# Patient Record
Sex: FEMALE | Race: WHITE | Employment: UNEMPLOYED | URBAN - METROPOLITAN AREA
[De-identification: names, ages, dates, MRNs, and addresses within clinical notes are randomized per-mention and may not be internally consistent; named-entity substitution may affect disease eponyms.]

---

## 2019-02-08 PROBLEM — F32.A MILD DEPRESSION: Status: ACTIVE | Noted: 2019-02-08

## 2019-04-08 PROBLEM — R73.01 IMPAIRED FASTING BLOOD SUGAR: Status: ACTIVE | Noted: 2019-04-08

## 2019-05-07 ENCOUNTER — HOSPITAL ENCOUNTER (OUTPATIENT)
Dept: ULTRASOUND IMAGING | Age: 64
Discharge: HOME | End: 2019-05-07
Attending: INTERNAL MEDICINE
Payer: COMMERCIAL

## 2019-05-07 DIAGNOSIS — M79.89 LEFT LEG SWELLING: ICD-10-CM

## 2019-05-07 PROCEDURE — 93971 EXTREMITY STUDY: CPT

## 2019-11-06 ENCOUNTER — HOSPITAL ENCOUNTER (OUTPATIENT)
Dept: MAMMOGRAPHY | Age: 64
Discharge: HOME | End: 2019-11-06
Attending: INTERNAL MEDICINE

## 2019-11-06 DIAGNOSIS — Z12.39 BREAST CANCER SCREENING: ICD-10-CM

## 2020-01-09 ENCOUNTER — HOSPITAL ENCOUNTER (OUTPATIENT)
Dept: GENERAL RADIOLOGY | Age: 65
Discharge: HOME | End: 2020-01-09

## 2020-01-09 DIAGNOSIS — R06.02 SHORTNESS OF BREATH: ICD-10-CM

## 2020-01-23 PROBLEM — R06.02 SOB (SHORTNESS OF BREATH): Status: ACTIVE | Noted: 2020-01-23

## 2020-01-23 PROBLEM — R60.0 LEG EDEMA: Status: ACTIVE | Noted: 2020-01-23

## 2020-01-30 ENCOUNTER — HOSPITAL ENCOUNTER (OUTPATIENT)
Dept: LAB | Age: 65
Discharge: HOME | End: 2020-01-30
Payer: COMMERCIAL

## 2020-01-30 DIAGNOSIS — R06.02 SOB (SHORTNESS OF BREATH): ICD-10-CM

## 2020-01-30 DIAGNOSIS — R60.0 LEG EDEMA: ICD-10-CM

## 2020-01-30 LAB — BNP SERPL-MCNC: 239 PG/ML (ref 5–125)

## 2020-01-30 PROCEDURE — 83880 ASSAY OF NATRIURETIC PEPTIDE: CPT

## 2020-01-30 PROCEDURE — 36415 COLL VENOUS BLD VENIPUNCTURE: CPT

## 2020-06-25 PROBLEM — J84.9 INTERSTITIAL LUNG DISEASE (HCC): Status: ACTIVE | Noted: 2020-06-25

## 2020-06-25 PROBLEM — R06.02 SOB (SHORTNESS OF BREATH): Status: RESOLVED | Noted: 2020-01-23 | Resolved: 2020-06-25

## 2020-09-09 ENCOUNTER — HOSPITAL ENCOUNTER (INPATIENT)
Age: 65
LOS: 7 days | Discharge: HOME HEALTH | DRG: 871 | End: 2020-09-17
Attending: EMERGENCY MEDICINE | Admitting: INTERNAL MEDICINE
Payer: COMMERCIAL

## 2020-09-09 ENCOUNTER — APPOINTMENT (OUTPATIENT)
Dept: GENERAL RADIOLOGY | Age: 65
DRG: 871 | End: 2020-09-09
Attending: EMERGENCY MEDICINE
Payer: COMMERCIAL

## 2020-09-09 DIAGNOSIS — E66.01 MORBID OBESITY (HCC): ICD-10-CM

## 2020-09-09 DIAGNOSIS — R09.02 HYPOXIA: ICD-10-CM

## 2020-09-09 DIAGNOSIS — A41.9 SEPSIS, DUE TO UNSPECIFIED ORGANISM, UNSPECIFIED WHETHER ACUTE ORGAN DYSFUNCTION PRESENT (HCC): Primary | ICD-10-CM

## 2020-09-09 DIAGNOSIS — L03.116 CELLULITIS OF LEFT LOWER LEG: ICD-10-CM

## 2020-09-09 DIAGNOSIS — R05.9 COUGH: ICD-10-CM

## 2020-09-09 PROBLEM — E11.9 CONTROLLED TYPE 2 DIABETES MELLITUS, WITHOUT LONG-TERM CURRENT USE OF INSULIN (HCC): Status: ACTIVE | Noted: 2020-09-09

## 2020-09-09 PROBLEM — R73.01 IMPAIRED FASTING BLOOD SUGAR: Status: RESOLVED | Noted: 2019-04-08 | Resolved: 2020-09-09

## 2020-09-09 LAB
ALBUMIN SERPL-MCNC: 2.7 G/DL (ref 3.2–4.6)
ALBUMIN/GLOB SERPL: 0.6 {RATIO} (ref 1.2–3.5)
ALP SERPL-CCNC: 102 U/L (ref 50–136)
ALT SERPL-CCNC: 47 U/L (ref 12–65)
ANION GAP SERPL CALC-SCNC: 6 MMOL/L (ref 7–16)
AST SERPL-CCNC: 134 U/L (ref 15–37)
BASOPHILS # BLD: 0 K/UL (ref 0–0.2)
BASOPHILS NFR BLD: 0 % (ref 0–2)
BILIRUB SERPL-MCNC: 0.4 MG/DL (ref 0.2–1.1)
BUN SERPL-MCNC: 12 MG/DL (ref 8–23)
CALCIUM SERPL-MCNC: 8.8 MG/DL (ref 8.3–10.4)
CHLORIDE SERPL-SCNC: 102 MMOL/L (ref 98–107)
CO2 SERPL-SCNC: 31 MMOL/L (ref 21–32)
CREAT SERPL-MCNC: 0.81 MG/DL (ref 0.6–1)
DIFFERENTIAL METHOD BLD: ABNORMAL
EOSINOPHIL # BLD: 0 K/UL (ref 0–0.8)
EOSINOPHIL NFR BLD: 0 % (ref 0.5–7.8)
ERYTHROCYTE [DISTWIDTH] IN BLOOD BY AUTOMATED COUNT: 15 % (ref 11.9–14.6)
GLOBULIN SER CALC-MCNC: 4.8 G/DL (ref 2.3–3.5)
GLUCOSE SERPL-MCNC: 150 MG/DL (ref 65–100)
HCT VFR BLD AUTO: 43.6 % (ref 35.8–46.3)
HGB BLD-MCNC: 14 G/DL (ref 11.7–15.4)
IMM GRANULOCYTES # BLD AUTO: 0.1 K/UL (ref 0–0.5)
IMM GRANULOCYTES NFR BLD AUTO: 1 % (ref 0–5)
LACTATE SERPL-SCNC: 0.7 MMOL/L (ref 0.4–2)
LYMPHOCYTES # BLD: 0.7 K/UL (ref 0.5–4.6)
LYMPHOCYTES NFR BLD: 6 % (ref 13–44)
MCH RBC QN AUTO: 29.2 PG (ref 26.1–32.9)
MCHC RBC AUTO-ENTMCNC: 32.1 G/DL (ref 31.4–35)
MCV RBC AUTO: 90.8 FL (ref 79.6–97.8)
MONOCYTES # BLD: 0.7 K/UL (ref 0.1–1.3)
MONOCYTES NFR BLD: 6 % (ref 4–12)
NEUTS SEG # BLD: 10.9 K/UL (ref 1.7–8.2)
NEUTS SEG NFR BLD: 87 % (ref 43–78)
NRBC # BLD: 0 K/UL (ref 0–0.2)
PLATELET # BLD AUTO: 175 K/UL (ref 150–450)
PMV BLD AUTO: 11.1 FL (ref 9.4–12.3)
POTASSIUM SERPL-SCNC: 3.7 MMOL/L (ref 3.5–5.1)
PROT SERPL-MCNC: 7.5 G/DL (ref 6.3–8.2)
RBC # BLD AUTO: 4.8 M/UL (ref 4.05–5.2)
SODIUM SERPL-SCNC: 139 MMOL/L (ref 136–145)
TROPONIN-HIGH SENSITIVITY: 67.1 PG/ML (ref 0–14)
WBC # BLD AUTO: 12.5 K/UL (ref 4.3–11.1)

## 2020-09-09 PROCEDURE — 93005 ELECTROCARDIOGRAM TRACING: CPT | Performed by: EMERGENCY MEDICINE

## 2020-09-09 PROCEDURE — 96367 TX/PROPH/DG ADDL SEQ IV INF: CPT

## 2020-09-09 PROCEDURE — 71045 X-RAY EXAM CHEST 1 VIEW: CPT

## 2020-09-09 PROCEDURE — 74011250637 HC RX REV CODE- 250/637: Performed by: EMERGENCY MEDICINE

## 2020-09-09 PROCEDURE — 87040 BLOOD CULTURE FOR BACTERIA: CPT

## 2020-09-09 PROCEDURE — 85025 COMPLETE CBC W/AUTO DIFF WBC: CPT

## 2020-09-09 PROCEDURE — 87635 SARS-COV-2 COVID-19 AMP PRB: CPT

## 2020-09-09 PROCEDURE — 96365 THER/PROPH/DIAG IV INF INIT: CPT

## 2020-09-09 PROCEDURE — 74011250636 HC RX REV CODE- 250/636: Performed by: EMERGENCY MEDICINE

## 2020-09-09 PROCEDURE — 84484 ASSAY OF TROPONIN QUANT: CPT

## 2020-09-09 PROCEDURE — 87205 SMEAR GRAM STAIN: CPT

## 2020-09-09 PROCEDURE — 80053 COMPREHEN METABOLIC PANEL: CPT

## 2020-09-09 PROCEDURE — 81003 URINALYSIS AUTO W/O SCOPE: CPT

## 2020-09-09 PROCEDURE — 87150 DNA/RNA AMPLIFIED PROBE: CPT

## 2020-09-09 PROCEDURE — 74011000258 HC RX REV CODE- 258: Performed by: EMERGENCY MEDICINE

## 2020-09-09 PROCEDURE — 83605 ASSAY OF LACTIC ACID: CPT

## 2020-09-09 PROCEDURE — 99283 EMERGENCY DEPT VISIT LOW MDM: CPT

## 2020-09-09 PROCEDURE — 87186 SC STD MICRODIL/AGAR DIL: CPT

## 2020-09-09 PROCEDURE — 87077 CULTURE AEROBIC IDENTIFY: CPT

## 2020-09-09 RX ORDER — IBUPROFEN 400 MG/1
400 TABLET ORAL
Status: COMPLETED | OUTPATIENT
Start: 2020-09-09 | End: 2020-09-09

## 2020-09-09 RX ORDER — ACETAMINOPHEN 500 MG
1000 TABLET ORAL
Status: COMPLETED | OUTPATIENT
Start: 2020-09-09 | End: 2020-09-09

## 2020-09-09 RX ADMIN — ACETAMINOPHEN 1000 MG: 500 TABLET, FILM COATED ORAL at 22:57

## 2020-09-09 RX ADMIN — IBUPROFEN 400 MG: 400 TABLET, FILM COATED ORAL at 23:55

## 2020-09-09 RX ADMIN — AZITHROMYCIN MONOHYDRATE 500 MG: 500 INJECTION, POWDER, LYOPHILIZED, FOR SOLUTION INTRAVENOUS at 22:57

## 2020-09-09 RX ADMIN — CEFTRIAXONE SODIUM 2 G: 2 INJECTION, POWDER, FOR SOLUTION INTRAMUSCULAR; INTRAVENOUS at 22:05

## 2020-09-10 PROBLEM — R09.02 HYPOXIA: Status: ACTIVE | Noted: 2020-09-10

## 2020-09-10 PROBLEM — A41.9 SEPSIS (HCC): Status: ACTIVE | Noted: 2020-09-10

## 2020-09-10 PROBLEM — R74.8 ABNORMAL CARDIAC ENZYME LEVEL: Status: ACTIVE | Noted: 2020-09-10

## 2020-09-10 PROBLEM — L03.90 CELLULITIS: Status: ACTIVE | Noted: 2020-09-10

## 2020-09-10 PROBLEM — L03.116 CELLULITIS OF LEFT LOWER EXTREMITY: Status: ACTIVE | Noted: 2020-09-10

## 2020-09-10 PROBLEM — Z20.822 SUSPECTED COVID-19 VIRUS INFECTION: Status: ACTIVE | Noted: 2020-09-10

## 2020-09-10 LAB
ABO + RH BLD: NORMAL
ACC. NO. FROM MICRO ORDER, ACCP: ABNORMAL
ATRIAL RATE: 104 BPM
BACTERIA URNS QL MICRO: NORMAL /HPF
BLOOD GROUP ANTIBODIES SERPL: NORMAL
CALCULATED P AXIS, ECG09: 74 DEGREES
CALCULATED R AXIS, ECG10: 26 DEGREES
CALCULATED T AXIS, ECG11: 60 DEGREES
CASTS URNS QL MICRO: 0 /LPF
COVID-19 RAPID TEST, COVR: NOT DETECTED
CRYSTALS URNS QL MICRO: NORMAL /LPF
DIAGNOSIS, 93000: NORMAL
EPI CELLS #/AREA URNS HPF: NORMAL /HPF
GLUCOSE BLD STRIP.AUTO-MCNC: 129 MG/DL (ref 65–100)
GLUCOSE BLD STRIP.AUTO-MCNC: 134 MG/DL (ref 65–100)
GLUCOSE BLD STRIP.AUTO-MCNC: 141 MG/DL (ref 65–100)
GLUCOSE BLD STRIP.AUTO-MCNC: 161 MG/DL (ref 65–100)
INTERPRETATION: ABNORMAL
MUCOUS THREADS URNS QL MICRO: 0 /LPF
P-R INTERVAL, ECG05: 184 MS
Q-T INTERVAL, ECG07: 310 MS
QRS DURATION, ECG06: 74 MS
QTC CALCULATION (BEZET), ECG08: 407 MS
RBC #/AREA URNS HPF: NORMAL /HPF
SARS COV-2, XPGCVT: NEGATIVE
SOURCE, COVRS: NORMAL
SPECIMEN EXP DATE BLD: NORMAL
STREPTOCOCCUS , STPSP: DETECTED
TROPONIN-HIGH SENSITIVITY: 38 PG/ML (ref 0–14)
TROPONIN-HIGH SENSITIVITY: 39.4 PG/ML (ref 0–14)
TROPONIN-HIGH SENSITIVITY: 67 PG/ML (ref 0–14)
VENTRICULAR RATE, ECG03: 104 BPM
WBC URNS QL MICRO: NORMAL /HPF

## 2020-09-10 PROCEDURE — 84484 ASSAY OF TROPONIN QUANT: CPT

## 2020-09-10 PROCEDURE — 94760 N-INVAS EAR/PLS OXIMETRY 1: CPT

## 2020-09-10 PROCEDURE — 74011250637 HC RX REV CODE- 250/637: Performed by: INTERNAL MEDICINE

## 2020-09-10 PROCEDURE — 74011250636 HC RX REV CODE- 250/636: Performed by: INTERNAL MEDICINE

## 2020-09-10 PROCEDURE — 65270000029 HC RM PRIVATE

## 2020-09-10 PROCEDURE — 94640 AIRWAY INHALATION TREATMENT: CPT

## 2020-09-10 PROCEDURE — 74011000258 HC RX REV CODE- 258: Performed by: INTERNAL MEDICINE

## 2020-09-10 PROCEDURE — 82962 GLUCOSE BLOOD TEST: CPT

## 2020-09-10 PROCEDURE — 77030040393 HC DRSG OPTIFOAM GENT MDII -B

## 2020-09-10 PROCEDURE — 81015 MICROSCOPIC EXAM OF URINE: CPT

## 2020-09-10 PROCEDURE — 36415 COLL VENOUS BLD VENIPUNCTURE: CPT

## 2020-09-10 PROCEDURE — 86900 BLOOD TYPING SEROLOGIC ABO: CPT

## 2020-09-10 PROCEDURE — 74011636637 HC RX REV CODE- 636/637: Performed by: INTERNAL MEDICINE

## 2020-09-10 PROCEDURE — 77010033678 HC OXYGEN DAILY

## 2020-09-10 PROCEDURE — 74011250637 HC RX REV CODE- 250/637

## 2020-09-10 RX ORDER — NYSTATIN 100000 [USP'U]/G
POWDER TOPICAL
Status: COMPLETED
Start: 2020-09-10 | End: 2020-09-10

## 2020-09-10 RX ORDER — PROMETHAZINE HYDROCHLORIDE 25 MG/1
12.5 TABLET ORAL
Status: DISCONTINUED | OUTPATIENT
Start: 2020-09-10 | End: 2020-09-17 | Stop reason: HOSPADM

## 2020-09-10 RX ORDER — NYSTATIN 100000 [USP'U]/G
POWDER TOPICAL 2 TIMES DAILY
Status: DISCONTINUED | OUTPATIENT
Start: 2020-09-10 | End: 2020-09-17 | Stop reason: HOSPADM

## 2020-09-10 RX ORDER — BUDESONIDE AND FORMOTEROL FUMARATE DIHYDRATE 80; 4.5 UG/1; UG/1
2 AEROSOL RESPIRATORY (INHALATION)
Status: DISCONTINUED | OUTPATIENT
Start: 2020-09-10 | End: 2020-09-17 | Stop reason: HOSPADM

## 2020-09-10 RX ORDER — ONDANSETRON 2 MG/ML
4 INJECTION INTRAMUSCULAR; INTRAVENOUS
Status: DISCONTINUED | OUTPATIENT
Start: 2020-09-10 | End: 2020-09-17 | Stop reason: HOSPADM

## 2020-09-10 RX ORDER — SODIUM CHLORIDE 0.9 % (FLUSH) 0.9 %
5-40 SYRINGE (ML) INJECTION EVERY 8 HOURS
Status: DISCONTINUED | OUTPATIENT
Start: 2020-09-10 | End: 2020-09-17 | Stop reason: HOSPADM

## 2020-09-10 RX ORDER — POTASSIUM CHLORIDE 20 MEQ/1
20 TABLET, EXTENDED RELEASE ORAL DAILY
Status: DISCONTINUED | OUTPATIENT
Start: 2020-09-10 | End: 2020-09-17 | Stop reason: HOSPADM

## 2020-09-10 RX ORDER — SODIUM CHLORIDE 9 MG/ML
75 INJECTION, SOLUTION INTRAVENOUS CONTINUOUS
Status: DISCONTINUED | OUTPATIENT
Start: 2020-09-10 | End: 2020-09-11

## 2020-09-10 RX ORDER — VANCOMYCIN 1.75 GRAM/500 ML IN 0.9 % SODIUM CHLORIDE INTRAVENOUS
1750 EVERY 12 HOURS
Status: DISCONTINUED | OUTPATIENT
Start: 2020-09-10 | End: 2020-09-10

## 2020-09-10 RX ORDER — BUDESONIDE AND FORMOTEROL FUMARATE DIHYDRATE 80; 4.5 UG/1; UG/1
2 AEROSOL RESPIRATORY (INHALATION) 2 TIMES DAILY
Status: DISCONTINUED | OUTPATIENT
Start: 2020-09-10 | End: 2020-09-10

## 2020-09-10 RX ORDER — ENOXAPARIN SODIUM 100 MG/ML
40 INJECTION SUBCUTANEOUS EVERY 12 HOURS
Status: DISCONTINUED | OUTPATIENT
Start: 2020-09-10 | End: 2020-09-17 | Stop reason: HOSPADM

## 2020-09-10 RX ORDER — ACETAMINOPHEN 325 MG/1
650 TABLET ORAL
Status: DISCONTINUED | OUTPATIENT
Start: 2020-09-10 | End: 2020-09-17 | Stop reason: HOSPADM

## 2020-09-10 RX ORDER — ACETAMINOPHEN 650 MG/1
650 SUPPOSITORY RECTAL
Status: DISCONTINUED | OUTPATIENT
Start: 2020-09-10 | End: 2020-09-17 | Stop reason: HOSPADM

## 2020-09-10 RX ORDER — INSULIN LISPRO 100 [IU]/ML
INJECTION, SOLUTION INTRAVENOUS; SUBCUTANEOUS
Status: DISCONTINUED | OUTPATIENT
Start: 2020-09-10 | End: 2020-09-17 | Stop reason: HOSPADM

## 2020-09-10 RX ORDER — POLYETHYLENE GLYCOL 3350 17 G/17G
17 POWDER, FOR SOLUTION ORAL DAILY PRN
Status: DISCONTINUED | OUTPATIENT
Start: 2020-09-10 | End: 2020-09-17 | Stop reason: HOSPADM

## 2020-09-10 RX ORDER — METOPROLOL SUCCINATE 50 MG/1
25 TABLET, EXTENDED RELEASE ORAL DAILY
Status: DISCONTINUED | OUTPATIENT
Start: 2020-09-10 | End: 2020-09-17 | Stop reason: HOSPADM

## 2020-09-10 RX ORDER — SODIUM CHLORIDE 0.9 % (FLUSH) 0.9 %
5-40 SYRINGE (ML) INJECTION AS NEEDED
Status: DISCONTINUED | OUTPATIENT
Start: 2020-09-10 | End: 2020-09-17 | Stop reason: HOSPADM

## 2020-09-10 RX ORDER — FUROSEMIDE 40 MG/1
40 TABLET ORAL DAILY
Status: DISCONTINUED | OUTPATIENT
Start: 2020-09-10 | End: 2020-09-17 | Stop reason: HOSPADM

## 2020-09-10 RX ADMIN — ENOXAPARIN SODIUM 40 MG: 40 INJECTION SUBCUTANEOUS at 09:44

## 2020-09-10 RX ADMIN — NYSTATIN: 100000 POWDER TOPICAL at 18:00

## 2020-09-10 RX ADMIN — Medication 5 ML: at 01:10

## 2020-09-10 RX ADMIN — Medication 10 ML: at 06:16

## 2020-09-10 RX ADMIN — Medication 10 ML: at 21:51

## 2020-09-10 RX ADMIN — ENOXAPARIN SODIUM 40 MG: 40 INJECTION SUBCUTANEOUS at 21:50

## 2020-09-10 RX ADMIN — FUROSEMIDE 40 MG: 40 TABLET ORAL at 09:44

## 2020-09-10 RX ADMIN — SODIUM CHLORIDE 75 ML/HR: 900 INJECTION, SOLUTION INTRAVENOUS at 02:10

## 2020-09-10 RX ADMIN — BUDESONIDE AND FORMOTEROL FUMARATE DIHYDRATE 2 PUFF: 80; 4.5 AEROSOL RESPIRATORY (INHALATION) at 08:25

## 2020-09-10 RX ADMIN — NYSTATIN: 100000 POWDER TOPICAL at 06:00

## 2020-09-10 RX ADMIN — METOPROLOL SUCCINATE 25 MG: 50 TABLET, EXTENDED RELEASE ORAL at 09:44

## 2020-09-10 RX ADMIN — Medication 10 ML: at 14:18

## 2020-09-10 RX ADMIN — CEFTRIAXONE SODIUM 1 G: 1 INJECTION, POWDER, FOR SOLUTION INTRAMUSCULAR; INTRAVENOUS at 21:50

## 2020-09-10 RX ADMIN — BUDESONIDE AND FORMOTEROL FUMARATE DIHYDRATE 2 PUFF: 80; 4.5 AEROSOL RESPIRATORY (INHALATION) at 19:27

## 2020-09-10 RX ADMIN — POTASSIUM CHLORIDE 20 MEQ: 20 TABLET, EXTENDED RELEASE ORAL at 09:44

## 2020-09-10 RX ADMIN — NYSTATIN: 100000 POWDER TOPICAL at 09:00

## 2020-09-10 RX ADMIN — INSULIN LISPRO 2 UNITS: 100 INJECTION, SOLUTION INTRAVENOUS; SUBCUTANEOUS at 21:51

## 2020-09-10 RX ADMIN — VANCOMYCIN HYDROCHLORIDE 2500 MG: 10 INJECTION, POWDER, LYOPHILIZED, FOR SOLUTION INTRAVENOUS at 02:10

## 2020-09-10 NOTE — PROGRESS NOTES
Pt resting in bed awake watching tv. On 2L NC. No s/sx of distress noted at this time. Denies any pain. Prurewick in place and draining clear luis urine. NS infusing at 75ml/hr. Encouraged to call for assistance as needed. Call light within reach. Will continue to monitor.

## 2020-09-10 NOTE — PROGRESS NOTES
's visit via telephone call attempted. The call was unanswered. Chaplains remain available follow-up.      Jorge Crocker MDIV, [facility]

## 2020-09-10 NOTE — PROGRESS NOTES
Patient arrived to floor on stretcher via patient transport. Patient alert and oriented to person, place, and situation. Respirations even and unlabored on 3L NC. Oriented patient to unit, call light system, and surroundings. Instructed patient to utilize call light for any needs and not to ambulate without assistance. Patient verbalized understanding. Denies any needs at this time. Bed low and locked. Bedside table, personal belongings and call light within reach.

## 2020-09-10 NOTE — ED PROVIDER NOTES
The history is provided by the patient. Fatigue   This is a new problem. The current episode started yesterday. The problem has been gradually worsening. There was no focality noted. Primary symptoms comment: Fever cough generalized weakness. There has been a fever of 101 - 101.9 F. Pertinent negatives include no shortness of breath, no chest pain, no vomiting, no altered mental status, no confusion, no headaches and no nausea.         Past Medical History:   Diagnosis Date    Chronic pain     pain R knee    Hypertension     Morbid obesity (HCC)     BMI 45.8- 12    Positive PPD     had vaccination as a child - BCG       Past Surgical History:   Procedure Laterality Date    HX GYN      C-sec x 1 with GA    HX KNEE ARTHROSCOPY      bilateral knees         Family History:   Problem Relation Age of Onset    Other Mother         kidney failure    Cancer Sister         cervical cancer       Social History     Socioeconomic History    Marital status:      Spouse name: Not on file    Number of children: Not on file    Years of education: Not on file    Highest education level: Not on file   Occupational History    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     Social Needs    Financial resource strain: Not on file    Food insecurity     Worry: Not on file     Inability: Not on file   [employer] needs     Medical: Not on file     Non-medical: Not on file   Tobacco Use    Smoking status: Former Smoker     Packs/day: 0.50     Years: 25.00     Pack years: 12.50     Types: Cigarettes     Last attempt to quit: 2007     Years since quittin.5    Smokeless tobacco: Never Used   Substance and Sexual Activity    Alcohol use: No    Drug use: No    Sexual activity: Not on file   Lifestyle    Physical activity     Days per week: Not on file     Minutes per session: Not on file    Stress: Not on file   Relationships    Social connections     Talks on phone: Not on file     Gets together: Not on file Attends Restorationism service: Not on file     Active member of club or organization: Not on file     Attends meetings of clubs or organizations: Not on file     Relationship status: Not on file    Intimate partner violence     Fear of current or ex partner: Not on file     Emotionally abused: Not on file     Physically abused: Not on file     Forced sexual activity: Not on file   Other Topics Concern    Not on file   Social History Narrative    Not on file         ALLERGIES: Lortab [hydrocodone-acetaminophen]    Review of Systems   Constitutional: Positive for fatigue. Negative for chills and fever. HENT: Negative for congestion, rhinorrhea and sore throat. Eyes: Negative for photophobia and redness. Respiratory: Positive for cough. Negative for shortness of breath. Cardiovascular: Negative for chest pain and leg swelling. Gastrointestinal: Negative for abdominal pain, diarrhea, nausea and vomiting. Endocrine: Negative for polydipsia and polyuria. Genitourinary: Negative for dysuria, frequency and urgency. Musculoskeletal: Negative for back pain and myalgias. Skin: Positive for color change ( Left lower leg). Neurological: Negative for weakness, numbness and headaches. Psychiatric/Behavioral: Negative for confusion. Vitals:    09/09/20 2121   BP: 177/81   Pulse: (!) 103   Resp: 24   Temp: (!) 103.1 °F (39.5 °C)   SpO2: (!) 88%   Weight: 158.8 kg (350 lb)   Height: 5' 7\" (1.702 m)            Physical Exam  Vitals signs and nursing note reviewed. Constitutional:       General: She is not in acute distress. Appearance: She is well-developed. She is obese. HENT:      Head: Normocephalic. Eyes:      Conjunctiva/sclera: Conjunctivae normal.      Pupils: Pupils are equal, round, and reactive to light. Cardiovascular:      Rate and Rhythm: Normal rate and regular rhythm. Heart sounds: Normal heart sounds.    Pulmonary:      Effort: Pulmonary effort is normal.      Breath sounds: Normal breath sounds. Abdominal:      General: There is no distension. Palpations: Abdomen is soft. There is no mass. Tenderness: There is no abdominal tenderness. There is no guarding or rebound. Musculoskeletal: Normal range of motion. Lymphadenopathy:      Cervical: No cervical adenopathy. Skin:     General: Skin is warm and dry. Findings: Erythema ( Warmth and erythema left lower anterolateral leg with a few areas of superficial skin breakdown.) present. Neurological:      Mental Status: She is alert. MDM  Number of Diagnoses or Management Options  Diagnosis management comments: Septic and coronavirus work-up initiated. Pneumonia unlikely given hypoxia, PE unlikely giving denial of shortness of breath or chest pain. Cellulitis may be the source as well but it does not explain the hypoxia. Anterior lateral erythema more consistent with cellulitis and not DVT.        Amount and/or Complexity of Data Reviewed  Clinical lab tests: ordered and reviewed  Tests in the radiology section of CPT®: ordered and reviewed           Procedures

## 2020-09-10 NOTE — ROUTINE PROCESS
TRANSFER - OUT REPORT: 
 
Verbal report given to lawson(name) on Lizzette  being transferred to [facility](unit) for routine progression of care Report consisted of patients Situation, Background, Assessment and  
Recommendations(SBAR). Information from the following report(s) SBAR was reviewed with the receiving nurse. Lines:  
Peripheral IV 09/10/20 Left Hand (Active) Site Assessment Clean, dry, & intact 09/10/20 0030 Phlebitis Assessment 0 09/10/20 1750 Infiltration Assessment 0 09/10/20 0081 Dressing Status Clean, dry, & intact 09/10/20 6872 Hub Color/Line Status Pink 09/10/20 2843 Saline Lock 09/09/20 Right Antecubital (Active) Opportunity for questions and clarification was provided. Patient transported with: 
 O2 @ 4 liters

## 2020-09-10 NOTE — H&P
Hospitalist H&P Note     Admit Date:  2020  9:25 PM   Name:  Lazarus Pair   Age:  63 y.o.  :  1955   MRN:  [id number]   PCP:  Bambi Cortez MD  Treatment Team: Attending Provider: Desmond Lockhart DO; Primary Nurse: Jackie Cates    HPI:       Chief complaintcough, fever, malaise body aches cellulitis left lower extremity    1 week history of productive cough today progressive weakness and myalgias with fevers as high as 101.9 degrees. Last several days developing cellulitis left lower extremity and she reports that she had to cover her pretibial ulceration because her dog [place] it. She claims that she has not been out of her house to be exposed to COVID-19 but she is being admitted with suspected infection despite initial rapid screen negative. She is 63years of age with history of possible mild interstitial lung disease by available records, morbid obesity with dyspnea on exertion related to as well as chronic lower extremity edema related to obesity for which she takes daily Lasix and potassium. Hypertension and a new diagnosis of diabetes mellitus. She has had progressive weakness and sought care today because she could get off of her couch. She was hypoxemic at time of presentation with reported 87% on room air but documented 88% on room air. Her white blood count is 12,500 with 87% neutrophils. Serum lactic acid is normal serum creatinine is less than 1 she has a glucose of 150 her liver transaminases are abnormal with  and ALT of 47. Her troponin I is elevated but EKG shows no ischemic changes and she does not complain of any chest pain or pressure. 10 systems reviewed and negative except as noted in HPI.   Past Medical History:   Diagnosis Date    Chronic pain     pain R knee    Hypertension     Morbid obesity ([facility]     BMI 45.8- 12    Positive PPD     had vaccination as a child - BCG      Past Surgical History:   Procedure Laterality Date    HX GYN      C-sec x 1 with GA    HX KNEE ARTHROSCOPY      bilateral knees      Allergies   Allergen Reactions    Lortab [Hydrocodone-Acetaminophen] Nausea Only and Other (comments)     \"It makes me feel hung over\"      Social History     Tobacco Use    Smoking status: Former Smoker     Packs/day: 0.50     Years: 25.00     Pack years: 12.50     Types: Cigarettes     Last attempt to quit: 2007     Years since quittin.6    Smokeless tobacco: Never Used   Substance Use Topics    Alcohol use: No      Family History   Problem Relation Age of Onset    Other Mother         kidney failure    Cancer Sister         cervical cancer      Immunization History   Administered Date(s) Administered    BCG Vaccine 1960     PTA Medications:  Prior to Admission Medications   Prescriptions Last Dose Informant Patient Reported? Taking?   budesonide-formoteroL (SYMBICORT) 80-4.5 mcg/actuation HFAA   No No   Sig: Take 2 Puffs by inhalation two (2) times a day. furosemide (LASIX) 40 mg tablet   No No   Sig: Take 1 Tab by mouth daily. metoprolol succinate (TOPROL-XL) 25 mg XL tablet   No No   Sig: Take 1 Tab by mouth daily. potassium chloride (K-DUR, KLOR-CON) 20 mEq tablet   No No   Sig: Take 1 Tab by mouth daily. Facility-Administered Medications: None       Objective:     Patient Vitals for the past 24 hrs:   Temp Pulse Resp BP SpO2   20  (!) 110  145/85 93 %   20 (!) 103.1 °F (39.5 °C) (!) 103 24 177/81 (!) 88 %     Oxygen Therapy  O2 Sat (%): 93 % (20)  Pulse via Oximetry: 106 beats per minute (20)  O2 Device: Room air (20)  No intake or output data in the 24 hours ending 09/10/20 0118    Physical Exam:  General:    Malaise, myalgias and weakness  Eyes:   Normal sclera. Extraocular movements intact. ENT:  Normocephalic, atraumatic. Moist mucous membranes  Neck:  Supple, no lymphadenopathy or JVD  CV:   Tachycardic but regular no m/r/g. Peripheral pulses 2+. Capillary refill <2s. Lungs:  Creased breath sounds at the basesmorbidly obese may be obscuring breath sounds  Abdomen: Soft, nontender, nondistended. Extremities: Warm and dry. Bilateral lower extremity edema  Neurologic: CN II-XII grossly intact. Sensation intact. Skin:     Colitis left lower extremity pretibial with anterior tibial ulceration covered  Psych:  Normal mood and affect. I reviewed the labs, imaging, EKGs, telemetry, and other studies done this admission. Data Review:   Recent Results (from the past 24 hour(s))   CBC WITH AUTOMATED DIFF    Collection Time: 09/09/20  9:36 PM   Result Value Ref Range    WBC 12.5 (H) 4.3 - 11.1 K/uL    RBC 4.80 4.05 - 5.2 M/uL    HGB 14.0 11.7 - 15.4 g/dL    HCT 43.6 35.8 - 46.3 %    MCV 90.8 79.6 - 97.8 FL    MCH 29.2 26.1 - 32.9 PG    MCHC 32.1 31.4 - 35.0 g/dL    RDW 15.0 (H) 11.9 - 14.6 %    PLATELET 444 647 - 592 K/uL    MPV 11.1 9.4 - 12.3 FL    ABSOLUTE NRBC 0.00 0.0 - 0.2 K/uL    DF AUTOMATED      NEUTROPHILS 87 (H) 43 - 78 %    LYMPHOCYTES 6 (L) 13 - 44 %    MONOCYTES 6 4.0 - 12.0 %    EOSINOPHILS 0 (L) 0.5 - 7.8 %    BASOPHILS 0 0.0 - 2.0 %    IMMATURE GRANULOCYTES 1 0.0 - 5.0 %    ABS. NEUTROPHILS 10.9 (H) 1.7 - 8.2 K/UL    ABS. LYMPHOCYTES 0.7 0.5 - 4.6 K/UL    ABS. MONOCYTES 0.7 0.1 - 1.3 K/UL    ABS. EOSINOPHILS 0.0 0.0 - 0.8 K/UL    ABS. BASOPHILS 0.0 0.0 - 0.2 K/UL    ABS. IMM.  GRANS. 0.1 0.0 - 0.5 K/UL   METABOLIC PANEL, COMPREHENSIVE    Collection Time: 09/09/20  9:36 PM   Result Value Ref Range    Sodium 139 136 - 145 mmol/L    Potassium 3.7 3.5 - 5.1 mmol/L    Chloride 102 98 - 107 mmol/L    CO2 31 21 - 32 mmol/L    Anion gap 6 (L) 7 - 16 mmol/L    Glucose 150 (H) 65 - 100 mg/dL    BUN 12 8 - 23 MG/DL    Creatinine 0.81 0.6 - 1.0 MG/DL    GFR est AA >60 >60 ml/min/1.73m2    GFR est non-AA >60 >60 ml/min/1.73m2    Calcium 8.8 8.3 - 10.4 MG/DL    Bilirubin, total 0.4 0.2 - 1.1 MG/DL    ALT (SGPT) 47 12 - 65 U/L    AST (SGOT) 134 (H) 15 - 37 U/L    Alk. phosphatase 102 50 - 136 U/L    Protein, total 7.5 6.3 - 8.2 g/dL    Albumin 2.7 (L) 3.2 - 4.6 g/dL    Globulin 4.8 (H) 2.3 - 3.5 g/dL    A-G Ratio 0.6 (L) 1.2 - 3.5     TROPONIN-HIGH SENSITIVITY    Collection Time: 09/09/20  9:36 PM   Result Value Ref Range    Troponin-High Sensitivity 67.1 (HH) 0 - 14 pg/mL   EKG, 12 LEAD, INITIAL    Collection Time: 09/09/20  9:38 PM   Result Value Ref Range    Ventricular Rate 104 BPM    Atrial Rate 104 BPM    P-R Interval 184 ms    QRS Duration 74 ms    Q-T Interval 310 ms    QTC Calculation (Bezet) 407 ms    Calculated P Axis 74 degrees    Calculated R Axis 26 degrees    Calculated T Axis 60 degrees    Diagnosis       !! AGE AND GENDER SPECIFIC ECG ANALYSIS !! Sinus tachycardia  Anteroseptal infarct , age undetermined  Abnormal ECG  No previous ECGs available     SARS-COV-2    Collection Time: 09/09/20 10:56 PM   Result Value Ref Range    Specimen source Nasopharyngeal      COVID-19 rapid test Not detected NOTD      SARS CoV-2 PENDING    LACTIC ACID    Collection Time: 09/09/20 10:56 PM   Result Value Ref Range    Lactic acid 0.7 0.4 - 2.0 MMOL/L       All Micro Results     Procedure Component Value Units Date/Time    CULTURE, BLOOD [[id number]] Collected:  09/09/20 2157    Order Status:  Completed Specimen:  Blood Updated:  09/09/20 2216    CULTURE, BLOOD [[id number]] Collected:  09/09/20 2136    Order Status:  Completed Specimen:  Blood Updated:  09/09/20 2216          Other Studies:  Xr Chest Port    Result Date: 9/9/2020  Portable AP upright chest dated 9/9/2020 at 2135 hours Prior exam 1/9/2020 CLINICAL INFORMATION: Cough and fever Heart is enlarged, mediastinum unremarkable. Pulmonary vascularity is normal and lungs clear. No pleural effusion.      IMPRESSION: No acute acute abnormality      Assessment and Plan:     Hospital Problems as of 9/10/2020 Date Reviewed: 2/18/2020          Codes Class Noted - Resolved POA    * (Principal) Sepsis [facility]) ICD-10-CM: A41.9  ICD-9-CM: 038.9, 995.91  9/10/2020 - Present Unknown        Cellulitis of left lower extremity ICD-10-CM: L03.116  ICD-9-CM: 682.6  9/10/2020 - Present Unknown        Suspected COVID-19 virus infection ICD-10-CM: Z20.828  ICD-9-CM: V01.79  9/10/2020 - Present Unknown        Abnormal cardiac enzyme level ICD-10-CM: R74.8  ICD-9-CM: 790.5  9/10/2020 - Present Unknown        Hypoxia ICD-10-CM: R09.02  ICD-9-CM: 799.02  9/10/2020 - Present Unknown        Cellulitis ICD-10-CM: L03.90  ICD-9-CM: 682.9  9/10/2020 - Present Unknown        Controlled type 2 diabetes mellitus, without long-term current use of insulin ([facility] ICD-10-CM: E11.9  ICD-9-CM: 250.00  9/9/2020 - Present Yes        Interstitial lung disease ([facility] ICD-10-CM: J84.9  ICD-9-CM: 515  6/25/2020 - Present Yes        Leg edema ICD-10-CM: R60.0  ICD-9-CM: 782.3  1/23/2020 - Present Yes        Hypertension ICD-10-CM: I10  ICD-9-CM: 401.9  Unknown - Present Yes        Mild depression ([facility] ICD-10-CM: F32.0  ICD-9-CM: 950  2/8/2019 - Present Yes        Morbid obesity ([facility] ICD-10-CM: E66.01  ICD-9-CM: 278.01  Unknown - Present Yes    Overview Signed 2/8/2019 11:59 AM by Gavi Hernandez MD     BMI 45.8- 5/2/12                   PLAN:  --- Sepsis secondary to left lower extremity cellulitis but COVID-19 suspect. Vancomycin Rocephin, blood culture sent. IV fluids but not bolus due to COVID suspect. Type and screened and if confirmed COVID-19 consider convalescent plasma and ID consultation to evaluate for remdesivir and would start Decadron confirmed positive, or clinical suspicion remains and her clinical course deteriorates.   Supportive oxygenation regarding hypoxia    --- Diagnoses diabetes mellitus type 2recheck hemoglobin A1csliding-scale insulin coverage and diabetic diet    --Abnormal LFTsfollow this along with abnormal troponin I may be explained by sepsis/COVID-19 suspected    --Oxygenation as neededconsider CT chest inching to further delineate ported interstitial lung disease mild by outpatient records    Discharge planning: Home with family  DVT ppx: Lovenox    Code status: Full code  Decision Maker: Self, contacts on file      Admit status: Inpatient          Estimated LOS: Greater than 2 midnights  Risk:  high    Signed:  Shawn Mccurdy DO

## 2020-09-10 NOTE — WOUND CARE
Bilateral lower legs with 3 + pitting edema, left leg with small open areas consistent with venous stasis, patient states she allows her dog to lick them daily, concern for cellulitis. Recommend xeroform abd and ace from toes to just below knee. Will monitor.

## 2020-09-10 NOTE — PROGRESS NOTES
Provided dulce care to patient and placed clean purewick external female catheter. Completed dual skin assessment with Susanne Fonseca RN. Wound care completed for left lower leg wound/cellulitis. Wound cleansed with wound cleansing spray, patted dry. Telfa non-adherent dressing applied with kerlex to secure. Patient declined changing into to hospital gown at this time. Patient denies any needs at this time. Bed remains low and locked. Bedside table, personal belongings and call light within reach.

## 2020-09-10 NOTE — PROGRESS NOTES
Pharmacokinetic Consult to Pharmacist    Germania Tamara is a 63 y.o. female being treated for cellulitis/sepsis with vancomycin and rocephin. Height: 5' 7\" (170.2 cm)  Weight: 158.8 kg (350 lb)  Lab Results   Component Value Date/Time    BUN 12 09/09/2020 09:36 PM    Creatinine 0.81 09/09/2020 09:36 PM    WBC 12.5 (H) 09/09/2020 09:36 PM    Lactic acid 0.7 09/09/2020 10:56 PM      Estimated Creatinine Clearance: 112.8 mL/min (based on SCr of 0.81 mg/dL). CULTURES:  9/9 BCx: pending      Day 1 of vancomycin. Goal trough is 15 -20. Vancomycin dose initiated at 2.5g load , then 1750mg q 12h. Will continue to follow patient and order levels when clinically indicated.     Thank you,  Rosmery Fox, PharmD  Clinical Pharmacist  [phone]

## 2020-09-10 NOTE — PROGRESS NOTES
Pt resting in bed with eyes closed. Awakens when spoken to. Alert and oriented times 3. On 2L NC. No s/sx of distress noted. Denies any pain. Encouraged to call for assistance as needed. Call light within reach. Will continue to monitor.

## 2020-09-10 NOTE — PROGRESS NOTES
Pt resting in bed with eyes closed. Awakens when spoken to. No s/sx of distress noted. Denies any pain. Call light within reach. Will monitor.

## 2020-09-10 NOTE — PROGRESS NOTES
TRANSFER - IN REPORT:    Verbal report received from OPAL Goodwin on CONOR.ROXANE Rice  being received from Emergency Department for routine progression of care. Report consisted of patients Situation, Background, Assessment and   Recommendations(SBAR). Information from the following report(s) SBAR, Kardex, ED Summary, [facility] and Recent Results was reviewed with the receiving nurse. Opportunity for questions and clarification was provided. Assessment will be completed upon patients arrival to unit and care assumed.

## 2020-09-11 PROBLEM — F32.A MILD DEPRESSION: Chronic | Status: ACTIVE | Noted: 2019-02-08

## 2020-09-11 PROBLEM — R60.0 LEG EDEMA: Chronic | Status: ACTIVE | Noted: 2020-01-23

## 2020-09-11 PROBLEM — E11.9 CONTROLLED TYPE 2 DIABETES MELLITUS, WITHOUT LONG-TERM CURRENT USE OF INSULIN (HCC): Chronic | Status: ACTIVE | Noted: 2020-09-09

## 2020-09-11 PROBLEM — J84.9 INTERSTITIAL LUNG DISEASE (HCC): Chronic | Status: ACTIVE | Noted: 2020-06-25

## 2020-09-11 PROBLEM — I24.8 DEMAND ISCHEMIA (HCC): Status: ACTIVE | Noted: 2020-09-10

## 2020-09-11 LAB
ALBUMIN SERPL-MCNC: 2.5 G/DL (ref 3.2–4.6)
ALBUMIN/GLOB SERPL: 0.5 {RATIO} (ref 1.2–3.5)
ALP SERPL-CCNC: 103 U/L (ref 50–136)
ALT SERPL-CCNC: 47 U/L (ref 12–65)
ANION GAP SERPL CALC-SCNC: 5 MMOL/L (ref 7–16)
AST SERPL-CCNC: 84 U/L (ref 15–37)
BASOPHILS # BLD: 0 K/UL (ref 0–0.2)
BASOPHILS NFR BLD: 1 % (ref 0–2)
BILIRUB SERPL-MCNC: 0.2 MG/DL (ref 0.2–1.1)
BNP SERPL-MCNC: 220 PG/ML (ref 5–125)
BUN SERPL-MCNC: 14 MG/DL (ref 8–23)
CALCIUM SERPL-MCNC: 8.9 MG/DL (ref 8.3–10.4)
CHLORIDE SERPL-SCNC: 103 MMOL/L (ref 98–107)
CO2 SERPL-SCNC: 32 MMOL/L (ref 21–32)
CREAT SERPL-MCNC: 0.78 MG/DL (ref 0.6–1)
DIFFERENTIAL METHOD BLD: ABNORMAL
EOSINOPHIL # BLD: 0 K/UL (ref 0–0.8)
EOSINOPHIL NFR BLD: 0 % (ref 0.5–7.8)
ERYTHROCYTE [DISTWIDTH] IN BLOOD BY AUTOMATED COUNT: 15.6 % (ref 11.9–14.6)
EST. AVERAGE GLUCOSE BLD GHB EST-MCNC: 169 MG/DL
GLOBULIN SER CALC-MCNC: 5.2 G/DL (ref 2.3–3.5)
GLUCOSE BLD STRIP.AUTO-MCNC: 138 MG/DL (ref 65–100)
GLUCOSE BLD STRIP.AUTO-MCNC: 143 MG/DL (ref 65–100)
GLUCOSE BLD STRIP.AUTO-MCNC: 152 MG/DL (ref 65–100)
GLUCOSE BLD STRIP.AUTO-MCNC: 161 MG/DL (ref 65–100)
GLUCOSE SERPL-MCNC: 124 MG/DL (ref 65–100)
HBA1C MFR BLD: 7.5 % (ref 4.8–6)
HCT VFR BLD AUTO: 45.1 % (ref 35.8–46.3)
HGB BLD-MCNC: 13.8 G/DL (ref 11.7–15.4)
IMM GRANULOCYTES # BLD AUTO: 0.1 K/UL (ref 0–0.5)
IMM GRANULOCYTES NFR BLD AUTO: 1 % (ref 0–5)
LYMPHOCYTES # BLD: 1.3 K/UL (ref 0.5–4.6)
LYMPHOCYTES NFR BLD: 14 % (ref 13–44)
MAGNESIUM SERPL-MCNC: 2.1 MG/DL (ref 1.8–2.4)
MCH RBC QN AUTO: 29 PG (ref 26.1–32.9)
MCHC RBC AUTO-ENTMCNC: 30.6 G/DL (ref 31.4–35)
MCV RBC AUTO: 94.7 FL (ref 79.6–97.8)
MONOCYTES # BLD: 0.9 K/UL (ref 0.1–1.3)
MONOCYTES NFR BLD: 10 % (ref 4–12)
NEUTS SEG # BLD: 6.6 K/UL (ref 1.7–8.2)
NEUTS SEG NFR BLD: 74 % (ref 43–78)
NRBC # BLD: 0 K/UL (ref 0–0.2)
PLATELET # BLD AUTO: 162 K/UL (ref 150–450)
PMV BLD AUTO: 11.3 FL (ref 9.4–12.3)
POTASSIUM SERPL-SCNC: 3.8 MMOL/L (ref 3.5–5.1)
PROT SERPL-MCNC: 7.7 G/DL (ref 6.3–8.2)
RBC # BLD AUTO: 4.76 M/UL (ref 4.05–5.2)
SODIUM SERPL-SCNC: 140 MMOL/L (ref 136–145)
WBC # BLD AUTO: 8.9 K/UL (ref 4.3–11.1)

## 2020-09-11 PROCEDURE — 83735 ASSAY OF MAGNESIUM: CPT

## 2020-09-11 PROCEDURE — 74011250637 HC RX REV CODE- 250/637: Performed by: INTERNAL MEDICINE

## 2020-09-11 PROCEDURE — 80053 COMPREHEN METABOLIC PANEL: CPT

## 2020-09-11 PROCEDURE — 74011636637 HC RX REV CODE- 636/637: Performed by: INTERNAL MEDICINE

## 2020-09-11 PROCEDURE — 97112 NEUROMUSCULAR REEDUCATION: CPT

## 2020-09-11 PROCEDURE — 97162 PT EVAL MOD COMPLEX 30 MIN: CPT

## 2020-09-11 PROCEDURE — 74011250636 HC RX REV CODE- 250/636: Performed by: INTERNAL MEDICINE

## 2020-09-11 PROCEDURE — 97166 OT EVAL MOD COMPLEX 45 MIN: CPT

## 2020-09-11 PROCEDURE — 83036 HEMOGLOBIN GLYCOSYLATED A1C: CPT

## 2020-09-11 PROCEDURE — 36415 COLL VENOUS BLD VENIPUNCTURE: CPT

## 2020-09-11 PROCEDURE — 83880 ASSAY OF NATRIURETIC PEPTIDE: CPT

## 2020-09-11 PROCEDURE — 65270000029 HC RM PRIVATE

## 2020-09-11 PROCEDURE — 94640 AIRWAY INHALATION TREATMENT: CPT

## 2020-09-11 PROCEDURE — 97530 THERAPEUTIC ACTIVITIES: CPT

## 2020-09-11 PROCEDURE — 82962 GLUCOSE BLOOD TEST: CPT

## 2020-09-11 PROCEDURE — 85025 COMPLETE CBC W/AUTO DIFF WBC: CPT

## 2020-09-11 PROCEDURE — 74011000258 HC RX REV CODE- 258: Performed by: INTERNAL MEDICINE

## 2020-09-11 PROCEDURE — 94760 N-INVAS EAR/PLS OXIMETRY 1: CPT

## 2020-09-11 RX ADMIN — Medication 10 ML: at 21:12

## 2020-09-11 RX ADMIN — ENOXAPARIN SODIUM 40 MG: 40 INJECTION SUBCUTANEOUS at 21:12

## 2020-09-11 RX ADMIN — INSULIN LISPRO 2 UNITS: 100 INJECTION, SOLUTION INTRAVENOUS; SUBCUTANEOUS at 12:13

## 2020-09-11 RX ADMIN — BUDESONIDE AND FORMOTEROL FUMARATE DIHYDRATE 2 PUFF: 80; 4.5 AEROSOL RESPIRATORY (INHALATION) at 09:15

## 2020-09-11 RX ADMIN — ACETAMINOPHEN 650 MG: 325 TABLET, FILM COATED ORAL at 21:37

## 2020-09-11 RX ADMIN — NYSTATIN: 100000 POWDER TOPICAL at 18:00

## 2020-09-11 RX ADMIN — ENOXAPARIN SODIUM 40 MG: 40 INJECTION SUBCUTANEOUS at 09:52

## 2020-09-11 RX ADMIN — Medication 10 ML: at 12:17

## 2020-09-11 RX ADMIN — INSULIN LISPRO 2 UNITS: 100 INJECTION, SOLUTION INTRAVENOUS; SUBCUTANEOUS at 15:58

## 2020-09-11 RX ADMIN — POTASSIUM CHLORIDE 20 MEQ: 20 TABLET, EXTENDED RELEASE ORAL at 09:51

## 2020-09-11 RX ADMIN — ACETAMINOPHEN 650 MG: 325 TABLET, FILM COATED ORAL at 04:53

## 2020-09-11 RX ADMIN — FUROSEMIDE 40 MG: 40 TABLET ORAL at 09:53

## 2020-09-11 RX ADMIN — BUDESONIDE AND FORMOTEROL FUMARATE DIHYDRATE 2 PUFF: 80; 4.5 AEROSOL RESPIRATORY (INHALATION) at 20:19

## 2020-09-11 RX ADMIN — NYSTATIN: 100000 POWDER TOPICAL at 09:53

## 2020-09-11 RX ADMIN — METOPROLOL SUCCINATE 25 MG: 50 TABLET, EXTENDED RELEASE ORAL at 09:51

## 2020-09-11 RX ADMIN — Medication 10 ML: at 06:13

## 2020-09-11 RX ADMIN — CEFTRIAXONE SODIUM 2 G: 2 INJECTION, POWDER, FOR SOLUTION INTRAMUSCULAR; INTRAVENOUS at 21:12

## 2020-09-11 NOTE — PROGRESS NOTES
Patient transferred from floor. Left in bed accompanied by Anahi Bonilla RN and Terry Guevara CNA. Patient resting comfortably in bed. NAD noted. Breathing room air. Respirations even and unlabored.

## 2020-09-11 NOTE — PROGRESS NOTES
Hospitalist Note     Admit Date:  2020  9:25 PM   Name:  Alok Ramirez   Age:  63 y.o.  :  1955   MRN:  [id number]   PCP:  Charli Ireland MD  Treatment Team: Attending Provider: Selena Durán MD; Utilization Review: Myrtle Severe; Primary Nurse: Poli Pretty Primary Nurse: Nelson Becker; Primary Nurse: King Jane    HPI/Subjective:   Pt is a 62 y/o F admitted with sepsis from LLE cellulitis. Has ILD. Recent echo with no CHF but takes lasix due to volume issues related to extreme obesity. Blood cx with strep.  - pt has LLE pain around cellulitis site with palpation. Otherwise no complaints. Denies SOB. On 1-2L oxygen. Fever overnight. No cough, CP    No other complaints  Objective:     Patient Vitals for the past 24 hrs:   Temp Pulse Resp BP SpO2   20 0743 98.9 °F (37.2 °C) 99 18 112/68 92 %   20 0346 99.1 °F (37.3 °C) 91 18 114/57 90 %   09/10/20 2329 (!) 100.5 °F (38.1 °C) 90 20 127/72 90 %   09/10/20 2019 98.2 °F (36.8 °C) 92 16 113/76 91 %   09/10/20 1927     92 %   09/10/20 1515 98.4 °F (36.9 °C) 84 18 131/72 93 %   09/10/20 1149 98.4 °F (36.9 °C) 81 18 121/71 93 %   09/10/20 0825     90 %     Oxygen Therapy  O2 Sat (%): 92 % (20 0743)  Pulse via Oximetry: 88 beats per minute (09/10/20 1927)  O2 Device: Nasal cannula (09/10/20 1927)  O2 Flow Rate (L/min): 2 l/min (09/10/20 1927)    Estimated body mass index is 54.82 kg/m² as calculated from the following:    Height as of this encounter: 5' 7\" (1.702 m). Weight as of this encounter: 158.8 kg (350 lb). Intake/Output Summary (Last 24 hours) at 2020 0811  Last data filed at 2020 2729  Gross per 24 hour   Intake 480 ml   Output 500 ml   Net -20 ml       *Note that automatically entered I/Os may not be accurate; dependent on patient compliance with collection and accurate  by techs. General:    Well nourished. Alert. CV:   RRR.   No murmur, rub, or gallop. Lungs:   Diminished   Extremities: Warm and dry. No cyanosis. Chronic BLE edema  Skin:     No rashes or jaundice. Neuro:  No gross focal deficits    Data Reviewed:  I have reviewed all labs, meds, and studies from the last 24 hours:  Recent Results (from the past 24 hour(s))   TROPONIN-HIGH SENSITIVITY    Collection Time: 09/10/20 11:17 AM   Result Value Ref Range    Troponin-High Sensitivity 39.4 (H) 0 - 14 pg/mL   GLUCOSE, POC    Collection Time: 09/10/20 11:48 AM   Result Value Ref Range    Glucose (POC) 129 (H) 65 - 100 mg/dL   GLUCOSE, POC    Collection Time: 09/10/20  4:08 PM   Result Value Ref Range    Glucose (POC) 134 (H) 65 - 100 mg/dL   TROPONIN-HIGH SENSITIVITY    Collection Time: 09/10/20  8:14 PM   Result Value Ref Range    Troponin-High Sensitivity 38.0 (H) 0 - 14 pg/mL   GLUCOSE, POC    Collection Time: 09/10/20  9:15 PM   Result Value Ref Range    Glucose (POC) 161 (H) 65 - 050 mg/dL   METABOLIC PANEL, COMPREHENSIVE    Collection Time: 09/11/20  5:17 AM   Result Value Ref Range    Sodium 140 136 - 145 mmol/L    Potassium 3.8 3.5 - 5.1 mmol/L    Chloride 103 98 - 107 mmol/L    CO2 32 21 - 32 mmol/L    Anion gap 5 (L) 7 - 16 mmol/L    Glucose 124 (H) 65 - 100 mg/dL    BUN 14 8 - 23 MG/DL    Creatinine 0.78 0.6 - 1.0 MG/DL    GFR est AA >60 >60 ml/min/1.73m2    GFR est non-AA >60 >60 ml/min/1.73m2    Calcium 8.9 8.3 - 10.4 MG/DL    Bilirubin, total 0.2 0.2 - 1.1 MG/DL    ALT (SGPT) 47 12 - 65 U/L    AST (SGOT) 84 (H) 15 - 37 U/L    Alk.  phosphatase 103 50 - 136 U/L    Protein, total 7.7 6.3 - 8.2 g/dL    Albumin 2.5 (L) 3.2 - 4.6 g/dL    Globulin 5.2 (H) 2.3 - 3.5 g/dL    A-G Ratio 0.5 (L) 1.2 - 3.5     MAGNESIUM    Collection Time: 09/11/20  5:17 AM   Result Value Ref Range    Magnesium 2.1 1.8 - 2.4 mg/dL   CBC WITH AUTOMATED DIFF    Collection Time: 09/11/20  5:17 AM   Result Value Ref Range    WBC 8.9 4.3 - 11.1 K/uL    RBC 4.76 4.05 - 5.2 M/uL    HGB 13.8 11.7 - 15.4 g/dL    HCT 45.1 35.8 - 46.3 %    MCV 94.7 79.6 - 97.8 FL    MCH 29.0 26.1 - 32.9 PG    MCHC 30.6 (L) 31.4 - 35.0 g/dL    RDW 15.6 (H) 11.9 - 14.6 %    PLATELET 968 910 - 440 K/uL    MPV 11.3 9.4 - 12.3 FL    ABSOLUTE NRBC 0.00 0.0 - 0.2 K/uL    DF AUTOMATED      NEUTROPHILS 74 43 - 78 %    LYMPHOCYTES 14 13 - 44 %    MONOCYTES 10 4.0 - 12.0 %    EOSINOPHILS 0 (L) 0.5 - 7.8 %    BASOPHILS 1 0.0 - 2.0 %    IMMATURE GRANULOCYTES 1 0.0 - 5.0 %    ABS. NEUTROPHILS 6.6 1.7 - 8.2 K/UL    ABS. LYMPHOCYTES 1.3 0.5 - 4.6 K/UL    ABS. MONOCYTES 0.9 0.1 - 1.3 K/UL    ABS. EOSINOPHILS 0.0 0.0 - 0.8 K/UL    ABS. BASOPHILS 0.0 0.0 - 0.2 K/UL    ABS. IMM.  GRANS. 0.1 0.0 - 0.5 K/UL   HEMOGLOBIN A1C WITH EAG    Collection Time: 09/11/20  5:17 AM   Result Value Ref Range    Hemoglobin A1c 7.5 (H) 4.8 - 6.0 %    Est. average glucose 169 mg/dL   GLUCOSE, POC    Collection Time: 09/11/20  5:58 AM   Result Value Ref Range    Glucose (POC) 138 (H) 65 - 100 mg/dL        Current Meds:  Current Facility-Administered Medications   Medication Dose Route Frequency    cefTRIAXone (ROCEPHIN) 2 g in 0.9% sodium chloride (MBP/ADV) 50 mL  2 g IntraVENous Q24H    sodium chloride (NS) flush 5-40 mL  5-40 mL IntraVENous Q8H    sodium chloride (NS) flush 5-40 mL  5-40 mL IntraVENous PRN    acetaminophen (TYLENOL) tablet 650 mg  650 mg Oral Q6H PRN    Or    acetaminophen (TYLENOL) suppository 650 mg  650 mg Rectal Q6H PRN    polyethylene glycol (MIRALAX) packet 17 g  17 g Oral DAILY PRN    promethazine (PHENERGAN) tablet 12.5 mg  12.5 mg Oral Q6H PRN    Or    ondansetron (ZOFRAN) injection 4 mg  4 mg IntraVENous Q6H PRN    enoxaparin (LOVENOX) injection 40 mg  40 mg SubCUTAneous Q12H    insulin lispro (HUMALOG) injection   SubCUTAneous AC&HS    furosemide (LASIX) tablet 40 mg  40 mg Oral DAILY    metoprolol succinate (TOPROL-XL) XL tablet 25 mg  25 mg Oral DAILY    potassium chloride (K-DUR, KLOR-CON) SR tablet 20 mEq  20 mEq Oral DAILY    nystatin (MYCOSTATIN) 100,000 unit/gram powder   Topical BID    budesonide-formoterol (SYMBICORT) 80-4.5 mcg inhaler  2 Puff Inhalation BID RT       Other Studies:  No results found for this visit on 09/09/20. No results found. All Micro Results     Procedure Component Value Units Date/Time    BLOOD CULTURE ID PANEL [[id number]]  (Abnormal) Collected:  09/09/20 2136    Order Status:  Completed Specimen:  Blood Updated:  09/10/20 1129     Acc. no. from Micro Order [id number]     Streptococcus Detected        Comment: RESULTS VERIFIED, PHONED TO AND READ BACK BY  OPAL STONE @9494 9/10/20 SC          INTERPRETATION       Gram positive cocci. Identified by realtime PCR as Streptococcus species (not agalactiae, pyogenes, or pneumoniae). Comment: Consider discontinuation of IV vancomycin and using an anti-streptococcal beta-lactam (ceftriaxone/cefotaxime (peds))       CULTURE, BLOOD [[id number]] Collected:  09/09/20 2157    Order Status:  Completed Specimen:  Blood Updated:  09/10/20 1127     Special Requests: --        LEFT  Antecubital       GRAM STAIN GRAM POS COCCI IN CHAINS         AEROBIC BOTTLE POSITIVE               CRITICAL RESULT NOT CALLED DUE TO PREVIOUS NOTIFICATION OF CRITICAL RESULT WITHIN THE LAST 24 HOURS.            Culture result:       CULTURE IN PROGRESS,FURTHER UPDATES TO FOLLOW          CULTURE, BLOOD [[id number]] Collected:  09/09/20 2136    Order Status:  Completed Specimen:  Blood Updated:  09/10/20 1126     Special Requests: --        RIGHT  Antecubital       GRAM STAIN GRAM POS COCCI IN CHAINS               AEROBIC AND ANAEROBIC BOTTLES                  RESULTS VERIFIED, PHONED TO AND READ BACK BY OPAL STONE @8172 9/10/20 SC           Culture result:       CULTURE IN [address] UPDATES TO FOLLOW            REFER TO [address] ACCESSION [id number]          SARS-CoV-2 Lab Results  \"Novel Coronavirus\" Test: No results found for: COV2NT   \"Emergent Disease\" Test: No results found for: EDPR  \"SARS-COV-2\" Test: No results found for: XGCOVT  \"[facility]\" Test: No results found for: RSLT  Rapid Test:   Lab Results   Component Value Date/Time    COVR Not detected 09/09/2020 10:56 PM            Assessment and Plan:     Hospital Problems as of 9/11/2020 Date Reviewed: 2/18/2020          Codes Class Noted - Resolved POA    * (Principal) Sepsis ([facility] ICD-10-CM: A41.9  ICD-9-CM: 038.9, 995.91  9/10/2020 - Present Yes        Cellulitis of left lower extremity ICD-10-CM: L03.116  ICD-9-CM: 682.6  9/10/2020 - Present Yes        Demand ischemia ([facility] ICD-10-CM: I24.8  ICD-9-CM: 411.89  9/10/2020 - Present Yes        Hypoxia ICD-10-CM: R09.02  ICD-9-CM: 799.02  9/10/2020 - Present Yes        Controlled type 2 diabetes mellitus, without long-term current use of insulin (HCC) (Chronic) ICD-10-CM: E11.9  ICD-9-CM: 250.00  9/9/2020 - Present Yes        Interstitial lung disease ([facility] (Chronic) ICD-10-CM: J84.9  ICD-9-CM: 114  6/25/2020 - Present Yes        Leg edema (Chronic) ICD-10-CM: R60.0  ICD-9-CM: 782.3  1/23/2020 - Present Yes        Hypertension (Chronic) ICD-10-CM: I10  ICD-9-CM: 401.9  Unknown - Present Yes        Mild depression ([facility] (Chronic) ICD-10-CM: F32.0  ICD-9-CM: 946  2/8/2019 - Present Yes        Morbid obesity ([facility] (Chronic) ICD-10-CM: E66.01  ICD-9-CM: 278.01  Unknown - Present Yes    Overview Signed 2/8/2019 11:59 AM by Shayne Barbour MD     BMI 45.8- 5/2/12                   Plan:  -cont rocephin for strep. Switch to 2g for bacteremia  -will need 14d course of abx for strep bacteremia with known source. Can complete with PO therapy as outpatient if culture sens allows  -wean oxygen if able.   She has chronic ILD listed in chart?    -add on BNP  -COVID neg as expected; transfer off unit    DC planning/Dispo:    -ready for DC soon IMO    Diet:  DIET DIABETIC CONSISTENT CARB    Signed:  Libia Noonan MD

## 2020-09-11 NOTE — PROGRESS NOTES
Pt is resting in bed at this time. Pt is on 2L NC. Pt has NS running at 75mL/hr. Pt has no s/sx of acute distress at this time. Left lower leg wound has been dressed with xeroform, ABD, kerlex, and take. Now ace wrap at this time. Safety measures in place. Pt has call light within reach and is encouraged to call for assistance if needed. Will prepare report for oncoming nurse.

## 2020-09-11 NOTE — PROGRESS NOTES
SBAR received from Andrew Archer RN. Patient stable, resting in bed, in no apparent distress. Receiving 2 L/min oxygen via NC. NS running at 75 mL/hr. Bed in locked and low position. Call light within reach. Droplet plus precautions maintained.

## 2020-09-11 NOTE — PROGRESS NOTES
TRANSFER - OUT REPORT:    Verbal report [facility] to [facility]ze [facility] to [facility] floor(unit) for routine progression of care       Report consisted of patients Situation, Background, Assessment and   Recommendations(SBAR). Information from the following report(s) SBAR, Kardex and ED Summary was reviewed with the receiving nurse. Lines:   Peripheral IV 09/11/20 Left Forearm (Active)   Site Assessment Clean, dry, & intact 09/11/20 1127   Phlebitis Assessment 0 09/11/20 1127   Infiltration Assessment 0 09/11/20 1127   Dressing Status Clean, dry, & intact 09/11/20 1127   Dressing Type Tape;Transparent 09/11/20 1127   Hub Color/Line Status Patent 09/11/20 1127   Alcohol Cap Used No 09/11/20 1127       Saline Lock 09/09/20 Right Antecubital (Active)   Site Assessment Clean, dry, & intact 09/11/20 1127   Phlebitis Assessment 0 09/11/20 1127   Infiltration Assessment 0 09/11/20 1127   Dressing Status Clean, dry, & intact 09/11/20 1127   Dressing Type Transparent;Tape 09/11/20 1127   Hub Color/Line Status Patent; Infusing 09/11/20 1127        Opportunity for questions and clarification was provided.       Patient transported with:   [facility]

## 2020-09-11 NOTE — PROGRESS NOTES
Assumed care at 1510. A&Ox4. Resting in bed at this time. 2LNo s/sx of distress at this time. Call light within reach and is encouraged to call for assistance if needed. Will continue to monitor.

## 2020-09-11 NOTE — PROGRESS NOTES
0915 2 RNs attempted to obtain active pro-BNP lab unsuccessfully. Hard stick form sent to lab via tube system.   This RN called lab to notify Ana Luisa Vasquez of patient hard stick and to request a phlebotomist.

## 2020-09-11 NOTE — PROGRESS NOTES
Problem: Mobility Impaired (Adult and Pediatric)  Goal: *Acute Goals and Plan of Care (Insert Text)  Outcome: Progressing Towards Goal  Note:   LTG:  (1.)Ms. Shona Hough will move from supine to sit and sit to supine, scoot up and down, and roll side to side with MINIMAL ASSIST within 7 treatment day(s). (2.)Ms. Shona Hough will perform sit-stand transfer with MODERATE ASSIST within 7 days. (3.)Ms. Shona Hough will transfer from bed to chair and chair to bed with MAXIMAL ASSIST using the least restrictive device within 7 treatment day(s). (4.)Ms. Shona Hough will perform exercises per HEP for 10+ minutes to improve strength and mobility within 7 days. ________________________________________________________________________________________________      PHYSICAL THERAPY: Initial Assessment and AM 9/11/2020  INPATIENT: PT Visit Days : 1  Payor: Conner Painter / Plan: Jaron List / Product Type: Commerical /       NAME/AGE/GENDER: Kyle Tyler is a 63 y.o. female   PRIMARY DIAGNOSIS: Sepsis ([facility] [A41.9]  Cellulitis [L03.90]  Suspected COVID-19 virus infection [Z20.828] Sepsis ([facility] Sepsis ([facility]        ICD-10: Treatment Diagnosis:    Generalized Muscle Weakness (M62.81)  Difficulty in walking, Not elsewhere classified (R26.2)  Other abnormalities of gait and mobility (R26.89)   Precaution/Allergies:  Lortab [hydrocodone-acetaminophen]      ASSESSMENT:     Ms. Shona Hough is a 63year old female admitted from home with sepsis due to left LE cellulitis. She lives with son and is typically independent to modified independent with household mobility using cane as needed. She presents in supine without specific complaints and is agreeable to therapy evaluation, mobility. Pt c/o chronic pain in right shoulder. Needs max additional time and cueing and max assist of 2 for bed mobility/transfer to sitting. Needs assist for seated scooting to reposition with feet on floor.  Fair seated balance throughout with prop sitting using UEs and initially with posterior lean. Worked on seated mobility, balance, and functional activities. B LEs with limited AROM in sitting and appear significantly weak. Attempted sit-stand transfers x many reps with max assist of 2 and heavy cueing for technique however unable to fully stand. Pt returned to supine with max assist of 2 and positioned comfortably with need in reach. Paola Campos is currently functioning well below baseline with mobility, strength, balance, transfers, and activity tolerance; she is unable to stand or ambulate today and therefore would be unsafe to discharge home. Will need rehab at [place]. At this time, patient is appropriate for Co-treatment with occupational therapy due to patient's decreased overall endurance/tolerance levels, as well as need for high level skilled assistance to complete functional transfers/mobility and functional tasks. Paola Campos is appropriate for a multidisciplinary co-treatment of PT and OT to address goals of both disciplines. This section established at most recent assessment   PROBLEM LIST (Impairments causing functional limitations):  Decreased Strength  Decreased Transfer Abilities  Decreased Ambulation Ability/Technique  Decreased Balance  Increased Pain  Decreased Activity Tolerance  Edema/Girth  Decreased Skin Integrity/Hygeine   INTERVENTIONS PLANNED: (Benefits and precautions of physical therapy have been discussed with the patient.)  Balance Exercise  Bed Mobility  Gait Training  Home Exercise Program (HEP)  Therapeutic Activites  Therapeutic Exercise/Strengthening  Transfer Training     TREATMENT PLAN: Frequency/Duration: 3 times a week for duration of hospital stay  Rehabilitation Potential For Stated Goals: Good     REHAB RECOMMENDATIONS (at time of discharge pending progress):    Placement: It is my opinion, based on this patient's performance to date, that Ms. Zuniga may benefit from intensive therapy at a [address] [place] [address] after discharge due to the functional deficits listed above that are likely to improve with skilled rehabilitation and concerns that he/she may be unsafe to be unsupervised at home due to weakness, inability to stand, need for heavy assistance . Equipment:   Tbd pending progress              HISTORY:   History of Present Injury/Illness (Reason for Referral):  Per H&P, \"1 week history of productive cough today progressive weakness and myalgias with fevers as high as 101.9 degrees. Last several days developing cellulitis left lower extremity and she reports that she had to cover her pretibial ulceration because her dog [place] it. She claims that she has not been out of her house to be exposed to COVID-19 but she is being admitted with suspected infection despite initial rapid screen negative. She is 63years of age with history of possible mild interstitial lung disease by available records, morbid obesity with dyspnea on exertion related to as well as chronic lower extremity edema related to obesity for which she takes daily Lasix and potassium. Hypertension and a new diagnosis of diabetes mellitus. She has had progressive weakness and sought care today because she could get off of her couch. She was hypoxemic at time of presentation with reported 87% on room air but documented 88% on room air. Her white blood count is 12,500 with 87% neutrophils. Serum lactic acid is normal serum creatinine is less than 1 she has a glucose of 150 her liver transaminases are abnormal with  and ALT of 47. Her troponin I is elevated but EKG shows no ischemic changes and she does not complain of any chest pain or pressure\"  Past Medical History/Comorbidities:   Ms. Jose Turner  has a past medical history of Chronic pain, Hypertension, Morbid obesity ([address] and Positive PPD. Ms. Jose Turner  has a past surgical history that includes hx gyn and hx knee arthroscopy.   Social History/Living Environment:   Home Environment: Private residence  # Steps to Enter: 0  One/Two Story Residence: Two story, live on 1st floor  Lift Chair Available: No  Living Alone: No  Support Systems: Child(adelita), Family member(s)  Patient Expects to be Discharged to[de-identified] Rehabilitation facility  Current DME Used/Available at Home: Cane, straight  Tub or Shower Type: Shower  Prior Level of Function/Work/Activity:  Lives with son who works during the day. Independent to mod I using cane as needed. Number of Personal Factors/Comorbidities that affect the Plan of Care: 1-2: MODERATE COMPLEXITY   EXAMINATION:   Most Recent Physical Functioning:   Gross Assessment:  AROM: Generally decreased, functional  Strength: Generally decreased, functional  Coordination: Generally decreased, functional               Posture:  Posture (WDL): Exceptions to WDL  Posture Assessment: Forward head, Rounded shoulders, Trunk flexion  Balance:  Sitting: Impaired  Sitting - Static: Fair (occasional); Prop sitting  Sitting - Dynamic: Fair (occasional); Prop sitting  Standing: (unable) Bed Mobility:  Rolling: Maximum assistance;Assist x2  Supine to Sit: Maximum assistance;Assist x2  Sit to Supine: Maximum assistance;Assist x2  Scooting: Maximum assistance;Assist x2  Wheelchair Mobility:     Transfers:  Sit to Stand: Maximum assistance;Assist x2(with attempt; unable to stand)  Interventions: Verbal cues; Safety awareness training; Tactile cues  Duration: 23 Minutes  Gait:            Body Structures Involved:  Joints  Muscles Body Functions Affected:  Sensory/Pain  Movement Related  Skin Related Activities and Participation Affected:  General Tasks and Demands  Mobility  Domestic Life  Community, Social and [place]   Number of elements that affect the Plan of Care: 4+: HIGH COMPLEXITY   CLINICAL PRESENTATION:   Presentation: Evolving clinical presentation with changing clinical characteristics: MODERATE COMPLEXITY   CLINICAL DECISION MAKIN [employer] Mobility Inpatient Short Form  How much difficulty does the patient currently have. .. Unable A Lot A Little None   1. Turning over in bed (including adjusting bedclothes, sheets and blankets)? [] 1   [x] 2   [] 3   [] 4   2. Sitting down on and standing up from a chair with arms ( e.g., wheelchair, bedside commode, etc.)   [x] 1   [] 2   [] 3   [] 4   3. Moving from lying on back to sitting on the side of the bed? [] 1   [x] 2   [] 3   [] 4   How much help from another person does the patient currently need. .. Total A Lot A Little None   4. Moving to and from a bed to a chair (including a wheelchair)? [x] 1   [] 2   [] 3   [] 4   5. Need to walk in hospital room? [x] 1   [] 2   [] 3   [] 4   6. Climbing 3-5 steps with a railing? [x] 1   [] 2   [] 3   [] 4   © 2007, Trustees of [facility] [employer], under license to [employer]. All rights reserved      Score:  Initial: 8 Most Recent: X (Date: -- )    Interpretation of Tool:  Represents activities that are increasingly more difficult (i.e. Bed mobility, Transfers, Gait). Medical Necessity:     Patient demonstrates   good   rehab potential due to higher previous functional level. Reason for Services/Other Comments:  Patient   continues to demonstrate capacity to improve strength, mobility, balance, transfers, and activity tolerance which will   increase independence, decrease amount of assistance required from caregiver, and increase safety  .    Use of outcome tool(s) and clinical judgement create a POC that gives a: Questionable prediction of patient's progress: MODERATE COMPLEXITY            TREATMENT:   (In addition to Assessment/Re-Assessment sessions the following treatments were rendered)   Pre-treatment Symptoms/Complaints:  \"I can't\"    Pain: Initial: 0/10     Post Session:  0/10     Therapeutic Activity: (  23 Minutes ):  Therapeutic activities including Bed mobility (rolling, scooting, and supine <-> sit transfer), seated static/dynamic balance and functional activities, seated mobility, sit-stand transfers x several unsuccessful attempts to improve mobility, strength, balance, coordination, and activity tolerance . Required maximal assist of 2 and heavy cueing (manual, tactile, verbal, and visual)   to promote static and dynamic balance in standing and promote motor control of bilateral, lower extremity(s). Braces/Orthotics/Lines/Etc:   IV  purewick   O2 Device: Nasal cannula  Treatment/Session Assessment:    Response to Treatment:  Pt performs mobility with max A of 2. Weak. Unable to stand  Interdisciplinary Collaboration:   Physical Therapist  Occupational Therapist  Registered Nurse  After treatment position/precautions:   Supine in bed  Bed/Chair-wheels locked  Bed in low position  Call light within reach   Compliance with Program/Exercises: Will assess as treatment progresses  Recommendations/Intent for next treatment session: \"Next visit will focus on advancements to more challenging activities and reduction in assistance provided\".   Total Treatment Duration:  PT Patient Time In/Time Out  Time In: 0908  Time Out: [address], DPT

## 2020-09-11 NOTE — PROGRESS NOTES
Pt is resting in bed at this time. Pt is alert and oriented times 4. Pt is on 2L NC. Pt has no s/sx of distress at this time. Pt has NS running at 75mL/hr. Pt has no complaints of pain at this time. Pt has safety measures in place. Pt has call light within reach and is encouraged to call for assistance if needed. Will continue to monitor.

## 2020-09-11 NOTE — PROGRESS NOTES
Problem: Self Care Deficits Care Plan (Adult)  Goal: *Acute Goals and Plan of Care (Insert Text)  Description:   1. Patient will complete lower body bathing and dressing with Mod A and adaptive equipment as needed. 2. Patient will complete toileting with Mod A and adaptive equipment as needed. 3. Patient will complete bed mobility with Mod A and minimal verbal cues. 4. Patient will complete functional transfers with Max A and adaptive equipment as needed. 5. Patient will complete seated grooming ADL with Set Up and good static and good dynamic seated balance. 6. Patent will complete transfer from bed to chair/BSC with Max A and adaptive equipment as needed. Timeframe: 7 visits     Outcome: Progressing Towards Goal    OCCUPATIONAL THERAPY: Initial Assessment, Daily Note, and AM 9/11/2020  INPATIENT: OT Visit Days: 1  Payor: Denise Mooney / Plan: Darron Leon / Product Type: Commerical /      NAME/AGE/GENDER: Joanne Shay is a 63 y.o. female   PRIMARY DIAGNOSIS:  Sepsis ([facility] [A41.9]  Cellulitis [L03.90]  Suspected COVID-19 virus infection [Z20.828] Sepsis ([facility] Sepsis ([facility]    ICD-10: Treatment Diagnosis:    Generalized Muscle Weakness (M62.81)  Difficulty in walking, Not elsewhere classified (R26.2)  Other abnormalities of gait and mobility (R26.89)   Precautions/Allergies:    Lortab [hydrocodone-acetaminophen]      ASSESSMENT:     Ms. Ella Foster is a 63 y.o. female admitted for Sepsis and Suspected Covid 19 infection. Pt is NEGATIVE for Covid 19. At baseline, patient lives with son (working full time) in two story home with 0 PHOEBE. Pt is typically Mod I to independent for ADLs, IADLs, and + driving. Pt uses SPC PRN for functional mobility for household and community distances. Pt not on home O2. Pt found supine in bed on 1.5L O2. Alert and agreeable to initial OT/PT evaluation to increase activity tolerance and likelihood of return to baseline.  OT worked on seated balance while PT facilitated bed mobility and functional transfers. Pt reports no pain prior to or following functional mobility. Patient completes supine to sit with Max A x 2. Seated edge of bed, pt with fair static and fair dynamic seated balance, reliant on use of propped sitting for increased support. Pt provided with moderate verbal and visual cueing, as well as minimal manual cueing to lean forward, sit upright, and place hands in lap to improve seated posture and balance. Pt able to momentarily sit without use of BUE for increased support but quickly reverts back to use of propped sitting. RUE generally decreased yet functional for ROM, strength, and coordination; LUE WFL. Pt reports alternation of BUE for performance of ADLs. Pt requires Total A for sock management. Pt requires Max A x 2 to attempt functional transfer from sit to stand 3x. Pt unable to clear bed on all attempts and requires Max A x 2 to complete lateral scooting to right side of bed in preparation for return to supine. Pt requires Max A x 2 for sit to supine. Pt left supine in bed with all needs met and within reach. RN notified. At this time, patient is appropriate for Co-treatment with physical therapy due to patient's decreased overall endurance/tolerance levels, as well as need for high level skilled assistance to complete functional transfers/mobility and functional tasks. Yan Dubon is appropriate for a multidisciplinary co-treatment of PT and OT to address goals of both disciplines. Pt is currently functioning below baseline for ADLs and functional mobility and would benefit from acute OT to increase independence and safety with ADLs and functional mobility. Will follow for duration of hospital stay to address the above goals. Patient was educated on role and plan of care of occupational therapy.     This section established at most recent assessment   PROBLEM LIST (Impairments causing functional limitations):  Decreased Strength  Decreased ADL/Functional Activities  Decreased Transfer Abilities  Decreased Ambulation Ability/Technique  Decreased Balance  Increased Pain  Decreased Activity Tolerance  Increased Fatigue  Increased Shortness of Breath   INTERVENTIONS PLANNED: (Benefits and precautions of occupational therapy have been discussed with the patient.)  Activities of daily living training  Adaptive equipment training  Balance training  Clothing management  Neuromuscular re-eduation  Re-evaluation  Therapeutic activity  Therapeutic exercise     TREATMENT PLAN: Frequency/Duration: Follow patient 3x/week to address above goals. Rehabilitation Potential For Stated Goals: [facility] (at time of discharge pending progress):    Placement: It is my opinion, based on this patient's performance to date, that Ms. June Rodriguez may benefit from intensive therapy at a [address] [place] [address] after discharge due to the functional deficits listed above that are likely to improve with skilled rehabilitation and concerns that he/she may be unsafe to be unsupervised at home due to decreased independence, safety, balance, strength, ROM, and activity tolerance for performance of ADLs and functional mobility . Equipment:   TBD              OCCUPATIONAL PROFILE AND HISTORY:   History of Present Injury/Illness (Reason for Referral):  See H & P  Past Medical History/Comorbidities:   Ms. June Rodriguez  has a past medical history of Chronic pain, Hypertension, Morbid obesity ([address] and Positive PPD. Ms. June Rodriguez  has a past surgical history that includes hx gyn and hx knee arthroscopy.   Social History/Living Environment:   Home Environment: Private residence  # Steps to Enter: 0  One/Two Story Residence: Two story, live on 1st floor  Lift Chair Available: No  Living Alone: No  Support Systems: Child(adelita), Family member(s)  Patient Expects to be Discharged to[de-identified] Rehabilitation facility  Current DME Used/Available at Home: Cane, straight  Tub or Shower Type: Shower  Prior Level of Function/Work/Activity:  At baseline, patient lives with son (working full time) in two story home with 0 PHOEBE. Pt is typically Mod I to independent for ADLs, IADLs, and + driving. Pt uses SPC PRN for functional mobility for household and community distances. Pt not on home O2. Personal Factors:          Sex:  female        Age:  63 y.o. Number of Personal Factors/Comorbidities that affect the Plan of Care: Extensive review of physical, cognitive, and psychosocial performance (3+):  HIGH COMPLEXITY   ASSESSMENT OF OCCUPATIONAL PERFORMANCE[de-identified]   Activities of Daily Living:   Basic ADLs (From Assessment) Complex ADLs (From Assessment)   Feeding: Setup  Oral Facial Hygiene/Grooming: Minimum assistance  Bathing: Maximum assistance  Upper Body Dressing: Moderate assistance  Lower Body Dressing: Total assistance  Toileting: Total assistance Instrumental ADL  Meal Preparation: Total assistance  Homemaking: Total assistance   Grooming/Bathing/Dressing Activities of Daily Living                             Bed/Mat Mobility  Rolling: Maximum assistance;Assist x2  Supine to Sit: Maximum assistance;Assist x2  Sit to Supine: Maximum assistance;Assist x2  Sit to Stand: Maximum assistance;Assist x2(with attempt; unable to stand)  Scooting: Maximum assistance;Assist x2     Most Recent Physical Functioning:   Gross Assessment:  AROM: Generally decreased, functional  Strength: Generally decreased, functional  Coordination: Generally decreased, functional               Posture:  Posture (WDL): Exceptions to WDL  Posture Assessment: Forward head, Rounded shoulders, Trunk flexion  Balance:  Sitting: Impaired  Sitting - Static: Fair (occasional); Prop sitting  Sitting - Dynamic: Fair (occasional); Prop sitting  Standing: (unable) Bed Mobility:  Rolling: Maximum assistance;Assist x2  Supine to Sit: Maximum assistance;Assist x2  Sit to Supine: Maximum assistance;Assist x2  Scooting: Maximum assistance;Assist x2  Wheelchair Mobility:     Transfers:  Sit to Stand: Maximum assistance;Assist x2(with attempt; unable to stand)  Interventions: Verbal cues; Safety awareness training; Tactile cues  Duration: 23 Minutes            Patient Vitals for the past 6 hrs:   BP BP Patient Position SpO2 O2 Flow Rate (L/min) Pulse   20 0743 112/68 At rest 92 % -- 99   20 0908 -- -- -- 2 l/min --       Mental Status  Neurologic State: Alert  Orientation Level: Oriented X4  Cognition: Follows commands                          Physical Skills Involved:  Range of Motion  Balance  Strength  Activity Tolerance  Gross Motor Control  Pain (Chronic) Cognitive Skills Affected (resulting in the inability to perform in a timely and safe manner):  Perception Psychosocial Skills Affected:  Habits/Routines  Environmental Adaptation  Social Interaction   Number of elements that affect the Plan of Care: 5+:  HIGH COMPLEXITY   CLINICAL DECISION MAKIN[address] [place] [id number] AM-PAC 6 Clicks   Daily Activity Inpatient Short Form  How much help from another person does the patient currently need. .. Total A Lot A Little None   1. Putting on and taking off regular lower body clothing? [x] 1   [] 2   [] 3   [] 4   2. Bathing (including washing, rinsing, drying)? [] 1   [x] 2   [] 3   [] 4   3. Toileting, which includes using toilet, bedpan or urinal?   [x] 1   [] 2   [] 3   [] 4   4. Putting on and taking off regular upper body clothing? [] 1   [x] 2   [] 3   [] 4   5. Taking care of personal grooming such as brushing teeth? [] 1   [] 2   [x] 3   [] 4   6. Eating meals? [] 1   [] 2   [x] 3   [] 4   © , Trustees of [address] [place], [place] [id number], under license to [employer]. All rights reserved      Score:  Initial: 12, completed, 2020 Most Recent: X (Date: -- )    Interpretation of Tool:  Represents activities that are increasingly more difficult (i.e. Bed mobility, Transfers, Gait).     Medical Necessity:     Skilled intervention continues to be required due to decreased independence, safety, balance, strength, ROM, and activity tolerance for performance of ADLs and functional mobility. Reason for Services/Other Comments:  Patient continues to require skilled intervention due to   medical complications and patient unable to attend/participate in therapy as expected  . Use of outcome tool(s) and clinical judgement create a POC that gives a: MODERATE COMPLEXITY         TREATMENT:   (In addition to Assessment/Re-Assessment sessions the following treatments were rendered)     Pre-treatment Symptoms/Complaints:  Pt reports no pain at rest.  Pain: Initial:   Pain Intensity 1: 0  Post Session:  Pt presents with slight increase in pain with mobility but does not rate. Today's treatment session addressed Decreased Strength, Decreased Balance, Decreased Activity Tolerance, and Increased Fatigue to progress towards achieving goal(s) listed above. During this session, Physical Therapy addressed  Bed Mobility and Functional Transfers to progress towards their discipline specific goal(s). Co-treatment was necessary to improve patient's ability to increase activity demands and ability to return to normal functional activity. Neuromuscular Re-education: (25 minutes):  Exercise/activities below to improve balance, coordination, and posture. Required minimal to moderate visual, verbal, and manual cues to promote static and dynamic balance in sitting. Seated edge of bed, pt with fair static and fair dynamic seated balance, reliant on use of propped sitting for increased support. Pt provided with moderate verbal and visual cueing, as well as minimal manual cueing to lean forward, sit upright, and place hands in lap to improve seated posture and balance. Pt able to momentarily sit without use of BUE for increased support but quickly reverts back to use of propped sitting.            Braces/Orthotics/Lines/Etc:   IV  Pure Wick Catheter   O2 Device: Nasal cannula  Treatment/Session Assessment:    Response to Treatment:  Pt with significant decrease in BLE strength hindering ability to participate in standing ADLs or out of bed mobility. Interdisciplinary Collaboration:   Physical Therapist  Occupational Therapist  Registered Nurse  After treatment position/precautions:   Supine in bed  Bed alarm/tab alert on  Bed/Chair-wheels locked  Bed in low position  Call light within reach  RN notified  Head of bed elevated    Compliance with Program/Exercises: Compliant most of the time, Will assess as treatment progresses. Recommendations/Intent for next treatment session: \"Next visit will focus on advancements to more challenging activities and reduction in assistance provided\".   Total Treatment Duration:  OT Patient Time In/Time Out  Time In: 0914  Time Out: 6826     BEATRIZ Lopes/EARLENE

## 2020-09-12 LAB
ANION GAP SERPL CALC-SCNC: 7 MMOL/L (ref 7–16)
BACTERIA SPEC CULT: ABNORMAL
BUN SERPL-MCNC: 13 MG/DL (ref 8–23)
CALCIUM SERPL-MCNC: 8.4 MG/DL (ref 8.3–10.4)
CHLORIDE SERPL-SCNC: 102 MMOL/L (ref 98–107)
CO2 SERPL-SCNC: 31 MMOL/L (ref 21–32)
CREAT SERPL-MCNC: 0.54 MG/DL (ref 0.6–1)
GLUCOSE BLD STRIP.AUTO-MCNC: 113 MG/DL (ref 65–100)
GLUCOSE BLD STRIP.AUTO-MCNC: 138 MG/DL (ref 65–100)
GLUCOSE BLD STRIP.AUTO-MCNC: 138 MG/DL (ref 65–100)
GLUCOSE BLD STRIP.AUTO-MCNC: 156 MG/DL (ref 65–100)
GLUCOSE SERPL-MCNC: 136 MG/DL (ref 65–100)
GRAM STN SPEC: ABNORMAL
POTASSIUM SERPL-SCNC: 3.5 MMOL/L (ref 3.5–5.1)
SERVICE CMNT-IMP: ABNORMAL
SERVICE CMNT-IMP: ABNORMAL
SODIUM SERPL-SCNC: 140 MMOL/L (ref 136–145)

## 2020-09-12 PROCEDURE — 2709999900 HC NON-CHARGEABLE SUPPLY

## 2020-09-12 PROCEDURE — 36415 COLL VENOUS BLD VENIPUNCTURE: CPT

## 2020-09-12 PROCEDURE — 74011250636 HC RX REV CODE- 250/636: Performed by: INTERNAL MEDICINE

## 2020-09-12 PROCEDURE — 74011000258 HC RX REV CODE- 258: Performed by: INTERNAL MEDICINE

## 2020-09-12 PROCEDURE — 77010033678 HC OXYGEN DAILY

## 2020-09-12 PROCEDURE — 80048 BASIC METABOLIC PNL TOTAL CA: CPT

## 2020-09-12 PROCEDURE — 94760 N-INVAS EAR/PLS OXIMETRY 1: CPT

## 2020-09-12 PROCEDURE — 74011250637 HC RX REV CODE- 250/637: Performed by: INTERNAL MEDICINE

## 2020-09-12 PROCEDURE — 77030038269 HC DRN EXT URIN PURWCK BARD -A

## 2020-09-12 PROCEDURE — 74011636637 HC RX REV CODE- 636/637: Performed by: INTERNAL MEDICINE

## 2020-09-12 PROCEDURE — 94640 AIRWAY INHALATION TREATMENT: CPT

## 2020-09-12 PROCEDURE — 65270000029 HC RM PRIVATE

## 2020-09-12 PROCEDURE — 82962 GLUCOSE BLOOD TEST: CPT

## 2020-09-12 RX ADMIN — BUDESONIDE AND FORMOTEROL FUMARATE DIHYDRATE 2 PUFF: 80; 4.5 AEROSOL RESPIRATORY (INHALATION) at 07:26

## 2020-09-12 RX ADMIN — FUROSEMIDE 40 MG: 40 TABLET ORAL at 08:06

## 2020-09-12 RX ADMIN — Medication 10 ML: at 05:56

## 2020-09-12 RX ADMIN — Medication 10 ML: at 14:00

## 2020-09-12 RX ADMIN — METOPROLOL SUCCINATE 25 MG: 50 TABLET, EXTENDED RELEASE ORAL at 08:07

## 2020-09-12 RX ADMIN — BUDESONIDE AND FORMOTEROL FUMARATE DIHYDRATE 2 PUFF: 80; 4.5 AEROSOL RESPIRATORY (INHALATION) at 21:00

## 2020-09-12 RX ADMIN — ENOXAPARIN SODIUM 40 MG: 40 INJECTION SUBCUTANEOUS at 08:07

## 2020-09-12 RX ADMIN — ENOXAPARIN SODIUM 40 MG: 40 INJECTION SUBCUTANEOUS at 21:40

## 2020-09-12 RX ADMIN — POTASSIUM CHLORIDE 20 MEQ: 20 TABLET, EXTENDED RELEASE ORAL at 08:06

## 2020-09-12 RX ADMIN — Medication 10 ML: at 21:44

## 2020-09-12 RX ADMIN — NYSTATIN: 100000 POWDER TOPICAL at 08:07

## 2020-09-12 RX ADMIN — INSULIN LISPRO 2 UNITS: 100 INJECTION, SOLUTION INTRAVENOUS; SUBCUTANEOUS at 21:54

## 2020-09-12 RX ADMIN — CEFTRIAXONE SODIUM 2 G: 2 INJECTION, POWDER, FOR SOLUTION INTRAMUSCULAR; INTRAVENOUS at 21:41

## 2020-09-12 RX ADMIN — NYSTATIN: 100000 POWDER TOPICAL at 18:00

## 2020-09-12 NOTE — PROGRESS NOTES
Patient resting in bed. Respirations present, non-labored. 1 LPM oxygen via NC. No signs of distress noted. 2+ BLE noted, redness noted. External catheter in place. Safety measures in place. Will continue to monitor.

## 2020-09-12 NOTE — PROGRESS NOTES
Progress Note    Patient: Kinjal Ortega MRN: [id number]  SSN: [id number]    YOB: 1955  Age: 63 y.o. Sex: female      Admit Date: 9/9/2020    LOS: 2 days     Subjective:     Patient with past medical history of obesity, DM, hypertension, leg edema. Patient is admitted due to sepsis with left lower leg cellulitis. She is on Empiric IV antibiotic now and feeling better. No fever. No shaking. No chills. Objective:     Vitals:    09/12/20 0305 09/12/20 0559 09/12/20 0726 09/12/20 0744   BP:    133/83   Pulse: 92   87   Resp: 22   18   Temp: 99.2 °F (37.3 °C)   99.1 °F (37.3 °C)   SpO2: 92% 92% 94% 94%   Weight:       Height:            Intake and Output:  Current Shift: 09/12 0701 - 09/12 1900  In: -   Out: 325 [Urine:325]  Last three shifts: 09/10 1901 - 09/12 0700  In: 2636 [P.O.:120; I.V.:2404]  Out: 300 [Urine:300]    Physical Exam:     General:                    The patient is a pleasant obese female in no acute respiratory distress. Head:                                   Normocephalic/atraumatic. Eyes:                                   No palpebral pallor or scleral icterus. ENT:                                    External auricular and nasal exam within normal limits. Mucous membranes are moist.  Neck:                                   Supple, non-tender, no JVD. Lungs:                       Clear to auscultation bilaterally without wheezes or crackles. No respiratory distress or accessory muscle use. Heart:                                  Regular rate and rhythm, without murmurs, rubs, or gallops. Abdomen:                  Soft, non-tender, very distended due to truncal obesity with normoactive bowel sounds. Genitourinary:           No tenderness over the bladder or bilateral CVAs. Extremities:               left lower leg with redness and swelling.    Skin: Normal color, texture, and turgor. No rashes, lesions, or jaundice. Pulses:                      Radial and dorsalis pedis pulses present 2+ bilaterally. Capillary refill <2s. Neurologic:                CN II-XII grossly intact and symmetrical.                                               Moving all four extremities well with no focal deficits. Psychiatric:                Pleasant demeanor, appropriate affect. Alert and oriented x 3      Lab/Data Review:    Recent Results (from the past 24 hour(s))   GLUCOSE, POC    Collection Time: 09/11/20 11:18 AM   Result Value Ref Range    Glucose (POC) 161 (H) 65 - 100 mg/dL   GLUCOSE, POC    Collection Time: 09/11/20  3:57 PM   Result Value Ref Range    Glucose (POC) 152 (H) 65 - 100 mg/dL   GLUCOSE, POC    Collection Time: 09/11/20  9:37 PM   Result Value Ref Range    Glucose (POC) 143 (H) 65 - 125 mg/dL   METABOLIC PANEL, BASIC    Collection Time: 09/12/20  5:03 AM   Result Value Ref Range    Sodium 140 136 - 145 mmol/L    Potassium 3.5 3.5 - 5.1 mmol/L    Chloride 102 98 - 107 mmol/L    CO2 31 21 - 32 mmol/L    Anion gap 7 7 - 16 mmol/L    Glucose 136 (H) 65 - 100 mg/dL    BUN 13 8 - 23 MG/DL    Creatinine 0.54 (L) 0.6 - 1.0 MG/DL    GFR est AA >60 >60 ml/min/1.73m2    GFR est non-AA >60 >60 ml/min/1.73m2    Calcium 8.4 8.3 - 10.4 MG/DL   GLUCOSE, POC    Collection Time: 09/12/20  7:52 AM   Result Value Ref Range    Glucose (POC) 138 (H) 65 - 100 mg/dL     Results     Procedure Component Value Units Date/Time    CULTURE, BLOOD [[id number]]  (Abnormal) Collected:  09/09/20 5421    Order Status:  Completed Specimen:  Blood Updated:  09/12/20 6159     Special Requests: --        LEFT  Antecubital       GRAM STAIN GRAM POS COCCI IN CHAINS         AEROBIC BOTTLE POSITIVE               CRITICAL RESULT NOT CALLED DUE TO PREVIOUS NOTIFICATION OF CRITICAL RESULT WITHIN THE LAST 24 HOURS.            Culture result:       STREPTOCOCCI, BETA HEMOLYTIC GROUP G                  For Susceptibility Refer to Culture  [facility] [id number]      CULTURE, BLOOD [[id number]]  (Abnormal)  (Susceptibility) Collected:  09/09/20 2136    Order Status:  Completed Specimen:  Blood Updated:  09/12/20 0743     Special Requests: --        RIGHT  Antecubital       GRAM STAIN GRAM POS COCCI IN CHAINS               AEROBIC AND ANAEROBIC BOTTLES                  RESULTS VERIFIED, PHONED TO AND READ BACK BY OPAL STONE @6934 9/10/20 SC           Culture result:       STREPTOCOCCI, BETA HEMOLYTIC GROUP G            REFER TO BIOFIRE ACCESSION [id number]    Susceptibility      Streptococcus group G beta hemolytic     CHAD     Clindamycin ($) Susceptible     Penicillin G ($$) Susceptible     Vancomycin ($) Susceptible                    BLOOD CULTURE ID PANEL [[id number]]  (Abnormal) Collected:  09/09/20 2136    Order Status:  Completed Specimen:  Blood Updated:  09/10/20 1129     Acc. no. from Micro Order [id number]     Streptococcus Detected        Comment: RESULTS VERIFIED, PHONED TO AND READ BACK BY  OPAL STONE @1254 9/10/20 SC          INTERPRETATION       Gram positive cocci. Identified by realtime PCR as Streptococcus species (not agalactiae, pyogenes, or pneumoniae).            Comment: Consider discontinuation of IV vancomycin and using an anti-streptococcal beta-lactam (ceftriaxone/cefotaxime (peds))           XR chest   9/9/2020  IMPRESSION: No acute acute abnormality      Current Facility-Administered Medications:     cefTRIAXone (ROCEPHIN) 2 g in 0.9% sodium chloride (MBP/ADV) 50 mL, 2 g, IntraVENous, Q24H, Selena Durán MD, Last Rate: 100 mL/hr at 09/11/20 2112, 2 g at 09/11/20 2112    sodium chloride (NS) flush 5-40 mL, 5-40 mL, IntraVENous, Q8H, Ed Patel Scriver, DO, 10 mL at 09/12/20 0556    sodium chloride (NS) flush 5-40 mL, 5-40 mL, IntraVENous, PRN, Ed Patel Scriver, DO    acetaminophen (TYLENOL) tablet 650 mg, 650 mg, Oral, Q6H PRN, 650 mg at 09/11/20 2137 **OR** acetaminophen (TYLENOL) suppository 650 mg, 650 mg, Rectal, Q6H PRN, Poli Patel,     polyethylene glycol (MIRALAX) packet 17 g, 17 g, Oral, DAILY PRN, Poli Patel Amy, DO    promethazine (PHENERGAN) tablet 12.5 mg, 12.5 mg, Oral, Q6H PRN **OR** ondansetron (ZOFRAN) injection 4 mg, 4 mg, IntraVENous, Q6H PRN, Poli Patelg, DO    enoxaparin (LOVENOX) injection 40 mg, 40 mg, SubCUTAneous, Q12H, Poli Patel, , 40 mg at 09/12/20 0807    insulin lispro (HUMALOG) injection, , SubCUTAneous, AC&HS, Poli Patel, DO, Stopped at 09/11/20 2200    furosemide (LASIX) tablet 40 mg, 40 mg, Oral, DAILY, Poli Patel, DO, 40 mg at 09/12/20 0806    metoprolol succinate (TOPROL-XL) XL tablet 25 mg, 25 mg, Oral, DAILY, Poil Patel, DO, 25 mg at 09/12/20 0807    potassium chloride (K-DUR, KLOR-CON) SR tablet 20 mEq, 20 mEq, Oral, DAILY, Poli Patel, , 20 mEq at 09/12/20 0806    nystatin (MYCOSTATIN) 100,000 unit/gram powder, , Topical, BID, Poli Patelg, DO    budesonide-formoterol (SYMBICORT) 80-4.5 mcg inhaler, 2 Puff, Inhalation, BID RT, Elida Yanez MD, 2 Puff at 09/12/20 9414      Assessment:     Principal Problem:    Sepsis ([facility] (9/10/2020)    Active Problems:    Hypertension ()      Mild depression ([facility] (2/8/2019)      Morbid obesity ([facility] ()      Overview: BMI 45.8- 5/2/12      Leg edema (1/23/2020)      Interstitial lung disease ([address] (6/25/2020)      Controlled type 2 diabetes mellitus, without long-term current use of insulin ([facility] (9/9/2020)      Cellulitis of left lower extremity (9/10/2020)      Demand ischemia ([facility] (9/10/2020)      Hypoxia (9/10/2020)        Plan:     Cellulitis   Severe sepsis   Continue IV antibiotic, Ceftriaxone. Symptomatic treatments     Diabetes mellitus type 2  Monitor blood sugar. Cover with insulin sliding scale accordingly.       Hypertension  Monitor blood pressure and manage accordingly. Continue home medications. Obesity   Advised to lose weight     Edema  On Lasix     I have discussed the plan of care with patient.       DVT prophylaxis : Lovenox SC       Signed By: Duke Fleming MD     September 12, 2020

## 2020-09-13 LAB
ANION GAP SERPL CALC-SCNC: 4 MMOL/L (ref 7–16)
BUN SERPL-MCNC: 12 MG/DL (ref 8–23)
CALCIUM SERPL-MCNC: 8.7 MG/DL (ref 8.3–10.4)
CHLORIDE SERPL-SCNC: 100 MMOL/L (ref 98–107)
CO2 SERPL-SCNC: 37 MMOL/L (ref 21–32)
CREAT SERPL-MCNC: 0.52 MG/DL (ref 0.6–1)
GLUCOSE BLD STRIP.AUTO-MCNC: 111 MG/DL (ref 65–100)
GLUCOSE BLD STRIP.AUTO-MCNC: 145 MG/DL (ref 65–100)
GLUCOSE BLD STRIP.AUTO-MCNC: 152 MG/DL (ref 65–100)
GLUCOSE BLD STRIP.AUTO-MCNC: 177 MG/DL (ref 65–100)
GLUCOSE SERPL-MCNC: 127 MG/DL (ref 65–100)
POTASSIUM SERPL-SCNC: 3.4 MMOL/L (ref 3.5–5.1)
SODIUM SERPL-SCNC: 141 MMOL/L (ref 136–145)

## 2020-09-13 PROCEDURE — 74011250636 HC RX REV CODE- 250/636: Performed by: INTERNAL MEDICINE

## 2020-09-13 PROCEDURE — 80048 BASIC METABOLIC PNL TOTAL CA: CPT

## 2020-09-13 PROCEDURE — 65270000029 HC RM PRIVATE

## 2020-09-13 PROCEDURE — 94640 AIRWAY INHALATION TREATMENT: CPT

## 2020-09-13 PROCEDURE — 36415 COLL VENOUS BLD VENIPUNCTURE: CPT

## 2020-09-13 PROCEDURE — 2709999900 HC NON-CHARGEABLE SUPPLY

## 2020-09-13 PROCEDURE — 94760 N-INVAS EAR/PLS OXIMETRY 1: CPT

## 2020-09-13 PROCEDURE — 82962 GLUCOSE BLOOD TEST: CPT

## 2020-09-13 PROCEDURE — 77010033678 HC OXYGEN DAILY

## 2020-09-13 PROCEDURE — 77030038269 HC DRN EXT URIN PURWCK BARD -A

## 2020-09-13 PROCEDURE — 74011250637 HC RX REV CODE- 250/637: Performed by: INTERNAL MEDICINE

## 2020-09-13 PROCEDURE — 74011636637 HC RX REV CODE- 636/637: Performed by: INTERNAL MEDICINE

## 2020-09-13 PROCEDURE — 74011000258 HC RX REV CODE- 258: Performed by: INTERNAL MEDICINE

## 2020-09-13 RX ADMIN — Medication 10 ML: at 21:58

## 2020-09-13 RX ADMIN — INSULIN LISPRO 2 UNITS: 100 INJECTION, SOLUTION INTRAVENOUS; SUBCUTANEOUS at 21:58

## 2020-09-13 RX ADMIN — INSULIN LISPRO 2 UNITS: 100 INJECTION, SOLUTION INTRAVENOUS; SUBCUTANEOUS at 12:08

## 2020-09-13 RX ADMIN — METOPROLOL SUCCINATE 25 MG: 50 TABLET, EXTENDED RELEASE ORAL at 08:47

## 2020-09-13 RX ADMIN — NYSTATIN: 100000 POWDER TOPICAL at 09:00

## 2020-09-13 RX ADMIN — ENOXAPARIN SODIUM 40 MG: 40 INJECTION SUBCUTANEOUS at 08:46

## 2020-09-13 RX ADMIN — NYSTATIN: 100000 POWDER TOPICAL at 18:00

## 2020-09-13 RX ADMIN — POTASSIUM CHLORIDE 20 MEQ: 20 TABLET, EXTENDED RELEASE ORAL at 08:46

## 2020-09-13 RX ADMIN — BUDESONIDE AND FORMOTEROL FUMARATE DIHYDRATE 2 PUFF: 80; 4.5 AEROSOL RESPIRATORY (INHALATION) at 07:10

## 2020-09-13 RX ADMIN — CEFTRIAXONE SODIUM 2 G: 2 INJECTION, POWDER, FOR SOLUTION INTRAMUSCULAR; INTRAVENOUS at 21:57

## 2020-09-13 RX ADMIN — BUDESONIDE AND FORMOTEROL FUMARATE DIHYDRATE 2 PUFF: 80; 4.5 AEROSOL RESPIRATORY (INHALATION) at 20:32

## 2020-09-13 RX ADMIN — FUROSEMIDE 40 MG: 40 TABLET ORAL at 08:47

## 2020-09-13 RX ADMIN — Medication 10 ML: at 05:22

## 2020-09-13 NOTE — PROGRESS NOTES
END OF SHIFT NOTE:    Intake/Output  09/12 1901 - 09/13 0700  In: 118 [P.O.:118]  Out: -    Voiding: YES  Catheter: NO  Drain:   External Female Catheter 09/10/20 (Active)   Site Assessment Clean, dry, & intact 09/11/20 1920   Repositioned No 09/11/20 1920   Perineal Care No 09/11/20 1127   Wick Changed Yes 09/12/20 0618   Suction Canister/Tubing Changed No 09/12/20 0618   Urine Output (mL) 100 ml 09/11/20 0634     Stool:  0 occurrences. Stool Assessment  Stool Color: Rollen Cyndi (09/12/20 1645)  Stool Appearance: Formed(per patient) (09/12/20 1904)  Stool Amount: Large (09/12/20 1645)  Stool Source/Status: Rectum (09/12/20 1645)    Emesis:  0 occurrences. VITAL SIGNS  Patient Vitals for the past 12 hrs:   Temp Pulse Resp BP SpO2   09/13/20 0336 98.7 °F (37.1 °C) 86 20 128/71 92 %   09/12/20 2350 99.4 °F (37.4 °C) 86 18 138/74 96 %   09/12/20 2108     96 %   09/12/20 1950 98.5 °F (36.9 °C) 90 20 139/77 95 %       Pain Assessment  Pain 1  Pain Scale 1: Numeric (0 - 10) (09/13/20 0250)  Pain Intensity 1: 0 (09/13/20 0250)  Patient Stated Pain Goal: 0 (09/13/20 0250)  Pain Reassessment 1: Patient resting w/respiratory rate greater than 10 (09/11/20 0555)  Pain Onset 1: acute (09/11/20 0453)  Pain Location 1: Head (09/11/20 0453)  Pain Orientation 1: Mid (09/11/20 0453)  Pain Description 1: Aching (09/11/20 0453)  Pain Intervention(s) 1: Medication (see MAR) (09/11/20 0453)    Ambulating  Yes     Additional Information:     Shift report given to oncoming nurse at the bedside.     Haim Justice

## 2020-09-13 NOTE — PROGRESS NOTES
LLE dressing changed per order. Leg red with small open area to front of shin. Pedal pulse strong & palpable. Pt tolerated well.

## 2020-09-13 NOTE — PROGRESS NOTES
Received pt from OPAL Cardenas) in stable condition. Pt in bed resting quietly. Resp even & unlabored on 3L NC; no acute signs of distress noted. Bed low & locked; call light in reach; no needs voiced.

## 2020-09-13 NOTE — PROGRESS NOTES
Progress Note    Patient: Bhupendra Kelly MRN: [id number]  SSN: [id number]    YOB: 1955  Age: 63 y.o. Sex: female      Admit Date: 9/9/2020    LOS: 3 days     Subjective:     Patient with past medical history of obesity, DM, hypertension, leg edema. Patient is admitted due to sepsis with left lower leg cellulitis. She is on Empiric IV antibiotic now and feeling better. No fever. No shaking. No chills. Left lower leg is less swollen and less tender as per patient. Objective:     Vitals:    09/13/20 0336 09/13/20 0717 09/13/20 0751 09/13/20 0846   BP: 128/71  121/71 121/71   Pulse: 86  84 84   Resp: 20  20    Temp: 98.7 °F (37.1 °C)  98.2 °F (36.8 °C)    SpO2: 92% 94% 92%    Weight:       Height:            Intake and Output:  Current Shift: No intake/output data recorded. Last three shifts: 09/11 1901 - 09/13 0700  In: 118 [P.O.:118]  Out: 325 [Urine:325]    Physical Exam:     General:                    The patient is a pleasant obese female in no acute respiratory distress. She is lying in bed with head up. Head:                                   Normocephalic/atraumatic. Eyes:                                   No palpebral pallor or scleral icterus. ENT:                                    External auricular and nasal exam within normal limits. Mucous membranes are moist.  Neck:                                   Supple, non-tender, no JVD. Lungs:                       Clear to auscultation bilaterally without wheezes or crackles. No respiratory distress or accessory muscle use. Heart:                                  Regular rate and rhythm, without murmurs, rubs, or gallops. Abdomen:                  Soft, non-tender, very distended due to truncal obesity with normoactive bowel sounds. Genitourinary:           No tenderness over the bladder or bilateral CVAs.   Extremities: left lower leg with less redness and swelling. Skin:                                    Normal color, texture, and turgor. No rashes, lesions, or jaundice. Pulses:                      Radial and dorsalis pedis pulses present 2+ bilaterally. Capillary refill <2s. Neurologic:                CN II-XII grossly intact and symmetrical.                                               Moving all four extremities well with no focal deficits. Psychiatric:                Pleasant demeanor, appropriate affect.  Alert and oriented x 3      Lab/Data Review:    Recent Results (from the past 24 hour(s))   GLUCOSE, POC    Collection Time: 09/12/20 11:37 AM   Result Value Ref Range    Glucose (POC) 138 (H) 65 - 100 mg/dL   GLUCOSE, POC    Collection Time: 09/12/20  4:45 PM   Result Value Ref Range    Glucose (POC) 113 (H) 65 - 100 mg/dL   GLUCOSE, POC    Collection Time: 09/12/20  9:47 PM   Result Value Ref Range    Glucose (POC) 156 (H) 65 - 896 mg/dL   METABOLIC PANEL, BASIC    Collection Time: 09/13/20  4:53 AM   Result Value Ref Range    Sodium 141 136 - 145 mmol/L    Potassium 3.4 (L) 3.5 - 5.1 mmol/L    Chloride 100 98 - 107 mmol/L    CO2 37 (H) 21 - 32 mmol/L    Anion gap 4 (L) 7 - 16 mmol/L    Glucose 127 (H) 65 - 100 mg/dL    BUN 12 8 - 23 MG/DL    Creatinine 0.52 (L) 0.6 - 1.0 MG/DL    GFR est AA >60 >60 ml/min/1.73m2    GFR est non-AA >60 >60 ml/min/1.73m2    Calcium 8.7 8.3 - 10.4 MG/DL   GLUCOSE, POC    Collection Time: 09/13/20  7:52 AM   Result Value Ref Range    Glucose (POC) 145 (H) 65 - 100 mg/dL     Results     Procedure Component Value Units Date/Time    CULTURE, BLOOD [[id number]]  (Abnormal) Collected:  09/09/20 2157    Order Status:  Completed Specimen:  Blood Updated:  09/12/20 0744     Special Requests: --        LEFT  Antecubital       GRAM STAIN GRAM POS COCCI IN CHAINS         AEROBIC BOTTLE POSITIVE               CRITICAL RESULT NOT CALLED DUE TO PREVIOUS NOTIFICATION OF CRITICAL RESULT WITHIN THE LAST 24 HOURS. Culture result:       STREPTOCOCCI, BETA HEMOLYTIC GROUP G                  For Susceptibility Refer to Culture  [facility] [id number]      CULTURE, BLOOD [[id number]]  (Abnormal)  (Susceptibility) Collected:  09/09/20 2136    Order Status:  Completed Specimen:  Blood Updated:  09/12/20 0743     Special Requests: --        RIGHT  Antecubital       GRAM STAIN GRAM POS COCCI IN CHAINS               AEROBIC AND ANAEROBIC BOTTLES                  RESULTS VERIFIED, PHONED TO AND READ BACK BY Edmundo Castillo RN @7862 9/10/20 SC           Culture result:       STREPTOCOCCI, BETA HEMOLYTIC GROUP G            REFER TO [employer] ACCESSION [id number]    Susceptibility      Streptococcus group G beta hemolytic     CHAD     Clindamycin ($) Susceptible     Penicillin G ($$) Susceptible     Vancomycin ($) Susceptible                    BLOOD CULTURE ID PANEL [[id number]]  (Abnormal) Collected:  09/09/20 2136    Order Status:  Completed Specimen:  Blood Updated:  09/10/20 1129     Acc. no. from Micro Order [id number]     Streptococcus Detected        Comment: RESULTS VERIFIED, PHONED TO AND READ BACK BY  Edmundo Castillo RN @6393 9/10/20 SC          INTERPRETATION       Gram positive cocci. Identified by realtime PCR as Streptococcus species (not agalactiae, pyogenes, or pneumoniae).            Comment: Consider discontinuation of IV vancomycin and using an anti-streptococcal beta-lactam (ceftriaxone/cefotaxime (peds))           XR chest   9/9/2020  IMPRESSION: No acute acute abnormality      Current Facility-Administered Medications:     cefTRIAXone (ROCEPHIN) 2 g in 0.9% sodium chloride (MBP/ADV) 50 mL, 2 g, IntraVENous, Q24H, Monique Shi MD, Last Rate: 100 mL/hr at 09/12/20 2141, 2 g at 09/12/20 2141    sodium chloride (NS) flush 5-40 mL, 5-40 mL, IntraVENous, Q8H, Meka Patel DO, 10 mL at 09/13/20 0522    sodium chloride (NS) flush 5-40 mL, 5-40 mL, IntraVENous, PRN, Vahe Blanca,     acetaminophen (TYLENOL) tablet 650 mg, 650 mg, Oral, Q6H PRN, 650 mg at 09/11/20 2137 **OR** acetaminophen (TYLENOL) suppository 650 mg, 650 mg, Rectal, Q6H PRN, Gabrielle Patel DO    polyethylene glycol (MIRALAX) packet 17 g, 17 g, Oral, DAILY PRN, Gabrielle Patel DO    promethazine (PHENERGAN) tablet 12.5 mg, 12.5 mg, Oral, Q6H PRN **OR** ondansetron (ZOFRAN) injection 4 mg, 4 mg, IntraVENous, Q6H PRN, Gabrielle Patel DO    enoxaparin (LOVENOX) injection 40 mg, 40 mg, SubCUTAneous, Q12H, Gabrielle Patel DO, 40 mg at 09/13/20 0846    insulin lispro (HUMALOG) injection, , SubCUTAneous, AC&HS, Gabrielle Patel DO, Stopped at 09/13/20 0730    furosemide (LASIX) tablet 40 mg, 40 mg, Oral, DAILY, Gabrielle Patel DO, 40 mg at 09/13/20 0847    metoprolol succinate (TOPROL-XL) XL tablet 25 mg, 25 mg, Oral, DAILY, Gabrielle Patel DO, 25 mg at 09/13/20 0847    potassium chloride (K-DUR, KLOR-CON) SR tablet 20 mEq, 20 mEq, Oral, DAILY, Gabrielle Patel DO, 20 mEq at 09/13/20 0846    nystatin (MYCOSTATIN) 100,000 unit/gram powder, , Topical, BID, Gabrielle Patel DO    budesonide-formoterol (SYMBICORT) 80-4.5 mcg inhaler, 2 Puff, Inhalation, BID RT, Cara Snell MD, 2 Puff at 09/13/20 0710      Assessment:     Principal Problem:    Sepsis ([facility] [address] (9/10/2020)    Active Problems:    Hypertension ()      Mild depression ([facility] [address] (2/8/2019)      Morbid obesity ([facility] [address] ()      Overview: BMI 45.8- 5/2/12      Leg edema (1/23/2020)      Interstitial lung disease ([address] (6/25/2020)      Controlled type 2 diabetes mellitus, without long-term current use of insulin ([address] (9/9/2020)      Cellulitis of left lower extremity (9/10/2020)      Demand ischemia ([address] (9/10/2020)      Hypoxia (9/10/2020)        Plan:     Cellulitis   Severe sepsis   Continue IV antibiotic, Ceftriaxone. Symptomatic treatments     Diabetes mellitus type 2  Monitor blood sugar.   Cover with insulin sliding scale accordingly. Hypertension  Monitor blood pressure and manage accordingly. Continue home medications. Obesity   Advised to lose weight     Edema  On Lasix     I have discussed the plan of care with patient.       DVT prophylaxis : Lovenox SC       Signed By: Rashida Hernadez MD     September 13, 2020

## 2020-09-13 NOTE — PROGRESS NOTES
Problem: Falls - Risk of  Goal: *Absence of Falls  Description: Document Rebbecca Persons Fall Risk and appropriate interventions in the flowsheet. Outcome: Progressing Towards Goal  Note: Fall Risk Interventions:  Mobility Interventions: Bed/chair exit alarm, Patient to call before getting OOB, PT Consult for mobility concerns, OT consult for ADLs         Medication Interventions: Bed/chair exit alarm, Patient to call before getting OOB, Teach patient to arise slowly    Elimination Interventions: Bed/chair exit alarm, Call light in reach, Patient to call for help with toileting needs, Toileting schedule/hourly rounds              Problem: Pressure Injury - Risk of  Goal: *Prevention of pressure injury  Description: Document Mukesh Scale and appropriate interventions in the flowsheet.   Outcome: Progressing Towards Goal  Note: Pressure Injury Interventions:  Sensory Interventions: Assess changes in LOC, Check visual cues for pain, Keep linens dry and wrinkle-free, Maintain/enhance activity level, Minimize linen layers, Pressure redistribution bed/mattress (bed type)    Moisture Interventions: Absorbent underpads, Check for incontinence Q2 hours and as needed, Maintain skin hydration (lotion/cream), Apply protective barrier, creams and emollients    Activity Interventions: Increase time out of bed, Pressure redistribution bed/mattress(bed type), PT/OT evaluation    Mobility Interventions: HOB 30 degrees or less, Pressure redistribution bed/mattress (bed type), PT/OT evaluation    Nutrition Interventions: Document food/fluid/supplement intake, Offer support with meals,snacks and hydration    Friction and Shear Interventions: Foam dressings/transparent film/skin sealants, Lift sheet, HOB 30 degrees or less

## 2020-09-14 LAB
ANION GAP SERPL CALC-SCNC: 4 MMOL/L (ref 7–16)
BUN SERPL-MCNC: 12 MG/DL (ref 8–23)
CALCIUM SERPL-MCNC: 8.6 MG/DL (ref 8.3–10.4)
CHLORIDE SERPL-SCNC: 99 MMOL/L (ref 98–107)
CO2 SERPL-SCNC: 39 MMOL/L (ref 21–32)
CREAT SERPL-MCNC: 0.5 MG/DL (ref 0.6–1)
GLUCOSE BLD STRIP.AUTO-MCNC: 138 MG/DL (ref 65–100)
GLUCOSE BLD STRIP.AUTO-MCNC: 144 MG/DL (ref 65–100)
GLUCOSE BLD STRIP.AUTO-MCNC: 147 MG/DL (ref 65–100)
GLUCOSE BLD STRIP.AUTO-MCNC: 163 MG/DL (ref 65–100)
GLUCOSE SERPL-MCNC: 134 MG/DL (ref 65–100)
POTASSIUM SERPL-SCNC: 3.4 MMOL/L (ref 3.5–5.1)
SODIUM SERPL-SCNC: 142 MMOL/L (ref 136–145)

## 2020-09-14 PROCEDURE — 74011000258 HC RX REV CODE- 258: Performed by: INTERNAL MEDICINE

## 2020-09-14 PROCEDURE — 36415 COLL VENOUS BLD VENIPUNCTURE: CPT

## 2020-09-14 PROCEDURE — 94760 N-INVAS EAR/PLS OXIMETRY 1: CPT

## 2020-09-14 PROCEDURE — 97535 SELF CARE MNGMENT TRAINING: CPT

## 2020-09-14 PROCEDURE — 97168 OT RE-EVAL EST PLAN CARE: CPT

## 2020-09-14 PROCEDURE — 97530 THERAPEUTIC ACTIVITIES: CPT

## 2020-09-14 PROCEDURE — 74011636637 HC RX REV CODE- 636/637: Performed by: INTERNAL MEDICINE

## 2020-09-14 PROCEDURE — 74011250637 HC RX REV CODE- 250/637: Performed by: INTERNAL MEDICINE

## 2020-09-14 PROCEDURE — 80048 BASIC METABOLIC PNL TOTAL CA: CPT

## 2020-09-14 PROCEDURE — 74011250636 HC RX REV CODE- 250/636: Performed by: INTERNAL MEDICINE

## 2020-09-14 PROCEDURE — 94640 AIRWAY INHALATION TREATMENT: CPT

## 2020-09-14 PROCEDURE — 82962 GLUCOSE BLOOD TEST: CPT

## 2020-09-14 PROCEDURE — 65270000029 HC RM PRIVATE

## 2020-09-14 RX ADMIN — Medication 10 ML: at 06:07

## 2020-09-14 RX ADMIN — METOPROLOL SUCCINATE 25 MG: 50 TABLET, EXTENDED RELEASE ORAL at 08:39

## 2020-09-14 RX ADMIN — ENOXAPARIN SODIUM 40 MG: 40 INJECTION SUBCUTANEOUS at 08:40

## 2020-09-14 RX ADMIN — Medication 10 ML: at 22:02

## 2020-09-14 RX ADMIN — NYSTATIN: 100000 POWDER TOPICAL at 08:39

## 2020-09-14 RX ADMIN — NYSTATIN: 100000 POWDER TOPICAL at 18:00

## 2020-09-14 RX ADMIN — POTASSIUM CHLORIDE 20 MEQ: 20 TABLET, EXTENDED RELEASE ORAL at 08:39

## 2020-09-14 RX ADMIN — BUDESONIDE AND FORMOTEROL FUMARATE DIHYDRATE 2 PUFF: 80; 4.5 AEROSOL RESPIRATORY (INHALATION) at 19:24

## 2020-09-14 RX ADMIN — Medication 10 ML: at 14:00

## 2020-09-14 RX ADMIN — CEFTRIAXONE SODIUM 2 G: 2 INJECTION, POWDER, FOR SOLUTION INTRAMUSCULAR; INTRAVENOUS at 22:01

## 2020-09-14 RX ADMIN — BUDESONIDE AND FORMOTEROL FUMARATE DIHYDRATE 2 PUFF: 80; 4.5 AEROSOL RESPIRATORY (INHALATION) at 07:23

## 2020-09-14 RX ADMIN — INSULIN LISPRO 2 UNITS: 100 INJECTION, SOLUTION INTRAVENOUS; SUBCUTANEOUS at 22:01

## 2020-09-14 RX ADMIN — FUROSEMIDE 40 MG: 40 TABLET ORAL at 08:39

## 2020-09-14 NOTE — PROGRESS NOTES
MSN, CM:  Spoke with patient this AM about discharge planning. Patient lives with son \"Faith" in own home with no steps for entrance but 18 steps inside. There are 4 bedrooms upstairs but patient states her BR is on the lower level and she does not have to climb the steps. Patient is independent with all ADL's and requires no equipment for ambulation. Patient does state she uses a cane PRN. Patient denies any home oxygen, HH, or rehab in the past.  Patient confirms PCP is Dr. Gopi Wood and she drives herself to all appointments. PT and OT consulted for evaluation and recommendations. Case Management will continue to follow for discharge needs. Care Management Interventions  PCP Verified by CM: Yes(Dr. Gopi Wood)  Mode of Transport at Discharge:  Other (see comment)(family to transport)  Transition of Care Consult (CM Consult): Discharge Planning  Physical Therapy Consult: Yes  Occupational Therapy Consult: Yes  Current Support Network: Own Home, Other(Son lives in home)  Confirm Follow Up Transport: Self  Freedom of Choice List was Provided with Basic Dialogue that Supports the Patient's Individualized Plan of Care/Goals, Treatment Preferences and Shares the Quality Data Associated with the Providers?: Yes  Discharge Location  Discharge Placement: Home

## 2020-09-14 NOTE — PROGRESS NOTES
Problem: Mobility Impaired (Adult and Pediatric)  Goal: *Acute Goals and Plan of Care (Insert Text)  Outcome: Progressing Towards Goal  Note:   LTG:  (1.)Ms. Marisela Sheth will move from supine to sit and sit to supine, scoot up and down, and roll side to side with SUPERVISION within 7 treatment day(s). (2.)Ms. Marisela Sheth will perform sit-stand transfer INDEPENDENTLY within 7 days. (3.)Ms. Marisela Sheth will transfer from bed to chair and chair to bed with STAND BY ASSIST using the least restrictive device within 7 treatment day(s). (4.)Ms. Marisela Sheth will perform exercises per HEP for 15+ minutes to improve strength and mobility within 7 days. (5.)Ms. Zuniga will ambulate with STAND BY ASSIST for 100+ feet using the least restrictive device within 7 days. ________________________________________________________________________________________________      PHYSICAL THERAPY: Daily Note and AM 9/14/2020  INPATIENT: PT Visit Days : 2  Payor: Dora Sharp / Plan: Farooq Zapata / Product Type: Commerical /       NAME/AGE/GENDER: Bella Rivero is a 63 y.o. female   PRIMARY DIAGNOSIS: [facility]epsis ([facility] [A41.9]  Cellulitis [L03.90]  Suspected COVID-19 virus infection [Z20.828] Sepsis ([facility] Sepsis ([facility]       ICD-10: Treatment Diagnosis:    · Generalized Muscle Weakness (M62.81)  · Difficulty in walking, Not elsewhere classified (R26.2)  · Other abnormalities of gait and mobility (R26.89)   Precaution/Allergies:  Lortab [hydrocodone-acetaminophen]      ASSESSMENT:     Ms. Marisela Sheth is a 63year old female admitted from home with sepsis due to left LE cellulitis. She lives with son and is typically independent to modified independent with household mobility using cane as needed. 9/14: pt much improved today, requires significantly less assistance with bed mobility. Intact seated balance. Was able to stand without help (close CGA and cues for technique) from edge of bed and from low commode.  Worked on transfer mechanics, body mechanics, posture, standing static/dynamic activities, pre-gait, and ambulation x 4 trials. Initially takes steps with walker and CGA and performs standing activities at sink to address balance, activity tolerance. Walks back to chair for seated rest break, then practiced transfers again from chair and ambulation to and from bathroom with close CGA/no DME (furniture walking at times, did not want to accept any handheld assist). Pt returned to room and again performs standing activities, then up to chair after session. Positioned comfortably with needs in reach. Ibeth Mary has made great progress today with mobility compared to initial evaluation. Goals modified/updated. She continues to be much weaker than baseline however and still recommending rehab at discharge. This section established at most recent assessment   PROBLEM LIST (Impairments causing functional limitations):  1. Decreased Strength  2. Decreased Transfer Abilities  3. Decreased Ambulation Ability/Technique  4. Decreased Balance  5. Increased Pain  6. Decreased Activity Tolerance  7. Edema/Girth  8. Decreased Skin Integrity/Hygeine   INTERVENTIONS PLANNED: (Benefits and precautions of physical therapy have been discussed with the patient.)  1. Balance Exercise  2. Bed Mobility  3. Gait Training  4. Home Exercise Program (HEP)  5. Therapeutic Activites  6. Therapeutic Exercise/Strengthening  7. Transfer Training     TREATMENT PLAN: Frequency/Duration: 3 times a week for duration of hospital stay  Rehabilitation Potential For Stated Goals: Good     REHAB RECOMMENDATIONS (at time of discharge pending progress):    Placement: It is my opinion, based on this patient's performance to date, that Ms. Herber Duncan may benefit from intensive therapy at a [address] [place] [address] after discharge due to the functional deficits listed above that are likely to improve with skilled rehabilitation and concerns that he/she may be unsafe to be unsupervised at home due to weakness, inability to stand, need for heavy assistance . Equipment:    Tbd pending progress              HISTORY:   History of Present Injury/Illness (Reason for Referral):  Per H&P, \"1 week history of productive cough today progressive weakness and myalgias with fevers as high as 101.9 degrees. Last several days developing cellulitis left lower extremity and she reports that she had to cover her pretibial ulceration because her dog [place] it. She claims that she has not been out of her house to be exposed to COVID-19 but she is being admitted with suspected infection despite initial rapid screen negative. She is 63years of age with history of possible mild interstitial lung disease by available records, morbid obesity with dyspnea on exertion related to as well as chronic lower extremity edema related to obesity for which she takes daily Lasix and potassium. Hypertension and a new diagnosis of diabetes mellitus. She has had progressive weakness and sought care today because she could get off of her couch. She was hypoxemic at time of presentation with reported 87% on room air but documented 88% on room air. Her white blood count is 12,500 with 87% neutrophils. Serum lactic acid is normal serum creatinine is less than 1 she has a glucose of 150 her liver transaminases are abnormal with  and ALT of 47. Her troponin I is elevated but EKG shows no ischemic changes and she does not complain of any chest pain or pressure\"  Past Medical History/Comorbidities:   Ms. Venus Fong  has a past medical history of Chronic pain, Hypertension, Morbid obesity ([address] and Positive PPD. Ms. Venus Fong  has a past surgical history that includes hx gyn and hx knee arthroscopy.   Social History/Living Environment:   Home Environment: Private residence  # Steps to Enter: 0  One/Two Story Residence: Two story, live on 1st floor  Lift Chair Available: No  Living Alone: No  Support Systems: Child(adelita), Family member(s)  Patient Expects to be Discharged to[de-identified] Rehabilitation facility  Current DME Used/Available at Home: Cane, straight  Tub or Shower Type: Shower  Prior Level of Function/Work/Activity:  Lives with son who works during the day. Independent to mod I using cane as needed. Number of Personal Factors/Comorbidities that affect the Plan of Care: 1-2: MODERATE COMPLEXITY   EXAMINATION:   Most Recent Physical Functioning:   Gross Assessment:  AROM: Generally decreased, functional  Strength: Generally decreased, functional  Coordination: Generally decreased, functional               Posture:  Posture (WDL): Exceptions to WDL  Posture Assessment: Forward head, Rounded shoulders, Trunk flexion  Balance:  Sitting: Impaired  Sitting - Static: Good (unsupported)  Sitting - Dynamic: Good (unsupported)  Standing: Impaired  Standing - Static: Fair  Standing - Dynamic : Fair Bed Mobility:  Rolling: Stand-by assistance  Supine to Sit: Stand-by assistance  Scooting: Stand-by assistance  Wheelchair Mobility:     Transfers:  Sit to Stand: Contact guard assistance  Stand to Sit: Contact guard assistance  Bed to Chair: Contact guard assistance  Interventions: Verbal cues; Visual cues; Safety awareness training; Tactile cues;Manual cues  Duration: 53 Minutes  Gait:     Base of Support: Center of gravity altered  Speed/Violeta: Pace decreased (<100 feet/min)  Step Length: Left shortened;Right shortened  Gait Abnormalities: Trunk sway increased;Decreased step clearance  Distance (ft): 8 Feet (ft)  Assistive Device: Walker, rolling(then without)  Ambulation - Level of Assistance: Contact guard assistance  Interventions: Verbal cues; Safety awareness training; Tactile cues      Body Structures Involved:  1. Joints  2. Muscles Body Functions Affected:  1. Sensory/Pain  2. Movement Related  3. Skin Related Activities and Participation Affected:  1. General Tasks and Demands  2. Mobility  3. Domestic Life  4.  [employer], Social and for; to (do) [employer]   Number of elements that affect the Plan of Care: 4+: HIGH COMPLEXITY   CLINICAL PRESENTATION:   Presentation: Evolving clinical presentation with changing clinical characteristics: MODERATE COMPLEXITY   CLINICAL DECISION MAKIN [facility] Inpatient Short Form  How much difficulty does the patient currently have. .. Unable A Lot A Little None   1. Turning over in bed (including adjusting bedclothes, sheets and blankets)? [] 1   [x] 2   [] 3   [] 4   2. Sitting down on and standing up from a chair with arms ( e.g., wheelchair, bedside commode, etc.)   [x] 1   [] 2   [] 3   [] 4   3. Moving from lying on back to sitting on the side of the bed? [] 1   [x] 2   [] 3   [] 4   How much help from another person does the patient currently need. .. Total A Lot A Little None   4. Moving to and from a bed to a chair (including a wheelchair)? [x] 1   [] 2   [] 3   [] 4   5. Need to walk in hospital room? [x] 1   [] 2   [] 3   [] 4   6. Climbing 3-5 steps with a railing? [x] 1   [] 2   [] 3   [] 4   © , Trustees of [address] [place] [address], under license to [employer]. All rights reserved      Score:  Initial: 8 Most Recent: X (Date: -- )    Interpretation of Tool:  Represents activities that are increasingly more difficult (i.e. Bed mobility, Transfers, Gait). Medical Necessity:     · Patient demonstrates   · good  ·  rehab potential due to higher previous functional level. Reason for Services/Other Comments:  · Patient   · continues to demonstrate capacity to improve strength, mobility, balance, transfers, and activity tolerance which will   · increase independence, decrease amount of assistance required from caregiver, and increase safety  · .    Use of outcome tool(s) and clinical judgement create a POC that gives a: Questionable prediction of patient's progress: MODERATE COMPLEXITY            TREATMENT:   (In addition to Assessment/Re-Assessment sessions the following treatments were rendered)   Pre-treatment Symptoms/Complaints:  \"I don't need help, this is the first day I've been able to get up\"    Pain: Initial: 0/10  Pain Intensity 1: 0  Post Session:  0/10     Therapeutic Activity: (  53 Minutes ):  Therapeutic activities including Bed mobility (rolling, scooting, and supine to sit transfer), seated static/dynamic balance and functional activities, seated mobility, sit-stand transfers x several reps from bed, chair, and commode, toilet transfers, ambulation on level ground x 4 trials, standing static/dynamic functional activities to improve mobility, strength, balance, coordination, and activity tolerance . Required close CGA/SBA and heavy cueing (manual, tactile, verbal, and visual) Verbal cues; Safety awareness training; Tactile cues to promote static and dynamic balance in standing and promote motor control of bilateral, lower extremity(s). Braces/Orthotics/Lines/Etc:   · O2 Device: Nasal cannula  Treatment/Session Assessment:    · Response to Treatment:  Close CGA amb in room x several short trials (8 ft at most) with walker  · Interdisciplinary Collaboration:   o Physical Therapist  o Occupational Therapist  o Registered Nurse  · After treatment position/precautions:   o Up in chair  o Bed/Chair-wheels locked  o Bed in low position  o Call light within reach  o RN notified   · Compliance with Program/Exercises: Will assess as treatment progresses  · Recommendations/Intent for next treatment session: \"Next visit will focus on advancements to more challenging activities and reduction in assistance provided\".   Total Treatment Duration:  PT Patient Time In/Time Out  Time In: 1122  Time Out: Stone Sweeney DPT

## 2020-09-14 NOTE — PROGRESS NOTES
Progress Note    Patient: Kyle Tyler MRN: [id number]  SSN: [id number]    YOB: 1955  Age: 63 y.o. Sex: female      Admit Date: 9/9/2020    LOS: 4 days     Subjective:     Patient with past medical history of obesity, DM, hypertension, leg edema. Patient is admitted due to sepsis with left lower leg cellulitis. She is on Empiric IV antibiotic now and feeling better. No fever. No shaking. No chills. Left lower leg is less swollen and less painful. Patient is ambulating a bit in her room. Objective:     Vitals:    09/13/20 2318 09/14/20 0315 09/14/20 0724 09/14/20 0801   BP: (!) 147/72 110/72  132/68   Pulse: 79 97  80   Resp: 16 18  15   Temp: 98.4 °F (36.9 °C) 98.4 °F (36.9 °C)  98.2 °F (36.8 °C)   SpO2: 96% 95% 93% 91%   Weight:       Height:            Intake and Output:  Current Shift: No intake/output data recorded. Last three shifts: 09/12 1901 - 09/14 0700  In: 80 [P.O.:493]  Out: 2601 [Urine:2600]    Physical Exam:     General:                    The patient is a pleasant obese female in no acute respiratory distress. She is lying in bed with head up. Talking well. Head:                                   Normocephalic/atraumatic. Eyes:                                   No palpebral pallor or scleral icterus. ENT:                                    External auricular and nasal exam within normal limits. Mucous membranes are moist.  Neck:                                   Supple, non-tender, no JVD. Lungs:                       Clear to auscultation bilaterally without wheezes or crackles. No respiratory distress or accessory muscle use. Heart:                                  Regular rate and rhythm, without murmurs, rubs, or gallops. Abdomen:                  Soft, non-tender, very distended due to truncal obesity with normoactive bowel sounds.    Genitourinary: No tenderness over the bladder or bilateral CVAs. Extremities:               left lower leg with less redness and swelling. Skin:                                   left lower leg with some redness and swelling   Pulses:                      Radial and dorsalis pedis pulses present 2+ bilaterally. Capillary refill <2s. Neurologic:                CN II-XII grossly intact and symmetrical.                                               Moving all four extremities well with no focal deficits. Psychiatric:                Pleasant demeanor, appropriate affect.  Alert and oriented x 3      Lab/Data Review:    Recent Results (from the past 24 hour(s))   GLUCOSE, POC    Collection Time: 09/13/20 11:17 AM   Result Value Ref Range    Glucose (POC) 152 (H) 65 - 100 mg/dL   GLUCOSE, POC    Collection Time: 09/13/20  4:44 PM   Result Value Ref Range    Glucose (POC) 111 (H) 65 - 100 mg/dL   GLUCOSE, POC    Collection Time: 09/13/20  9:27 PM   Result Value Ref Range    Glucose (POC) 177 (H) 65 - 841 mg/dL   METABOLIC PANEL, BASIC    Collection Time: 09/14/20  6:37 AM   Result Value Ref Range    Sodium 142 136 - 145 mmol/L    Potassium 3.4 (L) 3.5 - 5.1 mmol/L    Chloride 99 98 - 107 mmol/L    CO2 39 (H) 21 - 32 mmol/L    Anion gap 4 (L) 7 - 16 mmol/L    Glucose 134 (H) 65 - 100 mg/dL    BUN 12 8 - 23 MG/DL    Creatinine 0.50 (L) 0.6 - 1.0 MG/DL    GFR est AA >60 >60 ml/min/1.73m2    GFR est non-AA >60 >60 ml/min/1.73m2    Calcium 8.6 8.3 - 10.4 MG/DL   GLUCOSE, POC    Collection Time: 09/14/20  7:18 AM   Result Value Ref Range    Glucose (POC) 147 (H) 65 - 100 mg/dL     Results     Procedure Component Value Units Date/Time    CULTURE, BLOOD [[id number]]  (Abnormal) Collected:  09/09/20 2157    Order Status:  Completed Specimen:  Blood Updated:  09/12/20 9166     Special Requests: --        LEFT  Antecubital       GRAM STAIN GRAM POS COCCI IN CHAINS         AEROBIC BOTTLE POSITIVE               CRITICAL RESULT NOT CALLED DUE TO PREVIOUS NOTIFICATION OF CRITICAL RESULT WITHIN THE LAST 24 HOURS. Culture result:       STREPTOCOCCI, BETA HEMOLYTIC GROUP G                  For Susceptibility Refer to Culture  [facility] [id number]      CULTURE, BLOOD [[id number]]  (Abnormal)  (Susceptibility) Collected:  09/09/20 2136    Order Status:  Completed Specimen:  Blood Updated:  09/12/20 0743     Special Requests: --        RIGHT  Antecubital       GRAM STAIN GRAM POS COCCI IN CHAINS               AEROBIC AND ANAEROBIC BOTTLES                  RESULTS VERIFIED, PHONED TO AND READ BACK BY OPAL STONE @8299 9/10/20 SC           Culture result:       STREPTOCOCCI, BETA HEMOLYTIC GROUP G            REFER TO BIOFIRE ACCESSION [id number]    Susceptibility      Streptococcus group G beta hemolytic     CHAD     Clindamycin ($) Susceptible     Penicillin G ($$) Susceptible     Vancomycin ($) Susceptible                    BLOOD CULTURE ID PANEL [[id number]]  (Abnormal) Collected:  09/09/20 2136    Order Status:  Completed Specimen:  Blood Updated:  09/10/20 1129     Acc. no. from Micro Order [id number]     Streptococcus Detected        Comment: RESULTS VERIFIED, PHONED TO AND READ BACK BY  OPAL STONE @4302 9/10/20 SC          INTERPRETATION       Gram positive cocci. Identified by realtime PCR as Streptococcus species (not agalactiae, pyogenes, or pneumoniae).            Comment: Consider discontinuation of IV vancomycin and using an anti-streptococcal beta-lactam (ceftriaxone/cefotaxime (peds))           XR chest   9/9/2020  IMPRESSION: No acute acute abnormality      Current Facility-Administered Medications:     cefTRIAXone (ROCEPHIN) 2 g in 0.9% sodium chloride (MBP/ADV) 50 mL, 2 g, IntraVENous, Q24H, Jovany Maldonado MD, Last Rate: 100 mL/hr at 09/13/20 2157, 2 g at 09/13/20 2157    sodium chloride (NS) flush 5-40 mL, 5-40 mL, IntraVENous, Q8H, Marylu Patel DO, 10 mL at 09/14/20 0607    sodium chloride (NS) flush 5-40 mL, 5-40 mL, IntraVENous, PRN, Ronnie Patel, DO    acetaminophen (TYLENOL) tablet 650 mg, 650 mg, Oral, Q6H PRN, 650 mg at 09/11/20 2137 **OR** acetaminophen (TYLENOL) suppository 650 mg, 650 mg, Rectal, Q6H PRN, Ronnie Patel,     polyethylene glycol (MIRALAX) packet 17 g, 17 g, Oral, DAILY PRN, Ronnie Patel, DO    promethazine (PHENERGAN) tablet 12.5 mg, 12.5 mg, Oral, Q6H PRN **OR** ondansetron (ZOFRAN) injection 4 mg, 4 mg, IntraVENous, Q6H PRN, Ronnie Patel DO    enoxaparin (LOVENOX) injection 40 mg, 40 mg, SubCUTAneous, Q12H, Ronnie Patel, DO, 40 mg at 09/14/20 0840    insulin lispro (HUMALOG) injection, , SubCUTAneous, AC&HS, Ronnie Patel, DO, 2 Units at 09/13/20 2158    furosemide (LASIX) tablet 40 mg, 40 mg, Oral, DAILY, Ronnie Patel, DO, 40 mg at 09/14/20 7404    metoprolol succinate (TOPROL-XL) XL tablet 25 mg, 25 mg, Oral, DAILY, Ronnie Patel, DO, 25 mg at 09/14/20 0797    potassium chloride (K-DUR, KLOR-CON) SR tablet 20 mEq, 20 mEq, Oral, DAILY, Ronnie Patel, DO, 20 mEq at 09/14/20 6692    nystatin (MYCOSTATIN) 100,000 unit/gram powder, , Topical, BID, Ronnie Patel, DO    budesonide-formoterol (SYMBICORT) 80-4.5 mcg inhaler, 2 Puff, Inhalation, BID RT, Christine Petersen MD, 2 Puff at 09/14/20 3608      Assessment:     Principal Problem:    Sepsis ([facility] (9/10/2020)    Active Problems:    Hypertension ()      Mild depression ([facility] (2/8/2019)      Morbid obesity ([facility] ()      Overview: BMI 45.8- 5/2/12      Leg edema (1/23/2020)      Interstitial lung disease ([address] (6/25/2020)      Controlled type 2 diabetes mellitus, without long-term current use of insulin ([address] (9/9/2020)      Cellulitis of left lower extremity (9/10/2020)      Demand ischemia ([address] (9/10/2020)      Hypoxia (9/10/2020)        Plan:     Cellulitis   Severe sepsis on admission   Continue IV antibiotic, Ceftriaxone.    Symptomatic treatments     Diabetes mellitus type 2  Monitor blood sugar. Cover with insulin sliding scale accordingly. Hypertension  Monitor blood pressure and manage accordingly. Continue home medications. Obesity   Advised to lose weight     Edema  On Lasix     I have discussed the plan of care with patient. DVT prophylaxis : Lovenox SC     Disposition plan :  Possibly can change antibiotic to PO and discharge home tomorrow is leg redness and swelling better.        Signed By: Evin Yeung MD     September 14, 2020

## 2020-09-14 NOTE — PROGRESS NOTES
Bedside report received from [facility], RN. Patient resting quietly in bed. No needs expressed at this time.

## 2020-09-14 NOTE — PROGRESS NOTES
Problem: Self Care Deficits Care Plan (Adult)  Goal: *Acute Goals and Plan of Care (Insert Text)  Description:     GOALS UPDATED: 9/14/2020:  1. Patient will complete total body bathing and dressing with Minimal Assistance and adaptive equipment as needed. 2. Patient will complete toileting with stand by assistance and adaptive equipment as needed. 3. Patient will complete bed mobility with modified independence and minimal verbal cues. 4. Patient will complete functional transfers with stand by assistance and adaptive equipment as needed. 5. Patient will complete grooming in standing with supervision and good static and good dynamic standing balance. 6. Patent will complete functional mobility for ADLs with stand by assistance and adaptive equipment as needed. 7. Patient will verbalize 2 energy conservation techniques with no cues from therapist to increase safety and independence with ADLs. Timeframe: 7 visits     Outcome: Progressing Towards Goal    OCCUPATIONAL THERAPY: Daily Note, Re-evaluation and AM 9/14/2020  INPATIENT: OT Visit Days: 1  Payor: Flavia Seen / Plan: Geovany Jack / Product Type: Commerical /      NAME/AGE/GENDER: Evon Cano is a 63 y.o. female   PRIMARY DIAGNOSIS:  Sepsis ([facility] [A41.9]  Cellulitis [L03.90]  Suspected COVID-19 virus infection [Z20.828] Sepsis ([facility] Sepsis ([facility]    ICD-10: Treatment Diagnosis:    · Generalized Muscle Weakness (M62.81)  · Difficulty in walking, Not elsewhere classified (R26.2)  · Other abnormalities of gait and mobility (R26.89)   Precautions/Allergies:    Lortab [hydrocodone-acetaminophen]      ASSESSMENT:   Per Initial Assessment:  Ms. Selene Myers is a 63 y.o. female admitted for Sepsis and Suspected Covid 19 infection. Pt is NEGATIVE for Covid 19. At baseline, patient lives with son (working full time) in two story home with 0 PHOEBE. Pt is typically Mod I to independent for ADLs, IADLs, and + driving.  Pt uses SPC PRN for functional mobility for household and community distances. Pt not on home O2.    9/14/2020: Pt found supine in bed upon arrival, alert and agreeable to OT/PT co-treatment. Pt practiced bed mobility with SBA/additional time. Fair to good sitting balance. Total asisst to don socks. Pt trialed on room air, O2 sats drop to low 80s though pt reports she is not SOB. O2 replaced at 2L O2 NC and O2 sats fluctuation quickly between high 80s and mid 90s. Unsure if pt's O2 saturation is difficult to monitor? Cues throughout for breathing technique and pacing. Pt returned to 3L O2 NC with sats stable. Pt practiced sit > stand tranfers with CGA, ambulation for ADLs with CGA/RW, cues for RW management. Grooming in standing with CGA progressing to SBA, cues for breathing. Stand > sit with heavy cues for controlled descent. Pt left seated in chair with call bell within reach, left with PT for continued treatment. Pt is making progress towards goals, met several goals this date. Pt reevaluation and goals updated to better reflect current functional status. See above. Continue OT POC. At this time, patient is appropriate for Co-treatment with physical therapy due to patient's decreased overall endurance/tolerance levels, as well as need for high level skilled assistance/cueing to complete functional transfers/mobility and functional tasks. Vinay Power is appropriate for a multidisciplinary co-treatment of PT and OT to address goals of both disciplines. This section established at most recent assessment   PROBLEM LIST (Impairments causing functional limitations):  1. Decreased Strength  2. Decreased ADL/Functional Activities  3. Decreased Transfer Abilities  4. Decreased Ambulation Ability/Technique  5. Decreased Balance  6. Increased Pain  7. Decreased Activity Tolerance  8. Decreased Pacing Skills  9. Decreased Work Simplification/Energy Conservation Techniques  10. Increased Fatigue  11. Increased Shortness of Breath  12.  Edema/Girth INTERVENTIONS PLANNED: (Benefits and precautions of occupational therapy have been discussed with the patient.)  1. Activities of daily living training  2. Adaptive equipment training  3. Balance training  4. Clothing management  5. Neuromuscular re-eduation  6. Re-evaluation  7. Therapeutic activity  8. Therapeutic exercise     TREATMENT PLAN: Frequency/Duration: Follow patient 3x/week to address above goals. Rehabilitation Potential For Stated Goals: Good     REHAB RECOMMENDATIONS (at time of discharge pending progress):    Placement: It is my opinion, based on this patient's performance to date, that Ms. Josias Betts may benefit from intensive therapy at a [address] [place] [address] after discharge due to the functional deficits listed above that are likely to improve with skilled rehabilitation and concerns that he/she may be unsafe to be unsupervised at home due to decreased independence, safety, balance, strength, ROM, and activity tolerance for performance of ADLs and functional mobility . Equipment:    TBD              OCCUPATIONAL PROFILE AND HISTORY:   History of Present Injury/Illness (Reason for Referral):  See H & P  Past Medical History/Comorbidities:   Ms. Josias Betts  has a past medical history of Chronic pain, Hypertension, Morbid obesity ([address] and Positive PPD. Ms. Josias Betts  has a past surgical history that includes hx gyn and hx knee arthroscopy. Social History/Living Environment:   Home Environment: Private residence  # Steps to Enter: 0  One/Two Story Residence: Two story, live on 1st floor  Lift Chair Available: No  Living Alone: No  Support Systems: Child(adelita), Family member(s)  Patient Expects to be Discharged to[de-identified] Rehabilitation facility  Current DME Used/Available at Home: Cane, straight  Tub or Shower Type: Shower  Prior Level of Function/Work/Activity:  At baseline, patient lives with son (working full time) in two story home with 0 PHOEBE.   Pt is typically Mod I to independent for ADLs, IADLs, and + driving. Pt uses SPC PRN for functional mobility for household and community distances. Pt not on home O2. Personal Factors:          Sex:  female        Age:  63 y.o. Number of Personal Factors/Comorbidities that affect the Plan of Care: Extensive review of physical, cognitive, and psychosocial performance (3+):  HIGH COMPLEXITY   ASSESSMENT OF OCCUPATIONAL PERFORMANCE[de-identified]   Activities of Daily Living:   Basic ADLs (From Assessment) Complex ADLs (From Assessment)   Feeding: Independent  Oral Facial Hygiene/Grooming: Stand-by assistance  Bathing: Moderate assistance  Upper Body Dressing: Setup  Lower Body Dressing: Maximum assistance  Toileting: Moderate assistance Instrumental ADL  Meal Preparation: Total assistance  Homemaking: Total assistance   Grooming/Bathing/Dressing Activities of Daily Living   Grooming  Washing Hands: Stand-by assistance  Brushing Teeth: Stand-by assistance                       Lower Body Dressing Assistance  Socks: Total assistance (dependent) Bed/Mat Mobility  Rolling: Stand-by assistance  Supine to Sit: Stand-by assistance  Sit to Stand: Contact guard assistance  Stand to Sit: Contact guard assistance  Bed to Chair: Minimum assistance  Scooting: Stand-by assistance     Most Recent Physical Functioning:   Gross Assessment:  AROM: Generally decreased, functional(BUEs)  Strength: Generally decreased, functional(BUEs)  Coordination: Within functional limits(BUEs)               Posture:  Posture (WDL): Exceptions to WDL  Posture Assessment:  Forward head, Rounded shoulders, Trunk flexion  Balance:  Sitting: Intact  Sitting - Static: Good (unsupported)  Sitting - Dynamic: Good (unsupported)  Standing: Impaired  Standing - Static: Fair  Standing - Dynamic : Fair;Constant support Bed Mobility:  Rolling: Stand-by assistance  Supine to Sit: Stand-by assistance  Scooting: Stand-by assistance  Wheelchair Mobility:     Transfers:  Sit to Stand: Contact guard assistance  Stand to Sit: Contact guard assistance  Bed to Chair: Minimum assistance  Interventions: Verbal cues; Visual cues; Safety awareness training; Tactile cues;Manual cues  Duration: 53 Minutes            Patient Vitals for the past 6 hrs:   BP BP Patient Position SpO2 O2 Flow Rate (L/min) Pulse   20 0724   93 %     20 0801 132/68 At rest;Supine 91 %  80   20 1215    3 l/min    20 1216   95 % 3 l/min        Mental Status  Neurologic State: Alert  Orientation Level: Oriented X4  Cognition: Follows commands, Appropriate for age attention/concentration, Appropriate safety awareness                          Physical Skills Involved:  1. Range of Motion  2. Balance  3. Strength  4. Activity Tolerance  5. Gross Motor Control  6. Pain (Chronic) Cognitive Skills Affected (resulting in the inability to perform in a timely and safe manner):  1. None Psychosocial Skills Affected:  1. Habits/Routines  2. Environmental Adaptation  3. Social Interaction  4. Emotional Regulation  5. Self-Awareness  6. Awareness of Others  7. Social Roles   Number of elements that affect the Plan of Care: 5+:  HIGH COMPLEXITY   CLINICAL DECISION MAKIN[address] [place] [id number] AM-PAC 6 Clicks   Daily Activity Inpatient Short Form  How much help from another person does the patient currently need. .. Total A Lot A Little None   1. Putting on and taking off regular lower body clothing? [] 1   [x] 2   [] 3   [] 4   2. Bathing (including washing, rinsing, drying)? [] 1   [x] 2   [] 3   [] 4   3. Toileting, which includes using toilet, bedpan or urinal?   [] 1   [x] 2   [] 3   [] 4   4. Putting on and taking off regular upper body clothing? [] 1   [] 2   [x] 3   [] 4   5. Taking care of personal grooming such as brushing teeth? [] 1   [] 2   [x] 3   [] 4   6. Eating meals? [] 1   [] 2   [] 3   [x] 4   © , Trustees of [address] [place] [id number], under license to [employer].  All rights reserved      Score:  Initial: 12, completed, 9/11/2020 Most Recent: 16 9/14/2020 (Date: -- )    Interpretation of Tool:  Represents activities that are increasingly more difficult (i.e. Bed mobility, Transfers, Gait). Medical Necessity:     · Skilled intervention continues to be required due to decreased independence, safety, balance, strength, ROM, and activity tolerance for performance of ADLs and functional mobility. Reason for Services/Other Comments:  · Patient continues to require skilled intervention due to   · medical complications and patient unable to attend/participate in therapy as expected  · . Use of outcome tool(s) and clinical judgement create a POC that gives a: MODERATE COMPLEXITY         TREATMENT:   (In addition to Assessment/Re-Assessment sessions the following treatments were rendered)     Pre-treatment Symptoms/Complaints:   Pain: Initial:   Pain Intensity 1: 0  Post Session:  same     Today's treatment session addressed Decreased Strength, Decreased Balance, Decreased Activity Tolerance, and Increased Fatigue to progress towards achieving goal(s) listed above. During this session, Physical Therapy addressed  Bed Mobility and Ambulation to progress towards their discipline specific goal(s). Co-treatment was necessary to improve patient's ability to increase activity demands and ability to return to normal functional activity. Self Care: (23 minutes): Procedure(s) (per grid) utilized to improve and/or restore self-care/home management as related to dressing and grooming. Required minimal visual, verbal, manual and tactile cueing to facilitate activities of daily living skills. Pt practiced bed mobility with SBA/additional time. Fair to good sitting balance. Total asisst to don socks. Pt trialed on room air, O2 sats drop to low 80s though pt reports she is not SOB. O2 replaced at 2L O2 NC and O2 sats fluctuation quickly between high 80s and mid 90s. Unsure if pt's O2 saturation is difficult to monitor?  Cues throughout for breathing technique and pacing. Pt returned to 3L O2 NC with sats stable. Pt practiced sit > stand tranfers with CGA, ambulation for ADLs with CGA/RW, cues for RW management. Grooming in standing with CGA progressing to SBA, cues for breathing. Stand > sit with heavy cues for controlled descent. Braces/Orthotics/Lines/Etc:   · Pure Wick Catheter  · O2 Device: Nasal cannula  Treatment/Session Assessment:    Response to Treatment:  Tolerated well    · Interdisciplinary Collaboration:   o Physical Therapist  o Occupational Therapist  o Registered Nurse  · After treatment position/precautions:   o Up in chair  o Bed/Chair-wheels locked  o Bed in low position  o Call light within reach  o with PT   · Compliance with Program/Exercises: Compliant most of the time, Will assess as treatment progresses. · Recommendations/Intent for next treatment session: \"Next visit will focus on advancements to more challenging activities and reduction in assistance provided\".   Total Treatment Duration:  OT Patient Time In/Time Out  Time In: 1124  Time Out: [facility], OTR/L

## 2020-09-15 LAB
GLUCOSE BLD STRIP.AUTO-MCNC: 124 MG/DL (ref 65–100)
GLUCOSE BLD STRIP.AUTO-MCNC: 127 MG/DL (ref 65–100)
GLUCOSE BLD STRIP.AUTO-MCNC: 138 MG/DL (ref 65–100)
GLUCOSE BLD STRIP.AUTO-MCNC: 139 MG/DL (ref 65–100)

## 2020-09-15 PROCEDURE — 94760 N-INVAS EAR/PLS OXIMETRY 1: CPT

## 2020-09-15 PROCEDURE — 77010033678 HC OXYGEN DAILY

## 2020-09-15 PROCEDURE — 74011250637 HC RX REV CODE- 250/637: Performed by: INTERNAL MEDICINE

## 2020-09-15 PROCEDURE — 82962 GLUCOSE BLOOD TEST: CPT

## 2020-09-15 PROCEDURE — 97110 THERAPEUTIC EXERCISES: CPT

## 2020-09-15 PROCEDURE — 65270000029 HC RM PRIVATE

## 2020-09-15 PROCEDURE — 97535 SELF CARE MNGMENT TRAINING: CPT

## 2020-09-15 PROCEDURE — 74011250636 HC RX REV CODE- 250/636: Performed by: INTERNAL MEDICINE

## 2020-09-15 PROCEDURE — 94640 AIRWAY INHALATION TREATMENT: CPT

## 2020-09-15 PROCEDURE — 74011000258 HC RX REV CODE- 258: Performed by: INTERNAL MEDICINE

## 2020-09-15 PROCEDURE — 97530 THERAPEUTIC ACTIVITIES: CPT

## 2020-09-15 RX ADMIN — FUROSEMIDE 40 MG: 40 TABLET ORAL at 08:33

## 2020-09-15 RX ADMIN — NYSTATIN: 100000 POWDER TOPICAL at 08:33

## 2020-09-15 RX ADMIN — Medication 10 ML: at 05:10

## 2020-09-15 RX ADMIN — POTASSIUM CHLORIDE 20 MEQ: 20 TABLET, EXTENDED RELEASE ORAL at 08:33

## 2020-09-15 RX ADMIN — ENOXAPARIN SODIUM 40 MG: 40 INJECTION SUBCUTANEOUS at 22:47

## 2020-09-15 RX ADMIN — NYSTATIN: 100000 POWDER TOPICAL at 18:00

## 2020-09-15 RX ADMIN — ENOXAPARIN SODIUM 40 MG: 40 INJECTION SUBCUTANEOUS at 08:33

## 2020-09-15 RX ADMIN — METOPROLOL SUCCINATE 25 MG: 50 TABLET, EXTENDED RELEASE ORAL at 08:33

## 2020-09-15 RX ADMIN — CEFTRIAXONE SODIUM 2 G: 2 INJECTION, POWDER, FOR SOLUTION INTRAMUSCULAR; INTRAVENOUS at 22:46

## 2020-09-15 RX ADMIN — Medication 10 ML: at 14:00

## 2020-09-15 RX ADMIN — Medication 10 ML: at 22:47

## 2020-09-15 RX ADMIN — BUDESONIDE AND FORMOTEROL FUMARATE DIHYDRATE 2 PUFF: 80; 4.5 AEROSOL RESPIRATORY (INHALATION) at 20:22

## 2020-09-15 RX ADMIN — BUDESONIDE AND FORMOTEROL FUMARATE DIHYDRATE 2 PUFF: 80; 4.5 AEROSOL RESPIRATORY (INHALATION) at 09:53

## 2020-09-15 NOTE — PROGRESS NOTES
Hospitalist Note     Admit Date:  2020  9:25 PM   Name:  Evon Cano   Age:  63 y.o.  :  1955   MRN:  [id number]   PCP:  Rock Juarez MD  Treatment Team: Attending Provider: Usman Spencer MD; Utilization Review: Cammie Gay; Utilization Review: Lavern Trevino RN; Care Manager: Andrea López RN; Utilization Review: Alejandro Miller RN; Physical Therapist: Flor Tate DPT; Occupational Therapy Assistant: STEPHANIE Ornelas    HPI/Subjective:   Patient with past medical history of obesity, DM, hypertension, leg edema.      Patient is admitted due to sepsis with left lower leg cellulitis and gram pos bacteremia    She is still on IV rocephin and conts to improve each day. She denies any fevers, chills, n/v/d, abd pain, chest pain or pressure, SOB. She conts to endorse left lower leg swelling with some pain in her right foot when swelling is worse. She has been able to ambulate with no assistance with PT. No other complaints  Objective:     Patient Vitals for the past 24 hrs:   Temp Pulse Resp BP SpO2   09/15/20 0953     92 %   09/15/20 0742 98.1 °F (36.7 °C) 82 15 (!) 150/76 93 %   09/15/20 0321 98.2 °F (36.8 °C) 80 18 (!) 146/77 94 %   09/15/20 0000 98.2 °F (36.8 °C) 76 18 (!) 158/65 95 %   20 1948 98.2 °F (36.8 °C) 84 18 135/60 93 %   20 1924     96 %   20 1601 98.4 °F (36.9 °C) 87 16 123/74 90 %   20 1236 97.9 °F (36.6 °C) 87 17 120/76 90 %   20 1216     95 %     Oxygen Therapy  O2 Sat (%): 92 % (09/15/20 0953)  Pulse via Oximetry: 79 beats per minute (09/15/20 0953)  O2 Device: Nasal cannula (09/15/20 0953)  O2 Flow Rate (L/min): 2.5 l/min (09/15/20 0953)  FIO2 (%): 24 % (20 07)    Estimated body mass index is 54.82 kg/m² as calculated from the following:    Height as of this encounter: 5' 7\" (1.702 m). Weight as of this encounter: 158.8 kg (350 lb).     No intake or output data in the 24 hours ending 09/15/20 1017    *Note that automatically entered I/Os may not be accurate; dependent on patient compliance with collection and accurate  by techs. General:    Well nourished. Alert. CV:   RRR. No murmur, rub, or gallop. Lungs:   CTAB. No wheezing, rhonchi, or rales. Abdomen:   Soft, nontender, nondistended. Extremities: left lower leg with less redness and swelling. 1+ bilat pitting edema  Skin:     No rashes or jaundice.    Neuro:  No gross focal deficits    Data Reviewed:  I have reviewed all labs, meds, and studies from the last 24 hours:  Recent Results (from the past 24 hour(s))   GLUCOSE, POC    Collection Time: 09/14/20 11:26 AM   Result Value Ref Range    Glucose (POC) 144 (H) 65 - 100 mg/dL   GLUCOSE, POC    Collection Time: 09/14/20  4:38 PM   Result Value Ref Range    Glucose (POC) 138 (H) 65 - 100 mg/dL   GLUCOSE, POC    Collection Time: 09/14/20  9:14 PM   Result Value Ref Range    Glucose (POC) 163 (H) 65 - 100 mg/dL   GLUCOSE, POC    Collection Time: 09/15/20  7:13 AM   Result Value Ref Range    Glucose (POC) 127 (H) 65 - 100 mg/dL        Current Meds:  Current Facility-Administered Medications   Medication Dose Route Frequency    cefTRIAXone (ROCEPHIN) 2 g in 0.9% sodium chloride (MBP/ADV) 50 mL  2 g IntraVENous Q24H    sodium chloride (NS) flush 5-40 mL  5-40 mL IntraVENous Q8H    sodium chloride (NS) flush 5-40 mL  5-40 mL IntraVENous PRN    acetaminophen (TYLENOL) tablet 650 mg  650 mg Oral Q6H PRN    Or    acetaminophen (TYLENOL) suppository 650 mg  650 mg Rectal Q6H PRN    polyethylene glycol (MIRALAX) packet 17 g  17 g Oral DAILY PRN    promethazine (PHENERGAN) tablet 12.5 mg  12.5 mg Oral Q6H PRN    Or    ondansetron (ZOFRAN) injection 4 mg  4 mg IntraVENous Q6H PRN    enoxaparin (LOVENOX) injection 40 mg  40 mg SubCUTAneous Q12H    insulin lispro (HUMALOG) injection   SubCUTAneous AC&HS    furosemide (LASIX) tablet 40 mg  40 mg Oral DAILY    metoprolol succinate (TOPROL-XL) XL tablet 25 mg  25 mg Oral DAILY    potassium chloride (K-DUR, KLOR-CON) SR tablet 20 mEq  20 mEq Oral DAILY    nystatin (MYCOSTATIN) 100,000 unit/gram powder   Topical BID    budesonide-formoterol (SYMBICORT) 80-4.5 mcg inhaler  2 Puff Inhalation BID RT       Other Studies:  No results found for this visit on 09/09/20. No results found. All Micro Results     Procedure Component Value Units Date/Time    CULTURE, BLOOD [[id number]]  (Abnormal) Collected:  09/09/20 2157    Order Status:  Completed Specimen:  Blood Updated:  09/12/20 0744     Special Requests: --        LEFT  Antecubital       GRAM STAIN GRAM POS COCCI IN CHAINS         AEROBIC BOTTLE POSITIVE               CRITICAL RESULT NOT CALLED DUE TO PREVIOUS NOTIFICATION OF CRITICAL RESULT WITHIN THE LAST 24 HOURS. Culture result:       STREPTOCOCCI, BETA HEMOLYTIC GROUP G                  For Susceptibility Refer to Culture  [facility] [id number]      CULTURE, BLOOD [[id number]]  (Abnormal)  (Susceptibility) Collected:  09/09/20 2136    Order Status:  Completed Specimen:  Blood Updated:  09/12/20 0743     Special Requests: --        RIGHT  Antecubital       GRAM STAIN GRAM POS COCCI IN CHAINS               AEROBIC AND ANAEROBIC BOTTLES                  RESULTS VERIFIED, PHONED TO AND READ BACK BY OPAL STONE @7836 9/10/20 SC           Culture result:       STREPTOCOCCI, BETA HEMOLYTIC GROUP G            REFER TO [address] ACCESSION [id number]    BLOOD CULTURE ID PANEL [[id number]]  (Abnormal) Collected:  09/09/20 2136    Order Status:  Completed Specimen:  Blood Updated:  09/10/20 1129     Acc. no. from Micro Order [id number]     Streptococcus Detected        Comment: RESULTS VERIFIED, PHONED TO AND READ BACK BY  OPAL STONE @0102 9/10/20 SC          INTERPRETATION       Gram positive cocci. Identified by realtime PCR as Streptococcus species (not agalactiae, pyogenes, or pneumoniae).            Comment: Consider discontinuation of IV vancomycin and using an anti-streptococcal beta-lactam (ceftriaxone/cefotaxime (peds))             [unfilled]    Assessment and Plan:     Hospital Problems as of 9/15/2020 Date Reviewed: 2/18/2020          Codes Class Noted - Resolved POA    * (Principal) Sepsis ([facility] ICD-10-CM: A41.9  ICD-9-CM: 038.9, 995.91  9/10/2020 - Present Yes        Cellulitis of left lower extremity ICD-10-CM: L03.116  ICD-9-CM: 682.6  9/10/2020 - Present Yes        Demand ischemia ([facility] ICD-10-CM: I24.8  ICD-9-CM: 411.89  9/10/2020 - Present Yes        Hypoxia ICD-10-CM: R09.02  ICD-9-CM: 799.02  9/10/2020 - Present Yes        Controlled type 2 diabetes mellitus, without long-term current use of insulin (HCC) (Chronic) ICD-10-CM: E11.9  ICD-9-CM: 250.00  9/9/2020 - Present Yes        Interstitial lung disease ([facility] (Chronic) ICD-10-CM: J84.9  ICD-9-CM: 186  6/25/2020 - Present Yes        Leg edema (Chronic) ICD-10-CM: R60.0  ICD-9-CM: 782.3  1/23/2020 - Present Yes        Hypertension (Chronic) ICD-10-CM: I10  ICD-9-CM: 401.9  Unknown - Present Yes        Mild depression ([facility] (Chronic) ICD-10-CM: F32.0  ICD-9-CM: 085  2/8/2019 - Present Yes        Morbid obesity ([facility] (Chronic) ICD-10-CM: E66.01  ICD-9-CM: 278.01  Unknown - Present Yes    Overview Signed 2/8/2019 11:59 AM by Kirti Michael MD     BMI 45.8- 5/2/12                   Plan:  Sepsis 2/2 Cellulitis and streptococcal bacteremia  - cont IV Rocephin  - cellulitis cont to improve, sepsis resolved    DM2  - monitor blood sugar  - cover with SSI accordingly    HTN  -Monitor blood pressure and manage accordingly.   -Continue home medications. Obesity  - counseled on appropriate weight loss    LE edema 2/2 chronic venous insufficiency  - cont on Lasix  - recommended LE compression stockings as tolerated at home    DC planning/Dispo:    PT/OT recommending rehab placement. Pt does not want to go to rehab, will discuss recs and other options with patient alongside case mngnt. Diet:  DIET DIABETIC CONSISTENT CARB  DVT ppx:  Lovenox subq    Signed:  Nani Muniz MD

## 2020-09-15 NOTE — PROGRESS NOTES
Problem: Self Care Deficits Care Plan (Adult)  Goal: *Acute Goals and Plan of Care (Insert Text)  Description:     GOALS UPDATED: 9/14/2020:  1. Patient will complete total body bathing and dressing with Minimal Assistance and adaptive equipment as needed. 2. Patient will complete toileting with stand by assistance and adaptive equipment as needed. 3. Patient will complete bed mobility with modified independence and minimal verbal cues. 4. Patient will complete functional transfers with stand by assistance and adaptive equipment as needed. 5. Patient will complete grooming in standing with supervision and good static and good dynamic standing balance. 6. Patent will complete functional mobility for ADLs with stand by assistance and adaptive equipment as needed. 7. Patient will verbalize 2 energy conservation techniques with no cues from therapist to increase safety and independence with ADLs. Timeframe: 7 visits     Outcome: Progressing Towards Goal    OCCUPATIONAL THERAPY: Daily Note and AM 9/15/2020  INPATIENT: OT Visit Days: 2  Payor: Milka Barrett / Plan: Adonay Orozco / Product Type: Commerical /      NAME/AGE/GENDER: Jessenia Cunha is a 63 y.o. female   PRIMARY DIAGNOSIS:  Sepsis ([facility] [A41.9]  Cellulitis [L03.90]  Suspected COVID-19 virus infection [Z20.828] Sepsis ([facility] Sepsis ([facility]    ICD-10: Treatment Diagnosis:    · Generalized Muscle Weakness (M62.81)  · Difficulty in walking, Not elsewhere classified (R26.2)  · Other abnormalities of gait and mobility (R26.89)   Precautions/Allergies:    Lortab [hydrocodone-acetaminophen]      ASSESSMENT:   Per Initial Assessment:  Ms. Oracio Miller is a 63 y.o. female admitted for Sepsis and Suspected Covid 19 infection. Pt is NEGATIVE for Covid 19. At baseline, patient lives with son (working full time) in two story home with 0 PHOEBE. Pt is typically Mod I to independent for ADLs, IADLs, and + driving.  Pt uses SPC PRN for functional mobility for household and community distances. Pt not on home O2.    9/15/2020: Pt presents sitting up in chair upon arrival. Pt alert and agreeable to therapy. Pt introduced to AE (reacher and sock aid) to assist with LB dressing. Pt states she NEVER wears socks so she was educated on the uses of a reacher. Pt able to perform sit to stand transfer with SBA and  front of sink to demonstrate the ability to perform grooming standing at sink. Pt performed UE exercises (in grid below) to increase strength and activity tolerance to perform mobility and ADLs. Pt tolerated exercises well with brief rest breaks. Pt issued theraband at this time and encouraged to continue to perform exercises daily. Pt verbalized understanding. Pt encouraged to use RW until she gets stronger. CM had a RW for patient today. RW placed in patient's room at this time. Pt would prefer HH instead of going to rehab. Will continue to benefit from skilled OT during stay. At this time, patient is appropriate for Co-treatment with physical therapy due to patient's decreased overall endurance/tolerance levels, as well as need for high level skilled assistance/cueing to complete functional transfers/mobility and functional tasks. Yan Dubon is appropriate for a multidisciplinary co-treatment of PT and OT to address goals of both disciplines. This section established at most recent assessment   PROBLEM LIST (Impairments causing functional limitations):  1. Decreased Strength  2. Decreased ADL/Functional Activities  3. Decreased Transfer Abilities  4. Decreased Ambulation Ability/Technique  5. Decreased Balance  6. Increased Pain  7. Decreased Activity Tolerance  8. Decreased Pacing Skills  9. Decreased Work Simplification/Energy Conservation Techniques  10. Increased Fatigue  11. Increased Shortness of Breath  12. Edema/Girth   INTERVENTIONS PLANNED: (Benefits and precautions of occupational therapy have been discussed with the patient.)  1.  Activities of daily living training  2. Adaptive equipment training  3. Balance training  4. Clothing management  5. Neuromuscular re-eduation  6. Re-evaluation  7. Therapeutic activity  8. Therapeutic exercise     TREATMENT PLAN: Frequency/Duration: Follow patient 3x/week to address above goals. Rehabilitation Potential For Stated Goals: Good     REHAB RECOMMENDATIONS (at time of discharge pending progress):    Placement: It is my opinion, based on this patient's performance to date, that Ms. Raoul Paul may benefit from [address] after discharge due to the functional deficits listed above that are likely to improve with skilled rehabilitation because patient stated she is not interested at all in going to rehab facility. Equipment:    TBD              OCCUPATIONAL PROFILE AND HISTORY:   History of Present Injury/Illness (Reason for Referral):  See H & P  Past Medical History/Comorbidities:   Ms. Raoul Paul  has a past medical history of Chronic pain, Hypertension, Morbid obesity ([facility] and Positive PPD. Ms. Raoul Paul  has a past surgical history that includes hx gyn and hx knee arthroscopy. Social History/Living Environment:   Home Environment: Private residence  # Steps to Enter: 0  One/Two Story Residence: Two story, live on 1st floor  Lift Chair Available: No  Living Alone: No  Support Systems: Child(adelita), Family member(s)  Patient Expects to be Discharged to[de-identified] Rehabilitation facility  Current DME Used/Available at Home: Cane, straight  Tub or Shower Type: Shower  Prior Level of Function/Work/Activity:  At baseline, patient lives with son (working full time) in two story home with 0 PHOEBE. Pt is typically Mod I to independent for ADLs, IADLs, and + driving. Pt uses SPC PRN for functional mobility for household and community distances. Pt not on home O2. Personal Factors:          Sex:  female        Age:  63 y.o.    Number of Personal Factors/Comorbidities that affect the Plan of Care: Extensive review of physical, cognitive, and psychosocial performance (3+):  HIGH COMPLEXITY   ASSESSMENT OF OCCUPATIONAL PERFORMANCE[de-identified]   Activities of Daily Living:   Basic ADLs (From Assessment) Complex ADLs (From Assessment)   Feeding: Independent  Oral Facial Hygiene/Grooming: Stand-by assistance  Bathing: Moderate assistance  Upper Body Dressing: Setup  Lower Body Dressing: Maximum assistance  Toileting: Moderate assistance Instrumental ADL  Meal Preparation: Total assistance  Homemaking: Total assistance   Grooming/Bathing/Dressing Activities of Daily Living     Cognitive Retraining  Safety/Judgement: Awareness of environment                       Bed/Mat Mobility  Rolling: Stand-by assistance  Supine to Sit: Stand-by assistance  Sit to Stand: Stand-by assistance  Stand to Sit: Stand-by assistance  Bed to Chair: Contact guard assistance;Stand-by assistance  Scooting: Stand-by assistance     Most Recent Physical Functioning:   Gross Assessment:                  Posture:  Posture (WDL): Exceptions to WDL  Posture Assessment: Forward head, Rounded shoulders, Trunk flexion  Balance:  Sitting: Intact  Standing: Impaired  Standing - Static: Fair  Standing - Dynamic : Fair Bed Mobility:  Rolling: Stand-by assistance  Supine to Sit: Stand-by assistance  Scooting: Stand-by assistance  Wheelchair Mobility:     Transfers:  Sit to Stand: Stand-by assistance  Stand to Sit: Stand-by assistance  Bed to Chair: Contact guard assistance;Stand-by assistance  Interventions: Verbal cues; Safety awareness training  Duration: 32 Minutes            Patient Vitals for the past 6 hrs:   BP BP Patient Position SpO2 O2 Flow Rate (L/min) Pulse   09/15/20 0742 (!) 150/76 At rest;Supine 93 %  82   09/15/20 0953   92 % 2.5 l/min    09/15/20 1139 134/66 At rest;Supine 93 %  78       Mental Status  Neurologic State: Alert, Appropriate for age  Orientation Level: Oriented X4  Cognition: Follows commands  Perception: Appears intact  Perseveration: No perseveration noted  Safety/Judgement: Awareness of environment                          Physical Skills Involved:  1. Range of Motion  2. Balance  3. Strength  4. Activity Tolerance  5. Gross Motor Control  6. Pain (Chronic) Cognitive Skills Affected (resulting in the inability to perform in a timely and safe manner):  1. None Psychosocial Skills Affected:  1. Habits/Routines  2. Environmental Adaptation  3. Social Interaction  4. Emotional Regulation  5. Self-Awareness  6. Awareness of Others  7. Social Roles   Number of elements that affect the Plan of Care: 5+:  HIGH COMPLEXITY   CLINICAL DECISION MAKIN[address] [place] [address] AM-PAC 6 Clicks   Daily Activity Inpatient Short Form  How much help from another person does the patient currently need. .. Total A Lot A Little None   1. Putting on and taking off regular lower body clothing? [] 1   [x] 2   [] 3   [] 4   2. Bathing (including washing, rinsing, drying)? [] 1   [x] 2   [] 3   [] 4   3. Toileting, which includes using toilet, bedpan or urinal?   [] 1   [x] 2   [] 3   [] 4   4. Putting on and taking off regular upper body clothing? [] 1   [] 2   [x] 3   [] 4   5. Taking care of personal grooming such as brushing teeth? [] 1   [] 2   [x] 3   [] 4   6. Eating meals? [] 1   [] 2   [] 3   [x] 4   © , Trustees of [address] [place] [address], under license to [employer]. All rights reserved      Score:  Initial: 12, completed, 2020 Most Recent: 16 2020 (Date: -- )    Interpretation of Tool:  Represents activities that are increasingly more difficult (i.e. Bed mobility, Transfers, Gait). Medical Necessity:     · Skilled intervention continues to be required due to decreased independence, safety, balance, strength, ROM, and activity tolerance for performance of ADLs and functional mobility.   Reason for Services/Other Comments:  · Patient continues to require skilled intervention due to   · medical complications and patient unable to attend/participate in therapy as expected  · . Use of outcome tool(s) and clinical judgement create a POC that gives a: MODERATE COMPLEXITY         TREATMENT:   (In addition to Assessment/Re-Assessment sessions the following treatments were rendered)     Pre-treatment Symptoms/Complaints:   Pain: Initial:   Pain Intensity 1: 0  Post Session:  same     Today's treatment session addressed Decreased Strength, Decreased Balance, Decreased Activity Tolerance, and Increased Fatigue to progress towards achieving goal(s) listed above. During this session, Physical Therapy addressed  Bed Mobility and Ambulation to progress towards their discipline specific goal(s). Co-treatment was necessary to improve patient's ability to increase activity demands and ability to return to normal functional activity. Self Care: (15 minutes): Procedure(s) (per grid) utilized to improve and/or restore self-care/home management as related to dressing and grooming. Required minimal visual, verbal and tactile cueing to facilitate activities of daily living skills and compensatory activities. Therapeutic Exercise: (10 minutes):  Exercises per grid below to improve mobility, strength and activity tolerance. Required minimal visual and verbal cues to promote proper body alignment and promote proper body mechanics. Progressed resistance and repetitions as indicated.     Yellow theraband Date:  9/15 Date:   Date:     Activity/Exercise Parameters Parameters Parameters   Shoulder Abd/Adduction 10 reps     Shoulder Flexion 5 reps     Elbow Flexion 10 reps      Forward Punches 10 reps                           Braces/Orthotics/Lines/Etc:   · Pure Wick Catheter  · O2 Device: Nasal cannula  Treatment/Session Assessment:    Response to Treatment:  Tolerated well    · Interdisciplinary Collaboration:   o Certified Occupational Therapy Assistant  o Registered Nurse  · After treatment position/precautions:   o Up in chair  o Bed/Chair-wheels locked  o Bed in low position  o Call light within reach   · Compliance with Program/Exercises: Compliant most of the time, Will assess as treatment progresses. · Recommendations/Intent for next treatment session: \"Next visit will focus on advancements to more challenging activities and reduction in assistance provided\".   Total Treatment Duration:  OT Patient Time In/Time Out  Time In: 1045  Time Out: [address]

## 2020-09-15 NOTE — PROGRESS NOTES
Bedside report received from [facility], RN. Patient resting quietly in bed. No needs expressed at this time.

## 2020-09-15 NOTE — PROGRESS NOTES
Problem: Mobility Impaired (Adult and Pediatric)  Goal: *Acute Goals and Plan of Care (Insert Text)  Outcome: Progressing Towards Goal  Note:   LTG:  (1.)Ms. Ina Corbin will move from supine to sit and sit to supine, scoot up and down, and roll side to side with SUPERVISION within 7 treatment day(s). (2.)Ms. Ina Corbin will perform sit-stand transfer INDEPENDENTLY within 7 days. (3.)Ms. Ina Corbin will transfer from bed to chair and chair to bed with STAND BY ASSIST using the least restrictive device within 7 treatment day(s). (4.)Ms. Ina Corbin will perform exercises per HEP for 15+ minutes to improve strength and mobility within 7 days. (5.)Ms. Zuniga will ambulate with STAND BY ASSIST for 100+ feet using the least restrictive device within 7 days. ________________________________________________________________________________________________      PHYSICAL THERAPY: Daily Note and AM 9/15/2020  INPATIENT: PT Visit Days : 3  Payor: Efrain Dupreee / Plan: Elloit Calderon / Product Type: Commerical /       NAME/AGE/GENDER: Savana Chaidez is a 63 y.o. female   PRIMARY DIAGNOSIS: Sepsis ([facility] [A41.9]  Cellulitis [L03.90]  Suspected COVID-19 virus infection [Z20.828] Sepsis ([facility] Sepsis ([facility]       ICD-10: Treatment Diagnosis:    · Generalized Muscle Weakness (M62.81)  · Difficulty in walking, Not elsewhere classified (R26.2)  · Other abnormalities of gait and mobility (R26.89)   Precaution/Allergies:  Lortab [hydrocodone-acetaminophen]      ASSESSMENT:     Ms. Ina Corbin is a 63year old female admitted from home with sepsis due to left LE cellulitis. She lives with son and is typically independent to modified independent with household mobility using cane as needed. 9/15: pt presents in supine without complaints; agreeable to therapy treatment and mobility. Performs all activity with max additional time and min cueing for technique. Intact seated balance noted. SBA for sit-stand transfers from edge of bed.  Pt performs mobility/ambulation in room and hallway for 90 with with RW, verbal cues for posture, and cueing for gait mechanics, safety, and walker management. Pt ambulates with slow pace, no loss of balance noted. Returned to room and up to bathroom performing transfers with supervision. Independent with self care/hygiene in sitting. Standing activities at sink for several minutes to address balance, strength, and activity tolerance. Up to chair and perform seated exercises with good participation. Vivian Tidwell is progressing well with therapy, however she is still much weaker than baseline and is very unsteady in standing (better with walker). Positioned comfortably in chair with needs in reach. Good progress with mobility, gait, and activity tolerance. Still recommending rehab at discharge. This section established at most recent assessment   PROBLEM LIST (Impairments causing functional limitations):  1. Decreased Strength  2. Decreased Transfer Abilities  3. Decreased Ambulation Ability/Technique  4. Decreased Balance  5. Increased Pain  6. Decreased Activity Tolerance  7. Edema/Girth  8. Decreased Skin Integrity/Hygeine   INTERVENTIONS PLANNED: (Benefits and precautions of physical therapy have been discussed with the patient.)  1. Balance Exercise  2. Bed Mobility  3. Gait Training  4. Home Exercise Program (HEP)  5. Therapeutic Activites  6. Therapeutic Exercise/Strengthening  7. Transfer Training     TREATMENT PLAN: Frequency/Duration: 3 times a week for duration of hospital stay  Rehabilitation Potential For Stated Goals: Good     REHAB RECOMMENDATIONS (at time of discharge pending progress):    Placement: It is my opinion, based on this patient's performance to date, that Ms. Raoul Paul may benefit from intensive therapy at a [address] [place] [address] after discharge due to the functional deficits listed above that are likely to improve with skilled rehabilitation and concerns that he/she may be unsafe to be unsupervised at home due to weakness, inability to stand, need for heavy assistance . Equipment:    Tbd pending progress              HISTORY:   History of Present Injury/Illness (Reason for Referral):  Per H&P, \"1 week history of productive cough today progressive weakness and myalgias with fevers as high as 101.9 degrees. Last several days developing cellulitis left lower extremity and she reports that she had to cover her pretibial ulceration because her dog [place] it. She claims that she has not been out of her house to be exposed to COVID-19 but she is being admitted with suspected infection despite initial rapid screen negative. She is 63years of age with history of possible mild interstitial lung disease by available records, morbid obesity with dyspnea on exertion related to as well as chronic lower extremity edema related to obesity for which she takes daily Lasix and potassium. Hypertension and a new diagnosis of diabetes mellitus. She has had progressive weakness and sought care today because she could get off of her couch. She was hypoxemic at time of presentation with reported 87% on room air but documented 88% on room air. Her white blood count is 12,500 with 87% neutrophils. Serum lactic acid is normal serum creatinine is less than 1 she has a glucose of 150 her liver transaminases are abnormal with  and ALT of 47. Her troponin I is elevated but EKG shows no ischemic changes and she does not complain of any chest pain or pressure\"  Past Medical History/Comorbidities:   Ms. Daniela Mesa  has a past medical history of Chronic pain, Hypertension, Morbid obesity ([address] and Positive PPD. Ms. Daniela Mesa  has a past surgical history that includes hx gyn and hx knee arthroscopy.   Social History/Living Environment:   Home Environment: Private residence  # Steps to Enter: 0  One/Two Story Residence: Two story, live on 1st floor  Lift Chair Available: No  Living Alone: No  Support Systems: Child(adelita), Family member(s)  Patient Expects to be Discharged to[de-identified] Rehabilitation facility  Current DME Used/Available at Home: Cane, straight  Tub or Shower Type: Shower  Prior Level of Function/Work/Activity:  Lives with son who works during the day. Independent to mod I using cane as needed. Number of Personal Factors/Comorbidities that affect the Plan of Care: 1-2: MODERATE COMPLEXITY   EXAMINATION:   Most Recent Physical Functioning:   Gross Assessment:  AROM: Generally decreased, functional  Strength: Generally decreased, functional  Coordination: Generally decreased, functional               Posture:  Posture (WDL): Exceptions to WDL  Posture Assessment: Forward head, Rounded shoulders, Trunk flexion  Balance:  Sitting: Intact  Standing: Impaired  Standing - Static: Fair  Standing - Dynamic : Fair Bed Mobility:  Rolling: Stand-by assistance  Supine to Sit: Stand-by assistance  Scooting: Stand-by assistance  Wheelchair Mobility:     Transfers:  Sit to Stand: Stand-by assistance  Stand to Sit: Stand-by assistance  Bed to Chair: Contact guard assistance;Stand-by assistance  Interventions: Verbal cues; Safety awareness training  Duration: 32 Minutes  Gait:     Base of Support: Center of gravity altered  Speed/Violeta: Pace decreased (<100 feet/min); Slow  Step Length: Left shortened;Right shortened  Gait Abnormalities: Trunk sway increased;Decreased step clearance; Path deviations  Distance (ft): 90 Feet (ft)  Assistive Device: Walker, rolling  Ambulation - Level of Assistance: Contact guard assistance;Stand-by assistance  Interventions: Verbal cues; Safety awareness training; Tactile cues      Body Structures Involved:  1. Joints  2. Muscles Body Functions Affected:  1. Sensory/Pain  2. Movement Related  3. Skin Related Activities and Participation Affected:  1. General Tasks and Demands  2. Mobility  3. Domestic Life  4.  Community, Social and [place]   Number of elements that affect the Plan of Care: 4+: HIGH COMPLEXITY   CLINICAL PRESENTATION:   Presentation: Evolving clinical presentation with changing clinical characteristics: MODERATE COMPLEXITY   CLINICAL DECISION MAKIN [facility] Mobility Inpatient Short Form  How much difficulty does the patient currently have. .. Unable A Lot A Little None   1. Turning over in bed (including adjusting bedclothes, sheets and blankets)? [] 1   [x] 2   [] 3   [] 4   2. Sitting down on and standing up from a chair with arms ( e.g., wheelchair, bedside commode, etc.)   [x] 1   [] 2   [] 3   [] 4   3. Moving from lying on back to sitting on the side of the bed? [] 1   [x] 2   [] 3   [] 4   How much help from another person does the patient currently need. .. Total A Lot A Little None   4. Moving to and from a bed to a chair (including a wheelchair)? [x] 1   [] 2   [] 3   [] 4   5. Need to walk in hospital room? [x] 1   [] 2   [] 3   [] 4   6. Climbing 3-5 steps with a railing? [x] 1   [] 2   [] 3   [] 4   © , Trustees of [address] [place] [address], under license to [employer]. All rights reserved      Score:  Initial: 8 Most Recent: X (Date: -- )    Interpretation of Tool:  Represents activities that are increasingly more difficult (i.e. Bed mobility, Transfers, Gait). Medical Necessity:     · Patient demonstrates   · good  ·  rehab potential due to higher previous functional level. Reason for Services/Other Comments:  · Patient   · continues to demonstrate capacity to improve strength, mobility, balance, transfers, and activity tolerance which will   · increase independence, decrease amount of assistance required from caregiver, and increase safety  · .    Use of outcome tool(s) and clinical judgement create a POC that gives a: Questionable prediction of patient's progress: MODERATE COMPLEXITY            TREATMENT:   (In addition to Assessment/Re-Assessment sessions the following treatments were rendered)   Pre-treatment Symptoms/Complaints:  \"I can do it\"    Pain: Initial: 0/10  Pain Intensity 1: 0  Post Session:  0/10     Therapeutic Activity: (  32 Minutes ):  Therapeutic activities including Bed mobility (rolling, scooting, and supine to sit transfer), seated static/dynamic balance and functional activities, seated mobility, sit-stand transfers x several reps from bed, chair, and commode, toilet transfers, ambulation on level ground x 4 trials, standing static/dynamic functional activities to improve mobility, strength, balance, coordination, and activity tolerance . Required close CGA/SBA and heavy cueing (manual, tactile, verbal, and visual) Verbal cues; Safety awareness training; Tactile cues to promote static and dynamic balance in standing and promote motor control of bilateral, lower extremity(s). Therapeutic Exercise: (8 Minutes):  Exercises per grid below to improve mobility, strength and balance. Required minimal visual and verbal cues to promote proper body mechanics and exercise form. Progressed range, repetitions and complexity of movement as indicated. Date:  9/15/20 Date:   Date:     Activity/Exercise Parameters Parameters Parameters   LAQ 10x AB     Seated marching 10x AB     Ankle pumps 10x AB     Seated hip aBd 10x AB                               Braces/Orthotics/Lines/Etc:   · O2 Device: Nasal cannula  Treatment/Session Assessment:    · Response to Treatment:  CGA/SBA amb in room   · Interdisciplinary Collaboration:   o Physical Therapist  o Registered Nurse  · After treatment position/precautions:   o Up in chair  o Bed/Chair-wheels locked  o Bed in low position  o Call light within reach  o RN notified   · Compliance with Program/Exercises: Will assess as treatment progresses  · Recommendations/Intent for next treatment session: \"Next visit will focus on advancements to more challenging activities and reduction in assistance provided\".   Total Treatment Duration:  PT Patient Time In/Time Out  Time In: 1000  Time Out: [address], DPT

## 2020-09-16 ENCOUNTER — APPOINTMENT (OUTPATIENT)
Dept: GENERAL RADIOLOGY | Age: 65
DRG: 871 | End: 2020-09-16
Attending: STUDENT IN AN ORGANIZED HEALTH CARE EDUCATION/TRAINING PROGRAM
Payer: COMMERCIAL

## 2020-09-16 ENCOUNTER — HOME HEALTH ADMISSION (OUTPATIENT)
Dept: HOME HEALTH SERVICES | Facility: HOME HEALTH | Age: 65
End: 2020-09-16
Payer: COMMERCIAL

## 2020-09-16 LAB
ANION GAP SERPL CALC-SCNC: 4 MMOL/L (ref 7–16)
BASOPHILS # BLD: 0.1 K/UL (ref 0–0.2)
BASOPHILS NFR BLD: 1 % (ref 0–2)
BUN SERPL-MCNC: 16 MG/DL (ref 8–23)
CALCIUM SERPL-MCNC: 9.1 MG/DL (ref 8.3–10.4)
CHLORIDE SERPL-SCNC: 103 MMOL/L (ref 98–107)
CO2 SERPL-SCNC: 36 MMOL/L (ref 21–32)
CREAT SERPL-MCNC: 0.58 MG/DL (ref 0.6–1)
DIFFERENTIAL METHOD BLD: ABNORMAL
EOSINOPHIL # BLD: 0.3 K/UL (ref 0–0.8)
EOSINOPHIL NFR BLD: 4 % (ref 0.5–7.8)
ERYTHROCYTE [DISTWIDTH] IN BLOOD BY AUTOMATED COUNT: 14.8 % (ref 11.9–14.6)
GLUCOSE BLD STRIP.AUTO-MCNC: 124 MG/DL (ref 65–100)
GLUCOSE BLD STRIP.AUTO-MCNC: 126 MG/DL (ref 65–100)
GLUCOSE BLD STRIP.AUTO-MCNC: 148 MG/DL (ref 65–100)
GLUCOSE BLD STRIP.AUTO-MCNC: 151 MG/DL (ref 65–100)
GLUCOSE SERPL-MCNC: 118 MG/DL (ref 65–100)
HCT VFR BLD AUTO: 41.4 % (ref 35.8–46.3)
HGB BLD-MCNC: 13 G/DL (ref 11.7–15.4)
IMM GRANULOCYTES # BLD AUTO: 0.2 K/UL (ref 0–0.5)
IMM GRANULOCYTES NFR BLD AUTO: 2 % (ref 0–5)
LYMPHOCYTES # BLD: 1.6 K/UL (ref 0.5–4.6)
LYMPHOCYTES NFR BLD: 17 % (ref 13–44)
MCH RBC QN AUTO: 29.1 PG (ref 26.1–32.9)
MCHC RBC AUTO-ENTMCNC: 31.4 G/DL (ref 31.4–35)
MCV RBC AUTO: 92.6 FL (ref 79.6–97.8)
MONOCYTES # BLD: 0.8 K/UL (ref 0.1–1.3)
MONOCYTES NFR BLD: 9 % (ref 4–12)
NEUTS SEG # BLD: 6.3 K/UL (ref 1.7–8.2)
NEUTS SEG NFR BLD: 68 % (ref 43–78)
NRBC # BLD: 0 K/UL (ref 0–0.2)
PLATELET # BLD AUTO: 302 K/UL (ref 150–450)
PMV BLD AUTO: 10.8 FL (ref 9.4–12.3)
POTASSIUM SERPL-SCNC: 3.6 MMOL/L (ref 3.5–5.1)
RBC # BLD AUTO: 4.47 M/UL (ref 4.05–5.2)
SODIUM SERPL-SCNC: 143 MMOL/L (ref 136–145)
WBC # BLD AUTO: 9.3 K/UL (ref 4.3–11.1)

## 2020-09-16 PROCEDURE — 71046 X-RAY EXAM CHEST 2 VIEWS: CPT

## 2020-09-16 PROCEDURE — 36415 COLL VENOUS BLD VENIPUNCTURE: CPT

## 2020-09-16 PROCEDURE — 80048 BASIC METABOLIC PNL TOTAL CA: CPT

## 2020-09-16 PROCEDURE — 97110 THERAPEUTIC EXERCISES: CPT

## 2020-09-16 PROCEDURE — 82962 GLUCOSE BLOOD TEST: CPT

## 2020-09-16 PROCEDURE — 94760 N-INVAS EAR/PLS OXIMETRY 1: CPT

## 2020-09-16 PROCEDURE — 94761 N-INVAS EAR/PLS OXIMETRY MLT: CPT

## 2020-09-16 PROCEDURE — 97530 THERAPEUTIC ACTIVITIES: CPT

## 2020-09-16 PROCEDURE — 77010033678 HC OXYGEN DAILY

## 2020-09-16 PROCEDURE — 74011250636 HC RX REV CODE- 250/636: Performed by: INTERNAL MEDICINE

## 2020-09-16 PROCEDURE — 94640 AIRWAY INHALATION TREATMENT: CPT

## 2020-09-16 PROCEDURE — 74011250637 HC RX REV CODE- 250/637: Performed by: INTERNAL MEDICINE

## 2020-09-16 PROCEDURE — 74011636637 HC RX REV CODE- 636/637: Performed by: INTERNAL MEDICINE

## 2020-09-16 PROCEDURE — 85025 COMPLETE CBC W/AUTO DIFF WBC: CPT

## 2020-09-16 PROCEDURE — 65270000029 HC RM PRIVATE

## 2020-09-16 RX ADMIN — POTASSIUM CHLORIDE 20 MEQ: 20 TABLET, EXTENDED RELEASE ORAL at 08:10

## 2020-09-16 RX ADMIN — ENOXAPARIN SODIUM 40 MG: 40 INJECTION SUBCUTANEOUS at 21:08

## 2020-09-16 RX ADMIN — NYSTATIN: 100000 POWDER TOPICAL at 18:00

## 2020-09-16 RX ADMIN — Medication 10 ML: at 05:45

## 2020-09-16 RX ADMIN — ENOXAPARIN SODIUM 40 MG: 40 INJECTION SUBCUTANEOUS at 08:10

## 2020-09-16 RX ADMIN — FUROSEMIDE 40 MG: 40 TABLET ORAL at 08:10

## 2020-09-16 RX ADMIN — BUDESONIDE AND FORMOTEROL FUMARATE DIHYDRATE 2 PUFF: 80; 4.5 AEROSOL RESPIRATORY (INHALATION) at 20:47

## 2020-09-16 RX ADMIN — NYSTATIN: 100000 POWDER TOPICAL at 08:10

## 2020-09-16 RX ADMIN — BUDESONIDE AND FORMOTEROL FUMARATE DIHYDRATE 2 PUFF: 80; 4.5 AEROSOL RESPIRATORY (INHALATION) at 07:35

## 2020-09-16 RX ADMIN — METOPROLOL SUCCINATE 25 MG: 50 TABLET, EXTENDED RELEASE ORAL at 08:10

## 2020-09-16 RX ADMIN — Medication 5 ML: at 13:55

## 2020-09-16 RX ADMIN — INSULIN LISPRO 2 UNITS: 100 INJECTION, SOLUTION INTRAVENOUS; SUBCUTANEOUS at 21:08

## 2020-09-16 NOTE — PROGRESS NOTES
Problem: Mobility Impaired (Adult and Pediatric)  Goal: *Acute Goals and Plan of Care (Insert Text)  Outcome: Progressing Towards Goal  Note:   LTG:  (1.)Ms. Rui Shepherd will move from supine to sit and sit to supine, scoot up and down, and roll side to side with SUPERVISION within 7 treatment day(s). (2.)Ms. Rui Shepherd will perform sit-stand transfer INDEPENDENTLY within 7 days. (3.)Ms. Rui Shepherd will transfer from bed to chair and chair to bed with STAND BY ASSIST using the least restrictive device within 7 treatment day(s). (4.)Ms. Rui Shepherd will perform exercises per HEP for 15+ minutes to improve strength and mobility within 7 days. (5.)Ms. Zuniga will ambulate with STAND BY ASSIST for 100+ feet using the least restrictive device within 7 days. ________________________________________________________________________________________________      PHYSICAL THERAPY: Daily Note and AM 9/16/2020  INPATIENT: PT Visit Days : 4  Payor: Vero Elliott / Plan: Saurabh Breen / Product Type: Commerical /       NAME/AGE/GENDER: Meena Correa is a 63 y.o. female   PRIMARY DIAGNOSIS: [facility] ([facility] [A41.9]  Cellulitis [L03.90]  Suspected COVID-19 virus infection [Z20.828] Sepsis ([facility] Sepsis ([facility]       ICD-10: Treatment Diagnosis:    · Generalized Muscle Weakness (M62.81)  · Difficulty in walking, Not elsewhere classified (R26.2)  · Other abnormalities of gait and mobility (R26.89)   Precaution/Allergies:  Lortab [hydrocodone-acetaminophen]      ASSESSMENT:     Ms. Rui Shepherd is a 63year old female admitted from home with sepsis due to left LE cellulitis. She lives with son and is typically independent to modified independent with household mobility using cane as needed. The patient is seated in the recliner chair upon contact and agreeable to PT treatment. The patient performed transfers with SBA and ambulated 80' w/ the RW at a slow steady pace. She took several standing rest breaks and needed minimal cueing for posture. The patient then returned to the recliner and sat. The patient participated in therapeutic exercises as per the chart below to improve strength, mobility, and activity tolerance. The patient was then positioned for comfort with needs in reach. Overall the patient is making good progress toward therapy goals as indicated by increased activity tolerance. Will continue skilled PT treatment as patient is still below functional baseline. This section established at most recent assessment   PROBLEM LIST (Impairments causing functional limitations):  1. Decreased Strength  2. Decreased Transfer Abilities  3. Decreased Ambulation Ability/Technique  4. Decreased Balance  5. Increased Pain  6. Decreased Activity Tolerance  7. Edema/Girth  8. Decreased Skin Integrity/Hygeine   INTERVENTIONS PLANNED: (Benefits and precautions of physical therapy have been discussed with the patient.)  1. Balance Exercise  2. Bed Mobility  3. Gait Training  4. Home Exercise Program (HEP)  5. Therapeutic Activites  6. Therapeutic Exercise/Strengthening  7. Transfer Training     TREATMENT PLAN: Frequency/Duration: 3 times a week for duration of hospital stay  Rehabilitation Potential For Stated Goals: Good     REHAB RECOMMENDATIONS (at time of discharge pending progress):    Placement: It is my opinion, based on this patient's performance to date, that Ms. Rui Shepherd may benefit from intensive therapy at [address] after discharge due to the functional deficits listed above that are likely to improve with skilled rehabilitation and concerns that he/she may be unsafe to be unsupervised at home due to weakness, inability to stand, need for heavy assistance . V HHPT pending progress, patient wants to go home.   Equipment:    Tbd pending progress              HISTORY:   History of Present Injury/Illness (Reason for Referral):  Per H&P, \"1 week history of productive cough today progressive weakness and myalgias with fevers as high as 101.9 degrees. Last several days developing cellulitis left lower extremity and she reports that she had to cover her pretibial ulceration because her dog [place] it. She claims that she has not been out of her house to be exposed to COVID-19 but she is being admitted with suspected infection despite initial rapid screen negative. She is 63years of age with history of possible mild interstitial lung disease by available records, morbid obesity with dyspnea on exertion related to as well as chronic lower extremity edema related to obesity for which she takes daily Lasix and potassium. Hypertension and a new diagnosis of diabetes mellitus. She has had progressive weakness and sought care today because she could get off of her couch. She was hypoxemic at time of presentation with reported 87% on room air but documented 88% on room air. Her white blood count is 12,500 with 87% neutrophils. Serum lactic acid is normal serum creatinine is less than 1 she has a glucose of 150 her liver transaminases are abnormal with  and ALT of 47. Her troponin I is elevated but EKG shows no ischemic changes and she does not complain of any chest pain or pressure\"  Past Medical History/Comorbidities:   Ms. Shona Hough  has a past medical history of Chronic pain, Hypertension, Morbid obesity ([address] and Positive PPD. Ms. Shona Hough  has a past surgical history that includes hx gyn and hx knee arthroscopy. Social History/Living Environment:   Home Environment: Private residence  # Steps to Enter: 0  One/Two Story Residence: Two story, live on 1st floor  Lift Chair Available: No  Living Alone: No  Support Systems: Child(adelita), Family member(s)  Patient Expects to be Discharged to[de-identified] Rehabilitation facility  Current DME Used/Available at Home: Cane, straight  Tub or Shower Type: Shower  Prior Level of Function/Work/Activity:  Lives with son who works during the day. Independent to mod I using cane as needed.       Number of Personal Factors/Comorbidities that affect the Plan of Care: 1-2: MODERATE COMPLEXITY   EXAMINATION:   Most Recent Physical Functioning:   Gross Assessment:                  Posture:     Balance:  Sitting: Intact  Standing: Impaired  Standing - Static: Fair;Good  Standing - Dynamic : Fair Bed Mobility:     Wheelchair Mobility:     Transfers:  Sit to Stand: Stand-by assistance  Stand to Sit: Stand-by assistance  Interventions: Verbal cues; Safety awareness training  Gait:     Base of Support: Center of gravity altered  Speed/Violeta: Pace decreased (<100 feet/min)  Gait Abnormalities: Trunk sway increased  Distance (ft): 90 Feet (ft)  Assistive Device: Walker, rolling  Ambulation - Level of Assistance: Contact guard assistance;Stand-by assistance  Interventions: Verbal cues; Safety awareness training      Body Structures Involved:  1. Joints  2. Muscles Body Functions Affected:  1. Sensory/Pain  2. Movement Related  3. Skin Related Activities and Participation Affected:  1. General Tasks and Demands  2. Mobility  3. Domestic Life  4. Community, Social and [place]   Number of elements that affect the Plan of Care: 4+: HIGH COMPLEXITY   CLINICAL PRESENTATION:   Presentation: Evolving clinical presentation with changing clinical characteristics: MODERATE COMPLEXITY   CLINICAL DECISION MAKIN [facility] Inpatient Short Form  How much difficulty does the patient currently have. .. Unable A Lot A Little None   1. Turning over in bed (including adjusting bedclothes, sheets and blankets)? [] 1   [x] 2   [] 3   [] 4   2. Sitting down on and standing up from a chair with arms ( e.g., wheelchair, bedside commode, etc.)   [x] 1   [] 2   [] 3   [] 4   3. Moving from lying on back to sitting on the side of the bed? [] 1   [x] 2   [] 3   [] 4   How much help from another person does the patient currently need. .. Total A Lot A Little None   4.   Moving to and from a bed to a chair (including a wheelchair)? [x] 1   [] 2   [] 3   [] 4   5. Need to walk in hospital room? [x] 1   [] 2   [] 3   [] 4   6. Climbing 3-5 steps with a railing? [x] 1   [] 2   [] 3   [] 4   © 2007, Trustees of [facility], under license to [employer]. All rights reserved      Score:  Initial: 8 Most Recent: X (Date: -- )    Interpretation of Tool:  Represents activities that are increasingly more difficult (i.e. Bed mobility, Transfers, Gait). Medical Necessity:     · Patient demonstrates   · good  ·  rehab potential due to higher previous functional level. Reason for Services/Other Comments:  · Patient   · continues to demonstrate capacity to improve strength, mobility, balance, transfers, and activity tolerance which will   · increase independence, decrease amount of assistance required from caregiver, and increase safety  · . Use of outcome tool(s) and clinical judgement create a POC that gives a: Questionable prediction of patient's progress: MODERATE COMPLEXITY            TREATMENT:   (In addition to Assessment/Re-Assessment sessions the following treatments were rendered)   Pre-treatment Symptoms/Complaints:  \"I can do it\"    Pain: Initial: 0/10  Pain Intensity 1: 0  Post Session:  0/10       Therapeutic Activity: (    14 min): Therapeutic activities including Chair transfers, Ambulation on level ground and posture training to improve mobility, strength and balance. Required minimal Verbal cues; Safety awareness training to promote static and dynamic balance in standing. Therapeutic Exercise: ( 10 min):  Exercises per grid below to improve mobility and strength. Required minimal visual and verbal cues to promote proper body mechanics and exercise form. Progressed range, repetitions and complexity of movement as indicated.      Date:  9/15/20 Date:  9/16/20 Date:     Activity/Exercise Parameters Parameters Parameters   LAQ 10x AB x15 B    Seated marching 10x AB x15 B    Ankle pumps 10x AB x15 B    Seated hip aBd 10x AB x15 B    STS  x5 B                        Braces/Orthotics/Lines/Etc:   · O2 Device: Nasal cannula  Treatment/Session Assessment:    · Response to Treatment:  See above  · Interdisciplinary Collaboration:   o Physical Therapy Assistant  o Registered Nurse  · After treatment position/precautions:   o Up in chair  o Bed/Chair-wheels locked  o Bed in low position  o Call light within reach  o RN notified   · Compliance with Program/Exercises: Will assess as treatment progresses  · Recommendations/Intent for next treatment session: \"Next visit will focus on advancements to more challenging activities and reduction in assistance provided\".   Total Treatment Duration:  PT Patient Time In/Time Out  Time In: 1016  Time Out: [address] [facility], PTA

## 2020-09-16 NOTE — PROGRESS NOTES
Bedside report received from [facility], [address] [place] [address] Place resting quietly in bed.  No needs expressed at this time.

## 2020-09-16 NOTE — PROGRESS NOTES
Hospitalist Note     Admit Date:  2020  9:25 PM   Name:  Bella Rivero   Age:  63 y.o.  :  1955   MRN:  [id number]   PCP:  John Anand MD  Treatment Team: Attending Provider: Rex Fleischer, MD; Utilization Review: Chang Vigil; Utilization Review: Trace Juares RN; Care Manager: Sydni Bai RN; Utilization Review: Chen Jordan RN; Staff Nurse: Lindsay Burgos; Physical Therapy Assistant: Mathis Dandy; Occupational Therapist: STEPHANIE Hopkins    HPI/Subjective:   Seen and examined this morning. No acute events overnight. No complaints at this time. She underwent ambulatory test with oxygen desaturation to low 80s. Planes of no cough, fevers, chills, pleurisy shortness of breath. She does states she sometimes wheezes but suffer from any wheeze, shortness of breath or cough when she was walking with PT. No other complaints  Objective:     Patient Vitals for the past 24 hrs:   Temp Pulse Resp BP SpO2   20 1110 98 °F (36.7 °C) 80 18 119/71 (!) 85 %   20 0735     93 %   20 0725 98.1 °F (36.7 °C) 76 20 136/69 90 %   20 0338 98 °F (36.7 °C) 82 22 (!) 157/80 92 %   09/15/20 2316 98.3 °F (36.8 °C) 97 18 (!) 169/74 92 %   09/15/20 2024     93 %     Oxygen Therapy  O2 Sat (%): (!) 85 %(was put back on 2L NC in O2 sat went to 92) (20 1110)  Pulse via Oximetry: 77 beats per minute (20 0735)  O2 Device: Nasal cannula (20)  O2 Flow Rate (L/min): 2.5 l/min (2035)  FIO2 (%): 24 % (20 07)    Estimated body mass index is 54.82 kg/m² as calculated from the following:    Height as of this encounter: 5' 7\" (1.702 m). Weight as of this encounter: 158.8 kg (350 lb).       Intake/Output Summary (Last 24 hours) at 2020 1547  Last data filed at 2020 0522  Gross per 24 hour   Intake    Output 300 ml   Net -300 ml       *Note that automatically entered I/Os may not be accurate; dependent on patient compliance with collection and accurate  by techs. General:    Well nourished. Alert. CV:   RRR. No murmur, rub, or gallop. Lungs:   CTAB. No wheezing, rhonchi, or rales. Abdomen:   Soft, nontender, nondistended. Extremities: Warm and dry. No cyanosis or edema. Skin:     No rashes or jaundice.    Neuro:  No gross focal deficits    Data Reviewed:  I have reviewed all labs, meds, and studies from the last 24 hours:  Recent Results (from the past 24 hour(s))   GLUCOSE, POC    Collection Time: 09/15/20  4:55 PM   Result Value Ref Range    Glucose (POC) 124 (H) 65 - 100 mg/dL   GLUCOSE, POC    Collection Time: 09/15/20 11:16 PM   Result Value Ref Range    Glucose (POC) 138 (H) 65 - 100 mg/dL   GLUCOSE, POC    Collection Time: 09/16/20  7:32 AM   Result Value Ref Range    Glucose (POC) 126 (H) 65 - 100 mg/dL   GLUCOSE, POC    Collection Time: 09/16/20 11:19 AM   Result Value Ref Range    Glucose (POC) 148 (H) 65 - 100 mg/dL        Current Meds:  Current Facility-Administered Medications   Medication Dose Route Frequency    sodium chloride (NS) flush 5-40 mL  5-40 mL IntraVENous Q8H    sodium chloride (NS) flush 5-40 mL  5-40 mL IntraVENous PRN    acetaminophen (TYLENOL) tablet 650 mg  650 mg Oral Q6H PRN    Or    acetaminophen (TYLENOL) suppository 650 mg  650 mg Rectal Q6H PRN    polyethylene glycol (MIRALAX) packet 17 g  17 g Oral DAILY PRN    promethazine (PHENERGAN) tablet 12.5 mg  12.5 mg Oral Q6H PRN    Or    ondansetron (ZOFRAN) injection 4 mg  4 mg IntraVENous Q6H PRN    enoxaparin (LOVENOX) injection 40 mg  40 mg SubCUTAneous Q12H    insulin lispro (HUMALOG) injection   SubCUTAneous AC&HS    furosemide (LASIX) tablet 40 mg  40 mg Oral DAILY    metoprolol succinate (TOPROL-XL) XL tablet 25 mg  25 mg Oral DAILY    potassium chloride (K-DUR, KLOR-CON) SR tablet 20 mEq  20 mEq Oral DAILY    nystatin (MYCOSTATIN) 100,000 unit/gram powder   Topical BID    budesonide-formoterol (SYMBICORT) 80-4.5 mcg inhaler  2 Puff Inhalation BID RT       Other Studies:  No results found for this visit on 09/09/20. Xr Chest Pa Lat    Result Date: 9/16/2020  EXAM: 2 view chest radiograph. INDICATION: Hypoxia. COMPARISON: Chest radiograph dated September 09, 2020. FINDINGS: No pneumothorax or pleural effusion. No focal lung consolidation. Exaggerated thoracic kyphosis. Diffuse osteopenia. Heart is normal in size. IMPRESSION: 1. No acute cardiopulmonary abnormality. All Micro Results     Procedure Component Value Units Date/Time    CULTURE, BLOOD [[id number]]  (Abnormal) Collected:  09/09/20 2157    Order Status:  Completed Specimen:  Blood Updated:  09/12/20 0744     Special Requests: --        LEFT  Antecubital       GRAM STAIN GRAM POS COCCI IN CHAINS         AEROBIC BOTTLE POSITIVE               CRITICAL RESULT NOT CALLED DUE TO PREVIOUS NOTIFICATION OF CRITICAL RESULT WITHIN THE LAST 24 HOURS. Culture result:       STREPTOCOCCI, BETA HEMOLYTIC GROUP G                  For Susceptibility Refer to Culture  [facility] [id number]      CULTURE, BLOOD [[id number]]  (Abnormal)  (Susceptibility) Collected:  09/09/20 2136    Order Status:  Completed Specimen:  Blood Updated:  09/12/20 0743     Special Requests: --        RIGHT  Antecubital       GRAM STAIN GRAM POS COCCI IN CHAINS               AEROBIC AND ANAEROBIC BOTTLES                  RESULTS VERIFIED, PHONED TO AND READ BACK BY OPAL STONE @4614 9/10/20 SC           Culture result:       STREPTOCOCCI, BETA HEMOLYTIC GROUP G            REFER TO [address] ACCESSION [id number]    BLOOD CULTURE ID PANEL [[id number]]  (Abnormal) Collected:  09/09/20 2136    Order Status:  Completed Specimen:  Blood Updated:  09/10/20 1129     Acc. no. from Micro Order [id number]     Streptococcus Detected        Comment: RESULTS VERIFIED, PHONED TO AND READ BACK BY  OPAL STONE @5554 9/10/20 SC          INTERPRETATION       Gram positive cocci. Identified by realtime PCR as Streptococcus species (not agalactiae, pyogenes, or pneumoniae).            Comment: Consider discontinuation of IV vancomycin and using an anti-streptococcal beta-lactam (ceftriaxone/cefotaxime (peds))             @grabHalo@    Assessment and Plan:     Hospital Problems as of 9/16/2020 Date Reviewed: 2/18/2020          Codes Class Noted - Resolved POA    * (Principal) Sepsis ([facility] ICD-10-CM: A41.9  ICD-9-CM: 038.9, 995.91  9/10/2020 - Present Yes        Cellulitis of left lower extremity ICD-10-CM: L03.116  ICD-9-CM: 682.6  9/10/2020 - Present Yes        Demand ischemia ([facility] ICD-10-CM: I24.8  ICD-9-CM: 411.89  9/10/2020 - Present Yes        Hypoxia ICD-10-CM: R09.02  ICD-9-CM: 799.02  9/10/2020 - Present Yes        Controlled type 2 diabetes mellitus, without long-term current use of insulin (HCC) (Chronic) ICD-10-CM: E11.9  ICD-9-CM: 250.00  9/9/2020 - Present Yes        Interstitial lung disease ([facility] (Chronic) ICD-10-CM: J84.9  ICD-9-CM: 098  6/25/2020 - Present Yes        Leg edema (Chronic) ICD-10-CM: R60.0  ICD-9-CM: 782.3  1/23/2020 - Present Yes        Hypertension (Chronic) ICD-10-CM: I10  ICD-9-CM: 401.9  Unknown - Present Yes        Mild depression ([facility] (Chronic) ICD-10-CM: F32.0  ICD-9-CM: 689  2/8/2019 - Present Yes        Morbid obesity ([facility] (Chronic) ICD-10-CM: E66.01  ICD-9-CM: 278.01  Unknown - Present Yes    Overview Signed 2/8/2019 11:59 AM by Lucia Snowden MD     BMI 45.8- 5/2/12                   Plan:    Sepsis 2/2 Cellulitis and streptococcal bacteremia  - finished 7 days IV Rocephin  - cellulitis cont to improve, sepsis resolved  - ECHO pending with continued desaturation  - CBC pending    Acute Hypoxic Respiratory failure  -Failed walk test today desat to low 80s  -Chest x-ray clear without any acute cardiopulmonary process  -CBC and BMP pending  -ECHO pending  - encourage ICS use     DM2  - monitor blood sugar  - cover with SSI accordingly     HTN  -Monitor blood pressure and manage accordingly.   -Continue home medications.       Obesity  - counseled on appropriate weight loss     LE edema 2/2 chronic venous insufficiency  - cont on Lasix  - recommended LE compression stockings as tolerated at home     DC planning/Dispo:    PT/OT recommending rehab placement. Pt does not want to go to rehab, so will send home with HHPT. Pt still with high oxygen requirements, investigating use.  Hopeful DC tomorrow         Diet:  DIET DIABETIC CONSISTENT CARB  DVT ppx:      Signed:  Ena Ramirez MD

## 2020-09-16 NOTE — PROGRESS NOTES
Oxygen Qualifier       Room air: SpO2 with O2 and liter flow   Resting SpO2 85% 88% on 1 L   92% on 2 L   Ambulating SpO2  84% 84% on 2 L  86% 3 L  87% on 3 L  88% on 4 L   90% on 5 L     At rest 92% on 2 L. Completed by:     Neyda Dhaliwal

## 2020-09-17 VITALS
BODY MASS INDEX: 45.99 KG/M2 | HEART RATE: 82 BPM | OXYGEN SATURATION: 94 % | DIASTOLIC BLOOD PRESSURE: 73 MMHG | SYSTOLIC BLOOD PRESSURE: 127 MMHG | RESPIRATION RATE: 18 BRPM | WEIGHT: 293 LBS | TEMPERATURE: 98 F | HEIGHT: 67 IN

## 2020-09-17 LAB
GLUCOSE BLD STRIP.AUTO-MCNC: 112 MG/DL (ref 65–100)
GLUCOSE BLD STRIP.AUTO-MCNC: 127 MG/DL (ref 65–100)
GLUCOSE BLD STRIP.AUTO-MCNC: 136 MG/DL (ref 65–100)

## 2020-09-17 PROCEDURE — 74011250636 HC RX REV CODE- 250/636: Performed by: INTERNAL MEDICINE

## 2020-09-17 PROCEDURE — 74011250637 HC RX REV CODE- 250/637: Performed by: INTERNAL MEDICINE

## 2020-09-17 PROCEDURE — 94760 N-INVAS EAR/PLS OXIMETRY 1: CPT

## 2020-09-17 PROCEDURE — 94640 AIRWAY INHALATION TREATMENT: CPT

## 2020-09-17 PROCEDURE — 93306 TTE W/DOPPLER COMPLETE: CPT

## 2020-09-17 PROCEDURE — 77010033678 HC OXYGEN DAILY

## 2020-09-17 PROCEDURE — 82962 GLUCOSE BLOOD TEST: CPT

## 2020-09-17 RX ADMIN — BUDESONIDE AND FORMOTEROL FUMARATE DIHYDRATE 2 PUFF: 80; 4.5 AEROSOL RESPIRATORY (INHALATION) at 07:40

## 2020-09-17 RX ADMIN — METOPROLOL SUCCINATE 25 MG: 50 TABLET, EXTENDED RELEASE ORAL at 09:28

## 2020-09-17 RX ADMIN — POTASSIUM CHLORIDE 20 MEQ: 20 TABLET, EXTENDED RELEASE ORAL at 09:27

## 2020-09-17 RX ADMIN — FUROSEMIDE 40 MG: 40 TABLET ORAL at 09:27

## 2020-09-17 RX ADMIN — NYSTATIN: 100000 POWDER TOPICAL at 09:26

## 2020-09-17 RX ADMIN — ENOXAPARIN SODIUM 40 MG: 40 INJECTION SUBCUTANEOUS at 09:26

## 2020-09-17 NOTE — DISCHARGE SUMMARY
Hospitalist Discharge Summary     Admit Date:  2020  9:25 PM   DC note date: 2020  Name:  Jennyfer Jerome   Age:  63 y.o.  :  1955   MRN:  [id number]   PCP:  Magda Galicia MD  Treatment Team: Attending Provider: Esme Martin MD; Utilization Review: Dwyane Aase; Utilization Review: Gopi Thompson RN; Care Manager: Ragini Edwards RN; Utilization Review: Nasrin Betancur RN    Problem List for this Hospitalization:  Hospital Problems as of 2020 Date Reviewed: 2020          Codes Class Noted - Resolved POA    * (Principal) Sepsis ([facility].) ICD-10-CM: A41.9  ICD-9-CM: 038.9, 995.91  9/10/2020 - Present Yes        Cellulitis of left lower extremity ICD-10-CM: L03.116  ICD-9-CM: 682.6  9/10/2020 - Present Yes        Demand ischemia ([facility] ICD-10-CM: I24.8  ICD-9-CM: 411.89  9/10/2020 - Present Yes        Hypoxia ICD-10-CM: R09.02  ICD-9-CM: 799.02  9/10/2020 - Present Yes        Controlled type 2 diabetes mellitus, without long-term current use of insulin (HCC) (Chronic) ICD-10-CM: E11.9  ICD-9-CM: 250.00  2020 - Present Yes        Interstitial lung disease ([facility] (Chronic) ICD-10-CM: J84.9  ICD-9-CM: 664  2020 - Present Yes        Leg edema (Chronic) ICD-10-CM: R60.0  ICD-9-CM: 782.3  2020 - Present Yes        Hypertension (Chronic) ICD-10-CM: I10  ICD-9-CM: 401.9  Unknown - Present Yes        Mild depression ([facility] (Chronic) ICD-10-CM: F32.0  ICD-9-CM: 727  2019 - Present Yes        Morbid obesity ([facility] (Chronic) ICD-10-CM: E66.01  ICD-9-CM: 278.01  Unknown - Present Yes    Overview Signed 2019 11:59 AM by Magda Galicia MD     BMI 45.8- 12                     Admission HPI from 2020:    Chief complaintcough, fever, malaise body aches cellulitis left lower extremity     1 week history of productive cough today progressive weakness and myalgias with fevers as high as 101.9 degrees.   Last several days developing cellulitis left lower extremity and she reports that she had to cover her pretibial ulceration because her dog [place] it. She claims that she has not been out of her house to be exposed to COVID-19 but she is being admitted with suspected infection despite initial rapid screen negative. She is 63years of age with history of possible mild interstitial lung disease by available records, morbid obesity with dyspnea on exertion related to as well as chronic lower extremity edema related to obesity for which she takes daily Lasix and potassium. Hypertension and a new diagnosis of diabetes mellitus. She has had progressive weakness and sought care today because she could get off of her couch. She was hypoxemic at time of presentation with reported 87% on room air but documented 88% on room air. Her white blood count is 12,500 with 87% neutrophils. Serum lactic acid is normal serum creatinine is less than 1 she has a glucose of 150 her liver transaminases are abnormal with  and ALT of 47. Her troponin I is elevated but EKG shows no ischemic changes and she does not complain of any chest pain or pressure.       Hospital Course:    Patient was treated for left lower extremity cellulitis with IV ceftriaxone for 7 days with improvement in her erythema and tenderness to lower extremities. 1 Out of 2 blood cultures grew group G beta-hemolytic strep. Vitals were stable after 7 days of IV ceftriaxone and no further symptoms of sepsis were identified. Patient did suffer from hypoxia when taken off oxygen on days prior to admission. She was given incentive spirometer to use as well as obtaining a chest x-ray which was normal.  Sent home with oxygen to use as well as a referral to pulmonology for further evaluation and treatment of her continued hypoxia. She was given refills of her prescriptions for inhalers.   Other chronic medical conditions including diabetes, hypertension and lower extremity edema were stable on home medications. Disposition: Home or Self Care  Activity: Activity as tolerated  Diet: DIET DIABETIC CONSISTENT CARB Regular  Code Status: Full Code    Follow Up Orders: Follow-up Appointments   Procedures    FOLLOW UP VISIT Appointment in: 3 - 5 Days     Standing Status:   Standing     Number of Occurrences:   1     Order Specific Question:   Appointment in     Answer:   3 - 5 Days       Follow-up Information     Follow up With Specialties Details Why Contact Info    Kirti Michael MD Internal Medicine On 9/22/2020 Virtual visit 1:40 pm [address]  Flaca Ackerman MD Pulmonary Disease In 3 weeks  [address]  [address] [facility]  [phone]            Discharge meds at bottom of this note. Plan was discussed with patient and . All questions answered. Patient was stable at time of discharge. Given instructions to call a physician or return if any concerns. Discharge summary and encounter summary was sent to PCP electronically via \"Comm Mgt\" link in [facility], if possible. Diagnostic Imaging/Tests:   Xr Chest Pa Lat    Result Date: 9/16/2020  EXAM: 2 view chest radiograph. INDICATION: Hypoxia. COMPARISON: Chest radiograph dated September 09, 2020. FINDINGS: No pneumothorax or pleural effusion. No focal lung consolidation. Exaggerated thoracic kyphosis. Diffuse osteopenia. Heart is normal in size. IMPRESSION: 1. No acute cardiopulmonary abnormality. Xr Chest Port    Result Date: 9/9/2020  Portable AP upright chest dated 9/9/2020 at 2135 hours Prior exam 1/9/2020 CLINICAL INFORMATION: Cough and fever Heart is enlarged, mediastinum unremarkable. Pulmonary vascularity is normal and lungs clear. No pleural effusion. IMPRESSION: No acute acute abnormality      Echocardiogram results:  No results found for this visit on 09/09/20.     Procedures done this admission:  * No surgery found *    All Micro Results     Procedure Component Value Units Date/Time    CULTURE, BLOOD [[id number]]  (Abnormal) Collected:  09/09/20 2157    Order Status:  Completed Specimen:  Blood Updated:  09/12/20 0744     Special Requests: --        LEFT  Antecubital       GRAM STAIN GRAM POS COCCI IN CHAINS         AEROBIC BOTTLE POSITIVE               CRITICAL RESULT NOT CALLED DUE TO PREVIOUS NOTIFICATION OF CRITICAL RESULT WITHIN THE LAST 24 HOURS. Culture result:       STREPTOCOCCI, BETA HEMOLYTIC GROUP G                  For Susceptibility Refer to Culture  [address] [id number]      CULTURE, BLOOD [[id number]]  (Abnormal)  (Susceptibility) Collected:  09/09/20 2136    Order Status:  Completed Specimen:  Blood Updated:  09/12/20 0743     Special Requests: --        RIGHT  Antecubital       GRAM STAIN GRAM POS COCCI IN CHAINS               AEROBIC AND ANAEROBIC BOTTLES                  RESULTS VERIFIED, PHONED TO AND READ BACK BY OPAL STONE @6169 9/10/20 SC           Culture result:       STREPTOCOCCI, BETA HEMOLYTIC GROUP G            REFER TO [address] ACCESSION [id number]    BLOOD CULTURE ID PANEL [[id number]]  (Abnormal) Collected:  09/09/20 2136    Order Status:  Completed Specimen:  Blood Updated:  09/10/20 1129     Acc. no. from Micro Order [id number]     Streptococcus Detected        Comment: RESULTS VERIFIED, PHONED TO AND READ BACK BY  OPAL STONE @6951 9/10/20 SC          INTERPRETATION       Gram positive cocci. Identified by realtime PCR as Streptococcus species (not agalactiae, pyogenes, or pneumoniae).            Comment: Consider discontinuation of IV vancomycin and using an anti-streptococcal beta-lactam (ceftriaxone/cefotaxime (peds))             [unfilled]    Labs: Results:       BMP, Mg, Phos Recent Labs     09/16/20  1645      K 3.6      CO2 36*   AGAP 4*   BUN 16   CREA 0.58*   CA 9.1   *      CBC Recent Labs     09/16/20  1645   WBC 9.3   RBC 4.47   HGB 13.0   HCT 41.4      GRANS 68   LYMPH 17   EOS 4   MONOS 9   BASOS 1 IG 2   ANEU 6.3   ABL 1.6   JUDI 0.3   ABM 0.8   ABB 0.1   AIG 0.2      LFT No results for input(s): ALT, TBIL, AP, TP, ALB, GLOB, AGRAT in the last 72 hours.     No lab exists for component: SGOT, GPT   Cardiac Testing Lab Results   Component Value Date/Time    B-type Natriuretic Peptide 121.0 (H) 01/08/2020 09:22 AM      Coagulation Tests No results found for: PTP, INR, APTT, INREXT   A1c Lab Results   Component Value Date/Time    Hemoglobin A1c 7.5 (H) 09/11/2020 05:17 AM    Hemoglobin A1c 7.2 (H) 09/02/2020 11:16 AM    Hemoglobin A1c 6.3 (H) 10/01/2019 11:07 AM      Lipid Panel Lab Results   Component Value Date/Time    Cholesterol, total 199 09/02/2020 11:16 AM    HDL Cholesterol 73 09/02/2020 11:16 AM    LDL, calculated 95 04/01/2019 10:55 AM    LDL Chol Calc (NIH) 110 (H) 09/02/2020 11:16 AM    VLDL, calculated 16 04/01/2019 10:55 AM    VLDL Cholesterol Moses 16 09/02/2020 11:16 AM    Triglyceride 92 09/02/2020 11:16 AM      Thyroid Panel Lab Results   Component Value Date/Time    TSH 2.630 10/29/2019 08:22 AM        Most Recent UA Lab Results   Component Value Date/Time    WBC 3-5 09/10/2020 12:18 AM    RBC 5-10 09/10/2020 12:18 AM    Epithelial cells 0-3 09/10/2020 12:18 AM    Bacteria TRACE 09/10/2020 12:18 AM    Casts 0 09/10/2020 12:18 AM    Crystals, urine OCCASIONAL 09/10/2020 12:18 AM    Mucus 0 09/10/2020 12:18 AM        Allergies   Allergen Reactions    Lortab [Hydrocodone-Acetaminophen] Nausea Only and Other (comments)     \"It makes me feel hung over\"     Immunization History   Administered Date(s) Administered    BCG Vaccine 01/01/1960       All Labs from Last 24 Hrs:  Recent Results (from the past 24 hour(s))   GLUCOSE, POC    Collection Time: 09/16/20  4:31 PM   Result Value Ref Range    Glucose (POC) 124 (H) 65 - 100 mg/dL   CBC WITH AUTOMATED DIFF    Collection Time: 09/16/20  4:45 PM   Result Value Ref Range    WBC 9.3 4.3 - 11.1 K/uL    RBC 4.47 4.05 - 5.2 M/uL    HGB 13.0 11.7 - 15.4 g/dL HCT 41.4 35.8 - 46.3 %    MCV 92.6 79.6 - 97.8 FL    MCH 29.1 26.1 - 32.9 PG    MCHC 31.4 31.4 - 35.0 g/dL    RDW 14.8 (H) 11.9 - 14.6 %    PLATELET 750 342 - 124 K/uL    MPV 10.8 9.4 - 12.3 FL    ABSOLUTE NRBC 0.00 0.0 - 0.2 K/uL    DF AUTOMATED      NEUTROPHILS 68 43 - 78 %    LYMPHOCYTES 17 13 - 44 %    MONOCYTES 9 4.0 - 12.0 %    EOSINOPHILS 4 0.5 - 7.8 %    BASOPHILS 1 0.0 - 2.0 %    IMMATURE GRANULOCYTES 2 0.0 - 5.0 %    ABS. NEUTROPHILS 6.3 1.7 - 8.2 K/UL    ABS. LYMPHOCYTES 1.6 0.5 - 4.6 K/UL    ABS. MONOCYTES 0.8 0.1 - 1.3 K/UL    ABS. EOSINOPHILS 0.3 0.0 - 0.8 K/UL    ABS. BASOPHILS 0.1 0.0 - 0.2 K/UL    ABS. IMM.  GRANS. 0.2 0.0 - 0.5 K/UL   METABOLIC PANEL, BASIC    Collection Time: 09/16/20  4:45 PM   Result Value Ref Range    Sodium 143 136 - 145 mmol/L    Potassium 3.6 3.5 - 5.1 mmol/L    Chloride 103 98 - 107 mmol/L    CO2 36 (H) 21 - 32 mmol/L    Anion gap 4 (L) 7 - 16 mmol/L    Glucose 118 (H) 65 - 100 mg/dL    BUN 16 8 - 23 MG/DL    Creatinine 0.58 (L) 0.6 - 1.0 MG/DL    GFR est AA >60 >60 ml/min/1.73m2    GFR est non-AA >60 >60 ml/min/1.73m2    Calcium 9.1 8.3 - 10.4 MG/DL   GLUCOSE, POC    Collection Time: 09/16/20  8:42 PM   Result Value Ref Range    Glucose (POC) 151 (H) 65 - 100 mg/dL   GLUCOSE, POC    Collection Time: 09/17/20  7:48 AM   Result Value Ref Range    Glucose (POC) 127 (H) 65 - 100 mg/dL       Discharge Exam:  Patient Vitals for the past 24 hrs:   Temp Pulse Resp BP SpO2   09/17/20 1052 98 °F (36.7 °C) 76 18 (!) 168/78 91 %   09/17/20 0746 98.2 °F (36.8 °C) 73 17 132/71 95 %   09/17/20 0740     94 %   09/17/20 0402 98.3 °F (36.8 °C) 80 14 (!) 145/71 91 %   09/16/20 2358 98.4 °F (36.9 °C) 76 18 136/76 90 %   09/16/20 2047     95 %   09/16/20 2036 98.3 °F (36.8 °C) 77 16 (!) 144/89 93 %   09/16/20 1615 98.2 °F (36.8 °C) 82 18 115/77 94 %     Oxygen Therapy  O2 Sat (%): 91 % (09/17/20 1052)  Pulse via Oximetry: 77 beats per minute (09/17/20 0740)  O2 Device: Nasal cannula (09/17/20 0740)  O2 Flow Rate (L/min): 2.5 l/min (09/17/20 0740)  FIO2 (%): 24 % (09/12/20 0726)    Estimated body mass index is 54.82 kg/m² as calculated from the following:    Height as of this encounter: 5' 7\" (1.702 m). Weight as of this encounter: 158.8 kg (350 lb). No intake or output data in the 24 hours ending 09/17/20 1140    *Note that automatically entered I/Os may not be accurate; dependent on patient compliance with collection and accurate  by assistants. General:    Well nourished. Alert. Eyes:   Normal sclerae. Extraocular movements intact. ENT:  Normocephalic, atraumatic. Moist mucous membranes  CV:   Regular rate and rhythm. No murmur, rub, or gallop. Lungs:  Clear to auscultation bilaterally. No wheezing, rhonchi, or rales. Abdomen: Soft, nontender, nondistended. Extremities: Warm and dry. No cyanosis or edema. Neurologic: CN II-XII grossly intact. No gross focal deficits   Skin:     No rashes or jaundice. Psych:  Normal mood and affect.     Current Med List in Hospital:   Current Facility-Administered Medications   Medication Dose Route Frequency    sodium chloride (NS) flush 5-40 mL  5-40 mL IntraVENous Q8H    sodium chloride (NS) flush 5-40 mL  5-40 mL IntraVENous PRN    acetaminophen (TYLENOL) tablet 650 mg  650 mg Oral Q6H PRN    Or    acetaminophen (TYLENOL) suppository 650 mg  650 mg Rectal Q6H PRN    polyethylene glycol (MIRALAX) packet 17 g  17 g Oral DAILY PRN    promethazine (PHENERGAN) tablet 12.5 mg  12.5 mg Oral Q6H PRN    Or    ondansetron (ZOFRAN) injection 4 mg  4 mg IntraVENous Q6H PRN    enoxaparin (LOVENOX) injection 40 mg  40 mg SubCUTAneous Q12H    insulin lispro (HUMALOG) injection   SubCUTAneous AC&HS    furosemide (LASIX) tablet 40 mg  40 mg Oral DAILY    metoprolol succinate (TOPROL-XL) XL tablet 25 mg  25 mg Oral DAILY    potassium chloride (K-DUR, KLOR-CON) SR tablet 20 mEq  20 mEq Oral DAILY    nystatin (MYCOSTATIN) 100,000 unit/gram powder   Topical BID    budesonide-formoterol (SYMBICORT) 80-4.5 mcg inhaler  2 Puff Inhalation BID RT       Discharge Info:   Current Discharge Medication List      CONTINUE these medications which have NOT CHANGED    Details   furosemide (LASIX) 40 mg tablet Take 1 Tab by mouth daily. Qty: 30 Tab, Refills: 1    Associated Diagnoses: Diastolic CHF, chronic (HCC)      potassium chloride (K-DUR, KLOR-CON) 20 mEq tablet Take 1 Tab by mouth daily. Qty: 30 Tab, Refills: 1    Associated Diagnoses: Diastolic CHF, chronic (HCC)      budesonide-formoteroL (SYMBICORT) 80-4.5 mcg/actuation HFAA Take 2 Puffs by inhalation two (2) times a day. Qty: 3 Inhaler, Refills: 3    Associated Diagnoses: Reactive airway disease without complication, unspecified asthma severity, unspecified whether persistent      metoprolol succinate (TOPROL-XL) 25 mg XL tablet Take 1 Tab by mouth daily. Qty: 90 Tab, Refills: 3    Associated Diagnoses: Essential hypertension               Time spent in patient discharge planning and coordination 35 minutes.     Signed:  Stephen Choi MD

## 2020-09-17 NOTE — PROGRESS NOTES
Patient assessment complete and charted. She is alert and oriented. Patient is slow moving but easily transfers from bed to bedside toilet. Heart with regular rate and rhythm. Lungs are clear but diminished at the bases. She has 2-3+ pitting edema of bilateral lower limbs. Patient is anxious to be discharged this afternoon.

## 2020-09-17 NOTE — PROGRESS NOTES
Patient discharge instructions provided to patient. Opportunity provided to ask questions and answered. Patient discharged into her son's care.

## 2020-09-17 NOTE — PROGRESS NOTES
Wound care provided to LLE; no open areas noted; scattered redness throughout lower leg from ankle to knee. Tenderness to palpation. Area cleansed with wound cleanser; wrapped with kurlex and gayle wrap. Will need to request ACE wrap from central supply. Patient tolerated well.

## 2020-09-17 NOTE — PROGRESS NOTES
Nutrition Assessment     Type and Reason for Visit: Initial, RD nutrition re-screen/LOS   Length of Stay     Nutrition Recommendations/Plan:    Continue with current diet     Nutrition Assessment:  Pt presents for sepsis with LLE cellulitis. PMH notable for HTN and DM II. Pt to d/c today. Pt reports consuming ~75% of meals which adequately meets estimated nutrition needs at this time. Estimated Daily Nutrient Needs:  Energy (kcal):  1535-1842kcal(25-30kcal/kg (IBW: 61.4kg))  Protein (g):  61-74gm(1-1.2gm/kg (IBW: 61.4kg))         Current Nutrition Therapies:  DIET DIABETIC CONSISTENT CARB Regular    Anthropometric Measures:  · Height:  5' 7.01\" (170.2 cm)  · Current Body Wt:  158.8 kg (350 lb 1.5 oz)  · BMI: 54.8    Nutrition Diagnosis:   No nutrition diagnosis at this time     Nutrition Intervention:  Food and/or Nutrient Delivery: Continue current diet    Goals:   Will continue to meet >75% estimated nutrition needs during admission     Nutrition Monitoring and Evaluation:   Food/Nutrient Intake Outcomes: Food and nutrient intake    Discharge Planning:    No discharge needs at this time     Electronically signed by Nuvia Katz MS, RDN, ADA 9/17/2020 at 4:51 PM  Contact: [phone]

## 2020-09-17 NOTE — PROGRESS NOTES
Problem: Diabetes Self-Management  Goal: *Disease process and treatment process  Description: Define diabetes and identify own type of diabetes; list 3 options for treating diabetes. Outcome: Progressing Towards Goal  Goal: *Incorporating nutritional management into lifestyle  Description: Describe effect of type, amount and timing of food on blood glucose; list 3 methods for planning meals. Outcome: Progressing Towards Goal  Goal: *Incorporating physical activity into lifestyle  Description: State effect of exercise on blood glucose levels. Outcome: Progressing Towards Goal  Goal: *Developing strategies to promote health/change behavior  Description: Define the ABC's of diabetes; identify appropriate screenings, schedule and personal plan for screenings. Outcome: Progressing Towards Goal  Goal: *Using medications safely  Description: State effect of diabetes medications on diabetes; name diabetes medication taking, action and side effects. Outcome: Progressing Towards Goal  Goal: *Monitoring blood glucose, interpreting and using results  Description: Identify recommended blood glucose targets  and personal targets. Outcome: Progressing Towards Goal  Goal: *Prevention, detection, treatment of acute complications  Description: List symptoms of hyper- and hypoglycemia; describe how to treat low blood sugar and actions for lowering  high blood glucose level. Outcome: Progressing Towards Goal  Goal: *Prevention, detection and treatment of chronic complications  Description: Define the natural course of diabetes and describe the relationship of blood glucose levels to long term complications of diabetes.   Outcome: Progressing Towards Goal  Goal: *Developing strategies to address psychosocial issues  Description: Describe feelings about living with diabetes; identify support needed and support network  Outcome: Progressing Towards Goal  Goal: *Insulin pump training  Outcome: Progressing Towards Goal  Goal: *Sick day guidelines  Outcome: Progressing Towards Goal  Goal: *Patient Specific Goal (EDIT GOAL, INSERT TEXT)  Outcome: Progressing Towards Goal     Problem: Patient Education: Go to Patient Education Activity  Goal: Patient/Family Education  Outcome: Progressing Towards Goal     Problem: Falls - Risk of  Goal: *Absence of Falls  Description: Document Delaney Waverly Fall Risk and appropriate interventions in the flowsheet. Outcome: Progressing Towards Goal  Note: Fall Risk Interventions:  Mobility Interventions: Patient to call before getting OOB, PT Consult for mobility concerns         Medication Interventions: Evaluate medications/consider consulting pharmacy, Patient to call before getting OOB    Elimination Interventions: Call light in reach, Patient to call for help with toileting needs              Problem: Patient Education: Go to Patient Education Activity  Goal: Patient/Family Education  Outcome: Progressing Towards Goal     Problem: Pressure Injury - Risk of  Goal: *Prevention of pressure injury  Description: Document Mukesh Scale and appropriate interventions in the flowsheet.   Outcome: Progressing Towards Goal  Note: Pressure Injury Interventions:  Sensory Interventions: Assess changes in LOC    Moisture Interventions: Absorbent underpads, Apply protective barrier, creams and emollients    Activity Interventions: Increase time out of bed, PT/OT evaluation    Mobility Interventions: PT/OT evaluation    Nutrition Interventions: Document food/fluid/supplement intake    Friction and Shear Interventions: Apply protective barrier, creams and emollients                Problem: Patient Education: Go to Patient Education Activity  Goal: Patient/Family Education  Outcome: Progressing Towards Goal     Problem: Patient Education: Go to Patient Education Activity  Goal: Patient/Family Education  Outcome: Progressing Towards Goal     Problem: Patient Education: Go to Patient Education Activity  Goal: Patient/Family Education  Outcome: Progressing Towards Goal    Patient alert and oriented x4; x 1 assist with walker. Patient calls for assistance appropriately. No open areas noted to LLE. VS stable; patient has been remaining on 2.5 LNC ; weaned down to 1 L; oxygen right at 90%. Will continue to monitor as patient does not wear oxygen at home. Awaiting echo results prior to d/c; desatted earlier in day with activity. **per day shift RN patient no longer had IV access; notified evening hospitalist and per hospitalist okay to leave out IV for now.

## 2020-09-17 NOTE — PROGRESS NOTES
Weaned patient down from 2 NC to 1L- will recheck O2 to see how is tolerated. Patient demonstrated understanding of IS. Able to reach 1000. Tolerated fairly well. Encouraged patient to perform hourly. Patient verbalized understanding. Rechecked oxygen at 06:30; patient at 88-89%; 89-90% at 1.5 L. Bumped back up to 2 L; stayed consistently at 91-93%.

## 2020-09-17 NOTE — PROGRESS NOTES
MSN, CM:  Patient to be discharged home today with [facility] services and home oxygen provided by [facility]  Patient agrees with this discharge plan. Patient has met all milestones for this admission. Family to transport patient home. Care Management Interventions  PCP Verified by CM: Yes(Dr. Georgie Sanchez)  Mode of Transport at Discharge:  Other (see comment)(family to transport)  Transition of Care Consult (CM Consult): [address]: Yes  Physical Therapy Consult: Yes  Occupational Therapy Consult: Yes  Current Support Network: Own Home, Other(Son lives in home)  Confirm Follow Up Transport: Self  The Plan for Transition of Care is Related to the Following Treatment Goals : Home health to regain strength back to baseline for self care  The Patient and/or Patient Representative was Provided with a Choice of Provider and Agrees with the Discharge Plan?: Yes  Name of the Patient Representative Who was Provided with a Choice of Provider and Agrees with the Discharge Plan: Ms. Lizeth Stock (patient)  Freedom of Choice List was Provided with Basic Dialogue that Supports the Patient's Individualized Plan of Care/Goals, Treatment Preferences and Shares the Quality Data Associated with the Providers?: Yes  Discharge Location  Discharge Placement: Home with home health

## 2020-09-17 NOTE — PROGRESS NOTES
09/17/20 0746   Pulmonary Toilet   Pulmonary Toilet Incentive Spirometry   Incentive Spirometry Treatment   Level of Service Assisted by nursing   Predicted Volume (ml) 1500 ml   Actual Volume (ml) 1200 ml   % Predicted Volume (ml) 0.8   Treatment Tolerance Patient tolerated

## 2020-09-18 ENCOUNTER — PATIENT OUTREACH (OUTPATIENT)
Dept: CASE MANAGEMENT | Age: 65
End: 2020-09-18

## 2020-09-18 ENCOUNTER — HOME CARE VISIT (OUTPATIENT)
Dept: SCHEDULING | Facility: HOME HEALTH | Age: 65
End: 2020-09-18
Payer: COMMERCIAL

## 2020-09-18 ENCOUNTER — TELEPHONE (OUTPATIENT)
Dept: HOME HEALTH SERVICES | Facility: HOME HEALTH | Age: 65
End: 2020-09-18

## 2020-09-18 VITALS
OXYGEN SATURATION: 93 % | HEART RATE: 86 BPM | DIASTOLIC BLOOD PRESSURE: 64 MMHG | RESPIRATION RATE: 20 BRPM | TEMPERATURE: 97.4 F | SYSTOLIC BLOOD PRESSURE: 110 MMHG

## 2020-09-18 PROCEDURE — G0299 HHS/HOSPICE OF RN EA 15 MIN: HCPCS

## 2020-09-18 PROCEDURE — 400013 HH SOC

## 2020-09-18 NOTE — PROGRESS NOTES
Patient contacted regarding recent discharge and COVID-19 risk. Discussed COVID-19 related testing which was available at this time. Test results were negative. Patient informed of results, if available? yes    Care Transition Nurse/ Ambulatory Care Manager/ LPN Care Coordinator contacted the patient by telephone to perform post discharge assessment. Verified name and  with patient as identifiers. Patient has following risk factors of: sepsis, hypoxia. CTN/ACM/LPN reviewed discharge instructions, medical action plan and red flags related to discharge diagnosis. Reviewed and educated them on any new and changed medications related to discharge diagnosis. Advised obtaining a 90-day supply of all daily and as-needed medications. Advance Care Planning:   Does patient have an Advance Directive: currently not on file; education provided     Education provided regarding infection prevention, and signs and symptoms of COVID-19 and when to seek medical attention with patient who verbalized understanding. Discussed exposure protocols and quarantine from [address] you at higher risk for severe illness  and given an opportunity for questions and concerns. The patient agrees to contact the COVID-19 hotline [phone] or PCP office for questions related to their healthcare. CTN/ACM/LPN provided contact information for future reference. From CDC: Are you at higher risk for severe illness?  Wash your hands often.  Avoid close contact (6 feet, which is about two arm lengths) with people who are sick.  Put distance between yourself and other people if COVID-19 is spreading in your community.  Clean and disinfect frequently touched surfaces.  Avoid all cruise travel and non-essential air travel.  Call your healthcare professional if you have concerns about COVID-19 and your underlying condition or if you are sick.     For more information on steps you can take to protect yourself, see CDC's How to Protect Yourself      Patient/family/caregiver given information for [employer] [facility] and agrees to enroll no  Patient's preferred e-mail:none  Patient's preferred phone number: [phone]  Spoke with patient, she stated she is doing better. Patient's visit not related to Covid, so only 1 call needed. Patient has mine information should any issues arise.

## 2020-09-18 NOTE — TELEPHONE ENCOUNTER
[facility] Care Pharmacist consult. Mrs. June Rodriguez was discharged from [facility] after having sepsis due to cellulitis. I spoke with her and explained my part of [facility]. I reviewed her discharge medications. She was not given any new medications on discharge. She is only on 4 maintenance medications which we discussed. I gave her my phone number and asked her to call with any medication questions or issues. She said OK to call back next week. There is a small language issue, but is OK if I speak slowly.

## 2020-09-21 ENCOUNTER — HOME CARE VISIT (OUTPATIENT)
Dept: SCHEDULING | Facility: HOME HEALTH | Age: 65
End: 2020-09-21
Payer: COMMERCIAL

## 2020-09-21 PROCEDURE — G0299 HHS/HOSPICE OF RN EA 15 MIN: HCPCS

## 2020-09-22 ENCOUNTER — HOME CARE VISIT (OUTPATIENT)
Dept: SCHEDULING | Facility: HOME HEALTH | Age: 65
End: 2020-09-22
Payer: COMMERCIAL

## 2020-09-22 VITALS
DIASTOLIC BLOOD PRESSURE: 73 MMHG | SYSTOLIC BLOOD PRESSURE: 122 MMHG | HEART RATE: 89 BPM | RESPIRATION RATE: 19 BRPM | TEMPERATURE: 98.2 F | OXYGEN SATURATION: 96 %

## 2020-09-22 PROCEDURE — G0151 HHCP-SERV OF PT,EA 15 MIN: HCPCS

## 2020-09-23 ENCOUNTER — TELEPHONE (OUTPATIENT)
Dept: HOME HEALTH SERVICES | Facility: HOME HEALTH | Age: 65
End: 2020-09-23

## 2020-09-23 VITALS
SYSTOLIC BLOOD PRESSURE: 110 MMHG | HEART RATE: 83 BPM | RESPIRATION RATE: 18 BRPM | TEMPERATURE: 97.2 F | DIASTOLIC BLOOD PRESSURE: 82 MMHG | OXYGEN SATURATION: 93 %

## 2020-09-23 NOTE — TELEPHONE ENCOUNTER
. Anna Grimes said she was supposed to have virtual visit with Dr. Kerin Mcardle yesterday, but could never get connected. She kept getting the message to call back in 10 minutes, which she did and got the same message. She will wait on MD to reschedule. She is on the same 4 medications with no changes or questions. I asked her to call with any medication issues.

## 2020-09-24 NOTE — ADT AUTH CERT NOTES
[facility] (9/16/2020) by Flakita Cid  
 
   
Review Status  Review Entered In Primary  9/17/2020 09:58   
   
Criteria Review REVIEW SUMMARY 
  
Patient: Carmelina Tiwari Review Number: [id number] Review Status: In Primary 
  
Condition Specific: Yes 
  
Condition Level Of Care Code: ACUTE Condition Level Of Care Description: Acute 
  
  
OUTCOMES Outcome Type: Primary 
  
  
  
REVIEW DETAILS 
  
Service Date: 09/16/2020 Admit Date: 09/10/2020 Product: [facility] Adult Subset: Extended Stay (Symptom or finding within 24h) 
  (Excludes PO medications unless noted) [X] Select Level of Care, One: 
            [X] ACUTE, >= One: 
                [ ] Infection, One: 
                    [ ] Partial responder, not clinically stable for discharge and requires continued stay, >= One: 
                        [ ] Infection actual or suspected and, Both: 
                            [ ] Intervention, >= One: 
                                [ ] Anti-infective <= 5d since initiation ~--Admin,  [place] on 09- 09:49 AM--~ 
                                ROCEPHIN IV DC'D 9/15/20 [X] Respiratory, One: 
                    [X] Partial responder, not clinically stable for discharge and requires continued stay, >= One: 
                    ~--Admin, IQ Admin Admin on 09- 09:58 AM--~ Hospitalist Note 
                      
                     
                    .  No acute events overnight. No complaints at this time. She underwent ambulatory test with oxygen desaturation to low 80s. Planes of no cough, fevers, chills, pleurisy shortness of breath. She does states she sometimes wheezes but suffer from any wheeze, shortness of breath or cough when she was walking with PT. EXAM:  CV:                  RRR. No murmur, rub, or gallop. Lungs:             CTAB. No wheezing, rhonchi, or rales. Abdomen:        Soft, nontender, nondistended. Extremities:     Warm and dry. No cyanosis or edema. Skin:                No rashes or jaundice. Neuro:             No gross focal deficits T 98.1, /69, P 76, R 20 
                    C02 36, GLUC 118, CREAT 0.58, CXR:  No acute cardiopulmonary abnormality. Plan: 
                      
                    Sepsis 2/2 Cellulitis and streptococcal bacteremia 
                    - finished 7 days IV Rocephin 
                    - cellulitis cont to improve, sepsis resolved 
                    - ECHO pending with continued desaturation 
                    - CBC pending 
                      
                    Acute Hypoxic Respiratory failure 
                    -Failed walk test today desat to low 80s 
                    -Chest x-ray clear without any acute cardiopulmonary process -CBC and BMP pending -ECHO pending 
                    - encourage ICS use 
                      
                    DM2 
                    - monitor blood sugar 
                    - cover with SSI accordingly 
                      HTN 
                    -Monitor blood pressure and manage accordingly.  
                    -Continue home medications.    
                      
                    Obesity 
                    - counseled on appropriate weight loss 
                      
                    LE edema 2/2 chronic venous insufficiency 
                    - cont on Lasix - recommended LE compression stockings as tolerated at home 
                      
                    DC planning/Dispo:   
                    PT/OT recommending rehab placement. Pt does not want to go to rehab, so will send home with HHPT. Pt still with high oxygen requirements, investigating use. Hopeful DC tomorrow 
                      
                     Diet:  DIET DIABETIC CONSISTENT CARB PHYSICAL THERAPY: Daily Note and AM 9/16/2020 The patient performed transfers with SBA and ambulated 80' w/ the RW at a slow steady pace. She took several standing rest breaks and needed minimal cueing for posture. The patient then returned to the recliner and sat. Abhishek Henao Overall the patient is making good progress toward therapy goals as indicated by increased activity tolerance. INCENT SPIR, 2D ECHO,  HH CONSULT,  SSI SC AC & HS (2U X1),  SAME SCHED MEDS 
                     
                     
                     
                        [X] O2 sat 89-91%(0.89-0.91) and < baseline requiring supplemental oxygen and, >= One: 
                            [X] Oxygen therapy adjustments at least 3x/24h and oximetry ~--Admin, IQ Admin Admin on 09- 09:53 AM--~ 
                            SPLLOT CHECK OXIMETRY PRN 
                             
                             
                            ~--Admin, IQ Admin Admin on 09- 09:53 AM--~ Oxygen Qualifier RESTING SP02  85% RA, 88% ON 1L & 92% ON 2L                             AMBULATING SP02:  84% ON RA. 84% 2L , 86% 3L, 87% 3L, 88% 4L, 90% 5L 
                             
                             
                             
  
 Version: InterQual® 2019 [place]  © 2019 [employer] and/or one of its [place]. All Rights Reserved. CPT only © 2018 American Medical Association. All Rights Reserved.  
   
LOC:Acute Adult-Extended Stay (9/15/2020) by Yaa Galloway  
 
   
Review Status  Review Entered In Primary  9/17/2020 09:47   
   
Criteria Review REVIEW SUMMARY 
  
Patient: Mary Rangel Review Number: [id number] Review Status: In Primary 
  
Condition Specific: Yes 
  
Condition Level Of Care Code: ACUTE Condition Level Of Care Description: Acute 
  
  
OUTCOMES Outcome Type: Primary 
  
  
  
REVIEW DETAILS 
  
Service Date: 09/15/2020 Admit Date: 09/10/2020 Product: Melissa Patch Adult Subset: Extended Stay (Symptom or finding within 24h) 
  (Excludes PO medications unless noted) Select Level of Care, One: 
            [ ] ACUTE, >= One: 
                [ ] Infection, One: 
                    [ ] Partial responder, not clinically stable for discharge and requires continued stay, >= One: 
                        [ ] Infection actual or suspected and, Both: 
                            [X] Intervention, >= One: 
                                [X] Anti-infective <= 5d since initiation ~--Admin, IQ Admin Admin on 09- 09:43 AM--~ 
                                ROCEPHIN 2G IV Q 24 HRS 
                                 
                                 
                                 
                    [X] Non-responder, not clinically stable for discharge and does not meet partial responder criteria (refer for secondary review) 
                    ~--Admin, IQ Admin Admin on 09- 09:47 AM--~ Hospitalist Note                     Patient is admitted due to sepsis with left lower leg cellulitis and gram pos bacteremia 
                      
 She is still on IV rocephin and conts to improve each day. She denies any fevers, chills, n/v/d, abd pain, chest pain or pressure, SOB. She conts to endorse left lower leg swelling with some pain in her right foot when swelling is worse. She has been able to ambulate with no assistance with PT.  
                      EXAM:  Lungs:             CTAB. No wheezing, rhonchi, or rales. Abdomen:        Soft, nontender, nondistended. Extremities:     left lower leg with less redness and swelling. 1+ bilat pitting edema T  98.1, /76, P 82, R 15, 02 SAT 93% 2.5L NC 
                     
                    POC GLUC 124-139 Plan: 
                    Sepsis 2/2 Cellulitis and streptococcal bacteremia 
                    - cont IV Rocephin 
                    - cellulitis cont to improve, sepsis resolved 
                      
                    DM2 
                    - monitor blood sugar 
                    - cover with SSI accordingly 
                      HTN 
                    -Monitor blood pressure and manage accordingly.  
                    -Continue home medications.   
                      
                    Obesity 
                    - counseled on appropriate weight loss 
                      
                    LE edema 2/2 chronic venous insufficiency 
                    - cont on Lasix 
                    - recommended LE compression stockings as tolerated at home 
                      
                    DC planning/Dispo:   
                    PT/OT recommending rehab placement. Pt does not want to go to rehab, will discuss recs and other options with patient alongside case mngnt.  
                      
                    Diet:  DIET DIABETIC CONSISTENT CARB DVT ppx:  Lovenox subq PHYSICAL THERAPY: Daily Note and AM 9/15/2020 
                    pt presents in supine without complaints; agreeable to therapy treatment and mobility. Performs all activity with max additional time and min cueing for technique. Intact seated balance noted. SBA for sit-stand transfers from edge of bed. Pt performs mobility/ambulation in room and hallway for 90 with with RW, verbal cues for posture, and cueing for gait mechanics, safety, and walker management. Pt ambulates with slow pace, no loss of balance noted. Returned to room and up to bathroom performing transfers with supervision. Independent with self care/hygiene in sitting. Standing activities at sink for several minutes to address balance, strength, and activity tolerance. Up to chair and perform seated exercises with good participation. Kina Fleming is progressing well with therapy, however she is still much weaker than baseline and is very unsteady in standing (better with walker). SYMBICORT INH BID, LOVENOX 40MG SC Q 12 HRS, LASIX 40MG PO QD, TOPROL XL 25MG PO QD,  MYCOSTATIN POWDER BID,  KCL 20 MEQ PO QD, 
                     
                     
                     
  
Version: InterQual® 2019 [place]  © 2019 [employer] and/or one of its [place]. All Rights Reserved. CPT only © 2018 American Medical Association. All Rights Reserved.  
   
LOC:Acute Adult-Infection: Skin (9/14/2020) by Shyla Dash RN  
 
   
Review Status  Review Entered In Primary  9/14/2020 17:29   
   
Criteria Review REVIEW SUMMARY 
  
Patient: Camille Ruiz Review Number: [id number] Review Status: In Primary 
  
Condition Specific: Yes 
  
Condition Level Of Care Code: ACUTE Condition Level Of Care Description: Acute 
  
  
OUTCOMES Outcome Type: Primary 
  
  
  
 REVIEW DETAILS 
  
Service Date: 09/14/2020 Admit Date: 09/10/2020 Product: Langabriela Bowles Adult Subset: Infection: Skin 
    (Symptom or finding within 24h) 
  (Excludes PO medications unless noted) Select Day, One: 
            [ ] Episode Day 4-5, One: 
                [ ] ACUTE, One: 
                    [ ] Partial responder, not clinically stable for discharge and requires continued stay, >= One: 
                        [ ] Cellulitis unresolved and, Both: 
                        ~--Admin, IQ Admin Admin on 09- 05:29 PM--~ 
                        PT Assessment 9/14: pt much improved today, requires significantly less assistance with bed mobility. Intact seated balance. Was able to stand without help (close CGA and cues for technique) from edge of bed and from low commode. Worked on transfer mechanics, body mechanics, posture, standing static/dynamic activities, pre-gait, and ambulation x 4 trials. Initially takes steps with walker and CGA and performs standing activities at sink to address balance, activity tolerance. Walks back to chair for seated rest break, then practiced transfers again from chair and ambulation to and from bathroom with close CGA/no DME (furniture walking at times, did not want to accept any handheld assist). Pt returned to room and again performs standing activities, then up to chair after session. Positioned comfortably with needs in reach. Ayse Washington has made great progress today with mobility compared to initial evaluation. Goals modified/updated. She continues to be much weaker than baseline however and still recommending rehab at discharge. ~--Admin,  [place] on 09- 05:27 PM--~ 
                        9/14/20 
                          
                        Patient is admitted due to sepsis with left lower leg cellulitis.   
                        She is on Empiric IV antibiotic now and feeling better. No fever. No shaking. No chills.  
                          
                        Left lower leg is less swollen and less painful. Patient is ambulating a bit in her room. Lungs:                       Clear to auscultation bilaterally without wheezes or crackles.                                             No respiratory distress or accessory muscle use. Heart:                                  Regular rate and rhythm, without murmurs, rubs, or gallops. Abdomen:                  Soft, non-tender, very distended due to truncal obesity with normoactive bowel sounds. Genitourinary:           No tenderness over the bladder or bilateral CVAs. Extremities:               left lower leg with less redness and swelling. Skin:                                   left lower leg with some redness and swelling Pulses:                      Radial and dorsalis pedis pulses present 2+ bilaterally. Plan Cellulitis Severe sepsis on admission Continue IV antibiotic, Ceftriaxone. Symptomatic treatments  
                          
                        Diabetes mellitus type 2 Monitor blood sugar. Cover with insulin sliding scale accordingly.   
                          
                        Hypertension Monitor blood pressure and manage accordingly. Continue home medications.   
                          
                        Obesity Advised to lose weight  
                          
                        Edema On Lasix  
                          
                        I have discussed the plan of care with patient.  
                          
                        DVT prophylaxis : Lovenox SC  
                          
                        Disposition plan :  Possibly can change antibiotic to PO and discharge home tomorrow is leg redness and swelling better. VS  T 98.4   P 97   B/P 110/72  R 18   SpO2 93 %   RA Results:   
                        K 3.4 CO2 39 Orders: 
                        Diabetic diet, Wound care, PT/OT, activity as tolerated,  PT/OT Consult [X] Anti-infective <= 4d since initiation ~--Admin, IQ Admin Admin on 09- 05:27 PM--~ 
                            Rocephin 2 g IV q24h, day 4 Lovenox 40 mg sq q12h, lasix 40 mg po daily, Metoprolol 25 mg po daily, KDur 20 mEq po daily Lispro insulin sq ac/hs 
                             
                             
                             
  
Version: InterQual® 2019 [place]  © 2019 [employer] and/or one of its [place]. All Rights Reserved. CPT only © 2018 American Medical Association.   All Rights Reserved.

## 2020-09-25 ENCOUNTER — HOME CARE VISIT (OUTPATIENT)
Dept: SCHEDULING | Facility: HOME HEALTH | Age: 65
End: 2020-09-25
Payer: COMMERCIAL

## 2020-09-25 PROCEDURE — G0299 HHS/HOSPICE OF RN EA 15 MIN: HCPCS

## 2020-09-26 VITALS
HEART RATE: 81 BPM | TEMPERATURE: 97.1 F | OXYGEN SATURATION: 94 % | RESPIRATION RATE: 18 BRPM | DIASTOLIC BLOOD PRESSURE: 70 MMHG | SYSTOLIC BLOOD PRESSURE: 118 MMHG

## 2020-09-28 ENCOUNTER — HOME CARE VISIT (OUTPATIENT)
Dept: HOME HEALTH SERVICES | Facility: HOME HEALTH | Age: 65
End: 2020-09-28
Payer: COMMERCIAL

## 2020-09-28 ENCOUNTER — HOME CARE VISIT (OUTPATIENT)
Dept: SCHEDULING | Facility: HOME HEALTH | Age: 65
End: 2020-09-28
Payer: COMMERCIAL

## 2020-09-28 PROCEDURE — G0299 HHS/HOSPICE OF RN EA 15 MIN: HCPCS

## 2020-09-29 VITALS
TEMPERATURE: 96.9 F | OXYGEN SATURATION: 93 % | RESPIRATION RATE: 16 BRPM | SYSTOLIC BLOOD PRESSURE: 116 MMHG | DIASTOLIC BLOOD PRESSURE: 82 MMHG | HEART RATE: 84 BPM

## 2020-09-30 ENCOUNTER — TELEPHONE (OUTPATIENT)
Dept: HOME HEALTH SERVICES | Facility: HOME HEALTH | Age: 65
End: 2020-09-30

## 2020-09-30 ENCOUNTER — HOME CARE VISIT (OUTPATIENT)
Dept: SCHEDULING | Facility: HOME HEALTH | Age: 65
End: 2020-09-30
Payer: COMMERCIAL

## 2020-09-30 VITALS
HEART RATE: 90 BPM | DIASTOLIC BLOOD PRESSURE: 82 MMHG | RESPIRATION RATE: 18 BRPM | SYSTOLIC BLOOD PRESSURE: 120 MMHG | TEMPERATURE: 98 F

## 2020-09-30 PROCEDURE — G0157 HHC PT ASSISTANT EA 15: HCPCS

## 2020-09-30 NOTE — TELEPHONE ENCOUNTER
Mrs. Michelle Huerta said she was doing OK. I reviewed her four maintenance medications. I asked about her virtual visit with Dr. Naty Chawla yesterday. She said it did not take place. She is still having technical issues. She is calling today to get it rescheduled for tomorrow. No medication questions. I asked her to call with any medication issues. She said OK to call back.

## 2020-10-01 PROBLEM — A41.9 SEPSIS (HCC): Status: RESOLVED | Noted: 2020-09-10 | Resolved: 2020-10-01

## 2020-10-01 PROBLEM — R09.02 HYPOXIA: Status: RESOLVED | Noted: 2020-09-10 | Resolved: 2020-10-01

## 2020-10-01 PROBLEM — I24.8 DEMAND ISCHEMIA (HCC): Status: RESOLVED | Noted: 2020-09-10 | Resolved: 2020-10-01

## 2020-10-01 PROBLEM — R60.0 LEG EDEMA: Chronic | Status: RESOLVED | Noted: 2020-01-23 | Resolved: 2020-10-01

## 2020-10-01 PROBLEM — E11.9 CONTROLLED TYPE 2 DIABETES MELLITUS, WITHOUT LONG-TERM CURRENT USE OF INSULIN (HCC): Chronic | Status: RESOLVED | Noted: 2020-09-09 | Resolved: 2020-10-01

## 2020-10-02 ENCOUNTER — HOME CARE VISIT (OUTPATIENT)
Dept: SCHEDULING | Facility: HOME HEALTH | Age: 65
End: 2020-10-02
Payer: COMMERCIAL

## 2020-10-02 ENCOUNTER — HOME CARE VISIT (OUTPATIENT)
Dept: HOME HEALTH SERVICES | Facility: HOME HEALTH | Age: 65
End: 2020-10-02
Payer: COMMERCIAL

## 2020-10-02 PROCEDURE — G0299 HHS/HOSPICE OF RN EA 15 MIN: HCPCS

## 2020-10-02 PROCEDURE — G0157 HHC PT ASSISTANT EA 15: HCPCS

## 2020-10-04 VITALS
TEMPERATURE: 97.2 F | HEART RATE: 68 BPM | OXYGEN SATURATION: 96 % | DIASTOLIC BLOOD PRESSURE: 88 MMHG | SYSTOLIC BLOOD PRESSURE: 140 MMHG | RESPIRATION RATE: 18 BRPM

## 2020-10-05 ENCOUNTER — HOME CARE VISIT (OUTPATIENT)
Dept: SCHEDULING | Facility: HOME HEALTH | Age: 65
End: 2020-10-05
Payer: COMMERCIAL

## 2020-10-05 PROCEDURE — G0299 HHS/HOSPICE OF RN EA 15 MIN: HCPCS

## 2020-10-06 ENCOUNTER — HOME CARE VISIT (OUTPATIENT)
Dept: SCHEDULING | Facility: HOME HEALTH | Age: 65
End: 2020-10-06
Payer: COMMERCIAL

## 2020-10-06 VITALS
DIASTOLIC BLOOD PRESSURE: 88 MMHG | HEART RATE: 67 BPM | OXYGEN SATURATION: 96 % | RESPIRATION RATE: 18 BRPM | SYSTOLIC BLOOD PRESSURE: 130 MMHG | TEMPERATURE: 97.8 F

## 2020-10-06 VITALS
SYSTOLIC BLOOD PRESSURE: 120 MMHG | TEMPERATURE: 98 F | RESPIRATION RATE: 17 BRPM | HEART RATE: 70 BPM | DIASTOLIC BLOOD PRESSURE: 72 MMHG

## 2020-10-06 PROCEDURE — G0157 HHC PT ASSISTANT EA 15: HCPCS

## 2020-10-08 ENCOUNTER — HOME CARE VISIT (OUTPATIENT)
Dept: SCHEDULING | Facility: HOME HEALTH | Age: 65
End: 2020-10-08
Payer: COMMERCIAL

## 2020-10-08 ENCOUNTER — TELEPHONE (OUTPATIENT)
Dept: HOME HEALTH SERVICES | Facility: HOME HEALTH | Age: 65
End: 2020-10-08

## 2020-10-08 VITALS
DIASTOLIC BLOOD PRESSURE: 75 MMHG | HEART RATE: 74 BPM | TEMPERATURE: 98.1 F | SYSTOLIC BLOOD PRESSURE: 132 MMHG | RESPIRATION RATE: 18 BRPM | OXYGEN SATURATION: 92 %

## 2020-10-08 PROCEDURE — G0151 HHCP-SERV OF PT,EA 15 MIN: HCPCS

## 2020-10-08 NOTE — TELEPHONE ENCOUNTER
Mrs. Aimee Bernal said she was doing well today. She did finally get her virtual visit with her MD.  She said it went as well as could be expected over the phone. I reviewed her medications. No medication additions or changes. No questions at this time. I asked her to call with any medication issues.

## 2020-10-12 ENCOUNTER — HOME CARE VISIT (OUTPATIENT)
Dept: SCHEDULING | Facility: HOME HEALTH | Age: 65
End: 2020-10-12
Payer: COMMERCIAL

## 2020-10-12 PROCEDURE — G0299 HHS/HOSPICE OF RN EA 15 MIN: HCPCS

## 2020-10-13 VITALS
HEART RATE: 78 BPM | DIASTOLIC BLOOD PRESSURE: 86 MMHG | OXYGEN SATURATION: 98 % | TEMPERATURE: 97.6 F | RESPIRATION RATE: 20 BRPM | SYSTOLIC BLOOD PRESSURE: 120 MMHG

## 2020-10-22 PROBLEM — J98.4 RESTRICTIVE LUNG DISEASE: Status: ACTIVE | Noted: 2020-10-22

## 2020-10-22 PROBLEM — J96.01 ACUTE HYPOXEMIC RESPIRATORY FAILURE (HCC): Status: ACTIVE | Noted: 2020-06-25

## 2020-11-04 ENCOUNTER — HOSPITAL ENCOUNTER (OUTPATIENT)
Dept: CT IMAGING | Age: 65
Discharge: HOME | End: 2020-11-04
Attending: INTERNAL MEDICINE

## 2020-11-04 DIAGNOSIS — J98.4 RESTRICTIVE LUNG DISEASE: ICD-10-CM

## 2020-11-17 NOTE — PROGRESS NOTES
No evidence for ILD on CT. There was a small area of haziness which we should look at again in 6 months per national radiology guidelines to assure stability. Otherwise, I don't see anything of concern. We can follow up in clinic after the CT. She also has PFTs 11/23 still pending.     Sine

## 2020-12-09 PROBLEM — J96.01 ACUTE HYPOXEMIC RESPIRATORY FAILURE (HCC): Status: RESOLVED | Noted: 2020-06-25 | Resolved: 2020-12-09

## 2020-12-09 PROBLEM — L03.116 CELLULITIS OF LEFT LOWER EXTREMITY: Status: RESOLVED | Noted: 2020-09-10 | Resolved: 2020-12-09

## 2021-09-21 ENCOUNTER — HOSPITAL ENCOUNTER (INPATIENT)
Age: 66
LOS: 4 days | Discharge: HOME | DRG: 308 | End: 2021-09-26
Attending: STUDENT IN AN ORGANIZED HEALTH CARE EDUCATION/TRAINING PROGRAM | Admitting: FAMILY MEDICINE
Payer: COMMERCIAL

## 2021-09-21 ENCOUNTER — APPOINTMENT (OUTPATIENT)
Dept: GENERAL RADIOLOGY | Age: 66
DRG: 308 | End: 2021-09-21
Attending: EMERGENCY MEDICINE
Payer: COMMERCIAL

## 2021-09-21 ENCOUNTER — APPOINTMENT (OUTPATIENT)
Dept: CT IMAGING | Age: 66
DRG: 308 | End: 2021-09-21
Attending: STUDENT IN AN ORGANIZED HEALTH CARE EDUCATION/TRAINING PROGRAM
Payer: COMMERCIAL

## 2021-09-21 DIAGNOSIS — R09.02 HYPOXIA: ICD-10-CM

## 2021-09-21 DIAGNOSIS — I48.91 ATRIAL FIBRILLATION WITH RVR (HCC): Primary | ICD-10-CM

## 2021-09-21 DIAGNOSIS — R06.02 SOB (SHORTNESS OF BREATH): ICD-10-CM

## 2021-09-21 DIAGNOSIS — E11.9 CONTROLLED TYPE 2 DIABETES MELLITUS WITHOUT COMPLICATION, WITHOUT LONG-TERM CURRENT USE OF INSULIN (HCC): Chronic | ICD-10-CM

## 2021-09-21 DIAGNOSIS — J98.4 RESTRICTIVE LUNG DISEASE: Chronic | ICD-10-CM

## 2021-09-21 DIAGNOSIS — I48.91 ATRIAL FIBRILLATION WITH RAPID VENTRICULAR RESPONSE (HCC): ICD-10-CM

## 2021-09-21 DIAGNOSIS — I10 HYPERTENSION, UNSPECIFIED TYPE: ICD-10-CM

## 2021-09-21 LAB
ALBUMIN SERPL-MCNC: 3 G/DL (ref 3.2–4.6)
ALBUMIN/GLOB SERPL: 0.7 {RATIO} (ref 1.2–3.5)
ALP SERPL-CCNC: 112 U/L (ref 50–136)
ALT SERPL-CCNC: 24 U/L (ref 12–65)
ANION GAP SERPL CALC-SCNC: 5 MMOL/L (ref 7–16)
AST SERPL-CCNC: 13 U/L (ref 15–37)
BASOPHILS # BLD: 0.1 K/UL (ref 0–0.2)
BASOPHILS NFR BLD: 1 % (ref 0–2)
BILIRUB SERPL-MCNC: 0.6 MG/DL (ref 0.2–1.1)
BNP SERPL-MCNC: 1230 PG/ML (ref 5–125)
BUN SERPL-MCNC: 22 MG/DL (ref 8–23)
CALCIUM SERPL-MCNC: 8.7 MG/DL (ref 8.3–10.4)
CHLORIDE SERPL-SCNC: 107 MMOL/L (ref 98–107)
CO2 SERPL-SCNC: 31 MMOL/L (ref 21–32)
COVID-19 RAPID TEST, COVR: NOT DETECTED
CREAT SERPL-MCNC: 0.84 MG/DL (ref 0.6–1)
DIFFERENTIAL METHOD BLD: ABNORMAL
EOSINOPHIL # BLD: 0.1 K/UL (ref 0–0.8)
EOSINOPHIL NFR BLD: 1 % (ref 0.5–7.8)
ERYTHROCYTE [DISTWIDTH] IN BLOOD BY AUTOMATED COUNT: 16.9 % (ref 11.9–14.6)
GLOBULIN SER CALC-MCNC: 4.2 G/DL (ref 2.3–3.5)
GLUCOSE SERPL-MCNC: 149 MG/DL (ref 65–100)
HCT VFR BLD AUTO: 38.6 % (ref 35.8–46.3)
HGB BLD-MCNC: 11.4 G/DL (ref 11.7–15.4)
IMM GRANULOCYTES # BLD AUTO: 0 K/UL (ref 0–0.5)
IMM GRANULOCYTES NFR BLD AUTO: 1 % (ref 0–5)
LACTATE SERPL-SCNC: 1.1 MMOL/L (ref 0.4–2)
LYMPHOCYTES # BLD: 1.4 K/UL (ref 0.5–4.6)
LYMPHOCYTES NFR BLD: 16 % (ref 13–44)
MCH RBC QN AUTO: 27 PG (ref 26.1–32.9)
MCHC RBC AUTO-ENTMCNC: 29.5 G/DL (ref 31.4–35)
MCV RBC AUTO: 91.5 FL (ref 79.6–97.8)
MONOCYTES # BLD: 0.8 K/UL (ref 0.1–1.3)
MONOCYTES NFR BLD: 9 % (ref 4–12)
NEUTS SEG # BLD: 6.2 K/UL (ref 1.7–8.2)
NEUTS SEG NFR BLD: 73 % (ref 43–78)
NRBC # BLD: 0 K/UL (ref 0–0.2)
PLATELET # BLD AUTO: 263 K/UL (ref 150–450)
PMV BLD AUTO: 10.9 FL (ref 9.4–12.3)
POTASSIUM SERPL-SCNC: 3.8 MMOL/L (ref 3.5–5.1)
PROCALCITONIN SERPL-MCNC: <0.05 NG/ML
PROT SERPL-MCNC: 7.2 G/DL (ref 6.3–8.2)
RBC # BLD AUTO: 4.22 M/UL (ref 4.05–5.2)
SODIUM SERPL-SCNC: 143 MMOL/L (ref 136–145)
SOURCE, COVRS: NORMAL
TROPONIN-HIGH SENSITIVITY: 13.8 PG/ML (ref 0–14)
TROPONIN-HIGH SENSITIVITY: 13.8 PG/ML (ref 0–14)
WBC # BLD AUTO: 8.5 K/UL (ref 4.3–11.1)

## 2021-09-21 PROCEDURE — 93005 ELECTROCARDIOGRAM TRACING: CPT | Performed by: EMERGENCY MEDICINE

## 2021-09-21 PROCEDURE — 74011000258 HC RX REV CODE- 258: Performed by: STUDENT IN AN ORGANIZED HEALTH CARE EDUCATION/TRAINING PROGRAM

## 2021-09-21 PROCEDURE — 74011000636 HC RX REV CODE- 636: Performed by: STUDENT IN AN ORGANIZED HEALTH CARE EDUCATION/TRAINING PROGRAM

## 2021-09-21 PROCEDURE — 84484 ASSAY OF TROPONIN QUANT: CPT

## 2021-09-21 PROCEDURE — 85025 COMPLETE CBC W/AUTO DIFF WBC: CPT

## 2021-09-21 PROCEDURE — 0202U NFCT DS 22 TRGT SARS-COV-2: CPT

## 2021-09-21 PROCEDURE — 96374 THER/PROPH/DIAG INJ IV PUSH: CPT

## 2021-09-21 PROCEDURE — 71045 X-RAY EXAM CHEST 1 VIEW: CPT

## 2021-09-21 PROCEDURE — 84145 PROCALCITONIN (PCT): CPT

## 2021-09-21 PROCEDURE — 71260 CT THORAX DX C+: CPT

## 2021-09-21 PROCEDURE — 87040 BLOOD CULTURE FOR BACTERIA: CPT

## 2021-09-21 PROCEDURE — 74011000250 HC RX REV CODE- 250: Performed by: STUDENT IN AN ORGANIZED HEALTH CARE EDUCATION/TRAINING PROGRAM

## 2021-09-21 PROCEDURE — 83605 ASSAY OF LACTIC ACID: CPT

## 2021-09-21 PROCEDURE — 80053 COMPREHEN METABOLIC PANEL: CPT

## 2021-09-21 PROCEDURE — 87635 SARS-COV-2 COVID-19 AMP PRB: CPT

## 2021-09-21 PROCEDURE — 83880 ASSAY OF NATRIURETIC PEPTIDE: CPT

## 2021-09-21 PROCEDURE — 99285 EMERGENCY DEPT VISIT HI MDM: CPT

## 2021-09-21 RX ORDER — SODIUM CHLORIDE 0.9 % (FLUSH) 0.9 %
5-10 SYRINGE (ML) INJECTION EVERY 8 HOURS
Status: DISCONTINUED | OUTPATIENT
Start: 2021-09-21 | End: 2021-09-26 | Stop reason: HOSPADM

## 2021-09-21 RX ORDER — METOPROLOL TARTRATE 5 MG/5ML
5 INJECTION INTRAVENOUS
Status: COMPLETED | OUTPATIENT
Start: 2021-09-21 | End: 2021-09-21

## 2021-09-21 RX ORDER — SODIUM CHLORIDE 0.9 % (FLUSH) 0.9 %
5-10 SYRINGE (ML) INJECTION AS NEEDED
Status: DISCONTINUED | OUTPATIENT
Start: 2021-09-21 | End: 2021-09-26 | Stop reason: HOSPADM

## 2021-09-21 RX ORDER — SODIUM CHLORIDE 0.9 % (FLUSH) 0.9 %
10 SYRINGE (ML) INJECTION
Status: COMPLETED | OUTPATIENT
Start: 2021-09-21 | End: 2021-09-21

## 2021-09-21 RX ORDER — HYDRALAZINE HYDROCHLORIDE 20 MG/ML
20 INJECTION INTRAMUSCULAR; INTRAVENOUS
Status: COMPLETED | OUTPATIENT
Start: 2021-09-21 | End: 2021-09-22

## 2021-09-21 RX ADMIN — METOPROLOL TARTRATE 5 MG: 5 INJECTION INTRAVENOUS at 23:40

## 2021-09-21 RX ADMIN — METOPROLOL TARTRATE 5 MG: 5 INJECTION INTRAVENOUS at 23:59

## 2021-09-21 RX ADMIN — SODIUM CHLORIDE 100 ML: 900 INJECTION, SOLUTION INTRAVENOUS at 23:03

## 2021-09-21 RX ADMIN — Medication 10 ML: at 23:03

## 2021-09-21 RX ADMIN — IOPAMIDOL 100 ML: 755 INJECTION, SOLUTION INTRAVENOUS at 23:03

## 2021-09-21 RX ADMIN — METOPROLOL TARTRATE 5 MG: 5 INJECTION INTRAVENOUS at 23:50

## 2021-09-22 ENCOUNTER — APPOINTMENT (OUTPATIENT)
Dept: ULTRASOUND IMAGING | Age: 66
DRG: 308 | End: 2021-09-22
Attending: NURSE PRACTITIONER
Payer: COMMERCIAL

## 2021-09-22 PROBLEM — I48.91 ATRIAL FIBRILLATION WITH RAPID VENTRICULAR RESPONSE (HCC): Status: ACTIVE | Noted: 2021-09-22

## 2021-09-22 PROBLEM — J98.4 RESTRICTIVE LUNG DISEASE: Chronic | Status: ACTIVE | Noted: 2020-10-22

## 2021-09-22 LAB
ANION GAP SERPL CALC-SCNC: 4 MMOL/L (ref 7–16)
ATRIAL RATE: 234 BPM
ATRIAL RATE: 441 BPM
B PERT DNA SPEC QL NAA+PROBE: NOT DETECTED
BASOPHILS # BLD: 0.1 K/UL (ref 0–0.2)
BASOPHILS NFR BLD: 1 % (ref 0–2)
BORDETELLA PARAPERTUSSIS PCR, BORPAR: NOT DETECTED
BUN SERPL-MCNC: 19 MG/DL (ref 8–23)
C PNEUM DNA SPEC QL NAA+PROBE: NOT DETECTED
CALCIUM SERPL-MCNC: 8.9 MG/DL (ref 8.3–10.4)
CALCULATED R AXIS, ECG10: 55 DEGREES
CALCULATED R AXIS, ECG10: 56 DEGREES
CALCULATED T AXIS, ECG11: 53 DEGREES
CALCULATED T AXIS, ECG11: 70 DEGREES
CHLORIDE SERPL-SCNC: 107 MMOL/L (ref 98–107)
CO2 SERPL-SCNC: 35 MMOL/L (ref 21–32)
CREAT SERPL-MCNC: 0.71 MG/DL (ref 0.6–1)
DIAGNOSIS, 93000: NORMAL
DIAGNOSIS, 93000: NORMAL
DIFFERENTIAL METHOD BLD: ABNORMAL
EOSINOPHIL # BLD: 0.1 K/UL (ref 0–0.8)
EOSINOPHIL NFR BLD: 2 % (ref 0.5–7.8)
ERYTHROCYTE [DISTWIDTH] IN BLOOD BY AUTOMATED COUNT: 16.7 % (ref 11.9–14.6)
FLUAV SUBTYP SPEC NAA+PROBE: NOT DETECTED
FLUBV RNA SPEC QL NAA+PROBE: NOT DETECTED
GLUCOSE BLD STRIP.AUTO-MCNC: 145 MG/DL (ref 65–100)
GLUCOSE BLD STRIP.AUTO-MCNC: 145 MG/DL (ref 65–100)
GLUCOSE BLD STRIP.AUTO-MCNC: 165 MG/DL (ref 65–100)
GLUCOSE BLD STRIP.AUTO-MCNC: 202 MG/DL (ref 65–100)
GLUCOSE SERPL-MCNC: 138 MG/DL (ref 65–100)
HADV DNA SPEC QL NAA+PROBE: NOT DETECTED
HCOV 229E RNA SPEC QL NAA+PROBE: NOT DETECTED
HCOV HKU1 RNA SPEC QL NAA+PROBE: NOT DETECTED
HCOV NL63 RNA SPEC QL NAA+PROBE: NOT DETECTED
HCOV OC43 RNA SPEC QL NAA+PROBE: NOT DETECTED
HCT VFR BLD AUTO: 35.2 % (ref 35.8–46.3)
HGB BLD-MCNC: 10.4 G/DL (ref 11.7–15.4)
HMPV RNA SPEC QL NAA+PROBE: NOT DETECTED
HPIV1 RNA SPEC QL NAA+PROBE: NOT DETECTED
HPIV2 RNA SPEC QL NAA+PROBE: NOT DETECTED
HPIV3 RNA SPEC QL NAA+PROBE: NOT DETECTED
HPIV4 RNA SPEC QL NAA+PROBE: NOT DETECTED
IMM GRANULOCYTES # BLD AUTO: 0 K/UL (ref 0–0.5)
IMM GRANULOCYTES NFR BLD AUTO: 0 % (ref 0–5)
LYMPHOCYTES # BLD: 1.3 K/UL (ref 0.5–4.6)
LYMPHOCYTES NFR BLD: 18 % (ref 13–44)
M PNEUMO DNA SPEC QL NAA+PROBE: NOT DETECTED
MAGNESIUM SERPL-MCNC: 2 MG/DL (ref 1.8–2.4)
MCH RBC QN AUTO: 26.3 PG (ref 26.1–32.9)
MCHC RBC AUTO-ENTMCNC: 29.5 G/DL (ref 31.4–35)
MCV RBC AUTO: 88.9 FL (ref 79.6–97.8)
MONOCYTES # BLD: 0.8 K/UL (ref 0.1–1.3)
MONOCYTES NFR BLD: 11 % (ref 4–12)
NEUTS SEG # BLD: 5.1 K/UL (ref 1.7–8.2)
NEUTS SEG NFR BLD: 69 % (ref 43–78)
NRBC # BLD: 0 K/UL (ref 0–0.2)
PLATELET # BLD AUTO: 241 K/UL (ref 150–450)
PMV BLD AUTO: 10.7 FL (ref 9.4–12.3)
POTASSIUM SERPL-SCNC: 3.4 MMOL/L (ref 3.5–5.1)
Q-T INTERVAL, ECG07: 280 MS
Q-T INTERVAL, ECG07: 320 MS
QRS DURATION, ECG06: 60 MS
QRS DURATION, ECG06: 66 MS
QTC CALCULATION (BEZET), ECG08: 412 MS
QTC CALCULATION (BEZET), ECG08: 412 MS
RBC # BLD AUTO: 3.96 M/UL (ref 4.05–5.2)
RSV RNA SPEC QL NAA+PROBE: NOT DETECTED
RV+EV RNA SPEC QL NAA+PROBE: NOT DETECTED
SARS-COV-2 PCR, COVPCR: NOT DETECTED
SERVICE CMNT-IMP: ABNORMAL
SODIUM SERPL-SCNC: 146 MMOL/L (ref 136–145)
VENTRICULAR RATE, ECG03: 100 BPM
VENTRICULAR RATE, ECG03: 130 BPM
WBC # BLD AUTO: 7.4 K/UL (ref 4.3–11.1)

## 2021-09-22 PROCEDURE — 74011250637 HC RX REV CODE- 250/637: Performed by: FAMILY MEDICINE

## 2021-09-22 PROCEDURE — 96374 THER/PROPH/DIAG INJ IV PUSH: CPT

## 2021-09-22 PROCEDURE — 74011636637 HC RX REV CODE- 636/637: Performed by: FAMILY MEDICINE

## 2021-09-22 PROCEDURE — 80048 BASIC METABOLIC PNL TOTAL CA: CPT

## 2021-09-22 PROCEDURE — 36415 COLL VENOUS BLD VENIPUNCTURE: CPT

## 2021-09-22 PROCEDURE — 74011250637 HC RX REV CODE- 250/637: Performed by: NURSE PRACTITIONER

## 2021-09-22 PROCEDURE — 74011250636 HC RX REV CODE- 250/636: Performed by: NURSE PRACTITIONER

## 2021-09-22 PROCEDURE — 82962 GLUCOSE BLOOD TEST: CPT

## 2021-09-22 PROCEDURE — 93971 EXTREMITY STUDY: CPT

## 2021-09-22 PROCEDURE — 74011250636 HC RX REV CODE- 250/636: Performed by: STUDENT IN AN ORGANIZED HEALTH CARE EDUCATION/TRAINING PROGRAM

## 2021-09-22 PROCEDURE — 74011250636 HC RX REV CODE- 250/636: Performed by: FAMILY MEDICINE

## 2021-09-22 PROCEDURE — 93005 ELECTROCARDIOGRAM TRACING: CPT | Performed by: STUDENT IN AN ORGANIZED HEALTH CARE EDUCATION/TRAINING PROGRAM

## 2021-09-22 PROCEDURE — 74011000250 HC RX REV CODE- 250: Performed by: NURSE PRACTITIONER

## 2021-09-22 PROCEDURE — 83735 ASSAY OF MAGNESIUM: CPT

## 2021-09-22 PROCEDURE — 65270000029 HC RM PRIVATE

## 2021-09-22 PROCEDURE — 85025 COMPLETE CBC W/AUTO DIFF WBC: CPT

## 2021-09-22 RX ORDER — PREDNISONE 20 MG/1
20 TABLET ORAL
Status: DISCONTINUED | OUTPATIENT
Start: 2021-09-22 | End: 2021-09-26 | Stop reason: HOSPADM

## 2021-09-22 RX ORDER — SODIUM CHLORIDE 0.9 % (FLUSH) 0.9 %
5-40 SYRINGE (ML) INJECTION AS NEEDED
Status: DISCONTINUED | OUTPATIENT
Start: 2021-09-22 | End: 2021-09-26 | Stop reason: HOSPADM

## 2021-09-22 RX ORDER — SODIUM CHLORIDE 0.9 % (FLUSH) 0.9 %
5-40 SYRINGE (ML) INJECTION EVERY 8 HOURS
Status: DISCONTINUED | OUTPATIENT
Start: 2021-09-22 | End: 2021-09-26 | Stop reason: HOSPADM

## 2021-09-22 RX ORDER — PROMETHAZINE HYDROCHLORIDE 25 MG/1
12.5 TABLET ORAL
Status: DISCONTINUED | OUTPATIENT
Start: 2021-09-22 | End: 2021-09-26 | Stop reason: HOSPADM

## 2021-09-22 RX ORDER — ENOXAPARIN SODIUM 100 MG/ML
40 INJECTION SUBCUTANEOUS EVERY 12 HOURS
Status: DISCONTINUED | OUTPATIENT
Start: 2021-09-22 | End: 2021-09-23

## 2021-09-22 RX ORDER — POTASSIUM CHLORIDE 20 MEQ/1
40 TABLET, EXTENDED RELEASE ORAL
Status: COMPLETED | OUTPATIENT
Start: 2021-09-22 | End: 2021-09-22

## 2021-09-22 RX ORDER — BUDESONIDE AND FORMOTEROL FUMARATE DIHYDRATE 80; 4.5 UG/1; UG/1
2 AEROSOL RESPIRATORY (INHALATION)
Status: DISCONTINUED | OUTPATIENT
Start: 2021-09-22 | End: 2021-09-26 | Stop reason: HOSPADM

## 2021-09-22 RX ORDER — FUROSEMIDE 40 MG/1
40 TABLET ORAL DAILY
Status: DISCONTINUED | OUTPATIENT
Start: 2021-09-22 | End: 2021-09-26 | Stop reason: HOSPADM

## 2021-09-22 RX ORDER — ACETAMINOPHEN 325 MG/1
650 TABLET ORAL
Status: DISCONTINUED | OUTPATIENT
Start: 2021-09-22 | End: 2021-09-26 | Stop reason: HOSPADM

## 2021-09-22 RX ORDER — ACETAMINOPHEN 650 MG/1
650 SUPPOSITORY RECTAL
Status: DISCONTINUED | OUTPATIENT
Start: 2021-09-22 | End: 2021-09-26 | Stop reason: HOSPADM

## 2021-09-22 RX ORDER — METOPROLOL SUCCINATE 50 MG/1
25 TABLET, EXTENDED RELEASE ORAL DAILY
Status: DISCONTINUED | OUTPATIENT
Start: 2021-09-22 | End: 2021-09-22

## 2021-09-22 RX ORDER — METOPROLOL SUCCINATE 25 MG/1
25 TABLET, EXTENDED RELEASE ORAL 2 TIMES DAILY
Status: DISCONTINUED | OUTPATIENT
Start: 2021-09-22 | End: 2021-09-23

## 2021-09-22 RX ORDER — ONDANSETRON 2 MG/ML
4 INJECTION INTRAMUSCULAR; INTRAVENOUS
Status: DISCONTINUED | OUTPATIENT
Start: 2021-09-22 | End: 2021-09-26 | Stop reason: HOSPADM

## 2021-09-22 RX ORDER — POLYETHYLENE GLYCOL 3350 17 G/17G
17 POWDER, FOR SOLUTION ORAL DAILY
Status: DISCONTINUED | OUTPATIENT
Start: 2021-09-22 | End: 2021-09-24

## 2021-09-22 RX ORDER — FUROSEMIDE 10 MG/ML
40 INJECTION INTRAMUSCULAR; INTRAVENOUS 2 TIMES DAILY
Status: COMPLETED | OUTPATIENT
Start: 2021-09-22 | End: 2021-09-23

## 2021-09-22 RX ORDER — METOPROLOL TARTRATE 5 MG/5ML
5 INJECTION INTRAVENOUS ONCE
Status: COMPLETED | OUTPATIENT
Start: 2021-09-22 | End: 2021-09-22

## 2021-09-22 RX ORDER — FUROSEMIDE 10 MG/ML
40 INJECTION INTRAMUSCULAR; INTRAVENOUS
Status: COMPLETED | OUTPATIENT
Start: 2021-09-22 | End: 2021-09-22

## 2021-09-22 RX ORDER — DEXTROSE 40 %
15 GEL (GRAM) ORAL AS NEEDED
Status: DISCONTINUED | OUTPATIENT
Start: 2021-09-22 | End: 2021-09-26 | Stop reason: HOSPADM

## 2021-09-22 RX ORDER — DEXTROSE 50 % IN WATER (D50W) INTRAVENOUS SYRINGE
25-50 AS NEEDED
Status: DISCONTINUED | OUTPATIENT
Start: 2021-09-22 | End: 2021-09-26 | Stop reason: HOSPADM

## 2021-09-22 RX ADMIN — METOPROLOL SUCCINATE 25 MG: 50 TABLET, EXTENDED RELEASE ORAL at 03:38

## 2021-09-22 RX ADMIN — PREDNISONE 20 MG: 20 TABLET ORAL at 10:52

## 2021-09-22 RX ADMIN — HYDRALAZINE HYDROCHLORIDE 20 MG: 20 INJECTION INTRAMUSCULAR; INTRAVENOUS at 00:22

## 2021-09-22 RX ADMIN — Medication 10 ML: at 16:47

## 2021-09-22 RX ADMIN — Medication 10 ML: at 16:46

## 2021-09-22 RX ADMIN — POTASSIUM CHLORIDE 40 MEQ: 20 TABLET, EXTENDED RELEASE ORAL at 14:51

## 2021-09-22 RX ADMIN — ACETAMINOPHEN 650 MG: 325 TABLET ORAL at 17:48

## 2021-09-22 RX ADMIN — METOPROLOL TARTRATE 5 MG: 5 INJECTION INTRAVENOUS at 14:51

## 2021-09-22 RX ADMIN — FUROSEMIDE 40 MG: 10 INJECTION, SOLUTION INTRAMUSCULAR; INTRAVENOUS at 17:49

## 2021-09-22 RX ADMIN — INSULIN HUMAN 4 UNITS: 100 INJECTION, SOLUTION PARENTERAL at 17:48

## 2021-09-22 RX ADMIN — ACETAMINOPHEN 650 MG: 325 TABLET ORAL at 10:59

## 2021-09-22 RX ADMIN — FUROSEMIDE 40 MG: 10 INJECTION, SOLUTION INTRAMUSCULAR; INTRAVENOUS at 00:20

## 2021-09-22 RX ADMIN — FUROSEMIDE 40 MG: 40 TABLET ORAL at 10:52

## 2021-09-22 RX ADMIN — METHYLPREDNISOLONE SODIUM SUCCINATE 125 MG: 40 INJECTION, POWDER, FOR SOLUTION INTRAMUSCULAR; INTRAVENOUS at 06:02

## 2021-09-22 RX ADMIN — Medication 10 ML: at 00:01

## 2021-09-22 RX ADMIN — ENOXAPARIN SODIUM 40 MG: 40 INJECTION SUBCUTANEOUS at 09:00

## 2021-09-22 RX ADMIN — Medication 10 ML: at 21:48

## 2021-09-22 RX ADMIN — INSULIN HUMAN 2 UNITS: 100 INJECTION, SOLUTION PARENTERAL at 12:44

## 2021-09-22 RX ADMIN — METOPROLOL SUCCINATE 25 MG: 25 TABLET, EXTENDED RELEASE ORAL at 17:49

## 2021-09-22 RX ADMIN — ENOXAPARIN SODIUM 40 MG: 40 INJECTION SUBCUTANEOUS at 21:48

## 2021-09-22 NOTE — ED PROVIDER NOTES
53-year-old female presents to the emergency department with intermittent pain to her left lower extremity, some concern for cellulitis. History of cellulitis in the past.  Reports worsening redness to left lower extremity but also some shortness of breath. Intermittent episodes of upper back pain as well. Reports having one of her COVID-19 vaccinations. Reports a dry cough with shortness of breath worse over the last 4 to 5 days. Denies fevers at home. Denies chest pain. Denies history of lung issues or being on oxygen at home.   EMS reports her O2 saturation was 84-85% on room air on arrival.             Past Medical History:   Diagnosis Date    Chronic pain     pain R knee    Hypertension     Morbid obesity ([facility] [address]     BMI 45.8- 12    Positive PPD     had vaccination as a child - BCG       Past Surgical History:   Procedure Laterality Date    HX GYN      C-sec x 1 with GA    HX KNEE ARTHROSCOPY      bilateral knees         Family History:   Problem Relation Age of Onset    Other Mother         kidney failure    Cancer Sister         cervical cancer       Social History     Socioeconomic History    Marital status:      Spouse name: Not on file    Number of children: Not on file    Years of education: Not on file    Highest education level: Not on file   Occupational History    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     Tobacco Use    Smoking status: Former Smoker     Packs/day: 0.50     Years: 25.00     Pack years: 12.50     Types: Cigarettes     Quit date: 2007     Years since quittin.6    Smokeless tobacco: Never Used   Substance and Sexual Activity    Alcohol use: No    Drug use: No    Sexual activity: Not on file   Other Topics Concern    Not on file   Social History Narrative    Not on file     Social Determinants of Health     Financial Resource Strain:     Difficulty of Paying Living Expenses:    Food Insecurity:     Worried About Running Out of Food in the Last Year:  Ran Out of Food in the Last Year:    Transportation Needs:     Lack of Transportation (Medical):  Lack of Transportation (Non-Medical):    Physical Activity:     Days of Exercise per Week:     Minutes of Exercise per Session:    Stress:     Feeling of Stress :    Social Connections:     Frequency of Communication with Friends and Family:     Frequency of Social Gatherings with Friends and Family:     Attends Shinto Services:     Active Member of Clubs or Organizations:     Attends Club or Organization Meetings:     Marital Status:    Intimate Partner Violence:     Fear of Current or Ex-Partner:     Emotionally Abused:     Physically Abused:     Sexually Abused: ALLERGIES: Lortab [hydrocodone-acetaminophen]    Review of Systems   Constitutional: Negative for chills and fever. HENT: Negative for sinus pressure and sore throat. Eyes: Negative for visual disturbance. Respiratory: Positive for cough and shortness of breath. Cardiovascular: Negative for chest pain. Gastrointestinal: Negative for abdominal pain, diarrhea, nausea and vomiting. Endocrine: Negative for polyuria. Genitourinary: Negative for difficulty urinating and dysuria. Musculoskeletal: Positive for back pain and myalgias. Negative for neck pain and neck stiffness. Skin: Negative for rash. Neurological: Negative for weakness and headaches. Psychiatric/Behavioral: Negative for confusion. All other systems reviewed and are negative. Vitals:    09/21/21 2111   BP: (!) 198/103   Pulse: (!) 130   Resp: 24   Temp: 98.4 °F (36.9 °C)   SpO2: 90%   Weight: (!) 173.7 kg (383 lb)   Height: 5' 7\" (1.702 m)            Physical Exam  Vitals and nursing note reviewed. Constitutional:       Appearance: Normal appearance. She is obese. She is not ill-appearing or toxic-appearing. HENT:      Head: Normocephalic and atraumatic.       Nose: Nose normal.      Mouth/Throat:      Mouth: Mucous membranes are moist.   Eyes:      Extraocular Movements: Extraocular movements intact. Cardiovascular:      Rate and Rhythm: Tachycardia present. Rhythm irregular. Pulses: Normal pulses. Heart sounds: Normal heart sounds. Pulmonary:      Effort: Pulmonary effort is normal. No respiratory distress. Breath sounds: Normal breath sounds. Abdominal:      General: Abdomen is flat. There is no distension. Palpations: Abdomen is soft. Tenderness: There is no abdominal tenderness. Musculoskeletal:         General: Tenderness present. Normal range of motion. Cervical back: Normal range of motion. No rigidity. Right lower leg: Edema present. Left lower leg: Edema present. Skin:     General: Skin is warm and dry. Findings: Erythema present. Comments: Erythema to bilateral lower extremities, appears more findings secondary to venous stasis than acute cellulitis. Neurological:      General: No focal deficit present. Mental Status: She is alert and oriented to person, place, and time. Psychiatric:         Mood and Affect: Mood normal.          MDM  Number of Diagnoses or Management Options  Atrial fibrillation with RVR ([address]  Hypertension, unspecified type  SOB (shortness of breath)  Diagnosis management comments: 60-year-old female arrives hypoxic with EMS with initial O2 saturation 84 to 85%. She is hypertensive with systolic BP 149T. Also tachycardic with a heart rate range from 130-140. Due to the nature of her chief complaint, a broad-based work-up was ordered. Lab results show a normal white count, stable H&H, normal electrolytes and kidney function, normal liver enzyme, normal procalcitonin, normal troponin. BNP is elevated at 1200, lactic acid 1.1. Repeat troponin unchanged. Rapid Covid is negative. Will obtain PCR virus panel. Although patient does report only having 1 vaccine, EMR reviewed shows she has been fully vaccinated.   CT patient's chest to rule out PE was negative for PE, showed pulmonary vascular congestion. Patient given 40 mg IV Lasix, metoprolol for her atrial fibrillation with RVR, also given IV hydralazine for hypertension. Heart rate improved as well as blood pressure improved after these interventions. Given patient's new oxygen requirement, she would benefit from medical admission. I spoke with the hospitalist who agreed admit this patient continued evaluation and treatment. Patient voiced understanding and agreement. EKG from 2115 shows atrial fibrillation with RVR, rate 130, QRS 66, QTc 412, normal axis, PVCs noted, no significant ST elevation or depression. To note, EMS did give patient 3.75 g of Zosyn in route to the hospital, blood cultures were obtained. Amount and/or Complexity of Data Reviewed  Clinical lab tests: ordered and reviewed  Tests in the radiology section of CPT®: ordered and reviewed  Independent visualization of images, tracings, or specimens: yes    Risk of Complications, Morbidity, and/or Mortality  Presenting problems: high  Diagnostic procedures: moderate  Management options: moderate           Critical Care  Performed by: Noemí Olivier DO  Authorized by: Noemí Olivier DO     Critical care provider statement:     Critical care time (minutes):  35    Critical care time was exclusive of:  Separately billable procedures and treating other patients    Critical care was necessary to treat or prevent imminent or life-threatening deterioration of the following conditions: Atrial fibrillation w/ RVR requiring IV pharmaceutical intervention.     Critical care was time spent personally by me on the following activities:  Discussions with consultants, examination of patient, obtaining history from patient or surrogate, ordering and performing treatments and interventions, ordering and review of laboratory studies, ordering and review of radiographic studies, re-evaluation of patient's condition and review of old charts

## 2021-09-22 NOTE — PROGRESS NOTES
Hospitalist Progress Note   Admit Date:  2021  9:06 PM   Name:  Guillermo Aguilera   Age:  61 y.o. Sex:  female  :  1955   MRN:  [id number]   Room:  ER/    Presenting Complaint: Blood infection    Reason(s) for Admission: Atrial fibrillation with rapid ventricular response [facility]) [I48.91]     Hospital Course & Interval History:   Courtney Jacques is a 61year old female with a PNH of Afib, HTN, DM, who presented to the ER with a complaint of SOB and increased pain in her LLE with a concern for cellulitis. In the ER patient was found to be hypoxic with a sat of 84%. Given IV lasix for suspected pulmonary edema. Endorses receiving 1 dose of COVID vaccination and COVID negative in ER     Subjective (21):      Assessment & Plan:     Principal Problem:  Atrial fibrillation with rapid ventricular response ([facility] [address] (2021)  This improved with IV Lopressor in the ER. Will monitor closely. Continue her home medications of metoprolol  21 Increased metoprolol to BID for better control; ECHO pending; Telemetry; PT/OT; Patient is not on [facility]     Active Problems:    LLE pain  21 Doppler pending     Hypertension ()  Continue home medications     Class 3 obesity with alveolar hypoventilation, serious comorbidity, and body mass index (BMI) of 50.0 to 59.9 in adult [facility]) ()     pt on lifestyle adjustments prior to discharge; increases morbidity and mortality; can impede treatment and recovery     Controlled type 2 diabetes mellitus without complication, without long-term current use of insulin ([facility] [address] (2020)  Not currently on long-term medications, will place on sliding scale insulin while inpatient     Hypoxia (9/10/2020)  Unclear etiology, and probably multifactorial due to restrictive lung disease and severe obesity.   There is no evidence of pneumonia or infection, Covid testing is negative  21 ECHO pending ; continue diuretics; CXR suggesting volume overload      Restrictive lung disease    9/22/21 No noted wheezing; appears stable; continue home medications       Dispo/Discharge Planning:     Dispo pending     Diet:  ADULT DIET Regular; 4 carb choices (60 gm/meal)  DVT PPx: Lovenox SQ   Code status: Full Code    Hospital Problems as of 9/22/2021 Date Reviewed: 2/18/2020        Codes Class Noted - Resolved POA    * (Principal) Atrial fibrillation with rapid ventricular response (HCC) ICD-10-CM: I48.91  ICD-9-CM: 427.31  9/22/2021 - Present Yes        Restrictive lung disease (Chronic) ICD-10-CM: J98.4  ICD-9-CM: 518.89  10/22/2020 - Present Yes        Hypoxia ICD-10-CM: R09.02  ICD-9-CM: 799.02  9/10/2020 - Present Yes        Controlled type 2 diabetes mellitus without complication, without long-term current use of insulin (HCC) (Chronic) ICD-10-CM: E11.9  ICD-9-CM: 250.00  9/9/2020 - Present Yes        Hypertension (Chronic) ICD-10-CM: I10  ICD-9-CM: 401.9  Unknown - Present Yes        Class 3 obesity with alveolar hypoventilation, serious comorbidity, and body mass index (BMI) of 50.0 to 59.9 in adult [facility]) (Chronic) ICD-10-CM: E19.0, Z68.43  ICD-9-CM: 278.03, V85.43  Unknown - Present Yes    Overview Signed 2/8/2019 11:59 AM by Celestina Abraham MD     BMI 45.8- 5/2/12                   Objective:     Patient Vitals for the past 24 hrs:   Temp Pulse Resp BP SpO2   09/22/21 1053  (!) 118 27 (!) 149/69 92 %   09/22/21 1052  (!) 113  (!) 149/69    09/22/21 0338  95  131/79    09/22/21 0118  (!) 114 18 (!) 162/68 97 %   09/22/21 0022  97      09/22/21 0020  (!) 107  (!) 167/73    09/22/21 0018  91 27 (!) 167/73 95 %   09/22/21 0002  98 21 (!) 150/68 96 %   09/22/21 0000  (!) 106 (!) 38 138/85 95 %   09/21/21 2359  (!) 103  138/85    09/21/21 2350  (!) 108  (!) 145/75    09/21/21 2346  (!) 114 25  96 %   09/21/21 2340  (!) 140  (!) 193/110    09/21/21 2111 98.4 °F (36.9 °C) (!) 130 24 (!) 198/103 90 %     Oxygen Therapy  O2 Sat (%): 92 % (09/22/21 1053)  Pulse via Oximetry: 125 beats per minute (09/22/21 1053)  O2 Device: Nasal cannula (09/21/21 2135)  O2 Flow Rate (L/min): 5 l/min (09/21/21 2135)    Estimated body mass index is 59.99 kg/m² as calculated from the following:    Height as of this encounter: 5' 7\" (1.702 m). Weight as of this encounter: 173.7 kg (383 lb). Intake/Output Summary (Last 24 hours) at 9/22/2021 1424  Last data filed at 9/22/2021 0559  Gross per 24 hour   Intake    Output 2600 ml   Net -2600 ml         Physical Exam:     General:    No overt distress  Head:  Normocephalic, atraumatic  Eyes:  Sclerae appear normal.  Pupils equally round. ENT:  Nares appear normal, no drainage. Moist oral mucosa  Neck:  No restricted ROM. Trachea midline   CV:   RRR. No m/r/g. No jugular venous distension. Lungs:   CTAB. No wheezing, rhonchi, or rales. Respirations even, unlabored  Abdomen: Bowel sounds present. Soft, nontender, nondistended. Extremities: No cyanosis or clubbing. No edema  Skin:     No rashes and normal coloration. Warm and dry. Neuro:  Cranial nerves II-XII grossly intact. Psych:  Normal mood and affect.   Alert and oriented x3    I have reviewed ordered lab tests and independently visualized imaging below:    Last 24hr Labs:  Recent Results (from the past 24 hour(s))   EKG, 12 LEAD, INITIAL    Collection Time: 09/21/21  9:15 PM   Result Value Ref Range    Ventricular Rate 130 BPM    Atrial Rate 441 BPM    QRS Duration 66 ms    Q-T Interval 280 ms    QTC Calculation (Bezet) 412 ms    Calculated R Axis 55 degrees    Calculated T Axis 53 degrees    Diagnosis       Atrial fibrillation with rapid ventricular response with premature   ventricular or aberrantly conducted complexes  Low voltage QRS  Septal infarct (cited on or before 09-SEP-2020)  Abnormal ECG  When compared with ECG of 09-SEP-2020 21:38,  Atrial fibrillation has replaced Sinus rhythm  Confirmed by Tariq Pod ([id number]) on 9/22/2021 7:39:52 AM CULTURE, BLOOD    Collection Time: 09/21/21  9:26 PM    Specimen: Blood   Result Value Ref Range    Special Requests: RIGHT  Antecubital        Culture result: NO GROWTH AFTER 13 HOURS     LACTIC ACID    Collection Time: 09/21/21  9:26 PM   Result Value Ref Range    Lactic acid 1.1 0.4 - 2.0 MMOL/L   CBC WITH AUTOMATED DIFF    Collection Time: 09/21/21  9:26 PM   Result Value Ref Range    WBC 8.5 4.3 - 11.1 K/uL    RBC 4.22 4.05 - 5.2 M/uL    HGB 11.4 (L) 11.7 - 15.4 g/dL    HCT 38.6 35.8 - 46.3 %    MCV 91.5 79.6 - 97.8 FL    MCH 27.0 26.1 - 32.9 PG    MCHC 29.5 (L) 31.4 - 35.0 g/dL    RDW 16.9 (H) 11.9 - 14.6 %    PLATELET 167 324 - 959 K/uL    MPV 10.9 9.4 - 12.3 FL    ABSOLUTE NRBC 0.00 0.0 - 0.2 K/uL    DF AUTOMATED      NEUTROPHILS 73 43 - 78 %    LYMPHOCYTES 16 13 - 44 %    MONOCYTES 9 4.0 - 12.0 %    EOSINOPHILS 1 0.5 - 7.8 %    BASOPHILS 1 0.0 - 2.0 %    IMMATURE GRANULOCYTES 1 0.0 - 5.0 %    ABS. NEUTROPHILS 6.2 1.7 - 8.2 K/UL    ABS. LYMPHOCYTES 1.4 0.5 - 4.6 K/UL    ABS. MONOCYTES 0.8 0.1 - 1.3 K/UL    ABS. EOSINOPHILS 0.1 0.0 - 0.8 K/UL    ABS. BASOPHILS 0.1 0.0 - 0.2 K/UL    ABS. IMM. GRANS. 0.0 0.0 - 0.5 K/UL   METABOLIC PANEL, COMPREHENSIVE    Collection Time: 09/21/21  9:26 PM   Result Value Ref Range    Sodium 143 136 - 145 mmol/L    Potassium 3.8 3.5 - 5.1 mmol/L    Chloride 107 98 - 107 mmol/L    CO2 31 21 - 32 mmol/L    Anion gap 5 (L) 7 - 16 mmol/L    Glucose 149 (H) 65 - 100 mg/dL    BUN 22 8 - 23 MG/DL    Creatinine 0.84 0.6 - 1.0 MG/DL    GFR est AA >60 >60 ml/min/1.73m2    GFR est non-AA >60 >60 ml/min/1.73m2    Calcium 8.7 8.3 - 10.4 MG/DL    Bilirubin, total 0.6 0.2 - 1.1 MG/DL    ALT (SGPT) 24 12 - 65 U/L    AST (SGOT) 13 (L) 15 - 37 U/L    Alk.  phosphatase 112 50 - 136 U/L    Protein, total 7.2 6.3 - 8.2 g/dL    Albumin 3.0 (L) 3.2 - 4.6 g/dL    Globulin 4.2 (H) 2.3 - 3.5 g/dL    A-G Ratio 0.7 (L) 1.2 - 3.5     PROCALCITONIN    Collection Time: 09/21/21  9:26 PM   Result Value Ref Range    Procalcitonin <0.05 ng/mL   TROPONIN-HIGH SENSITIVITY    Collection Time: 09/21/21  9:26 PM   Result Value Ref Range    Troponin-High Sensitivity 13.8 0 - 14 pg/mL   NT-PRO BNP    Collection Time: 09/21/21  9:26 PM   Result Value Ref Range    NT pro-BNP 1,230 (H) 5 - 125 PG/ML   COVID-19 RAPID TEST    Collection Time: 09/21/21  9:52 PM   Result Value Ref Range    Specimen source Nasopharyngeal      COVID-19 rapid test Not detected NOTD     TROPONIN-HIGH SENSITIVITY    Collection Time: 09/21/21 11:12 PM   Result Value Ref Range    Troponin-High Sensitivity 13.8 0 - 14 pg/mL   RESPIRATORY VIRUS PANEL W/COVID-19, PCR    Collection Time: 09/21/21 11:45 PM    Specimen: Nasopharyngeal   Result Value Ref Range    Adenovirus NOT DETECTED NOTDET      Coronavirus 229E NOT DETECTED NOTDET      Coronavirus HKU1 NOT DETECTED NOTDET      Coronavirus CVNL63 NOT DETECTED NOTDET      Coronavirus OC43 NOT DETECTED NOTDET      SARS-CoV-2, PCR NOT DETECTED NOTDET      Metapneumovirus NOT DETECTED NOTDET      Rhinovirus and Enterovirus NOT DETECTED NOTDET      Influenza A NOT DETECTED NOTDET      Influenza B NOT DETECTED NOTDET      Parainfluenza 1 NOT DETECTED NOTDET      Parainfluenza 2 NOT DETECTED NOTDET      Parainfluenza 3 NOT DETECTED NOTDET      Parainfluenza virus 4 NOT DETECTED NOTDET      RSV by PCR NOT DETECTED NOTDET      B. parapertussis, PCR NOT DETECTED NOTDET      Bordetella pertussis - PCR NOT DETECTED NOTDET      Chlamydophila pneumoniae DNA, QL, PCR NOT DETECTED NOTDET      Mycoplasma pneumoniae DNA, QL, PCR NOT DETECTED NOTDET     EKG, 12 LEAD, SUBSEQUENT    Collection Time: 09/22/21 12:15 AM   Result Value Ref Range    Ventricular Rate 100 BPM    Atrial Rate 234 BPM    QRS Duration 60 ms    Q-T Interval 320 ms    QTC Calculation (Bezet) 412 ms    Calculated R Axis 56 degrees    Calculated T Axis 70 degrees    Diagnosis       Atrial fibrillation  Abnormal ECG  When compared with ECG of 21-SEP-2021 21:15,  Criteria for Septal infarct are no longer Present  Confirmed by Angelito Vyas ([id number]) on 9/22/2021 1:97:61 AM     METABOLIC PANEL, BASIC    Collection Time: 09/22/21  3:49 AM   Result Value Ref Range    Sodium 146 (H) 136 - 145 mmol/L    Potassium 3.4 (L) 3.5 - 5.1 mmol/L    Chloride 107 98 - 107 mmol/L    CO2 35 (H) 21 - 32 mmol/L    Anion gap 4 (L) 7 - 16 mmol/L    Glucose 138 (H) 65 - 100 mg/dL    BUN 19 8 - 23 MG/DL    Creatinine 0.71 0.6 - 1.0 MG/DL    GFR est AA >60 >60 ml/min/1.73m2    GFR est non-AA >60 >60 ml/min/1.73m2    Calcium 8.9 8.3 - 10.4 MG/DL   CBC WITH AUTOMATED DIFF    Collection Time: 09/22/21  3:49 AM   Result Value Ref Range    WBC 7.4 4.3 - 11.1 K/uL    RBC 3.96 (L) 4.05 - 5.2 M/uL    HGB 10.4 (L) 11.7 - 15.4 g/dL    HCT 35.2 (L) 35.8 - 46.3 %    MCV 88.9 79.6 - 97.8 FL    MCH 26.3 26.1 - 32.9 PG    MCHC 29.5 (L) 31.4 - 35.0 g/dL    RDW 16.7 (H) 11.9 - 14.6 %    PLATELET 650 445 - 233 K/uL    MPV 10.7 9.4 - 12.3 FL    ABSOLUTE NRBC 0.00 0.0 - 0.2 K/uL    DF AUTOMATED      NEUTROPHILS 69 43 - 78 %    LYMPHOCYTES 18 13 - 44 %    MONOCYTES 11 4.0 - 12.0 %    EOSINOPHILS 2 0.5 - 7.8 %    BASOPHILS 1 0.0 - 2.0 %    IMMATURE GRANULOCYTES 0 0.0 - 5.0 %    ABS. NEUTROPHILS 5.1 1.7 - 8.2 K/UL    ABS. LYMPHOCYTES 1.3 0.5 - 4.6 K/UL    ABS. MONOCYTES 0.8 0.1 - 1.3 K/UL    ABS. EOSINOPHILS 0.1 0.0 - 0.8 K/UL    ABS. BASOPHILS 0.1 0.0 - 0.2 K/UL    ABS. IMM.  GRANS. 0.0 0.0 - 0.5 K/UL   GLUCOSE, POC    Collection Time: 09/22/21  9:22 AM   Result Value Ref Range    Glucose (POC) 145 (H) 65 - 100 mg/dL    Performed by [facility]    GLUCOSE, POC    Collection Time: 09/22/21 12:39 PM   Result Value Ref Range    Glucose (POC) 165 (H) 65 - 100 mg/dL    Performed by [facility]        All Micro Results     Procedure Component Value Units Date/Time    BLOOD CULTURE [[id number]] Collected: 09/21/21 2126    Order Status: Completed Specimen: Blood Updated: 09/22/21 1133     Special Requests: -- RIGHT  Antecubital       Culture result: NO GROWTH AFTER 13 HOURS       CULTURE, BLOOD [[id number]] Collected: 09/21/21 2312    Order Status: Completed Specimen: Blood Updated: 09/22/21 0142    RESPIRATORY VIRUS PANEL W/COVID-19, PCR [[id number]] Collected: 09/21/21 2345    Order Status: Completed Specimen: Nasopharyngeal Updated: 09/22/21 0105     Adenovirus NOT DETECTED        Coronavirus 229E NOT DETECTED        Coronavirus HKU1 NOT DETECTED        Coronavirus CVNL63 NOT DETECTED        Coronavirus OC43 NOT DETECTED        SARS-CoV-2, PCR NOT DETECTED        Metapneumovirus NOT DETECTED        Rhinovirus and Enterovirus NOT DETECTED        Influenza A NOT DETECTED        Influenza B NOT DETECTED        Parainfluenza 1 NOT DETECTED        Parainfluenza 2 NOT DETECTED        Parainfluenza 3 NOT DETECTED        Parainfluenza virus 4 NOT DETECTED        RSV by PCR NOT DETECTED        B. parapertussis, PCR NOT DETECTED        Bordetella pertussis - PCR NOT DETECTED        Chlamydophila pneumoniae DNA, QL, PCR NOT DETECTED        Mycoplasma pneumoniae DNA, QL, PCR NOT DETECTED       COVID-19 RAPID TEST [[id number]] Collected: 09/21/21 2152    Order Status: Completed Specimen: Nasopharyngeal Updated: 09/21/21 2241     Specimen source Nasopharyngeal        COVID-19 rapid test Not detected        Comment:      The specimen is NEGATIVE for SARS-CoV-2, the novel coronavirus associated with COVID-19. A negative result does not rule out COVID-19. This test has been authorized by the FDA under an Emergency Use Authorization (EUA) for use by authorized laboratories.         Fact sheet for Healthcare Providers: ConventionUpdate.co.nz  Fact sheet for Patients: ConventionUpdate.co.nz       Methodology: Isothermal Nucleic Acid Amplification         CULTURE, BLOOD [[id number]]     Order Status: Sent Specimen: Blood           Other Studies:  XR CHEST PORT    Result Date: 9/21/2021  PORTABLE CHEST 1 VIEW HISTORY: Shortness of breath COMPARISON: 9/16/2020 FINDINGS: The cardiac silhouette is prominent. Interstitial markings are thickened and there is peribronchial cuffing. There is no lobar consolidation or large pleural effusions. EKG leads are present. Prominent cardiac silhouette is suspected interstitial edema/volume overload. CT CHEST PULMONARY EMBOLISM    Result Date: 9/21/2021  EXAM: CT angiogram chest. HISTORY: back pain, hypoxia, tachy. TECHNIQUE: CT angiographic images of the chest were obtained following intravenous administration of contrast. Imaging was performed utilizing PE protocol. Examination was performed in the axial plane and coronal reconstructions were performed. 3-D MIPS reconstructions of the chest were obtained. Dose reduction technique used: Automated exposure control/Adjustment of the mA and/or kV according to patient size/Use of iterative reconstruction technique. COMPARISON: Chest CT dated 11/4/2020, CT urogram dated 1/10/2016. FINDINGS: There is adequate opacification of the pulmonary arterial tree. No significant filling defects are identified to suggest pulmonary embolism. Main pulmonary artery is normal in caliber. The heart is not enlarged and there is no pericardial effusion. The great vessels appear normal. The visualized thyroid is unremarkable. There are a few prominent mediastinal lymph nodes. Trachea and mainstem bronchi are patent. There is no consolidation, pleural effusion, or pneumothorax. No nodules are seen. There are appears to be pulmonary vascular congestion. This appearance may be in part to the images being acquired during expiration. Mild bibasilar atelectasis. Again there is fullness of the visualized left renal collecting system. Otherwise the visualized upper abdomen is unremarkable. Osseous structures are within normal limits. 1. No evidence of pulmonary embolism. 2. Mild bibasilar atelectasis.  There appears to be pulmonary vascular congestion. These images appear to have been acquired during expiration which can account for this appearance. 3. Images of the upper abdomen show fullness of the visualized left renal collecting system. This is unchanged. Current Meds:  Current Facility-Administered Medications   Medication Dose Route Frequency    budesonide-formoterol (SYMBICORT) 80-4.5 mcg inhaler  2 Puff Inhalation BID RT    furosemide (LASIX) tablet 40 mg  40 mg Oral DAILY    insulin regular (NOVOLIN R, HUMULIN R) injection   SubCUTAneous AC&HS    dextrose 40% (GLUTOSE) oral gel 1 Tube  15 g Oral PRN    glucagon (GLUCAGEN) injection 1 mg  1 mg IntraMUSCular PRN    dextrose (D50W) injection syrg 12.5-25 g  25-50 mL IntraVENous PRN    sodium chloride (NS) flush 5-40 mL  5-40 mL IntraVENous Q8H    sodium chloride (NS) flush 5-40 mL  5-40 mL IntraVENous PRN    acetaminophen (TYLENOL) tablet 650 mg  650 mg Oral Q6H PRN    Or    acetaminophen (TYLENOL) suppository 650 mg  650 mg Rectal Q6H PRN    polyethylene glycol (MIRALAX) packet 17 g  17 g Oral DAILY    promethazine (PHENERGAN) tablet 12.5 mg  12.5 mg Oral Q6H PRN    Or    ondansetron (ZOFRAN) injection 4 mg  4 mg IntraVENous Q6H PRN    enoxaparin (LOVENOX) injection 40 mg  40 mg SubCUTAneous Q12H    predniSONE (DELTASONE) tablet 20 mg  20 mg Oral DAILY WITH BREAKFAST    metoprolol succinate (TOPROL-XL) XL tablet 25 mg  25 mg Oral BID    potassium chloride (K-DUR, KLOR-CON) SR tablet 40 mEq  40 mEq Oral NOW    metoprolol (LOPRESSOR) injection 5 mg  5 mg IntraVENous ONCE    sodium chloride (NS) flush 5-10 mL  5-10 mL IntraVENous Q8H    sodium chloride (NS) flush 5-10 mL  5-10 mL IntraVENous PRN     Current Outpatient Medications   Medication Sig    metoprolol succinate (TOPROL-XL) 25 mg XL tablet Take 1 Tab by mouth daily.  furosemide (LASIX) 40 mg tablet Take 1 Tab by mouth daily.  potassium chloride (K-DUR, KLOR-CON) 20 mEq tablet Take 1 Tab by mouth daily.     OXYGEN-AIR DELIVERY SYSTEMS 2 L by Nasal route as needed (SOB).  budesonide-formoteroL (SYMBICORT) 80-4.5 mcg/actuation HFAA Take 2 Puffs by inhalation two (2) times a day. Signed:  Chana Nuno NP    Part of this note may have been written by using a voice dictation software. The note has been proof read but may still contain some grammatical/other typographical errors.

## 2021-09-22 NOTE — H&P
[facility] Hospitalist Service  History and Physical    Patient ID:  Sona Lyman  female  1955  [id number]    Admission Date: 9/21/2021  Chief Complaint: Leg pain, questionable cellulitis  Reason for Admission: A. fib with RVR, and hypoxia    ASSESSMENT & PLAN:    Beverly Padilla 1106 Problems    Diagnosis Date Noted    Atrial fibrillation with rapid ventricular response ([address] 09/22/2021    Restrictive lung disease 10/22/2020    Hypoxia 09/10/2020    Controlled type 2 diabetes mellitus without complication, without long-term current use of insulin ([address] 09/09/2020    Hypertension     Class 3 obesity with alveolar hypoventilation, serious comorbidity, and body mass index (BMI) of 50.0 to 59.9 in adult ([address]      BMI 45.8- 5/2/12       Principal Problem:    Atrial fibrillation with rapid ventricular response ([address] (9/22/2021)  This improved with IV Lopressor in the ER. Will monitor closely. Continue her home medications of metoprolol    Active Problems:    Hypertension ()  Continue home medications      Class 3 obesity with alveolar hypoventilation, serious comorbidity, and body mass index (BMI) of 50.0 to 59.9 in adult [facility]) ()     pt on lifestyle adjustments prior to discharge; increases morbidity and mortality; can impede treatment and recovery      Controlled type 2 diabetes mellitus without complication, without long-term current use of insulin ([address] (9/9/2020)  Not currently on long-term medications, will place on sliding scale insulin while inpatient      Hypoxia (9/10/2020)  Unclear etiology, and probably multifactorial due to restrictive lung disease and severe obesity. There is no evidence of pneumonia or infection, Covid testing is negative      Restrictive lung disease (10/22/2020)  Continue her home inhalers    Addendum: Several hours after admission her O2 saturation dropped and she required increasing amounts of oxygen.   We will go ahead and add treatment for possible COPD exacerbation. There is no evidence of pneumonia or other infection. She almost certainly has sleep apnea and we may need to attempt CPAP while she is inpatient. Disposition: admit to inpatient  Diet: Diabetic  VTE ppx: Lovenox   CODE STATUS: Full    Surrogate decision-maker:     HISTORY OF PRESENT ILLNESS:  Patient was discussed with the ER provider prior to seeing the patient. Patient is a 61 y.o. female with atrial fibrillation and chronic respiratory failure, who presented to the ED with shortness of breath. She was also complaining of increased pain to her left leg, and was concerned about cellulitis. Evaluation in the ER shows her to be in atrial fibrillation with RVR, as well as hypoxic on room air with an O2 saturation of 84%. Her tachycardia improved with treatment of her hypoxia and Lopressor. She was also given IV Lasix for suspected pulmonary edema. She has had a good amount of urinary output by the time I see her, but states she is not feeling any better. She states she does not use oxygen or nebulizers at home. But she does have a prescribed Symbicort inhaler. She denies fever/chills, NVD, abdominal pain, chest pain. She does report a dry cough and some shortness of breath over the last 4 to 5 days. She has received 1 Covid vaccination. Covid testing in the ER was negative. Lab studies were not consistent with infection. REVIEW OF SYSTEMS:  A 14 point review of systems was taken and pertinent positive and negative as per HPI. Allergies   Allergen Reactions    Lortab [Hydrocodone-Acetaminophen] Nausea Only and Other (comments)     \"It makes me feel hung over\"       Prior to Admission Medications   Prescriptions Last Dose Informant Patient Reported? Taking? OXYGEN-AIR DELIVERY SYSTEMS   Yes No   Si L by Nasal route as needed (SOB). budesonide-formoteroL (SYMBICORT) 80-4.5 mcg/actuation HFAA   No No   Sig: Take 2 Puffs by inhalation two (2) times a day.    furosemide (LASIX) 40 mg tablet   No No   Sig: Take 1 Tab by mouth daily. metoprolol succinate (TOPROL-XL) 25 mg XL tablet   No No   Sig: Take 1 Tab by mouth daily. potassium chloride (K-DUR, KLOR-CON) 20 mEq tablet   No No   Sig: Take 1 Tab by mouth daily. Facility-Administered Medications: None       Past Medical History:   Diagnosis Date    Chronic pain     pain R knee    Hypertension     Morbid obesity ([facility] [address]     BMI 45.8- 12    Positive PPD     had vaccination as a child - BCG     Past Surgical History:   Procedure Laterality Date    HX GYN      C-sec x 1 with GA    HX KNEE ARTHROSCOPY      bilateral knees       Social History     Tobacco Use    Smoking status: Former Smoker     Packs/day: 0.50     Years: 25.00     Pack years: 12.50     Types: Cigarettes     Quit date: 2007     Years since quittin.6    Smokeless tobacco: Never Used   Substance Use Topics    Alcohol use: No       FAMILY HISTORY:  Reviewed and non-contributory to admitting problem, unless otherwise stated in the HPI    Family History   Problem Relation Age of Onset    Other Mother         kidney failure    Cancer Sister         cervical cancer             PHYSICAL EXAM:    Patient Vitals for the past 24 hrs:   Temp Pulse Resp BP SpO2   21 0118  (!) 114 18 (!) 162/68 97 %   21 0022  97      21 0020  (!) 107  (!) 167/73    21 0018  91 27 (!) 167/73 95 %   21 0002  98 21 (!) 150/68 96 %   21 0000  (!) 106 (!) 38 138/85 95 %   21 2359  (!) 103  138/85    21 2350  (!) 108  (!) 145/75    21 2346  (!) 114 25  96 %   21 2340  (!) 140  (!) 193/110    21 2111 98.4 °F (36.9 °C) (!) 130 24 (!) 198/103 90 %     Visit Vitals  BP (!) 162/68   Pulse (!) 114   Temp 98.4 °F (36.9 °C)   Resp 18   Ht 5' 7\" (1.702 m)   Wt (!) 173.7 kg (383 lb)   SpO2 97%   BMI 59.99 kg/m²       General:    WD and WN, No apparent distress.    Eyes:  PERRL; EOMI; sclera normal/non-icteric  HENT:  Normocephalic, without obvious abnormality; Oropharynx is clear with tacky mucous membranes   Resp:    Clear/diminished to auscultation bilaterally. Resp are even and unlabored  CVS/Heart: Regular rate and rhythm, 3+ indurated LE edema    GI:    Soft, non-tender. Not distended. Bowel sounds normal.     Musc/SK: Muscle strength is appropriate for age/condition; No cyanosis. No clubbing  Skin:  Bilateral lower extremities with significant induration, erythema, warmth equally on both legs. No open lesions, blistering, obvious cellulitis.   Neurologic: CN II - XII are grossly intact - Eye exam as noted above; moves extremities equally  Psych: Alert and oriented x 4;  Judgement and insight are normal      Intake/Output last 3 shifts:      Labs:  CMP:   Lab Results   Component Value Date/Time     09/21/2021 09:26 PM    K 3.8 09/21/2021 09:26 PM     09/21/2021 09:26 PM    CO2 31 09/21/2021 09:26 PM    AGAP 5 (L) 09/21/2021 09:26 PM     (H) 09/21/2021 09:26 PM    BUN 22 09/21/2021 09:26 PM    CREA 0.84 09/21/2021 09:26 PM    GFRAA >60 09/21/2021 09:26 PM    GFRNA >60 09/21/2021 09:26 PM    CA 8.7 09/21/2021 09:26 PM    ALB 3.0 (L) 09/21/2021 09:26 PM    TBILI 0.6 09/21/2021 09:26 PM    TP 7.2 09/21/2021 09:26 PM    GLOB 4.2 (H) 09/21/2021 09:26 PM    AGRAT 0.7 (L) 09/21/2021 09:26 PM    ALT 24 09/21/2021 09:26 PM         CBC:    Lab Results   Component Value Date/Time    WBC 8.5 09/21/2021 09:26 PM    HGB 11.4 (L) 09/21/2021 09:26 PM    HCT 38.6 09/21/2021 09:26 PM     09/21/2021 09:26 PM       No results found for: INR, PTMR, PTP, PT1, PT2, INREXT    ABG:  No results found for: PH, PHI, PCO2, PCO2I, PO2, PO2I, HCO3, HCO3I, FIO2, FIO2I        Lab Results   Component Value Date/Time    B-type Natriuretic Peptide 121.0 (H) 01/08/2020 09:22 AM       Imaging & Other Studies:  XR CHEST PORT    Result Date: 9/21/2021  PORTABLE CHEST 1 VIEW HISTORY: Shortness of breath COMPARISON: 9/16/2020 FINDINGS: The cardiac silhouette is prominent. Interstitial markings are thickened and there is peribronchial cuffing. There is no lobar consolidation or large pleural effusions. EKG leads are present. Prominent cardiac silhouette is suspected interstitial edema/volume overload. CT CHEST PULMONARY EMBOLISM    Result Date: 9/21/2021  EXAM: CT angiogram chest. HISTORY: back pain, hypoxia, tachy. TECHNIQUE: CT angiographic images of the chest were obtained following intravenous administration of contrast. Imaging was performed utilizing PE protocol. Examination was performed in the axial plane and coronal reconstructions were performed. 3-D MIPS reconstructions of the chest were obtained. Dose reduction technique used: Automated exposure control/Adjustment of the mA and/or kV according to patient size/Use of iterative reconstruction technique. COMPARISON: Chest CT dated 11/4/2020, CT urogram dated 1/10/2016. FINDINGS: There is adequate opacification of the pulmonary arterial tree. No significant filling defects are identified to suggest pulmonary embolism. Main pulmonary artery is normal in caliber. The heart is not enlarged and there is no pericardial effusion. The great vessels appear normal. The visualized thyroid is unremarkable. There are a few prominent mediastinal lymph nodes. Trachea and mainstem bronchi are patent. There is no consolidation, pleural effusion, or pneumothorax. No nodules are seen. There are appears to be pulmonary vascular congestion. This appearance may be in part to the images being acquired during expiration. Mild bibasilar atelectasis. Again there is fullness of the visualized left renal collecting system. Otherwise the visualized upper abdomen is unremarkable. Osseous structures are within normal limits. 1. No evidence of pulmonary embolism. 2. Mild bibasilar atelectasis. There appears to be pulmonary vascular congestion.  These images appear to have been acquired during expiration which can account for this appearance. 3. Images of the upper abdomen show fullness of the visualized left renal collecting system. This is unchanged. EKG Results     Procedure 720 Value Units Date/Time    EKG, 12 LEAD, REPEAT [[id number]]     Order Status: Completed     EKG, 12 LEAD, INITIAL [[id number]] Collected: 09/21/21 2115    Order Status: Completed Updated: 09/21/21 2219     Ventricular Rate 130 BPM      Atrial Rate 441 BPM      QRS Duration 66 ms      Q-T Interval 280 ms      QTC Calculation (Bezet) 412 ms      Calculated R Axis 55 degrees      Calculated T Axis 53 degrees      Diagnosis --     !! AGE AND GENDER SPECIFIC ECG ANALYSIS !!   Atrial fibrillation with rapid ventricular response with premature   ventricular or aberrantly conducted complexes  Low voltage QRS  Septal infarct (cited on or before 09-SEP-2020)  Abnormal ECG  When compared with ECG of 09-SEP-2020 21:38,  Atrial fibrillation has replaced Sinus rhythm            Active Problems:  Patient Active Problem List    Diagnosis Date Noted    Atrial fibrillation with rapid ventricular response ([facility] 09/22/2021    Restrictive lung disease 10/22/2020    Hypoxia 09/10/2020    Controlled type 2 diabetes mellitus without complication, without long-term current use of insulin ([address] 09/09/2020    Mild depression ([address] 02/08/2019    Hypertension     Class 3 obesity with alveolar hypoventilation, serious comorbidity, and body mass index (BMI) of 50.0 to 59.9 in adult [facility])          Butch Wynne MD  [facility] Hospitalist Service  9/22/2021 1:46 AM

## 2021-09-22 NOTE — PROGRESS NOTES
09/22/21 1627   Dual Skin Pressure Injury Assessment   Dual Skin Pressure Injury Assessment WDL   Second Care Provider (Based on Facility Policy) Elpidio Robison RN   Skin Integumentary   Skin Integumentary (WDL) X   Skin Color Pink   Skin Condition/Temp Dry;Hot; Inflamed;Rash   Skin Integrity Other (comment)  (bilat legs)   Turgor Epidermis thin w/ loss of subcut tissue    Pressure  Injury Documentation No Pressure Injury Noted-Pressure Ulcer Prevention Initiated

## 2021-09-22 NOTE — ED TRIAGE NOTES
Pt arrives from home via [facility] with mask in place. Called to home for for pain BLE, more on left than right. Was 84% on RA and improved to 95-96 on 4L. RR 28-35, , -160.  3.375 Zosyn enroute. HX cellulitis BLE and HX of sepsis. Blood cultures collected by EMS prior to antibiotice.

## 2021-09-22 NOTE — ED NOTES
This RN showed results of urine dipstick to hospitalist Dr. Phani Monet and advised that all was negative except for blood. This RN asked if Dr. Phani Monet wanted this RN to send down urine for a micro. Dr. Phani Monet advised not to send.

## 2021-09-23 ENCOUNTER — APPOINTMENT (OUTPATIENT)
Dept: NON INVASIVE DIAGNOSTICS | Age: 66
DRG: 308 | End: 2021-09-23
Attending: NURSE PRACTITIONER
Payer: COMMERCIAL

## 2021-09-23 LAB
ANION GAP SERPL CALC-SCNC: 7 MMOL/L (ref 7–16)
BASOPHILS # BLD: 0 K/UL (ref 0–0.2)
BASOPHILS NFR BLD: 0 % (ref 0–2)
BUN SERPL-MCNC: 18 MG/DL (ref 8–23)
CALCIUM SERPL-MCNC: 9 MG/DL (ref 8.3–10.4)
CHLORIDE SERPL-SCNC: 105 MMOL/L (ref 98–107)
CO2 SERPL-SCNC: 34 MMOL/L (ref 21–32)
CREAT SERPL-MCNC: 0.57 MG/DL (ref 0.6–1)
DIFFERENTIAL METHOD BLD: ABNORMAL
ECHO AO ASC DIAM: 3.5 CM
ECHO AO ROOT DIAM: 3.1 CM
ECHO AV AREA PEAK VELOCITY: 2.6 CM2
ECHO AV AREA VTI: 2.7 CM2
ECHO AV AREA/BSA PEAK VELOCITY: 1 CM2/M2
ECHO AV AREA/BSA VTI: 1 CM2/M2
ECHO AV MEAN GRADIENT: 8 MMHG
ECHO AV PEAK GRADIENT: 16 MMHG
ECHO AV PEAK VELOCITY: 198 CM/S
ECHO AV VTI: 33 CM
ECHO EST RA PRESSURE: 15 MMHG
ECHO LA AREA 2C: 26.4 CM2
ECHO LA AREA 4C: 34.2 CM2
ECHO LA MAJOR AXIS: 7.95 CM
ECHO LA MINOR AXIS: 2.98 CM
ECHO LV EDV A2C: 69.6 CM3
ECHO LV EDV A4C: 123 CM3
ECHO LV ESV A2C: 33.1 CM3
ECHO LV ESV A4C: 50.5 CM3
ECHO LV INTERNAL DIMENSION DIASTOLIC: 5.1 CM (ref 3.9–5.3)
ECHO LV INTERNAL DIMENSION SYSTOLIC: 3.2 CM
ECHO LV IVSD: 1.2 CM (ref 0.6–0.9)
ECHO LV MASS 2D: 213.9 G (ref 67–162)
ECHO LV MASS INDEX 2D: 80.1 G/M2 (ref 43–95)
ECHO LV POSTERIOR WALL DIASTOLIC: 1 CM (ref 0.6–0.9)
ECHO LVOT DIAM: 2 CM
ECHO LVOT PEAK GRADIENT: 11 MMHG
ECHO LVOT VTI: 28.4 CM
ECHO MV E DECELERATION TIME (DT): 143 MS
ECHO MV E VELOCITY: 127 CM/S
ECHO RIGHT VENTRICULAR SYSTOLIC PRESSURE (RVSP): 41 MMHG
ECHO RV INTERNAL DIMENSION: 3.1 CM
ECHO RV TAPSE: 2.65 CM (ref 1.5–2)
ECHO TV REGURGITANT MAX VELOCITY: 253 CM/S
ECHO TV REGURGITANT PEAK GRADIENT: 26 MMHG
EOSINOPHIL # BLD: 0 K/UL (ref 0–0.8)
EOSINOPHIL NFR BLD: 0 % (ref 0.5–7.8)
ERYTHROCYTE [DISTWIDTH] IN BLOOD BY AUTOMATED COUNT: 16.6 % (ref 11.9–14.6)
EST. AVERAGE GLUCOSE BLD GHB EST-MCNC: 151 MG/DL
GLUCOSE BLD STRIP.AUTO-MCNC: 112 MG/DL (ref 65–100)
GLUCOSE BLD STRIP.AUTO-MCNC: 117 MG/DL (ref 65–100)
GLUCOSE BLD STRIP.AUTO-MCNC: 143 MG/DL (ref 65–100)
GLUCOSE BLD STRIP.AUTO-MCNC: 192 MG/DL (ref 65–100)
GLUCOSE SERPL-MCNC: 113 MG/DL (ref 65–100)
HBA1C MFR BLD: 6.9 % (ref 4.2–6.3)
HCT VFR BLD AUTO: 36.2 % (ref 35.8–46.3)
HGB BLD-MCNC: 10.5 G/DL (ref 11.7–15.4)
IMM GRANULOCYTES # BLD AUTO: 0 K/UL (ref 0–0.5)
IMM GRANULOCYTES NFR BLD AUTO: 0 % (ref 0–5)
LYMPHOCYTES # BLD: 1.3 K/UL (ref 0.5–4.6)
LYMPHOCYTES NFR BLD: 17 % (ref 13–44)
MAGNESIUM SERPL-MCNC: 2 MG/DL (ref 1.8–2.4)
MCH RBC QN AUTO: 26.8 PG (ref 26.1–32.9)
MCHC RBC AUTO-ENTMCNC: 29 G/DL (ref 31.4–35)
MCV RBC AUTO: 92.3 FL (ref 79.6–97.8)
MONOCYTES # BLD: 0.8 K/UL (ref 0.1–1.3)
MONOCYTES NFR BLD: 10 % (ref 4–12)
NEUTS SEG # BLD: 5.3 K/UL (ref 1.7–8.2)
NEUTS SEG NFR BLD: 71 % (ref 43–78)
NRBC # BLD: 0 K/UL (ref 0–0.2)
PLATELET # BLD AUTO: 211 K/UL (ref 150–450)
PMV BLD AUTO: 10.8 FL (ref 9.4–12.3)
POTASSIUM SERPL-SCNC: 3.7 MMOL/L (ref 3.5–5.1)
RBC # BLD AUTO: 3.92 M/UL (ref 4.05–5.2)
SERVICE CMNT-IMP: ABNORMAL
SODIUM SERPL-SCNC: 146 MMOL/L (ref 136–145)
WBC # BLD AUTO: 7.5 K/UL (ref 4.3–11.1)

## 2021-09-23 PROCEDURE — 80048 BASIC METABOLIC PNL TOTAL CA: CPT

## 2021-09-23 PROCEDURE — 83036 HEMOGLOBIN GLYCOSYLATED A1C: CPT

## 2021-09-23 PROCEDURE — 99223 1ST HOSP IP/OBS HIGH 75: CPT | Performed by: INTERNAL MEDICINE

## 2021-09-23 PROCEDURE — 94640 AIRWAY INHALATION TREATMENT: CPT

## 2021-09-23 PROCEDURE — 74011000250 HC RX REV CODE- 250: Performed by: INTERNAL MEDICINE

## 2021-09-23 PROCEDURE — 74011250637 HC RX REV CODE- 250/637: Performed by: FAMILY MEDICINE

## 2021-09-23 PROCEDURE — 94760 N-INVAS EAR/PLS OXIMETRY 1: CPT

## 2021-09-23 PROCEDURE — 65270000029 HC RM PRIVATE

## 2021-09-23 PROCEDURE — 74011000250 HC RX REV CODE- 250: Performed by: NURSE PRACTITIONER

## 2021-09-23 PROCEDURE — 74011636637 HC RX REV CODE- 636/637: Performed by: FAMILY MEDICINE

## 2021-09-23 PROCEDURE — 74011250636 HC RX REV CODE- 250/636: Performed by: INTERNAL MEDICINE

## 2021-09-23 PROCEDURE — 74011250636 HC RX REV CODE- 250/636: Performed by: NURSE PRACTITIONER

## 2021-09-23 PROCEDURE — 2709999900 HC NON-CHARGEABLE SUPPLY

## 2021-09-23 PROCEDURE — 83735 ASSAY OF MAGNESIUM: CPT

## 2021-09-23 PROCEDURE — 94664 DEMO&/EVAL PT USE INHALER: CPT

## 2021-09-23 PROCEDURE — 74011250637 HC RX REV CODE- 250/637: Performed by: PHYSICIAN ASSISTANT

## 2021-09-23 PROCEDURE — C8929 TTE W OR WO FOL WCON,DOPPLER: HCPCS

## 2021-09-23 PROCEDURE — 82962 GLUCOSE BLOOD TEST: CPT

## 2021-09-23 PROCEDURE — 74011250636 HC RX REV CODE- 250/636: Performed by: FAMILY MEDICINE

## 2021-09-23 PROCEDURE — 77010033678 HC OXYGEN DAILY

## 2021-09-23 PROCEDURE — 74011250637 HC RX REV CODE- 250/637: Performed by: NURSE PRACTITIONER

## 2021-09-23 PROCEDURE — 85025 COMPLETE CBC W/AUTO DIFF WBC: CPT

## 2021-09-23 PROCEDURE — 36415 COLL VENOUS BLD VENIPUNCTURE: CPT

## 2021-09-23 RX ORDER — POTASSIUM CHLORIDE 20 MEQ/1
20 TABLET, EXTENDED RELEASE ORAL DAILY
Status: DISCONTINUED | OUTPATIENT
Start: 2021-09-24 | End: 2021-09-26 | Stop reason: HOSPADM

## 2021-09-23 RX ORDER — METOPROLOL TARTRATE 5 MG/5ML
5 INJECTION INTRAVENOUS ONCE
Status: COMPLETED | OUTPATIENT
Start: 2021-09-23 | End: 2021-09-23

## 2021-09-23 RX ORDER — METOPROLOL SUCCINATE 50 MG/1
50 TABLET, EXTENDED RELEASE ORAL 2 TIMES DAILY
Status: DISCONTINUED | OUTPATIENT
Start: 2021-09-23 | End: 2021-09-25

## 2021-09-23 RX ADMIN — Medication 5 ML: at 14:31

## 2021-09-23 RX ADMIN — METOPROLOL TARTRATE 5 MG: 5 INJECTION INTRAVENOUS at 16:04

## 2021-09-23 RX ADMIN — Medication 10 ML: at 06:00

## 2021-09-23 RX ADMIN — PREDNISONE 20 MG: 20 TABLET ORAL at 08:08

## 2021-09-23 RX ADMIN — FUROSEMIDE 40 MG: 10 INJECTION, SOLUTION INTRAMUSCULAR; INTRAVENOUS at 16:45

## 2021-09-23 RX ADMIN — Medication 10 ML: at 22:00

## 2021-09-23 RX ADMIN — METOPROLOL SUCCINATE 50 MG: 50 TABLET, EXTENDED RELEASE ORAL at 08:08

## 2021-09-23 RX ADMIN — INSULIN HUMAN 2 UNITS: 100 INJECTION, SOLUTION PARENTERAL at 16:46

## 2021-09-23 RX ADMIN — FUROSEMIDE 40 MG: 10 INJECTION, SOLUTION INTRAMUSCULAR; INTRAVENOUS at 08:08

## 2021-09-23 RX ADMIN — RIVAROXABAN 20 MG: 20 TABLET, FILM COATED ORAL at 21:59

## 2021-09-23 RX ADMIN — PERFLUTREN 1 ML: 6.52 INJECTION, SUSPENSION INTRAVENOUS at 10:30

## 2021-09-23 RX ADMIN — BUDESONIDE AND FORMOTEROL FUMARATE DIHYDRATE 2 PUFF: 80; 4.5 AEROSOL RESPIRATORY (INHALATION) at 20:00

## 2021-09-23 RX ADMIN — METOPROLOL SUCCINATE 50 MG: 50 TABLET, EXTENDED RELEASE ORAL at 16:46

## 2021-09-23 RX ADMIN — BUDESONIDE AND FORMOTEROL FUMARATE DIHYDRATE 2 PUFF: 80; 4.5 AEROSOL RESPIRATORY (INHALATION) at 12:05

## 2021-09-23 RX ADMIN — ACETAMINOPHEN 650 MG: 325 TABLET ORAL at 23:41

## 2021-09-23 RX ADMIN — ENOXAPARIN SODIUM 40 MG: 40 INJECTION SUBCUTANEOUS at 08:08

## 2021-09-23 NOTE — H&P
[facility] Cardiology Initial Cardiac Evaluation                 Date of  Admission: 9/21/2021  9:06 PM     Primary Care Physician: Daysi Harrington MD  Primary Cardiologist: Dr Marc Veloz  Referring Physician: Dr Bartolome Mark  Attending Physician: Dr Marc Veloz    CC: paulina Estrada is a 61 y.o. female admitted for Atrial fibrillation with rapid ventricular response ([address] [I48.91]. She has a h/o morbid obesity, DM, htn and tobacco use 3/4 ppd from age 15 until she quit around 2009. Restrictive lung disease w PFT's 12/2020 c/w moderate restrictive pattern. She was seen by Dr Marc Veloz last year for LE edema w normal EF and RVSP 50 mmHg and has been on po lasix at home. She has had LE edema for the past year, worse since 9-2020 when she was admitted w cellulitis. LE edema better when she elevates her legs, worse when dependent, feels like squeezing around her ankles, L worse than R. She was admitted 9-21 w BLE pain, O2 8% en route to hospital, given antibiotics by EMS, BP significantly elevated at 198/103, EKG showing a fib w rate 130 w PVC. She was admitted, given BB and IV lasix and steroids, has diuresed - 8.5 L and her LE edema is improved. Covid test negative. Remains in a fib w rate around 100-120. She denies CP, dizziness, syncope. She has rare shortness of breath, no cough, and denies worsening SOB recently. She has had palpitations on and off lasting maybe 5 minutes for years which is unchanged. Echo 9-23-21 w EF 50-55%, RVSP 41 mmHg, mild CHANA, mild MR, elevated IVC pressures. HS trop 13, pBNP 1230, respiratory virus panel negative, K 3.7, mag 2, CT chest negative for PE, possibly mild vascular congestion though could have been related to expiration of breath during those images. LE ultrasound negative for DVT. Repeat EKG a fib w rate 100. BP now 117/63. Started on po lopressor, IV lasix changed to po. No h/o CVA, TIA or bleeding.     Soc:  3/4 ppd from age 15 until she quit around 2009  FH: Neg CAD  Covid: Vaccinated w / vax     Patient Active Problem List   Diagnosis Code    Hypertension I10    Mild depression ([facility] [address] F32.0    Class 3 obesity with alveolar hypoventilation, serious comorbidity, and body mass index (BMI) of 50.0 to 59.9 in adult [facility]) E66.2, Z68.43    Controlled type 2 diabetes mellitus without complication, without long-term current use of insulin (HCC) E11.9    Hypoxia R09.02    Restrictive lung disease J98.4    Atrial fibrillation with rapid ventricular response (HCC) I48.91       Past Medical History:   Diagnosis Date    Chronic pain     pain R knee    Hypertension     Morbid obesity ([facility] [address]     BMI 45.8- 12    Positive PPD     had vaccination as a child - BCG      Past Surgical History:   Procedure Laterality Date    HX GYN      C-sec x 1 with GA    HX KNEE ARTHROSCOPY      bilateral knees     Allergies   Allergen Reactions    Lortab [Hydrocodone-Acetaminophen] Nausea Only and Other (comments)     \"It makes me feel hung over\"      Family History   Problem Relation Age of Onset    Other Mother         kidney failure    Cancer Sister         cervical cancer      Social History     Tobacco Use    Smoking status: Former Smoker     Packs/day: 0.50     Years: 25.00     Pack years: 12.50     Types: Cigarettes     Quit date: 2007     Years since quittin.6    Smokeless tobacco: Never Used   Substance Use Topics    Alcohol use: No        Current Facility-Administered Medications   Medication Dose Route Frequency    metoprolol succinate (TOPROL-XL) XL tablet 50 mg  50 mg Oral BID    [START ON 2021] potassium chloride (K-DUR, KLOR-CON) SR tablet 20 mEq  20 mEq Oral DAILY    budesonide-formoterol (SYMBICORT) 80-4.5 mcg inhaler  2 Puff Inhalation BID RT    [Held by provider] furosemide (LASIX) tablet 40 mg  40 mg Oral DAILY    insulin regular (NOVOLIN R, HUMULIN R) injection   SubCUTAneous AC&HS    dextrose 40% (GLUTOSE) oral gel 1 Tube  15 g Oral PRN    glucagon (GLUCAGEN) injection 1 mg  1 mg IntraMUSCular PRN    dextrose (D50W) injection syrg 12.5-25 g  25-50 mL IntraVENous PRN    sodium chloride (NS) flush 5-40 mL  5-40 mL IntraVENous Q8H    sodium chloride (NS) flush 5-40 mL  5-40 mL IntraVENous PRN    acetaminophen (TYLENOL) tablet 650 mg  650 mg Oral Q6H PRN    Or    acetaminophen (TYLENOL) suppository 650 mg  650 mg Rectal Q6H PRN    polyethylene glycol (MIRALAX) packet 17 g  17 g Oral DAILY    promethazine (PHENERGAN) tablet 12.5 mg  12.5 mg Oral Q6H PRN    Or    ondansetron (ZOFRAN) injection 4 mg  4 mg IntraVENous Q6H PRN    enoxaparin (LOVENOX) injection 40 mg  40 mg SubCUTAneous Q12H    predniSONE (DELTASONE) tablet 20 mg  20 mg Oral DAILY WITH BREAKFAST    furosemide (LASIX) injection 40 mg  40 mg IntraVENous BID    sodium chloride (NS) flush 5-10 mL  5-10 mL IntraVENous Q8H    sodium chloride (NS) flush 5-10 mL  5-10 mL IntraVENous PRN       Review of Symptoms:  General: no weight change,  no weakness, fever or chills  Skin: no rashes, lumps, or other skin changes  HEENT: no headache, dizziness, lightheadedness, vision changes, hearing changes, tinnitus, vertigo, sinus pressure/pain, bleeding gums, sore throat, or hoarseness  Neck: no swollen glands, goiter, pain or stiffness  Respiratory: no cough, sputum, hemoptysis, rare dyspnea, wheezing  Cardiovascular: + as per HPI  Gastrointestinal: Belching  Urinary: no frequency, urgency , hematuria, burning/pain with urination, recent flank pain, polyuria, nocturia, or difficulty urinating  Peripheral Vascular: Chronic LE edema  Musculoskeletal: no muscle or joint pain/stiffness, joint swelling, erythema of joints, or back pain  Psychiatric: no depression or excessive stress  Neurological: no sensory or motor loss, seizures, syncope, tremors, numbness, no dementia  Hematologic: no anemia, easy bruising or bleeding  Endocrine: no thyroid problems, heat or cold intolerance, excessive sweating, polyuria, polydipsia, +  diabetes.        Physical Exam  Vitals:    09/23/21 0747 09/23/21 1049 09/23/21 1058 09/23/21 1208   BP: (!) 141/98 112/79 117/63    Pulse: (!) 104 (!) 125     Resp: 22 18     Temp: 98 °F (36.7 °C) 98.1 °F (36.7 °C)     SpO2: 94% 91%  93%   Weight:   (!) 173.7 kg (383 lb)    Height:   5' 7\" (1.702 m)        Physical Exam:  General: Well Developed, Well Nourished, No Acute Distress, appears stated age, 2L O2 by NC  Head: normocephalic atraumatic   ENT: pupils equal and round, no abnormalities noted  Neck: supple, no JVD, no carotid bruits  Heart: S1S2 irregularly irregular   Lungs: Clear throughout auscultation bilaterally without adventitious sounds  Abd: soft, nontender, nondistended, with good bowel sounds  Ext: warm, 2+ BLE edema w erythema L>R  Skin: warm and dry  Psychiatric: Normal mood and affect  Neurologic: Alert and oriented X 3      Labs:   Recent Labs     09/23/21  0554 09/22/21  0349   * 146*   K 3.7 3.4*   MG 2.0 2.0   BUN 18 19   CREA 0.57* 0.71   * 138*   WBC 7.5 7.4   HGB 10.5* 10.4*   HCT 36.2 35.2*    241        Assessment/Plan:     Assessment:   Atrial fibrillation with rapid ventricular response (HCC) (9/22/2021)- New dx of asymptomatic a fib w RVR, rate improved on Toprol, cont to titrate as possible; would recommend anticoagulation for CVA prophylaxis and start xarelto, check TSH, f/u outpatient for EP     Atrial Fibrillation CHADSVASC2 Score Stroke Risk:   61 y.o. <65        + 0    female Female +1   CHF HX: No    + 0   HTN HX: Yes    +1   Stroke/TIA/Thromboembolism No    +0   Vascular Disease HX: No    + 0   Diabetes Mellitus Yes    +1   CHADSVASC 2 Score 3      Annual Stroke Risk 3.2% - moderate-high        Hypertension ()- Improved on BB     Class 3 obesity with alveolar hypoventilation, serious comorbidity, and body mass index (BMI) of 50.0 to 59.9 in adult [facility])     Controlled type 2 diabetes mellitus without complication, without long-term current use of insulin ([facility] [address] (9/9/2020)- per primary     Hypoxia (9/10/2020)- 2L O2 by NC    Restrictive lung disease (10/22/2020)- Steroids, s/p diuresis w IV lasix     Thank you very much for this referral. We appreciate the opportunity to participate in this patient's care. We will follow along with above stated plan.     Javier Zapien PA-C  Consulting MD: Solange Loaiza

## 2021-09-23 NOTE — PROGRESS NOTES
Hospitalist Progress Note   Admit Date:  2021  9:06 PM   Name:  Vessie Mortimer   Age:  61 y.o. Sex:  female  :  1955   MRN:  [id number]   Room:  [facility]/    Presenting Complaint: Blood infection    Reason(s) for Admission: Atrial fibrillation with rapid ventricular response [facility]) [I48.91]     Hospital Course & Interval History:   Ashleigh Dejesus is a 61year old female with a PNH of Afib, HTN, DM, who presented to the ER with a complaint of SOB and increased pain in her LLE with a concern for cellulitis. In the ER patient was found to be hypoxic with a sat of 84%. Given IV lasix for suspected pulmonary edema. Endorses receiving 1 dose of COVID vaccination and COVID negative in ER     Subjective (21): States she does feel better.  Denies SOB, palpitations, chest pain     Assessment & Plan:     Principal Problem:  Atrial fibrillation with rapid ventricular response ([facility] [address] (2021)  This improved with IV Lopressor in the ER. Nila Moralestan monitor closely.  Continue her home medications of metoprolol  21 Increased metoprolol to BID for better control; ECHO pending; Telemetry; PT/OT; Patient is not on [facility]  21 HR remains elevated and irregular despite increase of BB; Cardiology consulted; ECHO showing EF 50-55%; low normal diastolic; mildly dilated LA    Active Problems:     LLE pain  21 Doppler pending   21  Doppler negative for DVT; swelling mildly improved     Hypertension ()  Continue home medications     Class 3 obesity with alveolar hypoventilation, serious comorbidity, and body mass index (BMI) of 50.0 to 59.9 in adult [facility]) ()     pt on lifestyle adjustments prior to discharge; increases morbidity and mortality; can impede treatment and recovery     Controlled type 2 diabetes mellitus without complication, without long-term current use of insulin ([facility] [address] (2020)  Not currently on long-term medications, will place on sliding scale insulin while inpatient  21 BGLs remain < 200     Hypoxia (9/10/2020)  Unclear etiology, and probably multifactorial due to restrictive lung disease and severe obesity. Hayden Ritchie is no evidence of pneumonia or infection, Covid testing is negative  21 ECHO pending ; continue diuretics; CXR suggesting volume overload  21 ECHO as above; O2 decreased to 2L NC; continue to wean as tolerated      Restrictive lung disease    21 No noted wheezing; appears stable; continue home medications            Dispo/Discharge Plannin-48 hours     Diet:  ADULT DIET Regular; 4 carb choices (60 gm/meal)  DVT PPx: Lovenox SQ   Code status: Full Code    Hospital Problems as of 2021 Date Reviewed: 2020        Codes Class Noted - Resolved POA    * (Principal) Atrial fibrillation with rapid ventricular response ([facility] [address] ICD-10-CM: I48.91  ICD-9-CM: 427.31  2021 - Present Yes        Restrictive lung disease (Chronic) ICD-10-CM: J98.4  ICD-9-CM: 518.89  10/22/2020 - Present Yes        Hypoxia ICD-10-CM: R09.02  ICD-9-CM: 799.02  9/10/2020 - Present Yes        Controlled type 2 diabetes mellitus without complication, without long-term current use of insulin (HCC) (Chronic) ICD-10-CM: E11.9  ICD-9-CM: 250.00  2020 - Present Yes        Hypertension (Chronic) ICD-10-CM: I10  ICD-9-CM: 401.9  Unknown - Present Yes        Class 3 obesity with alveolar hypoventilation, serious comorbidity, and body mass index (BMI) of 50.0 to 59.9 in adult [facility]) (Chronic) ICD-10-CM: J28.8, Z85.55  ICD-9-CM: 278.03, V85.43  Unknown - Present Yes    Overview Signed 2019 11:59 AM by Oksana Brooks MD     BMI 45.8- 12                   Objective:     Patient Vitals for the past 24 hrs:   Temp Pulse Resp BP SpO2   21 1208     93 %   21 1058    117/63    21 1049 98.1 °F (36.7 °C) (!) 125 18 112/79 91 %   09/23/21 0747 98 °F (36.7 °C) (!) 104 22 (!) 141/98 94 %   21 0444 97.8 °F (36.6 °C) 93 22 117/63 93 %   21 0048 97.4 °F (36.3 °C) 92 20 (!) 138/92 94 %   09/22/21 2046 98.3 °F (36.8 °C) (!) 111 22 137/84 95 %   09/22/21 1624 98 °F (36.7 °C) (!) 109 24 (!) 165/86 93 %   09/22/21 1452  (!) 110 28 (!) 144/85 96 %   09/22/21 1451  (!) 123  (!) 144/85      Oxygen Therapy  O2 Sat (%): 93 % (09/23/21 1208)  Pulse via Oximetry: 112 beats per minute (09/23/21 1208)  O2 Device: Nasal cannula (09/23/21 1208)  O2 Flow Rate (L/min): 2 l/min (09/23/21 1208)    Estimated body mass index is 59.99 kg/m² as calculated from the following:    Height as of this encounter: 5' 7\" (1.702 m). Weight as of this encounter: 173.7 kg (383 lb). Intake/Output Summary (Last 24 hours) at 9/23/2021 1211  Last data filed at 9/23/2021 1058  Gross per 24 hour   Intake    Output 5640 ml   Net -5640 ml         Physical Exam:     General:    Well nourished. No overt distress  Head:  Normocephalic, atraumatic  Eyes:  Sclerae appear normal.  Pupils equally round. ENT:  Nares appear normal, no drainage. Moist oral mucosa  Neck:  No restricted ROM. Trachea midline   CV:   Tachycardia, irregular rhythm  No m/r/g. No jugular venous distension. Lungs:   CTAB. No wheezing, rhonchi, or rales. Respirations even, unlabored  Abdomen: Bowel sounds present. Soft, nontender, nondistended. Extremities: No cyanosis or clubbing. BLE edema (Improved)  Skin:     No rashes and normal coloration. Warm and dry. Neuro:  Cranial nerves II-XII grossly intact. Psych:  Normal mood and affect.   Alert and oriented x3    I have reviewed ordered lab tests and independently visualized imaging below:    Last 24hr Labs:  Recent Results (from the past 24 hour(s))   GLUCOSE, POC    Collection Time: 09/22/21 12:39 PM   Result Value Ref Range    Glucose (POC) 165 (H) 65 - 100 mg/dL    Performed by Alessandro    CULTURE, BLOOD    Collection Time: 09/22/21  5:37 PM    Specimen: Blood   Result Value Ref Range    Special Requests: RIGHT  HAND        Culture result: NO GROWTH AFTER 13 HOURS     GLUCOSE, POC    Collection Time: 09/22/21  5:46 PM   Result Value Ref Range    Glucose (POC) 202 (H) 65 - 100 mg/dL    Performed by Roderick    GLUCOSE, POC    Collection Time: 09/22/21  9:57 PM   Result Value Ref Range    Glucose (POC) 145 (H) 65 - 100 mg/dL    Performed by Chan    HEMOGLOBIN A1C WITH EAG    Collection Time: 09/23/21  5:54 AM   Result Value Ref Range    Hemoglobin A1c 6.9 (H) 4.2 - 6.3 %    Est. average glucose 292 mg/dL   METABOLIC PANEL, BASIC    Collection Time: 09/23/21  5:54 AM   Result Value Ref Range    Sodium 146 (H) 136 - 145 mmol/L    Potassium 3.7 3.5 - 5.1 mmol/L    Chloride 105 98 - 107 mmol/L    CO2 34 (H) 21 - 32 mmol/L    Anion gap 7 7 - 16 mmol/L    Glucose 113 (H) 65 - 100 mg/dL    BUN 18 8 - 23 MG/DL    Creatinine 0.57 (L) 0.6 - 1.0 MG/DL    GFR est AA >60 >60 ml/min/1.73m2    GFR est non-AA >60 >60 ml/min/1.73m2    Calcium 9.0 8.3 - 10.4 MG/DL   MAGNESIUM    Collection Time: 09/23/21  5:54 AM   Result Value Ref Range    Magnesium 2.0 1.8 - 2.4 mg/dL   CBC WITH AUTOMATED DIFF    Collection Time: 09/23/21  5:54 AM   Result Value Ref Range    WBC 7.5 4.3 - 11.1 K/uL    RBC 3.92 (L) 4.05 - 5.2 M/uL    HGB 10.5 (L) 11.7 - 15.4 g/dL    HCT 36.2 35.8 - 46.3 %    MCV 92.3 79.6 - 97.8 FL    MCH 26.8 26.1 - 32.9 PG    MCHC 29.0 (L) 31.4 - 35.0 g/dL    RDW 16.6 (H) 11.9 - 14.6 %    PLATELET 201 555 - 129 K/uL    MPV 10.8 9.4 - 12.3 FL    ABSOLUTE NRBC 0.00 0.0 - 0.2 K/uL    DF AUTOMATED      NEUTROPHILS 71 43 - 78 %    LYMPHOCYTES 17 13 - 44 %    MONOCYTES 10 4.0 - 12.0 %    EOSINOPHILS 0 (L) 0.5 - 7.8 %    BASOPHILS 0 0.0 - 2.0 %    IMMATURE GRANULOCYTES 0 0.0 - 5.0 %    ABS. NEUTROPHILS 5.3 1.7 - 8.2 K/UL    ABS. LYMPHOCYTES 1.3 0.5 - 4.6 K/UL    ABS. MONOCYTES 0.8 0.1 - 1.3 K/UL    ABS. EOSINOPHILS 0.0 0.0 - 0.8 K/UL    ABS. BASOPHILS 0.0 0.0 - 0.2 K/UL    ABS. IMM.  GRANS. 0.0 0.0 - 0.5 K/UL   GLUCOSE, POC    Collection Time: 09/23/21  6:21 AM   Result Value Ref Range    Glucose (POC) 117 (H) 65 - 100 mg/dL    Performed by Chan    ECHO ADULT COMPLETE    Collection Time: 09/23/21 10:40 AM   Result Value Ref Range    LV ED Vol A2C 69.60 cm3    LV ED Vol A4C 123.00 cm3    LV ES Vol A2C 33.10 cm3    LV ES Vol A4C 50.50 cm3    IVSd 1.20 (A) 0.60 - 0.90 cm    LVIDd 5.10 3.90 - 5.30 cm    LVIDs 3.20 cm    LVOT d 2.00 cm    LVOT VTI 28.40 cm    LVOT Peak Gradient 11.00 mmHg    LVPWd 1.00 (A) 0.60 - 0.90 cm    Left Atrium Minor Axis 2.98 cm    Left Atrium Major Axis 7.95 cm    LA Area 2C 26.40 cm2    LA Area 4C 34.20 cm2    Aortic Valve Systolic Mean Gradient 5.55 mmHg    AoV VTI 33.00 cm    Aortic Valve Systolic Peak Velocity 250.98 cm/s    AoV PG 16.00 mmHg    Aortic Valve Area by Continuity of VTI 2.70 cm2    Aortic Valve Area by Continuity of Peak Velocity 2.60 cm2    Ao Root D 3.10 cm    AO ASC D 3.50 cm    Mitral Valve E Wave Deceleration Time 143.00 ms    MV E [place] 127.00 cm/s    RVIDd 3.10 cm    Tapse 2.65 (A) 1.50 - 2.00 cm    TR Max Velocity 253.00 cm/s    Triscuspid Valve Regurgitation Peak Gradient 26.00 mmHg    LV Mass .9 67.0 - 162.0 g    LV Mass AL Index 80.1 43.0 - 95.0 g/m2    RVSP 41.0 mmHg    Est. RA Pressure 15.0 mmHg    GUILLERMO/BSA Pk [place] 1.0 cm2/m2    GUILLERMO/BSA VTI 1.0 cm2/m2   GLUCOSE, POC    Collection Time: 09/23/21 11:37 AM   Result Value Ref Range    Glucose (POC) 143 (H) 65 - 100 mg/dL    Performed by Meseret        All Micro Results     Procedure Component Value Units Date/Time    CULTURE, BLOOD [[id number]] Collected: 09/22/21 7227    Order Status: Completed Specimen: Blood Updated: 09/23/21 0761     Special Requests: --        RIGHT  HAND       Culture result: NO GROWTH AFTER 13 HOURS       BLOOD CULTURE [[id number]] Collected: 09/21/21 2126    Order Status: Completed Specimen: Blood Updated: 09/23/21 0711     Special Requests: --        RIGHT  Antecubital       Culture result: NO GROWTH 2 DAYS       CULTURE, BLOOD [[id number]] Collected: 09/21/21 2312    Order Status: Completed Specimen: Blood Updated: 09/23/21 0711     Special Requests: --        LEFT  HAND       Culture result: NO GROWTH 1 DAY       RESPIRATORY VIRUS PANEL W/COVID-19, PCR [[id number]] Collected: 09/21/21 2345    Order Status: Completed Specimen: Nasopharyngeal Updated: 09/22/21 0105     Adenovirus NOT DETECTED        Coronavirus 229E NOT DETECTED        Coronavirus HKU1 NOT DETECTED        Coronavirus CVNL63 NOT DETECTED        Coronavirus OC43 NOT DETECTED        SARS-CoV-2, PCR NOT DETECTED        Metapneumovirus NOT DETECTED        Rhinovirus and Enterovirus NOT DETECTED        Influenza A NOT DETECTED        Influenza B NOT DETECTED        Parainfluenza 1 NOT DETECTED        Parainfluenza 2 NOT DETECTED        Parainfluenza 3 NOT DETECTED        Parainfluenza virus 4 NOT DETECTED        RSV by PCR NOT DETECTED        B. parapertussis, PCR NOT DETECTED        Bordetella pertussis - PCR NOT DETECTED        Chlamydophila pneumoniae DNA, QL, PCR NOT DETECTED        Mycoplasma pneumoniae DNA, QL, PCR NOT DETECTED       COVID-19 RAPID TEST [[id number]] Collected: 09/21/21 2152    Order Status: Completed Specimen: Nasopharyngeal Updated: 09/21/21 2241     Specimen source Nasopharyngeal        COVID-19 rapid test Not detected        Comment:      The specimen is NEGATIVE for SARS-CoV-2, the novel coronavirus associated with COVID-19. A negative result does not rule out COVID-19. This test has been authorized by the FDA under an Emergency Use Authorization (EUA) for use by authorized laboratories.         Fact sheet for Healthcare Providers: ConventionUpdate.co.nz  Fact sheet for Patients: ConventionUpdate.co.nz       Methodology: Isothermal Nucleic Acid Amplification               Other Studies:  DUPLEX LOWER EXT VENOUS LEFT    Result Date: 9/22/2021  Left lower extremity duplex venous doppler exam. Indication: pain and swelling. Technique: Gray-scale ultrasound with and without compression and color Doppler evaluation were performed of the deep veins of the left lower extremity from the level of the left common femoral vein to the level of the left popliteal vein. Peroneal and posterior tibial veins also interrogated. Findings: The left common femoral, profunda, greater saphenous, popliteal, and posterior tibial veins are compressible and opacify with color at color Doppler evaluation. Patient was unable to tolerate compression of the femoral vein, however normal color Doppler signal is preserved and no echogenic thrombus is evident. There is no evidence of deep vein thrombosis. The peroneal vein could not be visualized secondary to edema. Fluid collection within the popliteal fossa measuring 2.9 x 2.5 x 1.1 cm most likely reflecting a Baker's cyst.     Negative for DVT. ECHO ADULT COMPLETE    Result Date: 9/23/2021  · LV: Estimated LVEF is 50 - 55%. Normal cavity size and wall thickness. Low normal systolic function. Left ventricular diastolic dysfunction. · Right Ventricle: Normal cavity size and global systolic function. · TV: Mild tricuspid valve regurgitation is present. Right Ventricular Arterial Pressure (RVSP) is 41 mmHg. · AV: Cannot rule out bicuspid aortic valve. No hemodynamically significant aortic stenosis. · MV: Mitral valve non-specific thickening. Mild mitral valve regurgitation is present. · RA: Mildly dilated right atrium. · LA: Mildly dilated left atrium. · IVC: Severely elevated central venous pressure (15 mmHg); IVC diameter is larger than 21 mm and collapses less than 50% with respiration. · Contrast used: DEFINITY.         Current Meds:  Current Facility-Administered Medications   Medication Dose Route Frequency    metoprolol succinate (TOPROL-XL) XL tablet 50 mg  50 mg Oral BID    [START ON 9/24/2021] potassium chloride (K-DUR, KLOR-CON) SR tablet 20 mEq  20 mEq Oral DAILY    budesonide-formoterol (SYMBICORT) 80-4.5 mcg inhaler  2 Puff Inhalation BID RT    [Held by provider] furosemide (LASIX) tablet 40 mg  40 mg Oral DAILY    insulin regular (NOVOLIN R, HUMULIN R) injection   SubCUTAneous AC&HS    dextrose 40% (GLUTOSE) oral gel 1 Tube  15 g Oral PRN    glucagon (GLUCAGEN) injection 1 mg  1 mg IntraMUSCular PRN    dextrose (D50W) injection syrg 12.5-25 g  25-50 mL IntraVENous PRN    sodium chloride (NS) flush 5-40 mL  5-40 mL IntraVENous Q8H    sodium chloride (NS) flush 5-40 mL  5-40 mL IntraVENous PRN    acetaminophen (TYLENOL) tablet 650 mg  650 mg Oral Q6H PRN    Or    acetaminophen (TYLENOL) suppository 650 mg  650 mg Rectal Q6H PRN    polyethylene glycol (MIRALAX) packet 17 g  17 g Oral DAILY    promethazine (PHENERGAN) tablet 12.5 mg  12.5 mg Oral Q6H PRN    Or    ondansetron (ZOFRAN) injection 4 mg  4 mg IntraVENous Q6H PRN    enoxaparin (LOVENOX) injection 40 mg  40 mg SubCUTAneous Q12H    predniSONE (DELTASONE) tablet 20 mg  20 mg Oral DAILY WITH BREAKFAST    furosemide (LASIX) injection 40 mg  40 mg IntraVENous BID    sodium chloride (NS) flush 5-10 mL  5-10 mL IntraVENous Q8H    sodium chloride (NS) flush 5-10 mL  5-10 mL IntraVENous PRN       Signed:  Kirk Akers NP    Part of this note may have been written by using a voice dictation software. The note has been proof read but may still contain some grammatical/other typographical errors.

## 2021-09-24 LAB
ANION GAP SERPL CALC-SCNC: 6 MMOL/L (ref 7–16)
BNP SERPL-MCNC: 1249 PG/ML (ref 5–125)
BUN SERPL-MCNC: 22 MG/DL (ref 8–23)
CALCIUM SERPL-MCNC: 8.8 MG/DL (ref 8.3–10.4)
CHLORIDE SERPL-SCNC: 101 MMOL/L (ref 98–107)
CO2 SERPL-SCNC: 37 MMOL/L (ref 21–32)
CREAT SERPL-MCNC: 0.58 MG/DL (ref 0.6–1)
GLUCOSE BLD STRIP.AUTO-MCNC: 104 MG/DL (ref 65–100)
GLUCOSE BLD STRIP.AUTO-MCNC: 121 MG/DL (ref 65–100)
GLUCOSE BLD STRIP.AUTO-MCNC: 123 MG/DL (ref 65–100)
GLUCOSE BLD STRIP.AUTO-MCNC: 176 MG/DL (ref 65–100)
GLUCOSE SERPL-MCNC: 96 MG/DL (ref 65–100)
POTASSIUM SERPL-SCNC: 3.1 MMOL/L (ref 3.5–5.1)
SERVICE CMNT-IMP: ABNORMAL
SODIUM SERPL-SCNC: 144 MMOL/L (ref 136–145)
TSH SERPL DL<=0.005 MIU/L-ACNC: 2.67 UIU/ML (ref 0.36–3.74)

## 2021-09-24 PROCEDURE — 74011250637 HC RX REV CODE- 250/637: Performed by: FAMILY MEDICINE

## 2021-09-24 PROCEDURE — 74011250637 HC RX REV CODE- 250/637: Performed by: NURSE PRACTITIONER

## 2021-09-24 PROCEDURE — 83880 ASSAY OF NATRIURETIC PEPTIDE: CPT

## 2021-09-24 PROCEDURE — 74011636637 HC RX REV CODE- 636/637: Performed by: FAMILY MEDICINE

## 2021-09-24 PROCEDURE — 80048 BASIC METABOLIC PNL TOTAL CA: CPT

## 2021-09-24 PROCEDURE — 99232 SBSQ HOSP IP/OBS MODERATE 35: CPT | Performed by: INTERNAL MEDICINE

## 2021-09-24 PROCEDURE — 65270000029 HC RM PRIVATE

## 2021-09-24 PROCEDURE — 84443 ASSAY THYROID STIM HORMONE: CPT

## 2021-09-24 PROCEDURE — 94760 N-INVAS EAR/PLS OXIMETRY 1: CPT

## 2021-09-24 PROCEDURE — 74011000250 HC RX REV CODE- 250: Performed by: HOSPITALIST

## 2021-09-24 PROCEDURE — 74011250637 HC RX REV CODE- 250/637: Performed by: INTERNAL MEDICINE

## 2021-09-24 PROCEDURE — 77010033678 HC OXYGEN DAILY

## 2021-09-24 PROCEDURE — 82962 GLUCOSE BLOOD TEST: CPT

## 2021-09-24 PROCEDURE — 94640 AIRWAY INHALATION TREATMENT: CPT

## 2021-09-24 PROCEDURE — 74011636637 HC RX REV CODE- 636/637: Performed by: HOSPITALIST

## 2021-09-24 PROCEDURE — 74011250637 HC RX REV CODE- 250/637: Performed by: PHYSICIAN ASSISTANT

## 2021-09-24 PROCEDURE — 36415 COLL VENOUS BLD VENIPUNCTURE: CPT

## 2021-09-24 RX ORDER — POLYETHYLENE GLYCOL 3350 17 G/17G
17 POWDER, FOR SOLUTION ORAL DAILY PRN
Status: DISCONTINUED | OUTPATIENT
Start: 2021-09-24 | End: 2021-09-26 | Stop reason: HOSPADM

## 2021-09-24 RX ORDER — METOPROLOL TARTRATE 5 MG/5ML
5 INJECTION INTRAVENOUS
Status: DISCONTINUED | OUTPATIENT
Start: 2021-09-24 | End: 2021-09-26 | Stop reason: HOSPADM

## 2021-09-24 RX ORDER — INSULIN LISPRO 100 [IU]/ML
INJECTION, SOLUTION INTRAVENOUS; SUBCUTANEOUS
Status: DISCONTINUED | OUTPATIENT
Start: 2021-09-24 | End: 2021-09-26 | Stop reason: HOSPADM

## 2021-09-24 RX ORDER — POTASSIUM CHLORIDE 20 MEQ/1
40 TABLET, EXTENDED RELEASE ORAL
Status: COMPLETED | OUTPATIENT
Start: 2021-09-24 | End: 2021-09-24

## 2021-09-24 RX ORDER — INSULIN LISPRO 100 [IU]/ML
5 INJECTION, SOLUTION INTRAVENOUS; SUBCUTANEOUS
Status: DISCONTINUED | OUTPATIENT
Start: 2021-09-24 | End: 2021-09-26 | Stop reason: HOSPADM

## 2021-09-24 RX ORDER — POTASSIUM CHLORIDE 20 MEQ/1
40 TABLET, EXTENDED RELEASE ORAL DAILY
Status: DISCONTINUED | OUTPATIENT
Start: 2021-09-24 | End: 2021-09-26 | Stop reason: HOSPADM

## 2021-09-24 RX ADMIN — Medication 10 ML: at 05:40

## 2021-09-24 RX ADMIN — INSULIN LISPRO 2 UNITS: 100 INJECTION, SOLUTION INTRAVENOUS; SUBCUTANEOUS at 16:32

## 2021-09-24 RX ADMIN — PREDNISONE 20 MG: 20 TABLET ORAL at 07:34

## 2021-09-24 RX ADMIN — Medication 5 ML: at 11:39

## 2021-09-24 RX ADMIN — BUDESONIDE AND FORMOTEROL FUMARATE DIHYDRATE 2 PUFF: 80; 4.5 AEROSOL RESPIRATORY (INHALATION) at 19:57

## 2021-09-24 RX ADMIN — Medication 10 ML: at 22:02

## 2021-09-24 RX ADMIN — POTASSIUM CHLORIDE 20 MEQ: 1500 TABLET, EXTENDED RELEASE ORAL at 09:22

## 2021-09-24 RX ADMIN — POTASSIUM CHLORIDE 20 MEQ: 20 TABLET, EXTENDED RELEASE ORAL at 07:34

## 2021-09-24 RX ADMIN — RIVAROXABAN 20 MG: 20 TABLET, FILM COATED ORAL at 16:31

## 2021-09-24 RX ADMIN — BUDESONIDE AND FORMOTEROL FUMARATE DIHYDRATE 2 PUFF: 80; 4.5 AEROSOL RESPIRATORY (INHALATION) at 10:25

## 2021-09-24 RX ADMIN — INSULIN LISPRO 5 UNITS: 100 INJECTION, SOLUTION INTRAVENOUS; SUBCUTANEOUS at 16:31

## 2021-09-24 RX ADMIN — FUROSEMIDE 40 MG: 40 TABLET ORAL at 09:22

## 2021-09-24 RX ADMIN — METOPROLOL SUCCINATE 50 MG: 50 TABLET, EXTENDED RELEASE ORAL at 16:31

## 2021-09-24 RX ADMIN — ACETAMINOPHEN 650 MG: 325 TABLET ORAL at 07:37

## 2021-09-24 RX ADMIN — METOPROLOL TARTRATE 5 MG: 5 INJECTION INTRAVENOUS at 12:49

## 2021-09-24 RX ADMIN — METOPROLOL SUCCINATE 50 MG: 50 TABLET, EXTENDED RELEASE ORAL at 07:34

## 2021-09-24 NOTE — PROGRESS NOTES
[facility] CARDIOLOGY PROGRESS NOTE    9/24/2021 8:25 AM    Admit Date: 9/21/2021        Subjective:   Stable overnight without angina, syncope, CHF, or palpitations but remains in atrial fibrillation with heart rates 80-110bpm in bed. Still on OptiFlow. Tolerating Xarelto. Potassium low. She complains of lower extremity calf tightness but has no pitting edema today. Lower extremity duplex negative. Objective:      Vitals:    09/23/21 2155 09/23/21 2354 09/24/21 0411 09/24/21 0736   BP:  (!) 136/94 139/81 133/81   Pulse:  (!) 110 92 (!) 109   Resp:  16 16 18   Temp:  97.8 °F (36.6 °C) 97.3 °F (36.3 °C) 97.5 °F (36.4 °C)   SpO2: 93% 93% 91% 92%   Weight:       Height:           Physical Exam:  Neck- supple, cannot assess JVD  CV-irregularly irregular rate and rhythm no MRG  Lung- clear bilaterally anteriorly, distant laterally  Abd- soft, nontender, nondistended  Ext-no pitting edema but both calves tight subjectively  Skin- warm and dry    Data Review:   Recent Labs     09/24/21  0601 09/23/21  0554 09/22/21  0349 09/22/21  0349    146*   < > 146*   K 3.1* 3.7   < > 3.4*   MG  --  2.0  --  2.0   BUN 22 18   < > 19   CREA 0.58* 0.57*   < > 0.71   GLU 96 113*   < > 138*   WBC  --  7.5  --  7.4   HGB  --  10.5*  --  10.4*   HCT  --  36.2  --  35.2*   PLT  --  211  --  241    < > = values in this interval not displayed. Assessment and Plan:     Principal Problem:    Atrial fibrillation with rapid ventricular response ([address] (9/22/2021)- continue beta blocker, titrate as needed. Continue Xarelto. Tolerating well.     Active Problems:    Hypertension () continue to monitor with up titration of beta-blocker as needed      Class 3 obesity with alveolar hypoventilation, serious comorbidity, and body mass index (BMI) of 50.0 to 59.9 in [facility]) () dietary/nutrition counseling and weight loss counseling in the outpatient setting      Overview: BMI 45.8- 5/2/12      Controlled type 2 diabetes mellitus without complication, without long-term current use of insulin ([facility] [address] (9/9/2020) per primary physicians. Recheck      Hypoxia (9/10/2020) slowly improving, continue supportive care, wean oxygen as tolerated    Hypokalemia-iatrogenic, replete today and recheck tomorrow    Lower extremity edema she still complains of her calves feeling very tight bilaterally. We will resume Lasix, replete potassium, check a BNP, and recheck renal function tomorrow morning.-Lower extremity duplex negative for DVT on 9/22        A.  Kelly Cartwright MD  [facility] Cardiology  Pager [phone]

## 2021-09-24 NOTE — PROGRESS NOTES
Chart screened by  for discharge planning. No needs identified at this time. Please consult  if any new issues arise. IDT has rounded on patient who has no concerns regarding discharge at this time.   Labs to be repeated tomorrow AM.     Care Management Interventions  Physical Therapy Consult: No  Occupational Therapy Consult: No  Discharge Location  Discharge Placement: Home

## 2021-09-24 NOTE — PROGRESS NOTES
Hospitalist Progress Note   Admit Date:  2021  9:06 PM   Name:  Irma Corcoran   Age:  61 y.o. Sex:  female  :  1955   MRN:  [id number]   Room:  [facility]/    Presenting Complaint: Blood infection    Reason(s) for Admission: Atrial fibrillation with rapid ventricular response [facility]) [I48.91]     Hospital Course & Interval History:   Wilner Hernandez is a 61year old female with a PNH of Afib, HTN, DM, who presented to the ER with a complaint of SOB and increased pain in her LLE with a concern for cellulitis. In the ER patient was found to be hypoxic with a sat of 84%. Given IV lasix for suspected pulmonary edema. Endorses receiving 1 dose of COVID vaccination and COVID negative in ER     Today, on 2L, feels better, in controlled afib      Assessment & Plan: Today, echo shows ef 50 and diastolic dysfunction, on 2L, on IV diuresis, IO negative 11L follow tele and cardiology. On xarelto for afib. Atrial fibrillation with rapid ventricular response ([facility] [address] (2021)  This improved with IV Lopressor in the ER. Will monitor closely. Continue her home medications of metoprolol  21 Increased metoprolol to BID for better control; ECHO pending; Telemetry; PT/OT; Patient is not on [facility]     Active Problems:    LLE pain  21 Doppler pending     Hypertension ()  Continue home medications     Class 3 obesity with alveolar hypoventilation, serious comorbidity, and body mass index (BMI) of 50.0 to 59.9 in adult [facility]) ()     pt on lifestyle adjustments prior to discharge; increases morbidity and mortality; can impede treatment and recovery     Controlled type 2 diabetes mellitus without complication, without long-term current use of insulin ([address] (2020)  Not currently on long-term medications, will place on sliding scale insulin while inpatient     Hypoxia (9/10/2020)  Unclear etiology, and probably multifactorial due to restrictive lung disease and severe obesity.   There is no evidence of pneumonia or infection, Covid testing is negative  9/22/21 ECHO pending ; continue diuretics; CXR suggesting volume overload      Restrictive lung disease    9/22/21 No noted wheezing; appears stable; continue home medications       Dispo/Discharge Planning:     Dispo pending     Diet:  ADULT DIET Regular; 4 carb choices (60 gm/meal)  DVT PPx: Lovenox SQ   Code status: Full Code    Hospital Problems as of 9/24/2021 Date Reviewed: 2/18/2020        Codes Class Noted - Resolved POA    * (Principal) Atrial fibrillation with rapid ventricular response (HCC) ICD-10-CM: I48.91  ICD-9-CM: 427.31  9/22/2021 - Present Yes        Restrictive lung disease (Chronic) ICD-10-CM: J98.4  ICD-9-CM: 518.89  10/22/2020 - Present Yes        Hypoxia ICD-10-CM: R09.02  ICD-9-CM: 799.02  9/10/2020 - Present Yes        Controlled type 2 diabetes mellitus without complication, without long-term current use of insulin (HCC) (Chronic) ICD-10-CM: E11.9  ICD-9-CM: 250.00  9/9/2020 - Present Yes        Hypertension (Chronic) ICD-10-CM: I10  ICD-9-CM: 401.9  Unknown - Present Yes        Class 3 obesity with alveolar hypoventilation, serious comorbidity, and body mass index (BMI) of 50.0 to 59.9 in adult [facility]) (Chronic) ICD-10-CM: W03.0, N54.22  ICD-9-CM: 278.03, V85.43  Unknown - Present Yes    Overview Signed 2/8/2019 11:59 AM by Denis Gamez MD     BMI 45.8- 5/2/12                   Objective:     Patient Vitals for the past 24 hrs:   Temp Pulse Resp BP SpO2   09/24/21 1117 97.8 °F (36.6 °C) 83 18 120/66 92 %   09/24/21 1027     93 %   09/24/21 0736 97.5 °F (36.4 °C) (!) 109 18 133/81 92 %   09/24/21 0411 97.3 °F (36.3 °C) 92 16 139/81 91 %   09/23/21 2354 97.8 °F (36.6 °C) (!) 110 16 (!) 136/94 93 %   09/23/21 2155     93 %   09/23/21 2020 98.3 °F (36.8 °C) (!) 105 20 (!) 145/98 94 %   09/23/21 1504 99.2 °F (37.3 °C) (!) 134 22 127/77 92 %     Oxygen Therapy  O2 Sat (%): 92 % (09/24/21 1117)  Pulse via Oximetry: 90 beats per minute (09/24/21 1027)  O2 Device: Nasal cannula (09/24/21 1027)  O2 Flow Rate (L/min): 2 l/min (09/24/21 1027)    Estimated body mass index is 59.99 kg/m² as calculated from the following:    Height as of this encounter: 5' 7\" (1.702 m). Weight as of this encounter: 173.7 kg (383 lb). Intake/Output Summary (Last 24 hours) at 9/24/2021 1307  Last data filed at 9/24/2021 0411  Gross per 24 hour   Intake 357 ml   Output 4000 ml   Net -3643 ml         Physical Exam:     General:    AAO3, milddistress  CV:   RRR. No m/r/g. No jugular venous distension. Lungs:   Fine bibasilar crepitations  Abdomen: Bowel sounds present. Soft, nontender, nondistended. Extremities: No cyanosis or clubbing. No edema  Skin:     No rashes and normal coloration. Warm and dry. Neuro:  grossly intact. Psych:  Normal mood and affect.   Alert and oriented x3    I have reviewed ordered lab tests and independently visualized imaging below:    Last 24hr Labs:  Recent Results (from the past 24 hour(s))   GLUCOSE, POC    Collection Time: 09/23/21  3:48 PM   Result Value Ref Range    Glucose (POC) 192 (H) 65 - 100 mg/dL    Performed by Josie)Taiwo    GLUCOSE, POC    Collection Time: 09/23/21  9:21 PM   Result Value Ref Range    Glucose (POC) 112 (H) 65 - 100 mg/dL    Performed by [facility]inda[facility]SheylaCT    METABOLIC PANEL, BASIC    Collection Time: 09/24/21  6:01 AM   Result Value Ref Range    Sodium 144 136 - 145 mmol/L    Potassium 3.1 (L) 3.5 - 5.1 mmol/L    Chloride 101 98 - 107 mmol/L    CO2 37 (H) 21 - 32 mmol/L    Anion gap 6 (L) 7 - 16 mmol/L    Glucose 96 65 - 100 mg/dL    BUN 22 8 - 23 MG/DL    Creatinine 0.58 (L) 0.6 - 1.0 MG/DL    GFR est AA >60 >60 ml/min/1.73m2    GFR est non-AA >60 >60 ml/min/1.73m2    Calcium 8.8 8.3 - 10.4 MG/DL   TSH 3RD GENERATION    Collection Time: 09/24/21  6:01 AM   Result Value Ref Range    TSH 2.670 0.358 - 3.740 uIU/mL   NT-PRO BNP    Collection Time: 09/24/21  6:01 AM   Result Value Ref Range    NT pro-BNP 1,249 (H) 5 - 125 PG/ML   GLUCOSE, POC    Collection Time: 09/24/21  6:11 AM   Result Value Ref Range    Glucose (POC) 104 (H) 65 - 100 mg/dL    Performed by Chalino    GLUCOSE, POC    Collection Time: 09/24/21 11:15 AM   Result Value Ref Range    Glucose (POC) 121 (H) 65 - 100 mg/dL    Performed by Ashely        All Micro Results     Procedure Component Value Units Date/Time    CULTURE, BLOOD [[id number]] Collected: 09/22/21 1737    Order Status: Completed Specimen: Blood Updated: 09/24/21 1002     Special Requests: --        RIGHT  HAND       Culture result: NO GROWTH 2 DAYS       BLOOD CULTURE [[id number]] Collected: 09/21/21 2126    Order Status: Completed Specimen: Blood Updated: 09/24/21 1002     Special Requests: --        RIGHT  Antecubital       Culture result: NO GROWTH 3 DAYS       CULTURE, BLOOD [[id number]] Collected: 09/21/21 2312    Order Status: Completed Specimen: Blood Updated: 09/24/21 1002     Special Requests: --        LEFT  HAND       Culture result: NO GROWTH 2 DAYS       RESPIRATORY VIRUS PANEL W/COVID-19, PCR [[id number]] Collected: 09/21/21 2345    Order Status: Completed Specimen: Nasopharyngeal Updated: 09/22/21 0105     Adenovirus NOT DETECTED        Coronavirus 229E NOT DETECTED        Coronavirus HKU1 NOT DETECTED        Coronavirus CVNL63 NOT DETECTED        Coronavirus OC43 NOT DETECTED        SARS-CoV-2, PCR NOT DETECTED        Metapneumovirus NOT DETECTED        Rhinovirus and Enterovirus NOT DETECTED        Influenza A NOT DETECTED        Influenza B NOT DETECTED        Parainfluenza 1 NOT DETECTED        Parainfluenza 2 NOT DETECTED        Parainfluenza 3 NOT DETECTED        Parainfluenza virus 4 NOT DETECTED        RSV by PCR NOT DETECTED        B. parapertussis, PCR NOT DETECTED        Bordetella pertussis - PCR NOT DETECTED        Chlamydophila pneumoniae DNA, QL, PCR NOT DETECTED        Mycoplasma pneumoniae DNA, QL, PCR NOT DETECTED COVID-19 RAPID TEST [[id number]] Collected: 09/21/21 2152    Order Status: Completed Specimen: Nasopharyngeal Updated: 09/21/21 2241     Specimen source Nasopharyngeal        COVID-19 rapid test Not detected        Comment:      The specimen is NEGATIVE for SARS-CoV-2, the novel coronavirus associated with COVID-19. A negative result does not rule out COVID-19. This test has been authorized by the FDA under an Emergency Use Authorization (EUA) for use by authorized laboratories. Fact sheet for Healthcare Providers: [employer]  Fact sheet for Patients: [employer]       Methodology: Isothermal Nucleic Acid Amplification               Other Studies:  No results found.     Current Meds:  Current Facility-Administered Medications   Medication Dose Route Frequency    polyethylene glycol (MIRALAX) packet 17 g  17 g Oral DAILY PRN    potassium chloride (K-DUR, KLOR-CON) SR tablet 40 mEq  40 mEq Oral DAILY    insulin lispro (HUMALOG) injection 5 Units  5 Units SubCUTAneous TIDAC    insulin lispro (HUMALOG) injection   SubCUTAneous AC&HS    metoprolol (LOPRESSOR) injection 5 mg  5 mg IntraVENous Q6H PRN    metoprolol succinate (TOPROL-XL) XL tablet 50 mg  50 mg Oral BID    potassium chloride (K-DUR, KLOR-CON) SR tablet 20 mEq  20 mEq Oral DAILY    rivaroxaban (XARELTO) tablet 20 mg  20 mg Oral DAILY WITH DINNER    budesonide-formoterol (SYMBICORT) 80-4.5 mcg inhaler  2 Puff Inhalation BID RT    furosemide (LASIX) tablet 40 mg  40 mg Oral DAILY    dextrose 40% (GLUTOSE) oral gel 1 Tube  15 g Oral PRN    glucagon (GLUCAGEN) injection 1 mg  1 mg IntraMUSCular PRN    dextrose (D50W) injection syrg 12.5-25 g  25-50 mL IntraVENous PRN    sodium chloride (NS) flush 5-40 mL  5-40 mL IntraVENous Q8H    sodium chloride (NS) flush 5-40 mL  5-40 mL IntraVENous PRN    acetaminophen (TYLENOL) tablet 650 mg  650 mg Oral Q6H PRN    Or    acetaminophen (TYLENOL) suppository 650 mg  650 mg Rectal Q6H PRN    promethazine (PHENERGAN) tablet 12.5 mg  12.5 mg Oral Q6H PRN    Or    ondansetron (ZOFRAN) injection 4 mg  4 mg IntraVENous Q6H PRN    predniSONE (DELTASONE) tablet 20 mg  20 mg Oral DAILY WITH BREAKFAST    sodium chloride (NS) flush 5-10 mL  5-10 mL IntraVENous Q8H    sodium chloride (NS) flush 5-10 mL  5-10 mL IntraVENous PRN       Signed:  Evon Kaur MD    Part of this note may have been written by using a voice dictation software. The note has been proof read but may still contain some grammatical/other typographical errors.

## 2021-09-25 LAB
ANION GAP SERPL CALC-SCNC: 3 MMOL/L (ref 7–16)
BUN SERPL-MCNC: 16 MG/DL (ref 8–23)
CALCIUM SERPL-MCNC: 8.8 MG/DL (ref 8.3–10.4)
CHLORIDE SERPL-SCNC: 102 MMOL/L (ref 98–107)
CO2 SERPL-SCNC: 38 MMOL/L (ref 21–32)
CREAT SERPL-MCNC: 0.47 MG/DL (ref 0.6–1)
ERYTHROCYTE [DISTWIDTH] IN BLOOD BY AUTOMATED COUNT: 16.4 % (ref 11.9–14.6)
GLUCOSE BLD STRIP.AUTO-MCNC: 130 MG/DL (ref 65–100)
GLUCOSE BLD STRIP.AUTO-MCNC: 153 MG/DL (ref 65–100)
GLUCOSE BLD STRIP.AUTO-MCNC: 199 MG/DL (ref 65–100)
GLUCOSE BLD STRIP.AUTO-MCNC: 95 MG/DL (ref 65–100)
GLUCOSE SERPL-MCNC: 95 MG/DL (ref 65–100)
HCT VFR BLD AUTO: 37.4 % (ref 35.8–46.3)
HGB BLD-MCNC: 11.1 G/DL (ref 11.7–15.4)
MAGNESIUM SERPL-MCNC: 2.2 MG/DL (ref 1.8–2.4)
MCH RBC QN AUTO: 27.1 PG (ref 26.1–32.9)
MCHC RBC AUTO-ENTMCNC: 29.7 G/DL (ref 31.4–35)
MCV RBC AUTO: 91.4 FL (ref 79.6–97.8)
NRBC # BLD: 0 K/UL (ref 0–0.2)
PLATELET # BLD AUTO: 233 K/UL (ref 150–450)
PMV BLD AUTO: 10.6 FL (ref 9.4–12.3)
POTASSIUM SERPL-SCNC: 3.4 MMOL/L (ref 3.5–5.1)
RBC # BLD AUTO: 4.09 M/UL (ref 4.05–5.2)
SERVICE CMNT-IMP: ABNORMAL
SERVICE CMNT-IMP: NORMAL
SODIUM SERPL-SCNC: 143 MMOL/L (ref 136–145)
WBC # BLD AUTO: 7.9 K/UL (ref 4.3–11.1)

## 2021-09-25 PROCEDURE — 74011250637 HC RX REV CODE- 250/637: Performed by: PHYSICIAN ASSISTANT

## 2021-09-25 PROCEDURE — 74011250637 HC RX REV CODE- 250/637: Performed by: HOSPITALIST

## 2021-09-25 PROCEDURE — 65270000029 HC RM PRIVATE

## 2021-09-25 PROCEDURE — 74011250637 HC RX REV CODE- 250/637: Performed by: FAMILY MEDICINE

## 2021-09-25 PROCEDURE — 99232 SBSQ HOSP IP/OBS MODERATE 35: CPT | Performed by: INTERNAL MEDICINE

## 2021-09-25 PROCEDURE — 83735 ASSAY OF MAGNESIUM: CPT

## 2021-09-25 PROCEDURE — 74011250637 HC RX REV CODE- 250/637: Performed by: INTERNAL MEDICINE

## 2021-09-25 PROCEDURE — 82962 GLUCOSE BLOOD TEST: CPT

## 2021-09-25 PROCEDURE — 94640 AIRWAY INHALATION TREATMENT: CPT

## 2021-09-25 PROCEDURE — 80048 BASIC METABOLIC PNL TOTAL CA: CPT

## 2021-09-25 PROCEDURE — 94760 N-INVAS EAR/PLS OXIMETRY 1: CPT

## 2021-09-25 PROCEDURE — 74011636637 HC RX REV CODE- 636/637: Performed by: HOSPITALIST

## 2021-09-25 PROCEDURE — 85027 COMPLETE CBC AUTOMATED: CPT

## 2021-09-25 PROCEDURE — 77010033678 HC OXYGEN DAILY

## 2021-09-25 PROCEDURE — 74011636637 HC RX REV CODE- 636/637: Performed by: FAMILY MEDICINE

## 2021-09-25 PROCEDURE — 36415 COLL VENOUS BLD VENIPUNCTURE: CPT

## 2021-09-25 RX ORDER — METOPROLOL SUCCINATE 50 MG/1
100 TABLET, EXTENDED RELEASE ORAL 2 TIMES DAILY
Status: DISCONTINUED | OUTPATIENT
Start: 2021-09-25 | End: 2021-09-25

## 2021-09-25 RX ADMIN — Medication 10 ML: at 12:35

## 2021-09-25 RX ADMIN — INSULIN LISPRO 5 UNITS: 100 INJECTION, SOLUTION INTRAVENOUS; SUBCUTANEOUS at 12:31

## 2021-09-25 RX ADMIN — INSULIN LISPRO 2 UNITS: 100 INJECTION, SOLUTION INTRAVENOUS; SUBCUTANEOUS at 12:32

## 2021-09-25 RX ADMIN — POTASSIUM CHLORIDE 40 MEQ: 20 TABLET, EXTENDED RELEASE ORAL at 09:56

## 2021-09-25 RX ADMIN — PREDNISONE 20 MG: 20 TABLET ORAL at 07:35

## 2021-09-25 RX ADMIN — ACETAMINOPHEN 650 MG: 325 TABLET ORAL at 07:39

## 2021-09-25 RX ADMIN — METOPROLOL SUCCINATE 75 MG: 50 TABLET, EXTENDED RELEASE ORAL at 10:00

## 2021-09-25 RX ADMIN — RIVAROXABAN 20 MG: 20 TABLET, FILM COATED ORAL at 17:00

## 2021-09-25 RX ADMIN — METOPROLOL SUCCINATE 75 MG: 50 TABLET, EXTENDED RELEASE ORAL at 17:00

## 2021-09-25 RX ADMIN — INSULIN LISPRO 5 UNITS: 100 INJECTION, SOLUTION INTRAVENOUS; SUBCUTANEOUS at 16:59

## 2021-09-25 RX ADMIN — BUDESONIDE AND FORMOTEROL FUMARATE DIHYDRATE 2 PUFF: 80; 4.5 AEROSOL RESPIRATORY (INHALATION) at 09:21

## 2021-09-25 RX ADMIN — Medication 10 ML: at 21:58

## 2021-09-25 RX ADMIN — FUROSEMIDE 40 MG: 40 TABLET ORAL at 09:56

## 2021-09-25 RX ADMIN — INSULIN LISPRO 5 UNITS: 100 INJECTION, SOLUTION INTRAVENOUS; SUBCUTANEOUS at 07:35

## 2021-09-25 RX ADMIN — Medication 10 ML: at 05:44

## 2021-09-25 RX ADMIN — INSULIN LISPRO 3 UNITS: 100 INJECTION, SOLUTION INTRAVENOUS; SUBCUTANEOUS at 21:57

## 2021-09-25 RX ADMIN — BUDESONIDE AND FORMOTEROL FUMARATE DIHYDRATE 2 PUFF: 80; 4.5 AEROSOL RESPIRATORY (INHALATION) at 21:57

## 2021-09-25 NOTE — PROGRESS NOTES
Reviewed notes for spiritual concerns    Noted:    [facility]    NO DIRECTIVES    MILD DEPRESSION        Will continue to assess how we can best serve this family              \" Prayers for our staff for safety and compassion \"

## 2021-09-25 NOTE — PROGRESS NOTES
Hospitalist Progress Note   Admit Date:  2021  9:06 PM   Name:  Juan Headings   Age:  61 y.o. Sex:  female  :  1955   MRN:  [id number]   Room:  [facility]/    Presenting Complaint: Blood infection    Reason(s) for Admission: Atrial fibrillation with rapid ventricular response [facility]) [I48.91]     Hospital Course & Interval History:   Anyi Snell is a 61year old female with a PNH of Afib, HTN, DM, who presented to the ER with a complaint of SOB and increased pain in her LLE with a concern for cellulitis. In the ER patient was found to be hypoxic with a sat of 84%. Given IV lasix for suspected pulmonary edema.  Endorses receiving 1 dose of COVID vaccination and COVID negative in ER     Subjective (21):  Denies SOB, chest and leg pain, palpitation, N/V    Assessment & Plan:     Principal Problem:  Atrial fibrillation with rapid ventricular response ([facility] [address] (2021)  This improved with IV Lopressor in the ER. Reece Thomason monitor closely.  Continue her home medications of metoprolol  21 Increased metoprolol to BID for better control; ECHO pending; Telemetry; PT/OT; Patient is not on [facility]  21 Cardiology following; continue titration of BB; EF 15-94%; LV diastolic fx; patient started on Xarelto;      Active Problems:     LLE pain  21 Doppler pending   21 Doppler negative and pain resolved    Hypertension ()  Continue home medications  21 Stable BP; continue current regimen    Class 3 obesity with alveolar hypoventilation, serious comorbidity, and body mass index (BMI) of 50.0 to 59.9 in adult [facility]) ()     pt on lifestyle adjustments prior to discharge; increases morbidity and mortality; can impede treatment and recovery     Controlled type 2 diabetes mellitus without complication, without long-term current use of insulin ([facility] [address] (2020)  Not currently on long-term medications, will place on sliding scale insulin while inpatient  21 BGLs < 200; continue current regimen     Hypoxia (9/10/2020)  Unclear etiology, and probably multifactorial due to restrictive lung disease and severe obesity. Usman Cagle is no evidence of pneumonia or infection, Covid testing is negative  21 ECHO pending ; continue diuretics; CXR suggesting volume overload  21 ECHO as above; patient weaned from O2 today; monitor; BCx NGTD       Restrictive lung disease    21 No noted wheezing; appears stable; continue home medications             Dispo/Discharge Plannin-48 hours    Diet:  ADULT DIET Regular; 4 carb choices (60 gm/meal)  DVT PPx: Xarelto   Code status: Full Code    Hospital Problems as of 2021 Date Reviewed: 2020        Codes Class Noted - Resolved POA    * (Principal) Atrial fibrillation with rapid ventricular response ([facility] [address] ICD-10-CM: I48.91  ICD-9-CM: 427.31  2021 - Present Yes        Restrictive lung disease (Chronic) ICD-10-CM: J98.4  ICD-9-CM: 518.89  10/22/2020 - Present Yes        Hypoxia ICD-10-CM: R09.02  ICD-9-CM: 799.02  9/10/2020 - Present Yes        Controlled type 2 diabetes mellitus without complication, without long-term current use of insulin (HCC) (Chronic) ICD-10-CM: E11.9  ICD-9-CM: 250.00  2020 - Present Yes        Hypertension (Chronic) ICD-10-CM: I10  ICD-9-CM: 401.9  Unknown - Present Yes        Class 3 obesity with alveolar hypoventilation, serious comorbidity, and body mass index (BMI) of 50.0 to 59.9 in adult [facility]) (Chronic) ICD-10-CM: K73.1, W91.66  ICD-9-CM: 278.03, V85.43  Unknown - Present Yes    Overview Signed 2019 11:59 AM by Ced Carbone MD     BMI 45.8- 12                   Objective:     Patient Vitals for the past 24 hrs:   Temp Pulse Resp BP SpO2   21 1204 98.1 °F (36.7 °C) 100 19 109/77 90 %   21 0928     (!) 88 %   21 0729 98.7 °F (37.1 °C) (!) 104 19 138/82 92 %   21 0402 98.1 °F (36.7 °C) 98 20 (!) 153/90 93 %   21 0034 98.4 °F (36.9 °C) 65 20 138/88 92 % 09/24/21 2006    (!) 165/106    09/24/21 1958 98 °F (36.7 °C) 94 20 (!) 153/108 95 %   09/24/21 1514 98.2 °F (36.8 °C) (!) 102 18 122/87 92 %     Oxygen Therapy  O2 Sat (%): 90 % (09/25/21 1204)  Pulse via Oximetry: 90 beats per minute (09/25/21 0928)  O2 Device: None (Room air) (placed on 1L) (09/25/21 0928)  O2 Flow Rate (L/min): 2 l/min (09/24/21 1958)    Estimated body mass index is 59.99 kg/m² as calculated from the following:    Height as of this encounter: 5' 7\" (1.702 m). Weight as of this encounter: 173.7 kg (383 lb). Intake/Output Summary (Last 24 hours) at 9/25/2021 1401  Last data filed at 9/25/2021 0732  Gross per 24 hour   Intake 690 ml   Output 1875 ml   Net -1185 ml         Physical Exam:   General:    Well nourished. BMI 59  No overt distress  Head:  Normocephalic, atraumatic  Eyes:  Sclerae appear normal.  Pupils equally round. ENT:  Nares appear normal, no drainage. Moist oral mucosa  Neck:  No restricted ROM. Trachea midline   CV:   .RRR No m/r/g. No jugular venous distension. Lungs:   CTAB. No wheezing, rhonchi, or rales. Respirations even, unlabored  Abdomen: Bowel sounds present. Soft, nontender, nondistended. Extremities: No cyanosis or clubbing. No edema  Skin:     No rashes and normal coloration. Warm and dry. Neuro:  Cranial nerves II-XII grossly intact. Psych:  Normal mood and affect.   Alert and oriented x3    I have reviewed ordered lab tests and independently visualized imaging below:    Last 24hr Labs:  Recent Results (from the past 24 hour(s))   GLUCOSE, POC    Collection Time: 09/24/21  3:17 PM   Result Value Ref Range    Glucose (POC) 176 (H) 65 - 100 mg/dL    Performed by AnandSABA    GLUCOSE, POC    Collection Time: 09/24/21  9:15 PM   Result Value Ref Range    Glucose (POC) 123 (H) 65 - 100 mg/dL    Performed by [facility]    METABOLIC PANEL, BASIC    Collection Time: 09/25/21  5:42 AM   Result Value Ref Range    Sodium 143 136 - 145 mmol/L Potassium 3.4 (L) 3.5 - 5.1 mmol/L    Chloride 102 98 - 107 mmol/L    CO2 38 (H) 21 - 32 mmol/L    Anion gap 3 (L) 7 - 16 mmol/L    Glucose 95 65 - 100 mg/dL    BUN 16 8 - 23 MG/DL    Creatinine 0.47 (L) 0.6 - 1.0 MG/DL    GFR est AA >60 >60 ml/min/1.73m2    GFR est non-AA >60 >60 ml/min/1.73m2    Calcium 8.8 8.3 - 10.4 MG/DL   CBC W/O DIFF    Collection Time: 09/25/21  5:42 AM   Result Value Ref Range    WBC 7.9 4.3 - 11.1 K/uL    RBC 4.09 4.05 - 5.2 M/uL    HGB 11.1 (L) 11.7 - 15.4 g/dL    HCT 37.4 35.8 - 46.3 %    MCV 91.4 79.6 - 97.8 FL    MCH 27.1 26.1 - 32.9 PG    MCHC 29.7 (L) 31.4 - 35.0 g/dL    RDW 16.4 (H) 11.9 - 14.6 %    PLATELET 466 237 - 897 K/uL    MPV 10.6 9.4 - 12.3 FL    ABSOLUTE NRBC 0.00 0.0 - 0.2 K/uL   MAGNESIUM    Collection Time: 09/25/21  5:42 AM   Result Value Ref Range    Magnesium 2.2 1.8 - 2.4 mg/dL   GLUCOSE, POC    Collection Time: 09/25/21  6:31 AM   Result Value Ref Range    Glucose (POC) 95 65 - 100 mg/dL    Performed by Ludin    GLUCOSE, POC    Collection Time: 09/25/21 11:36 AM   Result Value Ref Range    Glucose (POC) 153 (H) 65 - 100 mg/dL    Performed by Priya        All Micro Results     Procedure Component Value Units Date/Time    CULTURE, BLOOD [[id number]] Collected: 09/22/21 1737    Order Status: Completed Specimen: Blood Updated: 09/25/21 1227     Special Requests: --        RIGHT  HAND       Culture result: NO GROWTH 3 DAYS       BLOOD CULTURE [[id number]] Collected: 09/21/21 2126    Order Status: Completed Specimen: Blood Updated: 09/25/21 1227     Special Requests: --        RIGHT  Antecubital       Culture result: NO GROWTH 4 DAYS       CULTURE, BLOOD [[id number]] Collected: 09/21/21 2312    Order Status: Completed Specimen: Blood Updated: 09/25/21 1227     Special Requests: --        LEFT  HAND       Culture result: NO GROWTH 3 DAYS       RESPIRATORY VIRUS PANEL W/COVID-19, PCR [[id number]] Collected: 09/21/21 2345    Order Status: Completed Specimen: Nasopharyngeal Updated: 09/22/21 0105     Adenovirus NOT DETECTED        Coronavirus 229E NOT DETECTED        Coronavirus HKU1 NOT DETECTED        Coronavirus CVNL63 NOT DETECTED        Coronavirus OC43 NOT DETECTED        SARS-CoV-2, PCR NOT DETECTED        Metapneumovirus NOT DETECTED        Rhinovirus and Enterovirus NOT DETECTED        Influenza A NOT DETECTED        Influenza B NOT DETECTED        Parainfluenza 1 NOT DETECTED        Parainfluenza 2 NOT DETECTED        Parainfluenza 3 NOT DETECTED        Parainfluenza virus 4 NOT DETECTED        RSV by PCR NOT DETECTED        B. parapertussis, PCR NOT DETECTED        Bordetella pertussis - PCR NOT DETECTED        Chlamydophila pneumoniae DNA, QL, PCR NOT DETECTED        Mycoplasma pneumoniae DNA, QL, PCR NOT DETECTED       COVID-19 RAPID TEST [[id number]] Collected: 09/21/21 2152    Order Status: Completed Specimen: Nasopharyngeal Updated: 09/21/21 2241     Specimen source Nasopharyngeal        COVID-19 rapid test Not detected        Comment:      The specimen is NEGATIVE for SARS-CoV-2, the novel coronavirus associated with COVID-19. A negative result does not rule out COVID-19. This test has been authorized by the FDA under an Emergency Use Authorization (EUA) for use by authorized laboratories. Fact sheet for Healthcare Providers: [employer]nz  Fact sheet for Patients: [employer]nz       Methodology: Isothermal Nucleic Acid Amplification               Other Studies:  No results found.     Current Meds:  Current Facility-Administered Medications   Medication Dose Route Frequency    metoprolol succinate (TOPROL-XL) XL tablet 75 mg  75 mg Oral BID    polyethylene glycol (MIRALAX) packet 17 g  17 g Oral DAILY PRN    potassium chloride (K-DUR, KLOR-CON) SR tablet 40 mEq  40 mEq Oral DAILY    insulin lispro (HUMALOG) injection 5 Units  5 Units SubCUTAneous TIDAC    insulin lispro (HUMALOG) injection   SubCUTAneous AC&HS    metoprolol (LOPRESSOR) injection 5 mg  5 mg IntraVENous Q6H PRN    potassium chloride (K-DUR, KLOR-CON) SR tablet 20 mEq  20 mEq Oral DAILY    rivaroxaban (XARELTO) tablet 20 mg  20 mg Oral DAILY WITH DINNER    budesonide-formoterol (SYMBICORT) 80-4.5 mcg inhaler  2 Puff Inhalation BID RT    furosemide (LASIX) tablet 40 mg  40 mg Oral DAILY    dextrose 40% (GLUTOSE) oral gel 1 Tube  15 g Oral PRN    glucagon (GLUCAGEN) injection 1 mg  1 mg IntraMUSCular PRN    dextrose (D50W) injection syrg 12.5-25 g  25-50 mL IntraVENous PRN    sodium chloride (NS) flush 5-40 mL  5-40 mL IntraVENous Q8H    sodium chloride (NS) flush 5-40 mL  5-40 mL IntraVENous PRN    acetaminophen (TYLENOL) tablet 650 mg  650 mg Oral Q6H PRN    Or    acetaminophen (TYLENOL) suppository 650 mg  650 mg Rectal Q6H PRN    promethazine (PHENERGAN) tablet 12.5 mg  12.5 mg Oral Q6H PRN    Or    ondansetron (ZOFRAN) injection 4 mg  4 mg IntraVENous Q6H PRN    predniSONE (DELTASONE) tablet 20 mg  20 mg Oral DAILY WITH BREAKFAST    sodium chloride (NS) flush 5-10 mL  5-10 mL IntraVENous Q8H    sodium chloride (NS) flush 5-10 mL  5-10 mL IntraVENous PRN       Signed:  Melva Diaz NP    Part of this note may have been written by using a voice dictation software. The note has been proof read but may still contain some grammatical/other typographical errors.

## 2021-09-25 NOTE — PROGRESS NOTES
[facility] CARDIOLOGY PROGRESS NOTE    9/25/2021 8:58 AM    Admit Date: 9/21/2021        Subjective:   Stable overnight without angina, syncope, CHF, or palpitations but remains in atrial fibrillation with heart rates 80-110bpm in bed. Comfortable on nasal cannula now. Tolerating Xarelto. Potassium low. She complains of lower extremity calf tightness but has no pitting edema today. Lower extremity duplex negative. Augmenting beta-blockers today    Objective:      Vitals:    09/24/21 2006 09/25/21 0034 09/25/21 0402 09/25/21 0729   BP: (!) 165/106 138/88 (!) 153/90 138/82   Pulse:  65 98 (!) 104   Resp:  20 20 19   Temp:  98.4 °F (36.9 °C) 98.1 °F (36.7 °C) 98.7 °F (37.1 °C)   SpO2:  92% 93% 92%   Weight:       Height:           Physical Exam:  Neck- supple, cannot assess JVD  CV- regular rate and rhythm no MRG  Lung-distant but clear bilaterally anterolaterally   abd- soft, nontender, nondistended  Ext- no edema, wrinkled skin below knees  Skin- warm and dry    Data Review:   Recent Labs     09/25/21  0542 09/24/21  0601 09/23/21  0554 09/23/21  0554    144   < > 146*   K 3.4* 3.1*   < > 3.7   MG 2.2  --   --  2.0   BUN 16 22   < > 18   CREA 0.47* 0.58*   < > 0.57*   GLU 95 96   < > 113*   WBC 7.9  --   --  7.5   HGB 11.1*  --   --  10.5*   HCT 37.4  --   --  36.2     --   --  211    < > = values in this interval not displayed. Assessment and Plan:     Principal Problem:    Atrial fibrillation with rapid ventricular response ([address] (9/22/2021)- continue beta blocker, increase to 75 mg twice daily and slowly increase as tolerated prior to discharge. Continue Xarelto.   Tolerating well.     Active Problems:    Hypertension () continue to monitor with up titration of beta-blocker as needed       Class 3 obesity with alveolar hypoventilation, serious comorbidity, and body mass index (BMI) of 50.0 to 59.9 in adult [facility]) () dietary/nutrition counseling and weight loss counseling in the outpatient setting      Overview: BMI 45.8- 5/2/12       Controlled type 2 diabetes mellitus without complication, without long-term current use of insulin ([facility] [address] (9/9/2020) per primary physicians. Recheck       Hypoxia (9/10/2020) slowly improving, continue supportive care, wean oxygen as tolerated     Hypokalemia-iatrogenic, replete today and recheck tomorrow     Lower extremity edema: Improved per patient, agree with once daily Lasix. Continue supportive care otherwise.     Brenda Murillo MD  [facility] Cardiology  Pager [phone]

## 2021-09-26 VITALS
TEMPERATURE: 97.8 F | BODY MASS INDEX: 45.99 KG/M2 | SYSTOLIC BLOOD PRESSURE: 129 MMHG | WEIGHT: 293 LBS | HEIGHT: 67 IN | DIASTOLIC BLOOD PRESSURE: 69 MMHG | RESPIRATION RATE: 19 BRPM | HEART RATE: 105 BPM | OXYGEN SATURATION: 91 %

## 2021-09-26 LAB
ANION GAP SERPL CALC-SCNC: 6 MMOL/L (ref 7–16)
BACTERIA SPEC CULT: NORMAL
BUN SERPL-MCNC: 15 MG/DL (ref 8–23)
CALCIUM SERPL-MCNC: 9.2 MG/DL (ref 8.3–10.4)
CHLORIDE SERPL-SCNC: 103 MMOL/L (ref 98–107)
CO2 SERPL-SCNC: 32 MMOL/L (ref 21–32)
CREAT SERPL-MCNC: 0.57 MG/DL (ref 0.6–1)
ERYTHROCYTE [DISTWIDTH] IN BLOOD BY AUTOMATED COUNT: 16.7 % (ref 11.9–14.6)
GLUCOSE BLD STRIP.AUTO-MCNC: 103 MG/DL (ref 65–100)
GLUCOSE BLD STRIP.AUTO-MCNC: 133 MG/DL (ref 65–100)
GLUCOSE SERPL-MCNC: 102 MG/DL (ref 65–100)
HCT VFR BLD AUTO: 46.1 % (ref 35.8–46.3)
HGB BLD-MCNC: 13.6 G/DL (ref 11.7–15.4)
MAGNESIUM SERPL-MCNC: 2.1 MG/DL (ref 1.8–2.4)
MCH RBC QN AUTO: 26.4 PG (ref 26.1–32.9)
MCHC RBC AUTO-ENTMCNC: 29.5 G/DL (ref 31.4–35)
MCV RBC AUTO: 89.3 FL (ref 79.6–97.8)
NRBC # BLD: 0 K/UL (ref 0–0.2)
PLATELET # BLD AUTO: 208 K/UL (ref 150–450)
PMV BLD AUTO: 11.3 FL (ref 9.4–12.3)
POTASSIUM SERPL-SCNC: 4.3 MMOL/L (ref 3.5–5.1)
RBC # BLD AUTO: 5.16 M/UL (ref 4.05–5.2)
SERVICE CMNT-IMP: ABNORMAL
SERVICE CMNT-IMP: ABNORMAL
SERVICE CMNT-IMP: NORMAL
SODIUM SERPL-SCNC: 141 MMOL/L (ref 136–145)
WBC # BLD AUTO: 8.4 K/UL (ref 4.3–11.1)

## 2021-09-26 PROCEDURE — 82962 GLUCOSE BLOOD TEST: CPT

## 2021-09-26 PROCEDURE — 77010033678 HC OXYGEN DAILY

## 2021-09-26 PROCEDURE — 74011250637 HC RX REV CODE- 250/637: Performed by: FAMILY MEDICINE

## 2021-09-26 PROCEDURE — 74011250637 HC RX REV CODE- 250/637: Performed by: NURSE PRACTITIONER

## 2021-09-26 PROCEDURE — 80048 BASIC METABOLIC PNL TOTAL CA: CPT

## 2021-09-26 PROCEDURE — 74011636637 HC RX REV CODE- 636/637: Performed by: HOSPITALIST

## 2021-09-26 PROCEDURE — 99232 SBSQ HOSP IP/OBS MODERATE 35: CPT | Performed by: INTERNAL MEDICINE

## 2021-09-26 PROCEDURE — 74011250637 HC RX REV CODE- 250/637: Performed by: HOSPITALIST

## 2021-09-26 PROCEDURE — 94640 AIRWAY INHALATION TREATMENT: CPT

## 2021-09-26 PROCEDURE — 74011636637 HC RX REV CODE- 636/637: Performed by: FAMILY MEDICINE

## 2021-09-26 PROCEDURE — 85027 COMPLETE CBC AUTOMATED: CPT

## 2021-09-26 PROCEDURE — 83735 ASSAY OF MAGNESIUM: CPT

## 2021-09-26 PROCEDURE — 36415 COLL VENOUS BLD VENIPUNCTURE: CPT

## 2021-09-26 PROCEDURE — 94760 N-INVAS EAR/PLS OXIMETRY 1: CPT

## 2021-09-26 PROCEDURE — 94761 N-INVAS EAR/PLS OXIMETRY MLT: CPT

## 2021-09-26 PROCEDURE — 74011250637 HC RX REV CODE- 250/637: Performed by: INTERNAL MEDICINE

## 2021-09-26 RX ORDER — METOPROLOL SUCCINATE 50 MG/1
100 TABLET, EXTENDED RELEASE ORAL 2 TIMES DAILY
Status: DISCONTINUED | OUTPATIENT
Start: 2021-09-26 | End: 2021-09-26 | Stop reason: HOSPADM

## 2021-09-26 RX ORDER — PREDNISONE 20 MG/1
20 TABLET ORAL
Qty: 2 TABLET | Refills: 0 | Status: SHIPPED | OUTPATIENT
Start: 2021-09-26 | End: 2022-02-15

## 2021-09-26 RX ORDER — ALBUTEROL SULFATE 90 UG/1
1 AEROSOL, METERED RESPIRATORY (INHALATION)
Qty: 18 G | Refills: 0 | Status: SHIPPED | OUTPATIENT
Start: 2021-09-26

## 2021-09-26 RX ORDER — METOPROLOL SUCCINATE 100 MG/1
100 TABLET, EXTENDED RELEASE ORAL 2 TIMES DAILY
Qty: 60 TABLET | Refills: 0 | Status: SHIPPED | OUTPATIENT
Start: 2021-09-26 | End: 2021-10-26

## 2021-09-26 RX ADMIN — POTASSIUM CHLORIDE 20 MEQ: 20 TABLET, EXTENDED RELEASE ORAL at 06:31

## 2021-09-26 RX ADMIN — POTASSIUM CHLORIDE 40 MEQ: 20 TABLET, EXTENDED RELEASE ORAL at 06:33

## 2021-09-26 RX ADMIN — INSULIN LISPRO 5 UNITS: 100 INJECTION, SOLUTION INTRAVENOUS; SUBCUTANEOUS at 11:08

## 2021-09-26 RX ADMIN — BUDESONIDE AND FORMOTEROL FUMARATE DIHYDRATE 2 PUFF: 80; 4.5 AEROSOL RESPIRATORY (INHALATION) at 08:19

## 2021-09-26 RX ADMIN — METOPROLOL SUCCINATE 75 MG: 50 TABLET, EXTENDED RELEASE ORAL at 06:31

## 2021-09-26 RX ADMIN — PREDNISONE 20 MG: 20 TABLET ORAL at 06:32

## 2021-09-26 RX ADMIN — INSULIN LISPRO 5 UNITS: 100 INJECTION, SOLUTION INTRAVENOUS; SUBCUTANEOUS at 06:41

## 2021-09-26 RX ADMIN — Medication 5 ML: at 06:33

## 2021-09-26 RX ADMIN — ACETAMINOPHEN 650 MG: 325 TABLET ORAL at 06:41

## 2021-09-26 RX ADMIN — FUROSEMIDE 40 MG: 40 TABLET ORAL at 06:32

## 2021-09-26 NOTE — PROGRESS NOTES
Oxygen Qualifier       Room air: SpO2 with O2 and liter flow   Resting SpO2  90%  92% on 2L   Ambulating SpO2  87% 88% on 1L  91% on 2L         Completed by:    Hetal Ulloa PTA

## 2021-09-26 NOTE — PROGRESS NOTES
Physician Progress Note      Sudheer Maya  CSN #:                  [id number]  :                       1955  ADMIT DATE:       2021 9:06 PM  [address] [place] DATE:  RESPONDING  PROVIDER #:        Shannon Spear NP          QUERY TEXT:    Pt admitted with SOB and afib with RVR and noted to have documentation of pulmonary edema. If possible, please document in the progress notes and discharge summary if you are evaluating and/or treating any of the following: The medical record reflects the following:  Risk Factors: afib, obesity  Clinical Indicators: ECHO with EF 50 to 55. BNP 1230. CXR, prominent cardiac silhouette is suspected interstitial edema, volume overload. As per history and physical, physical exam, 3 plus indurated LE edema. Resp, clear, diminished. As per cardiology consult, diuresed  8.5l and LE edema is improved. Treatment: Lasix . Supplemental oxygen 5 liters, weaned to 2l. Options provided:  -- Acute pulmonary edema due to heart disease  -- Acute pulmonary edema due to heart failure  -- Other - I will add my own diagnosis  -- Disagree - Not applicable / Not valid  -- Disagree - Clinically unable to determine / Unknown  -- Refer to Clinical Documentation Reviewer    PROVIDER RESPONSE TEXT:    This patient has acute pulmonary edema due to heart disease.     Query created by: Divina Mendoza on 2021 2:04 PM      Electronically signed by:  Shannon Spear NP 2021 3:50 PM

## 2021-09-26 NOTE — DISCHARGE INSTRUCTIONS
Patient Education        Learning About Atrial Fibrillation  What is atrial fibrillation? Atrial fibrillation (say \"AY-tree-shaniqua bfu-pvxy-HIU-roderickun\") is a common type of irregular heartbeat (arrhythmia). Normally, the heart beats in a strong, steady rhythm. In atrial fibrillation, a problem with the heart's electrical system causes the two upper chambers of the heart (called the atria) to quiver, or fibrillate. Atrial fibrillation can be dangerous. This is because if the heartbeat isn't strong and steady, blood can collect, or pool, in the atria. And pooled blood is more likely to form clots. Clots can travel to the brain, block blood flow, and cause a stroke. Atrial fibrillation can also lead to heart failure. This condition also upsets the normal rhythm between the atria and the lower chambers of the heart. (These chambers are called the ventricles.) The ventricles may beat fast and without a regular rhythm. What are the symptoms? Some people feel symptoms when they have episodes of atrial fibrillation. But other people don't notice any symptoms. If you have symptoms, you may feel:  · A fluttering, racing, or pounding feeling in your chest called palpitations. · Weak or tired. · Dizzy or lightheaded. · Short of breath. · Chest pain. · Confused. You may notice signs of atrial fibrillation when you check your pulse. Your pulse may seem uneven or fast.  What can you expect when you have atrial fibrillation? At first, spells of atrial fibrillation may come on suddenly and last a short time. They may go away on their own or with treatment. Over time, the spells may last longer and occur more often. They often don't go away on their own. How is it treated? Treatments can help you feel better and prevent future problems, especially stroke and heart failure. Your treatment will depend on the cause of your atrial fibrillation, your symptoms, and your risk for stroke.  Types of treatment include:  · Heart rate treatment. Medicine may be used to slow your heart rate. Your heartbeat may still be irregular. But these medicines keep your heart from beating too fast. They may also help relieve symptoms. · Heart rhythm treatment. Different treatments may be used to try to stop atrial fibrillation and keep it from returning. They can also relieve symptoms. These treatments include medicine, electrical cardioversion to shock the heart back to a normal rhythm, a procedure called catheter ablation, and heart surgery. · Stroke prevention. You and your doctor can decide how to lower your risk. You may decide to take a blood-thinning medicine called an anticoagulant. What is a heart-healthy lifestyle for atrial fibrillation? You can live well and help manage atrial fibrillation by having a heart-healthy lifestyle. This lifestyle may help reduce how often you have episodes of atrial fibrillation. Don't smoke. Avoid secondhand smoke too. Quitting smoking is the best thing you can do for your heart. Be active. Talk to your doctor about what type and level of exercise is safe for you. Eat a heart-healthy diet. These foods include vegetables, fruits, nuts, beans, lean meat, fish, and whole grains. Limit sodium, alcohol, and sugar. Avoid alcohol if it triggers symptoms. Stay at a healthy weight. Lose weight if you need to. Losing weight can help relieve symptoms. Manage other health problems. These problems include diabetes, high blood pressure, and high cholesterol. If you think you may have a problem with alcohol or drug use, talk to your doctor. Manage stress. Options like yoga, biofeedback, and meditation may help. Where can you learn more? Go to http://www.gray.com/  Enter L274 in the search box to learn more about \"Learning About Atrial Fibrillation. \"  Current as of: April 29, 2021               Content Version: 13.0  © 8342-4426 [employer]wise, [facility].    Care instructions adapted under license by [employer] (which disclaims liability or warranty for this information). If you have questions about a medical condition or this instruction, always ask your healthcare professional. [address] any warranty or liability for your use of this information.

## 2021-09-26 NOTE — PROGRESS NOTES
Patient is up for discharge home today. Patient qualifies for 2L of Oxygen. Order placed, referral faxed to [facility]. DME delivered to patient's room. She was made aware that Genevieve will be giving her a phone call. Patient confirmed that she does not feel as though she needs any HH. CM confirmed receipt of referral with Genevieve. Patient will be getting picked up and transported home by her son. No other needs have been voiced at this time.      Care Management Interventions  Mode of Transport at Discharge: BLS (Son transporting patient home)  Discharge Durable Medical Equipment: Yes (Oxygen through [facility])  Physical Therapy Consult: No  Occupational Therapy Consult: No  Confirm Follow Up Transport: Family (patient's son transporting her home)  Discharge Location  Discharge Placement: Home

## 2021-09-26 NOTE — PROGRESS NOTES
[facility] CARDIOLOGY PROGRESS NOTE    9/26/2021 9:54 AM    Admit Date: 9/21/2021        Subjective:   Stable overnight without angina, syncope, CHF, or palpitations but remains in atrial fibrillation with heart rates 80-110bpm in bed.    Comfortable on nasal cannula now. Tolerating Xarelto.  Potassium low.  She complains of lower extremity calf tightness but has no pitting edema today.  Lower extremity duplex negative. Augmenting beta-blockers today      Objective:      Vitals:    09/25/21 2253 09/26/21 0429 09/26/21 0631 09/26/21 0821   BP: (!) 143/90 137/85 124/80    Pulse: 98 63 (!) 102    Resp: 20 20 20    Temp: 98.4 °F (36.9 °C) 98.1 °F (36.7 °C) 97.6 °F (36.4 °C)    SpO2: 93% 92% 91% 90%   Weight:       Height:           Physical Exam:  Neck- supple, cannot assess JVD  CV- regular rate and rhythm no MRG  Lung-distant but clear bilaterally anterolaterally   abd- soft, nontender, nondistended  Ext- no edema, wrinkled skin below knees  Skin- warm and dry    Data Review:   Recent Labs     09/26/21  0631 09/25/21  0542    143   K 4.3 3.4*   MG 2.1 2.2   BUN 15 16   CREA 0.57* 0.47*   * 95   WBC 8.4 7.9   HGB 13.6 11.1*   HCT 46.1 37.4    233       Assessment and Plan:        Principal Problem:    Atrial fibrillation with rapid ventricular response (H[facility]) (9/22/2021)- continue beta blocker, but increase to 100 mg twice daily and slowly increase as tolerated prior to discharge. Continue Xarelto.  Tolerating well. We will be on standby and follow from a distance. Please call if any further assistance is needed or if any new questions arise.  Thanks for the consult.        Active Problems:    Hypertension () continue to monitor with up titration of beta-blocker as needed       Class 3 obesity with alveolar hypoventilation, serious comorbidity, and body mass index (BMI) of 50.0 to 59.9 in adult [facility]) () dietary/nutrition counseling and weight loss counseling in the outpatient setting      Overview: BMI 45.8- 5/2/12       Controlled type 2 diabetes mellitus without complication, without long-term current use of insulin ([facility] [address] (9/9/2020) per primary physicians.  Recheck       Hypoxia (9/10/2020) slowly improving, continue supportive care, wean oxygen as tolerated     Hypokalemia-iatrogenic, repleted yesterday, recheck tomorrow     Lower extremity edema: Improved per patient, agree with once daily Lasix. Continue supportive care otherwise. We will be on standby and follow from a distance. Please call if any further assistance is needed or if any new questions arise. Thanks for the consult.        Celio Turk MD  [facility] Cardiology  Pager [phone]

## 2021-09-26 NOTE — DISCHARGE SUMMARY
Hospitalist Discharge Summary   Admit Date:  2021  9:06 PM   DC Note date: 2021  Name:  Tyler Ledezma   Age:  61 y.o. Sex:  female  :  1955   MRN:  [id number]   Room:  [facility]  PCP:  Lewis Seals MD    Presenting Complaint: Blood infection    Initial Admission Diagnosis: Atrial fibrillation with rapid ventricular response ([address] [I48.91]     Problem List for this Hospitalization:  Hospital Problems as of 2021 Date Reviewed: 2020        Codes Class Noted - Resolved POA    * (Principal) Atrial fibrillation with rapid ventricular response ([address] ICD-10-CM: I48.91  ICD-9-CM: 427.31  2021 - Present Yes        Restrictive lung disease (Chronic) ICD-10-CM: J98.4  ICD-9-CM: 518.89  10/22/2020 - Present Yes        Hypoxia ICD-10-CM: R09.02  ICD-9-CM: 799.02  9/10/2020 - Present Yes        Controlled type 2 diabetes mellitus without complication, without long-term current use of insulin (HCC) (Chronic) ICD-10-CM: E11.9  ICD-9-CM: 250.00  2020 - Present Yes        Hypertension (Chronic) ICD-10-CM: I10  ICD-9-CM: 401.9  Unknown - Present Yes        Class 3 obesity with alveolar hypoventilation, serious comorbidity, and body mass index (BMI) of 50.0 to 59.9 in [facility]) (Chronic) ICD-10-CM: L14.1, S44.15  ICD-9-CM: 278.03, V85.43  Unknown - Present Yes    Overview Signed 2019 11:59 AM by Lewis Seals MD     BMI 45.8- 12                 Did Patient have Sepsis (YES OR NO): No       Hospital Course:  Jaya Munson is a 61year old female with a PNH of Afib, HTN, DM, who presented to the ER with a complaint of SOB and increased pain in her LLE with a concern for cellulitis.  In the ER patient was found to be hypoxic with a sat of 84%. Given IV lasix for suspected pulmonary edema.  Endorses receiving 1 dose of COVID vaccination and COVID negative in ER     HFPEF  EF 50-55% Low normal systolic and left ventricular diastolic dysfunction; RVSP 41  21 Discharging with home lasix and K+ supplement,  BB and cardiology follow-up    Atrial fibrillation with rapid ventricular response ([facility] (9/22/2021)  This improved with IV Lopressor in the ER. Karen Peguero monitor closely.  Continue her home medications of metoprolol  9/22/21 Increased metoprolol to BID for better control; ECHO pending; Telemetry; PT/OT; [facility]  9/25/21 Cardiology following; continue titration of BB; EF 69-08%; LV diastolic fx; patient started on Xarelto;    9/26/21 HR stable with NSR ; Patient discharged on Metoprolol 100 mg BID  Active Problems:     LLE pain  9/22/21 Doppler pending   9/25/21 Doppler negative and pain resolved     Hypertension ()  Continue home medications  9/25/21 Stable BP; continue current regimen     Class 3 obesity with alveolar hypoventilation, serious comorbidity, and body mass index (BMI) of 50.0 to 59.9 in [facility]) ()     pt on lifestyle adjustments prior to discharge; increases morbidity and mortality; can impede treatment and recovery     Controlled type 2 diabetes mellitus without complication, without long-term current use of insulin ([facility] [address] (9/9/2020)  Not currently on long-term medications, will place on sliding scale insulin while inpatient  9/25/21 BGLs < 200; continue current regimen     Hypoxia (9/10/2020)  Unclear etiology, and probably multifactorial due to restrictive lung disease and severe obesity. Lodema Aberdeen is no evidence of pneumonia or infection, Covid testing is negative  9/22/21 ECHO pending ; continue diuretics; CXR suggesting volume overload  9/25/21 ECHO as above; patient weaned from O2 today; monitor; BCx NGTD    9/26/21 Patient did fail 6 minute walk test requiring 2L to maintain sats > 90%. This may be advancement in her COPD or cardiac related. Patient ordered pulmonology follow-up as well as Cardiology follow-up.  BCx remain negative    Restrictive lung disease    9/22/21 No noted wheezing; appears stable; continue home medications   9/26/21 Inhaler ordered; see above         Disposition: Home or Self Care  Diet: ADULT DIET Regular; 4 carb choices (60 gm/meal)  Code Status: Full Code    Follow Up Orders: Follow-up Appointments   Procedures    FOLLOW UP VISIT Appointment in: Two Weeks Follow-up with Cardiology     Follow-up with Cardiology     Standing Status:   Standing     Number of Occurrences:   1     Order Specific Question:   Appointment in     Answer: Two Weeks    FOLLOW UP VISIT Appointment in: Two Weeks Pulmonology in 2 weeks     Pulmonology in 2 weeks     Standing Status:   Standing     Number of Occurrences:   1     Standing Expiration Date:   9/27/2021     Order Specific Question:   Appointment in     Answer: Two Weeks    FOLLOW UP VISIT Appointment in: One Week PCP in 1 week     PCP in 1 week     Standing Status:   Standing     Number of Occurrences:   1     Standing Expiration Date:   9/27/2021     Order Specific Question:   Appointment in     Answer: One Week       Follow-up Information     Follow up With Specialties Details Why Contact Info    Celestina Abraham MD Internal Medicine   [address] [place] [address]  [phone]            Suggested initial follow up labs/diagnostics (ultimately defer to outpatient provider):  Cardiology/Pulmonology    Time spent in patient discharge and coordination 38 minutes. Plan was discussed with patient. All questions answered. Patient was stable at time of discharge. Instructions given to call a physician or return if any concerns. Discharge Info:   Current Discharge Medication List      START taking these medications    Details   predniSONE (DELTASONE) 20 mg tablet Take 20 mg by mouth daily (with breakfast). Qty: 2 Tablet, Refills: 0  Start date: 9/26/2021      rivaroxaban (XARELTO) 20 mg tab tablet Take 1 Tablet by mouth daily (with dinner) for 30 days.   Qty: 30 Tablet, Refills: 0  Start date: 9/26/2021, End date: 10/26/2021      albuterol (PROVENTIL HFA, VENTOLIN HFA, PROAIR HFA) 90 mcg/actuation inhaler Take 1 Puff by inhalation every four (4) hours as needed for Wheezing. Qty: 18 g, Refills: 0  Start date: 9/26/2021         CONTINUE these medications which have NOT CHANGED    Details   metoprolol succinate (TOPROL-XL) 25 mg XL tablet Take 1 Tab by mouth daily. Qty: 90 Tab, Refills: 3    Associated Diagnoses: Essential hypertension      furosemide (LASIX) 40 mg tablet Take 1 Tab by mouth daily. Qty: 90 Tab, Refills: 2    Associated Diagnoses: Diastolic CHF, chronic (HCC)      potassium chloride (K-DUR, KLOR-CON) 20 mEq tablet Take 1 Tab by mouth daily. Qty: 90 Tab, Refills: 2    Associated Diagnoses: Diastolic CHF, chronic (HCC)      OXYGEN-AIR DELIVERY SYSTEMS 2 L by Nasal route as needed (SOB). budesonide-formoteroL (SYMBICORT) 80-4.5 mcg/actuation HFAA Take 2 Puffs by inhalation two (2) times a day. Qty: 3 Inhaler, Refills: 3    Associated Diagnoses: Reactive airway disease without complication, unspecified asthma severity, unspecified whether persistent             Procedures done this admission:  * No surgery found *    Consults this admission:  IP CONSULT TO CARDIOLOGY    Echocardiogram/EKG results:  Results from Hospital Encounter encounter on 09/21/21    ECHO ADULT COMPLETE    Interpretation Summary  · LV: Estimated LVEF is 50 - 55%. Normal cavity size and wall thickness. Low normal systolic function. Left ventricular diastolic dysfunction. · Right Ventricle: Normal cavity size and global systolic function. · TV: Mild tricuspid valve regurgitation is present. Right Ventricular Arterial Pressure (RVSP) is 41 mmHg. · AV: Cannot rule out bicuspid aortic valve. No hemodynamically significant aortic stenosis. · MV: Mitral valve non-specific thickening. Mild mitral valve regurgitation is present. · RA: Mildly dilated right atrium. · LA: Mildly dilated left atrium.   · IVC: Severely elevated central venous pressure (15 mmHg); IVC diameter is larger than 21 mm and collapses less than 50% with respiration. · Contrast used: DEFINITY. Echo results may also be under imaging section below. EKG Results     Procedure 720 Value Units Date/Time    EKG, 12 LEAD, REPEAT [[id number]] Collected: 09/22/21 0015    Order Status: Completed Updated: 09/22/21 0740     Ventricular Rate 100 BPM      Atrial Rate 234 BPM      QRS Duration 60 ms      Q-T Interval 320 ms      QTC Calculation (Bezet) 412 ms      Calculated R Axis 56 degrees      Calculated T Axis 70 degrees      Diagnosis --     Atrial fibrillation  Abnormal ECG  When compared with ECG of 21-SEP-2021 21:15,  Criteria for Septal infarct are no longer Present  Confirmed by Rubbie Pod [id number]) on 9/22/2021 7:40:39 AM      EKG, 12 LEAD, INITIAL [[id number]] Collected: 09/21/21 2115    Order Status: Completed Updated: 09/22/21 0739     Ventricular Rate 130 BPM      Atrial Rate 441 BPM      QRS Duration 66 ms      Q-T Interval 280 ms      QTC Calculation (Bezet) 412 ms      Calculated R Axis 55 degrees      Calculated T Axis 53 degrees      Diagnosis --     Atrial fibrillation with rapid ventricular response with premature   ventricular or aberrantly conducted complexes  Low voltage QRS  Septal infarct (cited on or before 09-SEP-2020)  Abnormal ECG  When compared with ECG of 09-SEP-2020 21:38,  Atrial fibrillation has replaced Sinus rhythm  Confirmed by Rubbie Pod ([id number]) on 9/22/2021 7:39:52 AM            Diagnostic Imaging/Tests:   XR CHEST PORT    Result Date: 9/21/2021  PORTABLE CHEST 1 VIEW HISTORY: Shortness of breath COMPARISON: 9/16/2020 FINDINGS: The cardiac silhouette is prominent. Interstitial markings are thickened and there is peribronchial cuffing. There is no lobar consolidation or large pleural effusions. EKG leads are present. Prominent cardiac silhouette is suspected interstitial edema/volume overload.     DUPLEX LOWER EXT VENOUS LEFT    Result Date: 9/22/2021  Left lower extremity duplex venous doppler exam. Indication: pain and swelling. Technique: Gray-scale ultrasound with and without compression and color Doppler evaluation were performed of the deep veins of the left lower extremity from the level of the left common femoral vein to the level of the left popliteal vein. Peroneal and posterior tibial veins also interrogated. Findings: The left common femoral, profunda, greater saphenous, popliteal, and posterior tibial veins are compressible and opacify with color at color Doppler evaluation. Patient was unable to tolerate compression of the femoral vein, however normal color Doppler signal is preserved and no echogenic thrombus is evident. There is no evidence of deep vein thrombosis. The peroneal vein could not be visualized secondary to edema. Fluid collection within the popliteal fossa measuring 2.9 x 2.5 x 1.1 cm most likely reflecting a Baker's cyst.     Negative for DVT. ECHO ADULT COMPLETE    Result Date: 9/23/2021  · LV: Estimated LVEF is 50 - 55%. Normal cavity size and wall thickness. Low normal systolic function. Left ventricular diastolic dysfunction. · Right Ventricle: Normal cavity size and global systolic function. · TV: Mild tricuspid valve regurgitation is present. Right Ventricular Arterial Pressure (RVSP) is 41 mmHg. · AV: Cannot rule out bicuspid aortic valve. No hemodynamically significant aortic stenosis. · MV: Mitral valve non-specific thickening. Mild mitral valve regurgitation is present. · RA: Mildly dilated right atrium. · LA: Mildly dilated left atrium. · IVC: Severely elevated central venous pressure (15 mmHg); IVC diameter is larger than 21 mm and collapses less than 50% with respiration. · Contrast used: DEFINITY. CT CHEST PULMONARY EMBOLISM    Result Date: 9/21/2021  EXAM: CT angiogram chest. HISTORY: back pain, hypoxia, tachy.   TECHNIQUE: CT angiographic images of the chest were obtained following intravenous administration of contrast. Imaging was performed utilizing PE protocol. Examination was performed in the axial plane and coronal reconstructions were performed. 3-D MIPS reconstructions of the chest were obtained. Dose reduction technique used: Automated exposure control/Adjustment of the mA and/or kV according to patient size/Use of iterative reconstruction technique. COMPARISON: Chest CT dated 11/4/2020, CT urogram dated 1/10/2016. FINDINGS: There is adequate opacification of the pulmonary arterial tree. No significant filling defects are identified to suggest pulmonary embolism. Main pulmonary artery is normal in caliber. The heart is not enlarged and there is no pericardial effusion. The great vessels appear normal. The visualized thyroid is unremarkable. There are a few prominent mediastinal lymph nodes. Trachea and mainstem bronchi are patent. There is no consolidation, pleural effusion, or pneumothorax. No nodules are seen. There are appears to be pulmonary vascular congestion. This appearance may be in part to the images being acquired during expiration. Mild bibasilar atelectasis. Again there is fullness of the visualized left renal collecting system. Otherwise the visualized upper abdomen is unremarkable. Osseous structures are within normal limits. 1. No evidence of pulmonary embolism. 2. Mild bibasilar atelectasis. There appears to be pulmonary vascular congestion. These images appear to have been acquired during expiration which can account for this appearance. 3. Images of the upper abdomen show fullness of the visualized left renal collecting system. This is unchanged.       All Micro Results     Procedure Component Value Units Date/Time    CULTURE, BLOOD [[id number]] Collected: 09/21/21 2312    Order Status: Completed Specimen: Blood Updated: 09/26/21 1045     Special Requests: --        LEFT  HAND       Culture result: NO GROWTH 4 DAYS       CULTURE, BLOOD [[id number]] Collected: 09/22/21 1737    Order Status: Completed Specimen: Blood Updated: 09/26/21 1045     Special Requests: --        RIGHT  HAND       Culture result: NO GROWTH 4 DAYS       BLOOD CULTURE [[id number]] Collected: 09/21/21 2126    Order Status: Completed Specimen: Blood Updated: 09/26/21 1045     Special Requests: --        RIGHT  Antecubital       Culture result: NO GROWTH 5 DAYS       RESPIRATORY VIRUS PANEL W/COVID-19, PCR [[id number]] Collected: 09/21/21 2345    Order Status: Completed Specimen: Nasopharyngeal Updated: 09/22/21 0105     Adenovirus NOT DETECTED        Coronavirus 229E NOT DETECTED        Coronavirus HKU1 NOT DETECTED        Coronavirus CVNL63 NOT DETECTED        Coronavirus OC43 NOT DETECTED        SARS-CoV-2, PCR NOT DETECTED        Metapneumovirus NOT DETECTED        Rhinovirus and Enterovirus NOT DETECTED        Influenza A NOT DETECTED        Influenza B NOT DETECTED        Parainfluenza 1 NOT DETECTED        Parainfluenza 2 NOT DETECTED        Parainfluenza 3 NOT DETECTED        Parainfluenza virus 4 NOT DETECTED        RSV by PCR NOT DETECTED        B. parapertussis, PCR NOT DETECTED        Bordetella pertussis - PCR NOT DETECTED        Chlamydophila pneumoniae DNA, QL, PCR NOT DETECTED        Mycoplasma pneumoniae DNA, QL, PCR NOT DETECTED       COVID-19 RAPID TEST [[id number]] Collected: 09/21/21 2152    Order Status: Completed Specimen: Nasopharyngeal Updated: 09/21/21 2241     Specimen source Nasopharyngeal        COVID-19 rapid test Not detected        Comment:      The specimen is NEGATIVE for SARS-CoV-2, the novel coronavirus associated with COVID-19. A negative result does not rule out COVID-19. This test has been authorized by the FDA under an Emergency Use Authorization (EUA) for use by authorized laboratories.         Fact sheet for Healthcare Providers: kstattoo.com  Fact sheet for Patients: BetaVersity.com       Methodology: Isothermal Nucleic Acid Amplification               Labs: Results:       BMP, Mg, Phos Recent Labs     09/26/21  0631 09/25/21  0542 09/24/21  0601    143 144   K 4.3 3.4* 3.1*    102 101   CO2 32 38* 37*   AGAP 6* 3* 6*   BUN 15 16 22   CREA 0.57* 0.47* 0.58*   CA 9.2 8.8 8.8   * 95 96   MG 2.1 2.2  --       CBC Recent Labs     09/26/21  0631 09/25/21  0542   WBC 8.4 7.9   RBC 5.16 4.09   HGB 13.6 11.1*   HCT 46.1 37.4    233      LFT No results for input(s): ALT, TBIL, AP, TP, ALB, GLOB, AGRAT in the last 72 hours.     No lab exists for component: SGOT, GPT   Cardiac Testing Lab Results   Component Value Date/Time    B-type Natriuretic Peptide 121.0 (H) 01/08/2020 09:22 AM      Coagulation Tests No results found for: PTP, INR, APTT, INREXT   A1c Lab Results   Component Value Date/Time    Hemoglobin A1c 6.9 (H) 09/23/2021 05:54 AM    Hemoglobin A1c 6.7 (H) 12/02/2020 10:55 AM    Hemoglobin A1c 7.5 (H) 09/11/2020 05:17 AM      Lipid Panel Lab Results   Component Value Date/Time    Cholesterol, total 180 12/02/2020 10:55 AM    HDL Cholesterol 63 12/02/2020 10:55 AM    LDL, calculated 102 (H) 12/02/2020 10:55 AM    LDL, calculated 95 04/01/2019 10:55 AM    VLDL, calculated 15 12/02/2020 10:55 AM    VLDL, calculated 16 04/01/2019 10:55 AM    Triglyceride 80 12/02/2020 10:55 AM      Thyroid Panel Lab Results   Component Value Date/Time    TSH 2.670 09/24/2021 06:01 AM    TSH 2.740 12/02/2020 10:55 AM    TSH 2.630 10/29/2019 08:22 AM        Most Recent UA Lab Results   Component Value Date/Time    WBC 3-5 09/10/2020 12:18 AM    RBC 5-10 09/10/2020 12:18 AM    Epithelial cells 0-3 09/10/2020 12:18 AM    Bacteria TRACE 09/10/2020 12:18 AM    Casts 0 09/10/2020 12:18 AM    Crystals, urine OCCASIONAL 09/10/2020 12:18 AM    Mucus 0 09/10/2020 12:18 AM          All Labs from Last 24 Hrs:  Recent Results (from the past 24 hour(s))   GLUCOSE, POC    Collection Time: 09/25/21 11:36 AM   Result Value Ref Range    Glucose (POC) 153 (H) 65 - 100 mg/dL    Performed by Priya GLUCOSE, POC    Collection Time: 09/25/21  4:15 PM   Result Value Ref Range    Glucose (POC) 130 (H) 65 - 100 mg/dL    Performed by Priya    GLUCOSE, POC    Collection Time: 09/25/21  9:55 PM   Result Value Ref Range    Glucose (POC) 199 (H) 65 - 100 mg/dL    Performed by Opal    GLUCOSE, POC    Collection Time: 09/26/21  6:30 AM   Result Value Ref Range    Glucose (POC) 103 (H) 65 - 100 mg/dL    Performed by [facility]    METABOLIC PANEL, BASIC    Collection Time: 09/26/21  6:31 AM   Result Value Ref Range    Sodium 141 136 - 145 mmol/L    Potassium 4.3 3.5 - 5.1 mmol/L    Chloride 103 98 - 107 mmol/L    CO2 32 21 - 32 mmol/L    Anion gap 6 (L) 7 - 16 mmol/L    Glucose 102 (H) 65 - 100 mg/dL    BUN 15 8 - 23 MG/DL    Creatinine 0.57 (L) 0.6 - 1.0 MG/DL    GFR est AA >60 >60 ml/min/1.73m2    GFR est non-AA >60 >60 ml/min/1.73m2    Calcium 9.2 8.3 - 10.4 MG/DL   CBC W/O DIFF    Collection Time: 09/26/21  6:31 AM   Result Value Ref Range    WBC 8.4 4.3 - 11.1 K/uL    RBC 5.16 4.05 - 5.2 M/uL    HGB 13.6 11.7 - 15.4 g/dL    HCT 46.1 35.8 - 46.3 %    MCV 89.3 79.6 - 97.8 FL    MCH 26.4 26.1 - 32.9 PG    MCHC 29.5 (L) 31.4 - 35.0 g/dL    RDW 16.7 (H) 11.9 - 14.6 %    PLATELET 133 717 - 708 K/uL    MPV 11.3 9.4 - 12.3 FL    ABSOLUTE NRBC 0.00 0.0 - 0.2 K/uL   MAGNESIUM    Collection Time: 09/26/21  6:31 AM   Result Value Ref Range    Magnesium 2.1 1.8 - 2.4 mg/dL       Current Med List in Hospital:   Current Facility-Administered Medications   Medication Dose Route Frequency    metoprolol succinate (TOPROL-XL) XL tablet 100 mg  100 mg Oral BID    polyethylene glycol (MIRALAX) packet 17 g  17 g Oral DAILY PRN    potassium chloride (K-DUR, KLOR-CON) SR tablet 40 mEq  40 mEq Oral DAILY    insulin lispro (HUMALOG) injection 5 Units  5 Units SubCUTAneous TIDAC    insulin lispro (HUMALOG) injection   SubCUTAneous AC&HS    metoprolol (LOPRESSOR) injection 5 mg  5 mg IntraVENous Q6H PRN    potassium chloride (K-DUR, KLOR-CON) SR tablet 20 mEq  20 mEq Oral DAILY    rivaroxaban (XARELTO) tablet 20 mg  20 mg Oral DAILY WITH DINNER    budesonide-formoterol (SYMBICORT) 80-4.5 mcg inhaler  2 Puff Inhalation BID RT    furosemide (LASIX) tablet 40 mg  40 mg Oral DAILY    dextrose 40% (GLUTOSE) oral gel 1 Tube  15 g Oral PRN    glucagon (GLUCAGEN) injection 1 mg  1 mg IntraMUSCular PRN    dextrose (D50W) injection syrg 12.5-25 g  25-50 mL IntraVENous PRN    sodium chloride (NS) flush 5-40 mL  5-40 mL IntraVENous Q8H    sodium chloride (NS) flush 5-40 mL  5-40 mL IntraVENous PRN    acetaminophen (TYLENOL) tablet 650 mg  650 mg Oral Q6H PRN    Or    acetaminophen (TYLENOL) suppository 650 mg  650 mg Rectal Q6H PRN    promethazine (PHENERGAN) tablet 12.5 mg  12.5 mg Oral Q6H PRN    Or    ondansetron (ZOFRAN) injection 4 mg  4 mg IntraVENous Q6H PRN    predniSONE (DELTASONE) tablet 20 mg  20 mg Oral DAILY WITH BREAKFAST    sodium chloride (NS) flush 5-10 mL  5-10 mL IntraVENous Q8H    sodium chloride (NS) flush 5-10 mL  5-10 mL IntraVENous PRN       Allergies   Allergen Reactions    Lortab [Hydrocodone-Acetaminophen] Nausea Only and Other (comments)     \"It makes me feel hung over\"     Immunization History   Administered Date(s) Administered    BCG Vaccine 01/01/1960    COVID-19, PFIZER, MRNA, LNP-S, PF, 30MCG/0.3ML DOSE 03/26/2021, 04/16/2021       Recent Vital Data:  Patient Vitals for the past 24 hrs:   Temp Pulse Resp BP SpO2   09/26/21 0821     90 %   09/26/21 0631 97.6 °F (36.4 °C) (!) 102 20 124/80 91 %   09/26/21 0429 98.1 °F (36.7 °C) 63 20 137/85 92 %   09/25/21 2253 98.4 °F (36.9 °C) 98 20 (!) 143/90 93 %   09/25/21 2158     93 %   09/25/21 1947 98.3 °F (36.8 °C) (!) 101 20 (!) 131/91 94 %   09/25/21 1615 98 °F (36.7 °C) (!) 56 18 118/83 95 %   09/25/21 1204 98.1 °F (36.7 °C) 100 19 109/77 90 %     Oxygen Therapy  O2 Sat (%): 90 % (09/26/21 0821)  Pulse via Oximetry: 88 beats per minute (09/26/21 8100)  O2 Device: Nasal cannula (09/26/21 7400)  Skin Assessment: Clean, dry, & intact (09/25/21 7325)  O2 Flow Rate (L/min): 1 l/min (09/26/21 0874)    Estimated body mass index is 59.99 kg/m² as calculated from the following:    Height as of this encounter: 5' 7\" (1.702 m). Weight as of this encounter: 173.7 kg (383 lb). Intake/Output Summary (Last 24 hours) at 9/26/2021 1059  Last data filed at 9/25/2021 1959  Gross per 24 hour   Intake 355 ml   Output    Net 355 ml         Physical Exam:  General:    Well nourished. No overt distress  Head:  Normocephalic, atraumatic  Eyes:  Sclerae appear normal.  Pupils equally round. HENT:  Nares appear normal, no drainage. Moist mucous membranes  Neck:  No restricted ROM. Trachea midline  CV:   RRR. No m/r/g. No JVD  Lungs:   CTAB. No wheezing, rhonchi, or rales. Even, unlabored  Abdomen:   Soft, nontender, nondistended. Extremities: Warm and dry. No cyanosis or clubbing. No edema. Skin:     No rashes. Normal coloration  Neuro:  Cranial nerves II-XII grossly intact. Psych:  Normal mood and affect. Signed:  Cady Roth NP    Part of this note may have been written by using a voice dictation software. The note has been proof read but may still contain some grammatical/other typographical errors.

## 2021-09-27 LAB
BACTERIA SPEC CULT: NORMAL
BACTERIA SPEC CULT: NORMAL
SERVICE CMNT-IMP: NORMAL
SERVICE CMNT-IMP: NORMAL

## 2021-11-11 PROBLEM — I50.32 CHRONIC DIASTOLIC HEART FAILURE (HCC): Status: ACTIVE | Noted: 2021-11-11

## 2022-02-03 ENCOUNTER — HOSPITAL ENCOUNTER (INPATIENT)
Age: 67
LOS: 12 days | Discharge: HOME HEALTH | DRG: 291 | End: 2022-02-15
Attending: EMERGENCY MEDICINE | Admitting: INTERNAL MEDICINE
Payer: COMMERCIAL

## 2022-02-03 ENCOUNTER — APPOINTMENT (OUTPATIENT)
Dept: GENERAL RADIOLOGY | Age: 67
DRG: 291 | End: 2022-02-03
Attending: EMERGENCY MEDICINE
Payer: COMMERCIAL

## 2022-02-03 DIAGNOSIS — J96.22 ACUTE ON CHRONIC RESPIRATORY FAILURE WITH HYPOXIA AND HYPERCAPNIA (HCC): ICD-10-CM

## 2022-02-03 DIAGNOSIS — J96.21 ACUTE ON CHRONIC RESPIRATORY FAILURE WITH HYPOXIA AND HYPERCAPNIA (HCC): ICD-10-CM

## 2022-02-03 DIAGNOSIS — I50.32 DIASTOLIC CHF, CHRONIC (HCC): ICD-10-CM

## 2022-02-03 DIAGNOSIS — E11.9 CONTROLLED TYPE 2 DIABETES MELLITUS WITHOUT COMPLICATION, WITHOUT LONG-TERM CURRENT USE OF INSULIN (HCC): Chronic | ICD-10-CM

## 2022-02-03 DIAGNOSIS — I50.9 ACUTE CONGESTIVE HEART FAILURE, UNSPECIFIED HEART FAILURE TYPE (HCC): ICD-10-CM

## 2022-02-03 DIAGNOSIS — R09.02 HYPOXIA: ICD-10-CM

## 2022-02-03 DIAGNOSIS — J98.4 RESTRICTIVE LUNG DISEASE: Chronic | ICD-10-CM

## 2022-02-03 DIAGNOSIS — I50.33 DIASTOLIC CHF, ACUTE ON CHRONIC (HCC): ICD-10-CM

## 2022-02-03 DIAGNOSIS — J96.12 CHRONIC RESPIRATORY FAILURE WITH HYPOXIA AND HYPERCAPNIA (HCC): ICD-10-CM

## 2022-02-03 DIAGNOSIS — I10 PRIMARY HYPERTENSION: Chronic | ICD-10-CM

## 2022-02-03 DIAGNOSIS — D64.9 ACUTE ANEMIA: Primary | ICD-10-CM

## 2022-02-03 DIAGNOSIS — I48.91 ATRIAL FIBRILLATION WITH RAPID VENTRICULAR RESPONSE (HCC): ICD-10-CM

## 2022-02-03 DIAGNOSIS — E66.2 CLASS 3 OBESITY WITH ALVEOLAR HYPOVENTILATION, SERIOUS COMORBIDITY, AND BODY MASS INDEX (BMI) OF 50.0 TO 59.9 IN ADULT (HCC): Chronic | ICD-10-CM

## 2022-02-03 DIAGNOSIS — J96.11 CHRONIC RESPIRATORY FAILURE WITH HYPOXIA AND HYPERCAPNIA (HCC): ICD-10-CM

## 2022-02-03 DIAGNOSIS — I48.91 ATRIAL FIBRILLATION WITH RVR (HCC): ICD-10-CM

## 2022-02-03 DIAGNOSIS — J96.01 ACUTE RESPIRATORY FAILURE WITH HYPOXIA (HCC): ICD-10-CM

## 2022-02-03 LAB
ALBUMIN SERPL-MCNC: 2.7 G/DL (ref 3.2–4.6)
ALBUMIN/GLOB SERPL: 0.6 {RATIO} (ref 1.2–3.5)
ALP SERPL-CCNC: 121 U/L (ref 50–136)
ALT SERPL-CCNC: 24 U/L (ref 12–65)
ANION GAP SERPL CALC-SCNC: 3 MMOL/L (ref 7–16)
AST SERPL-CCNC: 18 U/L (ref 15–37)
BASOPHILS # BLD: 0.1 K/UL (ref 0–0.2)
BASOPHILS NFR BLD: 1 % (ref 0–2)
BILIRUB SERPL-MCNC: 0.6 MG/DL (ref 0.2–1.1)
BNP SERPL-MCNC: 1328 PG/ML (ref 5–125)
BUN SERPL-MCNC: 19 MG/DL (ref 8–23)
CALCIUM SERPL-MCNC: 8.9 MG/DL (ref 8.3–10.4)
CALCULATED R AXIS, ECG10: 61 DEGREES
CALCULATED T AXIS, ECG11: 47 DEGREES
CHLORIDE SERPL-SCNC: 110 MMOL/L (ref 98–107)
CO2 SERPL-SCNC: 28 MMOL/L (ref 21–32)
CREAT SERPL-MCNC: 0.7 MG/DL (ref 0.6–1)
DIAGNOSIS, 93000: NORMAL
DIFFERENTIAL METHOD BLD: ABNORMAL
EOSINOPHIL # BLD: 0.1 K/UL (ref 0–0.8)
EOSINOPHIL NFR BLD: 2 % (ref 0.5–7.8)
ERYTHROCYTE [DISTWIDTH] IN BLOOD BY AUTOMATED COUNT: 19.3 % (ref 11.9–14.6)
FERRITIN SERPL-MCNC: 13 NG/ML (ref 8–388)
FOLATE SERPL-MCNC: 9.7 NG/ML (ref 3.1–17.5)
GLOBULIN SER CALC-MCNC: 4.4 G/DL (ref 2.3–3.5)
GLUCOSE BLD STRIP.AUTO-MCNC: 139 MG/DL (ref 65–100)
GLUCOSE BLD STRIP.AUTO-MCNC: 172 MG/DL (ref 65–100)
GLUCOSE SERPL-MCNC: 140 MG/DL (ref 65–100)
HCT VFR BLD AUTO: 24.5 % (ref 35.8–46.3)
HCT VFR BLD AUTO: 26.3 % (ref 35.8–46.3)
HGB BLD-MCNC: 6.5 G/DL (ref 11.7–15.4)
HGB BLD-MCNC: 7.2 G/DL (ref 11.7–15.4)
HGB RETIC QN AUTO: 19 PG (ref 29–35)
HISTORY CHECKED?,CKHIST: NORMAL
IMM GRANULOCYTES # BLD AUTO: 0 K/UL (ref 0–0.5)
IMM GRANULOCYTES NFR BLD AUTO: 0 % (ref 0–5)
IMM RETICS NFR: 29.2 % (ref 3–15.9)
INR PPP: 1.2
IRON SATN MFR SERPL: 4 %
IRON SERPL-MCNC: 16 UG/DL (ref 35–150)
LDH SERPL L TO P-CCNC: 258 U/L (ref 110–210)
LYMPHOCYTES # BLD: 1.3 K/UL (ref 0.5–4.6)
LYMPHOCYTES NFR BLD: 20 % (ref 13–44)
MCH RBC QN AUTO: 21 PG (ref 26.1–32.9)
MCHC RBC AUTO-ENTMCNC: 26.5 G/DL (ref 31.4–35)
MCV RBC AUTO: 79 FL (ref 79.6–97.8)
MONOCYTES # BLD: 0.8 K/UL (ref 0.1–1.3)
MONOCYTES NFR BLD: 13 % (ref 4–12)
NEUTS SEG # BLD: 3.9 K/UL (ref 1.7–8.2)
NEUTS SEG NFR BLD: 64 % (ref 43–78)
NRBC # BLD: 0.03 K/UL (ref 0–0.2)
PLATELET # BLD AUTO: 278 K/UL (ref 150–450)
PMV BLD AUTO: 10.3 FL (ref 9.4–12.3)
POTASSIUM SERPL-SCNC: 4 MMOL/L (ref 3.5–5.1)
PROT SERPL-MCNC: 7.1 G/DL (ref 6.3–8.2)
PROTHROMBIN TIME: 15.6 SEC (ref 12.6–14.5)
Q-T INTERVAL, ECG07: 328 MS
QRS DURATION, ECG06: 68 MS
QTC CALCULATION (BEZET), ECG08: 435 MS
RBC # BLD AUTO: 3.1 M/UL (ref 4.05–5.2)
RETICS # AUTO: 0.07 M/UL (ref 0.03–0.1)
RETICS/RBC NFR AUTO: 2.3 % (ref 0.3–2)
SERVICE CMNT-IMP: ABNORMAL
SERVICE CMNT-IMP: ABNORMAL
SODIUM SERPL-SCNC: 141 MMOL/L (ref 136–145)
TIBC SERPL-MCNC: 456 UG/DL (ref 250–450)
VENTRICULAR RATE, ECG03: 106 BPM
VIT B12 SERPL-MCNC: 635 PG/ML (ref 193–986)
WBC # BLD AUTO: 6.2 K/UL (ref 4.3–11.1)

## 2022-02-03 PROCEDURE — 82607 VITAMIN B-12: CPT

## 2022-02-03 PROCEDURE — 85610 PROTHROMBIN TIME: CPT

## 2022-02-03 PROCEDURE — 83615 LACTATE (LD) (LDH) ENZYME: CPT

## 2022-02-03 PROCEDURE — 30233N1 TRANSFUSION OF NONAUTOLOGOUS RED BLOOD CELLS INTO PERIPHERAL VEIN, PERCUTANEOUS APPROACH: ICD-10-PCS | Performed by: EMERGENCY MEDICINE

## 2022-02-03 PROCEDURE — 74011000250 HC RX REV CODE- 250: Performed by: EMERGENCY MEDICINE

## 2022-02-03 PROCEDURE — P9016 RBC LEUKOCYTES REDUCED: HCPCS

## 2022-02-03 PROCEDURE — 86900 BLOOD TYPING SEROLOGIC ABO: CPT

## 2022-02-03 PROCEDURE — 77010033678 HC OXYGEN DAILY

## 2022-02-03 PROCEDURE — 80053 COMPREHEN METABOLIC PANEL: CPT

## 2022-02-03 PROCEDURE — 74011250636 HC RX REV CODE- 250/636: Performed by: EMERGENCY MEDICINE

## 2022-02-03 PROCEDURE — 85025 COMPLETE CBC W/AUTO DIFF WBC: CPT

## 2022-02-03 PROCEDURE — 85046 RETICYTE/HGB CONCENTRATE: CPT

## 2022-02-03 PROCEDURE — 65660000000 HC RM CCU STEPDOWN

## 2022-02-03 PROCEDURE — 36415 COLL VENOUS BLD VENIPUNCTURE: CPT

## 2022-02-03 PROCEDURE — 86580 TB INTRADERMAL TEST: CPT | Performed by: INTERNAL MEDICINE

## 2022-02-03 PROCEDURE — 74011250636 HC RX REV CODE- 250/636: Performed by: INTERNAL MEDICINE

## 2022-02-03 PROCEDURE — 82728 ASSAY OF FERRITIN: CPT

## 2022-02-03 PROCEDURE — 74011636637 HC RX REV CODE- 636/637: Performed by: INTERNAL MEDICINE

## 2022-02-03 PROCEDURE — 83010 ASSAY OF HAPTOGLOBIN QUANT: CPT

## 2022-02-03 PROCEDURE — 74011000258 HC RX REV CODE- 258: Performed by: EMERGENCY MEDICINE

## 2022-02-03 PROCEDURE — 71045 X-RAY EXAM CHEST 1 VIEW: CPT

## 2022-02-03 PROCEDURE — 99284 EMERGENCY DEPT VISIT MOD MDM: CPT

## 2022-02-03 PROCEDURE — 36430 TRANSFUSION BLD/BLD COMPNT: CPT

## 2022-02-03 PROCEDURE — 93005 ELECTROCARDIOGRAM TRACING: CPT | Performed by: EMERGENCY MEDICINE

## 2022-02-03 PROCEDURE — 86923 COMPATIBILITY TEST ELECTRIC: CPT

## 2022-02-03 PROCEDURE — 85018 HEMOGLOBIN: CPT

## 2022-02-03 PROCEDURE — 74011000250 HC RX REV CODE- 250: Performed by: INTERNAL MEDICINE

## 2022-02-03 PROCEDURE — 99223 1ST HOSP IP/OBS HIGH 75: CPT | Performed by: INTERNAL MEDICINE

## 2022-02-03 PROCEDURE — 94762 N-INVAS EAR/PLS OXIMTRY CONT: CPT

## 2022-02-03 PROCEDURE — 82962 GLUCOSE BLOOD TEST: CPT

## 2022-02-03 PROCEDURE — 82746 ASSAY OF FOLIC ACID SERUM: CPT

## 2022-02-03 PROCEDURE — 83880 ASSAY OF NATRIURETIC PEPTIDE: CPT

## 2022-02-03 PROCEDURE — 94760 N-INVAS EAR/PLS OXIMETRY 1: CPT

## 2022-02-03 PROCEDURE — 83540 ASSAY OF IRON: CPT

## 2022-02-03 PROCEDURE — 74011250637 HC RX REV CODE- 250/637: Performed by: NURSE PRACTITIONER

## 2022-02-03 RX ORDER — SODIUM CHLORIDE 0.9 % (FLUSH) 0.9 %
5-10 SYRINGE (ML) INJECTION EVERY 8 HOURS
Status: DISCONTINUED | OUTPATIENT
Start: 2022-02-03 | End: 2022-02-15 | Stop reason: HOSPADM

## 2022-02-03 RX ORDER — SODIUM CHLORIDE 9 MG/ML
250 INJECTION, SOLUTION INTRAVENOUS AS NEEDED
Status: DISCONTINUED | OUTPATIENT
Start: 2022-02-03 | End: 2022-02-07

## 2022-02-03 RX ORDER — OXYCODONE HYDROCHLORIDE 5 MG/1
5 TABLET ORAL
Status: DISCONTINUED | OUTPATIENT
Start: 2022-02-03 | End: 2022-02-15 | Stop reason: HOSPADM

## 2022-02-03 RX ORDER — FUROSEMIDE 10 MG/ML
40 INJECTION INTRAMUSCULAR; INTRAVENOUS
Status: COMPLETED | OUTPATIENT
Start: 2022-02-03 | End: 2022-02-03

## 2022-02-03 RX ORDER — INSULIN LISPRO 100 [IU]/ML
INJECTION, SOLUTION INTRAVENOUS; SUBCUTANEOUS
Status: DISCONTINUED | OUTPATIENT
Start: 2022-02-03 | End: 2022-02-15 | Stop reason: HOSPADM

## 2022-02-03 RX ORDER — SODIUM CHLORIDE 0.9 % (FLUSH) 0.9 %
5-10 SYRINGE (ML) INJECTION AS NEEDED
Status: DISCONTINUED | OUTPATIENT
Start: 2022-02-03 | End: 2022-02-15 | Stop reason: HOSPADM

## 2022-02-03 RX ORDER — MORPHINE SULFATE 4 MG/ML
2 INJECTION INTRAVENOUS
Status: DISCONTINUED | OUTPATIENT
Start: 2022-02-03 | End: 2022-02-07

## 2022-02-03 RX ORDER — FUROSEMIDE 10 MG/ML
40 INJECTION INTRAMUSCULAR; INTRAVENOUS 2 TIMES DAILY
Status: DISCONTINUED | OUTPATIENT
Start: 2022-02-03 | End: 2022-02-15

## 2022-02-03 RX ADMIN — SODIUM CHLORIDE, PRESERVATIVE FREE 10 ML: 5 INJECTION INTRAVENOUS at 22:26

## 2022-02-03 RX ADMIN — Medication 5 UNITS: at 16:58

## 2022-02-03 RX ADMIN — FUROSEMIDE 40 MG: 10 INJECTION, SOLUTION INTRAMUSCULAR; INTRAVENOUS at 18:26

## 2022-02-03 RX ADMIN — OXYCODONE HYDROCHLORIDE 5 MG: 5 TABLET ORAL at 18:40

## 2022-02-03 RX ADMIN — FUROSEMIDE 40 MG: 10 INJECTION, SOLUTION INTRAMUSCULAR; INTRAVENOUS at 13:24

## 2022-02-03 RX ADMIN — Medication 2 UNITS: at 22:26

## 2022-02-03 RX ADMIN — SODIUM CHLORIDE, PRESERVATIVE FREE 5 ML: 5 INJECTION INTRAVENOUS at 18:08

## 2022-02-03 RX ADMIN — SODIUM CHLORIDE 2.5 MG/HR: 900 INJECTION, SOLUTION INTRAVENOUS at 13:27

## 2022-02-03 NOTE — ACP (ADVANCE CARE PLANNING)
Advance Care Planning   Healthcare Decision Maker:     Primary decision maker tami Fleming [phone]    Click here to [address] including selection of the Healthcare Decision Maker Relationship (ie \"Primary\")  Today we documented Decision Maker(s) consistent with Legal Next of Kin hierarchy.

## 2022-02-03 NOTE — ROUTINE PROCESS
TRANSFER - IN REPORT:    Verbal report received from OPAL Serrano on KOMAL Rice being received from ED for routine progression of care. Report consisted of patients Situation, Background, Assessment and Recommendations(SBAR). Opportunity for questions and clarification was provided. Assessment completed upon patients arrival to unit and care assumed. Patient received to room 302. Patient connected to monitor and assessment completed. Plan of care reviewed. Patient oriented to room and call light. Patient aware to use call light to communicate any chest pain or needs.      Admission skin assessment completed with second RN and reveals the following:   -Scattered scabs and scars  -BLE red, swelling, and weeping  -Wound on Right calf  -Sacrum and coccyx intact- no breakdown  -Heels red and some flaking

## 2022-02-03 NOTE — Clinical Note
Status[de-identified] INPATIENT [101]   Type of Bed: Intensive Care [6]   Inpatient Hospitalization Certified Necessary for the Following Reasons: 3.  Patient receiving treatment that can only be provided in an inpatient setting (further clarification in H&P documentation)   Admitting Diagnosis: Atrial fibrillation with RVR [facility]) [[id number]]   Admitting Physician: Maria Eugenia Vera [[id number]]   Attending Physician: Latoya Grayson   Estimated Length of Stay: 3-4 Midnights   Discharge Plan[de-identified] Extended Care Facility (e.g. Adult Home, Nursing Home, etc.)

## 2022-02-03 NOTE — H&P
Hospitalist History and Physical   Admit Date:  2/3/2022 11:07 AM   Name:  Peri Hudson   Age:  61 y.o. Sex:  female  :  1955   MRN:  [id number]   Room:  02/    Presenting Complaint: Abnormal Lab Results and Peripheral Edema    Reason(s) for Admission: Atrial fibrillation with RVR ([facility] [I48.91]     History of Present Illness:   Peri Hudson is a 61 y.o. female with medical history of Afib (on Xarelto), HTN, morbid obesity complicated by LUNA/OHS, chronic diastolic heart fialure presents with worsening shortness of breath, fatigue, and worsening swelling in her lower extremities. Her symptoms have been getting worse over the past several days. She denies any black/red stools and has not noticed any unusual bruising or bleeding. She denies sick contacts. She denies fevers/chills, chest pain, abdominal pain, nausea/vomiting or diarrhea. She says she occasionally gets heart palpitations where her heart \"keeps going boom boom\". ED Course: Hgb of 6.5 (last Hgb was 13 in 2021). MCV of 79 with RDW of 19. pBNP of 1300. CXR shows vascular congestion. EKG shows Afib with RVR with HRs in the 100s. Occult blood negative. Type/screen for pRBC transfusion. Given Lasix 40 mg IV once. Hospitalist consulted for admission. Review of Systems:  10 systems reviewed and negative except as noted in HPI. Assessment & Plan:      Active Problems:    Atrial fibrillation with RVR ([facility] (2/3/2022)  - hold Xarelto   - started on Cardizem gtt  - telemetry  - consult cardiology     # Symptomatic anemia  - occult blood negative  - concern for RICHA as RDW is elevated and MCV is depressed  - check iron studies, B12/folate, LDH/haptoglobin, reticulocyte count  - type/screen and transfuse one unit pRBC  - transfuse for Hgb<7 and/or brisk bleeding  - avoid NSAIDs  - hold Xarelto     # Acute on chronic diastolic heart failure  - also driven by Afib with RVR  - Lasix 40 mg BID  - strict I's/O's  - daily weights    # Acute hypoxic respiratory failure  - likely driven by above processes  - management as above  - wean supplemental oxygen as tolerated  - goal SpO2>90%    # HTN  - hold home antihypertensives    # Morbid obesity  - complicates course of hospitalization     F/E/N: no fluids, replete electrolytes as needed, regular diet    Ppx: SCDs for VTE    Code Status: FULL CODE    Disposition: Admit to Inpatient with plan as above. Discussed with patient and with son at bedside. PT/OT consults and PPD ordered. All questions answered. 36 minutes of critical care time spent with this patient. Hospital Problems as of 2/3/2022 Date Reviewed: 11/11/2021          Codes Class Noted - Resolved POA    Atrial fibrillation with RVR ([facility] ICD-10-CM: I48.91  ICD-9-CM: 427.31  2/3/2022 - Present Unknown              Past History:  Past Medical History:   Diagnosis Date    Chronic pain     pain R knee    Hypertension     Morbid obesity ([facility]     BMI 45.8- 5/2/12    Positive PPD     had vaccination as a child - BCG     Past Surgical History:   Procedure Laterality Date    HX GYN      C-sec x 1 with GA    HX KNEE ARTHROSCOPY      bilateral knees      Allergies   Allergen Reactions    Lortab [Hydrocodone-Acetaminophen] Nausea Only and Other (comments)     \"It makes me feel hung over\"      Social History     Tobacco Use    Smoking status: Former Smoker     Packs/day: 0.50     Years: 25.00     Pack years: 12.50     Types: Cigarettes     Quit date: 2/7/2007     Years since quitting: 15.0    Smokeless tobacco: Never Used   Substance Use Topics    Alcohol use: No      Family History   Problem Relation Age of Onset    Other Mother         kidney failure    Cancer Sister         cervical cancer      Family history reviewed and negative except as noted above.     Immunization History   Administered Date(s) Administered    BCG Vaccine 01/01/1962    COVID-19, Pfizer Purple top, DILUTE for use, 12+ yrs, 30mcg/0.3mL dose 03/26/2021, 04/16/2021     Prior to Admit Medications:  Current Outpatient Medications   Medication Instructions    albuterol (PROVENTIL HFA, VENTOLIN HFA, PROAIR HFA) 90 mcg/actuation inhaler 1 Puff, Inhalation, EVERY 4 HOURS AS NEEDED    albuterol (PROVENTIL HFA, VENTOLIN HFA, PROAIR HFA) 90 mcg/actuation inhaler 2 Puffs, Inhalation, EVERY 4 HOURS AS NEEDED    furosemide (LASIX) 40 mg, Oral, DAILY    OXYGEN-AIR DELIVERY SYSTEMS 2 L, Nasal, EVERY BEDTIME    potassium chloride (K-DUR, KLOR-CON) 20 mEq tablet 20 mEq, Oral, DAILY    predniSONE (DELTASONE) 20 mg, Oral, DAILY WITH BREAKFAST       Objective:     Patient Vitals for the past 24 hrs:   Temp Pulse Resp BP SpO2   02/03/22 1232  (!) 117 (!) 34 122/85 91 %   02/03/22 1217  100 26 (!) 134/98 100 %   02/03/22 1121  (!) 126 27  93 %   02/03/22 1120  (!) 115 29 90/74 (!) 89 %   02/03/22 1054 98.2 °F (36.8 °C) 95 20 116/86 92 %     Oxygen Therapy  O2 Sat (%): 91 % (02/03/22 1232)  Pulse via Oximetry: 118 beats per minute (02/03/22 1232)  O2 Device: Nasal cannula (02/03/22 1121)  O2 Flow Rate (L/min): 2 l/min (02/03/22 1121)    Estimated body mass index is 60.3 kg/m² as calculated from the following:    Height as of this encounter: 5' 7\" (1.702 m). Weight as of this encounter: 174.6 kg (385 lb). No intake or output data in the 24 hours ending 02/03/22 1322      Physical Exam:    Blood pressure 122/85, pulse (!) 117, temperature 98.2 °F (36.8 °C), resp. rate (!) 34, height 5' 7\" (1.702 m), weight (!) 174.6 kg (385 lb), SpO2 91 %. General:    Well nourished. No overt distress  Head:  Normocephalic, atraumatic  Eyes:  Sclerae appear normal.  Pupils equally round. ENT:  Nares appear normal, no drainage. Moist oral mucosa  Neck:  No restricted ROM. Trachea midline   CV:   Tachycardic rate with irregularly irregular rhythm, no JVC  Lungs:   Coarse breath sounds with tachypnea. Non wheezing. Nonlabored. Abdomen: Bowel sounds present.   Soft, nontender, nondistended. Obese. Extremities: Wrapped legs with +2 pitting edema b/l. Skin:     No rashes and normal coloration. Warm and dry. Neuro:  CN II-XII grossly intact. Sensation intact. A&Ox3  Psych:  Normal mood and affect.       I have reviewed ordered lab tests and independently visualized imaging below:    Last 24hr Labs:  Recent Results (from the past 24 hour(s))   EKG, 12 LEAD, INITIAL    Collection Time: 02/03/22 10:57 AM   Result Value Ref Range    Ventricular Rate 106 BPM    QRS Duration 68 ms    Q-T Interval 328 ms    QTC Calculation (Bezet) 435 ms    Calculated R Axis 61 degrees    Calculated T Axis 47 degrees    Diagnosis       Atrial fibrillation with rapid ventricular response  Low voltage QRS  Nonspecific ST abnormality  Abnormal ECG  When compared with ECG of 22-SEP-2021 00:15,  Nonspecific T wave abnormality now evident in Anterior leads  Confirmed by Mariano Patel MD (), JOO CHEEK ([id number]) on 2/3/2022 12:50:05 PM     TYPE & SCREEN    Collection Time: 02/03/22 11:05 AM   Result Value Ref Range    Crossmatch Expiration 02/06/2022,2359     ABO/Rh(D) A POSITIVE     Antibody screen NEG     Comment       SHARLENE IN ER NOTIFIED BLOOD READY AT 1302 2/3/22 1150 [facility]    Unit number [id number]     Blood component type  LR     Unit division 00     Status of unit ALLOCATED     Crossmatch result Compatible    CBC WITH AUTOMATED DIFF    Collection Time: 02/03/22 11:05 AM   Result Value Ref Range    WBC 6.2 4.3 - 11.1 K/uL    RBC 3.10 (L) 4.05 - 5.2 M/uL    HGB 6.5 (LL) 11.7 - 15.4 g/dL    HCT 24.5 (L) 35.8 - 46.3 %    MCV 79.0 (L) 79.6 - 97.8 FL    MCH 21.0 (L) 26.1 - 32.9 PG    MCHC 26.5 (L) 31.4 - 35.0 g/dL    RDW 19.3 (H) 11.9 - 14.6 %    PLATELET 988 973 - 843 K/uL    MPV 10.3 9.4 - 12.3 FL    ABSOLUTE NRBC 0.03 0.0 - 0.2 K/uL    DF AUTOMATED      NEUTROPHILS 64 43 - 78 %    LYMPHOCYTES 20 13 - 44 %    MONOCYTES 13 (H) 4.0 - 12.0 %    EOSINOPHILS 2 0.5 - 7.8 %    BASOPHILS 1 0.0 - 2.0 %    IMMATURE GRANULOCYTES 0 0.0 - 5.0 %    ABS. NEUTROPHILS 3.9 1.7 - 8.2 K/UL    ABS. LYMPHOCYTES 1.3 0.5 - 4.6 K/UL    ABS. MONOCYTES 0.8 0.1 - 1.3 K/UL    ABS. EOSINOPHILS 0.1 0.0 - 0.8 K/UL    ABS. BASOPHILS 0.1 0.0 - 0.2 K/UL    ABS. IMM. GRANS. 0.0 0.0 - 0.5 K/UL   METABOLIC PANEL, COMPREHENSIVE    Collection Time: 02/03/22 11:05 AM   Result Value Ref Range    Sodium 141 136 - 145 mmol/L    Potassium 4.0 3.5 - 5.1 mmol/L    Chloride 110 (H) 98 - 107 mmol/L    CO2 28 21 - 32 mmol/L    Anion gap 3 (L) 7 - 16 mmol/L    Glucose 140 (H) 65 - 100 mg/dL    BUN 19 8 - 23 MG/DL    Creatinine 0.70 0.6 - 1.0 MG/DL    GFR est AA >60 >60 ml/min/1.73m2    GFR est non-AA >60 >60 ml/min/1.73m2    Calcium 8.9 8.3 - 10.4 MG/DL    Bilirubin, total 0.6 0.2 - 1.1 MG/DL    ALT (SGPT) 24 12 - 65 U/L    AST (SGOT) 18 15 - 37 U/L    Alk. phosphatase 121 50 - 136 U/L    Protein, total 7.1 6.3 - 8.2 g/dL    Albumin 2.7 (L) 3.2 - 4.6 g/dL    Globulin 4.4 (H) 2.3 - 3.5 g/dL    A-G Ratio 0.6 (L) 1.2 - 3.5     PROTHROMBIN TIME + INR    Collection Time: 02/03/22 11:05 AM   Result Value Ref Range    Prothrombin time 15.6 (H) 12.6 - 14.5 sec    INR 1.2     NT-PRO BNP    Collection Time: 02/03/22 11:05 AM   Result Value Ref Range    NT pro-BNP 1,328 (H) 5 - 125 PG/ML   RBC, ALLOCATE    Collection Time: 02/03/22 12:45 PM   Result Value Ref Range    HISTORY CHECKED? Historical check performed        All Micro Results     None          Other Studies:  XR CHEST PORT    Result Date: 2/3/2022  Exam: XR CHEST PORT on 2/3/2022 11:33 AM Clinical History: The Female patient is 61years old  presenting for pulmonary edema. Comparison:  Chest x-ray 9/21/2021 Findings:  Frontal view of the chest was obtained. There is mild central pulmonary vascular congestion. No pleural effusions are demonstrated. The cardiomediastinal silhouette remains diffusely enlarged. There are no acute osseous abnormalities.      1. Primary megaly with mild central pulmonary vascular congestion. CPT code(s) 89954           Signed: Barry Jones DO    Part of this note may have been written by using a voice dictation software. The note has been proof read but may still contain some grammatical/other typographical errors.

## 2022-02-03 NOTE — ED NOTES
TRANSFER - OUT REPORT:    Verbal report given to Meryl Stevens (name) on José Antonio Job  being transferred to [facility] (unit) for routine progression of care       Report consisted of patients Situation, Background, Assessment and   Recommendations(SBAR). Information from the following report(s) SBAR, ED Summary, [facility] and Recent Results was reviewed with the receiving nurse. Lines:   Peripheral IV 02/03/22 Left Antecubital (Active)   Site Assessment Clean, dry, & intact 02/03/22 1107   Phlebitis Assessment 0 02/03/22 1107   Infiltration Assessment 0 02/03/22 1107   Dressing Status Clean, dry, & intact 02/03/22 1107       Peripheral IV 02/03/22 Right Antecubital (Active)   Site Assessment Clean, dry, & intact 02/03/22 1400   Phlebitis Assessment 0 02/03/22 1400   Infiltration Assessment 0 02/03/22 1400   Dressing Status Clean, dry, & intact 02/03/22 1400   Dressing Type 4 X 4 02/03/22 1400   Hub Color/Line Status Green 02/03/22 1400        Opportunity for questions and clarification was provided.       Patient transported with:   Registered Nurse

## 2022-02-03 NOTE — PROGRESS NOTES
Physician Progress Note      PATIENT:               Daxa Zarate  CSN #:                  [id number]  :                       1955  ADMIT DATE:       2/3/2022 11:07 AM  DISCH DATE:  RESPONDING  PROVIDER #:        TUTU GUZMAN DO          QUERY TEXT:    Patient admitted with atrial fibrillation and is maintained on Xarelto. If possible, please document in progress notes and discharge summary if you are evaluating and/or treating any of the following: The medical record reflects the following:  Risk Factors: 61 yr, female, Hx HTN, dCHF  Clinical Indicators: female,  bp 134/98, 136/81  Treatment: Xarelto  Options provided:  -- Secondary hypercoagulable state in a patient with atrial fibrillation  -- Other - I will add my own diagnosis  -- Disagree - Not applicable / Not valid  -- Disagree - Clinically unable to determine / Unknown  -- Refer to Clinical Documentation Reviewer    PROVIDER RESPONSE TEXT:    This patient has secondary hypercoagulable state related to atrial fibrillation.     Query created by: Mohit Valdivia on 2/3/2022 4:36 PM      Electronically signed by:  Linda Main 2/3/2022 5:45 PM

## 2022-02-03 NOTE — ED TRIAGE NOTES
Pt arrives via [facility] to triage. Was told to come to the ER today for possible low hgb. Had routine blood work done yesterday. Was also told it may be a error in the system. Pt with increased fatigue as well, also bilateral foot swelling. Takes lasix daily. Pt takes xarelto, denies dark or blood stool. Denies abd pain. NAD. Masked.

## 2022-02-03 NOTE — ED PROVIDER NOTES
Patient presents the ER with complaints of shortness of breath and fatigue. Patient reports she is having increasing fatigue and shortness of breath of the past couple days. States she was seen by her primary care physician and told to come to the ER for \"possible water around her heart\". She was also told that her blood tests were abnormal.  Denies any fevers or chills. Does report significant leg swelling as well as orthopnea    The history is provided by the patient. Abnormal Lab Results  This is a new problem. The current episode started 2 days ago. Associated symptoms include shortness of breath. Pertinent negatives include no abdominal pain and no headaches. Shortness of Breath  This is a new problem. The problem occurs frequently. The current episode started more than 2 days ago. The problem has not changed since onset. Associated symptoms include leg swelling. Pertinent negatives include no headaches, no rhinorrhea, no neck pain, no cough, no vomiting and no abdominal pain. She has tried nothing for the symptoms. Associated medical issues include heart failure.         Past Medical History:   Diagnosis Date    Chronic pain     pain R knee    Hypertension     Morbid obesity (HCC)     BMI 45.8- 5/2/12    Positive PPD     had vaccination as a child - BCG       Past Surgical History:   Procedure Laterality Date    HX GYN      C-sec x 1 with GA    HX KNEE ARTHROSCOPY      bilateral knees         Family History:   Problem Relation Age of Onset    Other Mother         kidney failure    Cancer Sister         cervical cancer       Social History     Socioeconomic History    Marital status:      Spouse name: Not on file    Number of children: Not on file    Years of education: Not on file    Highest education level: Not on file   Occupational History    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     Tobacco Use    Smoking status: Former Smoker     Packs/day: 0.50     Years: 25.00     Pack years: 12.50 Types: Cigarettes     Quit date: 2/7/2007     Years since quitting: 15.0    Smokeless tobacco: Never Used   Substance and Sexual Activity    Alcohol use: No    Drug use: No    Sexual activity: Not on file   Other Topics Concern    Not on file   Social History Narrative    Not on file     Social Determinants of Health     Financial Resource Strain:     Difficulty of Paying Living Expenses: Not on file   Food Insecurity:     Worried About Running Out of Food in the Last Year: Not on file    Antione of Food in the Last Year: Not on file   Transportation Needs:     Lack of Transportation (Medical): Not on file    Lack of Transportation (Non-Medical): Not on file   Physical Activity:     Days of Exercise per Week: Not on file    Minutes of Exercise per Session: Not on file   Stress:     Feeling of Stress : Not on file   Social Connections:     Frequency of Communication with Friends and Family: Not on file    Frequency of Social Gatherings with Friends and Family: Not on file    Attends Anabaptism Services: Not on file    Active Member of [address] [place] [address] or Organizations: Not on file    Attends Club or Organization Meetings: Not on file    Marital Status: Not on file   Intimate Partner Violence:     Fear of Current or Ex-Partner: Not on file    Emotionally Abused: Not on file    Physically Abused: Not on file    Sexually Abused: Not on file   Housing Stability:     Unable to Pay for Housing in the Last Year: Not on file    Number of [place] in the Last Year: Not on file    Unstable Housing in the Last Year: Not on file         ALLERGIES: Lortab [hydrocodone-acetaminophen]    Review of Systems   Constitutional: Positive for fatigue. HENT: Negative for dental problem, rhinorrhea, tinnitus and voice change. Eyes: Negative for photophobia and redness. Respiratory: Positive for shortness of breath. Negative for apnea, cough and chest tightness. Cardiovascular: Positive for leg swelling.  Negative for palpitations. Gastrointestinal: Negative for abdominal pain, rectal pain and vomiting. Endocrine: Negative for polydipsia and polyuria. Genitourinary: Negative for dysuria and flank pain. Musculoskeletal: Negative for back pain and neck pain. Skin: Negative for color change and pallor. Neurological: Positive for light-headedness. Negative for weakness and headaches. Hematological: Negative for adenopathy. Does not bruise/bleed easily. Psychiatric/Behavioral: Negative for behavioral problems and confusion. All other systems reviewed and are negative. Vitals:    02/03/22 1054   BP: 116/86   Pulse: 95   Resp: 20   Temp: 98.2 °F (36.8 °C)   SpO2: 92%   Weight: (!) 174.6 kg (385 lb)   Height: 5' 7\" (1.702 m)            Physical Exam  Vitals and nursing note reviewed. Constitutional:       General: She is not in acute distress. Appearance: Normal appearance. She is not ill-appearing. HENT:      Head: Normocephalic and atraumatic. Nose: Nose normal. No congestion or rhinorrhea. Mouth/Throat:      Mouth: Mucous membranes are moist.      Pharynx: No oropharyngeal exudate or posterior oropharyngeal erythema. Eyes:      Extraocular Movements: Extraocular movements intact. Pupils: Pupils are equal, round, and reactive to light. Cardiovascular:      Rate and Rhythm: Tachycardia present. Rhythm irregular. Pulses: Normal pulses. Pulmonary:      Effort: Pulmonary effort is normal.      Breath sounds: Rhonchi and rales present. Abdominal:      General: Abdomen is flat. There is no distension. Palpations: Abdomen is soft. There is no mass. Tenderness: There is no abdominal tenderness. Hernia: No hernia is present. Genitourinary:     Rectum: Guaiac result negative. Musculoskeletal:      Cervical back: Normal range of motion and neck supple. No rigidity or tenderness. Right lower leg: Edema present. Left lower leg: Edema present. Lymphadenopathy:      Cervical: No cervical adenopathy. Skin:     Capillary Refill: Capillary refill takes less than 2 seconds. Coloration: Skin is not jaundiced or pale. Findings: No erythema. Neurological:      General: No focal deficit present. Mental Status: She is alert and oriented to person, place, and time. Cranial Nerves: No cranial nerve deficit. Sensory: No sensory deficit. Motor: No weakness. Psychiatric:         Mood and Affect: Mood normal.         Behavior: Behavior normal.          MDM  Number of Diagnoses or Management Options  Diagnosis management comments: Did review outside records, apparently she is newly anemic as well. She is anticoagulated. Plan for screening labs. 12:45 PM  Hemoglobin noted to be 6.5. Rectal exam is negative for blood. Chest x-ray shows pulmonary edema    Elevated BNP. Will start Lasix. Will transfuse a unit of blood.   Will discuss case with hospitalist for admission       Amount and/or Complexity of Data Reviewed  Clinical lab tests: ordered and reviewed  Tests in the radiology section of CPT®: ordered and reviewed  Review and summarize past medical records: yes  Discuss the patient with other providers: yes  Independent visualization of images, tracings, or specimens: yes (EKG interpretation: Atrial fibrillation with RVR, rate of 106, normal axis, no blocks no acute ST segment changes noted)    Risk of Complications, Morbidity, and/or Mortality  Presenting problems: high  Diagnostic procedures: moderate  Management options: high  General comments: Critical Care Time 60 Minutes: Excluding all billable procedures, time spent at the bedside directing patients resuscitation, updating family, and coordinating care with consultants      Patient Progress  Patient progress: stable         Procedures                 Results Include:    Recent Results (from the past 24 hour(s))   EKG, 12 LEAD, INITIAL    Collection Time: 02/03/22 10:57 AM   Result Value Ref Range    Ventricular Rate 106 BPM    QRS Duration 68 ms    Q-T Interval 328 ms    QTC Calculation (Bezet) 435 ms    Calculated R Axis 61 degrees    Calculated T Axis 47 degrees    Diagnosis       Atrial fibrillation with rapid ventricular response  Low voltage QRS  Abnormal ECG  When compared with ECG of 22-SEP-2021 00:15,  Nonspecific T wave abnormality now evident in Anterior leads     TYPE & SCREEN    Collection Time: 02/03/22 11:05 AM   Result Value Ref Range    Crossmatch Expiration 02/06/2022,2359     ABO/Rh(D) A POSITIVE     Antibody screen NEG    CBC WITH AUTOMATED DIFF    Collection Time: 02/03/22 11:05 AM   Result Value Ref Range    WBC 6.2 4.3 - 11.1 K/uL    RBC 3.10 (L) 4.05 - 5.2 M/uL    HGB 6.5 (LL) 11.7 - 15.4 g/dL    HCT 24.5 (L) 35.8 - 46.3 %    MCV 79.0 (L) 79.6 - 97.8 FL    MCH 21.0 (L) 26.1 - 32.9 PG    MCHC 26.5 (L) 31.4 - 35.0 g/dL    RDW 19.3 (H) 11.9 - 14.6 %    PLATELET 292 117 - 911 K/uL    MPV 10.3 9.4 - 12.3 FL    ABSOLUTE NRBC 0.03 0.0 - 0.2 K/uL    DF AUTOMATED      NEUTROPHILS 64 43 - 78 %    LYMPHOCYTES 20 13 - 44 %    MONOCYTES 13 (H) 4.0 - 12.0 %    EOSINOPHILS 2 0.5 - 7.8 %    BASOPHILS 1 0.0 - 2.0 %    IMMATURE GRANULOCYTES 0 0.0 - 5.0 %    ABS. NEUTROPHILS 3.9 1.7 - 8.2 K/UL    ABS. LYMPHOCYTES 1.3 0.5 - 4.6 K/UL    ABS. MONOCYTES 0.8 0.1 - 1.3 K/UL    ABS. EOSINOPHILS 0.1 0.0 - 0.8 K/UL    ABS. BASOPHILS 0.1 0.0 - 0.2 K/UL    ABS. IMM.  GRANS. 0.0 0.0 - 0.5 K/UL   METABOLIC PANEL, COMPREHENSIVE    Collection Time: 02/03/22 11:05 AM   Result Value Ref Range    Sodium 141 136 - 145 mmol/L    Potassium 4.0 3.5 - 5.1 mmol/L    Chloride 110 (H) 98 - 107 mmol/L    CO2 28 21 - 32 mmol/L    Anion gap 3 (L) 7 - 16 mmol/L    Glucose 140 (H) 65 - 100 mg/dL    BUN 19 8 - 23 MG/DL    Creatinine 0.70 0.6 - 1.0 MG/DL    GFR est AA >60 >60 ml/min/1.73m2    GFR est non-AA >60 >60 ml/min/1.73m2    Calcium 8.9 8.3 - 10.4 MG/DL    Bilirubin, total 0.6 0.2 - 1.1 MG/DL    ALT (SGPT) 24 12 - 65 U/L    AST (SGOT) 18 15 - 37 U/L    Alk. phosphatase 121 50 - 136 U/L    Protein, total 7.1 6.3 - 8.2 g/dL    Albumin 2.7 (L) 3.2 - 4.6 g/dL    Globulin 4.4 (H) 2.3 - 3.5 g/dL    A-G Ratio 0.6 (L) 1.2 - 3.5     PROTHROMBIN TIME + INR    Collection Time: 02/03/22 11:05 AM   Result Value Ref Range    Prothrombin time 15.6 (H) 12.6 - 14.5 sec    INR 1.2     NT-PRO BNP    Collection Time: 02/03/22 11:05 AM   Result Value Ref Range    NT pro-BNP 1,328 (H) 5 - 125 PG/ML     Voice dictation software was used during the making of this note. This software is not perfect and grammatical and other typographical errors may be present. This note has been proofread, but may still contain errors. Nobie Ahumada, MD; 2/3/2022 @12:45 PM   ===================================================================    I wore appropriate PPE throughout this patient's ED visit. Nobie Ahumada, MD, 12:45 PM             Orders Placed This Encounter    XR CHEST PORT    CBC with Diff    CMP    INR    NT-PRO BNP    PULSE OXIMETRY CONTINUOUS    CARDIAC MONITOR - ED ONLY    TRANSFUSE PACKED RBC'S, 1 Units    EKG (Check If Upper Abdominal Pain or symptoms of SOB, Diaphoresis, or Tachycardia)    TYPE AND SCREEN    RBC, ALLOCATE, 1 Units Date needed for transfusion: 2/1/2022    SALINE LOCK IV ONE TIME Routine    sodium chloride (NS) flush 5-10 mL    sodium chloride (NS) flush 5-10 mL    furosemide (LASIX) injection 40 mg    0.9% sodium chloride infusion 250 mL    dilTIAZem (CARDIZEM) 100 mg in 0.9% sodium chloride (MBP/ADV) 100 mL infusion          No diagnosis found. Results for orders placed or performed during the hospital encounter of 02/03/22   1.  CBC WITH AUTOMATED DIFF   Result Value Ref Range    WBC 6.2 4.3 - 11.1 K/uL    RBC 3.10 (L) 4.05 - 5.2 M/uL    HGB 6.5 (LL) 11.7 - 15.4 g/dL    HCT 24.5 (L) 35.8 - 46.3 %    MCV 79.0 (L) 79.6 - 97.8 FL    MCH 21.0 (L) 26.1 - 32.9 PG    MCHC 26.5 (L) 31.4 - 35.0 g/dL RDW 19.3 (H) 11.9 - 14.6 %    PLATELET 948 675 - 726 K/uL    MPV 10.3 9.4 - 12.3 FL    ABSOLUTE NRBC 0.03 0.0 - 0.2 K/uL    DF AUTOMATED      NEUTROPHILS 64 43 - 78 %    LYMPHOCYTES 20 13 - 44 %    MONOCYTES 13 (H) 4.0 - 12.0 %    EOSINOPHILS 2 0.5 - 7.8 %    BASOPHILS 1 0.0 - 2.0 %    IMMATURE GRANULOCYTES 0 0.0 - 5.0 %    ABS. NEUTROPHILS 3.9 1.7 - 8.2 K/UL    ABS. LYMPHOCYTES 1.3 0.5 - 4.6 K/UL    ABS. MONOCYTES 0.8 0.1 - 1.3 K/UL    ABS. EOSINOPHILS 0.1 0.0 - 0.8 K/UL    ABS. BASOPHILS 0.1 0.0 - 0.2 K/UL    ABS. IMM. GRANS. 0.0 0.0 - 0.5 K/UL   2. METABOLIC PANEL, COMPREHENSIVE   Result Value Ref Range    Sodium 141 136 - 145 mmol/L    Potassium 4.0 3.5 - 5.1 mmol/L    Chloride 110 (H) 98 - 107 mmol/L    CO2 28 21 - 32 mmol/L    Anion gap 3 (L) 7 - 16 mmol/L    Glucose 140 (H) 65 - 100 mg/dL    BUN 19 8 - 23 MG/DL    Creatinine 0.70 0.6 - 1.0 MG/DL    GFR est AA >60 >60 ml/min/1.73m2    GFR est non-AA >60 >60 ml/min/1.73m2    Calcium 8.9 8.3 - 10.4 MG/DL    Bilirubin, total 0.6 0.2 - 1.1 MG/DL    ALT (SGPT) 24 12 - 65 U/L    AST (SGOT) 18 15 - 37 U/L    Alk. phosphatase 121 50 - 136 U/L    Protein, total 7.1 6.3 - 8.2 g/dL    Albumin 2.7 (L) 3.2 - 4.6 g/dL    Globulin 4.4 (H) 2.3 - 3.5 g/dL    A-G Ratio 0.6 (L) 1.2 - 3.5     3. PROTHROMBIN TIME + INR   Result Value Ref Range    Prothrombin time 15.6 (H) 12.6 - 14.5 sec    INR 1.2     4.  NT-PRO BNP   Result Value Ref Range    NT pro-BNP 1,328 (H) 5 - 125 PG/ML   5. EKG, 12 LEAD, INITIAL   Result Value Ref Range    Ventricular Rate 106 BPM    QRS Duration 68 ms    Q-T Interval 328 ms    QTC Calculation (Bezet) 435 ms    Calculated R Axis 61 degrees    Calculated T Axis 47 degrees    Diagnosis       Atrial fibrillation with rapid ventricular response  Low voltage QRS  Nonspecific ST abnormality  Abnormal ECG  When compared with ECG of 22-SEP-2021 00:15,  Nonspecific T wave abnormality now evident in Anterior leads  Confirmed by Ezio Rolon MD (), JOO CHEEK ([id number]) on 2/3/2022 12:50:05 PM     6.  TYPE & SCREEN   Result Value Ref Range    Crossmatch Expiration 02/06/2022,7806     ABO/Rh(D) A POSITIVE     Antibody screen NEG

## 2022-02-03 NOTE — H&P
[facility] Cardiology Initial Cardiac Evaluation                 Date of  Admission: 2/3/2022 11:07 AM     Primary Care Physician:  Dr. Benigno Baum  Primary Cardiologist:  Dr. Kyler Andres  Referring Physician:  Dr. Crystal Osuna  Attending Physician: Dr. Gloria Carmen    CC/Reason for consult:  afib with RVR       Daniel Fountain is a 61 y.o. female with PMH of persistent AF (on Xarelto), morbid obesity, DM, htn and tobacco (40 ppy history), restrictive lung disease w PFT's 12/2020 c/w moderate restrictive pattern, and LUNA, who presented to the ED with abnormal labs. She had routine blood work done the day before. She noted increased fatigue and bilateral LE swelling. Labs showed H&H 6.5/24.5, plt 278, INR 1.2, , K 4, BUN 19, creatinine 0.70, albumin 2.7, Iron 16, TIBC 456, transferrin, pBNP 1328. Occult negative. Patient admitted. She was started on cardizem drip. Xarelto held. She was given IVP diuresis. Patient has one unit of PRBCs transfusing.         Past Medical History:   Diagnosis Date    Chronic pain     pain R knee    Hypertension     Morbid obesity (HCC)     BMI 45.8- 5/2/12    Positive PPD     had vaccination as a child - BCG      Past Surgical History:   Procedure Laterality Date    HX GYN      C-sec x 1 with GA    HX KNEE ARTHROSCOPY      bilateral knees     Allergies   Allergen Reactions    Lortab [Hydrocodone-Acetaminophen] Nausea Only and Other (comments)     \"It makes me feel hung over\"      Family History   Problem Relation Age of Onset    Other Mother         kidney failure    Cancer Sister         cervical cancer        Current Facility-Administered Medications   Medication Dose Route Frequency    sodium chloride (NS) flush 5-10 mL  5-10 mL IntraVENous Q8H    sodium chloride (NS) flush 5-10 mL  5-10 mL IntraVENous PRN    0.9% sodium chloride infusion 250 mL  250 mL IntraVENous PRN    dilTIAZem (CARDIZEM) 100 mg in 0.9% sodium chloride (MBP/ADV) 100 mL infusion  0-15 mg/hr IntraVENous TITRATE    furosemide (LASIX) injection 40 mg  40 mg IntraVENous BID    tuberculin injection 5 Units  5 Units IntraDERMal ONCE    insulin lispro (HUMALOG) injection   SubCUTAneous AC&HS       Review of Systems   Constitutional: Positive for malaise/fatigue. HENT: Negative. Eyes: Negative. Respiratory: Positive for shortness of breath. Cardiovascular: Positive for palpitations. Gastrointestinal: Negative. Genitourinary: Negative. Skin: Negative. Neurological: Negative. Endo/Heme/Allergies: Negative. Psychiatric/Behavioral: Negative. Physical Exam  Vitals:    02/03/22 1509 02/03/22 1516 02/03/22 1531 02/03/22 1624   BP: 118/81 118/88 (!) 124/90 126/82   Pulse: 99 (!) 109 94 (!) 107   Resp: 25 23 20    Temp: 97.8 °F (36.6 °C)   97.6 °F (36.4 °C)   SpO2: 99% 98% 92% 96%   Weight:       Height:           Physical Exam:  Physical Exam  Eyes:      Pupils: Pupils are equal, round, and reactive to light. Cardiovascular:      Rate and Rhythm: Tachycardia present. Rhythm irregular. Pulmonary:      Effort: Pulmonary effort is normal.      Breath sounds: Decreased breath sounds present. Abdominal:      General: Bowel sounds are normal.   Musculoskeletal:      Right lower leg: 3+ Pitting Edema present. Left lower leg: 3+ Pitting Edema present. Neurological:      General: No focal deficit present. Mental Status: She is alert and oriented to person, place, and time. Psychiatric:         Mood and Affect: Mood normal.         Behavior: Behavior normal.         Thought Content: Thought content normal.         Judgment: Judgment normal.         Cardiographics    Telemetry: AFIB  ECG: atrial fibrillation, rate 107  Echocardiogram: 9/2021  · . LV: Estimated LVEF is 50 - 55%. Normal cavity size and wall thickness. Low normal systolic function. Left ventricular diastolic dysfunction. · Right Ventricle: Normal cavity size and global systolic function.   · TV: Mild tricuspid valve regurgitation is present. Right Ventricular Arterial Pressure (RVSP) is 41 mmHg. · AV: Cannot rule out bicuspid aortic valve. No hemodynamically significant aortic stenosis. · MV: Mitral valve non-specific thickening. Mild mitral valve regurgitation is present. · RA: Mildly dilated right atrium. · LA: Mildly dilated left atrium. · IVC: Severely elevated central venous pressure (15 mmHg); IVC diameter is larger than 21 mm and collapses less than 50% with respiration. · Contrast used: DEFINITY. Labs:   Recent Labs     02/03/22  1105      K 4.0   BUN 19   CREA 0.70   *   WBC 6.2   HGB 6.5*   HCT 24.5*      INR 1.2        Assessment/Plan:     Assessment:      Principal Problem:    Anemia --   -- source unknown  -- consult GI for evaluation    Active Problems:      Atrial fibrillation with RVR --  -- continue cardizem drip  -- plan for rate control  -- no OAC given anemia      Hypertension --  -- stable, monitor closely with IV diuresis and cardizem drip      Class 3 obesity with alveolar hypoventilation, serious comorbidity, and body mass index (BMI) of 50.0 to 59.9 in adult       Acute respiratory failure with hypoxia   --       Controlled type 2 diabetes mellitus without complication, without long-term current use of insulin   --      Diastolic CHF, acute on chronic   -- continue IV diuresis  -- repeat echo  -- consult wound care  -- aggressively elevate legs    Thank you very much for this referral. We appreciate the opportunity to participate in this patient's care. We will follow along with above stated plan. Sarah Gan NP  Attending MD: Lauren Bowling    ATTENDING ADDENDUM:    Patient seen and examined by me. Agree with above note by physician extender. I repeated the entire history and physical exam myself and confirmed the above findings. The assessment and plan was made in conjunction with Hernandez Rankin NP.   Key findings are:  No CP or shortness of breath at rest, but worsening lower extremity weeping edema and worsening generalized fatigue and malaise with severe anemia, iron deficient, on routine labs yesterday. She is on chronic Xarelto with longstanding persistent atrial fibrillation which may in fact be defined as permanent now. She has had intermittent palpitations ever since her discharge last September and is a very poor candidate to consider RADAMES and cardioversion given her morbid obesity, and current anemia. She denies any angina or anginal equivalent symptoms. She had low normal ejection fraction and a technically difficult study 4 to 5 months ago but has worsening lower extremity edema and weeping now with worsening dyspnea when lying supine. CV-irregularly irregular without murmur, difficult to assess JVD, no carotid bruits  Lungs- Clear bilaterally anteroapically, decreased/distant bibasilar  Abd- soft, nontender, nondistended  Ext-chronic lower extremity severe pitting edema, tender and wrapped bilaterally    Plan:   IV diuretics, leg elevation, minimize sodium, wound care consultation today. Hold Xarelto, consult GI given iron deficiency anemia. Rate control with intravenous Cardizem overnight and convert to oral formulation of medications once heart rate is more stable. Tachycardia at the present time is being driven by acute anemia. Transfuse to hemoglobin greater than 9 if possible. Recheck labs in the morning. Consider LE venous duplex if edema does not improve with leg elevation and diuresis in the next 48 hours, despite history of Xarelto.     Suraj Olvera MD  [facility]  Pager [phone]

## 2022-02-03 NOTE — ED NOTES
Patient with unmeasurable urine output. purewick not functioning properly. Patient's linens changed, skin cleansed. Order for canchola catheter to protect from skin break down and measure urine output received.

## 2022-02-03 NOTE — PROGRESS NOTES
Chart review complete, CM met with pt and son at bedside, pt found laying on stretcher alert and oriented x4, pt states she lives alone in own 2 level home with no steps to enter and no steps to reach bedroom, states she has home oxygen from [facility], walker and cane in home for use, states she is normally independent with adls, drives but son also assists with transport as needed. Pt states she affords medications with insurance without difficulty. Demographics, insurance and PCP confirmed. PT/OT evaluations pending. CM will remain available to assist as needed with dc needs. Care Management Interventions  PCP Verified by CM:  Yes (Dr Russel Arroyo last visit yesterday)  MyChart Signup: No  Discharge Durable Medical Equipment: No  Physical Therapy Consult: Yes  Occupational Therapy Consult: Yes  Speech Therapy Consult: No  Support Systems: Child(adelita)  Confirm Follow Up Transport: Family  Discharge Location  Patient Expects to be Discharged to[de-identified] Unable to determine at this time'

## 2022-02-04 ENCOUNTER — APPOINTMENT (OUTPATIENT)
Dept: NON INVASIVE DIAGNOSTICS | Age: 67
DRG: 291 | End: 2022-02-04
Attending: NURSE PRACTITIONER
Payer: COMMERCIAL

## 2022-02-04 LAB
ANION GAP SERPL CALC-SCNC: 4 MMOL/L (ref 7–16)
BUN SERPL-MCNC: 17 MG/DL (ref 8–23)
CALCIUM SERPL-MCNC: 8.7 MG/DL (ref 8.3–10.4)
CHLORIDE SERPL-SCNC: 105 MMOL/L (ref 98–107)
CO2 SERPL-SCNC: 32 MMOL/L (ref 21–32)
CREAT SERPL-MCNC: 0.8 MG/DL (ref 0.6–1)
ECHO AO ASC DIAM: 3.4 CM
ECHO AO ASCENDING AORTA INDEX: 1.31 CM/M2
ECHO AO ROOT DIAM: 2.9 CM
ECHO AO ROOT INDEX: 1.12 CM/M2
ECHO AV AREA PEAK VELOCITY: 2.3 CM2
ECHO AV AREA VTI: 1.8 CM2
ECHO AV AREA/BSA PEAK VELOCITY: 0.9 CM2/M2
ECHO AV AREA/BSA VTI: 0.7 CM2/M2
ECHO AV MEAN GRADIENT: 9 MMHG
ECHO AV MEAN VELOCITY: 1.5 M/S
ECHO AV PEAK GRADIENT: 15 MMHG
ECHO AV PEAK VELOCITY: 2 M/S
ECHO AV VELOCITY RATIO: 0.75
ECHO AV VTI: 40.9 CM
ECHO EST RA PRESSURE: 15 MMHG
ECHO IVC PROX: 2.2 CM
ECHO LA AREA 2C: 33.4 CM2
ECHO LA AREA 4C: 36 CM2
ECHO LA DIAMETER INDEX: 2.01 CM/M2
ECHO LA DIAMETER: 5.2 CM
ECHO LA MAJOR AXIS: 7.6 CM
ECHO LA MINOR AXIS: 7.4 CM
ECHO LA TO AORTIC ROOT RATIO: 1.79
ECHO LA VOL BP: 129 ML (ref 22–52)
ECHO LA VOL/BSA BIPLANE: 50 ML/M2 (ref 16–34)
ECHO LV E' LATERAL VELOCITY: 13 CM/S
ECHO LV E' SEPTAL VELOCITY: 12 CM/S
ECHO LV EDV A2C: 120 ML
ECHO LV EDV A4C: 96 ML
ECHO LV EDV BP: 110 ML (ref 56–104)
ECHO LV EDV INDEX A4C: 37 ML/M2
ECHO LV EDV INDEX BP: 42 ML/M2
ECHO LV EDV NDEX A2C: 46 ML/M2
ECHO LV EJECTION FRACTION A2C: 64 %
ECHO LV EJECTION FRACTION A4C: 61 %
ECHO LV EJECTION FRACTION BIPLANE: 63 % (ref 55–100)
ECHO LV ESV A2C: 44 ML
ECHO LV ESV A4C: 37 ML
ECHO LV ESV INDEX A2C: 17 ML/M2
ECHO LV ESV INDEX A4C: 14 ML/M2
ECHO LV FRACTIONAL SHORTENING: 50 % (ref 28–44)
ECHO LV INTERNAL DIMENSION DIASTOLE INDEX: 1.85 CM/M2
ECHO LV INTERNAL DIMENSION DIASTOLIC: 4.8 CM (ref 3.9–5.3)
ECHO LV INTERNAL DIMENSION SYSTOLIC INDEX: 0.93 CM/M2
ECHO LV INTERNAL DIMENSION SYSTOLIC: 2.4 CM
ECHO LV IVSD: 0.9 CM (ref 0.6–0.9)
ECHO LV MASS 2D: 158.8 G (ref 67–162)
ECHO LV MASS INDEX 2D: 61.3 G/M2 (ref 43–95)
ECHO LV POSTERIOR WALL DIASTOLIC: 1 CM (ref 0.6–0.9)
ECHO LV RELATIVE WALL THICKNESS RATIO: 0.42
ECHO LVOT AREA: 3.1 CM2
ECHO LVOT AV VTI INDEX: 0.59
ECHO LVOT DIAM: 2 CM
ECHO LVOT MEAN GRADIENT: 4 MMHG
ECHO LVOT PEAK GRADIENT: 8 MMHG
ECHO LVOT PEAK VELOCITY: 1.5 M/S
ECHO LVOT STROKE VOLUME INDEX: 29.1 ML/M2
ECHO LVOT SV: 75.4 ML
ECHO LVOT VTI: 24 CM
ECHO MV E DECELERATION TIME (DT): 240 MS
ECHO MV E VELOCITY: 1.37 M/S
ECHO MV E/E' LATERAL: 10.54
ECHO MV E/E' RATIO (AVERAGED): 10.98
ECHO MV E/E' SEPTAL: 11.42
ECHO PV ACCELERATION TIME (AT): 106 MS
ECHO PV MAX VELOCITY: 1.3 M/S
ECHO PV PEAK GRADIENT: 7 MMHG
ECHO RIGHT VENTRICULAR SYSTOLIC PRESSURE (RVSP): 46 MMHG
ECHO RV BASAL DIMENSION: 4.2 CM
ECHO RV INTERNAL DIMENSION: 3.9 CM
ECHO RV MID DIMENSION: 3.7 CM
ECHO RV TAPSE: 2 CM (ref 1.5–2)
ECHO TV REGURGITANT MAX VELOCITY: 2.78 M/S
ECHO TV REGURGITANT PEAK GRADIENT: 31 MMHG
GLUCOSE BLD STRIP.AUTO-MCNC: 122 MG/DL (ref 65–100)
GLUCOSE BLD STRIP.AUTO-MCNC: 125 MG/DL (ref 65–100)
GLUCOSE BLD STRIP.AUTO-MCNC: 129 MG/DL (ref 65–100)
GLUCOSE BLD STRIP.AUTO-MCNC: 161 MG/DL (ref 65–100)
GLUCOSE SERPL-MCNC: 129 MG/DL (ref 65–100)
HAPTOGLOB SERPL-MCNC: 182 MG/DL (ref 30–200)
HCT VFR BLD AUTO: 27.8 % (ref 35.8–46.3)
HGB BLD-MCNC: 7.9 G/DL (ref 11.7–15.4)
HISTORY CHECKED?,CKHIST: NORMAL
MM INDURATION POC: 0 MM (ref 0–5)
POTASSIUM SERPL-SCNC: 3.8 MMOL/L (ref 3.5–5.1)
PPD POC: NEGATIVE NEGATIVE
SERVICE CMNT-IMP: ABNORMAL
SODIUM SERPL-SCNC: 141 MMOL/L (ref 136–145)

## 2022-02-04 PROCEDURE — 74011250636 HC RX REV CODE- 250/636: Performed by: HOSPITALIST

## 2022-02-04 PROCEDURE — 94760 N-INVAS EAR/PLS OXIMETRY 1: CPT

## 2022-02-04 PROCEDURE — 74011000258 HC RX REV CODE- 258: Performed by: EMERGENCY MEDICINE

## 2022-02-04 PROCEDURE — 83735 ASSAY OF MAGNESIUM: CPT

## 2022-02-04 PROCEDURE — 36430 TRANSFUSION BLD/BLD COMPNT: CPT

## 2022-02-04 PROCEDURE — 74011636637 HC RX REV CODE- 636/637: Performed by: INTERNAL MEDICINE

## 2022-02-04 PROCEDURE — 74011000258 HC RX REV CODE- 258: Performed by: HOSPITALIST

## 2022-02-04 PROCEDURE — 65660000000 HC RM CCU STEPDOWN

## 2022-02-04 PROCEDURE — 97530 THERAPEUTIC ACTIVITIES: CPT

## 2022-02-04 PROCEDURE — 80048 BASIC METABOLIC PNL TOTAL CA: CPT

## 2022-02-04 PROCEDURE — 74011000250 HC RX REV CODE- 250: Performed by: HOSPITALIST

## 2022-02-04 PROCEDURE — 85018 HEMOGLOBIN: CPT

## 2022-02-04 PROCEDURE — 74011250637 HC RX REV CODE- 250/637: Performed by: HOSPITALIST

## 2022-02-04 PROCEDURE — 82962 GLUCOSE BLOOD TEST: CPT

## 2022-02-04 PROCEDURE — 36415 COLL VENOUS BLD VENIPUNCTURE: CPT

## 2022-02-04 PROCEDURE — 74011250636 HC RX REV CODE- 250/636: Performed by: EMERGENCY MEDICINE

## 2022-02-04 PROCEDURE — 94664 DEMO&/EVAL PT USE INHALER: CPT

## 2022-02-04 PROCEDURE — 74011000250 HC RX REV CODE- 250: Performed by: EMERGENCY MEDICINE

## 2022-02-04 PROCEDURE — 94640 AIRWAY INHALATION TREATMENT: CPT

## 2022-02-04 PROCEDURE — 97161 PT EVAL LOW COMPLEX 20 MIN: CPT

## 2022-02-04 PROCEDURE — 77010033678 HC OXYGEN DAILY

## 2022-02-04 PROCEDURE — 99232 SBSQ HOSP IP/OBS MODERATE 35: CPT | Performed by: INTERNAL MEDICINE

## 2022-02-04 PROCEDURE — 74011250636 HC RX REV CODE- 250/636: Performed by: INTERNAL MEDICINE

## 2022-02-04 PROCEDURE — 93306 TTE W/DOPPLER COMPLETE: CPT

## 2022-02-04 PROCEDURE — P9016 RBC LEUKOCYTES REDUCED: HCPCS

## 2022-02-04 PROCEDURE — 97165 OT EVAL LOW COMPLEX 30 MIN: CPT

## 2022-02-04 RX ORDER — ACETAMINOPHEN 325 MG/1
650 TABLET ORAL
Status: DISCONTINUED | OUTPATIENT
Start: 2022-02-04 | End: 2022-02-15 | Stop reason: HOSPADM

## 2022-02-04 RX ORDER — SODIUM CHLORIDE 9 MG/ML
250 INJECTION, SOLUTION INTRAVENOUS AS NEEDED
Status: DISCONTINUED | OUTPATIENT
Start: 2022-02-04 | End: 2022-02-15 | Stop reason: HOSPADM

## 2022-02-04 RX ORDER — DILTIAZEM HYDROCHLORIDE 120 MG/1
120 CAPSULE, COATED, EXTENDED RELEASE ORAL DAILY
Status: DISCONTINUED | OUTPATIENT
Start: 2022-02-04 | End: 2022-02-05

## 2022-02-04 RX ORDER — LEVALBUTEROL INHALATION SOLUTION 0.63 MG/3ML
0.63 SOLUTION RESPIRATORY (INHALATION)
Status: DISCONTINUED | OUTPATIENT
Start: 2022-02-04 | End: 2022-02-12

## 2022-02-04 RX ORDER — POTASSIUM CHLORIDE 20 MEQ/1
40 TABLET, EXTENDED RELEASE ORAL DAILY
Status: DISCONTINUED | OUTPATIENT
Start: 2022-02-04 | End: 2022-02-06

## 2022-02-04 RX ADMIN — SODIUM CHLORIDE 125 MG: 9 INJECTION, SOLUTION INTRAVENOUS at 08:58

## 2022-02-04 RX ADMIN — LEVALBUTEROL HYDROCHLORIDE 0.63 MG: 0.63 SOLUTION RESPIRATORY (INHALATION) at 11:16

## 2022-02-04 RX ADMIN — FUROSEMIDE 40 MG: 10 INJECTION, SOLUTION INTRAMUSCULAR; INTRAVENOUS at 08:11

## 2022-02-04 RX ADMIN — DILTIAZEM HYDROCHLORIDE 120 MG: 120 CAPSULE, COATED, EXTENDED RELEASE ORAL at 08:11

## 2022-02-04 RX ADMIN — LEVALBUTEROL HYDROCHLORIDE 0.63 MG: 0.63 SOLUTION RESPIRATORY (INHALATION) at 15:28

## 2022-02-04 RX ADMIN — LEVALBUTEROL HYDROCHLORIDE 0.63 MG: 0.63 SOLUTION RESPIRATORY (INHALATION) at 07:44

## 2022-02-04 RX ADMIN — Medication 2 UNITS: at 11:22

## 2022-02-04 RX ADMIN — SODIUM CHLORIDE 10 MG/HR: 900 INJECTION, SOLUTION INTRAVENOUS at 21:38

## 2022-02-04 RX ADMIN — POTASSIUM CHLORIDE 40 MEQ: 20 TABLET, EXTENDED RELEASE ORAL at 08:11

## 2022-02-04 RX ADMIN — SODIUM CHLORIDE 7.5 MG/HR: 900 INJECTION, SOLUTION INTRAVENOUS at 00:54

## 2022-02-04 RX ADMIN — SODIUM CHLORIDE, PRESERVATIVE FREE 10 ML: 5 INJECTION INTRAVENOUS at 13:34

## 2022-02-04 RX ADMIN — FUROSEMIDE 40 MG: 10 INJECTION, SOLUTION INTRAMUSCULAR; INTRAVENOUS at 17:10

## 2022-02-04 RX ADMIN — SODIUM CHLORIDE 10 MG/HR: 900 INJECTION, SOLUTION INTRAVENOUS at 14:00

## 2022-02-04 RX ADMIN — SODIUM CHLORIDE, PRESERVATIVE FREE 10 ML: 5 INJECTION INTRAVENOUS at 06:47

## 2022-02-04 RX ADMIN — LEVALBUTEROL HYDROCHLORIDE 0.63 MG: 0.63 SOLUTION RESPIRATORY (INHALATION) at 19:54

## 2022-02-04 RX ADMIN — SODIUM CHLORIDE, PRESERVATIVE FREE 10 ML: 5 INJECTION INTRAVENOUS at 21:01

## 2022-02-04 NOTE — PROGRESS NOTES
Pt presented with worsening SOB, fatigue and worsening swelling in her lower extremities. Reportedly, her sxs have been getting worse over the past several days. PMHx included AFib (on Xarelto), HTN, morbid obesity complicated by LUNA/OHS and chronic diastolic HF. Pt lives with her son in a two-story home; her BR is on the lower level and she does not have to climb the steps. Is independent with her ADLs. Able to drive herself. Uses a cane at home PRN. Is on 3L supplemental. [employer] supplies her home oxygen. Has used [facility] in the past, denies STR. PT/OT consulted, recommended HH. Patient denies any home oxygen, HH, or rehab in the past. PCP confirmed. Insurance verified. Able to afford her meds. Will f/u on therapies recommendation and update note. [id number] agreeable with [facility], referral sent. Pt stated that she has a portable tank at home, but usually only uses her oxygen at night. Care Management Interventions  PCP Verified by CM: Yes  Mode of Transport at Discharge:  Other (see comment)  Transition of Care Consult (CM Consult): Discharge Planning  MyChart Signup: No  Discharge Durable Medical Equipment: No  Physical Therapy Consult: Yes  Occupational Therapy Consult: Yes  Speech Therapy Consult: No  Support Systems: Child(adelita),Other Family Member(s),Spiritism/[place] [employer],Friend/Neighbor  Confirm Follow Up Transport: Family  The Plan for Transition of Care is Related to the Following Treatment Goals : Return home and back to her baseline  Discharge Location  Patient Expects to be Discharged to[de-identified] Home

## 2022-02-04 NOTE — PROGRESS NOTES
OT Note: Attempted OT evaluation x 2 and initially patient with MD and the second time patient receiving blood up in the chair. Plan to check back at a later time to initiate OT.

## 2022-02-04 NOTE — WOUND CARE
Pt seen for wounds to BLE documented as present on admission. Pt currently has ace wraps on BLE. Pt states at home she wraps them with the ace wrap because compression socks are too hard to get on. Removed ace wraps noted wound to lateral, posterior aspect of RLE with slough and moderate amount of drainage. Noted several scattered open areas on anterior LLE and posterior aspect as well, all partial thickness wounds. Cleansed with normal saline, applied xeroform over all open areas, covered with ABD pads and secured with kerlix and ace wrap from base of toes to just below the knee. Pt tolerated well. Recommend daily dressing changes while in acute care setting. Pt agreed to make a follow up at out patient wound clinic for further management of BLE wounds and possible tighter compression wraps. Educated patient on importance of elevation and compression wraps starting from base of toes to just below the knee and not just starting at the ankle. Wound team will continue to follow and adjust treatment as tolerated while in acute care setting.

## 2022-02-04 NOTE — PROGRESS NOTES
[facility] CARDIOLOGY PROGRESS NOTE           2/4/2022 7:57 AM    Admit Date: 2/3/2022         Subjective: Remains on dilt gtt, hemaglobin up to 7 from 6.5. Diuresed net -1400 ml overnight, Us LE neg for dvt. ROS:  Cardiovascular:  As noted above    Objective:      Vitals:    02/04/22 0400 02/04/22 0520 02/04/22 0734 02/04/22 0744   BP:  124/67 126/64    Pulse: 85 (!) 101 94    Resp:  20 22    Temp:  97.5 °F (36.4 °C) 98.1 °F (36.7 °C)    SpO2:  92% 93% 91%   Weight:  (!) 358 lb 4 oz (162.5 kg)     Height:             Physical Exam:  General: Well Developed, Well Nourished, No Acute Distress, Alert & Oriented x 3, Appropriate mood  Neck: supple, no JVD  Heart: S1S2 with RRR without murmurs or gallops  Lungs: Clear throughout auscultation bilaterally without adventitious sounds  Abd: soft, nontender, nondistended, with good bowel sounds  Ext: no edema bilaterally  Skin: warm and dry      Data Review:   Recent Labs     02/03/22  2200 02/03/22  1105   NA  --  141   K  --  4.0   BUN  --  19   CREA  --  0.70   GLU  --  140*   WBC  --  6.2   HGB 7.2* 6.5*   HCT 26.3* 24.5*   PLT  --  278   INR  --  1.2       No results for input(s): TNIPOC, TROIQ in the last 72 hours.         Assessment/Plan:     Principal Problem:    Anemia (2/3/2022)    Active Problems:    Hypertension ()      Class 3 obesity with alveolar hypoventilation, serious comorbidity, and body mass index (BMI) of 50.0 to 59.9 in adult [facility]) ()      Overview: BMI 45.8- 5/2/12      Acute respiratory failure with hypoxia ([address] (6/25/2020)      Controlled type 2 diabetes mellitus without complication, without long-term current use of insulin ([address] (1/1/4285)      Diastolic CHF, acute on chronic ([address] (11/11/2021)      Atrial fibrillation with RVR ([address] (2/3/2022)    A/P  1) dCHF - continue diuretic and daily I/os daily BMP  2) Afib - continue dilt gtt, driven by anemia  3) Anemia - xfuse x 1 unit PRBC, Hosp/GI following may need h/o to weigh in as she was heme-occult negative so significant blood loss from the GI tract seem unlikely      Clayton Fierro MD  2/4/2022 7:57 AM

## 2022-02-04 NOTE — PROGRESS NOTES
Hospitalist Progress Note   Admit Date:  2/3/2022 11:07 AM   Name:  Pinky Condon   Age:  61 y.o. Sex:  female  :  1955   MRN:  [id number]   Room:  [facility]/    Presenting Complaint: Abnormal Lab Results and Peripheral Edema    Reason(s) for Admission: Atrial fibrillation with RVR ([facility] [address] [I48.91]     Interval Hx/ Subjective:   Pinky Condon is a 61 y.o. female with medical history of Afib (on Xarelto), HTN, morbid obesity complicated by LUNA/OHS, chronic diastolic heart failure admitted on 2/3 with A.fib RVR, acute hypoxic respiratory failure (room air sats 89%), severe iron deficiency anemia. Pt presented with worsening shortness of breath, fatigue, and worsening swelling in her lower extremities. ED Course: Hgb of 6.5 (last Hgb was 13 in 2021). MCV of 79 with RDW of 19. pBNP of 1300. CXR shows vascular congestion. EKG shows Afib with RVR with HRs in the 100s. Occult blood negative. Type/screen for pRBC transfusion. Given Lasix 40 mg IV once. 2:  Patient seen at bedside, resting in bed comfortably. She is currently on 2 L via NC. Currently heart rate is in 110s. Denies any chest pain, worsening shortness of breath or congestion. No fever, chills, nausea vomiting or diarrhea. Review of Systems:  10 systems reviewed and negative except as noted above. Assessment & Plan: Active Problems:    Atrial fibrillation with RVR ([address]   2:  Heart rate is suboptimally controlled, currently on Cardizem gtt. Wean off Cardizem as able. Started on oral diltiazem  mg p.o. daily  Xarelto is on hold currently in view of concerns for possible underlying GI bleed. - hold Xarelto   Cardiology on board, appreciate their recommendations. Follow-up with 2D echo. # Symptomatic anemia  2:  Status post 1 unit PRBC, hemoglobin up from 6.5 to 7.2  Patient has severe iron deficiency based on anemia work-up. Ordered 5 doses of IV Ferrlecit 125 mg daily.   Will need oral iron on discharge. GI consulted to evaluate for possible colonoscopy and EGD. Continue to hold Xarelto in the meantime and avoid NSAIDs. Vitamin B12 and folate levels are within normal limits. # Acute on chronic diastolic heart failure  2/4:  Patient seems to be mildly fluid overloaded  Continue IV Lasix 40 mg twice daily  Fluid balance -1.4 L since admission. BP is currently optimally controlled. # Acute hypoxic respiratory failure  2/4:  Patient is running on 3 L via NC, does not use any supplemental oxygen at baseline. Patient does have suspicion of having obstructive sleep apnea, will benefit from outpatient sleep study which can be deferred to her primary care physician. Added Xopenex nebs  We will start on flutter valve 4 times daily  Titrate for SPO2 greater than 92%, wean off as able. # HTN  Blood pressure is optimally controlled. # Morbid obesity  2/4:  Patient likely has underlying obstructive sleep apnea which has never been investigated in the past.  Patient has been advised to follow-up with her PCP to get sleep study referral as outpatient after discharge. Ppx: SCDs for VTE    Code Status: FULL CODE    Disposition: Patient will be discharged home with home health once medically stable. Currently she is on diltiazem gtt. with suboptimal heart rate control and acute respiratory failure with hypoxia. She is not currently medically clear for discharge.         Hospital Problems as of 2/4/2022 Date Reviewed: 11/11/2021          Codes Class Noted - Resolved POA    Atrial fibrillation with RVR (HCC) ICD-10-CM: I48.91  ICD-9-CM: 427.31  2/3/2022 - Present Unknown        * (Principal) Anemia ICD-10-CM: D64.9  ICD-9-CM: 285.9  2/3/2022 - Present Yes        Diastolic CHF, acute on chronic [facility]) ICD-10-CM: I50.33  ICD-9-CM: 428.33, 428.0  11/11/2021 - Present Unknown        Controlled type 2 diabetes mellitus without complication, without long-term current use of insulin ([facility]) (Chronic) ICD-10-CM: E11.9  ICD-9-CM: 250.00  9/9/2020 - Present Yes        Acute respiratory failure with hypoxia [facility]) ICD-10-CM: J96.01  ICD-9-CM: 518.81  6/25/2020 - Present Unknown        Hypertension (Chronic) ICD-10-CM: I10  ICD-9-CM: 401.9  Unknown - Present Yes        Class 3 obesity with alveolar hypoventilation, serious comorbidity, and body mass index (BMI) of 50.0 to 59.9 in adult [facility]) (Chronic) ICD-10-CM: Y88.3, J85.90  ICD-9-CM: 278.03, V85.43  Unknown - Present Yes    Overview Signed 2/8/2019 11:59 AM by Carmen Busch MD     BMI 45.8- 5/2/12                       Objective:     Patient Vitals for the past 24 hrs:   Temp Pulse Resp BP SpO2   02/04/22 0520 97.5 °F (36.4 °C) (!) 101 20 124/67 92 %   02/04/22 0400  85      02/04/22 0137  (!) 114  129/74    02/04/22 0020 98.6 °F (37 °C) (!) 102 20 102/72 92 %   02/04/22 0000  (!) 102      02/03/22 2027 98.5 °F (36.9 °C) 95 18 112/81 93 %   02/03/22 2000  89      02/03/22 1801 98.1 °F (36.7 °C) 94 16 135/69 94 %   02/03/22 1704     96 %   02/03/22 1624 97.6 °F (36.4 °C) (!) 107 16 126/82 96 %   02/03/22 1531  94 20 (!) 124/90 92 %   02/03/22 1516  (!) 109 23 118/88 98 %   02/03/22 1509 97.8 °F (36.6 °C) 99 25 118/81 99 %   02/03/22 1505  85 25 120/87 98 %   02/03/22 1500  93 22 115/80 98 %   02/03/22 1457  (!) 105 23 (!) 124/95 97 %   02/03/22 1452  96 24 119/81 98 %   02/03/22 1447  93 21 122/83 98 %   02/03/22 1442  99 28  91 %   02/03/22 1427  (!) 135 14     02/03/22 1423 97.9 °F (36.6 °C) 95 22 121/89 99 %   02/03/22 1412  (!) 109 28     02/03/22 1357  96 24     02/03/22 1242  (!) 115 28 136/81 100 %   02/03/22 1232  (!) 117 (!) 34 122/85 91 %   02/03/22 1217  100 26 (!) 134/98 100 %   02/03/22 1121  (!) 126 27  93 %   02/03/22 1120  (!) 115 29 90/74 (!) 89 %   02/03/22 1054 98.2 °F (36.8 °C) 95 20 116/86 92 %     Oxygen Therapy  O2 Sat (%): 92 % (02/04/22 0520)  Pulse via Oximetry: 91 beats per minute (02/03/22 1531)  O2 Device: Nasal cannula (02/03/22 1624)  O2 Flow Rate (L/min): 2 l/min (02/03/22 1704)    Estimated body mass index is 56.11 kg/m² as calculated from the following:    Height as of this encounter: 5' 7\" (1.702 m). Weight as of this encounter: 162.5 kg (358 lb 4 oz). Intake/Output Summary (Last 24 hours) at 2/4/2022 0708  Last data filed at 2/4/2022 0414  Gross per 24 hour   Intake 1104.2 ml   Output 2550 ml   Net -1445.8 ml         Physical Exam:    Blood pressure 124/67, pulse (!) 101, temperature 97.5 °F (36.4 °C), resp. rate 20, height 5' 7\" (1.702 m), weight (!) 162.5 kg (358 lb 4 oz), SpO2 92 %. General:    Alert, awake, obese with BMI 56, on 3 L via NC, NAD  HEENT:           Head NCAT, PERRLA positive, MMM  Neck:  No restricted ROM. Trachea midline   CV:   Tachycardic rate with irregularly irregular rhythm, no JVC  Lungs:   Coarse breath sounds with tachypnea. Non wheezing. Nonlabored. Abdomen: Bowel sounds present. Soft, nontender, nondistended. Obese. Extremities: Wrapped legs with trace to 1+ pitting edema b/l. Skin:     No rashes and normal coloration. Warm and dry.     Neuro:  GCS 15, cranial nerves intact, no motor deficits, speech is normal.  Psych:  AOx3, mood and affect appropriate    I have reviewed ordered lab tests and independently visualized imaging below:    Last 24hr Labs:  Recent Results (from the past 24 hour(s))   EKG, 12 LEAD, INITIAL    Collection Time: 02/03/22 10:57 AM   Result Value Ref Range    Ventricular Rate 106 BPM    QRS Duration 68 ms    Q-T Interval 328 ms    QTC Calculation (Bezet) 435 ms    Calculated R Axis 61 degrees    Calculated T Axis 47 degrees    Diagnosis       Atrial fibrillation with rapid ventricular response  Low voltage QRS  Nonspecific ST abnormality  Abnormal ECG  When compared with ECG of 22-SEP-2021 00:15,  Nonspecific T wave abnormality now evident in Anterior leads  Confirmed by Eda Leos MD (), JOO CHEEK ([id number]) on 2/3/2022 12:50:05 PM     TYPE & SCREEN    Collection Time: 02/03/22 11:05 AM   Result Value Ref Range    Crossmatch Expiration 02/06/2022,2359     ABO/Rh(D) A POSITIVE     Antibody screen NEG     Comment       SHARLENE IN ER NOTIFIED BLOOD READY AT 1302 2/3/22 1[facility]    Unit number [id number]     Blood component type RC LR     Unit division 00     Status of unit ISSUED     Crossmatch result Compatible    CBC WITH AUTOMATED DIFF    Collection Time: 02/03/22 11:05 AM   Result Value Ref Range    WBC 6.2 4.3 - 11.1 K/uL    RBC 3.10 (L) 4.05 - 5.2 M/uL    HGB 6.5 (LL) 11.7 - 15.4 g/dL    HCT 24.5 (L) 35.8 - 46.3 %    MCV 79.0 (L) 79.6 - 97.8 FL    MCH 21.0 (L) 26.1 - 32.9 PG    MCHC 26.5 (L) 31.4 - 35.0 g/dL    RDW 19.3 (H) 11.9 - 14.6 %    PLATELET 451 458 - 210 K/uL    MPV 10.3 9.4 - 12.3 FL    ABSOLUTE NRBC 0.03 0.0 - 0.2 K/uL    DF AUTOMATED      NEUTROPHILS 64 43 - 78 %    LYMPHOCYTES 20 13 - 44 %    MONOCYTES 13 (H) 4.0 - 12.0 %    EOSINOPHILS 2 0.5 - 7.8 %    BASOPHILS 1 0.0 - 2.0 %    IMMATURE GRANULOCYTES 0 0.0 - 5.0 %    ABS. NEUTROPHILS 3.9 1.7 - 8.2 K/UL    ABS. LYMPHOCYTES 1.3 0.5 - 4.6 K/UL    ABS. MONOCYTES 0.8 0.1 - 1.3 K/UL    ABS. EOSINOPHILS 0.1 0.0 - 0.8 K/UL    ABS. BASOPHILS 0.1 0.0 - 0.2 K/UL    ABS. IMM. GRANS. 0.0 0.0 - 0.5 K/UL   METABOLIC PANEL, COMPREHENSIVE    Collection Time: 02/03/22 11:05 AM   Result Value Ref Range    Sodium 141 136 - 145 mmol/L    Potassium 4.0 3.5 - 5.1 mmol/L    Chloride 110 (H) 98 - 107 mmol/L    CO2 28 21 - 32 mmol/L    Anion gap 3 (L) 7 - 16 mmol/L    Glucose 140 (H) 65 - 100 mg/dL    BUN 19 8 - 23 MG/DL    Creatinine 0.70 0.6 - 1.0 MG/DL    GFR est AA >60 >60 ml/min/1.73m2    GFR est non-AA >60 >60 ml/min/1.73m2    Calcium 8.9 8.3 - 10.4 MG/DL    Bilirubin, total 0.6 0.2 - 1.1 MG/DL    ALT (SGPT) 24 12 - 65 U/L    AST (SGOT) 18 15 - 37 U/L    Alk.  phosphatase 121 50 - 136 U/L    Protein, total 7.1 6.3 - 8.2 g/dL    Albumin 2.7 (L) 3.2 - 4.6 g/dL    Globulin 4.4 (H) 2.3 - 3.5 g/dL    A-G Ratio 0.6 (L) 1.2 - 3.5     PROTHROMBIN TIME + INR    Collection Time: 02/03/22 11:05 AM   Result Value Ref Range    Prothrombin time 15.6 (H) 12.6 - 14.5 sec    INR 1.2     NT-PRO BNP    Collection Time: 02/03/22 11:05 AM   Result Value Ref Range    NT pro-BNP 1,328 (H) 5 - 125 PG/ML   TRANSFERRIN SATURATION    Collection Time: 02/03/22 11:05 AM   Result Value Ref Range    Iron 16 (L) 35 - 150 ug/dL    TIBC 456 (H) 250 - 450 ug/dL    Transferrin Saturation 4 (L) >20 %   RETICULOCYTE COUNT    Collection Time: 02/03/22 11:05 AM   Result Value Ref Range    Reticulocyte count 2.3 (H) 0.3 - 2.0 %    Absolute Retic Cnt. 0.0707 0.026 - 0.095 M/ul    Immature Retic Fraction 29.2 (H) 3.0 - 15.9 %    Retic Hgb Conc. 19 (L) 29 - 35 pg   VITAMIN B12    Collection Time: 02/03/22 11:05 AM   Result Value Ref Range    Vitamin B12 635 193 - 986 pg/mL   FERRITIN    Collection Time: 02/03/22 11:05 AM   Result Value Ref Range    Ferritin 13 8 - 388 NG/ML   FOLATE    Collection Time: 02/03/22 11:05 AM   Result Value Ref Range    Folate 9.7 3.1 - 17.5 ng/mL   HAPTOGLOBIN    Collection Time: 02/03/22 11:05 AM   Result Value Ref Range    Haptoglobin PENDING mg/dL   LD    Collection Time: 02/03/22 11:05 AM   Result Value Ref Range     (H) 110 - 210 U/L   RBC, ALLOCATE    Collection Time: 02/03/22 12:45 PM   Result Value Ref Range    HISTORY CHECKED?  Historical check performed    GLUCOSE, POC    Collection Time: 02/03/22  4:54 PM   Result Value Ref Range    Glucose (POC) 139 (H) 65 - 100 mg/dL    Performed by Alirio    GLUCOSE, POC    Collection Time: 02/03/22  8:30 PM   Result Value Ref Range    Glucose (POC) 172 (H) 65 - 100 mg/dL    Performed by Holly Cuenca    HGB & HCT    Collection Time: 02/03/22 10:00 PM   Result Value Ref Range    HGB 7.2 (L) 11.7 - 15.4 g/dL    HCT 26.3 (L) 35.8 - 46.3 %       All Micro Results     None          Other Studies:  XR CHEST PORT    Result Date: 2/3/2022  Exam: XR CHEST PORT on 2/3/2022 11:33 AM Clinical History: The Female patient is 61years old  presenting for pulmonary edema. Comparison:  Chest x-ray 9/21/2021 Findings:  Frontal view of the chest was obtained. There is mild central pulmonary vascular congestion. No pleural effusions are demonstrated. The cardiomediastinal silhouette remains diffusely enlarged. There are no acute osseous abnormalities. 1. Primary megaly with mild central pulmonary vascular congestion. CPT code(s) 76265           Signed:  Scooter Reddy MD    Part of this note may have been written by using a voice dictation software. The note has been proof read but may still contain some grammatical/other typographical errors.

## 2022-02-04 NOTE — PROGRESS NOTES
ACUTE OT GOALS:  (Developed with and agreed upon by patient and/or caregiver.)  1. Leland Flores will be modified independent with functional mobility for ADL in room within 4 - 7 visits. Dayanara Ge will be modified independent with total body bathing and dressing within 4 - 7 visits. 3. Leland Flores will state and demonstrate at least 5 energy conservation techniques during ADL/therapeutic activities within 4 - 7 visits. 3. Leland Flores will voice a plan for appropriate home modifications for home safety and fall prevention within 7 visits. 5. Leland Flores will participate at least 30 minutes of ADL with 3 or less rest breaks within 7 visits. Dayanara Ge will complete a tub or shower transfer with modified independence within 7 visits.     OCCUPATIONAL THERAPY ASSESSMENT: Initial Assessment and Daily Note OT Treatment Day # 1    Leland Flores is a 61 y.o. female   PRIMARY DIAGNOSIS: Anemia  Atrial fibrillation with RVR ([facility]) [I48.91]  Procedure(s) (LRB):  ESOPHAGOGASTRODUODENOSCOPY (EGD) (N/A)  COLONOSCOPY/ BMI 56 ROOM 302 (N/A)     Reason for Referral:    ICD-10: Treatment Diagnosis: Generalized Muscle Weakness (M62.81)  INPATIENT: Payor: Charles Suárez / Plan: Gasper Sethi / Product Type: Commerical /   ASSESSMENT:     REHAB RECOMMENDATIONS:   Recommendation to date pending progress:  [address] [place] [address]  Equipment:    To Be Determined     PRIOR LEVEL OF FUNCTION:  (Prior to Hospitalization)  INITIAL/CURRENT LEVEL OF FUNCTION:  (Based on today's evaluation)   Bathing:   Modified Independent  Dressing:   Modified Independent  Feeding/Grooming:   Modified Independent  Toileting:   Modified Independent  Functional Mobility:   Modified Independent Bathing:   Minimal Assistance  Dressing:   Minimal Assistance  Feeding/Grooming:   Standby Assistance  Toileting:   Minimal Assistance  Functional Mobility:   Minimal Assistance     ASSESSMENT:  Ms. Altagracia Diaz was admitted for the above and anemia. She typically lives alone, but has a son in the area. She was sitting up in the chair and just received blood. Legs are wrapped and she relays pain in her R LE> L LE. She was able to stand and take a couple forward/bacward steps with CGA. José Antonio Marie presents with the following problems and would benefit from occupational therapy to maximize independence with self-care,instrumental activities of daily living, and functional transfers/mobility for activities of daily living/instrumental activities of daily living via the stated goals. SUBJECTIVE:   Ms. Ferrara Manual states, \"thank you. \"    SOCIAL HISTORY/LIVING ENVIRONMENT: Lives in 2 story home, but lives on the first floor. She typically  Uses a cane or a rolling walker.     Support Systems: Child(adelita),Other Family Member(s),[employer],Friend/Neighbor    OBJECTIVE:     PAIN: VITAL SIGNS: LINES/DRAINS:   Pre Treatment: Pain Screen  Pain Scale 1: Numeric (0 - 10)  Pain Intensity 1: 0  Post Treatment: same   IV  O2 Device: Nasal cannula     GROSS EVALUATION:   Within Functional Limits Abnormal/ Functional Abnormal/ Non-Functional (see comments) Not Tested Comments:   AROM [] [x] [] []    PROM [] [x] [] []    Strength [] [x] [] []    Balance [] [x] [] []    Posture [] [x] [] []    Sensation [] [] [] []    Coordination [] [x] [] []    Tone [] [x] [] []    Edema [] [] [] []    Activity Tolerance [] [x] [] []     [] [] [] []      COGNITION/  PERCEPTION: Intact Impaired   (see comments) Comments:   Orientation [x] []    Vision [] []    Hearing [] []    Judgment/ Insight [x] []    Attention [] []    Memory [] []    Command Following [x] []    Emotional Regulation [] []     [] []      ACTIVITIES OF DAILY LIVING: I Mod I S SBA CGA Min Mod Max Total NT Comments   BASIC ADLs:              Bathing/ Showering [] [] [] [] [] [] [] [] [] [x]    Toileting [] [] [] [] [] [] [] [] [] [x]    Dressing [] [] [] [] [] [] [] [] [] [x]    Feeding [] [] [] [] [] [] [] [] [] [x]    Grooming [] [] [] [] [] [] [] [] [] [x]    Personal Device Care [] [] [] [] [] [] [] [] [] [x]    Functional Mobility [] [] [] [] [x] [x] [] [] [] []    I=Independent, Mod I=Modified Independent, S=Supervision, SBA=Standby Assistance, CGA=Contact Guard Assistance,   Min=Minimal Assistance, Mod=Moderate Assistance, Max=Maximal Assistance, Total=Total Assistance, NT=Not Tested    MOBILITY: I Mod I S SBA CGA Min Mod Max Total  NT x2 Comments:   Supine to sit [] [] [] [] [] [] [] [] [] [x] []    Sit to supine [] [] [] [] [] [] [] [] [] [x] []    Sit to stand [] [] [] [] [x] [] [] [] [] [] []    Bed to chair [] [] [] [] [] [] [] [] [] [x] []    I=Independent, Mod I=Modified Independent, S=Supervision, SBA=Standby Assistance, CGA=Contact Guard Assistance,   Min=Minimal Assistance, Mod=Moderate Assistance, Max=Maximal Assistance, Total=Total Assistance, NT=Not Tested    [facility] [address] [address] AM-PAC 6 Clicks   Daily Activity Inpatient Short Form        How much help from another person does the patient currently need. .. Total A Lot A Little None   1. Putting on and taking off regular lower body clothing? [] 1   [x] 2   [] 3   [] 4   2. Bathing (including washing, rinsing, drying)? [] 1   [x] 2   [] 3   [] 4   3. Toileting, which includes using toilet, bedpan or urinal?   [] 1   [x] 2   [] 3   [] 4   4. Putting on and taking off regular upper body clothing? [] 1   [] 2   [x] 3   [] 4   5. Taking care of personal grooming such as brushing teeth? [] 1   [] 2   [x] 3   [] 4   6. Eating meals? [] 1   [] 2   [x] 3   [] 4   © 2007, Trustees of [address] [place] [address] [address] [address], under license to [employer]. All rights reserved     Score:  Initial: 15 Most Recent: X (Date: -- )   Interpretation of Tool:  Represents activities that are increasingly more difficult (i.e. Bed mobility, Transfers, Gait).     PLAN:   FREQUENCY/DURATION:   for duration of hospital stay or until stated goals are met, whichever comes first.    PROBLEM LIST:   (Skilled intervention is medically necessary to address:)  1. Decreased ADL/Functional Activities  2. Decreased Activity Tolerance  3. Decreased AROM/PROM  4. Decreased Balance  5. Decreased Coordination  6. Decreased Strength  7. Decreased Transfer Abilities  8. Increased Pain   INTERVENTIONS PLANNED:   (Benefits and precautions of occupational therapy have been discussed with the patient.)  1. Self Care Training  2. Therapeutic Activity  3. Therapeutic Exercise/HEP  4. Neuromuscular Re-education  5. Education     TREATMENT:     EVALUATION: Low Complexity : (Untimed Charge)    TREATMENT:   ( $$ Therapeutic Activity: 8-22 mins   )  Therapeutic Activity (8 Minutes): Therapeutic activity included Sitting balance  and Standing balance to improve functional Mobility, Strength, ROM and Activity tolerance.     TREATMENT GRID:  N/A    AFTER TREATMENT POSITION/PRECAUTIONS:  Chair, Needs within reach and RN notified    INTERDISCIPLINARY COLLABORATION:  RN/PCT and OT/BROWN    TOTAL TREATMENT DURATION:  OT Patient Time In/Time Out  Time In: 1450  Time Out: EVELYN Steele, OTR/L

## 2022-02-04 NOTE — ROUTINE PROCESS
Bedside shift change report given to pepe (oncoming nurse) by joana (offgoing nurse). Report included the following information SBAR.

## 2022-02-04 NOTE — CONSULTS
[facility] Consult Note       Consulting GI Physician: Dr. Uri Hurtado ([facility])    Referring Provider: Fuentes Little NP     Consult Date:  2/4/2022    Admit Date:  2/3/2022    Chief Complaint:  Anemia     Subjective:     History of Present Illness:  Patient is a 61 y.o. female with PMH including but not limited to Afib (on Xarelto) chronic right knee pain, HTN, morbid obesity complicated by LUNA/OHS, and chronic diastolic heart failure (on Lasix), who is seen in consultation on 2/4/22 at the request of Fuentes Little NP for anemia. Patient presented to the ER on 2/3/22 with complaints of shortness of breath and fatigue that started 2 days ago. Patient reported she was having increasing fatigue and shortness of breath the past couple days. States she was seen by her primary care physician and told to come to the ER for \"possible water around her heart\". She was also told that her blood tests were abnormal.  Denied any fevers or chills. Did report significant leg swelling as well as orthopnea. Patient reports being on iron as a teenager but not during adulthood. She reports being on Xarelto since September with last dose a week ago. She reports she ran out of refills. Pt denies abdominal pain, nausea, vomiting, hematemesis, hematochezia, melena, or change in bowel habits. She denies NSAID use, takes Tylenol PRN for pain. Pt denies ETOH use or smoking. Pt does not recall having a work-up for RICHA. Pt is on iron infusions and Cardizem gtt. Pt has received 1 unit of PRBC on 2/3/22 and 1 unit of PRBC pending for today. Hgb is 7.2 from 6.5 (baseline appears to be 11 to 13 range in 2021), HCT 26.3, MCV 79, . PT 15.6, INR 1.2. BUN 19, Creatinine 0.70. NT pro-BNP 1,328. Iron 16, TIBC 456, Transferrin saturation 4%, Ferritin 13.        PMH:  Past Medical History:   Diagnosis Date    Chronic pain     pain R knee    Hypertension     Morbid obesity ([facility] [address]     BMI 45.8- 5/2/12    Positive PPD     had vaccination as a child - BCG       PSH:  Past Surgical History:   Procedure Laterality Date    HX GYN      C-sec x 1 with GA    HX KNEE ARTHROSCOPY      bilateral knees       Allergies: Allergies   Allergen Reactions    Lortab [Hydrocodone-Acetaminophen] Nausea Only and Other (comments)     \"It makes me feel hung over\"       Home Medications:  Prior to Admission medications    Medication Sig Start Date End Date Taking? Authorizing Provider   albuterol (PROVENTIL HFA, VENTOLIN HFA, PROAIR HFA) 90 mcg/actuation inhaler Take 2 Puffs by inhalation every four (4) hours as needed for Wheezing. 11/11/21  Yes Stiven Gaffney MD   albuterol (PROVENTIL HFA, VENTOLIN HFA, PROAIR HFA) 90 mcg/actuation inhaler Take 1 Puff by inhalation every four (4) hours as needed for Wheezing. 9/26/21  Yes Julia Davey NP   furosemide (LASIX) 40 mg tablet Take 1 Tab by mouth daily. 10/1/20  Yes Hugh Villanueva MD   potassium chloride (K-DUR, KLOR-CON) 20 mEq tablet Take 1 Tab by mouth daily. 10/1/20  Yes Hugh Villanueva MD   OXYGEN-AIR DELIVERY SYSTEMS 2 L by Nasal route nightly. 10/2/20  Yes Provider, Historical   predniSONE (DELTASONE) 20 mg tablet Take 20 mg by mouth daily (with breakfast).  9/26/21   Julia Davey NP       Hospital Medications:  Current Facility-Administered Medications   Medication Dose Route Frequency    ferric gluconate (FERRLECIT) 125 mg in 0.9% sodium chloride 100 mL IVPB  125 mg IntraVENous DAILY    dilTIAZem ER (CARDIZEM CD) capsule 120 mg  120 mg Oral DAILY    levalbuterol (XOPENEX) nebulizer soln 0.63 mg/3 mL  0.63 mg Nebulization QID RT    potassium chloride (K-DUR, KLOR-CON M20) SR tablet 40 mEq  40 mEq Oral DAILY    acetaminophen (TYLENOL) tablet 650 mg  650 mg Oral Q4H PRN    0.9% sodium chloride infusion 250 mL  250 mL IntraVENous PRN    sodium chloride (NS) flush 5-10 mL  5-10 mL IntraVENous Q8H    sodium chloride (NS) flush 5-10 mL  5-10 mL IntraVENous PRN    0.9% sodium chloride infusion 250 mL  250 mL IntraVENous PRN    dilTIAZem (CARDIZEM) 100 mg in 0.9% sodium chloride (MBP/ADV) 100 mL infusion  0-15 mg/hr IntraVENous TITRATE    furosemide (LASIX) injection 40 mg  40 mg IntraVENous BID    tuberculin injection 5 Units  5 Units IntraDERMal ONCE    insulin lispro (HUMALOG) injection   SubCUTAneous AC&HS    morphine injection 2 mg  2 mg IntraVENous Q4H PRN    oxyCODONE IR (ROXICODONE) tablet 5 mg  5 mg Oral Q4H PRN       Social History:  Social History     Tobacco Use    Smoking status: Former Smoker     Packs/day: 0.50     Years: 25.00     Pack years: 12.50     Types: Cigarettes     Quit date: 2/7/2007     Years since quitting: 15.0    Smokeless tobacco: Never Used   Substance Use Topics    Alcohol use: No       Family History:  Family History   Problem Relation Age of Onset    Other Mother         kidney failure    Cancer Sister         cervical cancer       Review of Systems:  A detailed 10 system ROS is obtained, with pertinent positives as listed above. All others are negative. Objective:     Physical Exam:  Vitals:  Visit Vitals  /64 (BP 1 Location: Left lower arm)   Pulse 94   Temp 98.1 °F (36.7 °C)   Resp 22   Ht 5' 7\" (1.702 m)   Wt (!) 162.5 kg (358 lb 4 oz)   SpO2 91%   BMI 56.11 kg/m²     Gen:  Pt is alert, cooperative, no acute distress  Skin:  Extremities and face reveal no rashes. Warm and dry. HEENT: Sclerae anicteric. Extra-occular muscles are intact. No abnormal pigmentation of the lips. The neck is supple. Cardiovascular: Regular rate and rhythm. Respiratory:  Comfortable breathing with no accessory muscle use. Clear breath sounds anteriorly with no wheezes, rales, or rhonchi. GI:  Abdomen obese, nondistended, soft, and nontender. Normal active bowel sounds. No enlargement of the liver or spleen. No masses palpable. Rectal:  Deferred  Musculoskeletal:  Edema and erythema of the lower legs.     Neurological:  Gross memory appears intact. Patient is alert and oriented. .    Laboratory:    Recent Labs     02/03/22  2200 02/03/22  1105   WBC  --  6.2   HGB 7.2* 6.5*   HCT 26.3* 24.5*   PLT  --  278   MCV  --  79.0*   NA  --  141   K  --  4.0   CL  --  110*   CO2  --  28   BUN  --  19   CREA  --  0.70   CA  --  8.9   GLU  --  140*   AP  --  121   AST  --  18   ALT  --  24   TBILI  --  0.6   ALB  --  2.7*   TP  --  7.1   PTP  --  15.6*   INR  --  1.2      Exam: XR CHEST PORT on 2/3/2022 11:33 AM     Clinical History: The Female patient is 61years old  presenting for pulmonary  edema.     Comparison:  Chest x-ray 9/21/2021     Findings:  Frontal view of the chest was obtained.      There is mild central pulmonary vascular congestion. No pleural effusions are  demonstrated. The cardiomediastinal silhouette remains diffusely enlarged. There are no acute osseous abnormalities.     IMPRESSION     1. Primary cardiomegaly with mild central pulmonary vascular congestion.     Assessment:     Principal Problem:    Anemia (2/3/2022)    Active Problems:    Hypertension ()      Class 3 obesity with alveolar hypoventilation, serious comorbidity, and body mass index (BMI) of 50.0 to 59.9 in [facility]) ()      Overview: BMI 45.8- 5/2/12      Acute respiratory failure with hypoxia ([facility] [address] (6/25/2020)      Controlled type 2 diabetes mellitus without complication, without long-term current use of insulin ([facility] [address] (9/5/8058)      Diastolic CHF, acute on chronic (HCC) (11/11/2021)      Atrial fibrillation with RVR ([facility] [address] (2/3/2022)      Patient is a 61 y.o. female with PMH including but not limited to Afib (on Xarelto) chronic right knee pain, HTN, morbid obesity complicated by LUNA/OHS, and chronic diastolic heart failure (on Lasix), who is seen in consultation on 2/4/22 at the request of Olive Riggins NP for anemia. Pt has received 1 unit of PRBC on 2/3/22 and 1 unit of PRBC pending for today.  Hgb is 7.2 from 6.5 (baseline appears to be 11 to 13 range in 2021), HCT 26.3, MCV 79, . PT 15.6, INR 1.2. BUN 19, Creatinine 0.70. NT pro-BNP 1,328. Iron 16, TIBC 456, Transferrin saturation 4%, Ferritin 13. Echo is pending. Plan:     -- Monitor Hgb, transfuse PRN for Hgb < 7  - 1 unit of PRBC pending   - Echo is pending   - Plan for EGD and Colonoscopy on Sunday 2/6/22 if cardiac status is stable. Discussed risks including but not limited to bleeding, perforation, acute cardiopulmonary event, sedation risks, IV complications, aspiration, and pt states understanding and agrees to proceed.      Mack Avila MD

## 2022-02-04 NOTE — PROGRESS NOTES
Reviewed notes for spiritual concerns   patient was calm   Sitting in chair  Brief visit  to encourage

## 2022-02-04 NOTE — PROGRESS NOTES
ACUTE PHYSICAL THERAPY GOALS:  (Developed with and agreed upon by patient and/or caregiver.)    (1.) Garima Espitia  will move from supine to sit and sit to supine  with INDEPENDENCE within 7 treatment day(s). (2.) Garima Espitia will transfer from bed to chair and chair to bed with MODIFIED INDEPENDENCE using the least restrictive device within 7 treatment day(s). (3.) Garima Espitia will ambulate with MODIFIED INDEPENDENCE for 150 feet with the least restrictive device within 7 treatment day(s). (4.) Garima Espitia will perform standing static and dynamic balance activities x 20 minutes with SUPERVISION to improve safety within 7 treatment day(s). (5.) Garima Espitia will perform bilateral lower extremity exercises x 20 min for HEP with INDEPENDENCE to improve strength, endurance, and functional mobility within 7 treatment day(s).      PHYSICAL THERAPY ASSESSMENT: Initial Assessment, Daily Note and AM PT Treatment Day # 1      Garima Espitia is a 61 y.o. female   PRIMARY DIAGNOSIS: Anemia  Atrial fibrillation with RVR ([facility]) [I48.91]  Procedure(s) (LRB):  ESOPHAGOGASTRODUODENOSCOPY (EGD) (N/A)  COLONOSCOPY/ BMI 56 ROOM 302 (N/A)     Reason for Referral:    ICD-10: Treatment Diagnosis: Difficulty in walking, Not elsewhere classified (R26.2)  INPATIENT: Payor: Shona Dates / Plan: Ophelia Byrd / Product Type: Commerical /     ASSESSMENT:     REHAB RECOMMENDATIONS:   Recommendation to date pending progress:  [address] [place] [address]  Equipment:    None   pt has cane, walker     PRIOR LEVEL OF FUNCTION:  (Prior to Hospitalization) INITIAL/CURRENT LEVEL OF FUNCTION:  (Most Recently Demonstrated)   Bed Mobility:   Independent  Sit to Stand:   Modified Independent  Transfers:   Modified Independent  Gait/Mobility:   Modified Independent Bed Mobility:   Minimal Assistance  Sit to Stand:   Contact Guard Assistance  Transfers:   Contact Guard Assistance  Gait/Mobility:   Contact Guard Assistance     ASSESSMENT:  Ms. Lauren Looney is a 61year old F who presents with anemia, shortness of breath, B LE swelling. At baseline, pt lives alone and is independent with mobility with a cane. She reports her son checks in on her frequently and does her shopping. This date pt performs mobility including bed mobility with Elder, HOB elevated. She was able to stand and ambulate 5 ft with her cane and CGA, CGA to transfer to chair. Pt with WBOS, some unsteadiness. Pt has chronic B knee pain which limited further mobility. Pt will benefit from [facility] PT and reports her son is able to stay with her 24/7 during her initial recovery. Pt presents as functioning below her baseline, with deficits in mobility including transfers, gait, balance, and activity tolerance. Pt will benefit from skilled therapy services to address stated deficits to promote return to highest level of function, independence, and safety. Will continue to follow.      SUBJECTIVE:   Ms. Lauren Looney states, Silvio Carlson would be great\" (in reference to receiving HH therapy)    SOCIAL HISTORY/LIVING ENVIRONMENT: PLOF: Reyna with cane, lives alone but son checks on her, 0 PHOEBE, no falls   Support Systems: Child(adelita)  OBJECTIVE:     PAIN: VITAL SIGNS: LINES/DRAINS:   Pre Treatment: Pain Screen  Pain Scale 1: Numeric (0 - 10)  Pain Intensity 1: 6  Pain Onset 1: chronic  Pain Location 1: Knee  Pain Orientation 1: Left  Pain Intervention(s) 1: Ambulation/Increased Activity  Post Treatment: 6   Fonseca Catheter and IV  O2 Device: Nasal cannula     GROSS EVALUATION:  B LE Within Functional Limits Abnormal/ Functional Abnormal/ Non-Functional (see comments) Not Tested Comments:   AROM [x] [] [] []    PROM [] [] [] [x]    Strength [] [x] [] [] Generalized weakness   Balance [] [x] [] [] Good sitting, fair standing    Posture [] [x] [] [] Forward flexed   Sensation [] [] [] [x]    Coordination [] [] [] [x]    Tone [] [] [] [x]    Edema [] [x] [] [] B LE swelling    Activity Tolerance [] [x] [] [] Below baseline     [] [] [] []      COGNITION/  PERCEPTION: Intact Impaired   (see comments) Comments:   Orientation [x] []    Vision [x] []    Hearing [x] []    Command Following [x] []    Safety Awareness [x] []     [] []      MOBILITY: I Mod I S SBA CGA Min Mod Max Total  NT x2 Comments:   Bed Mobility    Rolling [] [] [] [] [] [x] [] [] [] [] []    Supine to Sit [] [] [] [] [] [x] [] [] [] [] []    Scooting [] [] [] [] [] [x] [] [] [] [] []    Sit to Supine [] [] [] [] [] [] [] [] [] [x] []    Transfers    Sit to Stand [] [] [] [] [x] [] [] [] [] [] []    Bed to Chair [] [] [] [] [x] [] [] [] [] [] []    Stand to Sit [] [] [] [] [x] [] [] [] [] [] []    I=Independent, Mod I=Modified Independent, S=Supervision, SBA=Standby Assistance, CGA=Contact Guard Assistance,   Min=Minimal Assistance, Mod=Moderate Assistance, Max=Maximal Assistance, Total=Total Assistance, NT=Not Tested  GAIT: I Mod I S SBA CGA Min Mod Max Total  NT x2 Comments:   Level of Assistance [] [] [] [] [x] [] [] [] [] [] []    Distance 5 ft    DME SPC    Gait Quality Wide ANGELO, unsteadiness, decreased gait speed    Weightbearing Status N/A     I=Independent, Mod I=Modified Independent, S=Supervision, SBA=Standby Assistance, CGA=Contact Guard Assistance,   Min=Minimal Assistance, Mod=Moderate Assistance, Max=Maximal Assistance, Total=Total Assistance, NT=Not Tested    [employer] AM-PAC 6 Clicks   Basic Mobility Inpatient Short Form       How much difficulty does the patient currently have. .. Unable A Lot A Little None   1. Turning over in bed (including adjusting bedclothes, sheets and blankets)? [] 1   [] 2   [x] 3   [] 4   2. Sitting down on and standing up from a chair with arms ( e.g., wheelchair, bedside commode, etc.)   [] 1   [] 2   [x] 3   [] 4   3. Moving from lying on back to sitting on the side of the bed?    [] 1   [] 2   [x] 3   [] 4   How much help from another person does the patient currently need... Total A Lot A Little None   4. Moving to and from a bed to a chair (including a wheelchair)? [] 1   [] 2   [x] 3   [] 4   5. Need to walk in hospital room? [] 1   [] 2   [x] 3   [] 4   6. Climbing 3-5 steps with a railing? [] 1   [x] 2   [] 3   [] 4   © 2007, Trustees of [address] [place] [address] [address] [address], under license to [employer]. All rights reserved     Score:  Initial: 17 Most Recent: X (Date: -- )    Interpretation of Tool:  Represents activities that are increasingly more difficult (i.e. Bed mobility, Transfers, Gait). PLAN:   FREQUENCY/DURATION: PT Plan of Care: 3 times/week for duration of hospital stay or until stated goals are met, whichever comes first.    PROBLEM LIST:   (Skilled intervention is medically necessary to address:)  1. Decreased ADL/Functional Activities  2. Decreased Activity Tolerance  3. Decreased Balance  4. Decreased Gait Ability  5. Decreased Strength  6. Decreased Transfer Abilities  7. Increased Pain   INTERVENTIONS PLANNED:   (Benefits and precautions of physical therapy have been discussed with the patient.)  1. Therapeutic Activity  2. Therapeutic Exercise/HEP  3. Neuromuscular Re-education  4. Gait Training  5. Education     TREATMENT:     EVALUATION: Low Complexity : (Untimed Charge)    TREATMENT:   ($$ Therapeutic Activity: 8-22 mins    )  Therapeutic Activity (18 Minutes): Therapeutic activity included Rolling, Supine to Sit, Scooting, Transfer Training, Ambulation on level ground, Sitting balance  and Standing balance to improve functional Mobility, Strength and Activity tolerance.     TREATMENT GRID:  N/A    AFTER TREATMENT POSITION/PRECAUTIONS:  Chair, Needs within reach and RN notified    INTERDISCIPLINARY COLLABORATION:  RN/PCT    TOTAL TREATMENT DURATION:  PT Patient Time In/Time Out  Time In: 1005  Time Out: [address], PT

## 2022-02-05 ENCOUNTER — ANESTHESIA EVENT (OUTPATIENT)
Dept: ENDOSCOPY | Age: 67
DRG: 291 | End: 2022-02-05
Payer: COMMERCIAL

## 2022-02-05 ENCOUNTER — HOME HEALTH ADMISSION (OUTPATIENT)
Dept: HOME HEALTH SERVICES | Facility: HOME HEALTH | Age: 67
End: 2022-02-05

## 2022-02-05 LAB
ABO + RH BLD: NORMAL
ANION GAP SERPL CALC-SCNC: 3 MMOL/L (ref 7–16)
BLD PROD TYP BPU: NORMAL
BLD PROD TYP BPU: NORMAL
BLOOD BANK CMNT PATIENT-IMP: NORMAL
BLOOD GROUP ANTIBODIES SERPL: NORMAL
BPU ID: NORMAL
BPU ID: NORMAL
BUN SERPL-MCNC: 14 MG/DL (ref 8–23)
CALCIUM SERPL-MCNC: 8.8 MG/DL (ref 8.3–10.4)
CHLORIDE SERPL-SCNC: 104 MMOL/L (ref 98–107)
CO2 SERPL-SCNC: 33 MMOL/L (ref 21–32)
COVID-19 RAPID TEST, COVR: NOT DETECTED
CREAT SERPL-MCNC: 0.7 MG/DL (ref 0.6–1)
CROSSMATCH RESULT,%XM: NORMAL
CROSSMATCH RESULT,%XM: NORMAL
ERYTHROCYTE [DISTWIDTH] IN BLOOD BY AUTOMATED COUNT: 19.8 % (ref 11.9–14.6)
GLUCOSE BLD STRIP.AUTO-MCNC: 140 MG/DL (ref 65–100)
GLUCOSE BLD STRIP.AUTO-MCNC: 148 MG/DL (ref 65–100)
GLUCOSE BLD STRIP.AUTO-MCNC: 158 MG/DL (ref 65–100)
GLUCOSE BLD STRIP.AUTO-MCNC: 178 MG/DL (ref 65–100)
GLUCOSE SERPL-MCNC: 124 MG/DL (ref 65–100)
HCT VFR BLD AUTO: 27 % (ref 35.8–46.3)
HGB BLD-MCNC: 7.6 G/DL (ref 11.7–15.4)
Lab: NORMAL
MCH RBC QN AUTO: 22.6 PG (ref 26.1–32.9)
MCHC RBC AUTO-ENTMCNC: 28.1 G/DL (ref 31.4–35)
MCV RBC AUTO: 80.4 FL (ref 79.6–97.8)
MM INDURATION POC: 0 MM (ref 0–5)
NRBC # BLD: 0.04 K/UL (ref 0–0.2)
PLATELET # BLD AUTO: 227 K/UL (ref 150–450)
PMV BLD AUTO: 10.3 FL (ref 9.4–12.3)
POTASSIUM SERPL-SCNC: 3.3 MMOL/L (ref 3.5–5.1)
PPD POC: NEGATIVE NEGATIVE
RBC # BLD AUTO: 3.36 M/UL (ref 4.05–5.2)
REFERENCE LAB,REFLB: NORMAL
SERVICE CMNT-IMP: ABNORMAL
SODIUM SERPL-SCNC: 140 MMOL/L (ref 136–145)
SOURCE, COVRS: NORMAL
SPECIMEN EXP DATE BLD: NORMAL
STATUS OF UNIT,%ST: NORMAL
STATUS OF UNIT,%ST: NORMAL
TEST DESCRIPTION:,ATST: NORMAL
UNIT DIVISION, %UDIV: 0
UNIT DIVISION, %UDIV: 0
WBC # BLD AUTO: 9.1 K/UL (ref 4.3–11.1)

## 2022-02-05 PROCEDURE — 36415 COLL VENOUS BLD VENIPUNCTURE: CPT

## 2022-02-05 PROCEDURE — 83735 ASSAY OF MAGNESIUM: CPT

## 2022-02-05 PROCEDURE — 74011250637 HC RX REV CODE- 250/637: Performed by: HOSPITALIST

## 2022-02-05 PROCEDURE — 74011250636 HC RX REV CODE- 250/636: Performed by: INTERNAL MEDICINE

## 2022-02-05 PROCEDURE — 82962 GLUCOSE BLOOD TEST: CPT

## 2022-02-05 PROCEDURE — 94640 AIRWAY INHALATION TREATMENT: CPT

## 2022-02-05 PROCEDURE — 99232 SBSQ HOSP IP/OBS MODERATE 35: CPT | Performed by: INTERNAL MEDICINE

## 2022-02-05 PROCEDURE — 65660000000 HC RM CCU STEPDOWN

## 2022-02-05 PROCEDURE — 74011250637 HC RX REV CODE- 250/637: Performed by: NURSE PRACTITIONER

## 2022-02-05 PROCEDURE — 80048 BASIC METABOLIC PNL TOTAL CA: CPT

## 2022-02-05 PROCEDURE — 74011250637 HC RX REV CODE- 250/637: Performed by: INTERNAL MEDICINE

## 2022-02-05 PROCEDURE — 94760 N-INVAS EAR/PLS OXIMETRY 1: CPT

## 2022-02-05 PROCEDURE — 74011000250 HC RX REV CODE- 250: Performed by: HOSPITALIST

## 2022-02-05 PROCEDURE — 74011250636 HC RX REV CODE- 250/636: Performed by: EMERGENCY MEDICINE

## 2022-02-05 PROCEDURE — 74011000250 HC RX REV CODE- 250: Performed by: EMERGENCY MEDICINE

## 2022-02-05 PROCEDURE — 77010033678 HC OXYGEN DAILY

## 2022-02-05 PROCEDURE — 74011000258 HC RX REV CODE- 258: Performed by: HOSPITALIST

## 2022-02-05 PROCEDURE — 74011000258 HC RX REV CODE- 258: Performed by: EMERGENCY MEDICINE

## 2022-02-05 PROCEDURE — 74011636637 HC RX REV CODE- 636/637: Performed by: INTERNAL MEDICINE

## 2022-02-05 PROCEDURE — 87635 SARS-COV-2 COVID-19 AMP PRB: CPT

## 2022-02-05 PROCEDURE — 85027 COMPLETE CBC AUTOMATED: CPT

## 2022-02-05 PROCEDURE — 74011250636 HC RX REV CODE- 250/636: Performed by: HOSPITALIST

## 2022-02-05 RX ORDER — POTASSIUM CHLORIDE 20 MEQ/1
40 TABLET, EXTENDED RELEASE ORAL
Status: COMPLETED | OUTPATIENT
Start: 2022-02-05 | End: 2022-02-05

## 2022-02-05 RX ORDER — DILTIAZEM HYDROCHLORIDE 120 MG/1
240 CAPSULE, COATED, EXTENDED RELEASE ORAL DAILY
Status: DISCONTINUED | OUTPATIENT
Start: 2022-02-06 | End: 2022-02-07

## 2022-02-05 RX ORDER — POLYETHYLENE GLYCOL 3350 17 G/17G
238 POWDER, FOR SOLUTION ORAL ONCE
Status: COMPLETED | OUTPATIENT
Start: 2022-02-05 | End: 2022-02-05

## 2022-02-05 RX ORDER — DIGOXIN 250 MCG
0.25 TABLET ORAL DAILY
Status: DISCONTINUED | OUTPATIENT
Start: 2022-02-05 | End: 2022-02-15 | Stop reason: HOSPADM

## 2022-02-05 RX ADMIN — LEVALBUTEROL HYDROCHLORIDE 0.63 MG: 0.63 SOLUTION RESPIRATORY (INHALATION) at 07:49

## 2022-02-05 RX ADMIN — FUROSEMIDE 40 MG: 10 INJECTION, SOLUTION INTRAMUSCULAR; INTRAVENOUS at 17:35

## 2022-02-05 RX ADMIN — SODIUM CHLORIDE 10 MG/HR: 900 INJECTION, SOLUTION INTRAVENOUS at 17:35

## 2022-02-05 RX ADMIN — SODIUM CHLORIDE 10 MG/HR: 900 INJECTION, SOLUTION INTRAVENOUS at 07:14

## 2022-02-05 RX ADMIN — POTASSIUM CHLORIDE 40 MEQ: 20 TABLET, EXTENDED RELEASE ORAL at 10:00

## 2022-02-05 RX ADMIN — SODIUM CHLORIDE 125 MG: 9 INJECTION, SOLUTION INTRAVENOUS at 10:16

## 2022-02-05 RX ADMIN — DIGOXIN 0.25 MG: 250 TABLET ORAL at 10:14

## 2022-02-05 RX ADMIN — POTASSIUM CHLORIDE 40 MEQ: 20 TABLET, EXTENDED RELEASE ORAL at 08:41

## 2022-02-05 RX ADMIN — POLYETHYLENE GLYCOL 3350 238 G: 17 POWDER, FOR SOLUTION ORAL at 14:51

## 2022-02-05 RX ADMIN — OXYCODONE HYDROCHLORIDE 5 MG: 5 TABLET ORAL at 21:29

## 2022-02-05 RX ADMIN — LEVALBUTEROL HYDROCHLORIDE 0.63 MG: 0.63 SOLUTION RESPIRATORY (INHALATION) at 19:54

## 2022-02-05 RX ADMIN — LEVALBUTEROL HYDROCHLORIDE 0.63 MG: 0.63 SOLUTION RESPIRATORY (INHALATION) at 11:06

## 2022-02-05 RX ADMIN — FUROSEMIDE 40 MG: 10 INJECTION, SOLUTION INTRAMUSCULAR; INTRAVENOUS at 08:43

## 2022-02-05 RX ADMIN — SODIUM CHLORIDE, PRESERVATIVE FREE 10 ML: 5 INJECTION INTRAVENOUS at 21:30

## 2022-02-05 RX ADMIN — LEVALBUTEROL HYDROCHLORIDE 0.63 MG: 0.63 SOLUTION RESPIRATORY (INHALATION) at 15:32

## 2022-02-05 RX ADMIN — OXYCODONE HYDROCHLORIDE 5 MG: 5 TABLET ORAL at 00:38

## 2022-02-05 RX ADMIN — SODIUM CHLORIDE, PRESERVATIVE FREE 10 ML: 5 INJECTION INTRAVENOUS at 05:47

## 2022-02-05 RX ADMIN — Medication 2 UNITS: at 12:25

## 2022-02-05 RX ADMIN — Medication 2 UNITS: at 21:29

## 2022-02-05 RX ADMIN — DILTIAZEM HYDROCHLORIDE 120 MG: 120 CAPSULE, COATED, EXTENDED RELEASE ORAL at 08:41

## 2022-02-05 NOTE — PROGRESS NOTES
GASTROENTEROLOGY DAILY PROGRESS NOTE    Patient: Janine Joiner MRN: [id number]  SSN: [id number]    YOB: 1955  Age: 61 y.o. Sex: female       Admit Date:  2/3/2022    Today's Date:  2/5/2022    Subjective:     No gi sx. No gross bleeding. Taking prep. For EGD and colonoscopy tomorrow am.        Objective:   Vitals:  Visit Vitals  BP (!) 119/56   Pulse (!) 109   Temp 98 °F (36.7 °C)   Resp 14   Ht 5' 7\" (1.702 m)   Wt (!) 159.2 kg (351 lb)   SpO2 93%   BMI 54.97 kg/m²     Intake/Output:  Current Shift:  02/05 0701 - 02/05 1900  In: 240 [P.O.:240]  Out: 1000 [Urine:1000]  Previous three completed shifts:  02/03 1901 - 02/05 0700  In: 373.5   Out: 6050 [Urine:6050]    Exam:  General appearance: alert, cooperative, no distress  Obese  Lungs: clear to auscultation bilaterally anteriorly  Heart: regular rate and rhythm  Abdomen: soft, non-tender.  Bowel sounds normal. No masses,  no organomegaly  Extremities: extremities normal, atraumatic, no cyanosis or edema  Neuro:  alert and oriented    Data Review (Labs):    Recent Labs     02/05/22  0700 02/04/22  1733 02/04/22  1525 02/03/22  2200 02/03/22  1105   WBC 9.1  --   --   --  6.2   HGB 7.6* 7.9*  --  7.2* 6.5*   HCT 27.0* 27.8*  --  26.3* 24.5*     --   --   --  278   MCV 80.4  --   --   --  79.0*     --  141  --  141   K 3.3*  --  3.8  --  4.0     --  105  --  110*   CO2 33*  --  32  --  28   BUN 14  --  17  --  19   CREA 0.70  --  0.80  --  0.70   CA 8.8  --  8.7  --  8.9   *  --  129*  --  140*   AP  --   --   --   --  121   ALT  --   --   --   --  24   TBILI  --   --   --   --  0.6   PTP  --   --   --   --  15.6*   INR  --   --   --   --  1.2           Assessment:     Principal Problem:    Anemia (2/3/2022)    Active Problems:    Hypertension ()      Class 3 obesity with alveolar hypoventilation, serious comorbidity, and body mass index (BMI) of 50.0 to 59.9 in adult [facility]) ()      Overview: BMI 45.8- 5/2/12      Acute respiratory failure with hypoxia ([facility] [address] (6/25/2020)      Controlled type 2 diabetes mellitus without complication, without long-term current use of insulin ([facility] [address] (3/2/2405)      Diastolic CHF, acute on chronic (HCC) (11/11/2021)      Atrial fibrillation with RVR ([facility] [address] (2/3/2022)        Patient is a 61 y.o. female with PMH including but not limited to Afib (on Xarelto) chronic right knee pain, HTN, morbid obesity complicated by LUNA/OHS, and chronic diastolic heart failure (on Lasix), who is seen in consultation on 2/4/22 at the request of Fartun Good NP for anemia.        Pt has received 1 unit of PRBC on 2/3/22 and 1 unit of PRBC pending for today. Hgb is from 6.5 (baseline appears to be 11 to 13 range in 2021), HCT 26.3, MCV 79, . PT 15.6, INR 1.2. BUN 19, Creatinine 0.70. NT pro-BNP 1,328. Iron 16, TIBC 456, Transferrin saturation 4%, Ferritin 13. Plan:     EGD and colonoscopy tomorrow at 8 am.  Discussed risks including but not limited to bleeding, perforation, acute cardiopulmonary event, sedation risks, IV complications and pt states understanding and agrees to proceed.      Signed By: Thomas Neff MD     February 5, 2022

## 2022-02-05 NOTE — ROUTINE PROCESS
Bedside and Verbal shift change report given to self (oncoming nurse) by  Carley Talley RN (offgoing nurse). Report included the following information SBAR, Kardex, Intake/Output, MAR, and Recent Results.

## 2022-02-05 NOTE — PROGRESS NOTES
Hospitalist Progress Note   Admit Date:  2/3/2022 11:07 AM   Name:  Latrice Gerber   Age:  61 y.o. Sex:  female  :  1955   MRN:  [id number]   Room:  [facility]/    Presenting Complaint: Abnormal Lab Results and Peripheral Edema    Reason(s) for Admission: Atrial fibrillation with RVR ([facility] [address] [I48.91]     Interval Hx/ Subjective:   Latrice Gerber is a 61 y.o. female with medical history of Afib (on Xarelto), HTN, morbid obesity complicated by LUNA/OHS, chronic diastolic heart failure admitted on 2/3 with A.fib RVR, acute hypoxic respiratory failure (room air sats 89%), severe iron deficiency anemia. Pt presented with worsening shortness of breath, fatigue, and worsening swelling in her lower extremities. ED Course: Hgb of 6.5 (last Hgb was 13 in 2021). MCV of 79 with RDW of 19. pBNP of 1300. CXR shows vascular congestion. EKG shows Afib with RVR with HRs in the 100s. Occult blood negative. Type/screen for pRBC transfusion. Given Lasix 40 mg IV once. :  Patient seen at bedside, resting in bed comfortably. She is currently on 2 L via NC. Currently heart rate is in 110s. Denies any chest pain, worsening shortness of breath or congestion. No fever, chills, nausea vomiting or diarrhea. : As per patient she is feeling better than yesterday. Per patient she is monitoring her heart rate on the monitor and intermittently it is going above 100. She is aware of endoscopy scheduled for tomorrow. She denies any chest pain, shortness of breath or palpitation. Rest of system negative except mentioned above. Next  Assessment & Plan: Active Problems:    Atrial fibrillation with RVR ([facility] [address]   :  Heart rate is suboptimally controlled, currently on Cardizem gtt. Wean off Cardizem as able. Started on oral diltiazem  mg p.o. daily  Xarelto is on hold currently in view of concerns for possible underlying GI bleed.   - hold Xarelto   Cardiology on board, appreciate their recommendations. Follow-up with 2D echo. February 5: [address] cardiology recs. On diltiazem. Anticoagulation on hold given suspected anemia of GI source. -Potassium already repleted today. Will monitor. # Symptomatic anemia  2/4:  Status post 1 unit PRBC, hemoglobin up from 6.5 to 7.2  Patient has severe iron deficiency based on anemia work-up. Ordered 5 doses of IV Ferrlecit 125 mg daily. Will need oral iron on discharge. GI consulted to evaluate for possible colonoscopy and EGD. Continue to hold Xarelto in the meantime and avoid NSAIDs. Vitamin B12 and folate levels are within normal limits. February 5: Iron deficiency anemia. Needs to rule out GI source. Plan for EGD and colonoscopy tomorrow. Ferritin 13 consistent with iron deficiency anemia and she is receiving IV iron. # Acute on chronic diastolic heart failure  2/4:  Patient seems to be mildly fluid overloaded  Continue IV Lasix 40 mg twice daily  Fluid balance -1.4 L since admission. BP is currently optimally controlled. February 5: Continue diuresis. # Acute on chronic hypoxic respiratory failure  2/4:  Patient is running on 3 L via NC, does not use any supplemental oxygen at baseline. Patient does have suspicion of having obstructive sleep apnea, will benefit from outpatient sleep study which can be deferred to her primary care physician. Added Xopenex nebs  We will start on flutter valve 4 times daily  Titrate for SPO2 greater than 92%, wean off as able. February 5: As per patient she has 2 L oxygen prescription at home and she wears while sleeping. Currently on 3 L. Continue diuresis. Will monitor. # HTN  Blood pressure is optimally controlled. #Diabetes mellitus: Blood sugar at goal on sliding scale.       #Patient with morbid obesity  2/4:  Patient likely has underlying obstructive sleep apnea which has never been investigated in the past.  Patient has been advised to follow-up with her PCP to get sleep study referral as outpatient after discharge. February 5: Will monitor. Ppx: SCDs for VTE    Code Status: FULL CODE    Patient is AOx3. Wants her son Red Cage to be power of . I offered but per patient she has already updated the family. I encouraged her to ask the nurse to page me if she or her family has any questions. She verbalized understanding that her condition may deteriorate in spite of treatment.           Hospital Problems as of 2/5/2022 Date Reviewed: 11/11/2021          Codes Class Noted - Resolved POA    Atrial fibrillation with RVR (HCC) ICD-10-CM: I48.91  ICD-9-CM: 427.31  2/3/2022 - Present Unknown        * (Principal) Anemia ICD-10-CM: D64.9  ICD-9-CM: 285.9  2/3/2022 - Present Yes        Diastolic CHF, acute on chronic [facility]) ICD-10-CM: I50.33  ICD-9-CM: 428.33, 428.0  11/11/2021 - Present Unknown        Controlled type 2 diabetes mellitus without complication, without long-term current use of insulin (HCC) (Chronic) ICD-10-CM: E11.9  ICD-9-CM: 250.00  9/9/2020 - Present Yes        Acute respiratory failure with hypoxia [facility]) ICD-10-CM: J96.01  ICD-9-CM: 518.81  6/25/2020 - Present Unknown        Hypertension (Chronic) ICD-10-CM: I10  ICD-9-CM: 401.9  Unknown - Present Yes        Class 3 obesity with alveolar hypoventilation, serious comorbidity, and body mass index (BMI) of 50.0 to 59.9 in adult [facility]) (Chronic) ICD-10-CM: B50.4, X05.53  ICD-9-CM: 278.03, V85.43  Unknown - Present Yes    Overview Signed 2/8/2019 11:59 AM by Jorgito Jefferson MD     BMI 45.8- 5/2/12                       Objective:     Patient Vitals for the past 24 hrs:   Temp Pulse Resp BP SpO2   02/05/22 1014  (!) 103      02/05/22 0749     93 %   02/05/22 0744 98 °F (36.7 °C) (!) 107 20 111/65 93 %   02/05/22 0423 98.8 °F (37.1 °C) 88 18 (!) 112/56 91 %   02/05/22 0011 99 °F (37.2 °C) 100 18 107/63 92 %   02/04/22 1955     97 %   02/04/22 1945 98.4 °F (36.9 °C) 95 18 115/74 90 %   02/04/22 1710  87  (!) 110/55    02/04/22 1553 97.8 °F (36.6 °C) 88 20 (!) 114/56 97 %   02/04/22 1528     97 %   02/04/22 1436 98.1 °F (36.7 °C) 86  112/62 96 %   02/04/22 1400 98 °F (36.7 °C) 90  117/62 95 %   02/04/22 1300 98.1 °F (36.7 °C) 86  109/66 94 %   02/04/22 1200 98 °F (36.7 °C) 98 20 (!) 117/53 92 %   02/04/22 1116     93 %   02/04/22 1100 98.1 °F (36.7 °C) 100 18 120/71 95 %   02/04/22 1045 97.6 °F (36.4 °C) (!) 103 20 125/73 93 %     Oxygen Therapy  O2 Sat (%): 93 % (02/05/22 0749)  Pulse via Oximetry: 110 beats per minute (02/05/22 0749)  O2 Device: Nasal cannula (02/05/22 0848)  O2 Flow Rate (L/min): 3 l/min (02/05/22 0848)    Estimated body mass index is 54.97 kg/m² as calculated from the following:    Height as of this encounter: 5' 7\" (1.702 m). Weight as of this encounter: 159.2 kg (351 lb). Intake/Output Summary (Last 24 hours) at 2/5/2022 1035  Last data filed at 2/5/2022 0848  Gross per 24 hour   Intake 613.5 ml   Output 5500 ml   Net -4886.5 ml         Physical Exam:    Blood pressure 111/65, pulse (!) 103, temperature 98 °F (36.7 °C), resp. rate 20, height 5' 7\" (1.702 m), weight (!) 159.2 kg (351 lb), SpO2 93 %. General:    Alert, awake, patient with morbid obesity. HEENT:             PERRLA positive, MMM  Neck:  No restricted ROM. CV:   Tachycardic rate with irregularly irregular rhythm, no JVC  Lungs:   Coarse breath sound with decreased air entry bilateral lower lobe. Non wheezing. Nonlabored. Abdomen: Bowel sounds present. Soft, nontender, nondistended. Extremities: Wrapped legs    Skin:     No rashes and normal coloration on the visible skin. Warm and dry.     Neuro:  GCS 15, AOx3, moving all 4 extremities, speech normal.  Psych:  AOx3, mood and affect appropriate    I have reviewed ordered lab tests and independently visualized imaging below:    Last 24hr Labs:  Procedures done this admission:  Procedure(s):  ESOPHAGOGASTRODUODENOSCOPY (EGD)  [facility] Results     Procedure Component Value Units Date/Time    COVID-19 RAPID TEST [[id number]] Collected: 02/05/22 0508    Order Status: Completed Specimen: Nasopharyngeal Updated: 02/05/22 0650     Specimen source NASAL        COVID-19 rapid test Not detected        Comment:      The specimen is NEGATIVE for SARS-CoV-2, the novel coronavirus associated with COVID-19. A negative result does not rule out COVID-19. This test has been authorized by the FDA under an Emergency Use Authorization (EUA) for use by authorized laboratories.         Fact sheet for Healthcare Providers: [employer]  Fact sheet for Patients: [employer]       Methodology: Isothermal Nucleic Acid Amplification               SARS-CoV-2 Lab Results  \"Novel Coronavirus\" Test: No results found for: COV2NT   \"Emergent Disease\" Test: No results found for: EDPR  \"SARS-COV-2\" Test: No results found for: XGCOVT  \"Precision Labs\" Test: No results found for: RSLT  Rapid Test:   Lab Results   Component Value Date/Time    COVR Not detected 02/05/2022 05:47 AM            Labs: Results:       BMP, Mg, Phos Recent Labs     02/05/22  0700 02/04/22  1525 02/03/22  1105    141 141   K 3.3* 3.8 4.0    105 110*   CO2 33* 32 28   AGAP 3* 4* 3*   BUN 14 17 19   CREA 0.70 0.80 0.70   CA 8.8 8.7 8.9   * 129* 140*      CBC Recent Labs     02/05/22  0700 02/04/22  1733 02/03/22  2200 02/03/22  1105 02/03/22  1105   WBC 9.1  --   --   --  6.2   RBC 3.36*  --   --   --  3.10*   HGB 7.6* 7.9* 7.2*   < > 6.5*   HCT 27.0* 27.8* 26.3*   < > 24.5*     --   --   --  278   GRANS  --   --   --   --  64   LYMPH  --   --   --   --  20   EOS  --   --   --   --  2   MONOS  --   --   --   --  13*   BASOS  --   --   --   --  1   IG  --   --   --   --  0   ANEU  --   --   --   --  3.9   ABL  --   --   --   --  1.3   JUDI  --   --   --   --  0.1   ABM  --   --   --   --  0.8   ABB --   --   --   --  0.1   AIG  --   --   --   --  0.0    < > = values in this interval not displayed. LFT Recent Labs     02/03/22  1105   ALT 24      TP 7.1   ALB 2.7*   GLOB 4.4*   AGRAT 0.6*      Cardiac Testing Lab Results   Component Value Date/Time    B-type Natriuretic Peptide 121.0 (H) 01/08/2020 09:22 AM      Coagulation Tests Lab Results   Component Value Date/Time    Prothrombin time 15.6 (H) 02/03/2022 11:05 AM    INR 1.2 02/03/2022 11:05 AM      A1c Lab Results   Component Value Date/Time    Hemoglobin A1c 6.9 (H) 09/23/2021 05:54 AM    Hemoglobin A1c 6.7 (H) 12/02/2020 10:55 AM    Hemoglobin A1c 7.5 (H) 09/11/2020 05:17 AM      Lipid Panel Lab Results   Component Value Date/Time    Cholesterol, total 180 12/02/2020 10:55 AM    HDL Cholesterol 63 12/02/2020 10:55 AM    LDL, calculated 102 (H) 12/02/2020 10:55 AM    LDL, calculated 95 04/01/2019 10:55 AM    VLDL, calculated 15 12/02/2020 10:55 AM    VLDL, calculated 16 04/01/2019 10:55 AM    Triglyceride 80 12/02/2020 10:55 AM      Thyroid Panel Lab Results   Component Value Date/Time    TSH 2.670 09/24/2021 06:01 AM    TSH 2.740 12/02/2020 10:55 AM    TSH 2.630 10/29/2019 08:22 AM        Most Recent UA Lab Results   Component Value Date/Time    WBC 3-5 09/10/2020 12:18 AM    RBC 5-10 09/10/2020 12:18 AM    Epithelial cells 0-3 09/10/2020 12:18 AM    Bacteria TRACE 09/10/2020 12:18 AM    Casts 0 09/10/2020 12:18 AM    Crystals, urine OCCASIONAL 09/10/2020 12:18 AM    Mucus 0 09/10/2020 12:18 AM            All Micro Results     Procedure Component Value Units Date/Time    COVID-19 RAPID TEST [[id number]] Collected: 02/05/22 05    Order Status: Completed Specimen: Nasopharyngeal Updated: 02/05/22 0650     Specimen source NASAL        COVID-19 rapid test Not detected        Comment:      The specimen is NEGATIVE for SARS-CoV-2, the novel coronavirus associated with COVID-19. A negative result does not rule out COVID-19.        This test has been authorized by the FDA under an Emergency Use Authorization (EUA) for use by authorized laboratories. Fact sheet for Healthcare Providers: [employer]  Fact sheet for Patients: [employer]       Methodology: Isothermal Nucleic Acid Amplification               Other Studies:  No results found. Signed:  Enio Wheat MD    Part of this note may have been written by using a voice dictation software. The note has been proof read but may still contain some grammatical/other typographical errors.

## 2022-02-05 NOTE — PROGRESS NOTES
[facility] CARDIOLOGY PROGRESS NOTE           2/5/2022 9:26 AM    Admit Date: 2/3/2022      Subjective:     Patient with palpitations but otherwise feeling okay net negative fluid balance note from gastroenterology reviewed with tentative plan for endoscopy 2/6/2022. Currently on extended release diltiazem 120 mg daily with IV diltiazem drip at 10 mg/h    Review of Systems   Constitutional: Negative for fever. Respiratory: Negative for cough. Cardiovascular: Negative for chest pain. Skin: Negative for rash. Objective:      Vitals:    02/05/22 0011 02/05/22 0423 02/05/22 0744 02/05/22 0749   BP: 107/63 (!) 112/56 111/65    Pulse: 100 88 (!) 107    Resp: 18 18 20    Temp: 99 °F (37.2 °C) 98.8 °F (37.1 °C) 98 °F (36.7 °C)    SpO2: 92% 91% 93% 93%   Weight:  (!) 351 lb (159.2 kg)     Height:             Physical Exam  Constitutional:       Appearance: She is well-developed. She is not diaphoretic. HENT:      Head: Normocephalic and atraumatic. Mouth/Throat:      Pharynx: No oropharyngeal exudate. Eyes:      General: No scleral icterus. Right eye: No discharge. Left eye: No discharge. Conjunctiva/sclera: Conjunctivae normal.      Pupils: Pupils are equal, round, and reactive to light. Neck:      Vascular: No JVD. Trachea: No tracheal deviation. Cardiovascular:      Rate and Rhythm: Normal rate and regular rhythm. Heart sounds: Normal heart sounds. No murmur heard. Pulmonary:      Effort: Pulmonary effort is normal.      Breath sounds: No wheezing. Abdominal:      General: There is no distension. Palpations: There is no mass. Tenderness: There is no abdominal tenderness. There is no guarding or rebound. Musculoskeletal:         General: No tenderness. Normal range of motion. Cervical back: Normal range of motion. Right lower leg: Edema present. Skin:     General: Skin is warm and dry. Coloration: Skin is not pale. Findings: No rash. Comments: Ace wraps on lower extremities   Neurological:      Mental Status: She is alert and oriented to person, place, and time. Cranial Nerves: No cranial nerve deficit. Psychiatric:         Behavior: Behavior normal.         Thought Content: Thought content normal.         Data Review:   Recent Labs     02/05/22  0700 02/04/22  1733 02/04/22  1525 02/03/22  2200 02/03/22  1105     --  141  --  141   K 3.3*  --  3.8  --  4.0   BUN 14  --  17  --  19   CREA 0.70  --  0.80  --  0.70   *  --  129*  --  140*   WBC 9.1  --   --   --  6.2   HGB 7.6* 7.9*  --    < > 6.5*   HCT 27.0* 27.8*  --    < > 24.5*     --   --   --  278   INR  --   --   --   --  1.2    < > = values in this interval not displayed.          Intake/Output Summary (Last 24 hours) at 2/5/2022 0926  Last data filed at 2/5/2022 0547  Gross per 24 hour   Intake 373.5 ml   Output 4500 ml   Net -4126.5 ml     Current Facility-Administered Medications   Medication Dose Route Frequency    ferric gluconate (FERRLECIT) 125 mg in 0.9% sodium chloride 100 mL IVPB  125 mg IntraVENous DAILY    dilTIAZem ER (CARDIZEM CD) capsule 120 mg  120 mg Oral DAILY    levalbuterol (XOPENEX) nebulizer soln 0.63 mg/3 mL  0.63 mg Nebulization QID RT    potassium chloride (K-DUR, KLOR-CON M20) SR tablet 40 mEq  40 mEq Oral DAILY    acetaminophen (TYLENOL) tablet 650 mg  650 mg Oral Q4H PRN    0.9% sodium chloride infusion 250 mL  250 mL IntraVENous PRN    sodium chloride (NS) flush 5-10 mL  5-10 mL IntraVENous Q8H    sodium chloride (NS) flush 5-10 mL  5-10 mL IntraVENous PRN    0.9% sodium chloride infusion 250 mL  250 mL IntraVENous PRN    dilTIAZem (CARDIZEM) 100 mg in 0.9% sodium chloride (MBP/ADV) 100 mL infusion  0-15 mg/hr IntraVENous TITRATE    furosemide (LASIX) injection 40 mg  40 mg IntraVENous BID    insulin lispro (HUMALOG) injection   SubCUTAneous AC&HS    morphine injection 2 mg  2 mg IntraVENous Q4H PRN    oxyCODONE IR (ROXICODONE) tablet 5 mg  5 mg Oral Q4H PRN         Left Ventricle: Left ventricle size is normal. Normal wall thickness. Normal wall motion. Normal left ventricular systolic function with a visually estimated EF of 60 - 65%.   Right Ventricle: Right ventricle is mildly dilated. Normal systolic function.   Tricuspid Valve: Mild to moderate transvalvular regurgitation. RVSP is mildly elevated at around 45 mmHg.   Left Atrium: Left atrium is severely dilated. Assessment/Plan:       61 y.o. female obesity with hypoventilation diabetes volume overload due to diastolic dysfunction elevated RVSP likely mixed WHO group 2/3 pulmonary hypertension. Atrial fibrillation with anemia      Anemia  1. Currently being addressed with blood transfusion with goal hemoglobin greater than 7. Tentative plan is for GI work-up with endoscopy 2/6/2022 note reviewed from gastroenterology. Atrial fibrillation  1. Currently with some improvement of rate control with IV and oral diltiazem  2. Currently not on anticoagulation due to acute anemia  3. Rhythm control strategy not an option at this time due to contraindications for acute anticoagulation therapy  4. Options limited due to lower blood pressures  5. Plan to start oral digoxin for additional rate control with plans to wean IV drip    Volume overload  1. Likely due to combined diastolic dysfunction with mixed pulmonary hypertension. 2. IV diuresis with improvement of symptoms  3. Closely monitoring renal function  4. Electrolyte repletion due to hypokalemia noted 2/5/2022  5. Initiate nighttime oximetry for evaluation of obstructive sleep apnea [currently untreated during this hospitalization]  Hypertension  1.  Controlled            Vicky Moore MD  2/5/2022 9:26 AM

## 2022-02-05 NOTE — PROGRESS NOTES
Problem: Pressure Injury - Risk of  Goal: *Prevention of pressure injury  Description: Document Mukesh Scale and appropriate interventions in the flowsheet. Outcome: Progressing Towards Goal  Note: Pressure Injury Interventions:  Sensory Interventions: Assess changes in LOC,Assess need for specialty bed,Keep linens dry and wrinkle-free,Maintain/enhance activity level,Minimize linen layers,Pressure redistribution bed/mattress (bed type)    Moisture Interventions: Absorbent underpads,Apply protective barrier, creams and emollients,Check for incontinence Q2 hours and as needed,Minimize layers    Activity Interventions: Increase time out of bed,Pressure redistribution bed/mattress(bed type),PT/OT evaluation    Mobility Interventions: Assess need for specialty bed,Pressure redistribution bed/mattress (bed type),HOB 30 degrees or less,PT/OT evaluation    Nutrition Interventions: Document food/fluid/supplement intake    Friction and Shear Interventions: Apply protective barrier, creams and emollients,Foam dressings/transparent film/skin sealants,HOB 30 degrees or less,Minimize layers                Problem: Falls - Risk of  Goal: *Absence of Falls  Description: Document Helen Fall Risk and appropriate interventions in the flowsheet.   Outcome: Progressing Towards Goal  Note: Fall Risk Interventions:  Mobility Interventions: Bed/chair exit alarm,Communicate number of staff needed for ambulation/transfer,Patient to call before getting OOB,PT Consult for mobility concerns,OT consult for ADLs         Medication Interventions: Patient to call before getting OOB,Teach patient to arise slowly    Elimination Interventions: Bed/chair exit alarm,Call light in reach,Patient to call for help with toileting needs,Toileting schedule/hourly rounds              Problem: Gas Exchange - Impaired  Goal: *Absence of hypoxia  Outcome: Progressing Towards Goal

## 2022-02-05 NOTE — PROGRESS NOTES
Problem: Pressure Injury - Risk of  Goal: *Prevention of pressure injury  Description: Document Mukesh Scale and appropriate interventions in the flowsheet. Outcome: Progressing Towards Goal  Note: Pressure Injury Interventions:  Sensory Interventions: Assess need for specialty bed,Keep linens dry and wrinkle-free,Minimize linen layers    Moisture Interventions: Absorbent underpads,Apply protective barrier, creams and emollients,Internal/External urinary devices,Minimize layers    Activity Interventions: Pressure redistribution bed/mattress(bed type),Increase time out of bed    Mobility Interventions: HOB 30 degrees or less,Pressure redistribution bed/mattress (bed type)    Nutrition Interventions: Document food/fluid/supplement intake,Offer support with meals,snacks and hydration    Friction and Shear Interventions: Minimize layers,HOB 30 degrees or less                Problem: Falls - Risk of  Goal: *Absence of Falls  Description: Document Helen Fall Risk and appropriate interventions in the flowsheet.   Outcome: Progressing Towards Goal  Note: Fall Risk Interventions:  Mobility Interventions: Bed/chair exit alarm,Communicate number of staff needed for ambulation/transfer,Patient to call before getting OOB         Medication Interventions: Bed/chair exit alarm,Patient to call before getting OOB,Teach patient to arise slowly    Elimination Interventions: Bed/chair exit alarm,Call light in reach,Elevated toilet seat,Toileting schedule/hourly rounds              Problem: Patient Education: Go to Patient Education Activity  Goal: Patient/Family Education  Outcome: Progressing Towards Goal

## 2022-02-06 ENCOUNTER — ANESTHESIA (OUTPATIENT)
Dept: ENDOSCOPY | Age: 67
DRG: 291 | End: 2022-02-06
Payer: COMMERCIAL

## 2022-02-06 LAB
ANION GAP SERPL CALC-SCNC: 5 MMOL/L (ref 7–16)
BASOPHILS # BLD: 0.1 K/UL (ref 0–0.2)
BASOPHILS NFR BLD: 1 % (ref 0–2)
BUN SERPL-MCNC: 9 MG/DL (ref 8–23)
CALCIUM SERPL-MCNC: 8.7 MG/DL (ref 8.3–10.4)
CHLORIDE SERPL-SCNC: 103 MMOL/L (ref 98–107)
CO2 SERPL-SCNC: 35 MMOL/L (ref 21–32)
CREAT SERPL-MCNC: 0.7 MG/DL (ref 0.6–1)
DIFFERENTIAL METHOD BLD: ABNORMAL
EOSINOPHIL # BLD: 0.2 K/UL (ref 0–0.8)
EOSINOPHIL NFR BLD: 2 % (ref 0.5–7.8)
ERYTHROCYTE [DISTWIDTH] IN BLOOD BY AUTOMATED COUNT: 20.4 % (ref 11.9–14.6)
GLUCOSE BLD STRIP.AUTO-MCNC: 135 MG/DL (ref 65–100)
GLUCOSE BLD STRIP.AUTO-MCNC: 137 MG/DL (ref 65–100)
GLUCOSE BLD STRIP.AUTO-MCNC: 153 MG/DL (ref 65–100)
GLUCOSE BLD STRIP.AUTO-MCNC: 154 MG/DL (ref 65–100)
GLUCOSE SERPL-MCNC: 120 MG/DL (ref 65–100)
HCT VFR BLD AUTO: 30.7 % (ref 35.8–46.3)
HGB BLD-MCNC: 8.6 G/DL (ref 11.7–15.4)
IMM GRANULOCYTES # BLD AUTO: 0 K/UL (ref 0–0.5)
IMM GRANULOCYTES NFR BLD AUTO: 0 % (ref 0–5)
LYMPHOCYTES # BLD: 1.3 K/UL (ref 0.5–4.6)
LYMPHOCYTES NFR BLD: 12 % (ref 13–44)
MCH RBC QN AUTO: 22.8 PG (ref 26.1–32.9)
MCHC RBC AUTO-ENTMCNC: 28 G/DL (ref 31.4–35)
MCV RBC AUTO: 81.2 FL (ref 79.6–97.8)
MM INDURATION POC: 0 MM (ref 0–5)
MONOCYTES # BLD: 1.1 K/UL (ref 0.1–1.3)
MONOCYTES NFR BLD: 10 % (ref 4–12)
NEUTS SEG # BLD: 7.8 K/UL (ref 1.7–8.2)
NEUTS SEG NFR BLD: 74 % (ref 43–78)
NRBC # BLD: 0.03 K/UL (ref 0–0.2)
PHOSPHATE SERPL-MCNC: 3.5 MG/DL (ref 2.3–3.7)
PLATELET # BLD AUTO: 170 K/UL (ref 150–450)
PMV BLD AUTO: 10.3 FL (ref 9.4–12.3)
POTASSIUM SERPL-SCNC: 3.3 MMOL/L (ref 3.5–5.1)
PPD POC: NEGATIVE NEGATIVE
RBC # BLD AUTO: 3.78 M/UL (ref 4.05–5.2)
SERVICE CMNT-IMP: ABNORMAL
SODIUM SERPL-SCNC: 143 MMOL/L (ref 136–145)
WBC # BLD AUTO: 10.5 K/UL (ref 4.3–11.1)

## 2022-02-06 PROCEDURE — 88305 TISSUE EXAM BY PATHOLOGIST: CPT

## 2022-02-06 PROCEDURE — 0DB68ZX EXCISION OF STOMACH, VIA NATURAL OR ARTIFICIAL OPENING ENDOSCOPIC, DIAGNOSTIC: ICD-10-PCS | Performed by: INTERNAL MEDICINE

## 2022-02-06 PROCEDURE — 74011250637 HC RX REV CODE- 250/637: Performed by: INTERNAL MEDICINE

## 2022-02-06 PROCEDURE — 83735 ASSAY OF MAGNESIUM: CPT

## 2022-02-06 PROCEDURE — 36415 COLL VENOUS BLD VENIPUNCTURE: CPT

## 2022-02-06 PROCEDURE — 74011000250 HC RX REV CODE- 250: Performed by: EMERGENCY MEDICINE

## 2022-02-06 PROCEDURE — 74011250636 HC RX REV CODE- 250/636: Performed by: INTERNAL MEDICINE

## 2022-02-06 PROCEDURE — 74011250637 HC RX REV CODE- 250/637: Performed by: NURSE PRACTITIONER

## 2022-02-06 PROCEDURE — 74011250636 HC RX REV CODE- 250/636: Performed by: EMERGENCY MEDICINE

## 2022-02-06 PROCEDURE — 0DB78ZX EXCISION OF STOMACH, PYLORUS, VIA NATURAL OR ARTIFICIAL OPENING ENDOSCOPIC, DIAGNOSTIC: ICD-10-PCS | Performed by: INTERNAL MEDICINE

## 2022-02-06 PROCEDURE — 76060000032 HC ANESTHESIA 0.5 TO 1 HR: Performed by: INTERNAL MEDICINE

## 2022-02-06 PROCEDURE — 85025 COMPLETE CBC W/AUTO DIFF WBC: CPT

## 2022-02-06 PROCEDURE — 74011250637 HC RX REV CODE- 250/637: Performed by: HOSPITALIST

## 2022-02-06 PROCEDURE — 0DB98ZX EXCISION OF DUODENUM, VIA NATURAL OR ARTIFICIAL OPENING ENDOSCOPIC, DIAGNOSTIC: ICD-10-PCS | Performed by: INTERNAL MEDICINE

## 2022-02-06 PROCEDURE — 82962 GLUCOSE BLOOD TEST: CPT

## 2022-02-06 PROCEDURE — 88312 SPECIAL STAINS GROUP 1: CPT

## 2022-02-06 PROCEDURE — 74011000250 HC RX REV CODE- 250: Performed by: NURSE ANESTHETIST, CERTIFIED REGISTERED

## 2022-02-06 PROCEDURE — 76040000026: Performed by: INTERNAL MEDICINE

## 2022-02-06 PROCEDURE — 94640 AIRWAY INHALATION TREATMENT: CPT

## 2022-02-06 PROCEDURE — 80048 BASIC METABOLIC PNL TOTAL CA: CPT

## 2022-02-06 PROCEDURE — 74011000258 HC RX REV CODE- 258: Performed by: HOSPITALIST

## 2022-02-06 PROCEDURE — 2709999900 HC NON-CHARGEABLE SUPPLY

## 2022-02-06 PROCEDURE — 65660000000 HC RM CCU STEPDOWN

## 2022-02-06 PROCEDURE — 74011000258 HC RX REV CODE- 258: Performed by: EMERGENCY MEDICINE

## 2022-02-06 PROCEDURE — 84100 ASSAY OF PHOSPHORUS: CPT

## 2022-02-06 PROCEDURE — 77030021593 HC FCPS BIOP ENDOSC BSC -A: Performed by: INTERNAL MEDICINE

## 2022-02-06 PROCEDURE — 0DJD8ZZ INSPECTION OF LOWER INTESTINAL TRACT, VIA NATURAL OR ARTIFICIAL OPENING ENDOSCOPIC: ICD-10-PCS | Performed by: INTERNAL MEDICINE

## 2022-02-06 PROCEDURE — 94760 N-INVAS EAR/PLS OXIMETRY 1: CPT

## 2022-02-06 PROCEDURE — C9113 INJ PANTOPRAZOLE SODIUM, VIA: HCPCS | Performed by: INTERNAL MEDICINE

## 2022-02-06 PROCEDURE — 77010033678 HC OXYGEN DAILY

## 2022-02-06 PROCEDURE — 74011636637 HC RX REV CODE- 636/637: Performed by: INTERNAL MEDICINE

## 2022-02-06 PROCEDURE — 74011000250 HC RX REV CODE- 250: Performed by: INTERNAL MEDICINE

## 2022-02-06 PROCEDURE — 74011250636 HC RX REV CODE- 250/636: Performed by: NURSE ANESTHETIST, CERTIFIED REGISTERED

## 2022-02-06 PROCEDURE — 74011000250 HC RX REV CODE- 250: Performed by: HOSPITALIST

## 2022-02-06 PROCEDURE — 2709999900 HC NON-CHARGEABLE SUPPLY: Performed by: INTERNAL MEDICINE

## 2022-02-06 PROCEDURE — 74011250636 HC RX REV CODE- 250/636: Performed by: HOSPITALIST

## 2022-02-06 RX ORDER — POTASSIUM CHLORIDE 20 MEQ/1
40 TABLET, EXTENDED RELEASE ORAL EVERY 4 HOURS
Status: COMPLETED | OUTPATIENT
Start: 2022-02-06 | End: 2022-02-06

## 2022-02-06 RX ORDER — PROPOFOL 10 MG/ML
INJECTION, EMULSION INTRAVENOUS
Status: DISCONTINUED | OUTPATIENT
Start: 2022-02-06 | End: 2022-02-06 | Stop reason: HOSPADM

## 2022-02-06 RX ORDER — ONDANSETRON 2 MG/ML
INJECTION INTRAMUSCULAR; INTRAVENOUS AS NEEDED
Status: DISCONTINUED | OUTPATIENT
Start: 2022-02-06 | End: 2022-02-06 | Stop reason: HOSPADM

## 2022-02-06 RX ORDER — SODIUM CHLORIDE, SODIUM LACTATE, POTASSIUM CHLORIDE, CALCIUM CHLORIDE 600; 310; 30; 20 MG/100ML; MG/100ML; MG/100ML; MG/100ML
100 INJECTION, SOLUTION INTRAVENOUS CONTINUOUS
Status: DISCONTINUED | OUTPATIENT
Start: 2022-02-06 | End: 2022-02-06 | Stop reason: HOSPADM

## 2022-02-06 RX ORDER — LIDOCAINE HYDROCHLORIDE 10 MG/ML
0.1 INJECTION INFILTRATION; PERINEURAL AS NEEDED
Status: DISCONTINUED | OUTPATIENT
Start: 2022-02-06 | End: 2022-02-06

## 2022-02-06 RX ORDER — NALOXONE HYDROCHLORIDE 0.4 MG/ML
0.1 INJECTION, SOLUTION INTRAMUSCULAR; INTRAVENOUS; SUBCUTANEOUS AS NEEDED
Status: DISCONTINUED | OUTPATIENT
Start: 2022-02-06 | End: 2022-02-06 | Stop reason: HOSPADM

## 2022-02-06 RX ORDER — LIDOCAINE HYDROCHLORIDE 20 MG/ML
INJECTION, SOLUTION EPIDURAL; INFILTRATION; INTRACAUDAL; PERINEURAL AS NEEDED
Status: DISCONTINUED | OUTPATIENT
Start: 2022-02-06 | End: 2022-02-06 | Stop reason: HOSPADM

## 2022-02-06 RX ORDER — SODIUM CHLORIDE, SODIUM LACTATE, POTASSIUM CHLORIDE, CALCIUM CHLORIDE 600; 310; 30; 20 MG/100ML; MG/100ML; MG/100ML; MG/100ML
INJECTION, SOLUTION INTRAVENOUS
Status: DISCONTINUED | OUTPATIENT
Start: 2022-02-06 | End: 2022-02-06 | Stop reason: HOSPADM

## 2022-02-06 RX ORDER — ONDANSETRON 2 MG/ML
4 INJECTION INTRAMUSCULAR; INTRAVENOUS ONCE
Status: DISCONTINUED | OUTPATIENT
Start: 2022-02-06 | End: 2022-02-06 | Stop reason: HOSPADM

## 2022-02-06 RX ORDER — MIDAZOLAM HYDROCHLORIDE 1 MG/ML
2 INJECTION, SOLUTION INTRAMUSCULAR; INTRAVENOUS
Status: DISCONTINUED | OUTPATIENT
Start: 2022-02-06 | End: 2022-02-06

## 2022-02-06 RX ORDER — ALBUTEROL SULFATE 0.83 MG/ML
2.5 SOLUTION RESPIRATORY (INHALATION) AS NEEDED
Status: DISCONTINUED | OUTPATIENT
Start: 2022-02-06 | End: 2022-02-06 | Stop reason: HOSPADM

## 2022-02-06 RX ORDER — FENTANYL CITRATE 50 UG/ML
100 INJECTION, SOLUTION INTRAMUSCULAR; INTRAVENOUS ONCE
Status: DISCONTINUED | OUTPATIENT
Start: 2022-02-06 | End: 2022-02-06

## 2022-02-06 RX ORDER — MIDAZOLAM HYDROCHLORIDE 1 MG/ML
2 INJECTION, SOLUTION INTRAMUSCULAR; INTRAVENOUS ONCE
Status: DISCONTINUED | OUTPATIENT
Start: 2022-02-06 | End: 2022-02-06

## 2022-02-06 RX ORDER — PROPOFOL 10 MG/ML
INJECTION, EMULSION INTRAVENOUS AS NEEDED
Status: DISCONTINUED | OUTPATIENT
Start: 2022-02-06 | End: 2022-02-06 | Stop reason: HOSPADM

## 2022-02-06 RX ORDER — DIPHENHYDRAMINE HYDROCHLORIDE 50 MG/ML
12.5 INJECTION, SOLUTION INTRAMUSCULAR; INTRAVENOUS
Status: DISCONTINUED | OUTPATIENT
Start: 2022-02-06 | End: 2022-02-06 | Stop reason: HOSPADM

## 2022-02-06 RX ORDER — SODIUM CHLORIDE, SODIUM LACTATE, POTASSIUM CHLORIDE, CALCIUM CHLORIDE 600; 310; 30; 20 MG/100ML; MG/100ML; MG/100ML; MG/100ML
100 INJECTION, SOLUTION INTRAVENOUS CONTINUOUS
Status: DISCONTINUED | OUTPATIENT
Start: 2022-02-06 | End: 2022-02-06

## 2022-02-06 RX ADMIN — LIDOCAINE HYDROCHLORIDE 100 MG: 20 INJECTION, SOLUTION EPIDURAL; INFILTRATION; INTRACAUDAL; PERINEURAL at 08:12

## 2022-02-06 RX ADMIN — SODIUM CHLORIDE 15 MG/HR: 900 INJECTION, SOLUTION INTRAVENOUS at 17:27

## 2022-02-06 RX ADMIN — Medication 2 UNITS: at 12:15

## 2022-02-06 RX ADMIN — ONDANSETRON 4 MG: 2 INJECTION INTRAMUSCULAR; INTRAVENOUS at 08:49

## 2022-02-06 RX ADMIN — PROPOFOL 50 MG: 10 INJECTION, EMULSION INTRAVENOUS at 08:13

## 2022-02-06 RX ADMIN — LEVALBUTEROL HYDROCHLORIDE 0.63 MG: 0.63 SOLUTION RESPIRATORY (INHALATION) at 16:03

## 2022-02-06 RX ADMIN — PROPOFOL 30 MG: 10 INJECTION, EMULSION INTRAVENOUS at 08:22

## 2022-02-06 RX ADMIN — PROPOFOL 50 MG: 10 INJECTION, EMULSION INTRAVENOUS at 08:18

## 2022-02-06 RX ADMIN — LEVALBUTEROL HYDROCHLORIDE 0.63 MG: 0.63 SOLUTION RESPIRATORY (INHALATION) at 07:17

## 2022-02-06 RX ADMIN — LEVALBUTEROL HYDROCHLORIDE 0.63 MG: 0.63 SOLUTION RESPIRATORY (INHALATION) at 19:35

## 2022-02-06 RX ADMIN — SODIUM CHLORIDE 10 MG/HR: 900 INJECTION, SOLUTION INTRAVENOUS at 06:31

## 2022-02-06 RX ADMIN — PROPOFOL 100 MCG/KG/MIN: 10 INJECTION, EMULSION INTRAVENOUS at 08:18

## 2022-02-06 RX ADMIN — PROPOFOL 30 MG: 10 INJECTION, EMULSION INTRAVENOUS at 08:23

## 2022-02-06 RX ADMIN — DIGOXIN 0.25 MG: 250 TABLET ORAL at 06:31

## 2022-02-06 RX ADMIN — SODIUM CHLORIDE 15 MG/HR: 900 INJECTION, SOLUTION INTRAVENOUS at 20:04

## 2022-02-06 RX ADMIN — Medication 2 UNITS: at 17:17

## 2022-02-06 RX ADMIN — PROPOFOL 30 MG: 10 INJECTION, EMULSION INTRAVENOUS at 08:33

## 2022-02-06 RX ADMIN — OXYCODONE HYDROCHLORIDE 5 MG: 5 TABLET ORAL at 21:29

## 2022-02-06 RX ADMIN — POTASSIUM CHLORIDE 40 MEQ: 20 TABLET, EXTENDED RELEASE ORAL at 06:31

## 2022-02-06 RX ADMIN — SODIUM CHLORIDE 10 MG/HR: 900 INJECTION, SOLUTION INTRAVENOUS at 12:16

## 2022-02-06 RX ADMIN — FUROSEMIDE 40 MG: 10 INJECTION, SOLUTION INTRAMUSCULAR; INTRAVENOUS at 17:17

## 2022-02-06 RX ADMIN — PROPOFOL 10 MG: 10 INJECTION, EMULSION INTRAVENOUS at 08:37

## 2022-02-06 RX ADMIN — SODIUM CHLORIDE 40 MG: 9 INJECTION INTRAMUSCULAR; INTRAVENOUS; SUBCUTANEOUS at 12:14

## 2022-02-06 RX ADMIN — SODIUM CHLORIDE, PRESERVATIVE FREE 10 ML: 5 INJECTION INTRAVENOUS at 05:47

## 2022-02-06 RX ADMIN — SODIUM CHLORIDE, PRESERVATIVE FREE 10 ML: 5 INJECTION INTRAVENOUS at 21:29

## 2022-02-06 RX ADMIN — DILTIAZEM HYDROCHLORIDE 240 MG: 120 CAPSULE, COATED, EXTENDED RELEASE ORAL at 06:31

## 2022-02-06 RX ADMIN — LEVALBUTEROL HYDROCHLORIDE 0.63 MG: 0.63 SOLUTION RESPIRATORY (INHALATION) at 12:00

## 2022-02-06 RX ADMIN — POTASSIUM CHLORIDE 40 MEQ: 20 TABLET, EXTENDED RELEASE ORAL at 07:23

## 2022-02-06 RX ADMIN — SODIUM CHLORIDE, SODIUM LACTATE, POTASSIUM CHLORIDE, AND CALCIUM CHLORIDE: 600; 310; 30; 20 INJECTION, SOLUTION INTRAVENOUS at 08:10

## 2022-02-06 RX ADMIN — POTASSIUM CHLORIDE 40 MEQ: 20 TABLET, EXTENDED RELEASE ORAL at 12:14

## 2022-02-06 RX ADMIN — FUROSEMIDE 40 MG: 10 INJECTION, SOLUTION INTRAMUSCULAR; INTRAVENOUS at 06:33

## 2022-02-06 RX ADMIN — SODIUM CHLORIDE 15 MG/HR: 900 INJECTION, SOLUTION INTRAVENOUS at 00:00

## 2022-02-06 RX ADMIN — SODIUM CHLORIDE 125 MG: 9 INJECTION, SOLUTION INTRAVENOUS at 17:18

## 2022-02-06 NOTE — PROGRESS NOTES
Hospitalist Progress Note   Admit Date:  2/3/2022 11:07 AM   Name:  José Antonio Marie   Age:  61 y.o. Sex:  female  :  1955   MRN:  [id number]   Room:  [facility]/    Presenting Complaint: Abnormal Lab Results and Peripheral Edema    Reason(s) for Admission: Atrial fibrillation with RVR ([facility] [address] [I48.91]     Interval Hx/ Subjective:   José Antonio Marie is a 61 y.o. female with medical history of Afib (on Xarelto), HTN, morbid obesity complicated by LUNA/OHS, chronic diastolic heart failure admitted on 2/3 with A.fib RVR, acute hypoxic respiratory failure (room air sats 89%), severe iron deficiency anemia. Pt presented with worsening shortness of breath, fatigue, and worsening swelling in her lower extremities. ED Course: Hgb of 6.5 (last Hgb was 13 in 2021). MCV of 79 with RDW of 19. pBNP of 1300. CXR shows vascular congestion. EKG shows Afib with RVR with HRs in the 100s. Occult blood negative. Type/screen for pRBC transfusion. Given Lasix 40 mg IV once. Week of : EGD and colonoscopy on  showing gastritis [status post biopsy], small superficial prepyloric ulcer, diverticulosis and hemorrhoids. : Status post EGD and colonoscopy. As per patient she is feeling slightly better than yesterday. Denies any chest pain, palpitation, nausea or vomiting. Denies any abdominal pain. Rest of system negative except mentioned above. Assessment & Plan: Active Problems:    Atrial fibrillation with RVR ([facility] [address]   :  Heart rate is suboptimally controlled, currently on Cardizem gtt. Wean off Cardizem as able. Started on oral diltiazem  mg p.o. daily  Xarelto is on hold currently in view of concerns for possible underlying GI bleed. - hold Xarelto   Cardiology on board, appreciate their recommendations. Follow-up with 2D echo. : [address] cardiology recs. On diltiazem. Anticoagulation on hold given suspected anemia of GI source.   -Potassium already repleted today. Will monitor. February 6: Potassium repleted. Will follow GI recommendation regarding resumption of anticoagulation. On Cardizem drip. Cardiology adjusting medications. Appreciate recommendation. # Symptomatic anemia  2/4:  Status post 1 unit PRBC, hemoglobin up from 6.5 to 7.2  Patient has severe iron deficiency based on anemia work-up. Ordered 5 doses of IV Ferrlecit 125 mg daily. Will need oral iron on discharge. GI consulted to evaluate for possible colonoscopy and EGD. Continue to hold Xarelto in the meantime and avoid NSAIDs. Vitamin B12 and folate levels are within normal limits. February 5: Iron deficiency anemia. Needs to rule out GI source. Plan for EGD and colonoscopy tomorrow. Ferritin 13 consistent with iron deficiency anemia and she is receiving IV iron. February 6: Hemoglobin stable. Will monitor. Receiving IV iron. Will need p.o. supplementation starting February 9    # Acute on chronic diastolic heart failure  2/4:  Patient seems to be mildly fluid overloaded  Continue IV Lasix 40 mg twice daily  Fluid balance -1.4 L since admission. BP is currently optimally controlled. February 5: Continue diuresis. # Acute on chronic hypoxic respiratory failure  2/4:  Patient is running on 3 L via NC, does not use any supplemental oxygen at baseline. Patient does have suspicion of having obstructive sleep apnea, will benefit from outpatient sleep study which can be deferred to her primary care physician. Added Xopenex nebs  We will start on flutter valve 4 times daily  Titrate for SPO2 greater than 92%, wean off as able. February 5: As per patient she has 2 L oxygen prescription at home and she wears while sleeping. Currently on 3 L. Continue diuresis. Will monitor. February 6: Currently on 4 to 5 L of oxygen but just came from the procedure. # HTN  Blood pressure is optimally controlled. February 6: Will monitor.     #Diabetes mellitus: Blood sugar at goal on sliding scale. #Patient with morbid obesity  2/4:  Patient likely has underlying obstructive sleep apnea which has never been investigated in the past.  Patient has been advised to follow-up with her PCP to get sleep study referral as outpatient after discharge. February 5: Will monitor. Ppx: SCDs for VTE    Code Status: FULL CODE    Patient is AOx3. Wants her son Anuradha Dies to be power of . Again, I offered but per patient she has already updated the family. I encouraged her to ask the nurse to page me if she or her family has any questions. She verbalized understanding that her condition may deteriorate in spite of treatment.           Hospital Problems as of 2/6/2022 Date Reviewed: 2/6/2022          Codes Class Noted - Resolved POA    Atrial fibrillation with RVR (HCC) ICD-10-CM: I48.91  ICD-9-CM: 427.31  2/3/2022 - Present Unknown        * (Principal) Anemia ICD-10-CM: D64.9  ICD-9-CM: 285.9  2/3/2022 - Present Yes        Diastolic CHF, acute on chronic [facility]) ICD-10-CM: I50.33  ICD-9-CM: 428.33, 428.0  11/11/2021 - Present Unknown        Controlled type 2 diabetes mellitus without complication, without long-term current use of insulin (HCC) (Chronic) ICD-10-CM: E11.9  ICD-9-CM: 250.00  9/9/2020 - Present Yes        Acute respiratory failure with hypoxia [facility]) ICD-10-CM: J96.01  ICD-9-CM: 518.81  6/25/2020 - Present Unknown        Hypertension (Chronic) ICD-10-CM: I10  ICD-9-CM: 401.9  Unknown - Present Yes        Class 3 obesity with alveolar hypoventilation, serious comorbidity, and body mass index (BMI) of 50.0 to 59.9 in adult [facility]) (Chronic) ICD-10-CM: L66.7, Q87.05  ICD-9-CM: 278.03, V85.43  Unknown - Present Yes    Overview Signed 2/8/2019 11:59 AM by Carmen Busch MD     BMI 45.8- 5/2/12                       Objective:     Patient Vitals for the past 24 hrs:   Temp Pulse Resp BP SpO2   02/06/22 1122 98.2 °F (36.8 °C) 93 20 126/66 93 %   02/06/22 0935 98 °F (36.7 °C) (!) 121 26 (!) 98/51 (!) 89 %   02/06/22 0931  93 28 (!) 100/50 91 %   02/06/22 0929  (!) 116 24 (!) 106/55 92 %   02/06/22 0925  (!) 109 25  93 %   02/06/22 0920  (!) 110 22 139/63 91 %   02/06/22 0910  (!) 109 20 110/63    02/06/22 0904 99.5 °F (37.5 °C) (!) 110 28 (!) 115/56 90 %   02/06/22 0813  (!) 150 16 122/86 91 %   02/06/22 0717     91 %   02/06/22 0633  (!) 109  126/62    02/06/22 0424 98.3 °F (36.8 °C) 83 16 (!) 121/58 91 %   02/05/22 2336 98.9 °F (37.2 °C) (!) 107 18 110/79 95 %   02/05/22 2041 99.2 °F (37.3 °C) (!) 105 18 133/60 93 %   02/05/22 1954     93 %   02/05/22 1735  96  115/69    02/05/22 1623 98.6 °F (37 °C) (!) 103 22 120/60 91 %   02/05/22 1532  89   90 %     Oxygen Therapy  O2 Sat (%): 93 % (02/06/22 1122)  Pulse via Oximetry: 110 beats per minute (02/06/22 0935)  O2 Device: Oxygen mask (02/06/22 0904)  O2 Flow Rate (L/min): 4 l/min (02/06/22 0935)    Estimated body mass index is 54.56 kg/m² as calculated from the following:    Height as of this encounter: 5' 7\" (1.702 m). Weight as of this encounter: 158 kg (348 lb 5.2 oz). Intake/Output Summary (Last 24 hours) at 2/6/2022 1151  Last data filed at 2/6/2022 1002  Gross per 24 hour   Intake 1100 ml   Output 3305 ml   Net -2205 ml         Physical Exam:    Blood pressure 126/66, pulse 93, temperature 98.2 °F (36.8 °C), resp. rate 20, height 5' 7\" (1.702 m), weight 158 kg (348 lb 5.2 oz), SpO2 93 %. General:    Alert, awake, patient with morbid obesity. HEENT:             PERRLA positive, MMM  Neck:  No restricted ROM. CV:   Tachycardic rate with irregularly irregular rhythm, no JVC  Lungs:   Coarse breath sound with decreased air entry bilateral lower lobe. Non wheezing. Nonlabored. Abdomen: Bowel sounds present. Soft, nontender, nondistended. Extremities: Wrapped legs    Skin:     No rashes and normal coloration on the visible skin. Warm and dry.     Neuro:  GCS 15, AOx3, moving all 4 extremities, speech normal.  Psych:  AOx3, mood and affect appropriate    I have reviewed ordered lab tests and independently visualized imaging below:    Last 24hr Labs:  Procedures done this admission:  Procedure(s):  ESOPHAGOGASTRODUODENOSCOPY (EGD)  COLONOSCOPY/ BMI 56 ROOM 302  ESOPHAGOGASTRODUODENAL (EGD) BIOPSY    All Micro Results     Procedure Component Value Units Date/Time    COVID-19 RAPID TEST [[id number]] Collected: 02/05/22 0547    Order Status: Completed Specimen: Nasopharyngeal Updated: 02/05/22 0650     Specimen source NASAL        COVID-19 rapid test Not detected        Comment:      The specimen is NEGATIVE for SARS-CoV-2, the novel coronavirus associated with COVID-19. A negative result does not rule out COVID-19. This test has been authorized by the FDA under an Emergency Use Authorization (EUA) for use by authorized laboratories.         Fact sheet for Healthcare Providers: Who-Sells-it.comdate.co.nz  Fact sheet for Patients: Tears for Life.co.nz       Methodology: Isothermal Nucleic Acid Amplification               SARS-CoV-2 Lab Results  \"Novel Coronavirus\" Test: No results found for: COV2NT   \"Emergent Disease\" Test: No results found for: EDPR  \"SARS-COV-2\" Test: No results found for: XGCOVT  \"Precision Labs\" Test:   Lab Results   Component Value Date/Time    RSLT RESULTS SCANNED IN [facility] 02/04/2022 03:25 PM     Rapid Test:   Lab Results   Component Value Date/Time    COVR Not detected 02/05/2022 05:47 AM            Labs: Results:       BMP, Mg, Phos Recent Labs     02/06/22 0557 02/05/22  0700 02/04/22  1525    140 141   K 3.3* 3.3* 3.8    104 105   CO2 35* 33* 32   AGAP 5* 3* 4*   BUN 9 14 17   CREA 0.70 0.70 0.80   CA 8.7 8.8 8.7   * 124* 129*   PHOS 3.5  --   --       CBC Recent Labs     02/06/22 0557 02/05/22  0700 02/04/22  1733   WBC 10.5 9.1  --    RBC 3.78* 3.36*  --    HGB 8.6* 7.6* 7.9*   HCT 30.7* 27.0* 27.8*  227  --    GRANS 74  --   --    LYMPH 12*  --   --    EOS 2  --   --    MONOS 10  --   --    BASOS 1  --   --    IG 0  --   --    ANEU 7.8  --   --    ABL 1.3  --   --    JUDI 0.2  --   --    ABM 1.1  --   --    ABB 0.1  --   --    AIG 0.0  --   --       LFT No results for input(s): ALT, TBIL, AP, TP, ALB, GLOB, AGRAT in the last 72 hours.     No lab exists for component: SGOT, GPT   Cardiac Testing Lab Results   Component Value Date/Time    B-type Natriuretic Peptide 121.0 (H) 01/08/2020 09:22 AM      Coagulation Tests Lab Results   Component Value Date/Time    Prothrombin time 15.6 (H) 02/03/2022 11:05 AM    INR 1.2 02/03/2022 11:05 AM      A1c Lab Results   Component Value Date/Time    Hemoglobin A1c 6.9 (H) 09/23/2021 05:54 AM    Hemoglobin A1c 6.7 (H) 12/02/2020 10:55 AM    Hemoglobin A1c 7.5 (H) 09/11/2020 05:17 AM      Lipid Panel Lab Results   Component Value Date/Time    Cholesterol, total 180 12/02/2020 10:55 AM    HDL Cholesterol 63 12/02/2020 10:55 AM    LDL, calculated 102 (H) 12/02/2020 10:55 AM    LDL, calculated 95 04/01/2019 10:55 AM    VLDL, calculated 15 12/02/2020 10:55 AM    VLDL, calculated 16 04/01/2019 10:55 AM    Triglyceride 80 12/02/2020 10:55 AM      Thyroid Panel Lab Results   Component Value Date/Time    TSH 2.670 09/24/2021 06:01 AM    TSH 2.740 12/02/2020 10:55 AM    TSH 2.630 10/29/2019 08:22 AM        Most Recent UA Lab Results   Component Value Date/Time    WBC 3-5 09/10/2020 12:18 AM    RBC 5-10 09/10/2020 12:18 AM    Epithelial cells 0-3 09/10/2020 12:18 AM    Bacteria TRACE 09/10/2020 12:18 AM    Casts 0 09/10/2020 12:18 AM    Crystals, urine OCCASIONAL 09/10/2020 12:18 AM    Mucus 0 09/10/2020 12:18 AM            All Micro Results     Procedure Component Value Units Date/Time    COVID-19 RAPID TEST [[id number]] Collected: 02/05/22 0547    Order Status: Completed Specimen: Nasopharyngeal Updated: 02/05/22 0650     Specimen source NASAL        COVID-19 rapid test Not detected        Comment:      The specimen is NEGATIVE for SARS-CoV-2, the novel coronavirus associated with COVID-19. A negative result does not rule out COVID-19. This test has been authorized by the FDA under an Emergency Use Authorization (EUA) for use by authorized laboratories. Fact sheet for Healthcare Providers: ConventionUpdate.co.nz  Fact sheet for Patients: ConventionUpdate.co.nz       Methodology: Isothermal Nucleic Acid Amplification               Other Studies:  No results found. Signed:  Aline Flores MD    Part of this note may have been written by using a voice dictation software. The note has been proof read but may still contain some grammatical/other typographical errors.

## 2022-02-06 NOTE — ROUTINE PROCESS
Bedside and Verbal shift change report given to Caleb Maxwell RN (oncoming nurse) by self (offgoing nurse). Report included the following information SBAR, Kardex, Intake/Output, MAR, and Recent Results.

## 2022-02-06 NOTE — PROCEDURES
Endoscopic Gastroduodenoscopy Procedure Note    Indications:  RICHA    Anesthesia/Sedation: MAC IV     Pre-Procedure Physical:    Current Facility-Administered Medications   Medication Dose Route Frequency    potassium chloride (K-DUR, KLOR-CON M20) SR tablet 40 mEq  40 mEq Oral Q4H    digoxin (LANOXIN) tablet 0.25 mg  0.25 mg Oral DAILY    dilTIAZem ER (CARDIZEM CD) capsule 240 mg  240 mg Oral DAILY    ferric gluconate (FERRLECIT) 125 mg in 0.9% sodium chloride 100 mL IVPB  125 mg IntraVENous DAILY    levalbuterol (XOPENEX) nebulizer soln 0.63 mg/3 mL  0.63 mg Nebulization QID RT    acetaminophen (TYLENOL) tablet 650 mg  650 mg Oral Q4H PRN    0.9% sodium chloride infusion 250 mL  250 mL IntraVENous PRN    sodium chloride (NS) flush 5-10 mL  5-10 mL IntraVENous Q8H    sodium chloride (NS) flush 5-10 mL  5-10 mL IntraVENous PRN    0.9% sodium chloride infusion 250 mL  250 mL IntraVENous PRN    dilTIAZem (CARDIZEM) 100 mg in 0.9% sodium chloride (MBP/ADV) 100 mL infusion  0-15 mg/hr IntraVENous TITRATE    furosemide (LASIX) injection 40 mg  40 mg IntraVENous BID    insulin lispro (HUMALOG) injection   SubCUTAneous AC&HS    morphine injection 2 mg  2 mg IntraVENous Q4H PRN    oxyCODONE IR (ROXICODONE) tablet 5 mg  5 mg Oral Q4H PRN      Lortab [hydrocodone-acetaminophen]    Patient Vitals for the past 8 hrs:   BP Temp Pulse Resp SpO2 Weight   02/06/22 0717     91 %    02/06/22 0633 126/62  (!) 109      02/06/22 0424 (!) 121/58 98.3 °F (36.8 °C) 83 16 91 % 158 kg (348 lb 5.2 oz)       Exam  Morbidly obese  Airway: clear   Heart: normal S1and S2    Lungs: clear bilateral  Abdomen: soft, nontender, bowel sounds present and normal in all quads   Mental Status: awake, alert and oriented to person, place and time          Procedure Details     Informed consent was obtained for the procedure, including conscious sedation.  Risks of pancreatitis, infection, perforation, hemorrhage, adverse drug reaction and aspiration were discussed. The patient was placed in the left lateral decubitus position. The EGD gastroscope was inserted into the mouth and advanced under direct vision to the second portion of the duodenum. A careful inspection was made as the gastroscope was withdrawn, including a retroflexed view of the proximal stomach; findings and interventions are described below. Appropriate photodocumentation was obtained. Findings:   Esophagus- Normal.  Stomach- Mild gastritis. Random biopsies taken. A Small and superficial pre pyloric ulcer was seen and was biopsied. Duodenum- Normal.  Biopsies taken to evaluate for celiac. Therapies: None    Specimens:  Duodenal,  and random gastric    Estimated Blood Loss: Minimal           Complications:   None; patient tolerated the procedure well. Attending Attestation:  I performed the procedure. Impression:  Small superficial , clean based. Mild gastritis. Recommendations:  F/U biopsies. Avoid NSAIDs. PPIs.     Samm Collins MD

## 2022-02-06 NOTE — ANESTHESIA POSTPROCEDURE EVALUATION
Procedure(s):  ESOPHAGOGASTRODUODENOSCOPY (EGD)  COLONOSCOPY/ BMI 56 ROOM 302  ESOPHAGOGASTRODUODENAL (EGD) BIOPSY. total IV anesthesia    Anesthesia Post Evaluation      Multimodal analgesia: multimodal analgesia used between 6 hours prior to anesthesia start to PACU discharge  Patient location during evaluation: PACU  Patient participation: complete - patient participated  Level of consciousness: awake and awake and alert  Pain management: adequate  Airway patency: patent  Anesthetic complications: no  Cardiovascular status: acceptable  Respiratory status: acceptable  Hydration status: acceptable  Post anesthesia nausea and vomiting:  controlled      INITIAL Post-op Vital signs:   Vitals Value Taken Time   /63 02/06/22 0920   Temp 37.5 °C (99.5 °F) 02/06/22 0904   Pulse 107 02/06/22 0926   Resp 42 02/06/22 0926   SpO2 93 % 02/06/22 0926   Vitals shown include unvalidated device data.

## 2022-02-06 NOTE — ANESTHESIA PREPROCEDURE EVALUATION
Relevant Problems   No relevant active problems       Anesthetic History   No history of anesthetic complications            Review of Systems / Medical History  Patient summary reviewed, nursing notes reviewed and pertinent labs reviewed    Pulmonary  Within defined limits                 Neuro/Psych   Within defined limits           Cardiovascular    Hypertension        Dysrhythmias : atrial fibrillation      Exercise tolerance: >4 METS  Comments: Diltiazem drip   GI/Hepatic/Renal                Endo/Other        Anemia     Other Findings              Physical Exam    Airway  Mallampati: II  TM Distance: 4 - 6 cm  Neck ROM: normal range of motion   Mouth opening: Normal     Cardiovascular  Regular rate and rhythm,  S1 and S2 normal,  no murmur, click, rub, or gallop             Dental  No notable dental hx       Pulmonary  Breath sounds clear to auscultation               Abdominal  GI exam deferred       Other Findings            Anesthetic Plan    ASA: 3  Anesthesia type: total IV anesthesia          Induction: Intravenous  Anesthetic plan and risks discussed with: Patient

## 2022-02-06 NOTE — PROGRESS NOTES
Problem: Pressure Injury - Risk of  Goal: *Prevention of pressure injury  Description: Document Mukesh Scale and appropriate interventions in the flowsheet. Outcome: Progressing Towards Goal  Note: Pressure Injury Interventions:  Sensory Interventions: Assess changes in LOC,Assess need for specialty bed,Keep linens dry and wrinkle-free,Maintain/enhance activity level,Minimize linen layers,Pressure redistribution bed/mattress (bed type)    Moisture Interventions: Absorbent underpads,Apply protective barrier, creams and emollients,Check for incontinence Q2 hours and as needed,Minimize layers,Internal/External urinary devices    Activity Interventions: Increase time out of bed,Pressure redistribution bed/mattress(bed type)    Mobility Interventions: HOB 30 degrees or less,Pressure redistribution bed/mattress (bed type)    Nutrition Interventions: Document food/fluid/supplement intake    Friction and Shear Interventions: Apply protective barrier, creams and emollients,Foam dressings/transparent film/skin sealants,HOB 30 degrees or less,Minimize layers                Problem: Falls - Risk of  Goal: *Absence of Falls  Description: Document Helen Fall Risk and appropriate interventions in the flowsheet.   Outcome: Progressing Towards Goal  Note: Fall Risk Interventions:  Mobility Interventions: Bed/chair exit alarm,Communicate number of staff needed for ambulation/transfer,Patient to call before getting OOB         Medication Interventions: Patient to call before getting OOB,Teach patient to arise slowly    Elimination Interventions: Bed/chair exit alarm,Call light in reach,Patient to call for help with toileting needs,Toileting schedule/hourly rounds              Problem: Gas Exchange - Impaired  Goal: *Absence of hypoxia  Outcome: Progressing Towards Goal

## 2022-02-06 NOTE — ROUTINE PROCESS
Bedside and Verbal shift change report given to self (oncoming nurse) by  OPAL Fernandez (offgoing nurse). Report included the following information SBAR, Kardex, Intake/Output, MAR, and Recent Results.

## 2022-02-06 NOTE — ROUTINE PROCESS
Bedside and Verbal shift change report given to Wolf Triana RN (oncoming nurse) by self (offgoing nurse). Report included the following information SBAR, Kardex, Intake/Output, MAR, and Recent Results.

## 2022-02-06 NOTE — PROGRESS NOTES
Pt transported to endo fluoro via bed with this RN and Usman Lara CRNA. Pt currently on Cardizem drip during transport. Patent IV on R hand 20G.

## 2022-02-06 NOTE — H&P
Gastroenterology Outpatient History and Physical    Patient: Carly Maldonado    Physician: Cipriano Garcia MD    Vital Signs: Blood pressure 126/62, pulse (!) 109, temperature 98.3 °F (36.8 °C), resp. rate 16, height 5' 7\" (1.702 m), weight 158 kg (348 lb 5.2 oz), SpO2 91 %. Allergies: Allergies   Allergen Reactions    Lortab [Hydrocodone-Acetaminophen] Nausea Only and Other (comments)     \"It makes me feel hung over\"       Chief Complaint:      Patient is a 62 y. o. female with PMH including but not limited to Afib (on Xarelto) chronic right knee pain, HTN, morbid obesity complicated by LUNA/OHS, and chronic diastolic heart failure (on Lasix), who is seen in consultation on 2/4/22 at the request of Marichuy Bentley NP for anemia.       History of Present Illness: As Above    Justification for Procedure: As Above    History:  Past Medical History:   Diagnosis Date    Chronic pain     pain R knee    Hypertension     Morbid obesity ([facility] [address]     BMI 45.8- 5/2/12    Positive PPD     had vaccination as a child - BCG      Past Surgical History:   Procedure Laterality Date    HX GYN      C-sec x 1 with GA    HX KNEE ARTHROSCOPY      bilateral knees      Social History     Socioeconomic History    Marital status:    Occupational History    Occupation: CNA cook in past.     Tobacco Use    Smoking status: Former Smoker     Packs/day: 0.50     Years: 25.00     Pack years: 12.50     Types: Cigarettes     Quit date: 2/7/2007     Years since quitting: 15.0    Smokeless tobacco: Never Used   Substance and Sexual Activity    Alcohol use: No    Drug use: No      Family History   Problem Relation Age of Onset    Other Mother         kidney failure    Cancer Sister         cervical cancer       Medications:   Prior to Admission medications    Medication Sig Start Date End Date Taking?  Authorizing Provider   albuterol (PROVENTIL HFA, VENTOLIN HFA, PROAIR HFA) 90 mcg/actuation inhaler Take 2 Puffs by inhalation every four (4) hours as needed for Wheezing. 11/11/21  Yes Tenzin Garcia MD   albuterol (PROVENTIL HFA, VENTOLIN HFA, PROAIR HFA) 90 mcg/actuation inhaler Take 1 Puff by inhalation every four (4) hours as needed for Wheezing. 9/26/21  Yes Duncan Hillman NP   furosemide (LASIX) 40 mg tablet Take 1 Tab by mouth daily. 10/1/20  Yes Christopher Rangel MD   potassium chloride (K-DUR, KLOR-CON) 20 mEq tablet Take 1 Tab by mouth daily. 10/1/20  Yes Christopher Rangel MD   OXYGEN-AIR DELIVERY SYSTEMS 2 L by Nasal route nightly. 10/2/20  Yes Provider, Historical   predniSONE (DELTASONE) 20 mg tablet Take 20 mg by mouth daily (with breakfast). 9/26/21   Duncan Hillman NP       Physical Exam:         Heart: regular rate and rhythm, S1, S2 normal, no murmur, click, rub or gallop   Lungs: chest clear, no wheezing, rales, normal symmetric air entry, Heart exam - S1, S2 normal, no murmur, no gallop, rate regular   Abdominal: Bowel sounds are normal, liver is not enlarged, spleen is not enlarged           Findings/Diagnosis: RICHA    Plan:  EGD and colonoscopy. Discussed risks including but not limited to bleeding, perforation, acute cardiopulmonary event, sedation risks, IV complications, aspiration and pt states understanding and agrees to proceed.          Signed:  Elyssa Hirsch MD 2/6/2022

## 2022-02-06 NOTE — ROUTINE PROCESS
Bedside and Verbal shift change report given to self (oncoming nurse) by OPAL Cervantes (offgoing nurse). Report included the following information SBAR, Kardex, ED Summary, Procedure Summary, Intake/Output, MAR, Recent Results and Cardiac Rhythm afib.

## 2022-02-06 NOTE — PERIOP NOTES
TRANSFER - IN REPORT:    Verbal report received from OPAL Fernandez on [place] being received from [id number] for ordered procedure      Report consisted of patients Situation, Background, Assessment and Recommendations(SBAR). Information from the following report(s) SBAR, Kardex, MAR, Recent Results, Procedure Verification and Quality Measures was reviewed with the receiving nurse. Opportunity for questions and clarification was provided. Assessment completed upon patients arrival to unit and care assumed.

## 2022-02-06 NOTE — ROUTINE PROCESS
TRANSFER - OUT REPORT:    Verbal report given to [facility] [place] by [facility], RN(name) on Lizzette  being transferred to Pre-opt (unit) for ordered procedure       Report consisted of patients Situation, Background, Assessment and   Recommendations(SBAR). Information from the following report(s) SBAR, Intake/Output, MAR, Recent Results and Cardiac Rhythm Afib  was reviewed with the receiving nurse. Lines:   Peripheral IV 02/03/22 Right Antecubital (Active)   Site Assessment Clean, dry, & intact 02/05/22 2041   Phlebitis Assessment 0 02/05/22 2041   Infiltration Assessment 0 02/05/22 2041   Dressing Status Clean, dry, & intact 02/05/22 2041   Dressing Type Tape;Transparent 02/05/22 2041   Hub Color/Line Status Patent; Flushed 02/05/22 2041   Alcohol Cap Used No 02/05/22 2041       Peripheral IV 02/04/22 Anterior;Proximal;Right Forearm (Active)   Site Assessment Clean, dry, & intact 02/05/22 2041   Phlebitis Assessment 0 02/05/22 2041   Infiltration Assessment 0 02/05/22 2041   Dressing Status Clean, dry, & intact 02/05/22 2041   Dressing Type Tape;Transparent 02/05/22 2041   Hub Color/Line Status Patent; Flushed 02/05/22 2041   Alcohol Cap Used No 02/05/22 2041        Opportunity for questions and clarification was provided.

## 2022-02-06 NOTE — PROGRESS NOTES
AURY MCCLELLAN    Patient Age: 78 year old   Refill request by: Pharmacy fax  Refill to be: ePrescribed to [facility] pharmacy    Medication requested to be refilled:     Restasis 1 drop in both eyes twice daily    Last visit with Dr. Mars was 10-11-18.    No pending appointment.    Routed to Dr. Chavez for refill authorization.           WEIGHT AND HEIGHT:   Wt Readings from Last 1 Encounters:   03/22/21 53.5 kg (118 lb)     Ht Readings from Last 1 Encounters:   02/17/21 5' 4\" (1.626 m)     BMI Readings from Last 1 Encounters:   03/22/21 20.25 kg/m²       ALLERGIES:  Ciprofloxacin, Metronidazole, Morphine, Moxifloxacin, Nitrofurantoin, Penicillins, Celecoxib, Doxycycline, Cleocin, Duloxetine hcl, Hydrocodone-acetaminophen, and Seasonal  Current Outpatient Medications   Medication   • gabapentin (NEURONTIN) 300 MG capsule   • triamcinolone (ARISTOCORT) 0.1 % cream   • furosemide (LASIX) 20 MG tablet   • potassium CHLORIDE (KLOR-CON M) 20 MEQ glen ER tablet   • ondansetron (ZOFRAN) 4 MG tablet   • lisinopril (ZESTRIL) 10 MG tablet   • PROLIA 60 MG/ML Solution Prefilled Syringe   • traZODone (DESYREL) 100 MG tablet   • sucralfate (CARAFATE) 1 g tablet   • cycloSPORINE (RESTASIS) 0.05 % ophthalmic emulsion   • calcium carbonate (OS-KANDACE) 600 MG Tab   • Cholecalciferol (VITAMIN D3) 2000 units capsule   • magnesium gluconate (MAGONATE) 500 MG tablet   • pancrelipase, Lip-Prot-Amyl, (CREON) 19420-95689 units per capsule     No current facility-administered medications for this visit.     PHARMACY to use: see below          Pharmacy preference(s) on file:   [id number] - [place], [place] - [address] [facility] & [address] [place] [address]  [address] [place] [address]  Phone: [phone] Fax: [phone]      CALL BACK INFO: DO NOT LEAVE A MESSAGE - contact patient directly  ROUTING: Patient's physician/staff        PCP: Jame Buenrostro MD         INS: Payor: MEDICARE RR / Plan: MEDICARE RR  GI/  See EGD and colonoscopy reports. Has a small non bleeding  in the pre pyloric area and gastritis. No definite source for her RICHA. Colonoscopy just showed sigmoid diverticulosis and small internal hemorrhoids. No source for RICHA. Consider outpatient SB capsule study. Hgb 8.6. Plan:  Start diet. Fe replacement per primary team.  We will s/o. Please call if needed. OK to restart Xarelto. Start PPIs.     Neelima Masterson MD PART A AND B / Product Type: MEDICARE   PATIENT ADDRESS:  [address] [place] [id number]

## 2022-02-06 NOTE — PERIOP NOTES
TRANSFER - OUT REPORT:    Verbal report given to 3rd floor OPAL Johnson on KOMAL Rice  being transferred to [facility](unit) for routine post - op       Report consisted of patients Situation, Background, Assessment and   Recommendations(SBAR). Information from the following report(s) SBAR, Kardex, OR Summary, Procedure Summary, Intake/Output and MAR was reviewed with the receiving nurse. Lines:   Peripheral IV 02/03/22 Right Antecubital (Active)   Site Assessment Clean, dry, & intact 02/06/22 0904   Phlebitis Assessment 0 02/06/22 0904   Infiltration Assessment 0 02/06/22 0904   Dressing Status Clean, dry, & intact 02/06/22 0904   Dressing Type Tape;Transparent 02/06/22 0904   Hub Color/Line Status Patent 02/06/22 0904   Alcohol Cap Used No 02/06/22 0732       Peripheral IV 02/04/22 Anterior;Proximal;Right Forearm (Active)   Site Assessment Clean, dry, & intact 02/06/22 0904   Phlebitis Assessment 0 02/06/22 0904   Infiltration Assessment 0 02/06/22 0904   Dressing Status Clean, dry, & intact 02/06/22 0904   Dressing Type Tape;Transparent 02/06/22 0904   Hub Color/Line Status Patent 02/06/22 0904   Alcohol Cap Used No 02/06/22 0732        Opportunity for questions and clarification was provided.       Patient transported with:   Registered Nurse

## 2022-02-06 NOTE — PROCEDURES
Colonoscopy Procedure Note    Indications:  RICHA    Anesthesia/Sedation: MAC IV     Pre-Procedure Physical:  Current Facility-Administered Medications   Medication Dose Route Frequency    potassium chloride (K-DUR, KLOR-CON M20) SR tablet 40 mEq  40 mEq Oral Q4H    digoxin (LANOXIN) tablet 0.25 mg  0.25 mg Oral DAILY    dilTIAZem ER (CARDIZEM CD) capsule 240 mg  240 mg Oral DAILY    ferric gluconate (FERRLECIT) 125 mg in 0.9% sodium chloride 100 mL IVPB  125 mg IntraVENous DAILY    levalbuterol (XOPENEX) nebulizer soln 0.63 mg/3 mL  0.63 mg Nebulization QID RT    acetaminophen (TYLENOL) tablet 650 mg  650 mg Oral Q4H PRN    0.9% sodium chloride infusion 250 mL  250 mL IntraVENous PRN    sodium chloride (NS) flush 5-10 mL  5-10 mL IntraVENous Q8H    sodium chloride (NS) flush 5-10 mL  5-10 mL IntraVENous PRN    0.9% sodium chloride infusion 250 mL  250 mL IntraVENous PRN    dilTIAZem (CARDIZEM) 100 mg in 0.9% sodium chloride (MBP/ADV) 100 mL infusion  0-15 mg/hr IntraVENous TITRATE    furosemide (LASIX) injection 40 mg  40 mg IntraVENous BID    insulin lispro (HUMALOG) injection   SubCUTAneous AC&HS    morphine injection 2 mg  2 mg IntraVENous Q4H PRN    oxyCODONE IR (ROXICODONE) tablet 5 mg  5 mg Oral Q4H PRN     Facility-Administered Medications Ordered in Other Encounters   Medication Dose Route Frequency    lidocaine (PF) (XYLOCAINE) 20 mg/mL (2 %) injection   IntraVENous PRN    propofoL (DIPRIVAN) 10 mg/mL injection   IntraVENous PRN    lactated Ringers infusion   IntraVENous CONTINUOUS    propofoL (DIPRIVAN) 10 mg/mL injection   IntraVENous CONTINUOUS    ondansetron (ZOFRAN) injection   IntraVENous PRN      Lortab [hydrocodone-acetaminophen]    Patient Vitals for the past 8 hrs:   BP Temp Pulse Resp SpO2 Weight   02/06/22 0813 122/86  (!) 150 16 91 %    02/06/22 0717     91 %    02/06/22 0633 126/62  (!) 109      02/06/22 0424 (!) 121/58 98.3 °F (36.8 °C) 83 16 91 % 158 kg (348 lb 5.2 oz)       Exam:  Morbidly obese  Airway: clear   Heart: normal S1and S2    Lungs: clear bilateral  Abdomen: soft, nontender, bowel sounds present and normal in all quads   Mental Status: awake, alert and oriented to person, place and time        Procedure Details      Informed consent was obtained for the procedure, including sedation. Risks of perforation, hemorrhage, adverse drug reaction and aspiration were discussed. The patient was placed in the left lateral decubitus position. A rectal examination was performed. The colonoscope was inserted into the rectum and advanced under direct vision to the cecum. The quality of the colonic preparation was good. A careful inspection was made as the colonoscope was withdrawn, including a retroflexed view of the rectum; findings and interventions are described below. Appropriate photodocumentation was obtained. Findings:   Sigmoid diverticulosis. Small internal hemorrhoids. .  No source for RICHA. Specimens: None    Estimated Blood Loss: 0 cc. Prep fair. Complications: None; patient tolerated the procedure well. Attending Attestation: I performed the procedure. Impression:  Sigmoid diverticulosis. Small internal hemorrhoids. Recommendations:   High fiber diet. Consider outpatient SB capsule study. Repeat colonoscopy in 10 years.     Ange Banuelos MD

## 2022-02-06 NOTE — ROUTINE PROCESS
TRANSFER - IN REPORT:    Verbal report received from [facility] on KOMAL Rice being received from PACU for routine progression of care. Report consisted of patients Situation, Background, Assessment and Recommendations(SBAR). Information from the following report(s) SBAR, Kardex, Procedure Summary, Intake/Output and MAR was reviewed. Opportunity for questions and clarification was provided. Assessment completed upon patients arrival to unit and care assumed. Patient received to room 302. Patient connected to monitor and assessment completed. Plan of care reviewed. Patient oriented to room and call light. Patient aware to use call light to communicate any chest pain or needs.

## 2022-02-07 ENCOUNTER — APPOINTMENT (OUTPATIENT)
Dept: GENERAL RADIOLOGY | Age: 67
DRG: 291 | End: 2022-02-07
Attending: INTERNAL MEDICINE
Payer: COMMERCIAL

## 2022-02-07 ENCOUNTER — APPOINTMENT (OUTPATIENT)
Dept: CT IMAGING | Age: 67
DRG: 291 | End: 2022-02-07
Attending: INTERNAL MEDICINE
Payer: COMMERCIAL

## 2022-02-07 LAB
ANION GAP SERPL CALC-SCNC: 4 MMOL/L (ref 7–16)
ARTERIAL PATENCY WRIST A: POSITIVE
BASE EXCESS BLD CALC-SCNC: 11.6 MMOL/L
BASOPHILS # BLD: 0.1 K/UL (ref 0–0.2)
BASOPHILS NFR BLD: 1 % (ref 0–2)
BDY SITE: ABNORMAL
BUN SERPL-MCNC: 10 MG/DL (ref 8–23)
CALCIUM SERPL-MCNC: 8.5 MG/DL (ref 8.3–10.4)
CHLORIDE SERPL-SCNC: 99 MMOL/L (ref 98–107)
CO2 SERPL-SCNC: 35 MMOL/L (ref 21–32)
CREAT SERPL-MCNC: 0.8 MG/DL (ref 0.6–1)
DIFFERENTIAL METHOD BLD: ABNORMAL
EOSINOPHIL # BLD: 0.1 K/UL (ref 0–0.8)
EOSINOPHIL NFR BLD: 1 % (ref 0.5–7.8)
ERYTHROCYTE [DISTWIDTH] IN BLOOD BY AUTOMATED COUNT: 21.3 % (ref 11.9–14.6)
GAS FLOW.O2 O2 DELIVERY SYS: ABNORMAL L/MIN
GLUCOSE BLD STRIP.AUTO-MCNC: 123 MG/DL (ref 65–100)
GLUCOSE BLD STRIP.AUTO-MCNC: 139 MG/DL (ref 65–100)
GLUCOSE BLD STRIP.AUTO-MCNC: 157 MG/DL (ref 65–100)
GLUCOSE BLD STRIP.AUTO-MCNC: 160 MG/DL (ref 65–100)
GLUCOSE SERPL-MCNC: 176 MG/DL (ref 65–100)
HCO3 BLD-SCNC: 39.5 MMOL/L (ref 22–26)
HCT VFR BLD AUTO: 29.9 % (ref 35.8–46.3)
HGB BLD-MCNC: 8.1 G/DL (ref 11.7–15.4)
IMM GRANULOCYTES # BLD AUTO: 0.1 K/UL (ref 0–0.5)
IMM GRANULOCYTES NFR BLD AUTO: 1 % (ref 0–5)
LYMPHOCYTES # BLD: 1.2 K/UL (ref 0.5–4.6)
LYMPHOCYTES NFR BLD: 12 % (ref 13–44)
MCH RBC QN AUTO: 22.6 PG (ref 26.1–32.9)
MCHC RBC AUTO-ENTMCNC: 27.1 G/DL (ref 31.4–35)
MCV RBC AUTO: 83.3 FL (ref 79.6–97.8)
MONOCYTES # BLD: 1.2 K/UL (ref 0.1–1.3)
MONOCYTES NFR BLD: 12 % (ref 4–12)
NEUTS SEG # BLD: 7.7 K/UL (ref 1.7–8.2)
NEUTS SEG NFR BLD: 74 % (ref 43–78)
NRBC # BLD: 0.03 K/UL (ref 0–0.2)
PCO2 BLD: 72.4 MMHG (ref 35–45)
PH BLD: 7.35 [PH] (ref 7.35–7.45)
PLATELET # BLD AUTO: 249 K/UL (ref 150–450)
PMV BLD AUTO: 10.4 FL (ref 9.4–12.3)
PO2 BLD: 63 MMHG (ref 75–100)
POTASSIUM SERPL-SCNC: 3.6 MMOL/L (ref 3.5–5.1)
RBC # BLD AUTO: 3.59 M/UL (ref 4.05–5.2)
SAO2 % BLD: 89.1 % (ref 95–98)
SERVICE CMNT-IMP: ABNORMAL
SODIUM SERPL-SCNC: 138 MMOL/L (ref 136–145)
SPECIMEN TYPE: ABNORMAL
TSH SERPL DL<=0.005 MIU/L-ACNC: 1.24 UIU/ML (ref 0.36–3.74)
WBC # BLD AUTO: 10.4 K/UL (ref 4.3–11.1)

## 2022-02-07 PROCEDURE — 74011000258 HC RX REV CODE- 258: Performed by: INTERNAL MEDICINE

## 2022-02-07 PROCEDURE — 94760 N-INVAS EAR/PLS OXIMETRY 1: CPT

## 2022-02-07 PROCEDURE — 76937 US GUIDE VASCULAR ACCESS: CPT

## 2022-02-07 PROCEDURE — 82803 BLOOD GASES ANY COMBINATION: CPT

## 2022-02-07 PROCEDURE — 36415 COLL VENOUS BLD VENIPUNCTURE: CPT

## 2022-02-07 PROCEDURE — 85025 COMPLETE CBC W/AUTO DIFF WBC: CPT

## 2022-02-07 PROCEDURE — C9113 INJ PANTOPRAZOLE SODIUM, VIA: HCPCS | Performed by: INTERNAL MEDICINE

## 2022-02-07 PROCEDURE — 71045 X-RAY EXAM CHEST 1 VIEW: CPT

## 2022-02-07 PROCEDURE — 94640 AIRWAY INHALATION TREATMENT: CPT

## 2022-02-07 PROCEDURE — 36600 WITHDRAWAL OF ARTERIAL BLOOD: CPT

## 2022-02-07 PROCEDURE — 74011250637 HC RX REV CODE- 250/637: Performed by: INTERNAL MEDICINE

## 2022-02-07 PROCEDURE — 77030020847 HC STATLOK BARD -A

## 2022-02-07 PROCEDURE — 74011000258 HC RX REV CODE- 258: Performed by: HOSPITALIST

## 2022-02-07 PROCEDURE — 77030021668 HC NEB PREFIL KT VYRM -A

## 2022-02-07 PROCEDURE — 74011000258 HC RX REV CODE- 258: Performed by: EMERGENCY MEDICINE

## 2022-02-07 PROCEDURE — 74011250636 HC RX REV CODE- 250/636: Performed by: EMERGENCY MEDICINE

## 2022-02-07 PROCEDURE — 65660000000 HC RM CCU STEPDOWN

## 2022-02-07 PROCEDURE — 74011250636 HC RX REV CODE- 250/636: Performed by: INTERNAL MEDICINE

## 2022-02-07 PROCEDURE — 77010033711 HC HIGH FLOW OXYGEN

## 2022-02-07 PROCEDURE — 74011000250 HC RX REV CODE- 250: Performed by: EMERGENCY MEDICINE

## 2022-02-07 PROCEDURE — 99232 SBSQ HOSP IP/OBS MODERATE 35: CPT | Performed by: INTERNAL MEDICINE

## 2022-02-07 PROCEDURE — 94660 CPAP INITIATION&MGMT: CPT

## 2022-02-07 PROCEDURE — 74011000250 HC RX REV CODE- 250: Performed by: HOSPITALIST

## 2022-02-07 PROCEDURE — 74011636637 HC RX REV CODE- 636/637: Performed by: INTERNAL MEDICINE

## 2022-02-07 PROCEDURE — 77030012794 HC KT NSL CANN/HGH TRAN -A

## 2022-02-07 PROCEDURE — 82962 GLUCOSE BLOOD TEST: CPT

## 2022-02-07 PROCEDURE — 99223 1ST HOSP IP/OBS HIGH 75: CPT | Performed by: INTERNAL MEDICINE

## 2022-02-07 PROCEDURE — 80048 BASIC METABOLIC PNL TOTAL CA: CPT

## 2022-02-07 PROCEDURE — 77030041974 HC CATH SYS PERIPH TELE -B

## 2022-02-07 PROCEDURE — 74011250636 HC RX REV CODE- 250/636: Performed by: HOSPITALIST

## 2022-02-07 PROCEDURE — 5A09557 ASSISTANCE WITH RESPIRATORY VENTILATION, GREATER THAN 96 CONSECUTIVE HOURS, CONTINUOUS POSITIVE AIRWAY PRESSURE: ICD-10-PCS | Performed by: INTERNAL MEDICINE

## 2022-02-07 PROCEDURE — 71260 CT THORAX DX C+: CPT

## 2022-02-07 PROCEDURE — 74011000636 HC RX REV CODE- 636: Performed by: INTERNAL MEDICINE

## 2022-02-07 PROCEDURE — 74011000250 HC RX REV CODE- 250: Performed by: INTERNAL MEDICINE

## 2022-02-07 PROCEDURE — 83735 ASSAY OF MAGNESIUM: CPT

## 2022-02-07 PROCEDURE — 84443 ASSAY THYROID STIM HORMONE: CPT

## 2022-02-07 RX ORDER — POTASSIUM CHLORIDE 20 MEQ/1
40 TABLET, EXTENDED RELEASE ORAL DAILY
Status: DISCONTINUED | OUTPATIENT
Start: 2022-02-08 | End: 2022-02-08

## 2022-02-07 RX ORDER — SODIUM CHLORIDE 0.9 % (FLUSH) 0.9 %
10 SYRINGE (ML) INJECTION
Status: COMPLETED | OUTPATIENT
Start: 2022-02-07 | End: 2022-02-07

## 2022-02-07 RX ORDER — POTASSIUM CHLORIDE 20 MEQ/1
40 TABLET, EXTENDED RELEASE ORAL EVERY 4 HOURS
Status: COMPLETED | OUTPATIENT
Start: 2022-02-07 | End: 2022-02-07

## 2022-02-07 RX ORDER — PANTOPRAZOLE SODIUM 40 MG/1
40 TABLET, DELAYED RELEASE ORAL EVERY 12 HOURS
Status: DISCONTINUED | OUTPATIENT
Start: 2022-02-07 | End: 2022-02-15 | Stop reason: HOSPADM

## 2022-02-07 RX ADMIN — LEVALBUTEROL HYDROCHLORIDE 0.63 MG: 0.63 SOLUTION RESPIRATORY (INHALATION) at 11:02

## 2022-02-07 RX ADMIN — SODIUM CHLORIDE 100 ML: 9 INJECTION, SOLUTION INTRAVENOUS at 16:36

## 2022-02-07 RX ADMIN — FUROSEMIDE 40 MG: 10 INJECTION, SOLUTION INTRAMUSCULAR; INTRAVENOUS at 17:12

## 2022-02-07 RX ADMIN — Medication 2 UNITS: at 12:02

## 2022-02-07 RX ADMIN — IOPAMIDOL 75 ML: 755 INJECTION, SOLUTION INTRAVENOUS at 16:33

## 2022-02-07 RX ADMIN — LEVALBUTEROL HYDROCHLORIDE 0.63 MG: 0.63 SOLUTION RESPIRATORY (INHALATION) at 20:26

## 2022-02-07 RX ADMIN — SODIUM CHLORIDE 40 MG: 9 INJECTION INTRAMUSCULAR; INTRAVENOUS; SUBCUTANEOUS at 09:01

## 2022-02-07 RX ADMIN — SODIUM CHLORIDE, PRESERVATIVE FREE 10 ML: 5 INJECTION INTRAVENOUS at 13:52

## 2022-02-07 RX ADMIN — SODIUM CHLORIDE, PRESERVATIVE FREE 10 ML: 5 INJECTION INTRAVENOUS at 05:28

## 2022-02-07 RX ADMIN — POTASSIUM CHLORIDE 40 MEQ: 20 TABLET, EXTENDED RELEASE ORAL at 12:01

## 2022-02-07 RX ADMIN — SODIUM CHLORIDE, PRESERVATIVE FREE 10 ML: 5 INJECTION INTRAVENOUS at 22:02

## 2022-02-07 RX ADMIN — Medication 2 UNITS: at 22:01

## 2022-02-07 RX ADMIN — SODIUM CHLORIDE 125 MG: 9 INJECTION, SOLUTION INTRAVENOUS at 10:18

## 2022-02-07 RX ADMIN — Medication 10 ML: at 16:36

## 2022-02-07 RX ADMIN — PANTOPRAZOLE SODIUM 40 MG: 40 TABLET, DELAYED RELEASE ORAL at 22:01

## 2022-02-07 RX ADMIN — FUROSEMIDE 40 MG: 10 INJECTION, SOLUTION INTRAMUSCULAR; INTRAVENOUS at 09:01

## 2022-02-07 RX ADMIN — SODIUM CHLORIDE 10 MG/HR: 900 INJECTION, SOLUTION INTRAVENOUS at 12:15

## 2022-02-07 RX ADMIN — POTASSIUM CHLORIDE 40 MEQ: 20 TABLET, EXTENDED RELEASE ORAL at 09:01

## 2022-02-07 RX ADMIN — DIGOXIN 0.25 MG: 250 TABLET ORAL at 09:02

## 2022-02-07 RX ADMIN — DILTIAZEM HYDROCHLORIDE 300 MG: 180 CAPSULE, COATED, EXTENDED RELEASE ORAL at 09:01

## 2022-02-07 RX ADMIN — LEVALBUTEROL HYDROCHLORIDE 0.63 MG: 0.63 SOLUTION RESPIRATORY (INHALATION) at 09:15

## 2022-02-07 RX ADMIN — SODIUM CHLORIDE 12.5 MG/HR: 900 INJECTION, SOLUTION INTRAVENOUS at 02:03

## 2022-02-07 NOTE — ROUTINE PROCESS
Called PICC team and asked them to come put a new IV in this patient. She only has one in her L arm and is complaining that it hurts. Its slowly getting red, but hard to tell if it's getting swollen. She has Cardizem running at 12.5 as of right now.

## 2022-02-07 NOTE — CONSULTS
History and Physical Initial Visit NOTE           2022    Leonid Ahumada                        Date of Admission:  2/3/2022    The patient's chart is reviewed and the patient is discussed with the staff. Subjective:     Patient is a 61 y.o.  female, PMH RLD, HTN, chronic right knee pain, seen and evaluated at the request of Dru Huang for acute respiratory failure with hypoxia. The patient was last seen 2021 in our office for follow up for restrictive lung disease due to kyphosis and obesity. The patient previously was admitted here back in 2021 with pulmonary edema and volume overload due to chronic diastolic heart failure, also noted on previous CT scans. At that time the patient was noted to have hypoxemia during that admission as well. The patient continues to use albuterol as needed at home as well as O2 at 2L NC nightly. The patient states she continued to be more short of breath with a dry cough at home but is no longer coughing here. She endorses tachycardia with minimal movement as well. She denies any fevers, chills, nausea, vomiting, or recent sick contacts. She is concerned that she is now up to 15L NC O2. She denies any chest pain. CXR today reveals volume overload. Review of Systems  A comprehensive review of systems was negative except for that written in the HPI. Prior to Admission Medications   Prescriptions Last Dose Informant Patient Reported? Taking? OXYGEN-AIR DELIVERY SYSTEMS 1/3/2022 at Unknown time  Yes Yes   Si L by Nasal route nightly. albuterol (PROVENTIL HFA, VENTOLIN HFA, PROAIR HFA) 90 mcg/actuation inhaler 2022 at Unknown time  No Yes   Sig: Take 1 Puff by inhalation every four (4) hours as needed for Wheezing. albuterol (PROVENTIL HFA, VENTOLIN HFA, PROAIR HFA) 90 mcg/actuation inhaler 2022 at Unknown time  No Yes   Sig: Take 2 Puffs by inhalation every four (4) hours as needed for Wheezing. furosemide (LASIX) 40 mg tablet 2/2/2022 at Unknown time  No Yes   Sig: Take 1 Tab by mouth daily. potassium chloride (K-DUR, KLOR-CON) 20 mEq tablet 2/2/2022 at Unknown time  No Yes   Sig: Take 1 Tab by mouth daily. predniSONE (DELTASONE) 20 mg tablet Unknown at Unknown time  No No   Sig: Take 20 mg by mouth daily (with breakfast). Facility-Administered Medications: None     Past Medical History:   Diagnosis Date    Chronic pain     pain R knee    Hypertension     Morbid obesity ([address]     BMI 45.8- 5/2/12    Positive PPD     had vaccination as a child - BCG     Past Surgical History:   Procedure Laterality Date    COLONOSCOPY N/A 2/6/2022    COLONOSCOPY/ BMI 56 ROOM 302 performed by Brandon Segovia MD at [facility] ENDOSCOPY    HX GYN      C-sec x 1 with GA    HX KNEE ARTHROSCOPY      bilateral knees     Social History     Socioeconomic History    Marital status:      Spouse name: Not on file    Number of children: Not on file    Years of education: Not on file    Highest education level: Not on file   Occupational History    Occupation: CNA cook in past.     Tobacco Use    Smoking status: Former Smoker     Packs/day: 0.50     Years: 25.00     Pack years: 12.50     Types: Cigarettes     Quit date: 2/7/2007     Years since quitting: 15.0    Smokeless tobacco: Never Used   Substance and Sexual Activity    Alcohol use: No    Drug use: No    Sexual activity: Not on file   Other Topics Concern    Not on file   Social History Narrative    Not on file     Social Determinants of Health     Financial Resource Strain:     Difficulty of Paying Living Expenses: Not on file   Food Insecurity:     Worried About [address] in the Last Year: Not on file    [address] in the Last Year: Not on file   Transportation Needs:     Lack of Transportation (Medical): Not on file    Lack of Transportation (Non-Medical):  Not on file   Physical Activity:     Days of Exercise per Week: Not on file    Minutes of Exercise per Session: Not on file   Stress:     Feeling of Stress : Not on file   Social Connections:     Frequency of Communication with Friends and Family: Not on file    Frequency of Social Gatherings with Friends and Family: Not on file    Attends Restoration Services: Not on file    Active Member of Clubs or Organizations: Not on file    Attends Club or Organization Meetings: Not on file    Marital Status: Not on file   Intimate Partner Violence:     Fear of Current or Ex-Partner: Not on file    Emotionally Abused: Not on file    Physically Abused: Not on file    Sexually Abused: Not on file   Housing Stability:     Unable to Pay for Housing in the Last Year: Not on file    Number of Places Lived in the Last Year: Not on file    Unstable Housing in the Last Year: Not on file     Family History   Problem Relation Age of Onset    Other Mother         kidney failure    Cancer Sister         cervical cancer     Allergies   Allergen Reactions    Lortab [Hydrocodone-Acetaminophen] Nausea Only and Other (comments)     \"It makes me feel hung over\"     Current Facility-Administered Medications   Medication Dose Route Frequency    potassium chloride (K-DUR, KLOR-CON M20) SR tablet 40 mEq  40 mEq Oral Q4H    [START ON 2/8/2022] potassium chloride (K-DUR, KLOR-CON M20) SR tablet 40 mEq  40 mEq Oral DAILY    dilTIAZem ER (CARDIZEM CD) capsule 300 mg  300 mg Oral DAILY    pantoprazole (PROTONIX) 40 mg in 0.9% sodium chloride 10 mL injection  40 mg IntraVENous DAILY    digoxin (LANOXIN) tablet 0.25 mg  0.25 mg Oral DAILY    ferric gluconate (FERRLECIT) 125 mg in 0.9% sodium chloride 100 mL IVPB  125 mg IntraVENous DAILY    levalbuterol (XOPENEX) nebulizer soln 0.63 mg/3 mL  0.63 mg Nebulization QID RT    acetaminophen (TYLENOL) tablet 650 mg  650 mg Oral Q4H PRN    0.9% sodium chloride infusion 250 mL  250 mL IntraVENous PRN    sodium chloride (NS) flush 5-10 mL  5-10 mL IntraVENous Q8H    sodium chloride (NS) flush 5-10 mL  5-10 mL IntraVENous PRN    0.9% sodium chloride infusion 250 mL  250 mL IntraVENous PRN    dilTIAZem (CARDIZEM) 100 mg in 0.9% sodium chloride (MBP/ADV) 100 mL infusion  0-15 mg/hr IntraVENous TITRATE    furosemide (LASIX) injection 40 mg  40 mg IntraVENous BID    insulin lispro (HUMALOG) injection   SubCUTAneous AC&HS    oxyCODONE IR (ROXICODONE) tablet 5 mg  5 mg Oral Q4H PRN     Objective:   Blood pressure (!) 125/59, pulse 99, temperature 98 °F (36.7 °C), resp. rate 20, height 5' 7\" (1.702 m), weight 349 lb 1.6 oz (158.4 kg), SpO2 94 %. Intake/Output Summary (Last 24 hours) at 2/7/2022 1139  Last data filed at 2/7/2022 0857  Gross per 24 hour   Intake 1440 ml   Output 1875 ml   Net -435 ml     PHYSICAL EXAM   Constitutional:  the patient is well developed and in no acute distress  EENMT:  Sclera clear, pupils equal, oral mucosa moist  Respiratory: On 15L HFNC  Cardiovascular:  RRR without M,G,R  Gastrointestinal: soft and non-tender; with positive bowel sounds. Musculoskeletal: warm without cyanosis. There is +1 lower extremity edema. Skin:  no jaundice or rashes, no wounds   Neurologic: no gross neuro deficits     Psychiatric:  alert and oriented x 3    CXR: 2/7/22      2/3/22      CT Chest: No recent    Recent Labs     02/07/22  0313 02/06/22  0557 02/05/22  0700   WBC 10.4 10.5 9.1   HGB 8.1* 8.6* 7.6*   HCT 29.9* 30.7* 27.0*    170 227    143 140   K 3.6 3.3* 3.3*   CL 99 103 104   CO2 35* 35* 33*   * 120* 124*   BUN 10 9 14   CREA 0.80 0.70 0.70   CA 8.5 8.7 8.8   PHOS  --  3.5  --      ECHO: Results from Hospital Encounter encounter on 02/03/22    ECHO ADULT COMPLETE    Interpretation Summary    Left Ventricle: Left ventricle size is normal. Normal wall thickness. Normal wall motion. Normal left ventricular systolic function with a visually estimated EF of 60 - 65%.     Right Ventricle: Right ventricle is mildly dilated. Normal systolic function.   Tricuspid Valve: Mild to moderate transvalvular regurgitation. RVSP is mildly elevated at around 45 mmHg.   Left Atrium: Left atrium is severely dilated. Results     Procedure Component Value Units Date/Time    COVID-19 RAPID TEST [[id number]] Collected: 02/05/22 0547    Order Status: Completed Specimen: Nasopharyngeal Updated: 02/05/22 0650     Specimen source NASAL        COVID-19 rapid test Not detected        Comment:      The specimen is NEGATIVE for SARS-CoV-2, the novel coronavirus associated with COVID-19. A negative result does not rule out COVID-19. This test has been authorized by the FDA under an Emergency Use Authorization (EUA) for use by authorized laboratories. Fact sheet for Healthcare Providers: ConventionUpdate.co.nz  Fact sheet for Patients: ConventionUpdate.co.nz       Methodology: Isothermal Nucleic Acid Amplification             Inpat Anti-Infectives (From admission, onward)    None        Assessment and Plan:  (Medical Decision Making)   Principal Problem:    Anemia (2/3/2022)  --Hgb 8.1 today. S/P colonoscopy/EGD on 2/6/22; noted gastric ulcer, non bleeding, iron supplementation recommended, GI signed off. Continue to monitor s/s bleeding and need for transfusion as this can contribute to hypoxia. Active Problems:    Hypertension ()  --Chronic, per primary team      Class 3 obesity with alveolar hypoventilation, serious comorbidity, and body mass index (BMI) of 50.0 to 59.9 in adult [facility]) ()  --Ongoing, chronic      Acute respiratory failure with hypoxia ([facility] [address] (6/25/2020)  --COVID negative 2/5/22  --Previously seen in our office for restrictive lung disease due to kyphosis and obesity. Previous hospitalizations with hypoxia due to fluid overload. Continue to optimize diuretics and oxygen, wean as tolerated and monitor renal function.        Controlled type 2 diabetes mellitus without complication, without long-term current use of insulin ([address] (9/9/2020)  --Chronic, on AC/HS blood sugar checks with SSI, per primary team      Diastolic CHF, acute on chronic ([address] (11/11/2021)  --On IV Lasix 40 mg BID and potassium replacement      Atrial fibrillation with RVR ([address] (2/3/2022)  --On Cardizem gtt per Cardiology     Prior    More than 50% of the time documented was spent in face-to-face contact with the patient and in the care of the patient on the floor/unit where the patient is located. Thank you very much for this referral.  We appreciate the opportunity to participate in this patient's care. Will follow along with above stated plan. Eugene Kramer NP     I have spoken with and examined the patient. I agree with the above assessment and plan as documented. Gen: Obese WF in NAD on 15L HFNC with sat of 93%   HEENT: very narrow OP; thick neck  Lungs:  Decreased BS bilaterally  Heart:  iRR with no Murmur/Rubs/Gallops  Abd: obese  Ext: + edema    60 yo F with morbid obesity admitted with increased dyspnea. Pt has been on 2L with sleep at home and is now requiring 15L oxygen while awake at rest. Pt has been more sedentary of late due to LE pain. Pt found to be in afib here and is now on cardizem drip. She denies pleuritic CP or hemoptysis but has hx of nocturnal dyspnea, EDS with frequent napping, and weight gain. Labs with high bicarb concerning for hypoventilation. Will check ABG, CTA of chest to r/o pulmonary embolism, and a TSH since hypothyroidism is possible. She likely has LUNA given upper airway anatomy and will place on empiric BIPAP with sleep if hypercapneic.       Teresa Rush MD

## 2022-02-07 NOTE — PROGRESS NOTES
Occupational Therapy Note:       OT treatment deferred today; patient currently being worked up to r/o PEs. Will check back tomorrow as patient's condition permits.     Vimal Costa, OTR/L, CLT

## 2022-02-07 NOTE — PROGRESS NOTES
No new discharge needs identified. Pt reporting she is more SOB; on 15L. She endorsed tachycardia with minimal movement as well. CXR today revealed volume overload. Care Management Interventions  PCP Verified by CM: Yes  Mode of Transport at Discharge:  Other (see comment)  Transition of Care Consult (CM Consult): Discharge [address]: Yes  MyChart Signup: No  Discharge Durable Medical Equipment: No  Physical Therapy Consult: Yes  Occupational Therapy Consult: Yes  Speech Therapy Consult: No  Support Systems: Child(adelita),Other Family Member(s),[employer] [employer],Friend/Neighbor  Confirm Follow Up Transport: Family  The Plan for Transition of Care is Related to the Following Treatment Goals : Return home and back to her baseline  Discharge Location  Patient Expects to be Discharged to[de-identified] Home with home health [facility])

## 2022-02-07 NOTE — ROUTINE PROCESS
Bedside and Verbal shift change report given to self (oncoming nurse) by Amara Davis RN (offgoing nurse). Report included the following information SBAR, Kardex, ED Summary, Procedure Summary, Intake/Output, MAR, Recent Results .

## 2022-02-07 NOTE — PROGRESS NOTES
[facility] CARDIOLOGY PROGRESS NOTE           2/7/2022 8:18 AM    Admit Date: 2/3/2022         Subjective: Doing okay, continue to have elevated HR.      ROS:  Cardiovascular:  As noted above    Objective:      Vitals:    02/06/22 1728 02/06/22 1935 02/06/22 2350 02/07/22 0400   BP:  (!) 129/59 (!) 141/60 130/65   Pulse: (!) 118 (!) 109 96 100   Resp:  18 18 24   Temp:  100.2 °F (37.9 °C) 97.9 °F (36.6 °C) 98.8 °F (37.1 °C)   SpO2:  90% 93% 91%   Weight:    349 lb 1.6 oz (158.4 kg)   Height:           On telemetry:      Physical Exam:  General: Well Developed, Well Nourished, No Acute Distress, Alert & Oriented x 3, Appropriate mood  Neck: supple, no JVD  Heart: S1S2 with RRR without murmurs or gallops  Lungs: Clear throughout auscultation bilaterally without adventitious sounds  Abd: soft, nontender, nondistended, with good bowel sounds  Ext: no edema bilaterally  Skin: warm and dry      Data Review:   Recent Labs     02/07/22  0313 02/06/22  0557    143   K 3.6 3.3*   BUN 10 9   CREA 0.80 0.70   * 120*   WBC 10.4 10.5   HGB 8.1* 8.6*   HCT 29.9* 30.7*    170       No results for input(s): TNIPOC, TROIQ in the last 72 hours. Assessment/Plan:     Principal Problem:    Anemia (2/3/2022)    Active Problems:    Hypertension ()      Class 3 obesity with alveolar hypoventilation, serious comorbidity, and body mass index (BMI) of 50.0 to 59.9 in adult [facility]) ()      Overview: BMI 45.8- 5/2/12      Acute respiratory failure with hypoxia ([address] (6/25/2020)      Controlled type 2 diabetes mellitus without complication, without long-term current use of insulin ([address] (2/1/6014)      Diastolic CHF, acute on chronic ([address] (11/11/2021)      Atrial fibrillation with RVR ([address] (2/3/2022)    A/P  1) Afib - holding oac with anemia, continue dilt increase to 300 mg daily, dig, try to wean gtt.   2) Gastritis/ulcer - per GI team recs  3) dCHF - continue BID LV lasix daily BMP      Viri Rape Parvin Christianson MD  2/7/2022 8:18 AM

## 2022-02-07 NOTE — PROGRESS NOTES
ABG back and reveals hypoxemia and significant hypercapnia. Suspect element of OHS. Will try on BIPAP tonight as tolerated. CTA remains pending.      Teresa Rush MD

## 2022-02-07 NOTE — PROGRESS NOTES
Asked to start two PIV's. Using US assistance, placed an 18g Endurance catheter in right upper arm cephalic vein on first attempt. Using US assistance, placed a 20g 8cm Endurance catheter in left upper arm cephalic veins on first time. Primary RN informed.   Esther Perez RN, VAT        Right cephalic:              Left cephalic:

## 2022-02-07 NOTE — PROGRESS NOTES
Attempted to see pt for PT treatment today, hold per RN as pt is being worked up for possible PE's. Will f/u at a later date as pt is able.     Erin Logan, PT, DPT

## 2022-02-07 NOTE — ADT AUTH CERT NOTES
Atrial Fibrillation - Care Day 5 (2/7/2022) by Carlie Smith       Review Status Review Entered   Completed 2/7/2022 14:47      Criteria Review      Care Day: 5 Care Date: 2/7/2022 Level of Care: Telemetry    Guideline Day 2    Level Of Care    (X) ICU, intermediate care, or telemetry to discharge    2/7/2022 14:47:35 EST by Carlie Smith      Telemetry unit    Clinical Status    ( ) * Hemodynamic stability    ( ) * Sinus rhythm or acceptable ventricular rate    (X) * No evidence of myocardial ischemia    (X) * Mental status at baseline    2/7/2022 14:47:35 EST by Carlie Smith      A&O    (X) * Tachypnea absent    ( ) * Hypoxemia absent    ( ) * Anticoagulants regimen for next level of care established    ( ) * Antiarrhythmic medication absent or no requirement for further inpatient ECG monitoring    ( ) * Discharge plans and education understood    Activity    (X) * Ambulatory or acceptable for next level of care    2/7/2022 14:47:35 EST by Carlie Smith      with assistance    Routes    (X) * Oral hydration    (X) * Oral medications or regimen acceptable for next level of care    (X) * Oral diet or acceptable for next level of care    2/7/2022 14:47:35 EST by Carlie Smith      easy to chew low Na diet    Interventions    (X) Cardiac monitoring    Medications    (X) Possible rate and rhythm control medications    2/7/2022 14:47:35 EST by Carlie Smith      Cardizem gtt    * Milestone   Additional Notes   Inpatient  2/7/22 - Telemetry unit      WT: 158.4kg      98.4-504-/59, 93% on HFNC at 15l/min      CXR: No acute cardiopulmonary abnormality.       CO2 35, Anion gap 4, Glu 176, H&H 8.1/29.9      Lanoxin 0.25mg PO QD, Cardizem 100mg/100cc gtt titrated, Ferrlecit 125mg IV QD, Lasix 40mg IV BID, Accu QID with SSI x 1 of 2u, Xopenex 0.63mg nebs QID, Protonix 40mg PO Q12h, KCL 40meq PO x 2, Cardizem CD 300mg QD      Orders: HH following, easy to chew low Na diet, RT For Flutter valve Tx Q4h W/A, oximetry spot check prn, PT/OT, I&O, daily weight      Internal Medicine progress  note 2/7/22:      February 7: Patient's oxygen requirement went up today morning from 5 to 15 L.  Patient feels all right.  Slightly sleepy.  AOx3.  Looks better.  Denies any nausea, vomiting or abdominal pain.       General:          Alert, awake, patient with morbid obesity. HEENT:             PERRLA positive, MMM   Neck:               No restricted ROM.      CV:                  Tachycardic rate with irregularly irregular rhythm, no JVC   Lungs:             Decreased air entry bilateral lower lobe.  Coarse breath sound with decreased air entry bilateral lower lobe.  . Nonlabored. Abdomen:        Bowel sounds present.  Soft, nontender, nondistended.     Extremities:     Wrapped legs     Skin:                No rashes and normal coloration on the visible skin.   Warm and dry.     Neuro:             GCS 15, AOx3, moving all 4 extremities, speech normal.   Psych:             AOx3, mood and affect appropriate       Assessment & Plan:       Active Problems:     Atrial fibrillation with RVR (HCC)    2/4:   Heart rate is suboptimally controlled, currently on Cardizem gtt. Wean off Cardizem as able. Started on oral diltiazem  mg p.o. daily   Xarelto is on hold currently in view of concerns for possible underlying GI bleed. - hold Xarelto    -Cardiology on board, appreciate their recommendations. Follow-up with 2D echo.       February 5: Appreciate cardiology recs.  On diltiazem.  Anticoagulation on hold given suspected anemia of GI source. -Potassium already repleted today.  Will monitor.       February 6: Potassium repleted. Maty Tapia follow GI recommendation regarding resumption of anticoagulation.  On Cardizem drip.  Cardiology adjusting medications.  Appreciate recommendation.    February 7: Potassium repleted. Maty Tapia defer resumption of anticoagulation to cardiology and GI.       # Symptomatic anemia   2/4:   Status post 1 unit PRBC, hemoglobin up from 6.5 to 7.2   Patient has severe iron deficiency based on anemia work-up. Ordered 5 doses of IV Ferrlecit 125 mg daily.  Will need oral iron on discharge. GI consulted to evaluate for possible colonoscopy and EGD. Continue to hold Xarelto in the meantime and avoid NSAIDs. Vitamin B12 and folate levels are within normal limits.       February 5: Iron deficiency anemia.  Needs to rule out GI source.  Plan for EGD and colonoscopy tomorrow.  Ferritin 13 consistent with iron deficiency anemia and she is receiving IV iron.       February 6: Hemoglobin stable.  Will monitor.  Receiving IV iron.  Will need p.o. supplementation starting February 9       # Acute on chronic diastolic heart failure   2/4:   Patient seems to be mildly fluid overloaded   Continue IV Lasix 40 mg twice daily   Fluid balance -1.4 L since admission. BP is currently optimally controlled.       February 5: Continue diuresis.         # Acute on chronic hypoxic respiratory failure   2/4:   Patient is running on 3 L via NC, does not use any supplemental oxygen at baseline. Patient does have suspicion of having obstructive sleep apnea, will benefit from outpatient sleep study which can be deferred to her primary care physician. Added Xopenex nebs   We will start on flutter valve 4 times daily   Titrate for SPO2 greater than 92%, wean off as able.       February 5: As per patient she has 2 L oxygen prescription at home and she wears while sleeping.  Currently on 3 L.  Continue diuresis.  Will monitor.       February 6: Currently on 4 to 5 L of oxygen but just came from the procedure.       February 7: Concern for obstructive sleep apnea with other etiology.  Chest x-ray ordered. Leo Leach on diuresis.  Denies any productive cough.  No fever.   -Low threshold for antibiotics.  Does not seem infected at present with       # HTN   Blood pressure is optimally controlled.       February 6:  Will monitor.       #Diabetes mellitus: Blood sugar at goal on sliding scale.           #Patient with morbid obesity   2/4:   Patient likely has underlying obstructive sleep apnea which has never been investigated in the past.   Patient has been advised to follow-up with her PCP to get sleep study referral as outpatient after discharge.       February 5: Will monitor.       Ppx: SCDs for VTE       Pulmonary progress note 2/7/22:      Assessment and Plan:  (Medical Decision Making)   Principal Problem:     Anemia (2/3/2022)   --Hgb 8.1 today.   S/P colonoscopy/EGD on 2/6/22; noted gastric ulcer, non bleeding, iron supplementation recommended, GI signed off.  Continue to monitor s/s bleeding and need for transfusion as this can contribute to hypoxia.       Active Problems:     Hypertension ()   --Chronic, per primary team         Class 3 obesity with alveolar hypoventilation, serious comorbidity, and body mass index (BMI) of 50.0 to 59.9 in [facility]) ()   --Ongoing, chronic         Acute respiratory failure with hypoxia ([address] (6/25/2020)   --COVID negative 2/5/22   --Previously seen in our office for restrictive lung disease due to kyphosis and obesity.  Previous hospitalizations with hypoxia due to fluid overload.  Continue to optimize diuretics and oxygen, wean as tolerated and monitor renal function.          Controlled type 2 diabetes mellitus without complication, without long-term current use of insulin ([address] (9/9/2020)   --Chronic, on AC/HS blood sugar checks with SSI, per primary team         Diastolic CHF, acute on chronic ([address] (11/11/2021)   --On IV Lasix 40 mg BID and potassium replacement         Atrial fibrillation with RVR ([address] (2/3/2022)   --On Cardizem gtt per Cardiology       Cardiology progress note 2/7/22:      Assessment/Plan:       Principal Problem:     Anemia (2/3/2022)       Active Problems:     Hypertension ()         Class 3 obesity with alveolar hypoventilation, serious comorbidity, and body mass index (BMI) of 50.0 to 59.9 in adult [facility]) ()       Overview: BMI 45.8- 5/2/12         Acute respiratory failure with hypoxia ([facility] (6/25/2020)         Controlled type 2 diabetes mellitus without complication, without long-term current use of insulin ([facility] [facility] (3/5/0496)         Diastolic CHF, acute on chronic ([facility] [facility] (11/11/2021)         Atrial fibrillation with RVR (HCC) (2/3/2022)       A/P   1) Afib - holding oac with anemia, continue dilt increase to 300 mg daily, dig, try to wean gtt. 2) Gastritis/ulcer - per GI team recs   3) dCHF - continue BID LV lasix daily BMP               INPT  2/5/22 by Evelyn Ayala       Review Status Review Entered   In Primary 2/7/2022 14:37      Criteria Review   Inpatient  2/5/22  Telemetry unit     Wt: 159.2kg     99.1-845-/60, 93% on 3l/min     COVID-19 not detected     K 3.3, CO2 33, Anion gap 3, Glu 124, H&H 7.6/27.0     Cardizem 100mg/100cc gtt titrated, Lasix 40mg IV BID, Accu QID with SSI x 2 of 2u, Ferrlecit 125mg IV QD, Xopenex 0.63mg nebs QID, KCL 40meq PO x 2, Oxycodone IR 5mg x 2, Cardizem CD 120mg QD     Orders: easy to chew low Na diet, PT/OT eval, I&O, daily weight, oximetry spot check prn, RT for flutter valve tx Q4h W/A     Internal Medicine progress note 2/5/22:     February 5: As per patient she is feeling better than yesterday.  Per patient she is monitoring her heart rate on the monitor and intermittently it is going above 100.  She is aware of endoscopy scheduled for tomorrow. Israel Brandt denies any chest pain, shortness of breath or palpitation.     General:          Alert, awake, patient with morbid obesity. HEENT:             PERRLA positive, MMM  Neck:               No restricted ROM.     CV:                  Tachycardic rate with irregularly irregular rhythm, no JVC  Lungs:             Coarse breath sound with decreased air entry bilateral lower lobe. Non wheezing. Nonlabored.   Abdomen:        Bowel sounds present.  Soft, nontender, nondistended.    Extremities:     Wrapped legs    Skin:                No rashes and normal coloration on the visible skin.   Warm and dry.    Neuro:             GCS 15, AOx3, moving all 4 extremities, speech normal.  Psych:             AOx3, mood and affect appropriate     Assessment & Plan:      Active Problems:    Atrial fibrillation with RVR ([address]   2/4:  Heart rate is suboptimally controlled, currently on Cardizem gtt. Wean off Cardizem as able. Started on oral diltiazem  mg p.o. daily  Xarelto is on hold currently in view of concerns for possible underlying GI bleed. - hold Xarelto   Cardiology on board, appreciate their recommendations. Follow-up with 2D echo.     February 5: Appreciate cardiology recs.  On diltiazem.  Anticoagulation on hold given suspected anemia of GI source. -Potassium already repleted today.  Will monitor.     # Symptomatic anemia  2/4:  Status post 1 unit PRBC, hemoglobin up from 6.5 to 7.2  Patient has severe iron deficiency based on anemia work-up. Ordered 5 doses of IV Ferrlecit 125 mg daily.  Will need oral iron on discharge. GI consulted to evaluate for possible colonoscopy and EGD. Continue to hold Xarelto in the meantime and avoid NSAIDs. Vitamin B12 and folate levels are within normal limits.     February 5: Iron deficiency anemia.  Needs to rule out GI source.  Plan for EGD and colonoscopy tomorrow.  Ferritin 13 consistent with iron deficiency anemia and she is receiving IV iron.           # Acute on chronic diastolic heart failure  2/4:  Patient seems to be mildly fluid overloaded  Continue IV Lasix 40 mg twice daily  Fluid balance -1.4 L since admission. BP is currently optimally controlled.     February 5: Continue diuresis.     # Acute on chronic hypoxic respiratory failure  2/4:  Patient is running on 3 L via NC, does not use any supplemental oxygen at baseline.   Patient does have suspicion of having obstructive sleep apnea, will benefit from outpatient sleep study which can be deferred to her primary care physician. Added Xopenex nebs  We will start on flutter valve 4 times daily  Titrate for SPO2 greater than 92%, wean off as able.     February 5: As per patient she has 2 L oxygen prescription at home and she wears while sleeping.  Currently on 3 L.  Continue diuresis.  Will monitor.     # HTN  Blood pressure is optimally controlled.     #Diabetes mellitus: Blood sugar at goal on sliding scale.        #Patient with morbid obesity  2/4:  Patient likely has underlying obstructive sleep apnea which has never been investigated in the past.  Patient has been advised to follow-up with her PCP to get sleep study referral as outpatient after discharge.     February 5: Will monitor.     Ppx: SCDs for VTE     Cardiology progress note 2/5/22:     Assessment/Plan:         61 y. o. 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  Atrial fibrillation with anemia        Anemia  1. Currently being addressed with blood transfusion with goal hemoglobin greater than 7.  Tentative plan is for GI work-up with endoscopy 2/6/2022 note reviewed from gastroenterology.     Atrial fibrillation  1. Currently with some improvement of rate control with IV and oral diltiazem  2. Currently not on anticoagulation due to acute anemia  3. Rhythm control strategy not an option at this time due to contraindications for acute anticoagulation therapy  4. Options limited due to lower blood pressures  5. Plan to start oral digoxin for additional rate control with plans to wean IV drip     Volume overload  1. Likely due to combined diastolic dysfunction with mixed pulmonary hypertension. 2. IV diuresis with improvement of symptoms  3. Closely monitoring renal function  4. Electrolyte repletion due to hypokalemia noted 2/5/2022  5.  Initiate nighttime oximetry for evaluation of obstructive sleep apnea [currently untreated during this hospitalization]  Hypertension  1. Controlled     GI progress note 2/5/22:     Assessment:      Principal Problem:    Anemia (2/3/2022)     Active Problems:    Hypertension ()       Class 3 obesity with alveolar hypoventilation, serious comorbidity, and body mass index (BMI) of 50.0 to 59.9 in adult [facility]) ()      Overview: BMI 45.8- 5/2/12       Acute respiratory failure with hypoxia ([address] (6/25/2020)       Controlled type 2 diabetes mellitus without complication, without long-term current use of insulin ([address] (7/9/9928)       Diastolic CHF, acute on chronic ([address] (11/11/2021)       Atrial fibrillation with RVR ([address] (2/3/2022)           Patient is B 87 y. o. female with PMH including but not limited to Afib (on Xarelto) chronic right knee pain, HTN, morbid obesity complicated by LUNA/OHS, and chronic diastolic heart failure (on Lasix), who is seen in consultation on 2/4/22 at the request of Marichuy Brenner NP for anemia.        Pt has received 1 unit of PRBC on 2/3/22 and 1 unit of PRBC pending for today. Hgb is from 6.5 (baseline appears to be 11 to 13 range in 2021), HCT 26.3, MCV 79, . PT 15.6, INR 1.2. BUN 19, Creatinine 0.70. NT pro-BNP 1,328.  Iron 16, TIBC 456, Transferrin saturation 4%, Ferritin 13.      Plan:      EGD and colonoscopy tomorrow at 8 am.  Discussed risks including but not limited to bleeding, perforation, acute cardiopulmonary event, sedation risks, IV complications and pt states understanding and agrees to proceed.            Atrial Fibrillation - Care Day 4 (2/6/2022) by Aravind Clayton       Review Status Review Entered   Completed 2/7/2022 12:47      Criteria Review      Care Day: 4 Care Date: 2/6/2022 Level of Care: Telemetry    Guideline Day 2    Level Of Care    (X) ICU, intermediate care, or telemetry to discharge    2/7/2022 12:47:21 EST by Aravind Clayton      Telemetry unit    Clinical Status    ( ) * Hemodynamic stability    ( ) * Sinus rhythm or acceptable ventricular rate (X) * No evidence of myocardial ischemia    (X) * Mental status at baseline    2/7/2022 12:47:21 EST by Jo Ellis      A&O    (X) * Tachypnea absent    ( ) * Hypoxemia absent    ( ) * Anticoagulants regimen for next level of care established    ( ) * Antiarrhythmic medication absent or no requirement for further inpatient ECG monitoring    ( ) * Discharge plans and education understood    Activity    (X) * Ambulatory or acceptable for next level of care    2/7/2022 12:47:21 EST by Jo Ellis      with assistance    Routes    (X) * Oral hydration    (X) * Oral medications or regimen acceptable for next level of care    (X) * Oral diet or acceptable for next level of care    2/7/2022 12:47:21 EST by Jo Ellis      regular low Na diet    Interventions    (X) Cardiac monitoring    Medications    (X) Possible rate and rhythm control medications    2/7/2022 12:47:21 EST by Jo Ellis      Cardizem gtt    * Milestone   Additional Notes   Inpatient  2/6/22 - Telemetry unit     Wt: 158kg   100.3-604-/66, 90% on 5l/min   COVID-19 not detected 2/5/22   K 3.3, CO2 35, Anion gap 5, Glu 120, H&H 8.6/30.7   Lanoxin 0.25mg PO QD, Cardizem 100mg/100cc gtt titrated, Ferrlecit 125mg IV QD, Lasix 40mg IV BID, Accu QID with SSI x 2 of 2u, Xopenex 0.63mg nebs QID, Oxycodone IR 5mg x 1, Protonix 40mg IV QD, KCL 40meq PO x 3, Cardizem CD 250mg QD   Orders: HH following, easy to chew low Na diet, RT For Flutter valve Tx Q4h W/A, oximetry spot check prn, PT/OT, I&O, daily weight   Internal Medicine progress note 2/6/22:   February 6: Status post EGD and colonoscopy.  As per patient she is feeling slightly better than yesterday.  Denies any chest pain, palpitation, nausea or vomiting.  Denies any abdominal pain.       General:          Alert, awake, patient with morbid obesity. HEENT:             PERRLA positive, MMM   Neck:               No restricted ROM.       CV:                  Tachycardic rate with irregularly irregular rhythm, no JVC   Lungs:             Coarse breath sound with decreased air entry bilateral lower lobe. Non wheezing. Nonlabored. Abdomen:        Bowel sounds present.  Soft, nontender, nondistended.     Extremities:     Wrapped legs     Skin:                No rashes and normal coloration on the visible skin.   Warm and dry.     Neuro:             GCS 15, AOx3, moving all 4 extremities, speech normal.   Psych:             AOx3, mood and affect appropriate       Assessment & Plan:       Active Problems:     Atrial fibrillation with RVR (HCC)    2/4:   Heart rate is suboptimally controlled, currently on Cardizem gtt. Wean off Cardizem as able. Started on oral diltiazem  mg p.o. daily   Xarelto is on hold currently in view of concerns for possible underlying GI bleed. - hold Xarelto    -Cardiology on board, appreciate their recommendations. Follow-up with 2D echo.       February 5: Appreciate cardiology recs.  On diltiazem.  Anticoagulation on hold given suspected anemia of GI source. -Potassium already repleted today.  Will monitor.       February 6: Potassium repleted. Jeimy Guaman follow GI recommendation regarding resumption of anticoagulation.  On Cardizem drip.  Cardiology adjusting medications.  Appreciate recommendation.       # Symptomatic anemia   2/4:   Status post 1 unit PRBC, hemoglobin up from 6.5 to 7.2   Patient has severe iron deficiency based on anemia work-up. Ordered 5 doses of IV Ferrlecit 125 mg daily.  Will need oral iron on discharge. GI consulted to evaluate for possible colonoscopy and EGD. Continue to hold Xarelto in the meantime and avoid NSAIDs. Vitamin B12 and folate levels are within normal limits.       February 5: Iron deficiency anemia.  Needs to rule out GI source.  Plan for EGD and colonoscopy tomorrow.  Ferritin 13 consistent with iron deficiency anemia and she is receiving IV iron.       February 6: Hemoglobin stable.  Will monitor.  Receiving IV iron.  Will need p.o. supplementation starting February 9       # Acute on chronic diastolic heart failure   2/4:   Patient seems to be mildly fluid overloaded   Continue IV Lasix 40 mg twice daily   Fluid balance -1.4 L since admission. BP is currently optimally controlled.       February 5: Continue diuresis.       # Acute on chronic hypoxic respiratory failure   2/4:   Patient is running on 3 L via NC, does not use any supplemental oxygen at baseline. Patient does have suspicion of having obstructive sleep apnea, will benefit from outpatient sleep study which can be deferred to her primary care physician. Added Xopenex nebs   We will start on flutter valve 4 times daily   Titrate for SPO2 greater than 92%, wean off as able.       February 5: As per patient she has 2 L oxygen prescription at home and she wears while sleeping.  Currently on 3 L.  Continue diuresis.  Will monitor.       February 6: Currently on 4 to 5 L of oxygen but just came from the procedure.       # HTN   Blood pressure is optimally controlled.       February 6: Will monitor.       #Diabetes mellitus: Blood sugar at goal on sliding scale.           #Patient with morbid obesity   2/4:   Patient likely has underlying obstructive sleep apnea which has never been investigated in the past.   Patient has been advised to follow-up with her PCP to get sleep study referral as outpatient after discharge.       February 5: Will monitor.       Ppx: SCDs for VTE       S/P Colonoscopy 2/6/22 with    Findings:   Sigmoid diverticulosis.  Small internal hemorrhoids. Nadia Rubins source for RICHA.      Estimated Blood Loss: 0 cc.  Prep fair.          Complications: None; patient tolerated the procedure well.       Impression:  Sigmoid diverticulosis.  Small internal hemorrhoids.       Recommendations:   High fiber diet.  Consider outpatient SB capsule study.  Repeat colonoscopy in 10 years.

## 2022-02-07 NOTE — PROGRESS NOTES
Hospitalist Progress Note   Admit Date:  2/3/2022 11:07 AM   Name:  Leonid Ahumada   Age:  61 y.o. Sex:  female  :  1955   MRN:  [id number]   Room:  [facility]/    Presenting Complaint: Abnormal Lab Results and Peripheral Edema    Reason(s) for Admission: Atrial fibrillation with RVR ([facility] [address] [I48.91]     Interval Hx/ Subjective:   Leonid Ahumada is a 61 y.o. female with medical history of Afib (on Xarelto), HTN, morbid obesity complicated by LUNA/OHS, chronic diastolic heart failure admitted on 2/3 with A.fib RVR, acute hypoxic respiratory failure (room air sats 89%), severe iron deficiency anemia. Pt presented with worsening shortness of breath, fatigue, and worsening swelling in her lower extremities. ED Course: Hgb of 6.5 (last Hgb was 13 in 2021). MCV of 79 with RDW of 19. pBNP of 1300. CXR shows vascular congestion. EKG shows Afib with RVR with HRs in the 100s. Occult blood negative. Type/screen for pRBC transfusion. Given Lasix 40 mg IV once. Week of : EGD and colonoscopy on  showing gastritis [status post biopsy], small superficial prepyloric ulcer, diverticulosis and hemorrhoids. : Status post EGD and colonoscopy. As per patient she is feeling slightly better than yesterday. Denies any chest pain, palpitation, nausea or vomiting. Denies any abdominal pain. : Patient's oxygen requirement went up today morning from 5 to 15 L. Patient feels all right. Slightly sleepy. AOx3. Looks better. Denies any nausea, vomiting or abdominal pain. Rest of system negative except mentioned above. Assessment & Plan: Active Problems:    Atrial fibrillation with RVR ([address]   :  Heart rate is suboptimally controlled, currently on Cardizem gtt. Wean off Cardizem as able. Started on oral diltiazem  mg p.o. daily  Xarelto is on hold currently in view of concerns for possible underlying GI bleed.   - hold Xarelto   Cardiology on board, appreciate their recommendations. Follow-up with 2D echo. February 5: [address] cardiology recs. On diltiazem. Anticoagulation on hold given suspected anemia of GI source. -Potassium already repleted today. Will monitor. February 6: Potassium repleted. Will follow GI recommendation regarding resumption of anticoagulation. On Cardizem drip. Cardiology adjusting medications. Appreciate recommendation. February 7: Potassium repleted. Will defer resumption of anticoagulation to cardiology and GI. # Symptomatic anemia  2/4:  Status post 1 unit PRBC, hemoglobin up from 6.5 to 7.2  Patient has severe iron deficiency based on anemia work-up. Ordered 5 doses of IV Ferrlecit 125 mg daily. Will need oral iron on discharge. GI consulted to evaluate for possible colonoscopy and EGD. Continue to hold Xarelto in the meantime and avoid NSAIDs. Vitamin B12 and folate levels are within normal limits. February 5: Iron deficiency anemia. Needs to rule out GI source. Plan for EGD and colonoscopy tomorrow. Ferritin 13 consistent with iron deficiency anemia and she is receiving IV iron. February 6: Hemoglobin stable. Will monitor. Receiving IV iron. Will need p.o. supplementation starting February 9    # Acute on chronic diastolic heart failure  2/4:  Patient seems to be mildly fluid overloaded  Continue IV Lasix 40 mg twice daily  Fluid balance -1.4 L since admission. BP is currently optimally controlled. February 5: Continue diuresis. # Acute on chronic hypoxic respiratory failure  2/4:  Patient is running on 3 L via NC, does not use any supplemental oxygen at baseline. Patient does have suspicion of having obstructive sleep apnea, will benefit from outpatient sleep study which can be deferred to her primary care physician. Added Xopenex nebs  We will start on flutter valve 4 times daily  Titrate for SPO2 greater than 92%, wean off as able.     February 5: As per patient she has 2 L oxygen prescription at home and she wears while sleeping. Currently on 3 L. Continue diuresis. Will monitor. February 6: Currently on 4 to 5 L of oxygen but just came from the procedure. February 7: Concern for obstructive sleep apnea with other etiology. Chest x-ray ordered. Already on diuresis. Denies any productive cough. No fever.  -Low threshold for antibiotics. Does not seem infected at present with    # HTN  Blood pressure is optimally controlled. February 6: Will monitor. #Diabetes mellitus: Blood sugar at goal on sliding scale. #Patient with morbid obesity  2/4:  Patient likely has underlying obstructive sleep apnea which has never been investigated in the past.  Patient has been advised to follow-up with her PCP to get sleep study referral as outpatient after discharge. February 5: Will monitor. Ppx: SCDs for VTE    Code Status: FULL CODE    Patient is AOx3. Again, wants her son Anuradha Julian to be power of . Again, I offered but per patient she has already updated the family. I encouraged her to ask the nurse to page me if she or her family has any questions. She verbalized understanding that her condition may deteriorate in spite of treatment.           Hospital Problems as of 2/7/2022 Date Reviewed: 2/6/2022          Codes Class Noted - Resolved POA    Atrial fibrillation with RVR (HCC) ICD-10-CM: I48.91  ICD-9-CM: 427.31  2/3/2022 - Present Unknown        * (Principal) Anemia ICD-10-CM: D64.9  ICD-9-CM: 285.9  2/3/2022 - Present Yes        Diastolic CHF, acute on chronic [facility]) ICD-10-CM: I50.33  ICD-9-CM: 428.33, 428.0  11/11/2021 - Present Unknown        Controlled type 2 diabetes mellitus without complication, without long-term current use of insulin (HCC) (Chronic) ICD-10-CM: E11.9  ICD-9-CM: 250.00  9/9/2020 - Present Yes        Acute respiratory failure with hypoxia ([facility] [address] ICD-10-CM: J96.01  ICD-9-CM: 518.81  6/25/2020 - Present Unknown        Hypertension (Chronic) ICD-10-CM: I10  ICD-9-CM: 401.9  Unknown - Present Yes        Class 3 obesity with alveolar hypoventilation, serious comorbidity, and body mass index (BMI) of 50.0 to 59.9 in adult [facility]) (Chronic) ICD-10-CM: H32.9, X76.14  ICD-9-CM: 278.03, V85.43  Unknown - Present Yes    Overview Signed 2/8/2019 11:59 AM by Hui Jimenes MD     BMI 45.8- 5/2/12                       Objective:     Patient Vitals for the past 24 hrs:   Temp Pulse Resp BP SpO2   02/07/22 1102     94 %   02/07/22 0917 98 °F (36.7 °C) 99 20 (!) 125/59 91 %   02/07/22 0915     91 %   02/07/22 0901  (!) 118  (!) 116/53    02/07/22 0400 98.8 °F (37.1 °C) 100 24 130/65 91 %   02/06/22 2350 97.9 °F (36.6 °C) 96 18 (!) 141/60 93 %   02/06/22 1935 100.2 °F (37.9 °C) (!) 109 18 (!) 129/59 90 %   02/06/22 1728  (!) 118      02/06/22 1717  (!) 126  (!) 166/66    02/06/22 1633  (!) 121 16 126/71    02/06/22 1603     90 %     Oxygen Therapy  O2 Sat (%): 94 % (02/07/22 1102)  Pulse via Oximetry: 95 beats per minute (02/07/22 1102)  O2 Device: Hi flow nasal cannula (02/07/22 1102)  O2 Flow Rate (L/min): 15 l/min (02/07/22 1102)    Estimated body mass index is 54.68 kg/m² as calculated from the following:    Height as of this encounter: 5' 7\" (1.702 m). Weight as of this encounter: 158.4 kg (349 lb 1.6 oz). Intake/Output Summary (Last 24 hours) at 2/7/2022 1242  Last data filed at 2/7/2022 1217  Gross per 24 hour   Intake 1080 ml   Output 2975 ml   Net -1895 ml         Physical Exam:    Blood pressure (!) 125/59, pulse 99, temperature 98 °F (36.7 °C), resp. rate 20, height 5' 7\" (1.702 m), weight 158.4 kg (349 lb 1.6 oz), SpO2 94 %. General:    Alert, awake, patient with morbid obesity. HEENT:             PERRLA positive, MMM  Neck:  No restricted ROM. CV:   Tachycardic rate with irregularly irregular rhythm, no JVC  Lungs:   Decreased air entry bilateral lower lobe.   Coarse breath sound with decreased air entry bilateral lower lobe. Ladena [place]. Abdomen: Bowel sounds present. Soft, nontender, nondistended. Extremities: Wrapped legs    Skin:     No rashes and normal coloration on the visible skin. Warm and dry. Neuro:  GCS 15, AOx3, moving all 4 extremities, speech normal.  Psych:  AOx3, mood and affect appropriate    I have reviewed ordered lab tests and independently visualized imaging below:    Last 24hr Labs:  Procedures done this admission:  Procedure(s):  ESOPHAGOGASTRODUODENOSCOPY (EGD)  COLONOSCOPY/ BMI 56 ROOM 302  ESOPHAGOGASTRODUODENAL (EGD) BIOPSY    All Micro Results     Procedure Component Value Units Date/Time    COVID-19 RAPID TEST [[id number]] Collected: 02/05/22 0547    Order Status: Completed Specimen: Nasopharyngeal Updated: 02/05/22 0650     Specimen source NASAL        COVID-19 rapid test Not detected        Comment:      The specimen is NEGATIVE for SARS-CoV-2, the novel coronavirus associated with COVID-19. A negative result does not rule out COVID-19. This test has been authorized by the FDA under an Emergency Use Authorization (EUA) for use by authorized laboratories.         Fact sheet for Healthcare Providers: ConventionUpdate.co.nz  Fact sheet for Patients: ConventionUpdate.co.nz       Methodology: Isothermal Nucleic Acid Amplification               SARS-CoV-2 Lab Results  \"Novel Coronavirus\" Test: No results found for: COV2NT   \"Emergent Disease\" Test: No results found for: EDPR  \"SARS-COV-2\" Test: No results found for: XGCOVT  \"Precision Labs\" Test:   Lab Results   Component Value Date/Time    RSLT RESULTS SCANNED IN [facility] 02/04/2022 03:25 PM     Rapid Test:   Lab Results   Component Value Date/Time    COVR Not detected 02/05/2022 05:47 AM            Labs: Results:       BMP, Mg, Phos Recent Labs     02/07/22  0313 02/06/22  0557 02/05/22  0700    143 140   K 3.6 3.3* 3.3*   CL 99 103 104   CO2 35* 35* 33*   AGAP 4* 5* 3* BUN 10 9 14   CREA 0.80 0.70 0.70   CA 8.5 8.7 8.8   * 120* 124*   PHOS  --  3.5  --       CBC Recent Labs     02/07/22  0313 02/06/22  0557 02/05/22  0700   WBC 10.4 10.5 9.1   RBC 3.59* 3.78* 3.36*   HGB 8.1* 8.6* 7.6*   HCT 29.9* 30.7* 27.0*    170 227   GRANS 74 74  --    LYMPH 12* 12*  --    EOS 1 2  --    MONOS 12 10  --    BASOS 1 1  --    IG 1 0  --    ANEU 7.7 7.8  --    ABL 1.2 1.3  --    JUDI 0.1 0.2  --    ABM 1.2 1.1  --    ABB 0.1 0.1  --    AIG 0.1 0.0  --       LFT No results for input(s): ALT, TBIL, AP, TP, ALB, GLOB, AGRAT in the last 72 hours.     No lab exists for component: SGOT, GPT   Cardiac Testing Lab Results   Component Value Date/Time    B-type Natriuretic Peptide 121.0 (H) 01/08/2020 09:22 AM      Coagulation Tests Lab Results   Component Value Date/Time    Prothrombin time 15.6 (H) 02/03/2022 11:05 AM    INR 1.2 02/03/2022 11:05 AM      A1c Lab Results   Component Value Date/Time    Hemoglobin A1c 6.9 (H) 09/23/2021 05:54 AM    Hemoglobin A1c 6.7 (H) 12/02/2020 10:55 AM    Hemoglobin A1c 7.5 (H) 09/11/2020 05:17 AM      Lipid Panel Lab Results   Component Value Date/Time    Cholesterol, total 180 12/02/2020 10:55 AM    HDL Cholesterol 63 12/02/2020 10:55 AM    LDL, calculated 102 (H) 12/02/2020 10:55 AM    LDL, calculated 95 04/01/2019 10:55 AM    VLDL, calculated 15 12/02/2020 10:55 AM    VLDL, calculated 16 04/01/2019 10:55 AM    Triglyceride 80 12/02/2020 10:55 AM      Thyroid Panel Lab Results   Component Value Date/Time    TSH 2.670 09/24/2021 06:01 AM    TSH 2.740 12/02/2020 10:55 AM    TSH 2.630 10/29/2019 08:22 AM        Most Recent UA Lab Results   Component Value Date/Time    WBC 3-5 09/10/2020 12:18 AM    RBC 5-10 09/10/2020 12:18 AM    Epithelial cells 0-3 09/10/2020 12:18 AM    Bacteria TRACE 09/10/2020 12:18 AM    Casts 0 09/10/2020 12:18 AM    Crystals, urine OCCASIONAL 09/10/2020 12:18 AM    Mucus 0 09/10/2020 12:18 AM            All Micro Results Procedure Component Value Units Date/Time    COVID-19 RAPID TEST [[id number]] Collected: 02/05/22 0547    Order Status: Completed Specimen: Nasopharyngeal Updated: 02/05/22 0650     Specimen source NASAL        COVID-19 rapid test Not detected        Comment:      The specimen is NEGATIVE for SARS-CoV-2, the novel coronavirus associated with COVID-19. A negative result does not rule out COVID-19. This test has been authorized by the FDA under an Emergency Use Authorization (EUA) for use by authorized laboratories. Fact sheet for Healthcare Providers: ConventionUpdate.co.nz  Fact sheet for Patients: ConventionUpdate.co.nz       Methodology: Isothermal Nucleic Acid Amplification               Other Studies:  No results found. Signed:  Ketty Adkins MD    Part of this note may have been written by using a voice dictation software. The note has been proof read but may still contain some grammatical/other typographical errors.

## 2022-02-08 PROBLEM — J96.22 ACUTE ON CHRONIC RESPIRATORY FAILURE WITH HYPOXIA AND HYPERCAPNIA (HCC): Status: ACTIVE | Noted: 2020-06-25

## 2022-02-08 PROBLEM — J96.21 ACUTE ON CHRONIC RESPIRATORY FAILURE WITH HYPOXIA AND HYPERCAPNIA (HCC): Status: ACTIVE | Noted: 2020-06-25

## 2022-02-08 LAB
ANION GAP SERPL CALC-SCNC: 2 MMOL/L (ref 7–16)
BASOPHILS # BLD: 0.1 K/UL (ref 0–0.2)
BASOPHILS NFR BLD: 1 % (ref 0–2)
BUN SERPL-MCNC: 10 MG/DL (ref 8–23)
CALCIUM SERPL-MCNC: 8.9 MG/DL (ref 8.3–10.4)
CHLORIDE SERPL-SCNC: 97 MMOL/L (ref 98–107)
CO2 SERPL-SCNC: 40 MMOL/L (ref 21–32)
CREAT SERPL-MCNC: 0.6 MG/DL (ref 0.6–1)
DIFFERENTIAL METHOD BLD: ABNORMAL
EOSINOPHIL # BLD: 0.4 K/UL (ref 0–0.8)
EOSINOPHIL NFR BLD: 4 % (ref 0.5–7.8)
ERYTHROCYTE [DISTWIDTH] IN BLOOD BY AUTOMATED COUNT: 22.3 % (ref 11.9–14.6)
GLUCOSE BLD STRIP.AUTO-MCNC: 115 MG/DL (ref 65–100)
GLUCOSE BLD STRIP.AUTO-MCNC: 142 MG/DL (ref 65–100)
GLUCOSE BLD STRIP.AUTO-MCNC: 154 MG/DL (ref 65–100)
GLUCOSE BLD STRIP.AUTO-MCNC: 190 MG/DL (ref 65–100)
GLUCOSE SERPL-MCNC: 125 MG/DL (ref 65–100)
HCT VFR BLD AUTO: 29.8 % (ref 35.8–46.3)
HGB BLD-MCNC: 8.1 G/DL (ref 11.7–15.4)
IMM GRANULOCYTES # BLD AUTO: 0 K/UL (ref 0–0.5)
IMM GRANULOCYTES NFR BLD AUTO: 0 % (ref 0–5)
LYMPHOCYTES # BLD: 1 K/UL (ref 0.5–4.6)
LYMPHOCYTES NFR BLD: 10 % (ref 13–44)
MAGNESIUM SERPL-MCNC: 1.4 MG/DL (ref 1.6–2.3)
MAGNESIUM SERPL-MCNC: 1.4 MG/DL (ref 1.6–2.3)
MCH RBC QN AUTO: 22.7 PG (ref 26.1–32.9)
MCHC RBC AUTO-ENTMCNC: 27.2 G/DL (ref 31.4–35)
MCV RBC AUTO: 83.5 FL (ref 79.6–97.8)
MONOCYTES # BLD: 1 K/UL (ref 0.1–1.3)
MONOCYTES NFR BLD: 9 % (ref 4–12)
NEUTS SEG # BLD: 7.8 K/UL (ref 1.7–8.2)
NEUTS SEG NFR BLD: 76 % (ref 43–78)
NRBC # BLD: 0 K/UL (ref 0–0.2)
PLATELET # BLD AUTO: 222 K/UL (ref 150–450)
PMV BLD AUTO: 10.1 FL (ref 9.4–12.3)
POTASSIUM SERPL-SCNC: 3.6 MMOL/L (ref 3.5–5.1)
PROCALCITONIN SERPL-MCNC: 0.17 NG/ML (ref 0–0.49)
RBC # BLD AUTO: 3.57 M/UL (ref 4.05–5.2)
SERVICE CMNT-IMP: ABNORMAL
SODIUM SERPL-SCNC: 139 MMOL/L (ref 136–145)
WBC # BLD AUTO: 10.2 K/UL (ref 4.3–11.1)

## 2022-02-08 PROCEDURE — 99232 SBSQ HOSP IP/OBS MODERATE 35: CPT | Performed by: INTERNAL MEDICINE

## 2022-02-08 PROCEDURE — 97112 NEUROMUSCULAR REEDUCATION: CPT

## 2022-02-08 PROCEDURE — 74011250636 HC RX REV CODE- 250/636: Performed by: INTERNAL MEDICINE

## 2022-02-08 PROCEDURE — 85025 COMPLETE CBC W/AUTO DIFF WBC: CPT

## 2022-02-08 PROCEDURE — 74011250637 HC RX REV CODE- 250/637: Performed by: INTERNAL MEDICINE

## 2022-02-08 PROCEDURE — 84145 PROCALCITONIN (PCT): CPT

## 2022-02-08 PROCEDURE — 74011250637 HC RX REV CODE- 250/637: Performed by: HOSPITALIST

## 2022-02-08 PROCEDURE — 94640 AIRWAY INHALATION TREATMENT: CPT

## 2022-02-08 PROCEDURE — 83735 ASSAY OF MAGNESIUM: CPT

## 2022-02-08 PROCEDURE — 74011636637 HC RX REV CODE- 636/637: Performed by: INTERNAL MEDICINE

## 2022-02-08 PROCEDURE — 74011250636 HC RX REV CODE- 250/636: Performed by: EMERGENCY MEDICINE

## 2022-02-08 PROCEDURE — 97535 SELF CARE MNGMENT TRAINING: CPT

## 2022-02-08 PROCEDURE — 65660000000 HC RM CCU STEPDOWN

## 2022-02-08 PROCEDURE — 82962 GLUCOSE BLOOD TEST: CPT

## 2022-02-08 PROCEDURE — 77010033711 HC HIGH FLOW OXYGEN

## 2022-02-08 PROCEDURE — 74011000250 HC RX REV CODE- 250: Performed by: EMERGENCY MEDICINE

## 2022-02-08 PROCEDURE — 36415 COLL VENOUS BLD VENIPUNCTURE: CPT

## 2022-02-08 PROCEDURE — 80048 BASIC METABOLIC PNL TOTAL CA: CPT

## 2022-02-08 PROCEDURE — 97530 THERAPEUTIC ACTIVITIES: CPT

## 2022-02-08 PROCEDURE — 94760 N-INVAS EAR/PLS OXIMETRY 1: CPT

## 2022-02-08 PROCEDURE — 74011000250 HC RX REV CODE- 250: Performed by: HOSPITALIST

## 2022-02-08 PROCEDURE — 74011000258 HC RX REV CODE- 258: Performed by: EMERGENCY MEDICINE

## 2022-02-08 RX ORDER — HEPARIN SODIUM 5000 [USP'U]/ML
5000 INJECTION, SOLUTION INTRAVENOUS; SUBCUTANEOUS EVERY 8 HOURS
Status: DISCONTINUED | OUTPATIENT
Start: 2022-02-08 | End: 2022-02-09

## 2022-02-08 RX ORDER — METOPROLOL SUCCINATE 25 MG/1
25 TABLET, EXTENDED RELEASE ORAL DAILY
Status: DISCONTINUED | OUTPATIENT
Start: 2022-02-08 | End: 2022-02-09

## 2022-02-08 RX ORDER — POTASSIUM CHLORIDE 20 MEQ/1
40 TABLET, EXTENDED RELEASE ORAL 2 TIMES DAILY
Status: COMPLETED | OUTPATIENT
Start: 2022-02-08 | End: 2022-02-09

## 2022-02-08 RX ORDER — OXYBUTYNIN CHLORIDE 5 MG/1
5 TABLET ORAL 3 TIMES DAILY
Status: DISCONTINUED | OUTPATIENT
Start: 2022-02-08 | End: 2022-02-09 | Stop reason: SDUPTHER

## 2022-02-08 RX ADMIN — HEPARIN SODIUM 5000 UNITS: 5000 INJECTION INTRAVENOUS; SUBCUTANEOUS at 14:15

## 2022-02-08 RX ADMIN — Medication 2 UNITS: at 12:09

## 2022-02-08 RX ADMIN — POTASSIUM CHLORIDE 40 MEQ: 20 TABLET, EXTENDED RELEASE ORAL at 08:41

## 2022-02-08 RX ADMIN — SODIUM CHLORIDE, PRESERVATIVE FREE 10 ML: 5 INJECTION INTRAVENOUS at 21:20

## 2022-02-08 RX ADMIN — SODIUM CHLORIDE, PRESERVATIVE FREE 10 ML: 5 INJECTION INTRAVENOUS at 05:47

## 2022-02-08 RX ADMIN — OXYBUTYNIN CHLORIDE 5 MG: 5 TABLET ORAL at 23:22

## 2022-02-08 RX ADMIN — DILTIAZEM HYDROCHLORIDE 300 MG: 180 CAPSULE, COATED, EXTENDED RELEASE ORAL at 08:41

## 2022-02-08 RX ADMIN — LEVALBUTEROL HYDROCHLORIDE 0.63 MG: 0.63 SOLUTION RESPIRATORY (INHALATION) at 19:56

## 2022-02-08 RX ADMIN — POTASSIUM CHLORIDE 40 MEQ: 20 TABLET, EXTENDED RELEASE ORAL at 17:05

## 2022-02-08 RX ADMIN — SODIUM CHLORIDE 5 MG/HR: 900 INJECTION, SOLUTION INTRAVENOUS at 21:53

## 2022-02-08 RX ADMIN — FUROSEMIDE 40 MG: 10 INJECTION, SOLUTION INTRAMUSCULAR; INTRAVENOUS at 08:42

## 2022-02-08 RX ADMIN — DIGOXIN 0.25 MG: 250 TABLET ORAL at 08:41

## 2022-02-08 RX ADMIN — METOPROLOL SUCCINATE 25 MG: 25 TABLET, EXTENDED RELEASE ORAL at 08:41

## 2022-02-08 RX ADMIN — LEVALBUTEROL HYDROCHLORIDE 0.63 MG: 0.63 SOLUTION RESPIRATORY (INHALATION) at 12:26

## 2022-02-08 RX ADMIN — HEPARIN SODIUM 5000 UNITS: 5000 INJECTION INTRAVENOUS; SUBCUTANEOUS at 21:17

## 2022-02-08 RX ADMIN — FUROSEMIDE 40 MG: 10 INJECTION, SOLUTION INTRAMUSCULAR; INTRAVENOUS at 17:04

## 2022-02-08 RX ADMIN — PANTOPRAZOLE SODIUM 40 MG: 40 TABLET, DELAYED RELEASE ORAL at 21:17

## 2022-02-08 RX ADMIN — LEVALBUTEROL HYDROCHLORIDE 0.63 MG: 0.63 SOLUTION RESPIRATORY (INHALATION) at 09:23

## 2022-02-08 RX ADMIN — LEVALBUTEROL HYDROCHLORIDE 0.63 MG: 0.63 SOLUTION RESPIRATORY (INHALATION) at 15:34

## 2022-02-08 RX ADMIN — SODIUM CHLORIDE 7.5 MG/HR: 900 INJECTION, SOLUTION INTRAVENOUS at 03:20

## 2022-02-08 RX ADMIN — PANTOPRAZOLE SODIUM 40 MG: 40 TABLET, DELAYED RELEASE ORAL at 08:41

## 2022-02-08 RX ADMIN — Medication 2 UNITS: at 21:17

## 2022-02-08 RX ADMIN — SODIUM CHLORIDE, PRESERVATIVE FREE 10 ML: 5 INJECTION INTRAVENOUS at 14:15

## 2022-02-08 NOTE — PROGRESS NOTES
ACUTE OT GOALS:  (Developed with and agreed upon by patient and/or caregiver.)  1. Manoj Calabrese will be modified independent with functional mobility for ADL in room within 4 - 7 visits. Dayanara Ge will be modified independent with total body bathing and dressing within 4 - 7 visits. 3. Manoj Calabrese will state and demonstrate at least 5 energy conservation techniques during ADL/therapeutic activities within 4 - 7 visits. 3. Manoj Calabrese will voice a plan for appropriate home modifications for home safety and fall prevention within 7 visits. 5. Manoj Calabrese will participate at least 30 minutes of ADL with 3 or less rest breaks within 7 visits. Dayanara W Chava eG will complete a tub or shower transfer with modified independence within 7 visits. OCCUPATIONAL THERAPY: Daily Note OT Treatment Day # 2    Manoj Calabrese is a 61 y.o. female   PRIMARY DIAGNOSIS: Anemia  Atrial fibrillation with RVR ([facility]) [I48.91]  Procedure(s) (LRB):  ESOPHAGOGASTRODUODENOSCOPY (EGD) (N/A)  COLONOSCOPY/ BMI 56 ROOM 302 (N/A)  ESOPHAGOGASTRODUODENAL (EGD) BIOPSY (N/A)  2 Days Post-Op  Payor: Kin Cockayne / Plan: Linda Brought / Product Type: Commerical /   ASSESSMENT:     REHAB RECOMMENDATIONS: CURRENT LEVEL OF FUNCTION:  (Most Recently Demonstrated)   Recommendation to date pending progress:  [address] [place] [address]  Equipment:    To Be Determined Bathing:   Not tested  Dressing:   Not tested  Feeding/Grooming:   Standby Assistance  Toileting:   Not tested  Functional Mobility:   Minimal Assistance     ASSESSMENT:  Ms. Callie Hernadez continues to present with deficits in overall strength, activity tolerance, ADL performance, and functional mobility. Currently resting on 15L 02 with Sp02 at 95% at rest. Patient is very slow moving and wants to move on her own and in her own manner. Overall min A required for functional transfers and CGA x SPC for functional mobility.  Completed self-grooming tasks including brushing teeth and washing face while seated EOB with set-up. Max A for applying lotion to back. OOB to chair today. Sp02 does drop into high 80s with activity but recovers well with seated rest break. Overall, progressing towards therapy goals. Will continue OT efforts as indicated. SUBJECTIVE:   Ms. Lauren Looney states, \"I want to be able to do it on my own. \"    SOCIAL HISTORY/LIVING ENVIRONMENT: See initial eval.   Support Systems: Child(adelita),Other Family Member(s),[employer],Friend/Neighbor    OBJECTIVE:     PAIN: VITAL SIGNS: LINES/DRAINS:   Pre Treatment: Pain Screen  Pain Scale 1: Numeric (0 - 10)  Pain Intensity 1: 0  Post Treatment: 0   Fonseca Catheter and IV  O2 Device: Hi flow nasal cannula     ACTIVITIES OF DAILY LIVING: I Mod I S SBA CGA Min Mod Max Total NT Comments   BASIC ADLs:              Bathing/ Showering [] [] [] [] [] [] [] [] [] [x]    Toileting [] [] [] [] [] [] [] [] [] [x]    Dressing [] [] [] [] [] [] [] [] [] [x]    Feeding [] [] [] [] [] [] [] [] [] [x]    Grooming [] [] [x] [] [] [] [] [x] [] [] Set-up/SBA for brushing teeth and washing face  Max A for applying lotion   Personal Device Care [] [] [] [] [] [] [] [] [] [x]    Functional Mobility [] [] [] [] [] [x] [] [] [] [] X SPC   I=Independent, Mod I=Modified Independent, S=Supervision, SBA=Standby Assistance, CGA=Contact Guard Assistance,   Min=Minimal Assistance, Mod=Moderate Assistance, Max=Maximal Assistance, Total=Total Assistance, NT=Not Tested    MOBILITY: I Mod I S SBA CGA Min Mod Max Total  NT x2 Comments:   Supine to sit [] [] [] [] [] [x] [] [] [] [] []    Sit to supine [] [] [] [] [] [] [] [] [] [x] []    Sit to stand [] [] [] [] [] [x] [] [] [] [] []    Bed to chair [] [] [] [] [] [x] [] [] [] [] [] X SPC   I=Independent, Mod I=Modified Independent, S=Supervision, SBA=Standby Assistance, CGA=Contact Guard Assistance,   Min=Minimal Assistance, Mod=Moderate Assistance, Max=Maximal Assistance, Total=Total Assistance, NT=Not Tested    BALANCE: Good Fair+ Fair Fair- Poor NT Comments   Sitting Static [x] [] [] [] [] []    Sitting Dynamic [x] [x] [] [] [] []              Standing Static [] [x] [x] [] [] []    Standing Dynamic [] [] [x] [] [] [] X SPC     PLAN:   FREQUENCY/DURATION:   for duration of hospital stay or until stated goals are met, whichever comes first.    TREATMENT:   TREATMENT:   ($$ Self Care/Home Management: 8-22 mins$$ Neuromuscular Re-Education: 8-22 mins   )  Self Care (13 Minutes): Self care including Grooming and Energy Conservation Training to increase independence and decrease level of assistance required. Neuromuscular Re-education (10 Minutes): Neuromuscular Re-education included Balance Training, Coordination training, Postural training, Sitting balance training and Standing balance training to improve Balance, Coordination and Postural Control.     TREATMENT GRID:  N/A    AFTER TREATMENT POSITION/PRECAUTIONS:  Chair, Needs within reach and RN notified    INTERDISCIPLINARY COLLABORATION:  RN/PCT, PT/PTA and OT/BROWN    TOTAL TREATMENT DURATION:  OT Patient Time In/Time Out  Time In: 6436  [facility]

## 2022-02-08 NOTE — PROGRESS NOTES
Pt asked PCT to notify me about her having discomfort with the Bipap. As I arrived to room, pt was in tears and asking me to please take off mask. Put High flow NC back on pt.    Respiratory notified of pt request.

## 2022-02-08 NOTE — PROGRESS NOTES
Hospitalist Progress Note   Admit Date:  2/3/2022 11:07 AM   Name:  Peri Hudson   Age:  61 y.o. Sex:  female  :  1955   MRN:  [id number]   Room:      Presenting Complaint: Abnormal Lab Results and Peripheral Edema    Reason(s) for Admission: Atrial fibrillation with RVR ([address] [I48.91]     Interval Hx/ Subjective:   Peri Hudson is a 61 y.o. female with medical history of Afib (on Xarelto), HTN, morbid obesity complicated by LUNA/OHS, chronic diastolic heart failure admitted on 2/3 with A.fib RVR, acute hypoxic respiratory failure (room air sats 89%), severe iron deficiency anemia. Pt presented with worsening shortness of breath, fatigue, and worsening swelling in her lower extremities. ED Course: Hgb of 6.5 (last Hgb was 13 in 2021). MCV of 79 with RDW of 19. pBNP of 1300. CXR shows vascular congestion. EKG shows Afib with RVR with HRs in the 100s. Occult blood negative. Type/screen for pRBC transfusion. Given Lasix 40 mg IV once. Week of : EGD and colonoscopy on  showing gastritis [status post biopsy], small superficial prepyloric ulcer, diverticulosis and hemorrhoids. Patient oxygen requirement went from 5 to 15 L on  and pulmonary on board. Acute hypoxic respiratory failure seems to be secondary to volume overload and chronic comorbidities including LUNA. : As per patient she is feeling same as of yesterday. Her heart rate is slightly slow but patient, she is still requiring 15 L of oxygen. She did not like BiPAP and not sure if she will wear it or not but she is willing to try it tonight. Denies any chest pain, palpitation, nausea or vomiting. Rest of system negative except mentioned above. Assessment & Plan: Active Problems:    Atrial fibrillation with RVR (HCC)   On p.o. Cardizem. Transitioning Cardizem drip. Anticoagulation on hold given significant anemia and GI finding.   Resumption of anticoagulation based on cardiology and GI recs. # Hypokalemia: Potassium repleted. # Symptomatic anemia  Iron deficiency anemia. Receiving IV iron and will need p.o. iron after finishing IV iron. EGD showed gastric ulcer and gastritis. # Acute on chronic diastolic heart failure  Continue diuresis. Still requiring significant oxygen. # Acute on chronic hypoxic respiratory failure  Multifactorial including volume overload, LUNA, restriction secondary to obesity. Continue diuresis. Unlikely infection. Low threshold to start antibiotics. # HTN  Blood pressure is optimally controlled. Will monitor. #Diabetes mellitus: Blood sugar at goal on sliding scale. #Patient with morbid obesity  Adding to complexity. Will need to work on lifestyle modification as outpatient. Needs sleep study as outpatient. We will continue to address. Ppx: Heparin for DVT prophylaxis. Will resume anticoagulation based on GI and cardiology recs    Code Status: FULL CODE    Patient is AOx3. Again, wants her son Garcia Stokes to be power of . Again, I offered but per patient she has already updated the family. I encouraged her to ask the nurse to page me if she or her family has any questions. She verbalized understanding that her condition may deteriorate in spite of treatment.           Hospital Problems as of 2/8/2022 Date Reviewed: 2/6/2022          Codes Class Noted - Resolved POA    Atrial fibrillation with RVR ([facility] [address] ICD-10-CM: I48.91  ICD-9-CM: 427.31  2/3/2022 - Present Unknown        * (Principal) Anemia ICD-10-CM: D64.9  ICD-9-CM: 285.9  2/3/2022 - Present Yes        Diastolic CHF, acute on chronic [facility]) ICD-10-CM: I50.33  ICD-9-CM: 428.33, 428.0  11/11/2021 - Present Unknown        Controlled type 2 diabetes mellitus without complication, without long-term current use of insulin (HCC) (Chronic) ICD-10-CM: E11.9  ICD-9-CM: 250.00  9/9/2020 - Present Yes        Acute on chronic respiratory failure with hypoxia and hypercapnia [facility]) ICD-10-CM: J96.21, J96.22  ICD-9-CM: 518.84, 786.09, 799.02  6/25/2020 - Present Unknown        Hypertension (Chronic) ICD-10-CM: I10  ICD-9-CM: 401.9  Unknown - Present Yes        Class 3 obesity with alveolar hypoventilation, serious comorbidity, and body mass index (BMI) of 50.0 to 59.9 in adult [facility]) (Chronic) ICD-10-CM: L60.6, Y93.95  ICD-9-CM: 278.03, V85.43  Unknown - Present Yes    Overview Signed 2/8/2019 11:59 AM by Ariel Castro MD     BMI 45.8- 5/2/12                       Objective:     Patient Vitals for the past 24 hrs:   Temp Pulse Resp BP SpO2   02/08/22 1306     96 %   02/08/22 1226     96 %   02/08/22 1142 98.7 °F (37.1 °C) 96 18 125/66 91 %   02/08/22 0923     94 %   02/08/22 0841  90  122/77    02/08/22 0712 98.6 °F (37 °C) 80 20 118/67 93 %   02/08/22 0454 98.4 °F (36.9 °C) 79 18 124/60 91 %   02/07/22 2341 97.7 °F (36.5 °C) 78 18 123/71 100 %   02/07/22 2240     96 %   02/07/22 2021 98.3 °F (36.8 °C) 83 18 117/66 95 %   02/07/22 1815 98.8 °F (37.1 °C) (!) 102  130/82    02/07/22 1712    103/75      Oxygen Therapy  O2 Sat (%): 96 % (02/08/22 1306)  Pulse via Oximetry: 82 beats per minute (02/08/22 1226)  O2 Device: Hi flow nasal cannula (02/08/22 1306)  Skin Assessment: Clean, dry, & intact (02/08/22 1210)  Skin Protection for O2 Device: N/A (02/08/22 1210)  O2 Flow Rate (L/min): 15 l/min (02/08/22 1306)  FIO2 (%): 60 % (02/07/22 2240)    Estimated body mass index is 54.39 kg/m² as calculated from the following:    Height as of this encounter: 5' 7\" (1.702 m). Weight as of this encounter: 157.5 kg (347 lb 4.8 oz). Intake/Output Summary (Last 24 hours) at 2/8/2022 1442  Last data filed at 2/8/2022 1333  Gross per 24 hour   Intake 1140 ml   Output 3700 ml   Net -2560 ml         Physical Exam:    Blood pressure 125/66, pulse 96, temperature 98.7 °F (37.1 °C), resp.  rate 18, height 5' 7\" (1.702 m), weight 157.5 kg (347 lb 4.8 oz), SpO2 96 %.  General:    Alert, awake, patient with morbid obesity. HEENT:             PERRLA positive, MMM  Neck:  No restricted ROM. CV:   Tachycardic rate with irregularly irregular rhythm,     Lungs:   Decreased air entry bilateral lower lobe. Coarse breath sound with decreased air entry bilateral lower lobe. Abdomen: Bowel sounds present. Soft, nontender, nondistended. Extremities: Wrapped legs    Skin:     No rashes and normal coloration on the visible skin. Warm and dry. Neuro:  GCS 15, AOx3, moving all 4 extremities, speech normal.  Psych:  AOx3, mood and affect appropriate    I have reviewed ordered lab tests and independently visualized imaging below:    Last 24hr Labs:  Procedures done this admission:  Procedure(s):  ESOPHAGOGASTRODUODENOSCOPY (EGD)  COLONOSCOPY/ BMI 56 ROOM 302  ESOPHAGOGASTRODUODENAL (EGD) BIOPSY    All Micro Results     Procedure Component Value Units Date/Time    COVID-19 RAPID TEST [[id number]] Collected: 02/05/22 0547    Order Status: Completed Specimen: Nasopharyngeal Updated: 02/05/22 0650     Specimen source NASAL        COVID-19 rapid test Not detected        Comment:      The specimen is NEGATIVE for SARS-CoV-2, the novel coronavirus associated with COVID-19. A negative result does not rule out COVID-19. This test has been authorized by the FDA under an Emergency Use Authorization (EUA) for use by authorized laboratories.         Fact sheet for Healthcare Providers: ConventionUpdate.co.nz  Fact sheet for Patients: ConventionUpdate.co.nz       Methodology: Isothermal Nucleic Acid Amplification               SARS-CoV-2 Lab Results  \"Novel Coronavirus\" Test: No results found for: COV2NT   \"Emergent Disease\" Test: No results found for: EDPR  \"SARS-COV-2\" Test: No results found for: XGCOVT  \"Precision Labs\" Test:   Lab Results   Component Value Date/Time    RSLT RESULTS SCANNED IN [facility] 02/04/2022 03:25 PM     Rapid Test: Lab Results   Component Value Date/Time    COVR Not detected 02/05/2022 05:47 AM            Labs: Results:       BMP, Mg, Phos Recent Labs     02/08/22  0605 02/07/22  0313 02/06/22  0557    138 143   K 3.6 3.6 3.3*   CL 97* 99 103   CO2 40* 35* 35*   AGAP 2* 4* 5*   BUN 10 10 9   CREA 0.60 0.80 0.70   CA 8.9 8.5 8.7   * 176* 120*   MG  --  1.4* 1.4*   PHOS  --   --  3.5      CBC Recent Labs     02/08/22  0605 02/07/22 0313 02/06/22  0557   WBC 10.2 10.4 10.5   RBC 3.57* 3.59* 3.78*   HGB 8.1* 8.1* 8.6*   HCT 29.8* 29.9* 30.7*    249 170   GRANS 76 74 74   LYMPH 10* 12* 12*   EOS 4 1 2   MONOS 9 12 10   BASOS 1 1 1   IG 0 1 0   ANEU 7.8 7.7 7.8   ABL 1.0 1.2 1.3   JUDI 0.4 0.1 0.2   ABM 1.0 1.2 1.1   ABB 0.1 0.1 0.1   AIG 0.0 0.1 0.0      LFT No results for input(s): ALT, TBIL, AP, TP, ALB, GLOB, AGRAT in the last 72 hours.     No lab exists for component: SGOT, GPT   Cardiac Testing Lab Results   Component Value Date/Time    B-type Natriuretic Peptide 121.0 (H) 01/08/2020 09:22 AM      Coagulation Tests Lab Results   Component Value Date/Time    Prothrombin time 15.6 (H) 02/03/2022 11:05 AM    INR 1.2 02/03/2022 11:05 AM      A1c Lab Results   Component Value Date/Time    Hemoglobin A1c 6.9 (H) 09/23/2021 05:54 AM    Hemoglobin A1c 6.7 (H) 12/02/2020 10:55 AM    Hemoglobin A1c 7.5 (H) 09/11/2020 05:17 AM      Lipid Panel Lab Results   Component Value Date/Time    Cholesterol, total 180 12/02/2020 10:55 AM    HDL Cholesterol 63 12/02/2020 10:55 AM    LDL, calculated 102 (H) 12/02/2020 10:55 AM    LDL, calculated 95 04/01/2019 10:55 AM    VLDL, calculated 15 12/02/2020 10:55 AM    VLDL, calculated 16 04/01/2019 10:55 AM    Triglyceride 80 12/02/2020 10:55 AM      Thyroid Panel Lab Results   Component Value Date/Time    TSH 1.240 02/07/2022 10:01 PM    TSH 2.670 09/24/2021 06:01 AM        Most Recent UA Lab Results   Component Value Date/Time    WBC 3-5 09/10/2020 12:18 AM    RBC 5-10 09/10/2020 12:18 AM    Epithelial cells 0-3 09/10/2020 12:18 AM    Bacteria TRACE 09/10/2020 12:18 AM    Casts 0 09/10/2020 12:18 AM    Crystals, urine OCCASIONAL 09/10/2020 12:18 AM    Mucus 0 09/10/2020 12:18 AM            All Micro Results     Procedure Component Value Units Date/Time    COVID-19 RAPID TEST [[id number]] Collected: 02/05/22 0532    Order Status: Completed Specimen: Nasopharyngeal Updated: 02/05/22 0650     Specimen source NASAL        COVID-19 rapid test Not detected        Comment:      The specimen is NEGATIVE for SARS-CoV-2, the novel coronavirus associated with COVID-19. A negative result does not rule out COVID-19. This test has been authorized by the FDA under an Emergency Use Authorization (EUA) for use by authorized laboratories. Fact sheet for Healthcare Providers: ConventionFriend Trusteddate.co.nz  Fact sheet for Patients: "[employer].co.nz       Methodology: Isothermal Nucleic Acid Amplification               Other Studies:  CT CHEST PULMONARY EMBOLISM    Result Date: 2/7/2022  CT OF THE CHEST WITH CONTRAST, PULMONARY EMBOLUS PROTOCOL. CLINICAL INDICATION: Shortness of breath, acute respiratory failure, severe hypoxemia PROCEDURE: Serial thin section axial images are obtained from the thoracic inlet through the upper abdomen following the administration of intravenous contrast per a dedicated, institutional pulmonary embolus protocol. Coronal MIP reformatted images are generated. Radiation dose reduction techniques were used for this study. Our CT scanners use one or all of the following: Automated exposure control, adjusted of the mA and/or kV according to patient size, iterative reconstruction COMPARISON: Chest CT dated 9/21/2021 FINDINGS: The aorta is unremarkable. There is adequate opacification of the central pulmonary arterial tree. No central intraluminal filling defect noted to indicate acute pulmonary embolus.  No mediastinal or axillary adenopathy. Trace bilateral pleural effusions are present. There is dependent atelectasis. Irregular pleural-based densities are noted at the right lung apex may simply represent scarring. A focal early pneumonia could also be considered. The heart is enlarged. There is no pericardial effusion. Limited evaluation of the upper abdomen is unremarkable. No aggressive osseous is identified. 1. No acute pulmonary emboli. 2. Dependent atelectasis with trace bilateral pleural effusions and cardiomegaly. 3. Irregular pleural-based densities at the right lung apex is nonspecific. An atypical pneumonitis is a consideration. Signed:  Bri Cobos MD    Part of this note may have been written by using a voice dictation software. The note has been proof read but may still contain some grammatical/other typographical errors.

## 2022-02-08 NOTE — ROUTINE PROCESS
Bedside and Verbal report given to self by Arianne Mejia RN. Report included SBAR, Kardex, ED Summary, Procedure Summary, Intake and Output and Cardiac Rhythm.

## 2022-02-08 NOTE — ROUTINE PROCESS
Verbal bedside report given to Theodore Cruz, oncoming RN. Patient's situation, background, assessment and recommendations provided. Opportunity for questions provided. Oncoming RN assumed care of patient. Cardizem IV drip verified at bedside with oncoming RN.

## 2022-02-08 NOTE — PROGRESS NOTES
ACUTE PHYSICAL THERAPY GOALS:  (Developed with and agreed upon by patient and/or caregiver.)  (1.) Svetlana Atkins  will move from supine to sit and sit to supine  with INDEPENDENCE within 7 treatment day(s). (2.) Svetlana Atkins will transfer from bed to chair and chair to bed with MODIFIED INDEPENDENCE using the least restrictive device within 7 treatment day(s). (3.) Svetlana Atkins will ambulate with MODIFIED INDEPENDENCE for 150 feet with the least restrictive device within 7 treatment day(s). (4.) Svetlana Atkins will perform standing static and dynamic balance activities x 20 minutes with SUPERVISION to improve safety within 7 treatment day(s). (5.) Svetlana Atkins will perform bilateral lower extremity exercises x 20 min for HEP with INDEPENDENCE to improve strength, endurance, and functional mobility within 7 treatment day(s). PHYSICAL THERAPY: Daily Note and PM Treatment Day # 2    Svetlana Atkins is a 61 y.o. female   PRIMARY DIAGNOSIS: Anemia  Atrial fibrillation with RVR ([facility]) [I48.91]  Procedure(s) (LRB):  ESOPHAGOGASTRODUODENOSCOPY (EGD) (N/A)  COLONOSCOPY/ BMI 56 ROOM 302 (N/A)  ESOPHAGOGASTRODUODENAL (EGD) BIOPSY (N/A)  2 Days Post-Op    ASSESSMENT:     REHAB RECOMMENDATIONS: CURRENT LEVEL OF FUNCTION:  (Most Recently Demonstrated)   Recommendation to date pending progress:  [address] [place] [address] Therapy  Equipment:    None Bed Mobility:   Minimal Assistance  Sit to Stand:   Contact Guard Assistance  Transfers:   Contact Guard Assistance  Gait/Mobility:   Contact Guard Assistance     ASSESSMENT:  Ms. Fidel Steve is supine on arrival, on 15 L HF, stats 96% at rest, results negative for PE. Pt likes to perform activities in her own way and time. Pt required additional time for bed mobility, transfers. Pt with very slow lakeisha with ambulation in room using SPC.  Pt holding bed rail strongly for support, declines use of RW for increased safety and support with ambulation. Pt unsteady at times, increased trunk sway, > 8-10 min to complete 20'. Pt O2 89% after ambulation, recovery to 91% quickly once sitting in chair. Pt making slow progress toward goals. PT to cont to follow for acute care needs.       SUBJECTIVE:   Ms. Romeo Ferguson states, \"I can do it\"    SOCIAL HISTORY/ LIVING ENVIRONMENT: PLOF: Reyna with cane, lives alone but son checks on her, 0 PHOEBE, no falls   Support Systems: Child(adelita),Other Family Member(s),[employer] Community,Friend/Neighbor  OBJECTIVE:     PAIN: VITAL SIGNS: LINES/DRAINS:   Pre Treatment: Pain Screen  Pain Scale 1: Numeric (0 - 10)  Pain Intensity 1: 0  Post Treatment: 0 Vital Signs  O2 Sat (%): 96 %  O2 Device: Hi flow nasal cannula  O2 Flow Rate (L/min): 15 l/min IV  O2 Device: Hi flow nasal cannula     MOBILITY: I Mod I S SBA CGA Min Mod Max Total  NT x2 Comments:   Bed Mobility    Rolling [] [] [] [] [] [] [] [] [] [x] [] pivots   Supine to Sit [] [] [] [] [] [x] [] [] [] [] [] Additional time   Scooting [] [] [] [x] [] [] [] [] [] [] []    Sit to Supine [] [] [] [] [] [] [] [] [] [x] [] chair   Transfers    Sit to Stand [] [] [] [x] [x] [] [] [] [] [] []    Bed to Chair [] [] [] [] [x] [] [] [] [] [] []    Stand to Sit [] [] [] [x] [] [] [] [] [] [] []    I=Independent, Mod I=Modified Independent, S=Supervision, SBA=Standby Assistance, CGA=Contact Guard Assistance,   Min=Minimal Assistance, Mod=Moderate Assistance, Max=Maximal Assistance, Total=Total Assistance, NT=Not Tested    BALANCE: Good Fair+ Fair Fair- Poor NT Comments   Sitting Static [x] [] [] [] [] []    Sitting Dynamic [x] [] [] [] [] []              Standing Static [] [x] [] [] [] []    Standing Dynamic [] [] [x] [x] [] []      GAIT: I Mod I S SBA CGA Min Mod Max Total  NT x2 Comments:   Level of Assistance [] [] [] [x] [x] [] [] [] [] [] []    Distance 20    DME SPC and and holding furniture    Gait Quality Trunk sway, slow lakeisha    Weightbearing  Status N/A     I=Independent, Mod I=Modified Independent, S=Supervision, SBA=Standby Assistance, CGA=Contact Guard Assistance,   Min=Minimal Assistance, Mod=Moderate Assistance, Max=Maximal Assistance, Total=Total Assistance, NT=Not Tested    PLAN:   FREQUENCY/DURATION: PT Plan of Care: 3 times/week for duration of hospital stay or until stated goals are met, whichever comes first.  TREATMENT:     TREATMENT:   ($$ Therapeutic Activity: 23-37 mins    )  Therapeutic Activity (23 Minutes): Therapeutic activity included Supine to Sit, Scooting, Transfer Training, Ambulation on level ground, Sitting balance , Standing balance and safety with mobility to improve functional Mobility, Strength, Activity tolerance and balance.    Co-Treatment PT/OT necessary due to patient's decreased overall endurance/tolerance levels, as well as need for high level skilled assistance to complete functional transfers/mobility and functional tasks    TREATMENT GRID:  N/A    AFTER TREATMENT POSITION/PRECAUTIONS:  Chair, Needs within reach and RN notified    INTERDISCIPLINARY COLLABORATION:  RN/PCT, PT/PTA and OT/BROWN    TOTAL TREATMENT DURATION:  PT Patient Time In/Time Out  Time In: 1306  Time Out: [address], PT

## 2022-02-08 NOTE — PROGRESS NOTES
No discharge needs identified. Remains on 15L and Cardizem gtt. Will continue to monitor. Care Management Interventions  PCP Verified by CM: Yes  Mode of Transport at Discharge:  Other (see comment)  Transition of Care Consult (CM Consult): Discharge [place]: Yes  MyChart Signup: No  Discharge Durable Medical Equipment: No  Physical Therapy Consult: Yes  Occupational Therapy Consult: Yes  Speech Therapy Consult: No  Support Systems: Child(adelita),Other Family Member(s),[employer] Community,Friend/Neighbor  Confirm Follow Up Transport: Family  The Plan for Transition of Care is Related to the Following Treatment Goals : Return home and back to her baseline  Discharge Location  Patient Expects to be Discharged to[de-identified] Home with home health

## 2022-02-08 NOTE — PROGRESS NOTES
Progress NOTE           2022    Latrice Gerber                        Date of Admission:  2/3/2022    The patient's chart is reviewed and the patient is discussed with the staff. 61 y.o.  female, PMH RLD, HTN, chronic right knee pain, seen and evaluated at the request of Shannon Rodrigez for acute respiratory failure with hypoxia. The patient was last seen 2021 in our office for follow up for restrictive lung disease due to kyphosis and obesity. The patient previously was admitted here back in 2021 with pulmonary edema and volume overload due to chronic diastolic heart failure, also noted on previous CT scans. At that time the patient was noted to have hypoxemia during that admission as well. The patient continues to use albuterol as needed at home as well as O2 at 2L NC nightly. The patient states she continued to be more short of breath with a dry cough at home but is no longer coughing here. She endorses tachycardia with minimal movement as well. She denies any fevers, chills, nausea, vomiting, or recent sick contacts. She is concerned that she is now up to 15L NC O2. She denies any chest pain. CXR today reveals volume overload. Subjective:     Resting quietly in bed on 15L HFNC  Still on Cardizem gtt  States she wore BIPAP for \"a few hours last night, but I did like that machine, it makes too much noise\"    Review of Systems  A comprehensive review of systems was negative except for that written in the HPI. Prior to Admission Medications   Prescriptions Last Dose Informant Patient Reported? Taking? OXYGEN-AIR DELIVERY SYSTEMS 1/3/2022 at Unknown time  Yes Yes   Si L by Nasal route nightly. albuterol (PROVENTIL HFA, VENTOLIN HFA, PROAIR HFA) 90 mcg/actuation inhaler 2022 at Unknown time  No Yes   Sig: Take 1 Puff by inhalation every four (4) hours as needed for Wheezing.    albuterol (PROVENTIL HFA, VENTOLIN HFA, PROAIR HFA) 90 mcg/actuation inhaler 2/2/2022 at Unknown time  No Yes   Sig: Take 2 Puffs by inhalation every four (4) hours as needed for Wheezing. furosemide (LASIX) 40 mg tablet 2/2/2022 at Unknown time  No Yes   Sig: Take 1 Tab by mouth daily. potassium chloride (K-DUR, KLOR-CON) 20 mEq tablet 2/2/2022 at Unknown time  No Yes   Sig: Take 1 Tab by mouth daily. predniSONE (DELTASONE) 20 mg tablet Unknown at Unknown time  No No   Sig: Take 20 mg by mouth daily (with breakfast). Facility-Administered Medications: None     Past Medical History:   Diagnosis Date    Chronic pain     pain R knee    Hypertension     Morbid obesity ([facility] [address]     BMI 45.8- 5/2/12    Positive PPD     had vaccination as a child - BCG     Past Surgical History:   Procedure Laterality Date    COLONOSCOPY N/A 2/6/2022    COLONOSCOPY/ BMI 56 ROOM 302 performed by Farooq Gong MD at [facility] ENDOSCOPY    HX GYN      C-sec x 1 with GA    HX KNEE ARTHROSCOPY      bilateral knees     Social History     Socioeconomic History    Marital status:      Spouse name: Not on file    Number of children: Not on file    Years of education: Not on file    Highest education level: Not on file   Occupational History    Occupation: [employer] in past.     Tobacco Use    Smoking status: Former Smoker     Packs/day: 0.50     Years: 25.00     Pack years: 12.50     Types: Cigarettes     Quit date: 2/7/2007     Years since quitting: 15.0    Smokeless tobacco: Never Used   Substance and Sexual Activity    Alcohol use: No    Drug use: No    Sexual activity: Not on file   Other Topics Concern    Not on file   Social History Narrative    Not on file     Social Determinants of Health     Financial Resource Strain:     Difficulty of Paying Living Expenses: Not on file   Food Insecurity:     Worried About [address] in the Last Year: Not on file    [address] in the Last Year: Not on file   Transportation Needs:     Lack of Transportation (Medical):  Not on file    Lack of Transportation (Non-Medical):  Not on file   Physical Activity:     Days of Exercise per Week: Not on file    Minutes of Exercise per Session: Not on file   Stress:     Feeling of Stress : Not on file   Social Connections:     Frequency of Communication with Friends and Family: Not on file    Frequency of Social Gatherings with Friends and Family: Not on file    Attends Confucianist Services: Not on file    Active Member of Clubs or Organizations: Not on file    Attends Club or Organization Meetings: Not on file    Marital Status: Not on file   Intimate Partner Violence:     Fear of Current or Ex-Partner: Not on file    Emotionally Abused: Not on file    Physically Abused: Not on file    Sexually Abused: Not on file   Housing Stability:     Unable to Pay for Housing in the Last Year: Not on file    Number of Places Lived in the Last Year: Not on file    Unstable Housing in the Last Year: Not on file     Family History   Problem Relation Age of Onset    Other Mother         kidney failure    Cancer Sister         cervical cancer     Allergies   Allergen Reactions    Lortab [Hydrocodone-Acetaminophen] Nausea Only and Other (comments)     \"It makes me feel hung over\"     Current Facility-Administered Medications   Medication Dose Route Frequency    heparin (porcine) injection 5,000 Units  5,000 Units SubCUTAneous Q8H    metoprolol succinate (TOPROL-XL) XL tablet 25 mg  25 mg Oral DAILY    potassium chloride (K-DUR, KLOR-CON M20) SR tablet 40 mEq  40 mEq Oral DAILY    dilTIAZem ER (CARDIZEM CD) capsule 300 mg  300 mg Oral DAILY    pantoprazole (PROTONIX) tablet 40 mg  40 mg Oral Q12H    digoxin (LANOXIN) tablet 0.25 mg  0.25 mg Oral DAILY    ferric gluconate (FERRLECIT) 125 mg in 0.9% sodium chloride 100 mL IVPB  125 mg IntraVENous DAILY    levalbuterol (XOPENEX) nebulizer soln 0.63 mg/3 mL  0.63 mg Nebulization QID RT    acetaminophen (TYLENOL) tablet 650 mg  650 mg Oral Q4H PRN  0.9% sodium chloride infusion 250 mL  250 mL IntraVENous PRN    sodium chloride (NS) flush 5-10 mL  5-10 mL IntraVENous Q8H    sodium chloride (NS) flush 5-10 mL  5-10 mL IntraVENous PRN    dilTIAZem (CARDIZEM) 100 mg in 0.9% sodium chloride (MBP/ADV) 100 mL infusion  0-15 mg/hr IntraVENous TITRATE    furosemide (LASIX) injection 40 mg  40 mg IntraVENous BID    insulin lispro (HUMALOG) injection   SubCUTAneous AC&HS    oxyCODONE IR (ROXICODONE) tablet 5 mg  5 mg Oral Q4H PRN     Objective:   Blood pressure 122/77, pulse 90, temperature 98.6 °F (37 °C), resp. rate 20, height 5' 7\" (1.702 m), weight 347 lb 4.8 oz (157.5 kg), SpO2 94 %. Intake/Output Summary (Last 24 hours) at 2/8/2022 1138  Last data filed at 2/8/2022 0325  Gross per 24 hour   Intake 960 ml   Output 2800 ml   Net -1840 ml     PHYSICAL EXAM   Constitutional:  the patient is well developed and in no acute distress  EENMT:  Sclera clear, pupils equal, oral mucosa moist  Respiratory: On 15L HFNC, diminished  Cardiovascular:  RRR without M,G,R  Gastrointestinal: soft and non-tender; with positive bowel sounds. Musculoskeletal: warm without cyanosis. There is +1 lower extremity edema. Skin:  no jaundice or rashes, no wounds   Neurologic: no gross neuro deficits     Psychiatric:  alert and oriented x 3    CXR: 2/7/22      2/3/22      CT Chest: No recent    Recent Labs     02/08/22  0605 02/07/22  0313 02/06/22  0557   WBC 10.2 10.4 10.5   HGB 8.1* 8.1* 8.6*   HCT 29.8* 29.9* 30.7*    249 170   PCT 0.17  --   --     138 143   K 3.6 3.6 3.3*   CL 97* 99 103   CO2 40* 35* 35*   * 176* 120*   BUN 10 10 9   CREA 0.60 0.80 0.70   MG  --  1.4* 1.4*   CA 8.9 8.5 8.7   PHOS  --   --  3.5     ECHO: Results from Hospital Encounter encounter on 02/03/22    ECHO ADULT COMPLETE    Interpretation Summary    Left Ventricle: Left ventricle size is normal. Normal wall thickness. Normal wall motion.  Normal left ventricular systolic function with a visually estimated EF of 60 - 65%.   Right Ventricle: Right ventricle is mildly dilated. Normal systolic function.   Tricuspid Valve: Mild to moderate transvalvular regurgitation. RVSP is mildly elevated at around 45 mmHg.   Left Atrium: Left atrium is severely dilated. Results     Procedure Component Value Units Date/Time    COVID-19 RAPID TEST [[id number]] Collected: 02/05/22 0547    Order Status: Completed Specimen: Nasopharyngeal Updated: 02/05/22 0650     Specimen source NASAL        COVID-19 rapid test Not detected        Comment:      The specimen is NEGATIVE for SARS-CoV-2, the novel coronavirus associated with COVID-19. A negative result does not rule out COVID-19. This test has been authorized by the FDA under an Emergency Use Authorization (EUA) for use by authorized laboratories. Fact sheet for Healthcare Providers: ConventionUpdate.co.nz  Fact sheet for Patients: ConventionUpdate.co.nz       Methodology: Isothermal Nucleic Acid Amplification             Inpat Anti-Infectives (From admission, onward)    None        Assessment and Plan:  (Medical Decision Making)   60 yo F with morbid obesity admitted with increased dyspnea. Pt has been on 2L with sleep at home and is now requiring 15L oxygen while awake at rest. Pt has been more sedentary of late due to LE pain. Pt found to be in afib here and is now on cardizem drip. She denies pleuritic CP or hemoptysis but has hx of nocturnal dyspnea, EDS with frequent napping, and weight gain. Labs with high bicarb concerning for hypoventilation. Will check ABG, CTA of chest to r/o pulmonary embolism, and a TSH since hypothyroidism is possible. She likely has LUNA given upper airway anatomy and will place on empiric BIPAP with sleep if hypercapneic. Principal Problem:    Anemia (2/3/2022)  --Hgb 8.1 today.   S/P colonoscopy/EGD on 2/6/22; noted gastric ulcer, non bleeding, iron supplementation recommended, GI signed off. Continue to monitor s/s bleeding and need for transfusion as this can contribute to hypoxia. Active Problems:    Hypertension ()  --Chronic, per primary team      Class 3 obesity with alveolar hypoventilation, serious comorbidity, and body mass index (BMI) of 50.0 to 59.9 in adult [facility]) ()  --Ongoing, chronic      Acute respiratory failure with hypoxia ([address] (6/25/2020)  --COVID negative 2/5/22  --Previously seen in our office for restrictive lung disease due to kyphosis and obesity. Previous hospitalizations with hypoxia due to fluid overload. Continue to optimize diuretics and oxygen, wean as tolerated and monitor renal function. --Procalcitonin 0.17 today      Controlled type 2 diabetes mellitus without complication, without long-term current use of insulin ([address] (9/9/2020)  --Chronic, on AC/HS blood sugar checks with SSI, per primary team      Diastolic CHF, acute on chronic ([address] (11/11/2021)  --On IV Lasix 40 mg BID and potassium replacement      Atrial fibrillation with RVR ([address] (2/3/2022)  --On Cardizem gtt per Cardiology       LUNA/OHS  --Only wore BIPAP for a few hours overnight, would be most beneficial if worn several hours overnight. High suspicion for OHS     Prior    More than 50% of the time documented was spent in face-to-face contact with the patient and in the care of the patient on the floor/unit where the patient is located. Charlotte Aiken NP       I have spoken with and examined the patient. I agree with the above assessment and plan as documented. Gen: NAD, resting in bed on 15l NC. Lungs:  Distant in anterior lung fields. Heart:  RRR with no Murmur/Rubs/Gallops  Abd: obese, soft, nt, nd  Ext: 2-3+ B LE edema    Patient volume overloaded. Would continue to diurese. CT without PE. In a fib and cardiology managing. Covid test negative. Will repeat abg now as patient only worse bipap about 2 hours last night.  If worsening may need to try and wear some during the day as well as at night.      Radha Child MD

## 2022-02-09 PROBLEM — D64.9 ANEMIA: Chronic | Status: ACTIVE | Noted: 2022-02-03

## 2022-02-09 LAB
ANION GAP SERPL CALC-SCNC: 4 MMOL/L (ref 7–16)
BASOPHILS # BLD: 0.1 K/UL (ref 0–0.2)
BASOPHILS NFR BLD: 1 % (ref 0–2)
BUN SERPL-MCNC: 10 MG/DL (ref 8–23)
CALCIUM SERPL-MCNC: 8.9 MG/DL (ref 8.3–10.4)
CHLORIDE SERPL-SCNC: 94 MMOL/L (ref 98–107)
CO2 SERPL-SCNC: 40 MMOL/L (ref 21–32)
CREAT SERPL-MCNC: 0.6 MG/DL (ref 0.6–1)
DIFFERENTIAL METHOD BLD: ABNORMAL
EOSINOPHIL # BLD: 0.4 K/UL (ref 0–0.8)
EOSINOPHIL NFR BLD: 4 % (ref 0.5–7.8)
ERYTHROCYTE [DISTWIDTH] IN BLOOD BY AUTOMATED COUNT: 23.6 % (ref 11.9–14.6)
GLUCOSE BLD STRIP.AUTO-MCNC: 134 MG/DL (ref 65–100)
GLUCOSE BLD STRIP.AUTO-MCNC: 135 MG/DL (ref 65–100)
GLUCOSE BLD STRIP.AUTO-MCNC: 138 MG/DL (ref 65–100)
GLUCOSE BLD STRIP.AUTO-MCNC: 147 MG/DL (ref 65–100)
GLUCOSE SERPL-MCNC: 163 MG/DL (ref 65–100)
HCT VFR BLD AUTO: 32 % (ref 35.8–46.3)
HGB BLD-MCNC: 8.6 G/DL (ref 11.7–15.4)
IMM GRANULOCYTES # BLD AUTO: 0 K/UL (ref 0–0.5)
IMM GRANULOCYTES NFR BLD AUTO: 0 % (ref 0–5)
LYMPHOCYTES # BLD: 1 K/UL (ref 0.5–4.6)
LYMPHOCYTES NFR BLD: 10 % (ref 13–44)
MAGNESIUM SERPL-MCNC: 1.5 MG/DL (ref 1.6–2.3)
MCH RBC QN AUTO: 22.8 PG (ref 26.1–32.9)
MCHC RBC AUTO-ENTMCNC: 26.9 G/DL (ref 31.4–35)
MCV RBC AUTO: 84.9 FL (ref 79.6–97.8)
MONOCYTES # BLD: 0.9 K/UL (ref 0.1–1.3)
MONOCYTES NFR BLD: 9 % (ref 4–12)
NEUTS SEG # BLD: 7.5 K/UL (ref 1.7–8.2)
NEUTS SEG NFR BLD: 76 % (ref 43–78)
NRBC # BLD: 0 K/UL (ref 0–0.2)
PLATELET # BLD AUTO: 239 K/UL (ref 150–450)
PMV BLD AUTO: 10.4 FL (ref 9.4–12.3)
POTASSIUM SERPL-SCNC: 3.6 MMOL/L (ref 3.5–5.1)
PROCALCITONIN SERPL-MCNC: 0.2 NG/ML (ref 0–0.49)
RBC # BLD AUTO: 3.77 M/UL (ref 4.05–5.2)
SERVICE CMNT-IMP: ABNORMAL
SODIUM SERPL-SCNC: 138 MMOL/L (ref 136–145)
WBC # BLD AUTO: 9.9 K/UL (ref 4.3–11.1)

## 2022-02-09 PROCEDURE — 99232 SBSQ HOSP IP/OBS MODERATE 35: CPT | Performed by: INTERNAL MEDICINE

## 2022-02-09 PROCEDURE — 94640 AIRWAY INHALATION TREATMENT: CPT

## 2022-02-09 PROCEDURE — 74011000250 HC RX REV CODE- 250: Performed by: EMERGENCY MEDICINE

## 2022-02-09 PROCEDURE — 74011250636 HC RX REV CODE- 250/636: Performed by: INTERNAL MEDICINE

## 2022-02-09 PROCEDURE — 85025 COMPLETE CBC W/AUTO DIFF WBC: CPT

## 2022-02-09 PROCEDURE — 74011250637 HC RX REV CODE- 250/637: Performed by: INTERNAL MEDICINE

## 2022-02-09 PROCEDURE — 74011250637 HC RX REV CODE- 250/637: Performed by: HOSPITALIST

## 2022-02-09 PROCEDURE — 77010033678 HC OXYGEN DAILY

## 2022-02-09 PROCEDURE — 80048 BASIC METABOLIC PNL TOTAL CA: CPT

## 2022-02-09 PROCEDURE — 83735 ASSAY OF MAGNESIUM: CPT

## 2022-02-09 PROCEDURE — 74011000250 HC RX REV CODE- 250: Performed by: HOSPITALIST

## 2022-02-09 PROCEDURE — 82962 GLUCOSE BLOOD TEST: CPT

## 2022-02-09 PROCEDURE — 84145 PROCALCITONIN (PCT): CPT

## 2022-02-09 PROCEDURE — 94760 N-INVAS EAR/PLS OXIMETRY 1: CPT

## 2022-02-09 PROCEDURE — 65660000000 HC RM CCU STEPDOWN

## 2022-02-09 RX ORDER — LANOLIN ALCOHOL/MO/W.PET/CERES
1 CREAM (GRAM) TOPICAL 2 TIMES DAILY WITH MEALS
Status: DISCONTINUED | OUTPATIENT
Start: 2022-02-09 | End: 2022-02-15 | Stop reason: HOSPADM

## 2022-02-09 RX ORDER — OXYBUTYNIN CHLORIDE 5 MG/1
5 TABLET ORAL 3 TIMES DAILY
Status: COMPLETED | OUTPATIENT
Start: 2022-02-09 | End: 2022-02-10

## 2022-02-09 RX ORDER — MAGNESIUM SULFATE HEPTAHYDRATE 40 MG/ML
2 INJECTION, SOLUTION INTRAVENOUS ONCE
Status: COMPLETED | OUTPATIENT
Start: 2022-02-09 | End: 2022-02-09

## 2022-02-09 RX ORDER — ENOXAPARIN SODIUM 100 MG/ML
40 INJECTION SUBCUTANEOUS EVERY 12 HOURS
Status: DISCONTINUED | OUTPATIENT
Start: 2022-02-09 | End: 2022-02-09

## 2022-02-09 RX ORDER — METOPROLOL SUCCINATE 25 MG/1
50 TABLET, EXTENDED RELEASE ORAL DAILY
Status: DISCONTINUED | OUTPATIENT
Start: 2022-02-09 | End: 2022-02-15 | Stop reason: HOSPADM

## 2022-02-09 RX ORDER — OXYBUTYNIN CHLORIDE 5 MG/1
5 TABLET ORAL 3 TIMES DAILY
Status: DISCONTINUED | OUTPATIENT
Start: 2022-02-09 | End: 2022-02-09 | Stop reason: SDUPTHER

## 2022-02-09 RX ORDER — METOPROLOL SUCCINATE 100 MG/1
100 TABLET, EXTENDED RELEASE ORAL DAILY
Status: DISCONTINUED | OUTPATIENT
Start: 2022-02-09 | End: 2022-02-09

## 2022-02-09 RX ORDER — LANOLIN ALCOHOL/MO/W.PET/CERES
1 CREAM (GRAM) TOPICAL
Status: DISCONTINUED | OUTPATIENT
Start: 2022-02-09 | End: 2022-02-09

## 2022-02-09 RX ADMIN — LEVALBUTEROL HYDROCHLORIDE 0.63 MG: 0.63 SOLUTION RESPIRATORY (INHALATION) at 22:35

## 2022-02-09 RX ADMIN — HEPARIN SODIUM 5000 UNITS: 5000 INJECTION INTRAVENOUS; SUBCUTANEOUS at 05:47

## 2022-02-09 RX ADMIN — LEVALBUTEROL HYDROCHLORIDE 0.63 MG: 0.63 SOLUTION RESPIRATORY (INHALATION) at 08:05

## 2022-02-09 RX ADMIN — OXYBUTYNIN CHLORIDE 5 MG: 5 TABLET ORAL at 16:57

## 2022-02-09 RX ADMIN — DILTIAZEM HYDROCHLORIDE 300 MG: 180 CAPSULE, COATED, EXTENDED RELEASE ORAL at 09:50

## 2022-02-09 RX ADMIN — PANTOPRAZOLE SODIUM 40 MG: 40 TABLET, DELAYED RELEASE ORAL at 21:24

## 2022-02-09 RX ADMIN — OXYBUTYNIN CHLORIDE 5 MG: 5 TABLET ORAL at 09:30

## 2022-02-09 RX ADMIN — SODIUM CHLORIDE, PRESERVATIVE FREE 10 ML: 5 INJECTION INTRAVENOUS at 21:25

## 2022-02-09 RX ADMIN — PANTOPRAZOLE SODIUM 40 MG: 40 TABLET, DELAYED RELEASE ORAL at 09:30

## 2022-02-09 RX ADMIN — MAGNESIUM SULFATE HEPTAHYDRATE 2 G: 40 INJECTION, SOLUTION INTRAVENOUS at 10:06

## 2022-02-09 RX ADMIN — SODIUM CHLORIDE, PRESERVATIVE FREE 5 ML: 5 INJECTION INTRAVENOUS at 14:00

## 2022-02-09 RX ADMIN — POTASSIUM CHLORIDE 40 MEQ: 20 TABLET, EXTENDED RELEASE ORAL at 17:00

## 2022-02-09 RX ADMIN — LEVALBUTEROL HYDROCHLORIDE 0.63 MG: 0.63 SOLUTION RESPIRATORY (INHALATION) at 12:01

## 2022-02-09 RX ADMIN — LEVALBUTEROL HYDROCHLORIDE 0.63 MG: 0.63 SOLUTION RESPIRATORY (INHALATION) at 15:37

## 2022-02-09 RX ADMIN — FUROSEMIDE 40 MG: 10 INJECTION, SOLUTION INTRAMUSCULAR; INTRAVENOUS at 11:51

## 2022-02-09 RX ADMIN — METOPROLOL SUCCINATE 50 MG: 25 TABLET, EXTENDED RELEASE ORAL at 10:06

## 2022-02-09 RX ADMIN — RIVAROXABAN 20 MG: 20 TABLET, FILM COATED ORAL at 11:51

## 2022-02-09 RX ADMIN — SODIUM CHLORIDE, PRESERVATIVE FREE 10 ML: 5 INJECTION INTRAVENOUS at 05:50

## 2022-02-09 RX ADMIN — FERROUS SULFATE TAB 325 MG (65 MG ELEMENTAL FE) 325 MG: 325 (65 FE) TAB at 16:56

## 2022-02-09 RX ADMIN — POTASSIUM CHLORIDE 40 MEQ: 20 TABLET, EXTENDED RELEASE ORAL at 09:30

## 2022-02-09 RX ADMIN — DIGOXIN 0.25 MG: 250 TABLET ORAL at 09:56

## 2022-02-09 RX ADMIN — OXYBUTYNIN CHLORIDE 5 MG: 5 TABLET ORAL at 21:25

## 2022-02-09 NOTE — ROUTINE PROCESS
Verbal bedside report given to Kamila Funes oncoming RN. Patient's situation, background, assessment and recommendations provided. Opportunity for questions provided. Oncoming RN assumed care of patient. Cardizem site visualized.

## 2022-02-09 NOTE — ROUTINE PROCESS
Bedside and Verbal report given to BITA SAAB, AN AFFILIATE OF [facility], RN by self. Report included SBAR, Kardex, ED summary, procedure summary, recent results and cardiac rhythm.

## 2022-02-09 NOTE — PROGRESS NOTES
Progress NOTE           2022    Daja Foster                        Date of Admission:  2/3/2022    The patient's chart is reviewed and the patient is discussed with the staff. 61 y.o.  female, PMH RLD, HTN, chronic right knee pain, seen and evaluated at the request of Dani Barker for acute respiratory failure with hypoxia. The patient was last seen 2021 in our office for follow up for restrictive lung disease due to kyphosis and obesity. The patient previously was admitted here back in 2021 with pulmonary edema and volume overload due to chronic diastolic heart failure, also noted on previous CT scans. At that time the patient was noted to have hypoxemia during that admission as well. The patient continues to use albuterol as needed at home as well as O2 at 2L NC nightly. The patient states she continued to be more short of breath with a dry cough at home but is no longer coughing here. She endorses tachycardia with minimal movement as well. She denies any fevers, chills, nausea, vomiting, or recent sick contacts. She is concerned that she is now up to 15L NC O2. She denies any chest pain. CXR today reveals volume overload. Subjective:     Resting quietly in bed on 10L HFNC  Now on Cardizem PO  Difficulty wearing BIPAP due to noise around mask        Review of Systems  A comprehensive review of systems was negative except for that written in the HPI. Prior to Admission Medications   Prescriptions Last Dose Informant Patient Reported? Taking? OXYGEN-AIR DELIVERY SYSTEMS 1/3/2022 at Unknown time  Yes Yes   Si L by Nasal route nightly. albuterol (PROVENTIL HFA, VENTOLIN HFA, PROAIR HFA) 90 mcg/actuation inhaler 2022 at Unknown time  No Yes   Sig: Take 1 Puff by inhalation every four (4) hours as needed for Wheezing.    albuterol (PROVENTIL HFA, VENTOLIN HFA, PROAIR HFA) 90 mcg/actuation inhaler 2022 at Unknown time  No Yes   Sig: Take 2 Puffs by inhalation every four (4) hours as needed for Wheezing. furosemide (LASIX) 40 mg tablet 2/2/2022 at Unknown time  No Yes   Sig: Take 1 Tab by mouth daily. potassium chloride (K-DUR, KLOR-CON) 20 mEq tablet 2/2/2022 at Unknown time  No Yes   Sig: Take 1 Tab by mouth daily. predniSONE (DELTASONE) 20 mg tablet Unknown at Unknown time  No No   Sig: Take 20 mg by mouth daily (with breakfast). Facility-Administered Medications: None     Past Medical History:   Diagnosis Date    Chronic pain     pain R knee    Hypertension     Morbid obesity ([address]     BMI 45.8- 5/2/12    Positive PPD     had vaccination as a child - BCG     Past Surgical History:   Procedure Laterality Date    COLONOSCOPY N/A 2/6/2022    COLONOSCOPY/ BMI 56 ROOM 302 performed by Darryle Lynn, MD at [facility] ENDOSCOPY    HX GYN      C-sec x 1 with GA    HX KNEE ARTHROSCOPY      bilateral knees     Social History     Socioeconomic History    Marital status:      Spouse name: Not on file    Number of children: Not on file    Years of education: Not on file    Highest education level: Not on file   Occupational History    Occupation: [employer] in past.     Tobacco Use    Smoking status: Former Smoker     Packs/day: 0.50     Years: 25.00     Pack years: 12.50     Types: Cigarettes     Quit date: 2/7/2007     Years since quitting: 15.0    Smokeless tobacco: Never Used   Substance and Sexual Activity    Alcohol use: No    Drug use: No    Sexual activity: Not on file   Other Topics Concern    Not on file   Social History Narrative    Not on file     Social Determinants of Health     Financial Resource Strain:     Difficulty of Paying Living Expenses: Not on file   Food Insecurity:     Worried About [address] in the Last Year: Not on file    [address] in the Last Year: Not on file   Transportation Needs:     Lack of Transportation (Medical): Not on file    Lack of Transportation (Non-Medical):  Not on file   Physical Activity:     Days of Exercise per Week: Not on file    Minutes of Exercise per Session: Not on file   Stress:     Feeling of Stress : Not on file   Social Connections:     Frequency of Communication with Friends and Family: Not on file    Frequency of Social Gatherings with Friends and Family: Not on file    Attends Latter day Services: Not on file    Active Member of Clubs or Organizations: Not on file    Attends Club or Organization Meetings: Not on file    Marital Status: Not on file   Intimate Partner Violence:     Fear of Current or Ex-Partner: Not on file    Emotionally Abused: Not on file    Physically Abused: Not on file    Sexually Abused: Not on file   Housing Stability:     Unable to Pay for Housing in the Last Year: Not on file    Number of Places Lived in the Last Year: Not on file    Unstable Housing in the Last Year: Not on file     Family History   Problem Relation Age of Onset    Other Mother         kidney failure    Cancer Sister         cervical cancer     Allergies   Allergen Reactions    Lortab [Hydrocodone-Acetaminophen] Nausea Only and Other (comments)     \"It makes me feel hung over\"     Current Facility-Administered Medications   Medication Dose Route Frequency    metoprolol succinate (TOPROL-XL) XL tablet 50 mg  50 mg Oral DAILY    rivaroxaban (XARELTO) tablet 20 mg  20 mg Oral DAILY WITH LUNCH    ferrous sulfate tablet 325 mg  1 Tablet Oral BID WITH MEALS    potassium chloride (K-DUR, KLOR-CON M20) SR tablet 40 mEq  40 mEq Oral BID    oxybutynin (DITROPAN) tablet 5 mg  5 mg Oral TID    dilTIAZem ER (CARDIZEM CD) capsule 300 mg  300 mg Oral DAILY    pantoprazole (PROTONIX) tablet 40 mg  40 mg Oral Q12H    digoxin (LANOXIN) tablet 0.25 mg  0.25 mg Oral DAILY    levalbuterol (XOPENEX) nebulizer soln 0.63 mg/3 mL  0.63 mg Nebulization QID RT    acetaminophen (TYLENOL) tablet 650 mg  650 mg Oral Q4H PRN    0.9% sodium chloride infusion 250 mL  250 mL IntraVENous PRN    sodium chloride (NS) flush 5-10 mL  5-10 mL IntraVENous Q8H    sodium chloride (NS) flush 5-10 mL  5-10 mL IntraVENous PRN    furosemide (LASIX) injection 40 mg  40 mg IntraVENous BID    insulin lispro (HUMALOG) injection   SubCUTAneous AC&HS    oxyCODONE IR (ROXICODONE) tablet 5 mg  5 mg Oral Q4H PRN     Objective:   Blood pressure 114/61, pulse 81, temperature 98.1 °F (36.7 °C), resp. rate 17, height 5' 7\" (1.702 m), weight 334 lb (151.5 kg), SpO2 91 %. Intake/Output Summary (Last 24 hours) at 2/9/2022 1154  Last data filed at 2/9/2022 0850  Gross per 24 hour   Intake 900 ml   Output 3400 ml   Net -2500 ml     PHYSICAL EXAM   Constitutional:  the patient is well developed and in no acute distress  EENMT:  Sclera clear, pupils equal, oral mucosa moist  Respiratory: On 10L HFNC, diminished  Cardiovascular:  RRR without M,G,R  Gastrointestinal: soft and non-tender; with positive bowel sounds. Musculoskeletal: warm without cyanosis. There is +1 lower extremity edema. Skin:  no jaundice or rashes, no wounds   Neurologic: no gross neuro deficits     Psychiatric:  alert and oriented x 3    CXR: 2/7/22      2/3/22      CT Chest: 2/7/22  IMPRESSION  1. No acute pulmonary emboli. 2. Dependent atelectasis with trace bilateral pleural effusions and  cardiomegaly. 3. Irregular pleural-based densities at the right lung apex is nonspecific. An  atypical pneumonitis is a consideration. Recent Labs     02/09/22  1008 02/08/22  0605 02/07/22  0313   WBC 9.9 10.2 10.4   HGB 8.6* 8.1* 8.1*   HCT 32.0* 29.8* 29.9*    222 249   PCT 0.20 0.17  --     139 138   K 3.6 3.6 3.6   CL 94* 97* 99   CO2 40* 40* 35*   * 125* 176*   BUN 10 10 10   CREA 0.60 0.60 0.80   MG  --  1.5* 1.4*   CA 8.9 8.9 8.5     ECHO: Results from Hospital Encounter encounter on 02/03/22    ECHO ADULT COMPLETE    Interpretation Summary    Left Ventricle: Left ventricle size is normal. Normal wall thickness. Normal wall motion. Normal left ventricular systolic function with a visually estimated EF of 60 - 65%.   Right Ventricle: Right ventricle is mildly dilated. Normal systolic function.   Tricuspid Valve: Mild to moderate transvalvular regurgitation. RVSP is mildly elevated at around 45 mmHg.   Left Atrium: Left atrium is severely dilated. Results     Procedure Component Value Units Date/Time    COVID-19 RAPID TEST [[id number]] Collected: 02/05/22 0574    Order Status: Completed Specimen: Nasopharyngeal Updated: 02/05/22 0650     Specimen source NASAL        COVID-19 rapid test Not detected        Comment:      The specimen is NEGATIVE for SARS-CoV-2, the novel coronavirus associated with COVID-19. A negative result does not rule out COVID-19. This test has been authorized by the FDA under an Emergency Use Authorization (EUA) for use by authorized laboratories. Fact sheet for Healthcare Providers: ConventionUpdate.co.nz  Fact sheet for Patients: ConventionUpdate.co.nz       Methodology: Isothermal Nucleic Acid Amplification             Inpat Anti-Infectives (From admission, onward)    None        Assessment and Plan:  (Medical Decision Making)   62 yo F with morbid obesity admitted with increased dyspnea. Pt has been on 2L with sleep at home and is now requiring 15L oxygen while awake at rest. Pt has been more sedentary of late due to LE pain. Pt found to be in afib here and is now on cardizem drip. She denies pleuritic CP or hemoptysis but has hx of nocturnal dyspnea, EDS with frequent napping, and weight gain. Labs with high bicarb concerning for hypoventilation. Will check ABG, CTA of chest to r/o pulmonary embolism, and a TSH since hypothyroidism is possible. She likely has LUNA given upper airway anatomy and will place on empiric BIPAP with sleep if hypercapneic. Principal Problem:    Anemia (2/3/2022)  --Hgb 8.6 today.   S/P colonoscopy/EGD on 2/6/22; noted gastric ulcer, non bleeding, iron supplementation recommended, GI signed off. Continue to monitor s/s bleeding and need for transfusion. Active Problems:    Hypertension ()  --Chronic, per primary team      Class 3 obesity with alveolar hypoventilation, serious comorbidity, and body mass index (BMI) of 50.0 to 59.9 in adult [facility]) ()  --Ongoing, chronic      Acute respiratory failure with hypoxia ([address] (6/25/2020)  --COVID negative 2/5/22  --Previously seen in our office for restrictive lung disease due to kyphosis and obesity. Previous hospitalizations with hypoxia due to fluid overload. Continue to optimize diuretics and oxygen, wean as tolerated and monitor renal function. --Procalcitonin 0.17 yesterday, low grade temp 99.1 this am      Controlled type 2 diabetes mellitus without complication, without long-term current use of insulin ([facility]) (9/9/2020)  --Chronic, on AC/HS blood sugar checks with SSI, per primary team      Diastolic CHF, acute on chronic ([address] (11/11/2021)  --On IV Lasix 40 mg BID and potassium replacement      Atrial fibrillation with RVR ([address] (2/3/2022)  --On Cardizem PO per Cardiology       LUNA/OHS  --Difficulty wearing BIPAP due to noise from mask, ?try different mask. Check ABG in am.     Prior    More than 50% of the time documented was spent in face-to-face contact with the patient and in the care of the patient on the floor/unit where the patient is located. Carley Jose NP       I have spoken with and examined the patient. I agree with the above assessment and plan as documented.     Gen: awake obese, in no distress  Lungs:  diminished   Heart:  RRR with no Murmur/Rubs/Gallops  Abd:obese, NT  Ext: plus 2  Need to continue diuresis, BIPAP at night, may benefit from trilogy  wean 02- now still on 10L NC    Macho Wesley MD

## 2022-02-09 NOTE — PROGRESS NOTES
[facility] CARDIOLOGY PROGRESS NOTE           2/9/2022 11:28 AM    Admit Date: 2/3/2022         Subjective: HR better controlled, but remains hypoxic. ROS:  Cardiovascular:  As noted above    Objective:      Vitals:    02/09/22 0806 02/09/22 0807 02/09/22 0900 02/09/22 0950   BP:   91/61 91/61   Pulse:   89 89   Resp:       Temp:       SpO2: (!) 88% (!) 88%     Weight:       Height:               Physical Exam:  General: Well Developed, Well Nourished, No Acute Distress, Alert & Oriented x 3, Appropriate mood  Neck: supple, no JVD  Heart: irreg irreg  Lungs: Clear throughout auscultation bilaterally without adventitious sounds  Abd: soft, nontender, nondistended, with good bowel sounds  Ext: no edema bilaterally  Skin: warm and dry      Data Review:   Recent Labs     02/09/22  1008 02/08/22  0605 02/07/22  0313 02/07/22  0313    139   < > 138   K 3.6 3.6   < > 3.6   MG  --  1.5*  --  1.4*   BUN 10 10   < > 10   CREA 0.60 0.60   < > 0.80   * 125*   < > 176*   WBC 9.9 10.2   < > 10.4   HGB 8.6* 8.1*   < > 8.1*   HCT 32.0* 29.8*   < > 29.9*    222   < > 249    < > = values in this interval not displayed. No results for input(s): Adah Smaller in the last 72 hours.         Assessment/Plan:     Principal Problem:    Acute on chronic respiratory failure with hypoxia and hypercapnia (HCC) (6/25/2020)    Active Problems:    Hypertension ()      Class 3 obesity with alveolar hypoventilation, serious comorbidity, and body mass index (BMI) of 50.0 to 59.9 in adult [facility]) ()      Overview: BMI 45.8- 5/2/12      Controlled type 2 diabetes mellitus without complication, without long-term current use of insulin ([address] (9/9/2020)      Restrictive lung disease (24/60/9308)      Diastolic CHF, acute on chronic ([address] (11/11/2021)      Atrial fibrillation with RVR ([address] (2/3/2022)      Anemia (2/3/2022)      Chronic respiratory failure with hypoxia and hypercapnia (HCC) ()    A/P  1) Afib - continue metop XL now off dilt gtt, continue dig/dilt oral.  2) Anemia - post upper GI score - care per GI team hold [facility]  3) dCHF - continue diuresis with daily BMP      Ayesha Joyce MD  2/9/2022 11:28 AM

## 2022-02-09 NOTE — PROGRESS NOTES
Hospitalist Progress Note   Admit Date:  2/3/2022 11:07 AM   Name:  Noble Romberg   Age:  61 y.o. Sex:  female  :  1955   MRN:  [id number]   Room:  302/01    Presenting Complaint: Abnormal Lab Results and Peripheral Edema    Reason(s) for Admission: Atrial fibrillation with RVR [facility]) [I48.91]     Hospital Course & Interval History:   Noble Romberg is a 61 y.o. female with medical history of Afib (on Xarelto), HTN, morbid obesity complicated by LUNA/OHS, chronic diastolic heart failure admitted on 2/3 with A.fib RVR, acute hypoxic respiratory failure (room air sats 89%), severe iron deficiency anemia. Pt presented with worsening shortness of breath, fatigue, and worsening swelling in her lower extremities. ED Course: Hgb of 6.5 (last Hgb was 13 in 2021). MCV of 79 with RDW of 19. pBNP of 1300. CXR shows vascular congestion. EKG shows Afib with RVR with HRs in the 100s. Occult blood negative. Type/screen for pRBC transfusion. Given Lasix 40 mg IV once. Week of : EGD and colonoscopy on  showing gastritis [status post biopsy], small superficial prepyloric ulcer, diverticulosis and hemorrhoids. Patient oxygen requirement went from 5 to 15 L on  and pulmonary on board. Acute hypoxic respiratory failure seems to be secondary to volume overload, obesity, LUNA, OHS. Subjective/24hr Events (22):  Pt c/o not being able to sleep last night due to bipap. Refused meds this morning for this reason per nursing? I told her she needs to take her meds. She is still SOB, mild-moderate, constant. Worse with exertion. No CP, fevers, cough    Assessment & Plan:       Acute on chronic respiratory failure with hypoxia and hypercapnia     Class 3 obesity with alveolar hypoventilation  -Charted O2 Flow Rate (L/min): 10 l/min. Wean as able  -suspect she will need significant respiratory support at baseline. Such as bipap or trilogy   -May need a tracheostomy. Defer to pulm      Diastolic CHF, acute on chronic   -CXR with no overt pulm edema  -on lasix; switch to PO when able  -KCL while on lasix  -reviewed I/Os:    Intake/Output Summary (Last 24 hours) at 2/9/2022 0957  Last data filed at 2/9/2022 0850  Gross per 24 hour   Intake 900 ml   Output 3400 ml   Net -2500 ml       Atrial fibrillation with RVR   -try to wean off cardizem gtt today  -resume home xarelto and monitor for worsening anemia. FOBT was negative. No bleeding on EGD. If she has recurrent worsening anemia despite iron and PPI, would stop xarelto for good. -increase Toprol to 50mg.  -cont PO cardizem as well      Gastritis and small superficial prepyloric ulcer  -cont PPI      Iron Def Anemia   -stable  -daily CBC  -PO iron      Hypertension   -Controlled. Continue current management      Type 2 diabetes mellitus   -controlled.     -Cont ISS      Dispo/Discharge Planning:    -PT/OT  -PPD done    Diet:  ADULT DIET Easy to Chew; Low Sodium (2 gm)  DVT PPx: on AC  Code status: Prior    Hospital Problems as of 2/9/2022 Date Reviewed: 2/6/2022          Codes Class Noted - Resolved POA    Chronic respiratory failure with hypoxia and hypercapnia (HCC) ICD-10-CM: J96.11, J96.12  ICD-9-CM: 518.83, 799.02, 786.09  Unknown - Present Yes        Atrial fibrillation with RVR (HCC) ICD-10-CM: I48.91  ICD-9-CM: 427.31  2/3/2022 - Present Yes        Anemia (Chronic) ICD-10-CM: D64.9  ICD-9-CM: 285.9  2/3/2022 - Present Yes        Diastolic CHF, acute on chronic (HCC) ICD-10-CM: I50.33  ICD-9-CM: 428.33, 428.0  11/11/2021 - Present Yes        Restrictive lung disease (Chronic) ICD-10-CM: J98.4  ICD-9-CM: 518.89  10/22/2020 - Present Yes        Controlled type 2 diabetes mellitus without complication, without long-term current use of insulin (HCC) (Chronic) ICD-10-CM: E11.9  ICD-9-CM: 250.00  9/9/2020 - Present Yes        * (Principal) Acute on chronic respiratory failure with hypoxia and hypercapnia ([address] ICD-10-CM: J96.21, [id number]  ICD-9-CM: 518.84, 786.09, 799.02  6/25/2020 - Present Yes        Hypertension (Chronic) ICD-10-CM: I10  ICD-9-CM: 401.9  Unknown - Present Yes        Class 3 obesity with alveolar hypoventilation, serious comorbidity, and body mass index (BMI) of 50.0 to 59.9 in adult [facility]) (Chronic) ICD-10-CM: F64.3, X05.21  ICD-9-CM: 278.03, V85.43  Unknown - Present Yes    Overview Signed 2/8/2019 11:59 AM by Hui Jimenes MD     BMI 45.8- 5/2/12                   Objective:     Patient Vitals for the past 24 hrs:   Temp Pulse Resp BP SpO2   02/09/22 0950  89  91/61    02/09/22 0900  89  91/61    02/09/22 0807     (!) 88 %   02/09/22 0806     (!) 88 %   02/09/22 0725 99.1 °F (37.3 °C) 93 17 106/64 93 %   02/09/22 0410 99.3 °F (37.4 °C) 100 18  92 %   02/09/22 0024 98.9 °F (37.2 °C) 89 20 124/60 94 %   02/08/22 2010     98 %   02/08/22 2005 98.1 °F (36.7 °C) 94 18 121/74 99 %   02/08/22 1956     99 %   02/08/22 1839 99.3 °F (37.4 °C) 95 20  96 %   02/08/22 1535     96 %   02/08/22 1306     96 %   02/08/22 1226     96 %   02/08/22 1142 98.7 °F (37.1 °C) 96 18 125/66 91 %     Oxygen Therapy  O2 Sat (%): (!) 88 % (02/09/22 0807)  Pulse via Oximetry: 93 beats per minute (02/09/22 0807)  O2 Device: Hi flow nasal cannula (02/09/22 0807)  Skin Assessment: Clean, dry, & intact (02/09/22 2881)  Skin Protection for O2 Device: N/A (02/08/22 1706)  O2 Flow Rate (L/min): 10 l/min (02/09/22 0807)  FIO2 (%): 60 % (02/07/22 2240)    Estimated body mass index is 52.31 kg/m² as calculated from the following:    Height as of this encounter: 5' 7\" (1.702 m). Weight as of this encounter: 151.5 kg (334 lb). Intake/Output Summary (Last 24 hours) at 2/9/2022 0957  Last data filed at 2/9/2022 0850  Gross per 24 hour   Intake 900 ml   Output 3400 ml   Net -2500 ml         Physical Exam:     Blood pressure 91/61, pulse 89, temperature 99.1 °F (37.3 °C), resp.  rate 17, height 5' 7\" (1.702 m), weight 151.5 kg (334 lb), SpO2 (!) 88 %. General:    Obese. No overt distress  Head:  Normocephalic, atraumatic  Eyes:  Sclerae appear normal.  Pupils equally round. ENT:  Nares appear normal, no drainage. Moist oral mucosa  Neck:  No restricted ROM. Trachea midline   CV:   Irregular. No jugular venous distension. Lungs:   Diminished anteriorly. Respirations even, unlabored  Abdomen:    nondistended. Extremities: No cyanosis or clubbing. Volume overloaded in extremities  Skin:     No rashes and normal coloration. Warm and dry. Neuro:  CN II-XII grossly intact. Sensation intact. A&Ox3  Psych:  Normal mood and affect.       I have reviewed ordered lab tests and independently visualized imaging below:    Recent Labs:  Recent Results (from the past 48 hour(s))   GLUCOSE, POC    Collection Time: 02/07/22 11:37 AM   Result Value Ref Range    Glucose (POC) 160 (H) 65 - 100 mg/dL    Performed by X-1    BLOOD GAS, ARTERIAL POC    Collection Time: 02/07/22  4:32 PM   Result Value Ref Range    Device: NASAL CANNULA      pH (POC) 7.35 7.35 - 7.45      pCO2 (POC) 72.4 (HH) 35 - 45 MMHG    pO2 (POC) 63 (L) 75 - 100 MMHG    HCO3 (POC) 39.5 (H) 22 - 26 MMOL/L    sO2 (POC) 89.1 (L) 95 - 98 %    Base excess (POC) 11.6 mmol/L    Allens test (POC) Positive      Site RIGHT RADIAL      Specimen type (POC) ARTERIAL      Performed by Rudy    GLUCOSE, POC    Collection Time: 02/07/22  5:11 PM   Result Value Ref Range    Glucose (POC) 123 (H) 65 - 100 mg/dL    Performed by X-1    GLUCOSE, POC    Collection Time: 02/07/22  8:54 PM   Result Value Ref Range    Glucose (POC) 157 (H) 65 - 100 mg/dL    Performed by Glynn    TSH 3RD GENERATION    Collection Time: 02/07/22 10:01 PM   Result Value Ref Range    TSH 1.240 0.358 - 3.740 uIU/mL   CBC WITH AUTOMATED DIFF    Collection Time: 02/08/22  6:05 AM   Result Value Ref Range    WBC 10.2 4.3 - 11.1 K/uL    RBC 3.57 (L) 4.05 - 5.2 M/uL HGB 8.1 (L) 11.7 - 15.4 g/dL    HCT 29.8 (L) 35.8 - 46.3 %    MCV 83.5 79.6 - 97.8 FL    MCH 22.7 (L) 26.1 - 32.9 PG    MCHC 27.2 (L) 31.4 - 35.0 g/dL    RDW 22.3 (H) 11.9 - 14.6 %    PLATELET 106 785 - 265 K/uL    MPV 10.1 9.4 - 12.3 FL    ABSOLUTE NRBC 0.00 0.0 - 0.2 K/uL    DF AUTOMATED      NEUTROPHILS 76 43 - 78 %    LYMPHOCYTES 10 (L) 13 - 44 %    MONOCYTES 9 4.0 - 12.0 %    EOSINOPHILS 4 0.5 - 7.8 %    BASOPHILS 1 0.0 - 2.0 %    IMMATURE GRANULOCYTES 0 0.0 - 5.0 %    ABS. NEUTROPHILS 7.8 1.7 - 8.2 K/UL    ABS. LYMPHOCYTES 1.0 0.5 - 4.6 K/UL    ABS. MONOCYTES 1.0 0.1 - 1.3 K/UL    ABS. EOSINOPHILS 0.4 0.0 - 0.8 K/UL    ABS. BASOPHILS 0.1 0.0 - 0.2 K/UL    ABS. IMM.  GRANS. 0.0 0.0 - 0.5 K/UL   METABOLIC PANEL, BASIC    Collection Time: 02/08/22  6:05 AM   Result Value Ref Range    Sodium 139 136 - 145 mmol/L    Potassium 3.6 3.5 - 5.1 mmol/L    Chloride 97 (L) 98 - 107 mmol/L    CO2 40 (H) 21 - 32 mmol/L    Anion gap 2 (L) 7 - 16 mmol/L    Glucose 125 (H) 65 - 100 mg/dL    BUN 10 8 - 23 MG/DL    Creatinine 0.60 0.6 - 1.0 MG/DL    GFR est AA >60 >60 ml/min/1.73m2    GFR est non-AA >60 >60 ml/min/1.73m2    Calcium 8.9 8.3 - 10.4 MG/DL   MAGNESIUM    Collection Time: 02/08/22  6:05 AM   Result Value Ref Range    Magnesium 1.5 (L) 1.6 - 2.3 mg/dL   PROCALCITONIN    Collection Time: 02/08/22  6:05 AM   Result Value Ref Range    Procalcitonin 0.17 0.00 - 0.49 ng/mL   GLUCOSE, POC    Collection Time: 02/08/22  7:29 AM   Result Value Ref Range    Glucose (POC) 142 (H) 65 - 100 mg/dL    Performed by Basilia    GLUCOSE, POC    Collection Time: 02/08/22 11:40 AM   Result Value Ref Range    Glucose (POC) 154 (H) 65 - 100 mg/dL    Performed by NoemiVenus    GLUCOSE, POC    Collection Time: 02/08/22  4:32 PM   Result Value Ref Range    Glucose (POC) 115 (H) 65 - 100 mg/dL    Performed by [employer]    GLUCOSE, POC    Collection Time: 02/08/22  8:07 PM   Result Value Ref Range    Glucose (POC) 190 (H) 65 - 100 mg/dL    Performed by Ricardo Corbett    GLUCOSE, POC    Collection Time: 02/09/22  7:30 AM   Result Value Ref Range    Glucose (POC) 135 (H) 65 - 100 mg/dL    Performed by Becky        All Micro Results     Procedure Component Value Units Date/Time    COVID-19 RAPID TEST [[id number]] Collected: 02/05/22 0546    Order Status: Completed Specimen: Nasopharyngeal Updated: 02/05/22 0650     Specimen source NASAL        COVID-19 rapid test Not detected        Comment:      The specimen is NEGATIVE for SARS-CoV-2, the novel coronavirus associated with COVID-19. A negative result does not rule out COVID-19. This test has been authorized by the FDA under an Emergency Use Authorization (EUA) for use by authorized laboratories. Fact sheet for Healthcare Providers: ConventionUpdate.co.nz  Fact sheet for Patients: ConventionUpdate.co.nz       Methodology: Isothermal Nucleic Acid Amplification               Other Studies:  No results found.     Current Meds:  Current Facility-Administered Medications   Medication Dose Route Frequency    metoprolol succinate (TOPROL-XL) XL tablet 50 mg  50 mg Oral DAILY    magnesium sulfate 2 g/50 ml IVPB (premix or compounded)  2 g IntraVENous ONCE    enoxaparin (LOVENOX) injection 40 mg  40 mg SubCUTAneous Q12H    ferrous sulfate tablet 325 mg  1 Tablet Oral DAILY WITH BREAKFAST    potassium chloride (K-DUR, KLOR-CON M20) SR tablet 40 mEq  40 mEq Oral BID    oxybutynin (DITROPAN) tablet 5 mg  5 mg Oral TID    dilTIAZem ER (CARDIZEM CD) capsule 300 mg  300 mg Oral DAILY    pantoprazole (PROTONIX) tablet 40 mg  40 mg Oral Q12H    digoxin (LANOXIN) tablet 0.25 mg  0.25 mg Oral DAILY    levalbuterol (XOPENEX) nebulizer soln 0.63 mg/3 mL  0.63 mg Nebulization QID RT    acetaminophen (TYLENOL) tablet 650 mg  650 mg Oral Q4H PRN    0.9% sodium chloride infusion 250 mL  250 mL IntraVENous PRN    sodium chloride (NS) flush 5-10 mL  5-10 mL IntraVENous Q8H    sodium chloride (NS) flush 5-10 mL  5-10 mL IntraVENous PRN    dilTIAZem (CARDIZEM) 100 mg in 0.9% sodium chloride (MBP/ADV) 100 mL infusion  0-15 mg/hr IntraVENous TITRATE    furosemide (LASIX) injection 40 mg  40 mg IntraVENous BID    insulin lispro (HUMALOG) injection   SubCUTAneous AC&HS    oxyCODONE IR (ROXICODONE) tablet 5 mg  5 mg Oral Q4H PRN       Signed:  Yelena Edwards MD    Part of this note may have been written by using a voice dictation software. The note has been proof read but may still contain some grammatical/other typographical errors.

## 2022-02-09 NOTE — ROUTINE PROCESS
Verbal bedside received by [facility]. Patient's situation, background, assessment and recommendations provided. Opportunity for questions provided.

## 2022-02-09 NOTE — PROGRESS NOTES
Patient remains A&Ox4, no complaints of pain. Reports feeling very tired because she was not able to sleep overnight. Fonseca remains in place, output adequate. Afternoon dose of lasix held per Dr. Benton Console due to low blood pressure. Call light in place, safety maintained. Problem: Pressure Injury - Risk of  Goal: *Prevention of pressure injury  Description: Document Mukesh Scale and appropriate interventions in the flowsheet. Outcome: Progressing Towards Goal  Note: Pressure Injury Interventions:  Sensory Interventions: Assess changes in LOC,Avoid rigorous massage over bony prominences,Keep linens dry and wrinkle-free,Minimize linen layers,Pad between skin to skin    Moisture Interventions: Absorbent underpads,Check for incontinence Q2 hours and as needed,Internal/External urinary devices    Activity Interventions: Pressure redistribution bed/mattress(bed type)    Mobility Interventions: Pressure redistribution bed/mattress (bed type)    Nutrition Interventions: Document food/fluid/supplement intake    Friction and Shear Interventions: Apply protective barrier, creams and emollients,Lift sheet                Problem: Patient Education: Go to Patient Education Activity  Goal: Patient/Family Education  Outcome: Progressing Towards Goal     Problem: Falls - Risk of  Goal: *Absence of Falls  Description: Document Ehlen Fall Risk and appropriate interventions in the flowsheet.   Outcome: Progressing Towards Goal  Note: Fall Risk Interventions:  Mobility Interventions: Patient to call before getting OOB         Medication Interventions: Teach patient to arise slowly,Patient to call before getting OOB    Elimination Interventions: Call light in reach              Problem: Patient Education: Go to Patient Education Activity  Goal: Patient/Family Education  Outcome: Progressing Towards Goal     Problem: Patient Education: Go to Patient Education Activity  Goal: Patient/Family Education  Outcome: Progressing Towards Goal Problem: Gas Exchange - Impaired  Goal: *Absence of hypoxia  Outcome: Progressing Towards Goal

## 2022-02-10 LAB
ANION GAP SERPL CALC-SCNC: 4 MMOL/L (ref 7–16)
BASOPHILS # BLD: 0.1 K/UL (ref 0–0.2)
BASOPHILS NFR BLD: 1 % (ref 0–2)
BUN SERPL-MCNC: 10 MG/DL (ref 8–23)
CALCIUM SERPL-MCNC: 9.1 MG/DL (ref 8.3–10.4)
CHLORIDE SERPL-SCNC: 96 MMOL/L (ref 98–107)
CO2 SERPL-SCNC: 40 MMOL/L (ref 21–32)
CREAT SERPL-MCNC: 0.6 MG/DL (ref 0.6–1)
DIFFERENTIAL METHOD BLD: ABNORMAL
EOSINOPHIL # BLD: 0.4 K/UL (ref 0–0.8)
EOSINOPHIL NFR BLD: 5 % (ref 0.5–7.8)
ERYTHROCYTE [DISTWIDTH] IN BLOOD BY AUTOMATED COUNT: 24.2 % (ref 11.9–14.6)
GLUCOSE BLD STRIP.AUTO-MCNC: 121 MG/DL (ref 65–100)
GLUCOSE BLD STRIP.AUTO-MCNC: 129 MG/DL (ref 65–100)
GLUCOSE BLD STRIP.AUTO-MCNC: 133 MG/DL (ref 65–100)
GLUCOSE BLD STRIP.AUTO-MCNC: 142 MG/DL (ref 65–100)
GLUCOSE SERPL-MCNC: 111 MG/DL (ref 65–100)
HCT VFR BLD AUTO: 31.8 % (ref 35.8–46.3)
HGB BLD-MCNC: 8.6 G/DL (ref 11.7–15.4)
IMM GRANULOCYTES # BLD AUTO: 0.1 K/UL (ref 0–0.5)
IMM GRANULOCYTES NFR BLD AUTO: 1 % (ref 0–5)
LYMPHOCYTES # BLD: 1 K/UL (ref 0.5–4.6)
LYMPHOCYTES NFR BLD: 11 % (ref 13–44)
MAGNESIUM SERPL-MCNC: 1.6 MG/DL (ref 1.6–2.3)
MCH RBC QN AUTO: 23.3 PG (ref 26.1–32.9)
MCHC RBC AUTO-ENTMCNC: 27 G/DL (ref 31.4–35)
MCV RBC AUTO: 86.2 FL (ref 79.6–97.8)
MONOCYTES # BLD: 0.9 K/UL (ref 0.1–1.3)
MONOCYTES NFR BLD: 10 % (ref 4–12)
NEUTS SEG # BLD: 6.7 K/UL (ref 1.7–8.2)
NEUTS SEG NFR BLD: 73 % (ref 43–78)
NRBC # BLD: 0 K/UL (ref 0–0.2)
PLATELET # BLD AUTO: 245 K/UL (ref 150–450)
PMV BLD AUTO: 10.2 FL (ref 9.4–12.3)
POTASSIUM SERPL-SCNC: 3.8 MMOL/L (ref 3.5–5.1)
RBC # BLD AUTO: 3.69 M/UL (ref 4.05–5.2)
SERVICE CMNT-IMP: ABNORMAL
SODIUM SERPL-SCNC: 140 MMOL/L (ref 136–145)
WBC # BLD AUTO: 9.1 K/UL (ref 4.3–11.1)

## 2022-02-10 PROCEDURE — 65660000000 HC RM CCU STEPDOWN

## 2022-02-10 PROCEDURE — 74011250637 HC RX REV CODE- 250/637: Performed by: INTERNAL MEDICINE

## 2022-02-10 PROCEDURE — 80048 BASIC METABOLIC PNL TOTAL CA: CPT

## 2022-02-10 PROCEDURE — 94640 AIRWAY INHALATION TREATMENT: CPT

## 2022-02-10 PROCEDURE — 74011000250 HC RX REV CODE- 250: Performed by: EMERGENCY MEDICINE

## 2022-02-10 PROCEDURE — 74011000250 HC RX REV CODE- 250: Performed by: HOSPITALIST

## 2022-02-10 PROCEDURE — 82962 GLUCOSE BLOOD TEST: CPT

## 2022-02-10 PROCEDURE — 36415 COLL VENOUS BLD VENIPUNCTURE: CPT

## 2022-02-10 PROCEDURE — 99232 SBSQ HOSP IP/OBS MODERATE 35: CPT | Performed by: INTERNAL MEDICINE

## 2022-02-10 PROCEDURE — 94660 CPAP INITIATION&MGMT: CPT

## 2022-02-10 PROCEDURE — 2709999900 HC NON-CHARGEABLE SUPPLY

## 2022-02-10 PROCEDURE — 85025 COMPLETE CBC W/AUTO DIFF WBC: CPT

## 2022-02-10 PROCEDURE — 97530 THERAPEUTIC ACTIVITIES: CPT

## 2022-02-10 PROCEDURE — 83735 ASSAY OF MAGNESIUM: CPT

## 2022-02-10 RX ORDER — GUAIFENESIN 600 MG/1
1200 TABLET, EXTENDED RELEASE ORAL EVERY 12 HOURS
Status: DISCONTINUED | OUTPATIENT
Start: 2022-02-10 | End: 2022-02-15 | Stop reason: HOSPADM

## 2022-02-10 RX ADMIN — LEVALBUTEROL HYDROCHLORIDE 0.63 MG: 0.63 SOLUTION RESPIRATORY (INHALATION) at 12:23

## 2022-02-10 RX ADMIN — GUAIFENESIN 1200 MG: 600 TABLET ORAL at 21:14

## 2022-02-10 RX ADMIN — FERROUS SULFATE TAB 325 MG (65 MG ELEMENTAL FE) 325 MG: 325 (65 FE) TAB at 08:51

## 2022-02-10 RX ADMIN — GUAIFENESIN 1200 MG: 600 TABLET ORAL at 12:08

## 2022-02-10 RX ADMIN — PANTOPRAZOLE SODIUM 40 MG: 40 TABLET, DELAYED RELEASE ORAL at 21:14

## 2022-02-10 RX ADMIN — LEVALBUTEROL HYDROCHLORIDE 0.63 MG: 0.63 SOLUTION RESPIRATORY (INHALATION) at 15:51

## 2022-02-10 RX ADMIN — DILTIAZEM HYDROCHLORIDE 300 MG: 180 CAPSULE, COATED, EXTENDED RELEASE ORAL at 08:50

## 2022-02-10 RX ADMIN — RIVAROXABAN 20 MG: 20 TABLET, FILM COATED ORAL at 12:08

## 2022-02-10 RX ADMIN — FERROUS SULFATE TAB 325 MG (65 MG ELEMENTAL FE) 325 MG: 325 (65 FE) TAB at 17:10

## 2022-02-10 RX ADMIN — SODIUM CHLORIDE, PRESERVATIVE FREE 10 ML: 5 INJECTION INTRAVENOUS at 21:14

## 2022-02-10 RX ADMIN — PANTOPRAZOLE SODIUM 40 MG: 40 TABLET, DELAYED RELEASE ORAL at 08:50

## 2022-02-10 RX ADMIN — LEVALBUTEROL HYDROCHLORIDE 0.63 MG: 0.63 SOLUTION RESPIRATORY (INHALATION) at 09:17

## 2022-02-10 RX ADMIN — SODIUM CHLORIDE, PRESERVATIVE FREE 5 ML: 5 INJECTION INTRAVENOUS at 14:10

## 2022-02-10 RX ADMIN — SODIUM CHLORIDE, PRESERVATIVE FREE 10 ML: 5 INJECTION INTRAVENOUS at 05:31

## 2022-02-10 RX ADMIN — OXYBUTYNIN CHLORIDE 5 MG: 5 TABLET ORAL at 08:50

## 2022-02-10 NOTE — PROGRESS NOTES
Problem: Pressure Injury - Risk of  Goal: *Prevention of pressure injury  Description: Document Mukesh Scale and appropriate interventions in the flowsheet. Outcome: Progressing Towards Goal  Note: Pressure Injury Interventions:  Sensory Interventions: Assess changes in LOC,Avoid rigorous massage over bony prominences,Keep linens dry and wrinkle-free,Minimize linen layers,Pad between skin to skin    Moisture Interventions: Absorbent underpads,Internal/External urinary devices,Minimize layers    Activity Interventions: Increase time out of bed,Pressure redistribution bed/mattress(bed type),PT/OT evaluation    Mobility Interventions: Float heels,HOB 30 degrees or less,Pressure redistribution bed/mattress (bed type)    Nutrition Interventions: Document food/fluid/supplement intake,Offer support with meals,snacks and hydration    Friction and Shear Interventions: Apply protective barrier, creams and emollients,Lift sheet                Problem: Falls - Risk of  Goal: *Absence of Falls  Description: Document Helen Fall Risk and appropriate interventions in the flowsheet.   Outcome: Progressing Towards Goal  Note: Fall Risk Interventions:  Mobility Interventions: Bed/chair exit alarm,Communicate number of staff needed for ambulation/transfer,Patient to call before getting OOB         Medication Interventions: Teach patient to arise slowly,Patient to call before getting OOB,Evaluate medications/consider consulting pharmacy    Elimination Interventions: Bed/chair exit alarm,Call light in reach,Patient to call for help with toileting needs

## 2022-02-10 NOTE — PROGRESS NOTES
ACUTE PHYSICAL THERAPY GOALS:  (Developed with and agreed upon by patient and/or caregiver.)  (1.) Rios Ruiz  will move from supine to sit and sit to supine  with INDEPENDENCE within 7 treatment day(s). (2.) Rios Ruiz will transfer from bed to chair and chair to bed with MODIFIED INDEPENDENCE using the least restrictive device within 7 treatment day(s). (3.) Rios Ruiz will ambulate with MODIFIED INDEPENDENCE for 150 feet with the least restrictive device within 7 treatment day(s). (4.) Rios Ruiz will perform standing static and dynamic balance activities x 20 minutes with SUPERVISION to improve safety within 7 treatment day(s). (5.) Rios Ruiz will perform bilateral lower extremity exercises x 20 min for HEP with INDEPENDENCE to improve strength, endurance, and functional mobility within 7 treatment day(s). PHYSICAL THERAPY: Daily Note and PM Treatment Day # 3    Rios Ruiz is a 61 y.o. female   PRIMARY DIAGNOSIS: Acute on chronic respiratory failure with hypoxia and hypercapnia (HCC)  Atrial fibrillation with RVR (HCC) [I48.91]  Procedure(s) (LRB):  ESOPHAGOGASTRODUODENOSCOPY (EGD) (N/A)  COLONOSCOPY/ BMI 56 ROOM 302 (N/A)  ESOPHAGOGASTRODUODENAL (EGD) BIOPSY (N/A)  4 Days Post-Op    ASSESSMENT:     REHAB RECOMMENDATIONS: CURRENT LEVEL OF FUNCTION:  (Most Recently Demonstrated)   Recommendation to date pending progress:  [address] [place] [address]  Equipment:    None Bed Mobility:   Minimal Assistance  Sit to Stand:   Contact Guard Assistance  Transfers:   Contact Guard Assistance  Gait/Mobility:   Contact Guard Assistance     ASSESSMENT:  Ms. Deana Angel is supine on arrival, on 10 L HF. She likes to perform activities in her own way and time. She managed bed mobility with SBA. She stood and ambulated in room using rolling walker, using footboard, and SBA for 30'. O2 sat 91% after ambulation.  She returned to chair and performed a few LE exercises. She was left up in chair with needs in reach. Would benefit from rolling walker for increased balance and support but has declined one. PT to cont to follow for acute care needs.       SUBJECTIVE:   Ms. Altagracia Diaz states, \"I can do it\"    SOCIAL HISTORY/ LIVING ENVIRONMENT: PLOF: Reyna with cane, lives alone but son checks on her, 0 PHOEBE, no falls   Support Systems: Child(adelita),Other Family Member(s),[employer]/[place] [employer],Friend/Neighbor  OBJECTIVE:     PAIN: VITAL SIGNS: LINES/DRAINS:   Pre Treatment:    Post Treatment: 0   IV  O2 Device: Hi flow nasal cannula     MOBILITY: I Mod I S SBA CGA Min Mod Max Total  NT x2 Comments:   Bed Mobility    Rolling [] [] [] [] [] [] [] [] [] [x] []    Supine to Sit [] [] [] [x] [] [] [] [] [] [] [] Additional time and effort   Scooting [] [] [] [x] [] [] [] [] [] [] []    Sit to Supine [] [] [] [] [] [] [] [] [] [] []    Transfers    Sit to Stand [] [] [] [x] [] [] [] [] [] [] [] Additional time and effort   Bed to Chair [] [] [] [] [] [] [] [] [] [] []    Stand to Sit [] [] [] [x] [] [] [] [] [] [] []    I=Independent, Mod I=Modified Independent, S=Supervision, SBA=Standby Assistance, CGA=Contact Guard Assistance,   Min=Minimal Assistance, Mod=Moderate Assistance, Max=Maximal Assistance, Total=Total Assistance, NT=Not Tested    BALANCE: Good Fair+ Fair Fair- Poor NT Comments   Sitting Static [x] [] [] [] [] []    Sitting Dynamic [x] [] [] [] [] []              Standing Static [] [x] [] [] [] []    Standing Dynamic [] [] [x] [x] [] []      GAIT: I Mod I S SBA CGA Min Mod Max Total  NT x2 Comments:   Level of Assistance [] [] [] [x] [x] [] [] [] [] [] [] Unsteady, would benefit from RW if she would agree to use one   Distance 30'    DME SPC and and holding furniture    Gait Quality Trunk sway, slow lakeisha    Weightbearing  Status N/A     I=Independent, Mod I=Modified Independent, S=Supervision, SBA=Standby Assistance, CGA=Contact Guard Assistance,   Min=Minimal Assistance, Mod=Moderate Assistance, Max=Maximal Assistance, Total=Total Assistance, NT=Not Tested    PLAN:   FREQUENCY/DURATION: PT Plan of Care: 3 times/week for duration of hospital stay or until stated goals are met, whichever comes first.  TREATMENT:     TREATMENT:   ($$ Therapeutic Activity: 23-37 mins    )  Therapeutic Activity (28 Minutes): Therapeutic activity included Supine to Sit, Scooting, Transfer Training, Ambulation on level ground, Sitting balance , Standing balance and safety with mobility, LE exs to improve functional Mobility, Strength, ROM, Activity tolerance and balance.        TREATMENT GRID:   Date:  2/10/22 Date:   Date:     Activity/Exercise Parameters Parameters Parameters   Ankle pumps X 15 B     LAQ X 15 B     Hip flexion X 15 B     Hip abd X 15 B                             AFTER TREATMENT POSITION/PRECAUTIONS:  Chair, Needs within reach and RN notified    INTERDISCIPLINARY COLLABORATION:  RN/PCT and PT/PTA    TOTAL TREATMENT DURATION:  PT Patient Time In/Time Out  Time In: 1505  Time Out: 0644 Valdemar Myles PTA

## 2022-02-10 NOTE — ROUTINE PROCESS
Bedside and Verbal shift change report given to [address] [place] [address] (oncoming nurse) by self Jason monroy). Report included the following information SBAR, Intake/Output, MAR and Recent Results.

## 2022-02-10 NOTE — PROGRESS NOTES
[facility] CARDIOLOGY PROGRESS NOTE    2/10/2022 7:34 AM    Admit Date: 2/3/2022        Subjective:   Stable overnight without angina, CHF, or palpitations. AF rate controlled on current meds. Vitals stable and controlled. No other complaints overnight. Tolerating meds well. Objective:      Vitals:    02/10/22 0016 02/10/22 0200 02/10/22 0523 02/10/22 0725   BP: 109/63  (!) 109/57 100/64   Pulse: 76  90 83   Resp: 18  18 18   Temp: 99.1 °F (37.3 °C)  98.6 °F (37 °C) 97.7 °F (36.5 °C)   SpO2: 96% 97% 94% 93%   Weight:       Height:           Physical Exam:  Neck- supple, no JVD at 45 deg  CV- irregular rate and rhythm no MRG  Lung- clear bilaterally anteriorly, decreased/distant lateral bases  Abd- soft, nontender, nondistended  Ext- wrapped below knees, tender 1+ below knee edema  Skin- warm and dry    Data Review:   Recent Labs     02/09/22  1008 02/08/22  0605    139   K 3.6 3.6   MG  --  1.5*   BUN 10 10   CREA 0.60 0.60   * 125*   WBC 9.9 10.2   HGB 8.6* 8.1*   HCT 32.0* 29.8*    222     Echocardiogram: 9/2021  · . LV: Estimated LVEF is 50 - 55%. Normal cavity size and wall thickness. Low normal systolic function. Left ventricular diastolic dysfunction. · Right Ventricle: Normal cavity size and global systolic function. · TV: Mild tricuspid valve regurgitation is present. Right Ventricular Arterial Pressure (RVSP) is 41 mmHg. · AV: Cannot rule out bicuspid aortic valve. No hemodynamically significant aortic stenosis. · MV: Mitral valve non-specific thickening. Mild mitral valve regurgitation is present. · RA: Mildly dilated right atrium. · LA: Mildly dilated left atrium. · IVC: Severely elevated central venous pressure (15 mmHg); IVC diameter is larger than 21 mm and collapses less than 50% with respiration. · Contrast used: DEFINITY.        Assessment and Plan:       Principal Problem:    Anemia --stable but persistent despite reinitiation of anticoagulation with permanent A. fib.  Continue and check CBC in the morning. EGD and colonoscopy on February 6 showing gastritis [status post biopsy], small superficial prepyloric ulcer, diverticulosis and hemorrhoids. Continue meds per GI direction     Active Problems:       Atrial fibrillation with RVR --continue rate control/anticoagulation strategy lifelong. A. fib is permanent now. Check digoxin level in the morning       Hypertension - stable, monitor closely and uptitrate meds as needed       Class 3 obesity with alveolar hypoventilation, serious comorbidity, and body mass index (BMI) of 50.0 to 59.9 in adult        Acute respiratory failure with hypoxia -improved, monitor closely, augment diuresis as needed          Controlled type 2 diabetes mellitus without complication, without long-term current use of insulin - per primary MD's         Diastolic CHF, acute on chronic   -- continue diuresis  -- repeat echo as noted above  --  wound care  -- aggressively elevate legs    AFranck Osei MD  [place] [facility]  Pager [phone]

## 2022-02-10 NOTE — PROGRESS NOTES
Hospitalist Progress Note   Admit Date:  2/3/2022 11:07 AM   Name:  Juvenal Kraus   Age:  61 y.o. Sex:  female  :  1955   MRN:  [id number]   Room:  [facility]/01    Presenting Complaint: Abnormal Lab Results and Peripheral Edema    Reason(s) for Admission: Atrial fibrillation with RVR [facility]) [I48.91]     Hospital Course & Interval History:   Juvenal Kraus is a 61 y.o. female with medical history of Afib (on Xarelto), HTN, morbid obesity complicated by LUNA/OHS, chronic diastolic heart failure admitted on 2/3 with A.fib RVR, acute hypoxic respiratory failure (room air sats 89%), severe iron deficiency anemia. Pt presented with worsening shortness of breath, fatigue, and worsening swelling in her lower extremities. ED Course: Hgb of 6.5 (last Hgb was 13 in 2021). MCV of 79 with RDW of 19. pBNP of 1300. CXR shows vascular congestion. EKG shows Afib with RVR with HRs in the 100s. Occult blood negative. Type/screen for pRBC transfusion. Given Lasix 40 mg IV once. Week of : EGD and colonoscopy on  showing gastritis [status post biopsy], small superficial prepyloric ulcer, diverticulosis and hemorrhoids. Patient oxygen requirement went from 5 to 15 L on  and pulmonary on board. Acute hypoxic respiratory failure seems to be secondary to volume overload, obesity, LUNA, OHS. Subjective/24hr Events (02/10/22): No improvement in hypoxia today despite diuresis -3.4L. Still on 10L. Says she is feeling OK and used bipap 5hrs last night. Denies SOB, CP. Assessment & Plan:       Acute on chronic respiratory failure with hypoxia and hypercapnia     Class 3 obesity with alveolar hypoventilation  -Charted O2 Flow Rate (L/min): 10 l/min. Wean as able  -suspect she will need significant respiratory support at baseline. Such as bipap or trilogy   -? Need tracheostomy.   Defer to pulm      Diastolic CHF, acute on chronic   -CXR with no overt pulm edema  -on lasix; defer to cardio  -KCL while on lasix  -reviewed I/Os:    Intake/Output Summary (Last 24 hours) at 2/10/2022 0954  Last data filed at 2/10/2022 0947  Gross per 24 hour   Intake 450 ml   Output 3850 ml   Net -3400 ml       Atrial fibrillation with RVR   -hb stable on xarelto today. Daily CBC  -HR controlled. Cont Toprol and cardizem      Gastritis and small superficial prepyloric ulcer  -cont PPI      Iron Def Anemia   -daily CBC  -PO iron      Hypertension   -Controlled. Continue current management      Type 2 diabetes mellitus   -controlled. -Cont ISS      Dispo/Discharge Planning:    -PT/OT.   Home with HH at discharge  -PPD done    Diet:  ADULT DIET Easy to Chew; Low Sodium (2 gm)  DVT PPx: on [facility]  Code status: Prior    Hospital Problems as of 2/10/2022 Date Reviewed: 2/9/2022          Codes Class Noted - Resolved POA    Chronic respiratory failure with hypoxia and hypercapnia (HCC) ICD-10-CM: J96.11, J96.12  ICD-9-CM: 518.83, 799.02, 786.09  Unknown - Present Yes        Atrial fibrillation with RVR (HCC) ICD-10-CM: I48.91  ICD-9-CM: 427.31  2/3/2022 - Present Yes        Anemia (Chronic) ICD-10-CM: D64.9  ICD-9-CM: 285.9  2/3/2022 - Present Yes        Diastolic CHF, acute on chronic [facility]) ICD-10-CM: I50.33  ICD-9-CM: 428.33, 428.0  11/11/2021 - Present Yes        Restrictive lung disease (Chronic) ICD-10-CM: J98.4  ICD-9-CM: 518.89  10/22/2020 - Present Yes        Controlled type 2 diabetes mellitus without complication, without long-term current use of insulin (HCC) (Chronic) ICD-10-CM: E11.9  ICD-9-CM: 250.00  9/9/2020 - Present Yes        * (Principal) Acute on chronic respiratory failure with hypoxia and hypercapnia (HCC) ICD-10-CM: J96.21, J96.22  ICD-9-CM: 518.84, 786.09, 799.02  6/25/2020 - Present Yes        Hypertension (Chronic) ICD-10-CM: I10  ICD-9-CM: 401.9  Unknown - Present Yes        Class 3 obesity with alveolar hypoventilation, serious comorbidity, and body mass index (BMI) of 50.0 to 59.9 in adult [facility]) (Chronic) ICD-10-CM: B64.6, G90.10  ICD-9-CM: 278.03, V85.43  Unknown - Present Yes    Overview Signed 2/8/2019 11:59 AM by Constantino Cristobal MD     BMI 45.8- 5/2/12                   Objective:     Patient Vitals for the past 24 hrs:   Temp Pulse Resp BP SpO2   02/10/22 0917  81  (!) 109/57 95 %   02/10/22 0725 97.7 °F (36.5 °C) 83 18 100/64 93 %   02/10/22 0523 98.6 °F (37 °C) 90 18 (!) 109/57 94 %   02/10/22 0200     97 %   02/10/22 0016 99.1 °F (37.3 °C) 76 18 109/63 96 %   02/09/22 2254     97 %   02/09/22 2007 98.8 °F (37.1 °C) 89 18 (!) 93/57 98 %   02/09/22 1615 98 °F (36.7 °C) 80 18 (!) 94/52 90 %   02/09/22 1538     91 %   02/09/22 1202     92 %   02/09/22 1110 98.1 °F (36.7 °C) 81 17 114/61 91 %     Oxygen Therapy  O2 Sat (%): 95 % (02/10/22 0917)  Pulse via Oximetry: 91 beats per minute (02/10/22 0917)  O2 Device: Hi flow nasal cannula (02/10/22 0945)  Skin Assessment: Clean, dry, & intact (02/10/22 0945)  Skin Protection for O2 Device: No (02/10/22 0445)  O2 Flow Rate (L/min): 10 l/min (02/10/22 0945)  FIO2 (%): 60 % (02/10/22 0200)    Estimated body mass index is 52.31 kg/m² as calculated from the following:    Height as of this encounter: 5' 7\" (1.702 m). Weight as of this encounter: 151.5 kg (334 lb). Intake/Output Summary (Last 24 hours) at 2/10/2022 0954  Last data filed at 2/10/2022 0947  Gross per 24 hour   Intake 450 ml   Output 3850 ml   Net -3400 ml         Physical Exam:     Blood pressure (!) 109/57, pulse 81, temperature 97.7 °F (36.5 °C), resp. rate 18, height 5' 7\" (1.702 m), weight 151.5 kg (334 lb), SpO2 95 %. General:    Obese. No overt distress  Head:  Normocephalic, atraumatic  Eyes:  Sclerae appear normal.  Pupils equally round. ENT:  Nares appear normal, no drainage. Moist oral mucosa  Neck:  No restricted ROM. Trachea midline   CV:   Irregular. No jugular venous distension. Lungs:   Diminished anteriorly.  Respirations even, unlabored  Abdomen:    nondistended. Extremities: No cyanosis or clubbing. No real edema, suspect baseline habitus  Skin:     No rashes and normal coloration. Warm and dry. Neuro:  CN II-XII grossly intact. Sensation intact. A&Ox3  Psych:  Normal mood and affect. I have reviewed ordered lab tests and independently visualized imaging below:    Recent Labs:  Recent Results (from the past 48 hour(s))   GLUCOSE, POC    Collection Time: 02/08/22 11:40 AM   Result Value Ref Range    Glucose (POC) 154 (H) 65 - 100 mg/dL    Performed by NoemiVenus    GLUCOSE, POC    Collection Time: 02/08/22  4:32 PM   Result Value Ref Range    Glucose (POC) 115 (H) 65 - 100 mg/dL    Performed by NoemiAnystreamus    GLUCOSE, POC    Collection Time: 02/08/22  8:07 PM   Result Value Ref Range    Glucose (POC) 190 (H) 65 - 100 mg/dL    Performed by Charmaine Estrada    GLUCOSE, POC    Collection Time: 02/09/22  7:30 AM   Result Value Ref Range    Glucose (POC) 135 (H) 65 - 100 mg/dL    Performed by LitaSABA    CBC WITH AUTOMATED DIFF    Collection Time: 02/09/22 10:08 AM   Result Value Ref Range    WBC 9.9 4.3 - 11.1 K/uL    RBC 3.77 (L) 4.05 - 5.2 M/uL    HGB 8.6 (L) 11.7 - 15.4 g/dL    HCT 32.0 (L) 35.8 - 46.3 %    MCV 84.9 79.6 - 97.8 FL    MCH 22.8 (L) 26.1 - 32.9 PG    MCHC 26.9 (L) 31.4 - 35.0 g/dL    RDW 23.6 (H) 11.9 - 14.6 %    PLATELET 946 139 - 571 K/uL    MPV 10.4 9.4 - 12.3 FL    ABSOLUTE NRBC 0.00 0.0 - 0.2 K/uL    DF AUTOMATED      NEUTROPHILS 76 43 - 78 %    LYMPHOCYTES 10 (L) 13 - 44 %    MONOCYTES 9 4.0 - 12.0 %    EOSINOPHILS 4 0.5 - 7.8 %    BASOPHILS 1 0.0 - 2.0 %    IMMATURE GRANULOCYTES 0 0.0 - 5.0 %    ABS. NEUTROPHILS 7.5 1.7 - 8.2 K/UL    ABS. LYMPHOCYTES 1.0 0.5 - 4.6 K/UL    ABS. MONOCYTES 0.9 0.1 - 1.3 K/UL    ABS. EOSINOPHILS 0.4 0.0 - 0.8 K/UL    ABS. BASOPHILS 0.1 0.0 - 0.2 K/UL    ABS. IMM.  GRANS. 0.0 0.0 - 0.5 K/UL   METABOLIC PANEL, BASIC    Collection Time: 02/09/22 10:08 AM Result Value Ref Range    Sodium 138 136 - 145 mmol/L    Potassium 3.6 3.5 - 5.1 mmol/L    Chloride 94 (L) 98 - 107 mmol/L    CO2 40 (H) 21 - 32 mmol/L    Anion gap 4 (L) 7 - 16 mmol/L    Glucose 163 (H) 65 - 100 mg/dL    BUN 10 8 - 23 MG/DL    Creatinine 0.60 0.6 - 1.0 MG/DL    GFR est AA >60 >60 ml/min/1.73m2    GFR est non-AA >60 >60 ml/min/1.73m2    Calcium 8.9 8.3 - 10.4 MG/DL   MAGNESIUM    Collection Time: 02/09/22 10:08 AM   Result Value Ref Range    Magnesium 1.6 1.6 - 2.3 mg/dL   PROCALCITONIN    Collection Time: 02/09/22 10:08 AM   Result Value Ref Range    Procalcitonin 0.20 0.00 - 0.49 ng/mL   GLUCOSE, POC    Collection Time: 02/09/22 11:14 AM   Result Value Ref Range    Glucose (POC) 147 (H) 65 - 100 mg/dL    [facility]    GLUCOSE, POC    Collection Time: 02/09/22  3:51 PM   Result Value Ref Range    Glucose (POC) 138 (H) 65 - 100 mg/dL    [facility]    GLUCOSE, POC    Collection Time: 02/09/22  8:05 PM   Result Value Ref Range    Glucose (POC) 134 (H) 65 - 100 mg/dL    Performed by Yesica Huang    CBC WITH AUTOMATED DIFF    Collection Time: 02/10/22  7:20 AM   Result Value Ref Range    WBC 9.1 4.3 - 11.1 K/uL    RBC 3.69 (L) 4.05 - 5.2 M/uL    HGB 8.6 (L) 11.7 - 15.4 g/dL    HCT 31.8 (L) 35.8 - 46.3 %    MCV 86.2 79.6 - 97.8 FL    MCH 23.3 (L) 26.1 - 32.9 PG    MCHC 27.0 (L) 31.4 - 35.0 g/dL    RDW 24.2 (H) 11.9 - 14.6 %    PLATELET 171 967 - 541 K/uL    MPV 10.2 9.4 - 12.3 FL    ABSOLUTE NRBC 0.00 0.0 - 0.2 K/uL    DF AUTOMATED      NEUTROPHILS 73 43 - 78 %    LYMPHOCYTES 11 (L) 13 - 44 %    MONOCYTES 10 4.0 - 12.0 %    EOSINOPHILS 5 0.5 - 7.8 %    BASOPHILS 1 0.0 - 2.0 %    IMMATURE GRANULOCYTES 1 0.0 - 5.0 %    ABS. NEUTROPHILS 6.7 1.7 - 8.2 K/UL    ABS. LYMPHOCYTES 1.0 0.5 - 4.6 K/UL    ABS. MONOCYTES 0.9 0.1 - 1.3 K/UL    ABS. EOSINOPHILS 0.4 0.0 - 0.8 K/UL    ABS. BASOPHILS 0.1 0.0 - 0.2 K/UL    ABS. IMM.  GRANS. 0.1 0.0 - 0.5 K/UL   METABOLIC PANEL, BASIC    Collection Time: 02/10/22  7:20 AM   Result Value Ref Range    Sodium 140 136 - 145 mmol/L    Potassium 3.8 3.5 - 5.1 mmol/L    Chloride 96 (L) 98 - 107 mmol/L    CO2 40 (H) 21 - 32 mmol/L    Anion gap 4 (L) 7 - 16 mmol/L    Glucose 111 (H) 65 - 100 mg/dL    BUN 10 8 - 23 MG/DL    Creatinine 0.60 0.6 - 1.0 MG/DL    GFR est AA >60 >60 ml/min/1.73m2    GFR est non-AA >60 >60 ml/min/1.73m2    Calcium 9.1 8.3 - 10.4 MG/DL   GLUCOSE, POC    Collection Time: 02/10/22  7:24 AM   Result Value Ref Range    Glucose (POC) 121 (H) 65 - 100 mg/dL    Performed by Becky        All Micro Results     Procedure Component Value Units Date/Time    COVID-19 RAPID TEST [[id number]] Collected: 02/05/22 0547    Order Status: Completed Specimen: Nasopharyngeal Updated: 02/05/22 0650     Specimen source NASAL        COVID-19 rapid test Not detected        Comment:      The specimen is NEGATIVE for SARS-CoV-2, the novel coronavirus associated with COVID-19. A negative result does not rule out COVID-19. This test has been authorized by the FDA under an Emergency Use Authorization (EUA) for use by authorized laboratories. Fact sheet for Healthcare Providers: ConventionUpdate.co.nz  Fact sheet for Patients: ConventionUpdate.co.nz       Methodology: Isothermal Nucleic Acid Amplification               Other Studies:  No results found.     Current Meds:  Current Facility-Administered Medications   Medication Dose Route Frequency    metoprolol succinate (TOPROL-XL) XL tablet 50 mg  50 mg Oral DAILY    rivaroxaban (XARELTO) tablet 20 mg  20 mg Oral DAILY WITH LUNCH    ferrous sulfate tablet 325 mg  1 Tablet Oral BID WITH MEALS    dilTIAZem ER (CARDIZEM CD) capsule 300 mg  300 mg Oral DAILY    pantoprazole (PROTONIX) tablet 40 mg  40 mg Oral Q12H    digoxin (LANOXIN) tablet 0.25 mg  0.25 mg Oral DAILY    levalbuterol (XOPENEX) nebulizer soln 0.63 mg/3 mL  0.63 mg Nebulization QID RT    acetaminophen (TYLENOL) tablet 650 mg  650 mg Oral Q4H PRN    0.9% sodium chloride infusion 250 mL  250 mL IntraVENous PRN    sodium chloride (NS) flush 5-10 mL  5-10 mL IntraVENous Q8H    sodium chloride (NS) flush 5-10 mL  5-10 mL IntraVENous PRN    furosemide (LASIX) injection 40 mg  40 mg IntraVENous BID    insulin lispro (HUMALOG) injection   SubCUTAneous AC&HS    oxyCODONE IR (ROXICODONE) tablet 5 mg  5 mg Oral Q4H PRN       Signed:  Karen Spence MD    Part of this note may have been written by using a voice dictation software. The note has been proof read but may still contain some grammatical/other typographical errors.

## 2022-02-10 NOTE — ROUTINE PROCESS
Bedside shift change report given to [address] [place] [address] (oncoming nurse) by Self (offgoing nurse). Report included the following information MAR, orders, recommendations & plan.

## 2022-02-10 NOTE — PROGRESS NOTES
Patient remains A&Ox4, no complaints of pain. Up to chair with physical therapy. Bilateral lower extremity dressings changed. Saline ordered for nose due to blood spotting. Patient remains on 10L high flow nasal cannula. Call light within reach, safety maintained. Problem: Pressure Injury - Risk of  Goal: *Prevention of pressure injury  Description: Document Mukesh Scale and appropriate interventions in the flowsheet. Outcome: Progressing Towards Goal  Note: Pressure Injury Interventions:  Sensory Interventions: Assess changes in LOC,Avoid rigorous massage over bony prominences,Keep linens dry and wrinkle-free,Minimize linen layers,Pad between skin to skin    Moisture Interventions: Absorbent underpads,Contain wound drainage,Internal/External urinary devices,Minimize layers    Activity Interventions: Increase time out of bed,Pressure redistribution bed/mattress(bed type)    Mobility Interventions: Assess need for specialty bed,Pressure redistribution bed/mattress (bed type)    Nutrition Interventions: Document food/fluid/supplement intake    Friction and Shear Interventions: Apply protective barrier, creams and emollients,Minimize layers                Problem: Patient Education: Go to Patient Education Activity  Goal: Patient/Family Education  Outcome: Progressing Towards Goal     Problem: Falls - Risk of  Goal: *Absence of Falls  Description: Document Helen Fall Risk and appropriate interventions in the flowsheet.   Outcome: Progressing Towards Goal  Note: Fall Risk Interventions:  Mobility Interventions: Patient to call before getting OOB         Medication Interventions: Patient to call before getting OOB,Teach patient to arise slowly    Elimination Interventions: Call light in reach              Problem: Patient Education: Go to Patient Education Activity  Goal: Patient/Family Education  Outcome: Progressing Towards Goal     Problem: Patient Education: Go to Patient Education Activity  Goal: Patient/Family Education  Outcome: Progressing Towards Goal     Problem: Gas Exchange - Impaired  Goal: *Absence of hypoxia  Outcome: Progressing Towards Goal

## 2022-02-10 NOTE — PROGRESS NOTES
Kathryn Polanco  Admission Date: 2/3/2022         Daily Progress Note: 2/10/2022    The patient's chart is reviewed and the patient is discussed with the staff. Background: 61 y.o.  female, PMH RLD, HTN, chronic right knee pain, seen and evaluated at the request of Juan José Naik for acute respiratory failure with hypoxia. The patient was last seen November 2021 in our office for follow up for restrictive lung disease due to kyphosis and obesity. The patient previously was admitted here back in September 2021 with pulmonary edema and volume overload due to chronic diastolic heart failure, also noted on previous CT scans. At that time the patient was noted to have hypoxemia during that admission as well. The patient continues to use albuterol as needed at home as well as O2 at 2L NC nightly. The patient states she continued to be more short of breath with a dry cough at home but is no longer coughing here. She endorses tachycardia with minimal movement as well. She denies any fevers, chills, nausea, vomiting, or recent sick contacts. She is concerned that she is now up to 15L NC O2. She denies any chest pain. CXR today reveals volume overload. Subjective:     Patient remains on 10L O2 Ongoing LE edema but she states it seems some better today. I/o show net negative 2L in last 24 hours. Cough with some thick sputum she is having trouble clearing. States she was able to tolerate the bipap better last night with change in mask.      Current Facility-Administered Medications   Medication Dose Route Frequency    metoprolol succinate (TOPROL-XL) XL tablet 50 mg  50 mg Oral DAILY    rivaroxaban (XARELTO) tablet 20 mg  20 mg Oral DAILY WITH LUNCH    ferrous sulfate tablet 325 mg  1 Tablet Oral BID WITH MEALS    dilTIAZem ER (CARDIZEM CD) capsule 300 mg  300 mg Oral DAILY    pantoprazole (PROTONIX) tablet 40 mg  40 mg Oral Q12H    digoxin (LANOXIN) tablet 0.25 mg  0.25 mg Oral DAILY    levalbuterol (XOPENEX) nebulizer soln 0.63 mg/3 mL  0.63 mg Nebulization QID RT    acetaminophen (TYLENOL) tablet 650 mg  650 mg Oral Q4H PRN    0.9% sodium chloride infusion 250 mL  250 mL IntraVENous PRN    sodium chloride (NS) flush 5-10 mL  5-10 mL IntraVENous Q8H    sodium chloride (NS) flush 5-10 mL  5-10 mL IntraVENous PRN    furosemide (LASIX) injection 40 mg  40 mg IntraVENous BID    insulin lispro (HUMALOG) injection   SubCUTAneous AC&HS    oxyCODONE IR (ROXICODONE) tablet 5 mg  5 mg Oral Q4H PRN     Review of Systems  Constitutional: negative for fever, chills, sweats  Cardiovascular: + LE edema. negative for chest pain, palpitations, syncope  Gastrointestinal:  negative for dysphagia, reflux, vomiting, diarrhea, abdominal pain, or melena  Neurologic:  negative for focal weakness, numbness, headache  Objective:     Vitals:    02/10/22 0523 02/10/22 0725 02/10/22 0917 02/10/22 1130   BP: (!) 109/57 100/64 (!) 109/57 102/67   Pulse: 90 83 81 87   Resp: 18 18  18   Temp: 98.6 °F (37 °C) 97.7 °F (36.5 °C)  97.9 °F (36.6 °C)   SpO2: 94% 93% 95% 94%   Weight:       Height:           Intake/Output Summary (Last 24 hours) at 2/10/2022 1149  Last data filed at 2/10/2022 1009  Gross per 24 hour   Intake 450 ml   Output 4300 ml   Net -3850 ml     Physical Exam:   Constitution:  the patient is well developed and in no acute distress  HEENT:  Sclera clear, pupils equal, oral mucosa moist  Respiratory: CTA B in anterior lung fields. Cardiovascular:  RRR without M,G,R  Gastrointestinal: soft and non-tender; with positive bowel sounds. Musculoskeletal: warm without cyanosis. There is 2-3 + B lower extremity edema.   Skin:  no jaundice or rashes, no wounds   Neurologic: no gross neuro deficits     Psychiatric:  alert and oriented x ppt    CXR:       LAB:  Recent Labs     02/10/22  0720 02/09/22  1008 02/08/22  0605   WBC 9.1 9.9 10.2   HGB 8.6* 8.6* 8.1*   HCT 31.8* 32.0* 29.8*    239 222   PCT --  0.20 0.17     Recent Labs     02/10/22  0720 02/09/22  1008 02/08/22  0605    138 139   K 3.8 3.6 3.6   CL 96* 94* 97*   CO2 40* 40* 40*   * 163* 125*   BUN 10 10 10   CREA 0.60 0.60 0.60   MG  --  1.6 1.5*   CA 9.1 8.9 8.9     No results for input(s): LAC, TROPHS, BNPNT, CRP in the last 72 hours. No lab exists for component: ESR  Recent Labs     02/07/22  1632   PHI 7.35   PCO2I 72.4*   PO2I 63*   HCO3I 39.5*     No results for input(s): SDES, CULT in the last 72 hours. Assessment and Plan:  (Medical Decision Making)   Principal Problem:    Acute on chronic respiratory failure with hypoxia and hypercapnia ([address] (6/25/2020)    Still on 10L O2. Hope that with ongoing diuresis will be able to wean this further. If not will need to work up other possible causes. Active Problems:    Hypertension ()          Class 3 obesity with alveolar hypoventilation, serious comorbidity, and body mass index (BMI) of 50.0 to 59.9 in adult [facility]) ()          Controlled type 2 diabetes mellitus without complication, without long-term current use of insulin ([address] (9/9/2020)          Restrictive lung disease (10/22/2020)    Likely due to obesity primarily. Diastolic CHF, acute on chronic (HCC) (11/11/2021)    Continue lasix and monitor I/O. Has a lot more fluid to remove by exam.       Atrial fibrillation with RVR ([address] (2/3/2022)          Anemia (2/3/2022)          Chronic respiratory failure with hypoxia and hypercapnia (HCC) ()    Cont bipap at night. Tolerating better now. Will reassess her abg when more euvolemic to help determine if she may need NIPPV at home. More than 50% of the time documented was spent in face-to-face contact with the patient and in the care of the patient on the floor/unit where the patient is located.     Luis Raphael MD

## 2022-02-10 NOTE — PROGRESS NOTES
Pt remains on 10L despite diuresis'. Pts insurance ([employer]), contacted this writer for an update. Spoke to RN MARK, Elizabeth Null (ph [phone] / fax [phone]) and provided her with pts dcp with [facility] [place] and LUIS CARLOS to [facility] for potential oxygen use increase. She reported that the [facility] RN will require prior authorization, but not PT/OT. Referral to [facility] [place] was sent on 2/04. Will continue to monitor. Care Management Interventions  PCP Verified by CM: Yes  Mode of Transport at Discharge:  Other (see comment)  Transition of Care Consult (CM Consult): Discharge [address] Tejinder Wyatt: Yes  MyChart Signup: No  Discharge Durable Medical Equipment: No  Physical Therapy Consult: Yes  Occupational Therapy Consult: Yes  Speech Therapy Consult: No  Support Systems: Child(adelita),Other Family Member(s),[employer] [employer],Friend/Neighbor  Confirm Follow Up Transport: Family  The Plan for Transition of Care is Related to the Following Treatment Goals : Return home and back to her baseline  Discharge Location  Patient Expects to be Discharged to[de-identified] Home with [place] [facility])

## 2022-02-11 LAB
ANION GAP SERPL CALC-SCNC: 3 MMOL/L (ref 7–16)
BASOPHILS # BLD: 0.1 K/UL (ref 0–0.2)
BASOPHILS NFR BLD: 1 % (ref 0–2)
BUN SERPL-MCNC: 9 MG/DL (ref 8–23)
CALCIUM SERPL-MCNC: 9 MG/DL (ref 8.3–10.4)
CHLORIDE SERPL-SCNC: 99 MMOL/L (ref 98–107)
CO2 SERPL-SCNC: 39 MMOL/L (ref 21–32)
CREAT SERPL-MCNC: 0.6 MG/DL (ref 0.6–1)
DIFFERENTIAL METHOD BLD: ABNORMAL
EOSINOPHIL # BLD: 0.5 K/UL (ref 0–0.8)
EOSINOPHIL NFR BLD: 7 % (ref 0.5–7.8)
ERYTHROCYTE [DISTWIDTH] IN BLOOD BY AUTOMATED COUNT: 24.7 % (ref 11.9–14.6)
GLUCOSE BLD STRIP.AUTO-MCNC: 118 MG/DL (ref 65–100)
GLUCOSE BLD STRIP.AUTO-MCNC: 121 MG/DL (ref 65–100)
GLUCOSE BLD STRIP.AUTO-MCNC: 124 MG/DL (ref 65–100)
GLUCOSE BLD STRIP.AUTO-MCNC: 154 MG/DL (ref 65–100)
GLUCOSE SERPL-MCNC: 103 MG/DL (ref 65–100)
HCT VFR BLD AUTO: 33.5 % (ref 35.8–46.3)
HGB BLD-MCNC: 8.9 G/DL (ref 11.7–15.4)
IMM GRANULOCYTES # BLD AUTO: 0 K/UL (ref 0–0.5)
IMM GRANULOCYTES NFR BLD AUTO: 1 % (ref 0–5)
LYMPHOCYTES # BLD: 1 K/UL (ref 0.5–4.6)
LYMPHOCYTES NFR BLD: 14 % (ref 13–44)
MAGNESIUM SERPL-MCNC: 1.9 MG/DL (ref 1.6–2.3)
MCH RBC QN AUTO: 23.4 PG (ref 26.1–32.9)
MCHC RBC AUTO-ENTMCNC: 26.6 G/DL (ref 31.4–35)
MCV RBC AUTO: 87.9 FL (ref 79.6–97.8)
MONOCYTES # BLD: 0.7 K/UL (ref 0.1–1.3)
MONOCYTES NFR BLD: 10 % (ref 4–12)
NEUTS SEG # BLD: 5 K/UL (ref 1.7–8.2)
NEUTS SEG NFR BLD: 68 % (ref 43–78)
NRBC # BLD: 0 K/UL (ref 0–0.2)
PLATELET # BLD AUTO: 240 K/UL (ref 150–450)
PMV BLD AUTO: 10.9 FL (ref 9.4–12.3)
POTASSIUM SERPL-SCNC: 3.4 MMOL/L (ref 3.5–5.1)
RBC # BLD AUTO: 3.81 M/UL (ref 4.05–5.2)
SERVICE CMNT-IMP: ABNORMAL
SODIUM SERPL-SCNC: 141 MMOL/L (ref 136–145)
WBC # BLD AUTO: 7.4 K/UL (ref 4.3–11.1)

## 2022-02-11 PROCEDURE — 74011000250 HC RX REV CODE- 250: Performed by: HOSPITALIST

## 2022-02-11 PROCEDURE — 74011636637 HC RX REV CODE- 636/637: Performed by: INTERNAL MEDICINE

## 2022-02-11 PROCEDURE — 94760 N-INVAS EAR/PLS OXIMETRY 1: CPT

## 2022-02-11 PROCEDURE — 82962 GLUCOSE BLOOD TEST: CPT

## 2022-02-11 PROCEDURE — 99231 SBSQ HOSP IP/OBS SF/LOW 25: CPT | Performed by: INTERNAL MEDICINE

## 2022-02-11 PROCEDURE — 74011250637 HC RX REV CODE- 250/637: Performed by: INTERNAL MEDICINE

## 2022-02-11 PROCEDURE — 65660000000 HC RM CCU STEPDOWN

## 2022-02-11 PROCEDURE — 74011250637 HC RX REV CODE- 250/637: Performed by: NURSE PRACTITIONER

## 2022-02-11 PROCEDURE — 36415 COLL VENOUS BLD VENIPUNCTURE: CPT

## 2022-02-11 PROCEDURE — 83735 ASSAY OF MAGNESIUM: CPT

## 2022-02-11 PROCEDURE — 94660 CPAP INITIATION&MGMT: CPT

## 2022-02-11 PROCEDURE — 74011250636 HC RX REV CODE- 250/636: Performed by: INTERNAL MEDICINE

## 2022-02-11 PROCEDURE — 94640 AIRWAY INHALATION TREATMENT: CPT

## 2022-02-11 PROCEDURE — 85025 COMPLETE CBC W/AUTO DIFF WBC: CPT

## 2022-02-11 PROCEDURE — 74011000250 HC RX REV CODE- 250: Performed by: EMERGENCY MEDICINE

## 2022-02-11 PROCEDURE — 80048 BASIC METABOLIC PNL TOTAL CA: CPT

## 2022-02-11 PROCEDURE — 77010033678 HC OXYGEN DAILY

## 2022-02-11 PROCEDURE — 99232 SBSQ HOSP IP/OBS MODERATE 35: CPT | Performed by: INTERNAL MEDICINE

## 2022-02-11 RX ORDER — POTASSIUM CHLORIDE 20 MEQ/1
40 TABLET, EXTENDED RELEASE ORAL DAILY
Status: DISCONTINUED | OUTPATIENT
Start: 2022-02-11 | End: 2022-02-14

## 2022-02-11 RX ORDER — MIDODRINE HYDROCHLORIDE 5 MG/1
10 TABLET ORAL
Status: DISCONTINUED | OUTPATIENT
Start: 2022-02-11 | End: 2022-02-15 | Stop reason: HOSPADM

## 2022-02-11 RX ORDER — OXYBUTYNIN CHLORIDE 5 MG/1
5 TABLET ORAL
Status: DISCONTINUED | OUTPATIENT
Start: 2022-02-11 | End: 2022-02-15 | Stop reason: HOSPADM

## 2022-02-11 RX ORDER — POTASSIUM CHLORIDE 20 MEQ/1
40 TABLET, EXTENDED RELEASE ORAL
Status: DISCONTINUED | OUTPATIENT
Start: 2022-02-11 | End: 2022-02-11

## 2022-02-11 RX ADMIN — FUROSEMIDE 40 MG: 10 INJECTION, SOLUTION INTRAMUSCULAR; INTRAVENOUS at 08:34

## 2022-02-11 RX ADMIN — PANTOPRAZOLE SODIUM 40 MG: 40 TABLET, DELAYED RELEASE ORAL at 21:23

## 2022-02-11 RX ADMIN — LEVALBUTEROL HYDROCHLORIDE 0.63 MG: 0.63 SOLUTION RESPIRATORY (INHALATION) at 11:32

## 2022-02-11 RX ADMIN — MIDODRINE HYDROCHLORIDE 10 MG: 5 TABLET ORAL at 18:30

## 2022-02-11 RX ADMIN — METOPROLOL SUCCINATE 50 MG: 25 TABLET, EXTENDED RELEASE ORAL at 08:34

## 2022-02-11 RX ADMIN — DIGOXIN 0.25 MG: 250 TABLET ORAL at 08:34

## 2022-02-11 RX ADMIN — OXYBUTYNIN CHLORIDE 5 MG: 5 TABLET ORAL at 12:08

## 2022-02-11 RX ADMIN — LEVALBUTEROL HYDROCHLORIDE 0.63 MG: 0.63 SOLUTION RESPIRATORY (INHALATION) at 17:26

## 2022-02-11 RX ADMIN — RIVAROXABAN 20 MG: 20 TABLET, FILM COATED ORAL at 12:11

## 2022-02-11 RX ADMIN — LEVALBUTEROL HYDROCHLORIDE 0.63 MG: 0.63 SOLUTION RESPIRATORY (INHALATION) at 21:01

## 2022-02-11 RX ADMIN — LEVALBUTEROL HYDROCHLORIDE 0.63 MG: 0.63 SOLUTION RESPIRATORY (INHALATION) at 08:14

## 2022-02-11 RX ADMIN — SODIUM CHLORIDE, PRESERVATIVE FREE 10 ML: 5 INJECTION INTRAVENOUS at 14:52

## 2022-02-11 RX ADMIN — OXYCODONE HYDROCHLORIDE 5 MG: 5 TABLET ORAL at 21:23

## 2022-02-11 RX ADMIN — GUAIFENESIN 1200 MG: 600 TABLET ORAL at 08:34

## 2022-02-11 RX ADMIN — POTASSIUM CHLORIDE 40 MEQ: 20 TABLET, EXTENDED RELEASE ORAL at 12:08

## 2022-02-11 RX ADMIN — FUROSEMIDE 40 MG: 10 INJECTION, SOLUTION INTRAMUSCULAR; INTRAVENOUS at 18:30

## 2022-02-11 RX ADMIN — PANTOPRAZOLE SODIUM 40 MG: 40 TABLET, DELAYED RELEASE ORAL at 08:34

## 2022-02-11 RX ADMIN — OXYBUTYNIN CHLORIDE 5 MG: 5 TABLET ORAL at 21:23

## 2022-02-11 RX ADMIN — SODIUM CHLORIDE, PRESERVATIVE FREE 10 ML: 5 INJECTION INTRAVENOUS at 22:00

## 2022-02-11 RX ADMIN — FERROUS SULFATE TAB 325 MG (65 MG ELEMENTAL FE) 325 MG: 325 (65 FE) TAB at 18:30

## 2022-02-11 RX ADMIN — MIDODRINE HYDROCHLORIDE 10 MG: 5 TABLET ORAL at 12:08

## 2022-02-11 RX ADMIN — GUAIFENESIN 1200 MG: 600 TABLET ORAL at 21:23

## 2022-02-11 RX ADMIN — Medication 2 UNITS: at 18:30

## 2022-02-11 RX ADMIN — DILTIAZEM HYDROCHLORIDE 300 MG: 180 CAPSULE, COATED, EXTENDED RELEASE ORAL at 08:34

## 2022-02-11 RX ADMIN — SODIUM CHLORIDE, PRESERVATIVE FREE 10 ML: 5 INJECTION INTRAVENOUS at 06:11

## 2022-02-11 RX ADMIN — FERROUS SULFATE TAB 325 MG (65 MG ELEMENTAL FE) 325 MG: 325 (65 FE) TAB at 08:34

## 2022-02-11 NOTE — ROUTINE PROCESS
Bedside and Verbal shift change report given to Moses Gar RN (oncoming nurse) by Luba Lyn RN (offgoing nurse). Report included the following information SBAR, Kardex, Intake/Output, MAR and Recent Results.

## 2022-02-11 NOTE — PROGRESS NOTES
[facility] CARDIOLOGY PROGRESS NOTE           2/11/2022 7:37 AM    Admit Date: 2/3/2022      Subjective:   No cp or inc sob      Objective:      Vitals:    02/10/22 1942 02/10/22 2359 02/11/22 0001 02/11/22 0517   BP: 119/70 113/65  (!) 115/59   Pulse: 85 85  92   Resp: 18 18  20   Temp: 98.3 °F (36.8 °C) 98.3 °F (36.8 °C)  97.8 °F (36.6 °C)   SpO2: 99% 98% 92% 93%   Weight:    148.9 kg (328 lb 3.2 oz)   Height:           Physical Exam:  General-No Acute Distress  Neck- supple, no JVD  CV- irregular rate and rhythm no MRG  Lung- clear bilaterally  Abd- soft, nontender, nondistended  Ext- no edema bilaterally. Skin- warm and dry    Data Review:   Recent Labs     02/10/22  0720 02/09/22  1008    138   K 3.8 3.6   MG  --  1.6   BUN 10 10   CREA 0.60 0.60   * 163*   WBC 9.1 9.9   HGB 8.6* 8.6*   HCT 31.8* 32.0*    239       Assessment/Plan:     Principal Problem:    Acute on chronic respiratory failure with hypoxia and hypercapnia (HCC) (6/25/2020)        Active Problems:    Hypertension ()          Class 3 obesity with alveolar hypoventilation, serious comorbidity, and body mass index (BMI) of 50.0 to 59.9 in adult [facility]) ()          Controlled type 2 diabetes mellitus without complication, without long-term current use of insulin ([address] (9/9/2020)          Restrictive lung disease (10/22/2020)          Diastolic CHF, acute on chronic ([address] (11/11/2021)          Atrial fibrillation with RVR ([address] (2/3/2022)          Anemia (2/3/2022)          Chronic respiratory failure with hypoxia and hypercapnia (HCC) ()      ////    CV stable on current rx. On standby.         Vu Figueroa MD  2/11/2022 7:37 AM

## 2022-02-11 NOTE — PROGRESS NOTES
Hospitalist Progress Note   Admit Date:  2/3/2022 11:07 AM   Name:  Jimy Lewis   Age:  61 y.o. Sex:  female  :  1955   MRN:  [id number]   Room:  [facility]/01    Presenting Complaint: Abnormal Lab Results and Peripheral Edema    Reason(s) for Admission: Atrial fibrillation with RVR [facility]) [I48.91]     Hospital Course & Interval History:   Jimy Lewis is a 61 y.o. female with medical history of Afib (on Xarelto), HTN, morbid obesity complicated by LUNA/OHS, chronic diastolic heart failure admitted on 2/3 with A.fib RVR, acute hypoxic respiratory failure (room air sats 89%), severe iron deficiency anemia. Pt presented with worsening shortness of breath, fatigue, and worsening swelling in her lower extremities. ED Course: Hgb of 6.5 (last Hgb was 13 in 2021). MCV of 79 with RDW of 19. pBNP of 1300. CXR shows vascular congestion. EKG shows Afib with RVR with HRs in the 100s. Occult blood negative. Type/screen for pRBC transfusion. Given Lasix 40 mg IV once. Week of : EGD and colonoscopy on  showing gastritis [status post biopsy], small superficial prepyloric ulcer, diverticulosis and hemorrhoids. Patient oxygen requirement went from 5 to 15 L on  and pulmonary on board. Acute hypoxic respiratory failure seems to be secondary to volume overload, obesity, LUNA, OHS. Subjective/24hr Events (22): Lasix held due to hypotension yesterday. Feeling ok. o2 need down to 8L from 10L. SOB improving, mild, constant, worse with exertion. No CP, fevers. Assessment & Plan:       Acute on chronic respiratory failure with hypoxia and hypercapnia     Class 3 obesity with alveolar hypoventilation  -Charted O2 Flow Rate (L/min): 8 l/min. Wean as able  -suspect she will need significant respiratory support at baseline. Such as bipap or trilogy   -? Need tracheostomy.   Defer to pulm      Diastolic CHF, acute on chronic   -CXR normal per radiology  -cont lasix; diuresis being inhibited by hypotension. Will start midodrine   -KCL while on lasix  -reviewed I/Os:    Intake/Output Summary (Last 24 hours) at 2/11/2022 1044  Last data filed at 2/11/2022 2327  Gross per 24 hour   Intake 470 ml   Output 2375 ml   Net -1905 ml       Atrial fibrillation with RVR   -hb stable on xarelto today. Daily CBC  -HR controlled. Cont Toprol and cardizem      Gastritis and small superficial prepyloric ulcer  -cont PPI      Iron Def Anemia   -daily CBC  -PO iron      Hypertension   -Controlled. Continue current management      Type 2 diabetes mellitus   -controlled. -Cont ISS      Dispo/Discharge Planning:    -PT/OT.   Home with HH at discharge  -PPD done    Diet:  ADULT DIET Easy to Chew; Low Sodium (2 gm)  DVT PPx: on [facility]  Code status: Prior    Hospital Problems as of 2/11/2022 Date Reviewed: 2/9/2022          Codes Class Noted - Resolved POA    Chronic respiratory failure with hypoxia and hypercapnia (HCC) ICD-10-CM: J96.11, J96.12  ICD-9-CM: 518.83, 799.02, 786.09  Unknown - Present Yes        Atrial fibrillation with RVR (HCC) ICD-10-CM: I48.91  ICD-9-CM: 427.31  2/3/2022 - Present Yes        Anemia (Chronic) ICD-10-CM: D64.9  ICD-9-CM: 285.9  2/3/2022 - Present Yes        Diastolic CHF, acute on chronic [facility]) ICD-10-CM: I50.33  ICD-9-CM: 428.33, 428.0  11/11/2021 - Present Yes        Restrictive lung disease (Chronic) ICD-10-CM: J98.4  ICD-9-CM: 518.89  10/22/2020 - Present Yes        Controlled type 2 diabetes mellitus without complication, without long-term current use of insulin (HCC) (Chronic) ICD-10-CM: E11.9  ICD-9-CM: 250.00  9/9/2020 - Present Yes        * (Principal) Acute on chronic respiratory failure with hypoxia and hypercapnia (HCC) ICD-10-CM: J96.21, J96.22  ICD-9-CM: 518.84, 786.09, 799.02  6/25/2020 - Present Yes        Hypertension (Chronic) ICD-10-CM: I10  ICD-9-CM: 401.9  Unknown - Present Yes        Class 3 obesity with alveolar hypoventilation, serious comorbidity, and body mass index (BMI) of 50.0 to 59.9 in adult [facility]) (Chronic) ICD-10-CM: J12.0, Z68.43  ICD-9-CM: 278.03, V85.43  Unknown - Present Yes    Overview Signed 2/8/2019 11:59 AM by Dioni Asencio MD     BMI 45.8- 5/2/12                   Objective:     Patient Vitals for the past 24 hrs:   Temp Pulse Resp BP SpO2   02/11/22 0814     95 %   02/11/22 0755 98.3 °F (36.8 °C) 95 19 99/79 92 %   02/11/22 0517 97.8 °F (36.6 °C) 92 20 (!) 115/59 93 %   02/11/22 0001     92 %   02/10/22 2359 98.3 °F (36.8 °C) 85 18 113/65 98 %   02/10/22 1942 98.3 °F (36.8 °C) 85 18 119/70 99 %   02/10/22 1615 98.9 °F (37.2 °C) 85 18 98/67 95 %   02/10/22 1551     96 %   02/10/22 1223     95 %   02/10/22 1130 97.9 °F (36.6 °C) 87 18 102/67 94 %     Oxygen Therapy  O2 Sat (%): 95 % (02/11/22 0814)  Pulse via Oximetry: 82 beats per minute (02/11/22 0814)  O2 Device: Hi flow nasal cannula (02/11/22 0827)  Skin Assessment: Clean, dry, & intact (02/11/22 0415)  Skin Protection for O2 Device: No (02/11/22 0415)  O2 Flow Rate (L/min): 8 l/min (02/11/22 0827)  FIO2 (%): 60 % (02/11/22 0001)    Estimated body mass index is 51.4 kg/m² as calculated from the following:    Height as of this encounter: 5' 7\" (1.702 m). Weight as of this encounter: 148.9 kg (328 lb 3.2 oz). Intake/Output Summary (Last 24 hours) at 2/11/2022 1044  Last data filed at 2/11/2022 7843  Gross per 24 hour   Intake 470 ml   Output 2375 ml   Net -1905 ml         Physical Exam:     Blood pressure 99/79, pulse 95, temperature 98.3 °F (36.8 °C), resp. rate 19, height 5' 7\" (1.702 m), weight 148.9 kg (328 lb 3.2 oz), SpO2 95 %. General:    Obese. No overt distress  Head:  Normocephalic, atraumatic  Eyes:  Sclerae appear normal.  Pupils equally round. ENT:  Nares appear normal, no drainage. Moist oral mucosa  Neck:  No restricted ROM. Trachea midline   CV:   Irregular. No jugular venous distension. Lungs:   Diminished anteriorly. Respirations even, unlabored  Abdomen:    nondistended. Extremities: No cyanosis or clubbing. No real edema, suspect baseline habitus  Skin:     No rashes and normal coloration. Warm and dry. Neuro:  CN II-XII grossly intact. Sensation intact. A&Ox3  Psych:  Normal mood and affect. I have reviewed ordered lab tests and independently visualized imaging below:    Recent Labs:  Recent Results (from the past 48 hour(s))   GLUCOSE, POC    Collection Time: 02/09/22 11:14 AM   Result Value Ref Range    Glucose (POC) 147 (H) 65 - 100 mg/dL    Performed by [facility]    GLUCOSE, POC    Collection Time: 02/09/22  3:51 PM   Result Value Ref Range    Glucose (POC) 138 (H) 65 - 100 mg/dL    Performed by [facility]    GLUCOSE, POC    Collection Time: 02/09/22  8:05 PM   Result Value Ref Range    Glucose (POC) 134 (H) 65 - 100 mg/dL    Performed by Ananya Suarez    CBC WITH AUTOMATED DIFF    Collection Time: 02/10/22  7:20 AM   Result Value Ref Range    WBC 9.1 4.3 - 11.1 K/uL    RBC 3.69 (L) 4.05 - 5.2 M/uL    HGB 8.6 (L) 11.7 - 15.4 g/dL    HCT 31.8 (L) 35.8 - 46.3 %    MCV 86.2 79.6 - 97.8 FL    MCH 23.3 (L) 26.1 - 32.9 PG    MCHC 27.0 (L) 31.4 - 35.0 g/dL    RDW 24.2 (H) 11.9 - 14.6 %    PLATELET 754 498 - 972 K/uL    MPV 10.2 9.4 - 12.3 FL    ABSOLUTE NRBC 0.00 0.0 - 0.2 K/uL    DF AUTOMATED      NEUTROPHILS 73 43 - 78 %    LYMPHOCYTES 11 (L) 13 - 44 %    MONOCYTES 10 4.0 - 12.0 %    EOSINOPHILS 5 0.5 - 7.8 %    BASOPHILS 1 0.0 - 2.0 %    IMMATURE GRANULOCYTES 1 0.0 - 5.0 %    ABS. NEUTROPHILS 6.7 1.7 - 8.2 K/UL    ABS. LYMPHOCYTES 1.0 0.5 - 4.6 K/UL    ABS. MONOCYTES 0.9 0.1 - 1.3 K/UL    ABS. EOSINOPHILS 0.4 0.0 - 0.8 K/UL    ABS. BASOPHILS 0.1 0.0 - 0.2 K/UL    ABS. IMM.  GRANS. 0.1 0.0 - 0.5 K/UL   METABOLIC PANEL, BASIC    Collection Time: 02/10/22  7:20 AM   Result Value Ref Range    Sodium 140 136 - 145 mmol/L    Potassium 3.8 3.5 - 5.1 mmol/L    Chloride 96 (L) 98 - 107 mmol/L    CO2 40 (H) 21 - 32 mmol/L    Anion gap 4 (L) 7 - 16 mmol/L    Glucose 111 (H) 65 - 100 mg/dL    BUN 10 8 - 23 MG/DL    Creatinine 0.60 0.6 - 1.0 MG/DL    GFR est AA >60 >60 ml/min/1.73m2    GFR est non-AA >60 >60 ml/min/1.73m2    Calcium 9.1 8.3 - 10.4 MG/DL   MAGNESIUM    Collection Time: 02/10/22  7:20 AM   Result Value Ref Range    Magnesium 1.9 1.6 - 2.3 mg/dL   GLUCOSE, POC    Collection Time: 02/10/22  7:24 AM   Result Value Ref Range    Glucose (POC) 121 (H) 65 - 100 mg/dL    Performed by [facility]    GLUCOSE, POC    Collection Time: 02/10/22 11:30 AM   Result Value Ref Range    Glucose (POC) 129 (H) 65 - 100 mg/dL    Performed by [facility]    GLUCOSE, POC    Collection Time: 02/10/22  4:13 PM   Result Value Ref Range    Glucose (POC) 133 (H) 65 - 100 mg/dL    Performed by [facility]    GLUCOSE, POC    Collection Time: 02/10/22  7:43 PM   Result Value Ref Range    Glucose (POC) 142 (H) 65 - 100 mg/dL    Performed by Reggie Arora    CBC WITH AUTOMATED DIFF    Collection Time: 02/11/22  6:12 AM   Result Value Ref Range    WBC 7.4 4.3 - 11.1 K/uL    RBC 3.81 (L) 4.05 - 5.2 M/uL    HGB 8.9 (L) 11.7 - 15.4 g/dL    HCT 33.5 (L) 35.8 - 46.3 %    MCV 87.9 79.6 - 97.8 FL    MCH 23.4 (L) 26.1 - 32.9 PG    MCHC 26.6 (L) 31.4 - 35.0 g/dL    RDW 24.7 (H) 11.9 - 14.6 %    PLATELET 004 188 - 922 K/uL    MPV 10.9 9.4 - 12.3 FL    ABSOLUTE NRBC 0.00 0.0 - 0.2 K/uL    DF AUTOMATED      NEUTROPHILS 68 43 - 78 %    LYMPHOCYTES 14 13 - 44 %    MONOCYTES 10 4.0 - 12.0 %    EOSINOPHILS 7 0.5 - 7.8 %    BASOPHILS 1 0.0 - 2.0 %    IMMATURE GRANULOCYTES 1 0.0 - 5.0 %    ABS. NEUTROPHILS 5.0 1.7 - 8.2 K/UL    ABS. LYMPHOCYTES 1.0 0.5 - 4.6 K/UL    ABS. MONOCYTES 0.7 0.1 - 1.3 K/UL    ABS. EOSINOPHILS 0.5 0.0 - 0.8 K/UL    ABS. BASOPHILS 0.1 0.0 - 0.2 K/UL    ABS. IMM.  GRANS. 0.0 0.0 - 0.5 K/UL   METABOLIC PANEL, BASIC    Collection Time: 02/11/22  6:12 AM   Result Value Ref Range    Sodium 141 136 - 145 mmol/L    Potassium 3.4 (L) 3.5 - 5.1 mmol/L    Chloride 99 98 - 107 mmol/L    CO2 39 (H) 21 - 32 mmol/L    Anion gap 3 (L) 7 - 16 mmol/L    Glucose 103 (H) 65 - 100 mg/dL    BUN 9 8 - 23 MG/DL    Creatinine 0.60 0.6 - 1.0 MG/DL    GFR est AA >60 >60 ml/min/1.73m2    GFR est non-AA >60 >60 ml/min/1.73m2    Calcium 9.0 8.3 - 10.4 MG/DL   GLUCOSE, POC    Collection Time: 02/11/22  7:47 AM   Result Value Ref Range    Glucose (POC) 121 (H) 65 - 100 mg/dL    Performed by [facility]        All Micro Results     Procedure Component Value Units Date/Time    COVID-19 RAPID TEST [[id number]] Collected: 02/05/22 0547    Order Status: Completed Specimen: Nasopharyngeal Updated: 02/05/22 0650     Specimen source NASAL        COVID-19 rapid test Not detected        Comment:      The specimen is NEGATIVE for SARS-CoV-2, the novel coronavirus associated with COVID-19. A negative result does not rule out COVID-19. This test has been authorized by the FDA under an Emergency Use Authorization (EUA) for use by authorized laboratories. Fact sheet for Healthcare Providers: ConventionUpdate.co.nz  Fact sheet for Patients: ConventionUpdate.co.nz       Methodology: Isothermal Nucleic Acid Amplification               Other Studies:  No results found.     Current Meds:  Current Facility-Administered Medications   Medication Dose Route Frequency    potassium chloride (K-DUR, KLOR-CON M20) SR tablet 40 mEq  40 mEq Oral NOW    guaiFENesin ER (MUCINEX) tablet 1,200 mg  1,200 mg Oral Q12H    lip protectant (BLISTEX) ointment 1 Each  1 Each Topical PRN    sodium chloride (OCEAN) 0.65 % nasal squeeze bottle 2 Spray  2 Spray Both Nostrils Q2H PRN    metoprolol succinate (TOPROL-XL) XL tablet 50 mg  50 mg Oral DAILY    rivaroxaban (XARELTO) tablet 20 mg  20 mg Oral DAILY WITH LUNCH    ferrous sulfate tablet 325 mg  1 Tablet Oral BID WITH MEALS    dilTIAZem ER (CARDIZEM CD) capsule 300 mg 300 mg Oral DAILY    pantoprazole (PROTONIX) tablet 40 mg  40 mg Oral Q12H    digoxin (LANOXIN) tablet 0.25 mg  0.25 mg Oral DAILY    levalbuterol (XOPENEX) nebulizer soln 0.63 mg/3 mL  0.63 mg Nebulization QID RT    acetaminophen (TYLENOL) tablet 650 mg  650 mg Oral Q4H PRN    0.9% sodium chloride infusion 250 mL  250 mL IntraVENous PRN    sodium chloride (NS) flush 5-10 mL  5-10 mL IntraVENous Q8H    sodium chloride (NS) flush 5-10 mL  5-10 mL IntraVENous PRN    furosemide (LASIX) injection 40 mg  40 mg IntraVENous BID    insulin lispro (HUMALOG) injection   SubCUTAneous AC&HS    oxyCODONE IR (ROXICODONE) tablet 5 mg  5 mg Oral Q4H PRN       Signed:  Kaylen Cosme MD    Part of this note may have been written by using a voice dictation software. The note has been proof read but may still contain some grammatical/other typographical errors.

## 2022-02-11 NOTE — PROGRESS NOTES
No new discharge needs identified. SOB improving; down to 8L from 10L. Will continue to monitor. Care Management Interventions  PCP Verified by CM: Yes  Mode of Transport at Discharge:  Other (see comment)  Transition of Care Consult (CM Consult): Discharge [address] Yoselin: Yes  MyChart Signup: No  Discharge Durable Medical Equipment: No  Physical Therapy Consult: Yes  Occupational Therapy Consult: Yes  Speech Therapy Consult: No  Support Systems: Child(adelita),Other Family Member(s),[employer],Friend/Neighbor  Confirm Follow Up Transport: Family  The Plan for Transition of Care is Related to the Following Treatment Goals : Return home and back to her baseline  Discharge Location  Patient Expects to be Discharged to[de-identified] Home with [facility])

## 2022-02-11 NOTE — PROGRESS NOTES
Comprehensive Nutrition Assessment    Type and Reason for Visit: Initial,RD nutrition re-screen/LOS    Nutrition Recommendations/Plan:   Meals and Snacks:  Continue current diet. ETC diet. Malnutrition Assessment:  Malnutrition Status: No malnutrition    Nutrition Assessment:   Nutrition History: Pt reports intake and appetite at baseline excluding ~4 days PTA. Pt reports limited intake starting 4 days PTA r/t stomach pain. Pt denies any unintentional weight loss. Pt denies prior use of ONS, states dislike of any and all liquids. Nutrition Background: Pt with PMH significant for Afib, HTN, morbid obesity complicated by LUNA/OHS, chronic diastolic heart failure admitted on 2/3 with [place] RVR, acute hypoxic respiratory failure, severe iron deficiency anemia. Pt presented with worsening SOB, fatigue, and worsening swelling in her lower extremities  Nutrition Interval:  Pt seen at bedside, no visitors present. Lunch at tray at bedside, observed 100% tray consumed. Pt reports similar intake during admission, dependent on what foods are being served. Pt also states dislike of all liquids- states \"I don't consume anything wet- I make myself drink water. \" Pt requests sugar cookie for dinner. No other needs for RD at this time. Nutrition Related Findings:   No jarrod signs of muscle or fat wasting noted. ETC diet 2/6.  Wound Type:  (see WC documentation)    Current Nutrition Therapies:  ADULT DIET Easy to Chew; Low Sodium (2 gm)    Current Intake:   Average Meal Intake: % Average Supplement Intake: None ordered      Anthropometric Measures:  Height: 5' 7\" (170.2 cm)  Current Body Wt: 148.9 kg (328 lb 4.2 oz) (2/10), Weight source: Standing scale  BMI: 51.4, Obese class 3 (BMI 40.0 or greater)  Admission Body Weight: 358 lb 4 oz (2/3 ; source not specified )  Ideal Body Weight (lbs) (Calculated): 135 lbs (61 kg), 243.2 %  Usual Body Wt: 149.7 kg (330 lb) (per pt), Percent weight change: -0.5          Edema: LLE: Non-pitting (2/11/2022  8:27 AM)  RLE: Non-pitting (2/11/2022  8:27 AM)     Estimated Daily Nutrient Needs:  Energy (kcal/day): 2276-4707 (Kcal/kg (25-30), Weight Used: Ideal (61kg))  Protein (g/day): 61-76 (1-1.25g/kg) Weight Used: (Ideal (61kg))  Fluid (ml/day):   (1 ml/kcal)    Nutrition Diagnosis:   No nutrition diagnosis at this time      Nutrition Interventions:   Food and/or Nutrient Delivery: Continue current diet     Coordination of Nutrition Care: Continue to monitor while inpatient    Goals:       Active Goal: Continue to meet >75% of estimated nutrition needs via PO intake by next RD assessment    Nutrition Monitoring and Evaluation:      Food/Nutrient Intake Outcomes: Food and nutrient intake  Physical Signs/Symptoms Outcomes: Meal time behavior,Weight    Discharge Planning:    Continue current diet    Chrissy Smallwood RD, LD  Contact: [phone]      Disaster Mode Active

## 2022-02-11 NOTE — PROGRESS NOTES
Problem: Pressure Injury - Risk of  Goal: *Prevention of pressure injury  Description: Document Mukesh Scale and appropriate interventions in the flowsheet. Outcome: Progressing Towards Goal  Note: Pressure Injury Interventions:  Sensory Interventions: Assess changes in LOC,Avoid rigorous massage over bony prominences,Keep linens dry and wrinkle-free,Minimize linen layers,Pad between skin to skin    Moisture Interventions: Absorbent underpads,Internal/External urinary devices,Minimize layers    Activity Interventions: Increase time out of bed,Pressure redistribution bed/mattress(bed type),PT/OT evaluation    Mobility Interventions: HOB 30 degrees or less,Pressure redistribution bed/mattress (bed type),PT/OT evaluation    Nutrition Interventions: Document food/fluid/supplement intake,Offer support with meals,snacks and hydration    Friction and Shear Interventions: Feet elevated on foot rest,Foam dressings/transparent film/skin sealants,HOB 30 degrees or less,Minimize layers,Apply protective barrier, creams and emollients                Problem: Falls - Risk of  Goal: *Absence of Falls  Description: Document Helen Fall Risk and appropriate interventions in the flowsheet.   Outcome: Progressing Towards Goal  Note: Fall Risk Interventions:  Mobility Interventions: Bed/chair exit alarm,Communicate number of staff needed for ambulation/transfer,Patient to call before getting OOB         Medication Interventions: Teach patient to arise slowly,Patient to call before getting OOB,Evaluate medications/consider consulting pharmacy    Elimination Interventions: Bed/chair exit alarm,Call light in reach,Patient to call for help with toileting needs              Problem: Gas Exchange - Impaired  Goal: *Absence of hypoxia  Outcome: Progressing Towards Goal

## 2022-02-11 NOTE — PROGRESS NOTES
Merlin Richmond  Admission Date: 2/3/2022         Daily Progress Note: 2/11/2022    The patient's chart is reviewed and the patient is discussed with the staff. Background: 61 y.o.  female, PMH RLD, HTN, chronic right knee pain, seen and evaluated at the request of Claudis Fay for acute respiratory failure with hypoxia. The patient was last seen November 2021 in our office for follow up for restrictive lung disease due to kyphosis and obesity. The patient previously was admitted here back in September 2021 with pulmonary edema and volume overload due to chronic diastolic heart failure, also noted on previous CT scans. At that time the patient was noted to have hypoxemia during that admission as well. The patient continues to use albuterol as needed at home as well as O2 at 2L NC nightly. The patient states she continued to be more short of breath with a dry cough at home but is no longer coughing here. She endorses tachycardia with minimal movement as well. She denies any fevers, chills, nausea, vomiting, or recent sick contacts. She is concerned that she is now up to 15L NC O2. She denies any chest pain. CXR today reveals volume overload.     Subjective:   Down to 7L NC  feeld stronger  Less LE edema    Current Facility-Administered Medications   Medication Dose Route Frequency    guaiFENesin ER (MUCINEX) tablet 1,200 mg  1,200 mg Oral Q12H    lip protectant (BLISTEX) ointment 1 Each  1 Each Topical PRN    sodium chloride (OCEAN) 0.65 % nasal squeeze bottle 2 Spray  2 Spray Both Nostrils Q2H PRN    metoprolol succinate (TOPROL-XL) XL tablet 50 mg  50 mg Oral DAILY    rivaroxaban (XARELTO) tablet 20 mg  20 mg Oral DAILY WITH LUNCH    ferrous sulfate tablet 325 mg  1 Tablet Oral BID WITH MEALS    dilTIAZem ER (CARDIZEM CD) capsule 300 mg  300 mg Oral DAILY    pantoprazole (PROTONIX) tablet 40 mg  40 mg Oral Q12H    digoxin (LANOXIN) tablet 0.25 mg  0.25 mg Oral DAILY    levalbuterol (XOPENEX) nebulizer soln 0.63 mg/3 mL  0.63 mg Nebulization QID RT    acetaminophen (TYLENOL) tablet 650 mg  650 mg Oral Q4H PRN    0.9% sodium chloride infusion 250 mL  250 mL IntraVENous PRN    sodium chloride (NS) flush 5-10 mL  5-10 mL IntraVENous Q8H    sodium chloride (NS) flush 5-10 mL  5-10 mL IntraVENous PRN    furosemide (LASIX) injection 40 mg  40 mg IntraVENous BID    insulin lispro (HUMALOG) injection   SubCUTAneous AC&HS    oxyCODONE IR (ROXICODONE) tablet 5 mg  5 mg Oral Q4H PRN     Review of Systems  Constitutional: negative for fever, chills, sweats  Cardiovascular: + LE edema. negative for chest pain, palpitations, syncope  Gastrointestinal:  negative for dysphagia, reflux, vomiting, diarrhea, abdominal pain, or melena  Neurologic:  negative for focal weakness, numbness, headache  Objective:     Vitals:    02/11/22 0001 02/11/22 0517 02/11/22 0755 02/11/22 0814   BP:  (!) 115/59 99/79    Pulse:  92 95    Resp:  20 19    Temp:  97.8 °F (36.6 °C) 98.3 °F (36.8 °C)    SpO2: 92% 93% 92% 95%   Weight:  328 lb 3.2 oz (148.9 kg)     Height:           Intake/Output Summary (Last 24 hours) at 2/11/2022 1041  Last data filed at 2/11/2022 9689  Gross per 24 hour   Intake 470 ml   Output 2375 ml   Net -1905 ml     Physical Exam:   Constitution:  the patient is well developed and in no acute distress  HEENT:  Sclera clear, pupils equal, oral mucosa moist  Respiratory: CTA B in anterior lung fields. Cardiovascular:  RRR without M,G,R  Gastrointestinal: soft and non-tender; with positive bowel sounds. Musculoskeletal: warm without cyanosis. There is 2-3 + B lower extremity edema.   Skin:  no jaundice or rashes, no wounds   Neurologic: no gross neuro deficits     Psychiatric:  alert and oriented x ppt    CXR:       LAB:  Recent Labs     02/11/22  0612 02/10/22  0720 02/09/22  1008   WBC 7.4 9.1 9.9   HGB 8.9* 8.6* 8.6*   HCT 33.5* 31.8* 32.0*    245 239   PCT  --   --  0.20     Recent Labs     02/11/22  0612 02/10/22  0720 02/09/22  1008    140 138   K 3.4* 3.8 3.6   CL 99 96* 94*   CO2 39* 40* 40*   * 111* 163*   BUN 9 10 10   CREA 0.60 0.60 0.60   MG  --  1.9 1.6   CA 9.0 9.1 8.9       Assessment and Plan:  (Medical Decision Making)   Principal Problem:    Acute on chronic respiratory failure with hypoxia and hypercapnia (HCC) (6/25/2020)  -down to 7L ,continue to wean ,may need home 02 upon discharge     Active Problems:    Hypertension ()          Class 3 obesity with alveolar hypoventilation, serious comorbidity, and body mass index (BMI) of 50.0 to 59.9 in [facility]) ()  -contributing to her medical problems          Controlled type 2 diabetes mellitus without complication, without long-term current use of insulin ([address] (9/9/2020)        Restrictive lung disease (10/22/2020)    Likely due to obesity primarily. Diastolic CHF, acute on chronic (HCC) (11/11/2021)    Continue lasix and monitor I/O. Has a lot more fluid to remove by exam.       Atrial fibrillation with RVR ([address] (2/3/2022)        Anemia (2/3/2022)        Chronic respiratory failure with hypoxia and hypercapnia (HCC) ()    Cont bipap at night. Tolerating better now. Will reassess her abg when more euvolemic to help determine if she may need NIPPV at home. More than 50% of the time documented was spent in face-to-face contact with the patient and in the care of the patient on the floor/unit where the patient is located.     Mary Herman MD

## 2022-02-12 LAB
ANION GAP SERPL CALC-SCNC: 5 MMOL/L (ref 7–16)
BASOPHILS # BLD: 0.1 K/UL (ref 0–0.2)
BASOPHILS NFR BLD: 1 % (ref 0–2)
BUN SERPL-MCNC: 13 MG/DL (ref 8–23)
CALCIUM SERPL-MCNC: 8.8 MG/DL (ref 8.3–10.4)
CHLORIDE SERPL-SCNC: 98 MMOL/L (ref 98–107)
CO2 SERPL-SCNC: 35 MMOL/L (ref 21–32)
CREAT SERPL-MCNC: 0.7 MG/DL (ref 0.6–1)
DIFFERENTIAL METHOD BLD: ABNORMAL
EOSINOPHIL # BLD: 0.5 K/UL (ref 0–0.8)
EOSINOPHIL NFR BLD: 6 % (ref 0.5–7.8)
ERYTHROCYTE [DISTWIDTH] IN BLOOD BY AUTOMATED COUNT: 25.2 % (ref 11.9–14.6)
GLUCOSE BLD STRIP.AUTO-MCNC: 114 MG/DL (ref 65–100)
GLUCOSE BLD STRIP.AUTO-MCNC: 127 MG/DL (ref 65–100)
GLUCOSE BLD STRIP.AUTO-MCNC: 152 MG/DL (ref 65–100)
GLUCOSE BLD STRIP.AUTO-MCNC: 209 MG/DL (ref 65–100)
GLUCOSE SERPL-MCNC: 97 MG/DL (ref 65–100)
HCT VFR BLD AUTO: 34.9 % (ref 35.8–46.3)
HGB BLD-MCNC: 9.7 G/DL (ref 11.7–15.4)
IMM GRANULOCYTES # BLD AUTO: 0.1 K/UL (ref 0–0.5)
IMM GRANULOCYTES NFR BLD AUTO: 1 % (ref 0–5)
LYMPHOCYTES # BLD: 1.2 K/UL (ref 0.5–4.6)
LYMPHOCYTES NFR BLD: 13 % (ref 13–44)
MAGNESIUM SERPL-MCNC: 2 MG/DL (ref 1.6–2.3)
MCH RBC QN AUTO: 23.8 PG (ref 26.1–32.9)
MCHC RBC AUTO-ENTMCNC: 27.8 G/DL (ref 31.4–35)
MCV RBC AUTO: 85.7 FL (ref 79.6–97.8)
MONOCYTES # BLD: 0.8 K/UL (ref 0.1–1.3)
MONOCYTES NFR BLD: 9 % (ref 4–12)
NEUTS SEG # BLD: 6.2 K/UL (ref 1.7–8.2)
NEUTS SEG NFR BLD: 70 % (ref 43–78)
NRBC # BLD: 0 K/UL (ref 0–0.2)
PLATELET # BLD AUTO: 157 K/UL
PLATELET COMMENTS,PCOM: SLIGHT
PMV BLD AUTO: 11.4 FL (ref 9.4–12.3)
POTASSIUM SERPL-SCNC: 4.5 MMOL/L (ref 3.5–5.1)
RBC # BLD AUTO: 4.07 M/UL (ref 4.05–5.2)
RBC MORPH BLD: ABNORMAL
RBC MORPH BLD: ABNORMAL
SERVICE CMNT-IMP: ABNORMAL
SODIUM SERPL-SCNC: 138 MMOL/L (ref 136–145)
WBC # BLD AUTO: 8.9 K/UL (ref 4.3–11.1)
WBC MORPH BLD: ABNORMAL

## 2022-02-12 PROCEDURE — 74011636637 HC RX REV CODE- 636/637: Performed by: INTERNAL MEDICINE

## 2022-02-12 PROCEDURE — 74011250637 HC RX REV CODE- 250/637: Performed by: INTERNAL MEDICINE

## 2022-02-12 PROCEDURE — 74011000250 HC RX REV CODE- 250: Performed by: INTERNAL MEDICINE

## 2022-02-12 PROCEDURE — 2709999900 HC NON-CHARGEABLE SUPPLY

## 2022-02-12 PROCEDURE — 94760 N-INVAS EAR/PLS OXIMETRY 1: CPT

## 2022-02-12 PROCEDURE — 94660 CPAP INITIATION&MGMT: CPT

## 2022-02-12 PROCEDURE — 74011250637 HC RX REV CODE- 250/637: Performed by: NURSE PRACTITIONER

## 2022-02-12 PROCEDURE — 80048 BASIC METABOLIC PNL TOTAL CA: CPT

## 2022-02-12 PROCEDURE — 99232 SBSQ HOSP IP/OBS MODERATE 35: CPT | Performed by: INTERNAL MEDICINE

## 2022-02-12 PROCEDURE — 85025 COMPLETE CBC W/AUTO DIFF WBC: CPT

## 2022-02-12 PROCEDURE — 82962 GLUCOSE BLOOD TEST: CPT

## 2022-02-12 PROCEDURE — 77010033678 HC OXYGEN DAILY

## 2022-02-12 PROCEDURE — 74011000250 HC RX REV CODE- 250: Performed by: EMERGENCY MEDICINE

## 2022-02-12 PROCEDURE — 36415 COLL VENOUS BLD VENIPUNCTURE: CPT

## 2022-02-12 PROCEDURE — 74011000250 HC RX REV CODE- 250: Performed by: HOSPITALIST

## 2022-02-12 PROCEDURE — 94640 AIRWAY INHALATION TREATMENT: CPT

## 2022-02-12 PROCEDURE — 83735 ASSAY OF MAGNESIUM: CPT

## 2022-02-12 PROCEDURE — 74011250636 HC RX REV CODE- 250/636: Performed by: INTERNAL MEDICINE

## 2022-02-12 PROCEDURE — 65660000000 HC RM CCU STEPDOWN

## 2022-02-12 RX ORDER — LEVALBUTEROL INHALATION SOLUTION 0.63 MG/3ML
0.63 SOLUTION RESPIRATORY (INHALATION)
Status: DISCONTINUED | OUTPATIENT
Start: 2022-02-12 | End: 2022-02-15 | Stop reason: HOSPADM

## 2022-02-12 RX ADMIN — OXYCODONE HYDROCHLORIDE 5 MG: 5 TABLET ORAL at 05:41

## 2022-02-12 RX ADMIN — POTASSIUM CHLORIDE 40 MEQ: 20 TABLET, EXTENDED RELEASE ORAL at 09:46

## 2022-02-12 RX ADMIN — Medication 4 UNITS: at 16:43

## 2022-02-12 RX ADMIN — DILTIAZEM HYDROCHLORIDE 300 MG: 180 CAPSULE, COATED, EXTENDED RELEASE ORAL at 09:45

## 2022-02-12 RX ADMIN — MIDODRINE HYDROCHLORIDE 10 MG: 5 TABLET ORAL at 09:45

## 2022-02-12 RX ADMIN — SODIUM CHLORIDE, PRESERVATIVE FREE 10 ML: 5 INJECTION INTRAVENOUS at 05:02

## 2022-02-12 RX ADMIN — LEVALBUTEROL HYDROCHLORIDE 0.63 MG: 0.63 SOLUTION RESPIRATORY (INHALATION) at 15:35

## 2022-02-12 RX ADMIN — METOPROLOL SUCCINATE 50 MG: 25 TABLET, EXTENDED RELEASE ORAL at 09:46

## 2022-02-12 RX ADMIN — FERROUS SULFATE TAB 325 MG (65 MG ELEMENTAL FE) 325 MG: 325 (65 FE) TAB at 16:43

## 2022-02-12 RX ADMIN — GUAIFENESIN 1200 MG: 600 TABLET ORAL at 09:46

## 2022-02-12 RX ADMIN — PANTOPRAZOLE SODIUM 40 MG: 40 TABLET, DELAYED RELEASE ORAL at 21:39

## 2022-02-12 RX ADMIN — Medication 2 UNITS: at 11:55

## 2022-02-12 RX ADMIN — LEVALBUTEROL HYDROCHLORIDE 0.63 MG: 0.63 SOLUTION RESPIRATORY (INHALATION) at 21:04

## 2022-02-12 RX ADMIN — FERROUS SULFATE TAB 325 MG (65 MG ELEMENTAL FE) 325 MG: 325 (65 FE) TAB at 09:46

## 2022-02-12 RX ADMIN — MIDODRINE HYDROCHLORIDE 10 MG: 5 TABLET ORAL at 11:57

## 2022-02-12 RX ADMIN — PANTOPRAZOLE SODIUM 40 MG: 40 TABLET, DELAYED RELEASE ORAL at 09:46

## 2022-02-12 RX ADMIN — LEVALBUTEROL HYDROCHLORIDE 0.63 MG: 0.63 SOLUTION RESPIRATORY (INHALATION) at 09:15

## 2022-02-12 RX ADMIN — FUROSEMIDE 40 MG: 10 INJECTION, SOLUTION INTRAMUSCULAR; INTRAVENOUS at 09:46

## 2022-02-12 RX ADMIN — FUROSEMIDE 40 MG: 10 INJECTION, SOLUTION INTRAMUSCULAR; INTRAVENOUS at 21:38

## 2022-02-12 RX ADMIN — MIDODRINE HYDROCHLORIDE 10 MG: 5 TABLET ORAL at 16:41

## 2022-02-12 RX ADMIN — LEVALBUTEROL HYDROCHLORIDE 0.63 MG: 0.63 SOLUTION RESPIRATORY (INHALATION) at 11:31

## 2022-02-12 RX ADMIN — SODIUM CHLORIDE, PRESERVATIVE FREE 10 ML: 5 INJECTION INTRAVENOUS at 21:39

## 2022-02-12 RX ADMIN — DIGOXIN 0.25 MG: 250 TABLET ORAL at 09:44

## 2022-02-12 RX ADMIN — RIVAROXABAN 20 MG: 20 TABLET, FILM COATED ORAL at 11:57

## 2022-02-12 RX ADMIN — GUAIFENESIN 1200 MG: 600 TABLET ORAL at 21:39

## 2022-02-12 NOTE — RT PROTOCOL NOTE
RT Inhaler-Nebulizer Bronchodilator Protocol Note    There is a bronchodilator order in the chart from a provider indicating to follow the RT Bronchodilator Protocol and there is an Initiate RT Bronchodilator Protocol order as well (see protocol at bottom of note). CXR Findings:  No results found for this or any previous visit from the past 2 days. The findings from the last RT Protocol Assessment were as follows:  History of Pulmonary Disease: Smoker 15 pack years or more  Respiratory Pattern: Regular pattern and RR 12-20 bpm  Breath Sounds: Slighly diminished and/or crackles  Cough: Strong, spontaneous, non-productive  Indication for Bronchodilator Therapy: On home bronchodilators  Bronchodilator Assessment Score: 3    Aerosolized bronchodilator medication orders have been revised according to the RT Inhaler-Nebulizer Bronchodilator Protocol below. Respiratory Therapist to perform RT Therapy Protocol Assessment initially then follow the protocol. Repeat RT Therapy Protocol Assessment PRN for score 0-3 or on second treatment, BID, and PRN for scores above 3. No Indications  adjust the frequency to every 6 hours PRN wheezing or bronchospasm, if no treatments needed after 48 hours then discontinue using Per Protocol order mode. If indication present, adjust the RT bronchodilator orders based on the Bronchodilator Assessment Score as indicated below. Use Inhaler orders unless patient has one or more of the following: on home nebulizer, not able to hold breath for 10 seconds, is not alert and oriented, cannot activate and use MDI correctly, or respiratory rate 25 breaths per minute or more, then use the equivalent nebulizer order(s) with same Frequency and PRN reasons based on the score. If a patient is on this medication at home then do not decrease Frequency below that used at home.     0-3  enter or revise RT bronchodilator order(s) to equivalent RT Bronchodilator order with Frequency of every 4 hours PRN for wheezing or increased work of breathing using Per Protocol order mode. 4-6  enter or revise RT Bronchodilator order(s) to two equivalent RT bronchodilator orders with one order with BID Frequency and one order with Frequency of every 4 hours PRN wheezing or increased work of breathing using Per Protocol order mode. 7-10  enter or revise RT Bronchodilator order(s) to two equivalent RT bronchodilator orders with one order with TID Frequency and one order with Frequency of every 4 hours PRN wheezing or increased work of breathing using Per Protocol order mode. 11-13  enter or revise RT Bronchodilator order(s) to one equivalent RT bronchodilator order with QID Frequency and an Albuterol order with Frequency of every 4 hours PRN wheezing or increased work of breathing using Per Protocol order mode. Greater than 13  enter or revise RT Bronchodilator order(s) to one equivalent RT bronchodilator order with every 4 hours Frequency and an Albuterol order with Frequency of every 2 hours PRN wheezing or increased work of breathing using Per Protocol order mode. RT to enter RT Home Evaluation for COPD & MDI Assessment order using Per Protocol order mode.     Electronically signed by RT Bhanu on 2/12/2022 at 3:38 PM

## 2022-02-12 NOTE — PROGRESS NOTES
Hospitalist Progress Note   Admit Date:  2/3/2022 11:07 AM   Name:  Ulices Perez   Age:  61 y.o. Sex:  female  :  1955   MRN:  [id number]   Room:  302/01    Presenting Complaint: Abnormal Lab Results and Peripheral Edema    Reason(s) for Admission: Atrial fibrillation with RVR [facility]) [I48.91]     Hospital Course & Interval History:   Ulices Perez is a 61 y.o. female with medical history of Afib (on Xarelto), HTN, morbid obesity complicated by LUNA/OHS, chronic diastolic heart failure admitted on 2/3 with A.fib RVR, acute hypoxic respiratory failure (room air sats 89%), severe iron deficiency anemia. Pt presented with worsening shortness of breath, fatigue, and worsening swelling in her lower extremities. ED Course: Hgb of 6.5 (last Hgb was 13 in 2021). MCV of 79 with RDW of 19. pBNP of 1300. CXR shows vascular congestion. EKG shows Afib with RVR with HRs in the 100s. Occult blood negative. Type/screen for pRBC transfusion. Given Lasix 40 mg IV once. Week of : EGD and colonoscopy on  showing gastritis [status post biopsy], small superficial prepyloric ulcer, diverticulosis and hemorrhoids. Patient oxygen requirement went from 5 to 15 L on  and pulmonary on board. Acute hypoxic respiratory failure seems to be secondary to volume overload, obesity, LUNA, OHS. Subjective/24hr Events (22):  o2 req down to 6L, improved. Still diuresing well. She seems to be getting used to the bipap. No CP, SOB. Assessment & Plan:       Acute on chronic respiratory failure with hypoxia and hypercapnia     Class 3 obesity with alveolar hypoventilation  -Charted O2 Flow Rate (L/min): 6 l/min. Wean as able  -suspect she will need significant respiratory support at baseline. Such as bipap or trilogy       Diastolic CHF, acute on chronic   -cont lasix.   Will need at discharge also, may need dose increase  -cont midodrine to facilitate diuresis  -KCL while on lasix  -reviewed I/Os:    Intake/Output Summary (Last 24 hours) at 2/12/2022 1044  Last data filed at 2/12/2022 2352  Gross per 24 hour   Intake 600 ml   Output 2500 ml   Net -1900 ml       Atrial fibrillation with RVR   -hb stable on xarelto. Daily CBC  -HR controlled. Cont Toprol and cardizem      Gastritis and small superficial prepyloric ulcer  -cont PPI      Iron Def Anemia   -daily CBC  -PO iron      Hypertension   -Controlled. Continue current management      Type 2 diabetes mellitus   -controlled. -Cont ISS      Dispo/Discharge Planning:    -PT/OT.   Home with HH at discharge  -PPD done    Diet:  ADULT DIET Easy to Chew; Low Sodium (2 gm)  DVT PPx: on [facility]  Code status: Prior    Hospital Problems as of 2/12/2022 Date Reviewed: 2/9/2022          Codes Class Noted - Resolved POA    Chronic respiratory failure with hypoxia and hypercapnia (HCC) ICD-10-CM: J96.11, J96.12  ICD-9-CM: 518.83, 799.02, 786.09  Unknown - Present Yes        Atrial fibrillation with RVR (HCC) ICD-10-CM: I48.91  ICD-9-CM: 427.31  2/3/2022 - Present Yes        Anemia (Chronic) ICD-10-CM: D64.9  ICD-9-CM: 285.9  2/3/2022 - Present Yes        Diastolic CHF, acute on chronic [facility]) ICD-10-CM: I50.33  ICD-9-CM: 428.33, 428.0  11/11/2021 - Present Yes        Restrictive lung disease (Chronic) ICD-10-CM: J98.4  ICD-9-CM: 518.89  10/22/2020 - Present Yes        Controlled type 2 diabetes mellitus without complication, without long-term current use of insulin (HCC) (Chronic) ICD-10-CM: E11.9  ICD-9-CM: 250.00  9/9/2020 - Present Yes        * (Principal) Acute on chronic respiratory failure with hypoxia and hypercapnia (HCC) ICD-10-CM: J96.21, J96.22  ICD-9-CM: 518.84, 786.09, 799.02  6/25/2020 - Present Yes        Hypertension (Chronic) ICD-10-CM: I10  ICD-9-CM: 401.9  Unknown - Present Yes        Class 3 obesity with alveolar hypoventilation, serious comorbidity, and body mass index (BMI) of 50.0 to 59.9 in adult [facility]) (Chronic) ICD-10-CM: [id number], [id number]  ICD-9-CM: 278.03, V85.43  Unknown - Present Yes    Overview Signed 2/8/2019 11:59 AM by Ellen Turner MD     BMI 45.8- 5/2/12                   Objective:     Patient Vitals for the past 24 hrs:   Temp Pulse Resp BP SpO2   02/12/22 0915     94 %   02/12/22 0821 97.9 °F (36.6 °C) 89 18 112/75 93 %   02/12/22 0446 98.9 °F (37.2 °C) 76 18 103/74 93 %   02/12/22 0045     96 %   02/12/22 0035 98.2 °F (36.8 °C) 87 18 117/82    02/11/22 2101     95 %   02/11/22 2000 98.2 °F (36.8 °C) 84 18 99/68 90 %   02/11/22 1726     93 %   02/11/22 1611 97.1 °F (36.2 °C) 76 18 104/73 92 %   02/11/22 1344     92 %   02/11/22 1201 98.5 °F (36.9 °C) 90 24 (!) 96/57 91 %   02/11/22 1133     95 %     Oxygen Therapy  O2 Sat (%): 94 % (02/12/22 0915)  Pulse via Oximetry: 103 beats per minute (02/12/22 0915)  O2 Device: Nasal cannula (02/12/22 0915)  Skin Assessment: Clean, dry, & intact (02/11/22 0415)  Skin Protection for O2 Device: No (02/11/22 0415)  O2 Flow Rate (L/min): 6 l/min (02/12/22 0915)  FIO2 (%): 40 % (02/12/22 0446)    Estimated body mass index is 50.42 kg/m² as calculated from the following:    Height as of this encounter: 5' 7\" (1.702 m). Weight as of this encounter: 146 kg (321 lb 14.4 oz). Intake/Output Summary (Last 24 hours) at 2/12/2022 1044  Last data filed at 2/12/2022 8200  Gross per 24 hour   Intake 600 ml   Output 2500 ml   Net -1900 ml         Physical Exam:     Blood pressure 112/75, pulse 89, temperature 97.9 °F (36.6 °C), resp. rate 18, height 5' 7\" (1.702 m), weight 146 kg (321 lb 14.4 oz), SpO2 94 %. General:    Obese. No overt distress  Head:  Normocephalic, atraumatic  Eyes:  Sclerae appear normal.  Pupils equally round. ENT:  Nares appear normal, no drainage. Moist oral mucosa  Neck:  No restricted ROM. Trachea midline   CV:   Irregular. No jugular venous distension. Lungs:   Diminished anteriorly.  Respirations even, unlabored  Abdomen: nondistended. Extremities: No cyanosis or clubbing. No real edema, suspect baseline habitus  Skin:     No rashes and normal coloration. Warm and dry. Neuro:  CN II-XII grossly intact. Sensation intact. A&Ox3  Psych:  Normal mood and affect. I have reviewed ordered lab tests and independently visualized imaging below:    Recent Labs:  Recent Results (from the past 48 hour(s))   GLUCOSE, POC    Collection Time: 02/10/22 11:30 AM   Result Value Ref Range    Glucose (POC) 129 (H) 65 - 100 mg/dL    Performed by RiserWaynautcquelinePCT    GLUCOSE, POC    Collection Time: 02/10/22  4:13 PM   Result Value Ref Range    Glucose (POC) 133 (H) 65 - 100 mg/dL    Performed by RiserWaynautcquelinePCT    GLUCOSE, POC    Collection Time: 02/10/22  7:43 PM   Result Value Ref Range    Glucose (POC) 142 (H) 65 - 100 mg/dL    Performed by Ananya Suarez    CBC WITH AUTOMATED DIFF    Collection Time: 02/11/22  6:12 AM   Result Value Ref Range    WBC 7.4 4.3 - 11.1 K/uL    RBC 3.81 (L) 4.05 - 5.2 M/uL    HGB 8.9 (L) 11.7 - 15.4 g/dL    HCT 33.5 (L) 35.8 - 46.3 %    MCV 87.9 79.6 - 97.8 FL    MCH 23.4 (L) 26.1 - 32.9 PG    MCHC 26.6 (L) 31.4 - 35.0 g/dL    RDW 24.7 (H) 11.9 - 14.6 %    PLATELET 321 770 - 816 K/uL    MPV 10.9 9.4 - 12.3 FL    ABSOLUTE NRBC 0.00 0.0 - 0.2 K/uL    DF AUTOMATED      NEUTROPHILS 68 43 - 78 %    LYMPHOCYTES 14 13 - 44 %    MONOCYTES 10 4.0 - 12.0 %    EOSINOPHILS 7 0.5 - 7.8 %    BASOPHILS 1 0.0 - 2.0 %    IMMATURE GRANULOCYTES 1 0.0 - 5.0 %    ABS. NEUTROPHILS 5.0 1.7 - 8.2 K/UL    ABS. LYMPHOCYTES 1.0 0.5 - 4.6 K/UL    ABS. MONOCYTES 0.7 0.1 - 1.3 K/UL    ABS. EOSINOPHILS 0.5 0.0 - 0.8 K/UL    ABS. BASOPHILS 0.1 0.0 - 0.2 K/UL    ABS. IMM.  GRANS. 0.0 0.0 - 0.5 K/UL   METABOLIC PANEL, BASIC    Collection Time: 02/11/22  6:12 AM   Result Value Ref Range    Sodium 141 136 - 145 mmol/L    Potassium 3.4 (L) 3.5 - 5.1 mmol/L    Chloride 99 98 - 107 mmol/L    CO2 39 (H) 21 - 32 mmol/L    Anion gap 3 (L) 7 - 16 mmol/L    Glucose 103 (H) 65 - 100 mg/dL    BUN 9 8 - 23 MG/DL    Creatinine 0.60 0.6 - 1.0 MG/DL    GFR est AA >60 >60 ml/min/1.73m2    GFR est non-AA >60 >60 ml/min/1.73m2    Calcium 9.0 8.3 - 10.4 MG/DL   MAGNESIUM    Collection Time: 02/11/22  6:12 AM   Result Value Ref Range    Magnesium 2.0 1.6 - 2.3 mg/dL   GLUCOSE, POC    Collection Time: 02/11/22  7:47 AM   Result Value Ref Range    Glucose (POC) 121 (H) 65 - 100 mg/dL    Performed by KumarPCT    GLUCOSE, POC    Collection Time: 02/11/22 11:56 AM   Result Value Ref Range    Glucose (POC) 124 (H) 65 - 100 mg/dL    Performed by Lennox    GLUCOSE, POC    Collection Time: 02/11/22  3:49 PM   Result Value Ref Range    Glucose (POC) 154 (H) 65 - 100 mg/dL    Performed by LitaSABA    GLUCOSE, POC    Collection Time: 02/11/22  8:38 PM   Result Value Ref Range    Glucose (POC) 118 (H) 65 - 100 mg/dL    Performed by Glynn    CBC WITH AUTOMATED DIFF    Collection Time: 02/12/22  7:08 AM   Result Value Ref Range    WBC 8.9 4.3 - 11.1 K/uL    RBC 4.07 4.05 - 5.2 M/uL    HGB 9.7 (L) 11.7 - 15.4 g/dL    HCT 34.9 (L) 35.8 - 46.3 %    MCV 85.7 79.6 - 97.8 FL    MCH 23.8 (L) 26.1 - 32.9 PG    MCHC 27.8 (L) 31.4 - 35.0 g/dL    RDW 25.2 (H) 11.9 - 14.6 %    PLATELET 752 K/uL    MPV 11.4 9.4 - 12.3 FL    ABSOLUTE NRBC 0.00 0.0 - 0.2 K/uL    DF AUTOMATED      NEUTROPHILS 70 43 - 78 %    LYMPHOCYTES 13 13 - 44 %    MONOCYTES 9 4.0 - 12.0 %    EOSINOPHILS 6 0.5 - 7.8 %    BASOPHILS 1 0.0 - 2.0 %    IMMATURE GRANULOCYTES 1 0.0 - 5.0 %    ABS. NEUTROPHILS 6.2 1.7 - 8.2 K/UL    ABS. LYMPHOCYTES 1.2 0.5 - 4.6 K/UL    ABS. MONOCYTES 0.8 0.1 - 1.3 K/UL    ABS. EOSINOPHILS 0.5 0.0 - 0.8 K/UL    ABS. BASOPHILS 0.1 0.0 - 0.2 K/UL    ABS. IMM.  GRANS. 0.1 0.0 - 0.5 K/UL    RBC COMMENTS SLIGHT  ANISOCYTOSIS + POIKILOCYTOSIS        RBC COMMENTS SLIGHT  HYPOCHROMIA        WBC COMMENTS Result Confirmed By Smear      PLATELET COMMENTS SLIGHT     METABOLIC PANEL, BASIC    Collection Time: 02/12/22  7:08 AM   Result Value Ref Range    Sodium 138 136 - 145 mmol/L    Potassium 4.5 3.5 - 5.1 mmol/L    Chloride 98 98 - 107 mmol/L    CO2 35 (H) 21 - 32 mmol/L    Anion gap 5 (L) 7 - 16 mmol/L    Glucose 97 65 - 100 mg/dL    BUN 13 8 - 23 MG/DL    Creatinine 0.70 0.6 - 1.0 MG/DL    GFR est AA >60 >60 ml/min/1.73m2    GFR est non-AA >60 >60 ml/min/1.73m2    Calcium 8.8 8.3 - 10.4 MG/DL   GLUCOSE, POC    Collection Time: 02/12/22  7:16 AM   Result Value Ref Range    Glucose (POC) 114 (H) 65 - 100 mg/dL    Performed by Celso    GLUCOSE, POC    Collection Time: 02/12/22 10:32 AM   Result Value Ref Range    Glucose (POC) 152 (H) 65 - 100 mg/dL    Performed by MargaretPEX CardSudeep        All Micro Results     Procedure Component Value Units Date/Time    COVID-19 RAPID TEST [[id number]] Collected: 02/05/22 0547    Order Status: Completed Specimen: Nasopharyngeal Updated: 02/05/22 0650     Specimen source NASAL        COVID-19 rapid test Not detected        Comment:      The specimen is NEGATIVE for SARS-CoV-2, the novel coronavirus associated with COVID-19. A negative result does not rule out COVID-19. This test has been authorized by the FDA under an Emergency Use Authorization (EUA) for use by authorized laboratories. Fact sheet for Healthcare Providers: ConventionUpdate.co.nz  Fact sheet for Patients: ConventionUpdate.co.nz       Methodology: Isothermal Nucleic Acid Amplification               Other Studies:  No results found.     Current Meds:  Current Facility-Administered Medications   Medication Dose Route Frequency    potassium chloride (K-DUR, KLOR-CON M20) SR tablet 40 mEq  40 mEq Oral DAILY    midodrine (PROAMATINE) tablet 10 mg  10 mg Oral TID WITH MEALS    oxybutynin (DITROPAN) tablet 5 mg  5 mg Oral Q8H PRN    zinc oxide-cod liver oil (DESITIN) 40 % paste   Topical PRN    guaiFENesin ER (MUCINEX) tablet 1,200 mg  1,200 mg Oral Q12H    lip protectant (BLISTEX) ointment 1 Each  1 Each Topical PRN    sodium chloride (OCEAN) 0.65 % nasal squeeze bottle 2 Spray  2 Spray Both Nostrils Q2H PRN    metoprolol succinate (TOPROL-XL) XL tablet 50 mg  50 mg Oral DAILY    rivaroxaban (XARELTO) tablet 20 mg  20 mg Oral DAILY WITH LUNCH    ferrous sulfate tablet 325 mg  1 Tablet Oral BID WITH MEALS    dilTIAZem ER (CARDIZEM CD) capsule 300 mg  300 mg Oral DAILY    pantoprazole (PROTONIX) tablet 40 mg  40 mg Oral Q12H    digoxin (LANOXIN) tablet 0.25 mg  0.25 mg Oral DAILY    levalbuterol (XOPENEX) nebulizer soln 0.63 mg/3 mL  0.63 mg Nebulization QID RT    acetaminophen (TYLENOL) tablet 650 mg  650 mg Oral Q4H PRN    0.9% sodium chloride infusion 250 mL  250 mL IntraVENous PRN    sodium chloride (NS) flush 5-10 mL  5-10 mL IntraVENous Q8H    sodium chloride (NS) flush 5-10 mL  5-10 mL IntraVENous PRN    furosemide (LASIX) injection 40 mg  40 mg IntraVENous BID    insulin lispro (HUMALOG) injection   SubCUTAneous AC&HS    oxyCODONE IR (ROXICODONE) tablet 5 mg  5 mg Oral Q4H PRN       Signed:  Charlotte Jennings MD    Part of this note may have been written by using a voice dictation software. The note has been proof read but may still contain some grammatical/other typographical errors.

## 2022-02-12 NOTE — PROGRESS NOTES
Kevin Concepcion  Admission Date: 2/3/2022         Daily Progress Note: 2/12/2022    The patient's chart is reviewed and the patient is discussed with the staff. Background: 61 y.o.  female, PMH RLD, HTN, chronic right knee pain, seen and evaluated at the request of [facility], [facility] by [facility] for acute respiratory failure with hypoxia. The patient was last seen November 2021 in our office for follow up for restrictive lung disease due to kyphosis and obesity. The patient previously was admitted here back in September 2021 with pulmonary edema and volume overload due to chronic diastolic heart failure, also noted on previous CT scans. At that time the patient was noted to have hypoxemia during that admission as well. The patient continues to use albuterol as needed at home as well as O2 at 2L NC nightly. The patient states she continued to be more short of breath with a dry cough at home but is no longer coughing here. She endorses tachycardia with minimal movement as well. She denies any fevers, chills, nausea, vomiting, or recent sick contacts. She is concerned that she is now up to 15L NC O2. She denies any chest pain. CXR today reveals volume overload. Subjective: Wore BIPAP overnight and tolerated well.      Current Facility-Administered Medications   Medication Dose Route Frequency    potassium chloride (K-DUR, KLOR-CON M20) SR tablet 40 mEq  40 mEq Oral DAILY    midodrine (PROAMATINE) tablet 10 mg  10 mg Oral TID WITH MEALS    oxybutynin (DITROPAN) tablet 5 mg  5 mg Oral Q8H PRN    zinc oxide-cod liver oil (DESITIN) 40 % paste   Topical PRN    guaiFENesin ER (MUCINEX) tablet 1,200 mg  1,200 mg Oral Q12H    lip protectant (BLISTEX) ointment 1 Each  1 Each Topical PRN    sodium chloride (OCEAN) 0.65 % nasal squeeze bottle 2 Spray  2 Spray Both Nostrils Q2H PRN    metoprolol succinate (TOPROL-XL) XL tablet 50 mg  50 mg Oral DAILY    rivaroxaban (XARELTO) tablet 20 mg  20 mg Oral DAILY WITH LUNCH    ferrous sulfate tablet 325 mg  1 Tablet Oral BID WITH MEALS    dilTIAZem ER (CARDIZEM CD) capsule 300 mg  300 mg Oral DAILY    pantoprazole (PROTONIX) tablet 40 mg  40 mg Oral Q12H    digoxin (LANOXIN) tablet 0.25 mg  0.25 mg Oral DAILY    levalbuterol (XOPENEX) nebulizer soln 0.63 mg/3 mL  0.63 mg Nebulization QID RT    acetaminophen (TYLENOL) tablet 650 mg  650 mg Oral Q4H PRN    0.9% sodium chloride infusion 250 mL  250 mL IntraVENous PRN    sodium chloride (NS) flush 5-10 mL  5-10 mL IntraVENous Q8H    sodium chloride (NS) flush 5-10 mL  5-10 mL IntraVENous PRN    furosemide (LASIX) injection 40 mg  40 mg IntraVENous BID    insulin lispro (HUMALOG) injection   SubCUTAneous AC&HS    oxyCODONE IR (ROXICODONE) tablet 5 mg  5 mg Oral Q4H PRN     Review of Systems  Constitutional: negative for fever, chills, sweats  Cardiovascular: + LE edema. negative for chest pain, palpitations, syncope  Gastrointestinal:  negative for dysphagia, reflux, vomiting, diarrhea, abdominal pain, or melena  Neurologic:  negative for focal weakness, numbness, headache  Objective:     Vitals:    02/11/22 2101 02/12/22 0035 02/12/22 0045 02/12/22 0446   BP:  117/82  103/74   Pulse:  87  76   Resp:  18  18   Temp:  98.2 °F (36.8 °C)  98.9 °F (37.2 °C)   SpO2: 95%  96% 93%   Weight:    321 lb 14.4 oz (146 kg)   Height:           Intake/Output Summary (Last 24 hours) at 2/12/2022 0810  Last data filed at 2/12/2022 0457  Gross per 24 hour   Intake 460 ml   Output 3400 ml   Net -2940 ml     Physical Exam:   Constitution:  the patient is well developed and in no acute distress  HEENT:  Sclera clear, pupils equal, oral mucosa moist  Respiratory: diminished in bases   Cardiovascular:  RRR without M,G,R  Gastrointestinal: soft and non-tender; with positive bowel sounds. Musculoskeletal: warm without cyanosis. There is 2+ B lower extremity edema. (improving) legs wrapped.    Skin:  no jaundice or rashes, no wounds Neurologic: no gross neuro deficits     Psychiatric:  alert and oriented x ppt    CXR:   None today     LAB:  Recent Labs     02/11/22  0612 02/10/22  0720 02/09/22  1008   WBC 7.4 9.1 9.9   HGB 8.9* 8.6* 8.6*   HCT 33.5* 31.8* 32.0*    245 239   PCT  --   --  0.20     Recent Labs     02/11/22  0612 02/10/22  0720 02/09/22  1008    140 138   K 3.4* 3.8 3.6   CL 99 96* 94*   CO2 39* 40* 40*   * 111* 163*   BUN 9 10 10   CREA 0.60 0.60 0.60   MG  --  1.9 1.6   CA 9.0 9.1 8.9       Assessment and Plan:  (Medical Decision Making)   Principal Problem:    Acute on chronic respiratory failure with hypoxia and hypercapnia (HCC) (6/25/2020)  -down to 7L ,continue to wean ,may need home 02 upon discharge. Wore BIPAP overnight    Active Problems:    Hypertension     --per primary           Class 3 obesity with alveolar hypoventilation, serious comorbidity, and body mass index (BMI) of 50.0 to 59.9 in adult [facility]) ()    --contributes to complications          Controlled type 2 diabetes mellitus without complication, without long-term current use of insulin ([facility] [address] (9/9/2020)        Restrictive lung disease (10/22/2020)    Likely due to obesity primarily. Diastolic CHF, acute on chronic (HCC) (11/11/2021)    Continue lasix and monitor I/O. Has a lot more fluid to remove by exam. She is net negative -20L since admit (-2.9L past 24 hours)      Hypokalemia   --replete K       Atrial fibrillation with RVR ([address] (2/3/2022)    --on dig and xarelto       Anemia (2/3/2022)    --stable       Chronic respiratory failure with hypoxia and hypercapnia (HCC) ()    --Cont bipap at night. Tolerating better now. Will reassess her abg when more euvolemic to help determine if she may need NIPPV at home. More than 50% of the time documented was spent in face-to-face contact with the patient and in the care of the patient on the floor/unit where the patient is located.     Teagan Gibson NP     I have spoken with and examined the patient. I agree with the above assessment and plan as documented.     Gen: awake   Lungs:  diminihsed  Heart:  RRR with no Murmur/Rubs/Gallops  Abd:soft, Nt  Ext: plus 2L edema     Getting better, -20 L , Cr still normal, continue   down to 6L NC  Will check back on Monday     Cole Shoemaker MD

## 2022-02-12 NOTE — ROUTINE PROCESS
Bedside and Verbal shift change report given to Waylon Mcdonald RN (oncoming nurse) by self, RN (offgoing nurse). Report included the following information SBAR, Kardex, Intake/Output, MAR and Recent Results.

## 2022-02-12 NOTE — ROUTINE PROCESS
Bedside and Verbal shift change report given to self, RN (oncoming nurse) by Bonifacio Aguilar RN (offgoing nurse). Report included the following information SBAR, Kardex, Intake/Output, MAR and Recent Results.

## 2022-02-12 NOTE — ROUTINE PROCESS
Bedside and Verbal shift change report given to OPAL Barney (oncoming nurse) by Yoly Suarez RN (offgoing nurse). Report included the following information SBAR, Kardex, Intake/Output, MAR and Recent Results.

## 2022-02-12 NOTE — PROGRESS NOTES
Pt placed on BIPAP - pt is in no distress at this time      02/12/22 0045   Oxygen Therapy   O2 Sat (%) 96 %   O2 Device BIPAP   FIO2 (%) 40 %   Respiratory   Respiratory (WDL) X   Respiratory Pattern Regular   Chest/Tracheal Assessment Chest expansion, symmetrical   Breath Sounds Bilateral Diminished   CPAP/BIPAP   CPAP/BIPAP Start/Stop On   Device Mode BIPAP   $$ Bipap Daily Yes   Mask Type and Size Full face; Medium   Skin Condition intact   PIP Observed 14 cm H20   IPAP (cm H2O) 15 cm H2O   EPAP (cm H2O) 8 cm H2O   Inspiratory Time (sec) 1 seconds   Vt Spont (ml) 540 ml   Ve Observed (l/min) 11.2 l/min   Backup Rate 15   Total RR (Spontaneous) 21 breaths per minute   Insp Rise Time (sec) 5   Leak (Estimated) 18 L/min   Pt's Home Machine No   Biomedical Check Performed Yes   Settings Verified Yes   Alarm Settings   High Pressure 30   Low Pressure 5   Apnea 20   Low Ve 3   High Rate 50   Low Rate 8   Pulmonary Toilet   Pulmonary Toilet H. O.B elevated;Cough and deep breath

## 2022-02-12 NOTE — RT PROTOCOL NOTE
Respiratory Care Services     Policy Number: [id number]    Title: Bronchial Hygiene Protocol    Effective Date: 01/1999    Revised Date: 12/2014, 11/2017, 7/2019    Reviewed Date: 06/2013, 05/2014, 03/2015, 03/2016, 06/2017, 4/2018, 11/2020     I. Purpose: The Respiratory Care Practitioner (RCP) will utilize the following protocol to select and initiate bronchial hygiene therapy to open and maintain obstructed airways when indicated. II. Patients: All patients who are ordered bronchial hygiene therapy. III. Clinical Area: All general patient floors     IV. Protocol: The following conditions or diseases are indications for bronchial hygiene                           therapy. A. Oscillating PEP Therapy        Indications should include:   1. Atelectasis caused by mucus plugging or foreign body  2. Chronic mucociliary clearance disorders  3. Retained secretions which may be associated with the following conditions:  a. Bronchitis  b. Bronchiectasis  c. Pneumonia  B. PAP- Positive airway pressure therapy  Indications include:  1. Patients with post-operative atelectasis or to prevent post operative atelectasis. 2. Patients who cannot perform deep breathing exercises due to pain. 3. Patients requiring lung expansion therapy who cannot follow instructions. 4. Patients requiring lung expansion therapy with poor inspiratory capacity <10cc/kg. 5. Patients requiring aerosol therapy in conjunction with opening their airways. C. Vibratory / Acoustical Airway Clearance Therapy (ACT)- i.e. (Vibralung, Vest, or Percussor)  Indications should include  1. Patient conditions  that involve retained secretions, increased mucus production and defective mucociliary clearance such as:  a. Cystic fibrosis  b. Chronic bronchitis  c. Bronchiectasis  d. Pneumonia  e. Asthma  f. Muscular dystrophy  g. Post-operative atelectasis  h.  Neuromuscular respiratory impairments  i. ACT may be considered in patients with COPD with  symptomatic secretion retention, guided by patient preference,  toleration, and effectiveness of therapy Patricia Bull et al., 2013). D. Nasotracheal suctioning indications should include:  1. Inability to cough effectively  2. Excessive secretions  3. Artificial airway      V. Equipment:   A. PEP therapy device   B. Vest therapy equipment   Harry Ramseur   D. AccuPap   Randa Cydney NT suction equipment       VI. Guidelines:   Monitor patient's vital signs and evaluate patient's clinical status. The need to                                change therapy modality may be indicated by:  A. Change in patient's sensorium (patient now confused or obtunded, and unable to follow directions). B. A significant deterioration is evident on patient's chest radiograph or increased sputum production. C. Increased thickening of secretions (e.g. mucolytic therapy may be indicated.)  D. Development of wheezing  E. Decrease in oxygen saturation  F. Development of chest pain. VII. Clinical Responsibility: The Therapy Assessment Protocol guidelines will be used to                re-evaluate all patients on bronchial hygiene therapy (See Therapy Assessment Protocol). A. RCP's will perform changes in therapy according to protocol. .  B. Bronchial hygiene therapy may be discontinued when goals of therapy are met, e.g., secretions easily expectorated for 48 hours, atelectasis is resolved, etc.  C. PAP Therapy may be utilized in place of IPPB therapy per discretion of the RCP, as approved by the Pulmonary Medicine and [address] [place] [address] [place]. VIII. Outcome Criteria:  Outcome criteria for bronchial hygiene therapy should include:  A. Decrease in sputum production  B. Improved breath sounds  C. Improved arterial oxygen tension and/or SaO2  D. Improved chest X-ray  E. Subjective response to therapy    IX. Documentation  A. Document assessment findings in the respiratory assessment section of the patient's EMR.   Traci Phan changes in therapy per protocol in the respiratory orders section and in the care plan section of the patient's EMR. C. Document patient education in the patient education section of the patient's EMR. X. Related Protocols:  1. Respiratory Patient Care Assessment Protocols  1. Aerosolized Medication Protocol  1. Oxygen Therapy Protocol        Reference:    L - Respiratory Care Department Policy, Procedure and Protocol Guideline Manual, 1995, KATIA Lopez. L - Therapist Driven Respiratory Care Protocols  A Practitioner's Guide for Criteria-Based         Respiratory Care by Kristian Norman M.D., and KATIA Reyes RRT. N  [facility] Clinical Practice Guidelines. Colton Hendrix., Taylor Lucia., Ifeoma Douglas., Gunnar Norman., Claus Lehman., . . . JACINTA Cameron (2013, December). [facility] Clinical Practice Guideline: Effectiveness of Nonpharmacologic Airway Clearance Therapies in Hospitalized Patients. Respiratory Care, 58(12), 0366-0321. Retrieved June 28, 2019          Respiratory Care Services     Policy Number: [id number]    Title: Oxygen Protocol    Effective Date: 01/1996    Revised Date: 06/2013, 02/29/2016, 4/2018, 7/2019    Reviewed Date: 05/2014, 03/2015, 06/2017, 11/2020        I. Policy: The Oxygen Protocol will be initiated for all patients upon written order from physician for administration of oxygen therapy or if a patient is found to have an oxygen saturation of 88% or less. Special consideration: the goal of oxygen therapy for COPD patients is to maintain oxygen saturation between 88% - 92% to comply with GOLD Guidelines. II. Purpose: To provide protocol driven respiratory therapy for the administration of oxygen at concentrations greater than that in ambient air with the intent of treating or preventing the symptoms and manifestations of hypoxia. III. Responsibility: Director Respiratory Care Services, all Respiratory Care Practitioners     IV. Indications:   E.  Implement this protocol for patients when physician orders oxygen to be administered or when patient is found to have an oxygen saturation of 88% or less. F. To assure routine monitoring of patient's oxygen saturation b.i.d. and to make appropriate adjustments in accordance with ordered oxygen saturation parameters. G. To assure continuity of respiratory care that meets [facility] Clinical Practice Guidelines and GOLD Guidelines. H. Hb < 8  I. Sickle Cell anemia crisis    V. Assessment:  Assess the following parameters to determine the need to adjust oxygen:  G. Measurement of patient's oxygen saturation via pulse oximetry. H. Observation of patient's color, respiratory effort, and responsiveness. I. Measurement of heart rate and respiratory rate. J. Complete a three-step ambulatory oxygen saturation when ordered. VI. Initiation:  Upon receipt of an order for oxygen, the RCP will:   D. Verify order in the patient's EMR, which should include the desired oxygen saturation to be maintained. E. The patient shall be placed on oxygen with humidity (except for those oxygen delivery devices that do not require humidity, i.e. venturi masks and non-rebreather masks) as ordered by the physician to achieve the prescribed oxygen saturation. F. In the event that no saturation is specified, a saturation of 90% will be maintained. G. Patients, who are found to have a SaO2 of 88% or less, may be started on supplemental oxygen as described above. H. Patients admitted with cardiac problems/disease shall be maintained at 92% per Chest Committee recommendation. I. The patient will be informed of the \"no smoking policy\" and instructed in the proper use of oxygen therapy. J. Once desired oxygen saturation has been achieved, the RCP will document FIO2 and oxygen saturation in the respiratory section of the patient's EMR. VII.  Maintenance:   F. 30-second oxygen saturation check will be taken to maintain the saturation ordered by the physician each day.  G. Patients will be assessed each shift and as needed by pulse oximetry to determine if oxygen needs to be decreased, increased or discontinued. H. If changes in FIO2 are indicated, all changes will be documented in the respiratory section of the patient's EMR. I. If no changes in FIO2 are required, the patient's oxygen flow rate and saturation will be recorded in the respiratory section of the patient's EMR. J. Per [facility], patients who are receiving oxygen therapy but are not on oxygen at home, should be weaned off oxygen as soon as possible or when anticipated discharge becomes evident. K. Oxygen will be discontinued after oxygen saturation has been maintained for 24 hours on room air and documented in the patient's EMR. L. Patients on the Inpatient Rehabilitation area on 9th floor will be exempt from having their oxygen discontinued per protocol. Oxygen may be weaned but will be changed to prn to meet the needs of the patient when exercising and participating in therapy. M. The goal of oxygen therapy is to maintain patients with a diagnosis of COPD at oxygen saturation between 88% - 92% to comply with GOLD Guidelines. VIII. Safety: RCP will address the following safety issues:  A. Identify patient using the two patient identifiers name and birth date via ID bracelet. B. Perform hand hygiene per hospital policy utilizing Standard Precautions for all patients and following transmission-based isolation as indicated per hospital policy. C. Cardiac patients will be maintained at an oxygen saturation of 92%. D. If a patient's FIO2 requirements necessitate changing oxygen delivery devices to a high concentration of oxygen, documentation indicating the change must be included in the progress notes, as well as in the respiratory flowsheet. E. If a patient has a hemoglobin level <8 mg. RCP will consult physician before discontinuing oxygen. IX.  Interventions:   A. RCP will assess patient for signs of respiratory distress or suspicion of CO2 retention. B. An ABG may be obtained for patients exhibiting respiratory distress or sickle cell crisis. C. An order should be entered into patient's EMR for ABG under per protocol. X. Documentation  D. Document assessment findings in the respiratory section of the patient's EMR. E. Document changes in therapy per protocol in the respiratory orders section and in the care plan section of the patient's EMR. F. Document patient education in the patient education section of the patient's EMR. XI. Reportable Conditions:  Report to the physician immediately:  A. Acute changes in patient's respiratory status. B. An oxygen saturation <85%. C. A change in oxygen delivery device to provide a high concentration of oxygen. XII. Patient Instructions: Review with Patient  A. Purpose of oxygen therapy  B. Proper technique for using oxygen  C. No smoking policy    Approval: Pulmonary Committee (1-25-96)  Revision: Chest Committee (4-28-05)    L - Respiratory Care Department Policy, Procedure and Protocol Guideline Manual, 1995,         KATIA Lopez. L - Therapist Driven Respiratory Care Protocols  A Practitioner's Guide for Criteria-Based       Respiratory Care by Owen Machuca M.D., and KATIA Burdick, RRT. L - The rationale for therapist-driven protocols: an update. Respiratory Care 1998. N  [facility] Clinical Practice Guidelines. Respiratory Care Services       Policy Number: [id number]    Title: Patient Care Assessment Protocol    Effective Date: 01/1999    Revised Date: 05/2014, 04/2018, 12/2018, 07/2019    Reviewed Date: 06/2013/ 03/2015, 03/2016, 06/2017, 11/2020        Overview  In an effort to improve quality and reduce costs of respiratory care at [facility], the Respiratory Department has developed a number of Patient Care Protocols.  These protocols have been developed according to [employer] Practice Guidelines and are utilized for those patients who are ordered respiratory therapy using therapeutic indications and standardized approaches for accomplishing objectives. Patient Care Protocols are intended to improve care by:   Defining the indications and standards of care agreed upon by the Pulmonary Medicine and [address] [place], [place] [address]e [facility].  Training respiratory care practitioners to apply those criteria to individual patients and modify therapy as indicated by the protocols.  Documenting the indication and care plan as part of the initial ordering process.  Tapering or discontinuing treatments once the indication for therapy changes. The Patient Care Protocols shall be universally applied throughout the hospital as determined by   the Pulmonary Medicine and [address] [place] [address] [place]. Rationale for Patient Care Assessment Protocols:   Continuous Quality Improvement   Cost containment   Standardization of care   Enhanced continuity of care   Utilization review   Timely intervention    The following patient care assessment protocols have been developed:   Aerosolized Medication Protocol    Bronchial Hygiene Protocol    Oxygen Protocol   CVRU Fast Track Weaning Protocol    Asthma Treatment Protocol ER   Pediatric Asthma Treatment Protocol ER   Alpha-1 Antitrypsin Deficiency Protocol   Prone Positioning Protocol    COPD Protocol    Home Oxygen Assessment Protocol   Ventilator Weaning Protocol    Lung Volume Expansion Protocol    The Director of [facility] [place] oversees the Patient Care Assessment Program. The Respiratory Educator is responsible for protocol development and training. The Supervisor is responsible for implementation and  activities. Each patient with an order for respiratory treatments will receive an evaluation. Respiratory Care Practitioners (RCP's) will perform the evaluations.  The same evaluation tool will be utilized for initial and follow-up assessments. If the patient does not meet criteria for ordered therapy, the therapy will be discontinued. If the patient demonstrates an adverse response to initially ordered therapy, the therapy will be discontinued and the physician will be contacted. Specific physician's orders that deviate from protocols and are deemed \"inappropriate\" or \"unsafe\" will be addressed with ordering physician and/or medical director as required. Respiratory Patient Care Assessment Protocols    I. Policy: In an effort to provide quality patient care and effective utilization of services, physicians who order respiratory therapy will have their patients treated via the protocols established (see attached) Respiratory Care Practitioners (RCP's) will complete the initial assessment which will indicate patient needs,  the care plan developed and will performed within 24 hours of admission. Frequency of the therapy will be set according to the results of the respiratory therapy evaluation and frequency guidelines policy. Reassessment will be continued every 48 hours and more frequently as needed for the individual patient. II. Purpose:     J. To provide a process that will allow for ongoing assessment and care plan modification for patients receiving respiratory services based on both objective and or subjective patient responses to interventions. This process of protocol utilization will assist in patient care progression while eliminating the need for the physician to continually update respiratory therapy orders. K. To assure continuity of respiratory care that meets [facility] Clinical Practice Guidelines. III. Initiation:  Implement Respiratory Care Protocols for patients who are ordered by physician          to receive respiratory therapy procedures or for ventilator management. IV. Protocol:  K.  Upon receiving an order for therapy the RCP will review the patient's EMR (electronic medical record) for all pertinent information includin. Physician's order for therapy  5. Patient history and physical examination  6. Physician progress notes  7. Diagnostic. X-rays, PFT's, arterial blood gases etc.  L. The RCP will perform a respiratory assessment in the following manner:  6. General observations: color, pattern and effort of breathing, chest expansion, (symmetrical and bilateral), level of consciousness and the ability to ambulate. 7. The RCP will assess patient's cough ability and determine if bronchial hygiene is needed. If patient is unable to produce sputum, at that time, the RCP should question the patient with regard to their sputum: production, color consistency, frequency and amount. 8. Auscultation: Using a stethoscope, the RCP will listen and note quality of breath sounds and presence or absence of adventitious breath sounds in all lung fields, both anteriorly and posteriorly. 9. Upon completing the EMR review and physical assessment, the RCP will document findings in the RT Assessment section of the EMR. The score level will be provided and will be used to determine the frequency of therapy. V. Indications:   A. Bronchial Hygiene Protocol indications:   2. Potential for or presence of atelectasis. 2. Need for hydration and removal of retained secretions. 2. Need for improvement of cough effectiveness. 1. Presence of conditions associated with disorder of pulmonary clearance:  d. Cystic fibrosis  e. Bronchiectasis  f. Neuromuscular disease  g. Obstructive lung diseases  h. Restrictive lung diseases   Aerosolized Medication(s) Protocol indications:Treatment of bronchospasm/wheezing  3. Improvement of mucociliary clearance  4. Treatment of stridor  5. History of Bronchiectasis, Asthma or COPD  C. Oxygen Therapy Protocol indications:   4. Documented hypoxemia  5. Severe trauma  6. Acute myocardial infarction  7.  Short-term therapy (e.g. post anesthesia recovery)  D. RU Ventilator Weaning Protocol indications:  1. All mechanically ventilated surgical patients unless they have a no wean order. E. Asthma Treatment Protocol ER indications:  1. Patients 15years of age and older that have been triaged or diagnosed with   asthma exacerbation shall be indicated for the ER Asthma Treatment Protocol. A physician order will be required to initiate the protocol. F. Pediatric Asthma protocol in the ER indications:  1. Patients less than 15years old that have been triaged or diagnosed with asthma exacerbation shall be indicated for the Pediatric Asthma protocol. A physician order will be required to initiate the protocol. G. Alpha-1 Antitrypsin Deficiency Testing protocol indications:         1. Patients admitted and diagnosed with COPD. H. Prone Positioning Protocol indications:         1. Acute lung injury         2. Acute respiratory distress syndrome (ARDS)   I. Respiratory Care COPD Protocol indications:         1. History of COPD in patient's records         2. Smoking history   J. Home Oxygen Assessment Protocol indications:         1. Chronic lung disease         2. Cor pulmonale         3. Unable to wean to room air 48 hours prior to anticipated discharge. K.  Ventilator Weaning Protocol indications:         1. Patient's mechanically ventilated          2. Managed by intensivist  L. Lung Volume Expansion Protocol indications:        1. Any patient at risk for pulmonary complications. VI. Maintenance:    K. Timely patient assessment is an integral part of this protocol therefore the following will be applied:  1. All non- critical care patients will be evaluated upon receiving initial respiratory care orders within 24 hours and re-evaluated within 48 hours (or more as needed). 2. Orders requesting a Respiratory Consult will be responded to in the following manner:  j.  In patient emergency situations, the RCP assigned to the floor will respond immediately to the patient, provide an initial respiratory assessment, and contact the patient's physician as necessary for appropriate orders.  k. In non-emergent situations, the RCP assigned to the floor will respond to the patient within 90 minutes and provide an initial respiratory assessment and contact patient's physician as necessary for appropriate orders. l. An RCP will provide a comprehensive assessment as soon as possible. 3. Upon completion of an evaluation, the RCP will complete documentation in the patient's EMR in the RT Assessment section. 4. The RCP who completes the assessment will document orders for therapy in the orders section of the patient's EMR selecting new order. Next, per protocol should be selected indicating it is a protocol order and sign orders should be selected to complete the process. The applicable protocol must be added to the progress note per Joint Commission guidelines. 5. The Pharmacy and Therapeutics (P&T) Committee has mandated that the medication Xopenex may be changed to unit dose albuterol without an order, except for those patients receiving Xopenex due to cardiac arrhythmias. 1. The dosage for these patients should be 0.63 mg. and may be changed from 1.25 mg. to 0.63 mg per P & T Committee by the RCP completing the assessment. 6. Patients who are not experiencing cardiac arrhythmias, and are ordered Xopenex and Atrovent may be changed to Duoneb. VII. Safety:        N. The following safety issues shall be monitored:  1. The RCP will perform hand hygiene per hospital policy utilizing Standard Precautions for all patients and following transmission-based isolation as indicated per hospital policy. 2. The RCP must exercise professional judgment in classifying the patient for frequency of therapy. 3. Appropriate classification of the patient will require an evaluation utilizing the Therapy Assessment Protocol Guidelines.   4. The RCP will confer with the physician concerning the care of the patient at any time questions or problems arise. 5. If during therapy, the patient exhibits no improvement, or deterioration in clinical status the RCP will notify the physician and the patient's nurse. VIII. Interventions:   F. The patient's nurse is responsible concerning all items related to his/her care. Ongoing communication with nursing is essential to successful protocol management. G. The RCP recognizes the value of the team approach in meeting the patient's needs. Nursing input regarding the patient's pulmonary condition will be sought as needed. IX. Reportable conditions: The RCP will inform the physician if:  1. There are acute changes in patient's respiratory status. 2. The therapist is unable to determine appropriate care plan upon assessment. 3. The patient fails to reach therapeutic objective. 4. A change or additional medication is needed. X.  Patient Education:    D. Patient will receive instruction on the followin. The treatment modality, including objectives and proper technique of therapy  2. Respiratory medications  E. Documentation shall occur in the patient education section of the patient's EMR. XI. Documentation: Record all findings as described above in the patient's EMR. Related Protocols: A. Aerosolized Medication Protocol  B. Bronchial Hygiene   C. Oxygen Protocol   D. CVRU Fast Track Weaning Protocol  E. Asthma Treatment protocol ER  F. Alpha-1 antitrypsin Deficiency Protocol  G. Prone Positioning Protocol  H. Respiratory Care COPD Protocol  I. Home Oxygen assessment Protocol  J. Ventilator Weaning Protocols   K. Volume Expansion protocol    Indications      Frequency          Level  A.  Aerosol therapy   1.  q4h     Severe SOB, wheezing, unable to sleep 1   2.  qid, q4 wa or q6h   Moderate SOB, wheezing   2   3.  tid      Hx of asthma, or COPD mild wheezing,         or facilitate secretion removal              3   4.  bid      Asthma, or COPD, Intermittent wheezing 4   5. PRN, i.e. tid PRN, qid PRN Asthma, or COPD, occasional wheezing 5       B. Bronchopulmonary Hygiene    1. q4h             Copious secretions, SOB, unable to sleep 1   2. qid & PRN            Moderate amounts of secretions   2   3. tid           Small amounts of secretions and poor cough,               history of secretions    3    4. PRN, i.e. tid PRN, qid PRN     Breathing exercises, encourage cough only 4      C. Oxygen Therapy     Follow hospital approved Oxygen Protocol      Note:  qid treatments are due 0800, 1200, 1600, and 2000. tid treatments are due 0800, 1400, and 2000  Q6h treatments are due 0800, 1400, 2000, 0200  Q4 wa teatments are due 0800, 1200, 1600, and 2000. Q4h treatments are due  0800, 1200, 1600, 2000, 0000, and 0400. The Level 1-5 rating system is only to be used as criteria for determining appropriate frequency of therapy. References:   N   Joint Commission Consolidated René Standard   L    Respiratory Care Department Policy, Procedure and Protocol Guideline Manual, 1995, KATIA Lopez. L  Therapist Driven Respiratory Care Protocols  A Practitioner's Guide for Criteria-Based Respiratory Care by Gwendolyn Vidales M.D., and KATIA Durham RRT. L  The rationale for therapist-driven protocols: an update. Respiratory Care 1998; 31:373-189   L Therapist Driven Respiratory Care Protocols  A Practitioner's Guide for Criteria-Based Respiratory Care by Gwendolyn Vidales M.D., and KATIA Durham RRT. L The rationale for therapist-driven protocols: an update. Respiratory Care 1998; [id number]. N   [facility] Clinical Practice Guidelines. D. The RCP will perform a respiratory assessment in the following manner:  1. Perform hand hygiene per hospital policy utilizing Standard Precautions for all patients and following transmission-based isolation as indicated per policy.   2. Identify patient via ID bracelet verifying patient name and birth date. 3. General observations: color, pattern and effort of breathing, chest expansion, (symmetrical and bilateral), level of consciousness and the ability to ambulate. 4. The RCP will assess patients cough ability and determine if Nasotracheal suctioning is needed. If patient is unable to produce sputum, at that time, the RCP should question the patient with regard to their sputum: production, color consistency, frequency and amount. 5. Auscultation: Using a stethoscope, the RCP will listen and note quality of breath sounds and presence or absence of adventitious breath sounds in all lung fields, both anteriorly and posteriorly. 6. Upon completing the EMR review and physical assessment, the RCP will document findings in the RT Assessment section of the EMR. The score level will be provided and will be used to determine the frequency of therapy. V. Indications:   A. Indications for Bronchial Hygiene Protocol will include:  1. Potential for or presence of atelectasis. 2. Need for hydration and removal of retained secretions. 3. Need for improvement of cough effectiveness. 4. Presence of conditions associated with disorder of pulmonary clearance:  a. Cystic fibrosis  b. Bronchiectasis  B. Indications for Aerosolized Medication(s) Protocol should include:  1. Treatment of bronchospasm/wheezing  2. Improvement of mucociliary clearance  3. Treatment of stridor  4. History of Asthma or COPD             C.  Indications for Oxygen Therapy Protocol should include:  1. Documented hypoxemia  2. Severe trauma  3. Acute myocardial infarction  4. Short-term therapy (e.g. post anesthesia recovery)    VI. Maintenance:    D. Timely patient assessment is an integral part of this protocol therefore the following will be applied:  1.  All non- critical care patients will be evaluated upon receiving initial respiratory care orders within 24 hours and re-evaluated within 48 hours (or more as needed). 2. Orders requesting a Respiratory Consult will be responded to in the following manner:  a. In patient emergency situations, the RCP assigned to the floor will respond immediately to the patient, provide an initial respiratory assessment, and contact the patients physician as necessary for appropriate orders. b. In non-emergent situations, the RCP assigned to the floor will respond to the patient within 90 minutes and provide an initial respiratory assessment and contact patients physician as necessary for appropriate orders. c. An RCP will provide a comprehensive assessment as soon as possible. 3. Upon completion of an evaluation, the RCP will complete documentation in the patients EMR in the RT Assessment section. 4. The RCP who completes the assessment will document orders for therapy in the orders section of the patients EMR selecting new order. Next, per protocol should be selected indicating it is a protocol order and sign orders should be selected to complete the process. 5. The Pharmacy and Therapeutics (P&T) Committee has mandated that the medication Xopenex may be changed to unit dose albuterol without an order, except for those patients receiving Xopenex due to cardiac arrhythmias. The dosage for these patients should be 0.63 mg. and may be changed from 1.25 mg. to 0.63 mg per P & T Committee by the RCP completing the assessment. 6. Patients who are not experiencing cardiac arrhythmias, and are ordered Xopenex and Atrovent may be changed to Duoneb. VII. Safety:        A. The following safety issues shall be monitored:  1. The RCP will perform hand hygiene per hospital policy utilizing Standard Precautions for all patients and following transmission-based isolation as indicated per hospital policy. 2. The RCP must exercise professional judgment in classifying the patient for frequency of therapy.   3. Appropriate classification of the patient will require an evaluation utilizing the Therapy Assessment Protocol Guidelines. 4. The RCP will confer with the physician concerning the care of the patient at any time questions or problems arise. 5. If during therapy, the patient exhibits no improvement or deterioration in clinical status the RCP will notify the physician and the patients nurse. VIII. Interventions:   A. The patients nurse is responsible concerning all items related to his/her care. Ongoing communication with nursing is essential to successful protocol management. B. The RCP recognizes the value of the team approach in meeting the patients needs. Nursing input regarding the patients pulmonary condition will be sought as needed. IX. Reportable conditions: The RCP will inform the physician if:  1. There are acute changes in patients respiratory status. 2. The therapist is unable to determine appropriate care plan upon assessment. 3. The patient fails to reach therapeutic objective. 4. A change or additional medication is needed. X.  Patient Education:    A. Patient will receive instruction on the followin. The treatment modality, including objectives and proper technique of therapy  2. Respiratory medications  B. Documentation shall occur in the patient education section of the patients EMR. XI. Documentation: Record all findings as described above in the patients EMR. Related Protocols: A. Aerosolized Medication Protocol  B. Bronchial Hygiene  C. Oxygen Protocol   D. Volume Expansion/Secretion Clearance  E. Ventilator Weaning Protocols    References:  N   Joint Commission Consolidated René Standard   L    Respiratory Care Department Policy, Procedure and Protocol Guideline Manual, , KATIA HENAO  Therapist Driven Respiratory Care Protocols  A Practitioners Guide for Criteria-Based Respiratory Care by Ivelisse Crane M.D., and KATIA Rodrigues RRT. L  The rationale for therapist-driven protocols: an update.  Respiratory Care 1998; [address] [facility] Res[facility] Policy Number: [id number]    Title: Aerosolized Medication Protocol    Effective Date: 10/1998    Revised Date: 06/13, 03/16, 11/17, 07/19     Reviewed Date: 05/14/ 03/15 , 06/17, 5/18, 11/2020   I. Policy: The Aerosolized Medication Protocol shall by implemented by Respiratory Care Practitioners (RCP) for patients with orders to receive aerosol therapy with medication. II. Purpose: To open and maintain obstructed airways, the RCP, will utilize the following   protocol to select the indicated aerosolized medication(s) and determine the most effective method of delivery to the patient. III. Patient Type: All patients who are determined to meet aerosolized medication criteria as          outlined in this protocol. IV. Responsibility: Director, [address], registered Respiratory Care Practitioners (RCP's) with documented competency in the performance of                                     respiratory therapeutic techniques. V. Equipment needed:  L. Stethoscope  M. Pulse oximeter  N. AeroEclipse nebulizer  O. Meter Dose Inhaler (MDI)    VI. Protocol:   M. The following conditions are accepted indications for aerosolized medication therapy. 8. Bronchospasm/wheezing  9. Impaired mucociliary clearance  10. Tracheobronchial mucosal congestion/and laryngeal stridor  11. Diseases which commonly require aerosolized medication therapy include, but are not limited to:  i. Asthma/reactive airway disease  j. Bronchitis/emphysema (COPD)  k. Cystic fibrosis  l. Severe laryngitis/tracheitis  m. Bronchiectasis  n. Smoke inhalation or chemical trauma to the lung or upper airway  o. Physical trauma to the upper airway  p. Laryngotracheobronchitis  q. Bronchiolitis  r. Non-specific wheezing              B. Indications for bronchodilator medications will include:  m. Bronchospasm/ wheezing  n.  Asthma/reactive airway disease  o. Chronic obstructive pulmonary disease  p. Obstructive defect on pulmonary function testing  C. Administration of medications  10. If a bronchodilator or any other type of respiratory medication is needed, a physician order must be indicated in the medication section in the patient's EMR. 11. When the physician specifies the medication and dosage at the time of request, the ordered medication will be used as part of the care plan. D. The following guidelines will be utilized in the evaluation and selection of the appropriate delivery device for indicated medication(s):  1. MDI is the preferred method of medication delivery via common canister. a. Patient should be alert/cooperative  b. Able to perform 3 second breath hold. c. Patient may be changed to self-administer as appropriate. d. Patients in isolation will be provided an individual inhaler. 2. Unassisted aerosol (UA) is indicated if  c. Ventilation is inadequate  d. Patient is unable to perform MDI appropriately     VII. Guidelines:   Monitor patient's vital signs and evaluate patient's clinical status. The need to change medication and/or modality may be indicated by:  6. A pulse greater than 120 bpm, or if a pulse increase of 20 bpm occurs with bronchodilator medications. 7. Significant worsening of dyspnea or wheezing occurring during or within 30 minutes of discontinuing therapy. 8. Worsening of patient's sensorium (e.g. patient becomes confused or obtunded, and unable to follow directions). 9. Worsening of patient's chest x-ray. 10. Change in sputum (e.g. increased pulmonary infiltrate, which might indicate need for volume expansion therapy). 11. Patient has difficulty coughing up secretions, which might indicate need for acetylcysteine and/or bronchial hygiene therapy. 12. Call physician immediately if dyspnea worsens and is not responsive to modifications allowed by protocol. VIII. Clinical Responsibility:  L.  The therapy assessment guidelines will be used to evaluate all patients receiving aerosolized medications with the exception of critical care areas. 3. RCP's will perform changes in therapy per protocol. 3. It will be the responsibility of RCP to provide instruction regarding respiratory medications, possible side effects, aerosol therapy and proper MDI technique, as well as, spacer usage to patients ordered MDI therapy. 3. Current therapy that is part of a patient's home regimen will not be discontinued. a. Provide spacer and educate patient on proper inhaler technique if needed. IX. Documentation  G. Document assessment findings in the respiratory assessment section of the patient's EMR. H. Document changes in therapy per protocol in the respiratory orders section and in the care plan section of the patient's EMR. I. Document patient education in the patient education section of the patient's EMR. X. Outcome Criteria:  O. Relief of wheezes and obstruction  P. Improved cough and sputum color and consistency  Q. Improved chest x-ray  R. Improved arterial oxygen tension and or SaO2  S. Improved Peak Flow on asthmatic patients        XI. Related Policy/Protocols:  H. Respiratory Patient Care Protocols  I. Bronchial Hygiene Therapy  J. Oxygen Protocol  K. [facility] Administration of Metered Dose Inhalers via a Common Canister  L. [address] [place] [address] Generating Procedures  Reference:  L - Respiratory Care Department Policy, Procedure and Protocol Guideline Manual, 1995, KATIA Lopez. L -  Therapist Driven Respiratory Care Protocols  A Practitioner's Guide for Criteria-Based Respiratory Care by Hilaria Espinoza M.D., and KATIA Rainey, ANDIE. L - The rationale for therapist-driven protocols: an update. Respiratory Care 1998; [id number]. N -[facility] Clinical Practice Guidelines.

## 2022-02-13 LAB
ANION GAP SERPL CALC-SCNC: 4 MMOL/L (ref 7–16)
BASOPHILS # BLD: 0 K/UL (ref 0–0.2)
BASOPHILS NFR BLD: 1 % (ref 0–2)
BUN SERPL-MCNC: 15 MG/DL (ref 8–23)
CALCIUM SERPL-MCNC: 9.3 MG/DL (ref 8.3–10.4)
CHLORIDE SERPL-SCNC: 95 MMOL/L (ref 98–107)
CO2 SERPL-SCNC: 39 MMOL/L (ref 21–32)
CREAT SERPL-MCNC: 0.6 MG/DL (ref 0.6–1)
DIFFERENTIAL METHOD BLD: ABNORMAL
EOSINOPHIL # BLD: 0.5 K/UL (ref 0–0.8)
EOSINOPHIL NFR BLD: 6 % (ref 0.5–7.8)
ERYTHROCYTE [DISTWIDTH] IN BLOOD BY AUTOMATED COUNT: 25.2 % (ref 11.9–14.6)
GLUCOSE BLD STRIP.AUTO-MCNC: 108 MG/DL (ref 65–100)
GLUCOSE BLD STRIP.AUTO-MCNC: 133 MG/DL (ref 65–100)
GLUCOSE BLD STRIP.AUTO-MCNC: 139 MG/DL (ref 65–100)
GLUCOSE BLD STRIP.AUTO-MCNC: 192 MG/DL (ref 65–100)
GLUCOSE SERPL-MCNC: 103 MG/DL (ref 65–100)
HCT VFR BLD AUTO: 34.6 % (ref 35.8–46.3)
HGB BLD-MCNC: 9.4 G/DL (ref 11.7–15.4)
IMM GRANULOCYTES # BLD AUTO: 0 K/UL (ref 0–0.5)
IMM GRANULOCYTES NFR BLD AUTO: 1 % (ref 0–5)
LYMPHOCYTES # BLD: 1.3 K/UL (ref 0.5–4.6)
LYMPHOCYTES NFR BLD: 16 % (ref 13–44)
MAGNESIUM SERPL-MCNC: 1.9 MG/DL (ref 1.6–2.3)
MCH RBC QN AUTO: 23.3 PG (ref 26.1–32.9)
MCHC RBC AUTO-ENTMCNC: 27.2 G/DL (ref 31.4–35)
MCV RBC AUTO: 85.6 FL (ref 79.6–97.8)
MONOCYTES # BLD: 0.7 K/UL (ref 0.1–1.3)
MONOCYTES NFR BLD: 9 % (ref 4–12)
NEUTS SEG # BLD: 5.5 K/UL (ref 1.7–8.2)
NEUTS SEG NFR BLD: 68 % (ref 43–78)
NRBC # BLD: 0 K/UL (ref 0–0.2)
PLATELET # BLD AUTO: 289 K/UL (ref 150–450)
PMV BLD AUTO: 10.4 FL (ref 9.4–12.3)
POTASSIUM SERPL-SCNC: 3.4 MMOL/L (ref 3.5–5.1)
RBC # BLD AUTO: 4.04 M/UL (ref 4.05–5.2)
SERVICE CMNT-IMP: ABNORMAL
SODIUM SERPL-SCNC: 138 MMOL/L (ref 136–145)
WBC # BLD AUTO: 8.1 K/UL (ref 4.3–11.1)

## 2022-02-13 PROCEDURE — 74011000250 HC RX REV CODE- 250: Performed by: INTERNAL MEDICINE

## 2022-02-13 PROCEDURE — 74011636637 HC RX REV CODE- 636/637: Performed by: INTERNAL MEDICINE

## 2022-02-13 PROCEDURE — 65660000000 HC RM CCU STEPDOWN

## 2022-02-13 PROCEDURE — 74011250637 HC RX REV CODE- 250/637: Performed by: INTERNAL MEDICINE

## 2022-02-13 PROCEDURE — 36415 COLL VENOUS BLD VENIPUNCTURE: CPT

## 2022-02-13 PROCEDURE — 80048 BASIC METABOLIC PNL TOTAL CA: CPT

## 2022-02-13 PROCEDURE — 94760 N-INVAS EAR/PLS OXIMETRY 1: CPT

## 2022-02-13 PROCEDURE — 82962 GLUCOSE BLOOD TEST: CPT

## 2022-02-13 PROCEDURE — 85025 COMPLETE CBC W/AUTO DIFF WBC: CPT

## 2022-02-13 PROCEDURE — 83735 ASSAY OF MAGNESIUM: CPT

## 2022-02-13 PROCEDURE — 77010033678 HC OXYGEN DAILY

## 2022-02-13 PROCEDURE — 74011250636 HC RX REV CODE- 250/636: Performed by: INTERNAL MEDICINE

## 2022-02-13 PROCEDURE — 94640 AIRWAY INHALATION TREATMENT: CPT

## 2022-02-13 PROCEDURE — 74011000250 HC RX REV CODE- 250: Performed by: EMERGENCY MEDICINE

## 2022-02-13 RX ADMIN — DIGOXIN 0.25 MG: 250 TABLET ORAL at 08:48

## 2022-02-13 RX ADMIN — SODIUM CHLORIDE, PRESERVATIVE FREE 10 ML: 5 INJECTION INTRAVENOUS at 14:22

## 2022-02-13 RX ADMIN — FUROSEMIDE 40 MG: 10 INJECTION, SOLUTION INTRAMUSCULAR; INTRAVENOUS at 17:27

## 2022-02-13 RX ADMIN — PANTOPRAZOLE SODIUM 40 MG: 40 TABLET, DELAYED RELEASE ORAL at 21:02

## 2022-02-13 RX ADMIN — POTASSIUM CHLORIDE 40 MEQ: 20 TABLET, EXTENDED RELEASE ORAL at 08:48

## 2022-02-13 RX ADMIN — SODIUM CHLORIDE, PRESERVATIVE FREE 10 ML: 5 INJECTION INTRAVENOUS at 21:36

## 2022-02-13 RX ADMIN — LEVALBUTEROL HYDROCHLORIDE 0.63 MG: 0.63 SOLUTION RESPIRATORY (INHALATION) at 09:00

## 2022-02-13 RX ADMIN — Medication 2 UNITS: at 16:31

## 2022-02-13 RX ADMIN — FUROSEMIDE 40 MG: 10 INJECTION, SOLUTION INTRAMUSCULAR; INTRAVENOUS at 08:48

## 2022-02-13 RX ADMIN — GUAIFENESIN 1200 MG: 600 TABLET ORAL at 08:48

## 2022-02-13 RX ADMIN — SODIUM CHLORIDE, PRESERVATIVE FREE 10 ML: 5 INJECTION INTRAVENOUS at 05:32

## 2022-02-13 RX ADMIN — LEVALBUTEROL HYDROCHLORIDE 0.63 MG: 0.63 SOLUTION RESPIRATORY (INHALATION) at 20:00

## 2022-02-13 RX ADMIN — MIDODRINE HYDROCHLORIDE 10 MG: 5 TABLET ORAL at 11:39

## 2022-02-13 RX ADMIN — DILTIAZEM HYDROCHLORIDE 300 MG: 180 CAPSULE, COATED, EXTENDED RELEASE ORAL at 08:48

## 2022-02-13 RX ADMIN — PANTOPRAZOLE SODIUM 40 MG: 40 TABLET, DELAYED RELEASE ORAL at 08:48

## 2022-02-13 RX ADMIN — RIVAROXABAN 20 MG: 20 TABLET, FILM COATED ORAL at 11:39

## 2022-02-13 RX ADMIN — GUAIFENESIN 1200 MG: 600 TABLET ORAL at 21:02

## 2022-02-13 RX ADMIN — MIDODRINE HYDROCHLORIDE 10 MG: 5 TABLET ORAL at 16:33

## 2022-02-13 RX ADMIN — MIDODRINE HYDROCHLORIDE 10 MG: 5 TABLET ORAL at 08:48

## 2022-02-13 RX ADMIN — FERROUS SULFATE TAB 325 MG (65 MG ELEMENTAL FE) 325 MG: 325 (65 FE) TAB at 08:48

## 2022-02-13 RX ADMIN — METOPROLOL SUCCINATE 50 MG: 25 TABLET, EXTENDED RELEASE ORAL at 08:48

## 2022-02-13 RX ADMIN — FERROUS SULFATE TAB 325 MG (65 MG ELEMENTAL FE) 325 MG: 325 (65 FE) TAB at 16:33

## 2022-02-13 RX ADMIN — LEVALBUTEROL HYDROCHLORIDE 0.63 MG: 0.63 SOLUTION RESPIRATORY (INHALATION) at 14:43

## 2022-02-13 NOTE — PROGRESS NOTES
Problem: Pressure Injury - Risk of  Goal: *Prevention of pressure injury  Description: Document Mukesh Scale and appropriate interventions in the flowsheet. Outcome: Progressing Towards Goal  Note: Pressure Injury Interventions:  Sensory Interventions: Assess changes in LOC,Float heels,Maintain/enhance activity level,Minimize linen layers,Pressure redistribution bed/mattress (bed type),Sit a 90-degree angle/use footstool if needed    Moisture Interventions: Absorbent underpads,Minimize layers    Activity Interventions: Pressure redistribution bed/mattress(bed type),PT/OT evaluation,Increase time out of bed    Mobility Interventions: Float heels,PT/OT evaluation,Pressure redistribution bed/mattress (bed type)    Nutrition Interventions: Document food/fluid/supplement intake    Friction and Shear Interventions: Minimize layers,Sit at 90-degree angle,Feet elevated on foot rest                Problem: Patient Education: Go to Patient Education Activity  Goal: Patient/Family Education  Outcome: Progressing Towards Goal     Problem: Falls - Risk of  Goal: *Absence of Falls  Description: Document Helen Fall Risk and appropriate interventions in the flowsheet.   Outcome: Progressing Towards Goal  Note: Fall Risk Interventions:  Mobility Interventions: Communicate number of staff needed for ambulation/transfer,Patient to call before getting OOB,Utilize walker, cane, or other assistive device         Medication Interventions: Patient to call before getting OOB,Teach patient to arise slowly    Elimination Interventions: Call light in reach,Patient to call for help with toileting needs              Problem: Patient Education: Go to Patient Education Activity  Goal: Patient/Family Education  Outcome: Progressing Towards Goal     Problem: Patient Education: Go to Patient Education Activity  Goal: Patient/Family Education  Outcome: Progressing Towards Goal     Problem: Gas Exchange - Impaired  Goal: *Absence of hypoxia  Outcome: Progressing Towards Goal

## 2022-02-13 NOTE — PROGRESS NOTES
Pt placed on BIPAP - pt is in no distress at this time      02/12/22 2353   Oxygen Therapy   O2 Sat (%) 96 %   Pulse via Oximetry 110 beats per minute   O2 Device BIPAP   FIO2 (%) 40 %   Respiratory   Respiratory (WDL) X   Respiratory Pattern Tachypneic   Chest/Tracheal Assessment Chest expansion, symmetrical   CPAP/BIPAP   CPAP/BIPAP Start/Stop On   Device Mode BIPAP   $$ Bipap Daily Yes   Mask Type and Size Full face; Medium   Skin Condition intact   PIP Observed 13 cm H20   IPAP (cm H2O) 14 cm H2O   EPAP (cm H2O) 8 cm H2O   Inspiratory Time (sec) 1 seconds   Vt Spont (ml) 595 ml   Ve Observed (l/min) 17.1 l/min   Backup Rate 15   Total RR (Spontaneous) 29 breaths per minute   Insp Rise Time (sec) 5   Leak (Estimated) 21 L/min   Pt's Home Machine No   Biomedical Check Performed Yes   Settings Verified Yes   Alarm Settings   High Pressure 30   Low Pressure 5   Apnea 20   Low Ve 3   High Rate 50   Low Rate 8   Pulmonary Toilet   Pulmonary Toilet Cough and deep breath;H. O.B elevated

## 2022-02-13 NOTE — PROGRESS NOTES
Hospitalist Progress Note   Admit Date:  2/3/2022 11:07 AM   Name:  Carly Maldonado   Age:  61 y.o. Sex:  female  :  1955   MRN:  [id number]   Room:  302/    Presenting Complaint: Abnormal Lab Results and Peripheral Edema    Reason(s) for Admission: Atrial fibrillation with RVR [facility]) [I48.91]     Hospital Course & Interval History:   Carly Maldonado is a 61 y.o. female with medical history of Afib (on Xarelto), HTN, morbid obesity complicated by LUNA/OHS, chronic diastolic heart failure admitted on 2/3 with A.fib RVR, acute hypoxic respiratory failure (room air sats 89%), severe iron deficiency anemia. Pt presented with worsening shortness of breath, fatigue, and worsening swelling in her lower extremities. ED Course: Hgb of 6.5 (last Hgb was 13 in 2021). MCV of 79 with RDW of 19. pBNP of 1300. CXR shows vascular congestion. EKG shows Afib with RVR with HRs in the 100s. Occult blood negative. Type/screen for pRBC transfusion. Given Lasix 40 mg IV once. Week of : EGD and colonoscopy on  showing gastritis [status post biopsy], small superficial prepyloric ulcer, diverticulosis and hemorrhoids. Patient oxygen requirement went from 5 to 15 L on  and pulmonary on board. Acute hypoxic respiratory failure seems to be secondary to volume overload, obesity, LUNA, OHS. Subjective/24hr Events (22): No significant changes today from yest.  Still on 6L NC today. Still diuresing well. K low today. No CP, SOB or other complaints    Assessment & Plan:       Acute on chronic respiratory failure with hypoxia and hypercapnia     Class 3 obesity with alveolar hypoventilation  -Charted O2 Flow Rate (L/min): 6 l/min. Wean today  -suspect she will need significant respiratory support at baseline. Such as bipap or trilogy       Diastolic CHF, acute on chronic   -cont lasix.   Will need at discharge also, may need dose increase  -cont midodrine to facilitate diuresis  -KCL while on lasix  -daily BMP, Cr stable  -reviewed I/Os:    Intake/Output Summary (Last 24 hours) at 2/13/2022 0859  Last data filed at 2/13/2022 0747  Gross per 24 hour   Intake 0 ml   Output 2525 ml   Net -2525 ml       Atrial fibrillation with RVR   -hb stable on xarelto. Daily CBC  -HR controlled. Cont Toprol and cardizem      Gastritis and small superficial prepyloric ulcer  -cont PPI      Iron Def Anemia   -daily CBC  -PO iron      Hypertension   -Controlled. Continue current management      Type 2 diabetes mellitus   -controlled. -Cont ISS      Dispo/Discharge Planning:    -PT/OT.   Home with HH at discharge  -PPD done    Diet:  ADULT DIET Easy to Chew; Low Sodium (2 gm)  DVT PPx: on [facility]  Code status: Prior    Hospital Problems as of 2/13/2022 Date Reviewed: 2/9/2022          Codes Class Noted - Resolved POA    Chronic respiratory failure with hypoxia and hypercapnia (HCC) ICD-10-CM: J96.11, J96.12  ICD-9-CM: 518.83, 799.02, 786.09  Unknown - Present Yes        Atrial fibrillation with RVR (HCC) ICD-10-CM: I48.91  ICD-9-CM: 427.31  2/3/2022 - Present Yes        Anemia (Chronic) ICD-10-CM: D64.9  ICD-9-CM: 285.9  2/3/2022 - Present Yes        Diastolic CHF, acute on chronic [facility]) ICD-10-CM: I50.33  ICD-9-CM: 428.33, 428.0  11/11/2021 - Present Yes        Restrictive lung disease (Chronic) ICD-10-CM: J98.4  ICD-9-CM: 518.89  10/22/2020 - Present Yes        Controlled type 2 diabetes mellitus without complication, without long-term current use of insulin (HCC) (Chronic) ICD-10-CM: E11.9  ICD-9-CM: 250.00  9/9/2020 - Present Yes        * (Principal) Acute on chronic respiratory failure with hypoxia and hypercapnia (HCC) ICD-10-CM: J96.21, J96.22  ICD-9-CM: 518.84, 786.09, 799.02  6/25/2020 - Present Yes        Hypertension (Chronic) ICD-10-CM: I10  ICD-9-CM: 401.9  Unknown - Present Yes        Class 3 obesity with alveolar hypoventilation, serious comorbidity, and body mass index (BMI) of 50.0 to 59.9 in adult [facility]) (Chronic) ICD-10-CM: E66.2, Z68.43  ICD-9-CM: 278.03, V85.43  Unknown - Present Yes    Overview Signed 2/8/2019 11:59 AM by Braulio Regan MD     BMI 45.8- 5/2/12                   Objective:     Patient Vitals for the past 24 hrs:   Temp Pulse Resp BP SpO2   02/13/22 0736 97.8 °F (36.6 °C) 85 16 (!) 113/57 93 %   02/13/22 0455 97.8 °F (36.6 °C) 77 18 115/68 91 %   02/13/22 0036 98.4 °F (36.9 °C) 79 17 114/75 94 %   02/12/22 2353     96 %   02/12/22 2138  71      02/12/22 2104     95 %   02/12/22 2019 97.9 °F (36.6 °C) 66 18 110/73 94 %   02/12/22 1641 98.1 °F (36.7 °C) 75 18 105/68 98 %   02/12/22 1535     91 %   02/12/22 1131     90 %   02/12/22 1113 98.3 °F (36.8 °C) 90 18 111/67 93 %   02/12/22 0915     94 %     Oxygen Therapy  O2 Sat (%): 93 % (02/13/22 0736)  Pulse via Oximetry: 110 beats per minute (02/12/22 2353)  O2 Device: Nasal cannula (02/13/22 0747)  Skin Assessment: Clean, dry, & intact (02/11/22 0415)  Skin Protection for O2 Device: No (02/11/22 0415)  O2 Flow Rate (L/min): 6 l/min (02/13/22 0747)  FIO2 (%): 40 % (02/12/22 2353)    Estimated body mass index is 52.33 kg/m² as calculated from the following:    Height as of this encounter: 5' 7\" (1.702 m). Weight as of this encounter: 151.5 kg (334 lb 1.6 oz). Intake/Output Summary (Last 24 hours) at 2/13/2022 0859  Last data filed at 2/13/2022 0747  Gross per 24 hour   Intake 0 ml   Output 2525 ml   Net -2525 ml         Physical Exam:     Blood pressure (!) 113/57, pulse 85, temperature 97.8 °F (36.6 °C), resp. rate 16, height 5' 7\" (1.702 m), weight 151.5 kg (334 lb 1.6 oz), SpO2 93 %. General:    Obese. No overt distress  Head:  Normocephalic, atraumatic  Eyes:  Sclerae appear normal.  Pupils equally round. ENT:  Nares appear normal, no drainage. Moist oral mucosa  Neck:  No restricted ROM. Trachea midline   CV:   Irregular. No jugular venous distension.   Lungs:   Respirations even, unlabored  Abdomen:    nondistended. Extremities: No cyanosis or clubbing. No real edema, suspect baseline habitus  Skin:     No rashes and normal coloration. Warm and dry. Neuro:  CN II-XII grossly intact. Sensation intact. A&Ox3  Psych:  Normal mood and affect. I have reviewed ordered lab tests and independently visualized imaging below:    Recent Labs:  Recent Results (from the past 48 hour(s))   GLUCOSE, POC    Collection Time: 02/11/22 11:56 AM   Result Value Ref Range    Glucose (POC) 124 (H) 65 - 100 mg/dL    [facility] by [facility]    GLUCOSE, POC    Collection Time: 02/11/22  3:49 PM   Result Value Ref Range    Glucose (POC) 154 (H) 65 - 100 mg/dL    Performed by Becky    GLUCOSE, POC    Collection Time: 02/11/22  8:38 PM   Result Value Ref Range    Glucose (POC) 118 (H) 65 - 100 mg/dL    Performed by Glynn    CBC WITH AUTOMATED DIFF    Collection Time: 02/12/22  7:08 AM   Result Value Ref Range    WBC 8.9 4.3 - 11.1 K/uL    RBC 4.07 4.05 - 5.2 M/uL    HGB 9.7 (L) 11.7 - 15.4 g/dL    HCT 34.9 (L) 35.8 - 46.3 %    MCV 85.7 79.6 - 97.8 FL    MCH 23.8 (L) 26.1 - 32.9 PG    MCHC 27.8 (L) 31.4 - 35.0 g/dL    RDW 25.2 (H) 11.9 - 14.6 %    PLATELET 131 K/uL    MPV 11.4 9.4 - 12.3 FL    ABSOLUTE NRBC 0.00 0.0 - 0.2 K/uL    DF AUTOMATED      NEUTROPHILS 70 43 - 78 %    LYMPHOCYTES 13 13 - 44 %    MONOCYTES 9 4.0 - 12.0 %    EOSINOPHILS 6 0.5 - 7.8 %    BASOPHILS 1 0.0 - 2.0 %    IMMATURE GRANULOCYTES 1 0.0 - 5.0 %    ABS. NEUTROPHILS 6.2 1.7 - 8.2 K/UL    ABS. LYMPHOCYTES 1.2 0.5 - 4.6 K/UL    ABS. MONOCYTES 0.8 0.1 - 1.3 K/UL    ABS. EOSINOPHILS 0.5 0.0 - 0.8 K/UL    ABS. BASOPHILS 0.1 0.0 - 0.2 K/UL    ABS. IMM.  GRANS. 0.1 0.0 - 0.5 K/UL    RBC COMMENTS SLIGHT  ANISOCYTOSIS + POIKILOCYTOSIS        RBC COMMENTS SLIGHT  HYPOCHROMIA        WBC COMMENTS Result Confirmed By Smear      PLATELET COMMENTS SLIGHT     METABOLIC PANEL, BASIC    Collection Time: 02/12/22  7:08 AM   Result Value Ref Range    Sodium 138 136 - 145 mmol/L    Potassium 4.5 3.5 - 5.1 mmol/L    Chloride 98 98 - 107 mmol/L    CO2 35 (H) 21 - 32 mmol/L    Anion gap 5 (L) 7 - 16 mmol/L    Glucose 97 65 - 100 mg/dL    BUN 13 8 - 23 MG/DL    Creatinine 0.70 0.6 - 1.0 MG/DL    GFR est AA >60 >60 ml/min/1.73m2    GFR est non-AA >60 >60 ml/min/1.73m2    Calcium 8.8 8.3 - 10.4 MG/DL   GLUCOSE, POC    Collection Time: 02/12/22  7:16 AM   Result Value Ref Range    Glucose (POC) 114 (H) 65 - 100 mg/dL    Performed by [employer]    GLUCOSE, POC    Collection Time: 02/12/22 10:32 AM   Result Value Ref Range    Glucose (POC) 152 (H) 65 - 100 mg/dL    Performed by FeideeA    GLUCOSE, POC    Collection Time: 02/12/22  4:13 PM   Result Value Ref Range    Glucose (POC) 209 (H) 65 - 100 mg/dL    Performed by FeideeA    GLUCOSE, POC    Collection Time: 02/12/22  8:40 PM   Result Value Ref Range    Glucose (POC) 127 (H) 65 - 100 mg/dL    Performed by Glynn    CBC WITH AUTOMATED DIFF    Collection Time: 02/13/22  6:00 AM   Result Value Ref Range    WBC 8.1 4.3 - 11.1 K/uL    RBC 4.04 (L) 4.05 - 5.2 M/uL    HGB 9.4 (L) 11.7 - 15.4 g/dL    HCT 34.6 (L) 35.8 - 46.3 %    MCV 85.6 79.6 - 97.8 FL    MCH 23.3 (L) 26.1 - 32.9 PG    MCHC 27.2 (L) 31.4 - 35.0 g/dL    RDW 25.2 (H) 11.9 - 14.6 %    PLATELET 140 844 - 499 K/uL    MPV 10.4 9.4 - 12.3 FL    ABSOLUTE NRBC 0.00 0.0 - 0.2 K/uL    DF AUTOMATED      NEUTROPHILS 68 43 - 78 %    LYMPHOCYTES 16 13 - 44 %    MONOCYTES 9 4.0 - 12.0 %    EOSINOPHILS 6 0.5 - 7.8 %    BASOPHILS 1 0.0 - 2.0 %    IMMATURE GRANULOCYTES 1 0.0 - 5.0 %    ABS. NEUTROPHILS 5.5 1.7 - 8.2 K/UL    ABS. LYMPHOCYTES 1.3 0.5 - 4.6 K/UL    ABS. MONOCYTES 0.7 0.1 - 1.3 K/UL    ABS. EOSINOPHILS 0.5 0.0 - 0.8 K/UL    ABS. BASOPHILS 0.0 0.0 - 0.2 K/UL    ABS. IMM.  GRANS. 0.0 0.0 - 0.5 K/UL   METABOLIC PANEL, BASIC    Collection Time: 02/13/22  6:00 AM   Result Value Ref Range Sodium 138 136 - 145 mmol/L    Potassium 3.4 (L) 3.5 - 5.1 mmol/L    Chloride 95 (L) 98 - 107 mmol/L    CO2 39 (H) 21 - 32 mmol/L    Anion gap 4 (L) 7 - 16 mmol/L    Glucose 103 (H) 65 - 100 mg/dL    BUN 15 8 - 23 MG/DL    Creatinine 0.60 0.6 - 1.0 MG/DL    GFR est AA >60 >60 ml/min/1.73m2    GFR est non-AA >60 >60 ml/min/1.73m2    Calcium 9.3 8.3 - 10.4 MG/DL   GLUCOSE, POC    Collection Time: 02/13/22  7:27 AM   Result Value Ref Range    Glucose (POC) 108 (H) 65 - 100 mg/dL    Performed by [facility]        All Micro Results     Procedure Component Value Units Date/Time    COVID-19 RAPID TEST [[id number]] Collected: 02/05/22 0547    Order Status: Completed Specimen: Nasopharyngeal Updated: 02/05/22 0650     Specimen source NASAL        COVID-19 rapid test Not detected        Comment:      The specimen is NEGATIVE for SARS-CoV-2, the novel coronavirus associated with COVID-19. A negative result does not rule out COVID-19. This test has been authorized by the FDA under an Emergency Use Authorization (EUA) for use by authorized laboratories. Fact sheet for Healthcare Providers: ConventionUpdate.co.nz  Fact sheet for Patients: ConventionUpdate.co.nz       Methodology: Isothermal Nucleic Acid Amplification               Other Studies:  No results found.     Current Meds:  Current Facility-Administered Medications   Medication Dose Route Frequency    levalbuterol (XOPENEX) nebulizer soln 0.63 mg/3 mL  0.63 mg Nebulization TID RT    potassium chloride (K-DUR, KLOR-CON M20) SR tablet 40 mEq  40 mEq Oral DAILY    midodrine (PROAMATINE) tablet 10 mg  10 mg Oral TID WITH MEALS    oxybutynin (DITROPAN) tablet 5 mg  5 mg Oral Q8H PRN    zinc oxide-cod liver oil (DESITIN) 40 % paste   Topical PRN    guaiFENesin ER (MUCINEX) tablet 1,200 mg  1,200 mg Oral Q12H    lip protectant (BLISTEX) ointment 1 Each  1 Each Topical PRN    sodium chloride (OCEAN) 0.65 % nasal squeeze bottle 2 Spray  2 Spray Both Nostrils Q2H PRN    metoprolol succinate (TOPROL-XL) XL tablet 50 mg  50 mg Oral DAILY    rivaroxaban (XARELTO) tablet 20 mg  20 mg Oral DAILY WITH LUNCH    ferrous sulfate tablet 325 mg  1 Tablet Oral BID WITH MEALS    dilTIAZem ER (CARDIZEM CD) capsule 300 mg  300 mg Oral DAILY    pantoprazole (PROTONIX) tablet 40 mg  40 mg Oral Q12H    digoxin (LANOXIN) tablet 0.25 mg  0.25 mg Oral DAILY    acetaminophen (TYLENOL) tablet 650 mg  650 mg Oral Q4H PRN    0.9% sodium chloride infusion 250 mL  250 mL IntraVENous PRN    sodium chloride (NS) flush 5-10 mL  5-10 mL IntraVENous Q8H    sodium chloride (NS) flush 5-10 mL  5-10 mL IntraVENous PRN    furosemide (LASIX) injection 40 mg  40 mg IntraVENous BID    insulin lispro (HUMALOG) injection   SubCUTAneous AC&HS    oxyCODONE IR (ROXICODONE) tablet 5 mg  5 mg Oral Q4H PRN       Signed:  Faby Regalado MD    Part of this note may have been written by using a voice dictation software. The note has been proof read but may still contain some grammatical/other typographical errors.

## 2022-02-13 NOTE — PROGRESS NOTES
Bedside and Verbal shift change report given to OPAL Liz (oncoming nurse) by self (offgoing nurse). Report included the following information SBAR, Kardex, MAR, Accordion and Recent Results.

## 2022-02-14 LAB
ANION GAP SERPL CALC-SCNC: 3 MMOL/L (ref 7–16)
BASOPHILS # BLD: 0.1 K/UL (ref 0–0.2)
BASOPHILS NFR BLD: 1 % (ref 0–2)
BUN SERPL-MCNC: 16 MG/DL (ref 8–23)
CALCIUM SERPL-MCNC: 9.4 MG/DL (ref 8.3–10.4)
CHLORIDE SERPL-SCNC: 97 MMOL/L (ref 98–107)
CO2 SERPL-SCNC: 39 MMOL/L (ref 21–32)
CREAT SERPL-MCNC: 0.7 MG/DL (ref 0.6–1)
DIFFERENTIAL METHOD BLD: ABNORMAL
EOSINOPHIL # BLD: 0.4 K/UL (ref 0–0.8)
EOSINOPHIL NFR BLD: 5 % (ref 0.5–7.8)
ERYTHROCYTE [DISTWIDTH] IN BLOOD BY AUTOMATED COUNT: 25.4 % (ref 11.9–14.6)
GLUCOSE BLD STRIP.AUTO-MCNC: 103 MG/DL (ref 65–100)
GLUCOSE BLD STRIP.AUTO-MCNC: 122 MG/DL (ref 65–100)
GLUCOSE BLD STRIP.AUTO-MCNC: 161 MG/DL (ref 65–100)
GLUCOSE BLD STRIP.AUTO-MCNC: 180 MG/DL (ref 65–100)
GLUCOSE SERPL-MCNC: 107 MG/DL (ref 65–100)
HCT VFR BLD AUTO: 34.9 % (ref 35.8–46.3)
HGB BLD-MCNC: 9.6 G/DL (ref 11.7–15.4)
IMM GRANULOCYTES # BLD AUTO: 0 K/UL (ref 0–0.5)
IMM GRANULOCYTES NFR BLD AUTO: 1 % (ref 0–5)
LYMPHOCYTES # BLD: 1.4 K/UL (ref 0.5–4.6)
LYMPHOCYTES NFR BLD: 16 % (ref 13–44)
MCH RBC QN AUTO: 23.4 PG (ref 26.1–32.9)
MCHC RBC AUTO-ENTMCNC: 27.5 G/DL (ref 31.4–35)
MCV RBC AUTO: 85.1 FL (ref 79.6–97.8)
MONOCYTES # BLD: 0.8 K/UL (ref 0.1–1.3)
MONOCYTES NFR BLD: 10 % (ref 4–12)
NEUTS SEG # BLD: 5.7 K/UL (ref 1.7–8.2)
NEUTS SEG NFR BLD: 68 % (ref 43–78)
NRBC # BLD: 0 K/UL (ref 0–0.2)
PLATELET # BLD AUTO: 321 K/UL (ref 150–450)
PMV BLD AUTO: 10.6 FL (ref 9.4–12.3)
POTASSIUM SERPL-SCNC: 3.3 MMOL/L (ref 3.5–5.1)
RBC # BLD AUTO: 4.1 M/UL (ref 4.05–5.2)
SERVICE CMNT-IMP: ABNORMAL
SODIUM SERPL-SCNC: 139 MMOL/L (ref 136–145)
WBC # BLD AUTO: 8.4 K/UL (ref 4.3–11.1)

## 2022-02-14 PROCEDURE — 77010033678 HC OXYGEN DAILY

## 2022-02-14 PROCEDURE — 74011000250 HC RX REV CODE- 250: Performed by: INTERNAL MEDICINE

## 2022-02-14 PROCEDURE — 83735 ASSAY OF MAGNESIUM: CPT

## 2022-02-14 PROCEDURE — 74011636637 HC RX REV CODE- 636/637: Performed by: INTERNAL MEDICINE

## 2022-02-14 PROCEDURE — 94760 N-INVAS EAR/PLS OXIMETRY 1: CPT

## 2022-02-14 PROCEDURE — 85025 COMPLETE CBC W/AUTO DIFF WBC: CPT

## 2022-02-14 PROCEDURE — 74011250637 HC RX REV CODE- 250/637: Performed by: INTERNAL MEDICINE

## 2022-02-14 PROCEDURE — 74011250636 HC RX REV CODE- 250/636: Performed by: INTERNAL MEDICINE

## 2022-02-14 PROCEDURE — 99232 SBSQ HOSP IP/OBS MODERATE 35: CPT | Performed by: INTERNAL MEDICINE

## 2022-02-14 PROCEDURE — 74011000250 HC RX REV CODE- 250: Performed by: EMERGENCY MEDICINE

## 2022-02-14 PROCEDURE — 94640 AIRWAY INHALATION TREATMENT: CPT

## 2022-02-14 PROCEDURE — 82962 GLUCOSE BLOOD TEST: CPT

## 2022-02-14 PROCEDURE — 65660000000 HC RM CCU STEPDOWN

## 2022-02-14 PROCEDURE — 94660 CPAP INITIATION&MGMT: CPT

## 2022-02-14 PROCEDURE — 80048 BASIC METABOLIC PNL TOTAL CA: CPT

## 2022-02-14 PROCEDURE — 36415 COLL VENOUS BLD VENIPUNCTURE: CPT

## 2022-02-14 RX ORDER — POTASSIUM CHLORIDE 20 MEQ/1
40 TABLET, EXTENDED RELEASE ORAL 2 TIMES DAILY
Status: DISCONTINUED | OUTPATIENT
Start: 2022-02-14 | End: 2022-02-15 | Stop reason: HOSPADM

## 2022-02-14 RX ADMIN — FERROUS SULFATE TAB 325 MG (65 MG ELEMENTAL FE) 325 MG: 325 (65 FE) TAB at 09:18

## 2022-02-14 RX ADMIN — SODIUM CHLORIDE, PRESERVATIVE FREE 5 ML: 5 INJECTION INTRAVENOUS at 16:14

## 2022-02-14 RX ADMIN — LEVALBUTEROL HYDROCHLORIDE 0.63 MG: 0.63 SOLUTION RESPIRATORY (INHALATION) at 19:50

## 2022-02-14 RX ADMIN — MIDODRINE HYDROCHLORIDE 10 MG: 5 TABLET ORAL at 17:29

## 2022-02-14 RX ADMIN — METOPROLOL SUCCINATE 50 MG: 25 TABLET, EXTENDED RELEASE ORAL at 09:18

## 2022-02-14 RX ADMIN — PANTOPRAZOLE SODIUM 40 MG: 40 TABLET, DELAYED RELEASE ORAL at 22:11

## 2022-02-14 RX ADMIN — GUAIFENESIN 1200 MG: 600 TABLET ORAL at 22:11

## 2022-02-14 RX ADMIN — Medication 2 UNITS: at 16:13

## 2022-02-14 RX ADMIN — GUAIFENESIN 1200 MG: 600 TABLET ORAL at 09:18

## 2022-02-14 RX ADMIN — FERROUS SULFATE TAB 325 MG (65 MG ELEMENTAL FE) 325 MG: 325 (65 FE) TAB at 17:29

## 2022-02-14 RX ADMIN — RIVAROXABAN 20 MG: 20 TABLET, FILM COATED ORAL at 12:22

## 2022-02-14 RX ADMIN — FUROSEMIDE 40 MG: 10 INJECTION, SOLUTION INTRAMUSCULAR; INTRAVENOUS at 09:17

## 2022-02-14 RX ADMIN — SODIUM CHLORIDE, PRESERVATIVE FREE 10 ML: 5 INJECTION INTRAVENOUS at 22:12

## 2022-02-14 RX ADMIN — SODIUM CHLORIDE, PRESERVATIVE FREE 10 ML: 5 INJECTION INTRAVENOUS at 05:40

## 2022-02-14 RX ADMIN — MIDODRINE HYDROCHLORIDE 10 MG: 5 TABLET ORAL at 12:22

## 2022-02-14 RX ADMIN — POTASSIUM CHLORIDE 40 MEQ: 20 TABLET, EXTENDED RELEASE ORAL at 09:18

## 2022-02-14 RX ADMIN — LEVALBUTEROL HYDROCHLORIDE 0.63 MG: 0.63 SOLUTION RESPIRATORY (INHALATION) at 07:14

## 2022-02-14 RX ADMIN — Medication 2 UNITS: at 22:11

## 2022-02-14 RX ADMIN — POTASSIUM CHLORIDE 40 MEQ: 20 TABLET, EXTENDED RELEASE ORAL at 17:29

## 2022-02-14 RX ADMIN — DIGOXIN 0.25 MG: 250 TABLET ORAL at 09:19

## 2022-02-14 RX ADMIN — DILTIAZEM HYDROCHLORIDE 300 MG: 180 CAPSULE, COATED, EXTENDED RELEASE ORAL at 09:18

## 2022-02-14 RX ADMIN — PANTOPRAZOLE SODIUM 40 MG: 40 TABLET, DELAYED RELEASE ORAL at 09:19

## 2022-02-14 RX ADMIN — MIDODRINE HYDROCHLORIDE 10 MG: 5 TABLET ORAL at 09:17

## 2022-02-14 RX ADMIN — FUROSEMIDE 40 MG: 10 INJECTION, SOLUTION INTRAMUSCULAR; INTRAVENOUS at 17:29

## 2022-02-14 RX ADMIN — LEVALBUTEROL HYDROCHLORIDE 0.63 MG: 0.63 SOLUTION RESPIRATORY (INHALATION) at 13:30

## 2022-02-14 NOTE — ROUTINE PROCESS
Verbal bedside report given to [facility], oncoming RN. Patient's situation, background, assessment and recommendations provided. Opportunity for questions provided. Oncoming RN assumed care of patient.

## 2022-02-14 NOTE — PROGRESS NOTES
Pt placed on BIPAP - pt is in no distress at this time      02/14/22 0003   Oxygen Therapy   O2 Sat (%) 93 %   Pulse via Oximetry 71 beats per minute   O2 Device BIPAP   FIO2 (%) 40 %   Respiratory   Respiratory (WDL) X   Respiratory Pattern Tachypneic   Chest/Tracheal Assessment Chest expansion, symmetrical   Breath Sounds Bilateral Coarse   CPAP/BIPAP   CPAP/BIPAP Start/Stop On   Device Mode BIPAP   $$ Bipap Daily Yes   Mask Type and Size Full face; Medium   Skin Condition intact   PIP Observed 13 cm H20   IPAP (cm H2O) 14 cm H2O   EPAP (cm H2O) 8 cm H2O   Inspiratory Time (sec) 1 seconds   Vt Spont (ml) 620 ml   Ve Observed (l/min) 19.4 l/min   Backup Rate 15   Total RR (Spontaneous) 31 breaths per minute   Insp Rise Time (sec) 5   Leak (Estimated) 3 L/min   Pt's Home Machine No   Biomedical Check Performed Yes   Settings Verified Yes   Alarm Settings   High Pressure 30   Low Pressure 5   Apnea 20   Low Ve 3   High Rate 50   Low Rate 8   Pulmonary Toilet   Pulmonary Toilet H. O.B elevated;Cough and deep breath

## 2022-02-14 NOTE — PROGRESS NOTES
No new discharge needs identified. Pts  needs decreased from  4L to 6L now. Still diuresing well with stable kidney function. MD asked about a BiPap for the pt, messaged Ray with [facility]. Pt could potentially discharge on Tuesday. Care Management Interventions  PCP Verified by CM: Yes  Mode of Transport at Discharge:  Other (see comment)  Transition of Care Consult (CM Consult): [facility])  [address] [place] [address] Road: Yes  MyChart Signup: No  Discharge Durable Medical Equipment: No  Physical Therapy Consult: Yes  Occupational Therapy Consult: Yes  Speech Therapy Consult: No  Support Systems: Child(adelita),Other Family Member(s),[employer],Friend/Neighbor  Confirm Follow Up Transport: Family  The Plan for Transition of Care is Related to the Following Treatment Goals : Return home and back to her baseline  Discharge Location  Patient Expects to be Discharged to[de-identified] Home with home health [facility])

## 2022-02-14 NOTE — PROGRESS NOTES
Hospitalist Progress Note   Admit Date:  2/3/2022 11:07 AM   Name:  Leland Flores   Age:  61 y.o. Sex:  female  :  1955   MRN:  [id number]   Room:  /    Presenting Complaint: Abnormal Lab Results and Peripheral Edema    Reason(s) for Admission: Atrial fibrillation with RVR [facility]) [I48.91]     Hospital Course & Interval History:   Leland Flores is a 61 y.o. female with medical history of Afib (on Xarelto), HTN, morbid obesity complicated by LUNA/OHS, chronic diastolic heart failure admitted on 2/3 with A.fib RVR, acute hypoxic respiratory failure (room air sats 89%), severe iron deficiency anemia. Pt presented with worsening shortness of breath, fatigue, and worsening swelling in her lower extremities. ED Course: Hgb of 6.5 (last Hgb was 13 in 2021). MCV of 79 with RDW of 19. pBNP of 1300. CXR shows vascular congestion. EKG shows Afib with RVR with HRs in the 100s. Occult blood negative. Type/screen for pRBC transfusion. Given Lasix 40 mg IV once. Week of : EGD and colonoscopy on  showing gastritis [status post biopsy], small superficial prepyloric ulcer, diverticulosis and hemorrhoids. Patient oxygen requirement went from 5 to 15 L on  and pulmonary on board. Acute hypoxic respiratory failure seems to be secondary to volume overload, obesity, LUNA, OHS. Subjective/24hr Events (22): No significant changes today from yest subjectively. No complaints. No CP, SOB.  o2 needs decreased to 4L from 6L. Still diuresing well with stable kidney function. Assessment & Plan:       Acute on chronic respiratory failure with hypoxia and hypercapnia     Class 3 obesity with alveolar hypoventilation  -Charted O2 Flow Rate (L/min): 4 l/min. Wean today  -spoke to CM about getting bipap for discharge; hoping tomorrow if o2 still weaning      Diastolic CHF, acute on chronic   -cont lasix.   Will need at discharge also, may need dose increase; takes 40mg daily at home  -cont midodrine to facilitate diuresis; tolerating well  -KCL while on lasix; increase to BID today  -daily BMP, Cr stable  -reviewed I/Os:    Intake/Output Summary (Last 24 hours) at 2/14/2022 0859  Last data filed at 2/14/2022 0518  Gross per 24 hour   Intake 760 ml   Output 2825 ml   Net -2065 ml       Atrial fibrillation with RVR   -hb stable on xarelto. Daily CBC  -HR controlled. Cont Toprol and cardizem      Gastritis and small superficial prepyloric ulcer  -cont PPI      Iron Def Anemia   -daily CBC  -PO iron      Hypertension   -Controlled. Continue current management      Type 2 diabetes mellitus   -controlled. -Cont ISS      Dispo/Discharge Planning:    -PT/OT. Home with HH at discharge; maybe tomorrow.   -PPD done    Diet:  ADULT DIET Easy to Chew; Low Sodium (2 gm)  DVT PPx: on AC  Code status: Prior    Hospital Problems as of 2/14/2022 Date Reviewed: 2/9/2022          Codes Class Noted - Resolved POA    Chronic respiratory failure with hypoxia and hypercapnia (HCC) ICD-10-CM: J96.11, J96.12  ICD-9-CM: 518.83, 799.02, 786.09  Unknown - Present Yes        Atrial fibrillation with RVR (HCC) ICD-10-CM: I48.91  ICD-9-CM: 427.31  2/3/2022 - Present Yes        Anemia (Chronic) ICD-10-CM: D64.9  ICD-9-CM: 285.9  2/3/2022 - Present Yes        Diastolic CHF, acute on chronic (HCC) ICD-10-CM: I50.33  ICD-9-CM: 428.33, 428.0  11/11/2021 - Present Yes        Restrictive lung disease (Chronic) ICD-10-CM: J98.4  ICD-9-CM: 518.89  10/22/2020 - Present Yes        Controlled type 2 diabetes mellitus without complication, without long-term current use of insulin (HCC) (Chronic) ICD-10-CM: E11.9  ICD-9-CM: 250.00  9/9/2020 - Present Yes        * (Principal) Acute on chronic respiratory failure with hypoxia and hypercapnia (HCC) ICD-10-CM: J96.21, J96.22  ICD-9-CM: 518.84, 786.09, 799.02  6/25/2020 - Present Yes        Hypertension (Chronic) ICD-10-CM: I10  ICD-9-CM: 401.9  Unknown - Present Yes Class 3 obesity with alveolar hypoventilation, serious comorbidity, and body mass index (BMI) of 50.0 to 59.9 in adult [facility]) (Chronic) ICD-10-CM: Z07.9, Z68.43  ICD-9-CM: 278.03, V85.43  Unknown - Present Yes    Overview Signed 2/8/2019 11:59 AM by Jose Juan Gan MD     BMI 45.8- 5/2/12                   Objective:     Patient Vitals for the past 24 hrs:   Temp Pulse Resp BP SpO2   02/14/22 0723 97.7 °F (36.5 °C) 84 17 136/72 93 %   02/14/22 0715     90 %   02/14/22 0517 98.2 °F (36.8 °C) 80 18 123/79 92 %   02/14/22 0048 98.2 °F (36.8 °C) 87 18 123/70 90 %   02/14/22 0003     93 %   02/13/22 2051 98 °F (36.7 °C) 76 18 108/78 94 %   02/13/22 2000     95 %   02/13/22 1727  76  110/69    02/13/22 1602 98.4 °F (36.9 °C) 77 18 118/66 93 %   02/13/22 1443     91 %   02/13/22 1117 97.5 °F (36.4 °C) 87 17 (!) 105/58 94 %   02/13/22 0901     94 %     Oxygen Therapy  O2 Sat (%): 93 % (02/14/22 0723)  Pulse via Oximetry: 82 beats per minute (02/14/22 0715)  O2 Device: Nasal cannula (02/14/22 0723)  Skin Assessment: Clean, dry, & intact (02/11/22 0415)  Skin Protection for O2 Device: No (02/11/22 0415)  O2 Flow Rate (L/min): 4 l/min (02/14/22 0723)  FIO2 (%): 35 % (02/14/22 0715)    Estimated body mass index is 51.26 kg/m² as calculated from the following:    Height as of this encounter: 5' 7\" (1.702 m). Weight as of this encounter: 148.5 kg (327 lb 4.8 oz). Intake/Output Summary (Last 24 hours) at 2/14/2022 0859  Last data filed at 2/14/2022 0518  Gross per 24 hour   Intake 760 ml   Output 2825 ml   Net -2065 ml         Physical Exam:     Blood pressure 136/72, pulse 84, temperature 97.7 °F (36.5 °C), resp. rate 17, height 5' 7\" (1.702 m), weight 148.5 kg (327 lb 4.8 oz), SpO2 93 %. General:    Obese. No overt distress  Head:  Normocephalic, atraumatic  Eyes:  Sclerae appear normal.  Pupils equally round. ENT:  Nares appear normal, no drainage. Moist oral mucosa  Neck:  No restricted ROM. Trachea midline   CV:   Irregular. No jugular venous distension. Lungs:   Respirations even, unlabored  Abdomen:    nondistended. Extremities: No cyanosis or clubbing. No real edema, suspect baseline habitus  Skin:     No rashes and normal coloration. Warm and dry. Neuro:  CN II-XII grossly intact. Sensation intact. A&Ox3  Psych:  Normal mood and affect. I have reviewed ordered lab tests and independently visualized imaging below:    Recent Labs:  Recent Results (from the past 48 hour(s))   GLUCOSE, POC    Collection Time: 02/12/22 10:32 AM   Result Value Ref Range    Glucose (POC) 152 (H) 65 - 100 mg/dL    Performed by [email or web address]    GLUCOSE, POC    Collection Time: 02/12/22  4:13 PM   Result Value Ref Range    Glucose (POC) 209 (H) 65 - 100 mg/dL    Performed by [email or web address]    GLUCOSE, POC    Collection Time: 02/12/22  8:40 PM   Result Value Ref Range    Glucose (POC) 127 (H) 65 - 100 mg/dL    Performed by [email or web address]    CBC WITH AUTOMATED DIFF    Collection Time: 02/13/22  6:00 AM   Result Value Ref Range    WBC 8.1 4.3 - 11.1 K/uL    RBC 4.04 (L) 4.05 - 5.2 M/uL    HGB 9.4 (L) 11.7 - 15.4 g/dL    HCT 34.6 (L) 35.8 - 46.3 %    MCV 85.6 79.6 - 97.8 FL    MCH 23.3 (L) 26.1 - 32.9 PG    MCHC 27.2 (L) 31.4 - 35.0 g/dL    RDW 25.2 (H) 11.9 - 14.6 %    PLATELET 899 296 - 684 K/uL    MPV 10.4 9.4 - 12.3 FL    ABSOLUTE NRBC 0.00 0.0 - 0.2 K/uL    DF AUTOMATED      NEUTROPHILS 68 43 - 78 %    LYMPHOCYTES 16 13 - 44 %    MONOCYTES 9 4.0 - 12.0 %    EOSINOPHILS 6 0.5 - 7.8 %    BASOPHILS 1 0.0 - 2.0 %    IMMATURE GRANULOCYTES 1 0.0 - 5.0 %    ABS. NEUTROPHILS 5.5 1.7 - 8.2 K/UL    ABS. LYMPHOCYTES 1.3 0.5 - 4.6 K/UL    ABS. MONOCYTES 0.7 0.1 - 1.3 K/UL    ABS. EOSINOPHILS 0.5 0.0 - 0.8 K/UL    ABS. BASOPHILS 0.0 0.0 - 0.2 K/UL    ABS. IMM.  GRANS. 0.0 0.0 - 0.5 K/UL   METABOLIC PANEL, BASIC    Collection Time: 02/13/22  6:00 AM   Result Value Ref Range    Sodium 138 136 - 145 mmol/L Potassium 3.4 (L) 3.5 - 5.1 mmol/L    Chloride 95 (L) 98 - 107 mmol/L    CO2 39 (H) 21 - 32 mmol/L    Anion gap 4 (L) 7 - 16 mmol/L    Glucose 103 (H) 65 - 100 mg/dL    BUN 15 8 - 23 MG/DL    Creatinine 0.60 0.6 - 1.0 MG/DL    GFR est AA >60 >60 ml/min/1.73m2    GFR est non-AA >60 >60 ml/min/1.73m2    Calcium 9.3 8.3 - 10.4 MG/DL   GLUCOSE, POC    Collection Time: 02/13/22  7:27 AM   Result Value Ref Range    Glucose (POC) 108 (H) 65 - 100 mg/dL    Performed by Agnes Aldrich    GLUCOSE, POC    Collection Time: 02/13/22 10:43 AM   Result Value Ref Range    Glucose (POC) 139 (H) 65 - 100 mg/dL    Performed by Agnes Aldrich    GLUCOSE, POC    Collection Time: 02/13/22  3:54 PM   Result Value Ref Range    Glucose (POC) 192 (H) 65 - 100 mg/dL    Performed by Sonali    GLUCOSE, POC    Collection Time: 02/13/22  9:10 PM   Result Value Ref Range    Glucose (POC) 133 (H) 65 - 100 mg/dL    Performed by Glynn    CBC WITH AUTOMATED DIFF    Collection Time: 02/14/22  5:33 AM   Result Value Ref Range    WBC 8.4 4.3 - 11.1 K/uL    RBC 4.10 4.05 - 5.2 M/uL    HGB 9.6 (L) 11.7 - 15.4 g/dL    HCT 34.9 (L) 35.8 - 46.3 %    MCV 85.1 79.6 - 97.8 FL    MCH 23.4 (L) 26.1 - 32.9 PG    MCHC 27.5 (L) 31.4 - 35.0 g/dL    RDW 25.4 (H) 11.9 - 14.6 %    PLATELET 723 157 - 581 K/uL    MPV 10.6 9.4 - 12.3 FL    ABSOLUTE NRBC 0.00 0.0 - 0.2 K/uL    DF AUTOMATED      NEUTROPHILS 68 43 - 78 %    LYMPHOCYTES 16 13 - 44 %    MONOCYTES 10 4.0 - 12.0 %    EOSINOPHILS 5 0.5 - 7.8 %    BASOPHILS 1 0.0 - 2.0 %    IMMATURE GRANULOCYTES 1 0.0 - 5.0 %    ABS. NEUTROPHILS 5.7 1.7 - 8.2 K/UL    ABS. LYMPHOCYTES 1.4 0.5 - 4.6 K/UL    ABS. MONOCYTES 0.8 0.1 - 1.3 K/UL    ABS. EOSINOPHILS 0.4 0.0 - 0.8 K/UL    ABS. BASOPHILS 0.1 0.0 - 0.2 K/UL    ABS. IMM.  GRANS. 0.0 0.0 - 0.5 K/UL   METABOLIC PANEL, BASIC    Collection Time: 02/14/22  5:33 AM   Result Value Ref Range    Sodium 139 136 - 145 mmol/L    Potassium 3.3 (L) 3.5 - 5.1 mmol/L Chloride 97 (L) 98 - 107 mmol/L    CO2 39 (H) 21 - 32 mmol/L    Anion gap 3 (L) 7 - 16 mmol/L    Glucose 107 (H) 65 - 100 mg/dL    BUN 16 8 - 23 MG/DL    Creatinine 0.70 0.6 - 1.0 MG/DL    GFR est AA >60 >60 ml/min/1.73m2    GFR est non-AA >60 >60 ml/min/1.73m2    Calcium 9.4 8.3 - 10.4 MG/DL   GLUCOSE, POC    Collection Time: 02/14/22  7:38 AM   Result Value Ref Range    Glucose (POC) 103 (H) 65 - 100 mg/dL    Performed by [email or web address]        All Micro Results     Procedure Component Value Units Date/Time    COVID-19 RAPID TEST [[id number]] Collected: 02/05/22 0547    Order Status: Completed Specimen: Nasopharyngeal Updated: 02/05/22 0650     Specimen source NASAL        COVID-19 rapid test Not detected        Comment:      The specimen is NEGATIVE for SARS-CoV-2, the novel coronavirus associated with COVID-19. A negative result does not rule out COVID-19. This test has been authorized by the FDA under an Emergency Use Authorization (EUA) for use by authorized laboratories. Fact sheet for Healthcare Providers: ConventionUpdate.co.nz  Fact sheet for Patients: ConventionUpdate.co.nz       Methodology: Isothermal Nucleic Acid Amplification               Other Studies:  No results found.     Current Meds:  Current Facility-Administered Medications   Medication Dose Route Frequency    potassium chloride (K-DUR, KLOR-CON M20) SR tablet 40 mEq  40 mEq Oral BID    levalbuterol (XOPENEX) nebulizer soln 0.63 mg/3 mL  0.63 mg Nebulization TID RT    midodrine (PROAMATINE) tablet 10 mg  10 mg Oral TID WITH MEALS    oxybutynin (DITROPAN) tablet 5 mg  5 mg Oral Q8H PRN    zinc oxide-cod liver oil (DESITIN) 40 % paste   Topical PRN    guaiFENesin ER (MUCINEX) tablet 1,200 mg  1,200 mg Oral Q12H    lip protectant (BLISTEX) ointment 1 Each  1 Each Topical PRN    sodium chloride (OCEAN) 0.65 % nasal squeeze bottle 2 Spray  2 Spray Both Nostrils Q2H PRN    metoprolol succinate (TOPROL-XL) XL tablet 50 mg  50 mg Oral DAILY    rivaroxaban (XARELTO) tablet 20 mg  20 mg Oral DAILY WITH LUNCH    ferrous sulfate tablet 325 mg  1 Tablet Oral BID WITH MEALS    dilTIAZem ER (CARDIZEM CD) capsule 300 mg  300 mg Oral DAILY    pantoprazole (PROTONIX) tablet 40 mg  40 mg Oral Q12H    digoxin (LANOXIN) tablet 0.25 mg  0.25 mg Oral DAILY    acetaminophen (TYLENOL) tablet 650 mg  650 mg Oral Q4H PRN    0.9% sodium chloride infusion 250 mL  250 mL IntraVENous PRN    sodium chloride (NS) flush 5-10 mL  5-10 mL IntraVENous Q8H    sodium chloride (NS) flush 5-10 mL  5-10 mL IntraVENous PRN    furosemide (LASIX) injection 40 mg  40 mg IntraVENous BID    insulin lispro (HUMALOG) injection   SubCUTAneous AC&HS    oxyCODONE IR (ROXICODONE) tablet 5 mg  5 mg Oral Q4H PRN       Signed:  Martinez Cordoba MD    Part of this note may have been written by using a voice dictation software. The note has been proof read but may still contain some grammatical/other typographical errors.

## 2022-02-14 NOTE — PROGRESS NOTES
PT Daily Note:  Attempted to see patient for physical therapy this afternoon but patient refused to participate again. Of note, patient also refused PT this morning. Will check back on patient at a later date/time if schedule permits.   Thank you,  Darilyn Ormond, PTA

## 2022-02-14 NOTE — PROGRESS NOTES
Problem: Pressure Injury - Risk of  Goal: *Prevention of pressure injury  Description: Document Mukesh Scale and appropriate interventions in the flowsheet. Outcome: Progressing Towards Goal  Note: Pressure Injury Interventions:  Sensory Interventions: Assess changes in LOC,Float heels,Maintain/enhance activity level,Minimize linen layers,Keep linens dry and wrinkle-free,Pressure redistribution bed/mattress (bed type)    Moisture Interventions: Absorbent underpads,Internal/External urinary devices,Minimize layers    Activity Interventions: Increase time out of bed,Pressure redistribution bed/mattress(bed type),PT/OT evaluation    Mobility Interventions: Float heels,Pressure redistribution bed/mattress (bed type),PT/OT evaluation    Nutrition Interventions: Document food/fluid/supplement intake    Friction and Shear Interventions: Minimize layers                Problem: Patient Education: Go to Patient Education Activity  Goal: Patient/Family Education  Outcome: Progressing Towards Goal     Problem: Falls - Risk of  Goal: *Absence of Falls  Description: Document Helen Fall Risk and appropriate interventions in the flowsheet.   Outcome: Progressing Towards Goal  Note: Fall Risk Interventions:  Mobility Interventions: Communicate number of staff needed for ambulation/transfer,Patient to call before getting OOB,Utilize walker, cane, or other assistive device         Medication Interventions: Patient to call before getting OOB,Teach patient to arise slowly    Elimination Interventions: Call light in reach,Patient to call for help with toileting needs              Problem: Patient Education: Go to Patient Education Activity  Goal: Patient/Family Education  Outcome: Progressing Towards Goal     Problem: Patient Education: Go to Patient Education Activity  Goal: Patient/Family Education  Outcome: Progressing Towards Goal     Problem: Gas Exchange - Impaired  Goal: *Absence of hypoxia  Outcome: Progressing Towards Goal

## 2022-02-14 NOTE — PROGRESS NOTES
Yasemin Palmer  Admission Date: 2/3/2022         Daily Progress Note: 2/14/2022    The patient's chart is reviewed and the patient is discussed with the staff. Background: 61 y.o.  female, PMH RLD, HTN, chronic right knee pain, seen and evaluated at the request of Josi Darnell for acute respiratory failure with hypoxia. The patient was last seen November 2021 in our office for follow up for restrictive lung disease due to kyphosis and obesity. The patient previously was admitted here back in September 2021 with pulmonary edema and volume overload due to chronic diastolic heart failure, also noted on previous CT scans. At that time the patient was noted to have hypoxemia during that admission as well. The patient continues to use albuterol as needed at home as well as O2 at 2L NC nightly. The patient states she continued to be more short of breath with a dry cough at home but is no longer coughing here. She endorses tachycardia with minimal movement as well. She denies any fevers, chills, nausea, vomiting, or recent sick contacts. She is concerned that she is now up to 15L NC O2. She denies any chest pain. CXR today reveals volume overload. Subjective:     Patient diuresed another 2L in the last 24 hours and 5L over the weekend. Down to 4L O2. States she only tolerated bipap about 2 hours last night.      Current Facility-Administered Medications   Medication Dose Route Frequency    potassium chloride (K-DUR, KLOR-CON M20) SR tablet 40 mEq  40 mEq Oral BID    levalbuterol (XOPENEX) nebulizer soln 0.63 mg/3 mL  0.63 mg Nebulization TID RT    midodrine (PROAMATINE) tablet 10 mg  10 mg Oral TID WITH MEALS    oxybutynin (DITROPAN) tablet 5 mg  5 mg Oral Q8H PRN    zinc oxide-cod liver oil (DESITIN) 40 % paste   Topical PRN    guaiFENesin ER (MUCINEX) tablet 1,200 mg  1,200 mg Oral Q12H    lip protectant (BLISTEX) ointment 1 Each  1 Each Topical PRN    sodium chloride (OCEAN) 0.65 % nasal squeeze bottle 2 Spray  2 Spray Both Nostrils Q2H PRN    metoprolol succinate (TOPROL-XL) XL tablet 50 mg  50 mg Oral DAILY    rivaroxaban (XARELTO) tablet 20 mg  20 mg Oral DAILY WITH LUNCH    ferrous sulfate tablet 325 mg  1 Tablet Oral BID WITH MEALS    dilTIAZem ER (CARDIZEM CD) capsule 300 mg  300 mg Oral DAILY    pantoprazole (PROTONIX) tablet 40 mg  40 mg Oral Q12H    digoxin (LANOXIN) tablet 0.25 mg  0.25 mg Oral DAILY    acetaminophen (TYLENOL) tablet 650 mg  650 mg Oral Q4H PRN    0.9% sodium chloride infusion 250 mL  250 mL IntraVENous PRN    sodium chloride (NS) flush 5-10 mL  5-10 mL IntraVENous Q8H    sodium chloride (NS) flush 5-10 mL  5-10 mL IntraVENous PRN    furosemide (LASIX) injection 40 mg  40 mg IntraVENous BID    insulin lispro (HUMALOG) injection   SubCUTAneous AC&HS    oxyCODONE IR (ROXICODONE) tablet 5 mg  5 mg Oral Q4H PRN     Review of Systems  Constitutional: negative for fever, chills, sweats  Cardiovascular: + LE edema. negative for chest pain, palpitations, syncope  Gastrointestinal:  negative for dysphagia, reflux, vomiting, diarrhea, abdominal pain, or melena  Neurologic:  negative for focal weakness, numbness, headache  Objective:     Vitals:    02/14/22 0517 02/14/22 0715 02/14/22 0723 02/14/22 0917   BP: 123/79  136/72 (!) 108/57   Pulse: 80  84 92   Resp: 18  17    Temp: 98.2 °F (36.8 °C)  97.7 °F (36.5 °C)    SpO2: 92% 90% 93%    Weight: 327 lb 4.8 oz (148.5 kg)      Height:           Intake/Output Summary (Last 24 hours) at 2/14/2022 1032  Last data filed at 2/14/2022 0857  Gross per 24 hour   Intake 860 ml   Output 2825 ml   Net -1965 ml     Physical Exam:   Constitution:  the patient is well developed and in no acute distress  HEENT:  Sclera clear, pupils equal, oral mucosa moist  Respiratory: CTA B in anterior lung fields.    Cardiovascular:  RRR without M,G,R  Gastrointestinal: soft and non-tender; with positive bowel sounds. Musculoskeletal: warm without cyanosis. There is 1-2 + B lower extremity edema. Skin:  no jaundice or rashes, no wounds   Neurologic: no gross neuro deficits     Psychiatric:  alert and oriented x ppt    CXR:       LAB:  Recent Labs     02/14/22  0533 02/13/22  0600 02/12/22  0708   WBC 8.4 8.1 8.9   HGB 9.6* 9.4* 9.7*   HCT 34.9* 34.6* 34.9*    289 157     Recent Labs     02/14/22  0533 02/13/22  0600 02/12/22  0708    138 138   K 3.3* 3.4* 4.5   CL 97* 95* 98   CO2 39* 39* 35*   * 103* 97   BUN 16 15 13   CREA 0.70 0.60 0.70   MG  --   --  1.9   CA 9.4 9.3 8.8     No results for input(s): LAC, TROPHS, BNPNT, CRP in the last 72 hours. No lab exists for component: ESR  No results for input(s): PH, PCO2, PO2, HCO3, PHI, PCO2I, PO2I, HCO3I in the last 72 hours. No results for input(s): SDES, CULT in the last 72 hours. Assessment and Plan:  (Medical Decision Making)   Principal Problem:    Acute on chronic respiratory failure with hypoxia and hypercapnia (HCC) (6/25/2020)    Down to 4L O2 with ongoing diuresis. Cont to wean as tolerated. Active Problems:    Hypertension ()          Class 3 obesity with alveolar hypoventilation, serious comorbidity, and body mass index (BMI) of 50.0 to 59.9 in [facility]) ()          Controlled type 2 diabetes mellitus without complication, without long-term current use of insulin ([address] (9/9/2020)          Restrictive lung disease (10/22/2020)    Likely due to obesity primarily. Diastolic CHF, acute on chronic (HCC) (11/11/2021)    Continue lasix and monitor I/O. Has a lot more fluid to remove by exam.       Atrial fibrillation with RVR ([address] (2/3/2022)          Anemia (2/3/2022)          Chronic respiratory failure with hypoxia and hypercapnia (HCC) ()    Would like her to be on bipap at night. Only tolerating a few hours intermittently mostly due to the noise.  Will try to get her Trilogy machine to use as inpatient and see if she tolerates this better. The patient has severe chronic respiratory failure as a result of their significant restrictive process caused by obesity hypoventilation syndrome and the patient requires guaranteed tidal volumes from non invasive ventilation. Bipap is insufficient to properly ventilate the patient because bipap does not supply volume targeted ventilation that will adjust to changing pressure requirement quickly enough for a patient of this severity. Non invasive ventilation needs to be used both during the day and night to control the patient's CO2. Therefore, non invasive ventilation is required at this time for life sustaining measures. Failure to adequately ventilate patient will result in serious harm and/or death. More than 50% of the time documented was spent in face-to-face contact with the patient and in the care of the patient on the floor/unit where the patient is located.     Luis Raphael MD

## 2022-02-14 NOTE — PROGRESS NOTES
Occupational/Physical Therapy Note:    OT/PT treatment attempted this a.m. however patient politely declining d/t restless night. Will check back later as schedule permits.     Wanda Pardo, OTR/L, CLT

## 2022-02-15 VITALS
BODY MASS INDEX: 45.99 KG/M2 | DIASTOLIC BLOOD PRESSURE: 71 MMHG | HEART RATE: 76 BPM | OXYGEN SATURATION: 97 % | HEIGHT: 67 IN | SYSTOLIC BLOOD PRESSURE: 148 MMHG | RESPIRATION RATE: 20 BRPM | WEIGHT: 293 LBS | TEMPERATURE: 98.3 F

## 2022-02-15 LAB
ANION GAP SERPL CALC-SCNC: 4 MMOL/L (ref 7–16)
BASOPHILS # BLD: 0.1 K/UL (ref 0–0.2)
BASOPHILS NFR BLD: 1 % (ref 0–2)
BUN SERPL-MCNC: 18 MG/DL (ref 8–23)
CALCIUM SERPL-MCNC: 9.7 MG/DL (ref 8.3–10.4)
CHLORIDE SERPL-SCNC: 98 MMOL/L (ref 98–107)
CO2 SERPL-SCNC: 37 MMOL/L (ref 21–32)
CREAT SERPL-MCNC: 0.8 MG/DL (ref 0.6–1)
DIFFERENTIAL METHOD BLD: ABNORMAL
EOSINOPHIL # BLD: 0.3 K/UL (ref 0–0.8)
EOSINOPHIL NFR BLD: 3 % (ref 0.5–7.8)
ERYTHROCYTE [DISTWIDTH] IN BLOOD BY AUTOMATED COUNT: 26.1 % (ref 11.9–14.6)
GLUCOSE BLD STRIP.AUTO-MCNC: 122 MG/DL (ref 65–100)
GLUCOSE BLD STRIP.AUTO-MCNC: 176 MG/DL (ref 65–100)
GLUCOSE SERPL-MCNC: 125 MG/DL (ref 65–100)
HCT VFR BLD AUTO: 37.6 % (ref 35.8–46.3)
HGB BLD-MCNC: 10.4 G/DL (ref 11.7–15.4)
IMM GRANULOCYTES # BLD AUTO: 0 K/UL (ref 0–0.5)
IMM GRANULOCYTES NFR BLD AUTO: 0 % (ref 0–5)
LYMPHOCYTES # BLD: 1.4 K/UL (ref 0.5–4.6)
LYMPHOCYTES NFR BLD: 16 % (ref 13–44)
MAGNESIUM SERPL-MCNC: 1.7 MG/DL (ref 1.6–2.3)
MAGNESIUM SERPL-MCNC: 1.9 MG/DL (ref 1.6–2.3)
MCH RBC QN AUTO: 23.5 PG (ref 26.1–32.9)
MCHC RBC AUTO-ENTMCNC: 27.7 G/DL (ref 31.4–35)
MCV RBC AUTO: 85.1 FL (ref 79.6–97.8)
MONOCYTES # BLD: 0.9 K/UL (ref 0.1–1.3)
MONOCYTES NFR BLD: 10 % (ref 4–12)
NEUTS SEG # BLD: 6.4 K/UL (ref 1.7–8.2)
NEUTS SEG NFR BLD: 71 % (ref 43–78)
NRBC # BLD: 0 K/UL (ref 0–0.2)
PLATELET # BLD AUTO: 368 K/UL (ref 150–450)
PMV BLD AUTO: 10.7 FL (ref 9.4–12.3)
POTASSIUM SERPL-SCNC: 3.4 MMOL/L (ref 3.5–5.1)
RBC # BLD AUTO: 4.42 M/UL (ref 4.05–5.2)
SERVICE CMNT-IMP: ABNORMAL
SERVICE CMNT-IMP: ABNORMAL
SODIUM SERPL-SCNC: 139 MMOL/L (ref 136–145)
WBC # BLD AUTO: 9 K/UL (ref 4.3–11.1)

## 2022-02-15 PROCEDURE — 74011250637 HC RX REV CODE- 250/637: Performed by: INTERNAL MEDICINE

## 2022-02-15 PROCEDURE — 80048 BASIC METABOLIC PNL TOTAL CA: CPT

## 2022-02-15 PROCEDURE — 94660 CPAP INITIATION&MGMT: CPT

## 2022-02-15 PROCEDURE — 94760 N-INVAS EAR/PLS OXIMETRY 1: CPT

## 2022-02-15 PROCEDURE — 94761 N-INVAS EAR/PLS OXIMETRY MLT: CPT

## 2022-02-15 PROCEDURE — 74011000250 HC RX REV CODE- 250: Performed by: INTERNAL MEDICINE

## 2022-02-15 PROCEDURE — 36415 COLL VENOUS BLD VENIPUNCTURE: CPT

## 2022-02-15 PROCEDURE — 77010033678 HC OXYGEN DAILY

## 2022-02-15 PROCEDURE — 94640 AIRWAY INHALATION TREATMENT: CPT

## 2022-02-15 PROCEDURE — 83735 ASSAY OF MAGNESIUM: CPT

## 2022-02-15 PROCEDURE — 74011000250 HC RX REV CODE- 250: Performed by: EMERGENCY MEDICINE

## 2022-02-15 PROCEDURE — 97535 SELF CARE MNGMENT TRAINING: CPT

## 2022-02-15 PROCEDURE — 82962 GLUCOSE BLOOD TEST: CPT

## 2022-02-15 PROCEDURE — 74011636637 HC RX REV CODE- 636/637: Performed by: INTERNAL MEDICINE

## 2022-02-15 PROCEDURE — 85025 COMPLETE CBC W/AUTO DIFF WBC: CPT

## 2022-02-15 PROCEDURE — 97530 THERAPEUTIC ACTIVITIES: CPT

## 2022-02-15 RX ORDER — FUROSEMIDE 40 MG/1
40 TABLET ORAL
Status: DISCONTINUED | OUTPATIENT
Start: 2022-02-15 | End: 2022-02-15 | Stop reason: HOSPADM

## 2022-02-15 RX ORDER — PANTOPRAZOLE SODIUM 40 MG/1
40 TABLET, DELAYED RELEASE ORAL SEE ADMIN INSTRUCTIONS
Qty: 60 TABLET | Refills: 1 | Status: SHIPPED | OUTPATIENT
Start: 2022-02-15

## 2022-02-15 RX ORDER — POTASSIUM CHLORIDE 20 MEQ/1
20 TABLET, EXTENDED RELEASE ORAL 2 TIMES DAILY
Qty: 90 TABLET | Refills: 2 | Status: SHIPPED | OUTPATIENT
Start: 2022-02-15

## 2022-02-15 RX ORDER — DIGOXIN 250 MCG
0.25 TABLET ORAL DAILY
Qty: 30 TABLET | Refills: 1 | Status: SHIPPED | OUTPATIENT
Start: 2022-02-16

## 2022-02-15 RX ORDER — MIDODRINE HYDROCHLORIDE 10 MG/1
10 TABLET ORAL
Qty: 90 TABLET | Refills: 0 | Status: SHIPPED | OUTPATIENT
Start: 2022-02-15 | End: 2022-03-17

## 2022-02-15 RX ORDER — METOPROLOL SUCCINATE 50 MG/1
50 TABLET, EXTENDED RELEASE ORAL DAILY
Qty: 30 TABLET | Refills: 1 | Status: SHIPPED | OUTPATIENT
Start: 2022-02-16

## 2022-02-15 RX ORDER — LANOLIN ALCOHOL/MO/W.PET/CERES
325 CREAM (GRAM) TOPICAL
Qty: 30 TABLET | Refills: 1 | Status: SHIPPED | OUTPATIENT
Start: 2022-02-15

## 2022-02-15 RX ORDER — FUROSEMIDE 40 MG/1
40 TABLET ORAL
Qty: 90 TABLET | Refills: 2 | Status: SHIPPED | OUTPATIENT
Start: 2022-02-15

## 2022-02-15 RX ORDER — DILTIAZEM HYDROCHLORIDE 300 MG/1
300 CAPSULE, COATED, EXTENDED RELEASE ORAL DAILY
Qty: 30 CAPSULE | Refills: 1 | Status: SHIPPED | OUTPATIENT
Start: 2022-02-16

## 2022-02-15 RX ADMIN — MIDODRINE HYDROCHLORIDE 10 MG: 5 TABLET ORAL at 08:46

## 2022-02-15 RX ADMIN — GUAIFENESIN 1200 MG: 600 TABLET ORAL at 08:47

## 2022-02-15 RX ADMIN — POTASSIUM CHLORIDE 40 MEQ: 20 TABLET, EXTENDED RELEASE ORAL at 08:48

## 2022-02-15 RX ADMIN — DIGOXIN 0.25 MG: 250 TABLET ORAL at 08:47

## 2022-02-15 RX ADMIN — LEVALBUTEROL HYDROCHLORIDE 0.63 MG: 0.63 SOLUTION RESPIRATORY (INHALATION) at 11:02

## 2022-02-15 RX ADMIN — Medication 2 UNITS: at 12:15

## 2022-02-15 RX ADMIN — SODIUM CHLORIDE, PRESERVATIVE FREE 10 ML: 5 INJECTION INTRAVENOUS at 06:23

## 2022-02-15 RX ADMIN — MIDODRINE HYDROCHLORIDE 10 MG: 5 TABLET ORAL at 12:15

## 2022-02-15 RX ADMIN — FUROSEMIDE 40 MG: 40 TABLET ORAL at 08:46

## 2022-02-15 RX ADMIN — FERROUS SULFATE TAB 325 MG (65 MG ELEMENTAL FE) 325 MG: 325 (65 FE) TAB at 08:46

## 2022-02-15 RX ADMIN — PANTOPRAZOLE SODIUM 40 MG: 40 TABLET, DELAYED RELEASE ORAL at 08:47

## 2022-02-15 RX ADMIN — DILTIAZEM HYDROCHLORIDE 300 MG: 180 CAPSULE, COATED, EXTENDED RELEASE ORAL at 08:46

## 2022-02-15 RX ADMIN — METOPROLOL SUCCINATE 50 MG: 25 TABLET, EXTENDED RELEASE ORAL at 08:47

## 2022-02-15 RX ADMIN — RIVAROXABAN 20 MG: 20 TABLET, FILM COATED ORAL at 12:15

## 2022-02-15 NOTE — PROGRESS NOTES
ACUTE OT GOALS:  (Developed with and agreed upon by patient and/or caregiver.)  1. Latrice Gerber will be modified independent with functional mobility for ADL in room within 4 - 7 visits. Dayanara Ge will be modified independent with total body bathing and dressing within 4 - 7 visits. 3. Latrice Gerber will state and demonstrate at least 5 energy conservation techniques during ADL/therapeutic activities within 4 - 7 visits. 3. Latrice Gerber will voice a plan for appropriate home modifications for home safety and fall prevention within 7 visits. 5. Latrice Gerber will participate at least 30 minutes of ADL with 3 or less rest breaks within 7 visits. Dayanara Ge will complete a tub or shower transfer with modified independence within 7 visits. OCCUPATIONAL THERAPY: Daily Note OT Treatment Day # 3    Latrice Gerber is a 61 y.o. female   PRIMARY DIAGNOSIS: Acute on chronic respiratory failure with hypoxia and hypercapnia (HCC)  Atrial fibrillation with RVR (HCC) [I48.91]  Procedure(s) (LRB):  ESOPHAGOGASTRODUODENOSCOPY (EGD) (N/A)  COLONOSCOPY/ BMI 56 ROOM 302 (N/A)  ESOPHAGOGASTRODUODENAL (EGD) BIOPSY (N/A)  9 Days Post-Op  Payor: Teresita Mcelroy / Plan: Hong Mccabe / Product Type: Commerical /   ASSESSMENT:     REHAB RECOMMENDATIONS: CURRENT LEVEL OF FUNCTION:  (Most Recently Demonstrated)   Recommendation to date pending progress:  Setting:  [facility] Therapy  Equipment:    To Be Determined Bathing:   Not tested  Dressing:   Not tested  Feeding/Grooming:   Not tested  Toileting:   Supervision  Functional Mobility:   Supervision     ASSESSMENT:  Ms. Maria Del Carmen Little continues to present with deficits in overall strength, activity tolerance, ADL performance, and functional mobility. Pt was sitting in chair upon arrival. Pt completed functional mobility in room with supervision using cane. Pt completed toileting with supervision. Continue POC.       SUBJECTIVE:   Ms. Maria Del Carmen Little states, \"Hey. \"    SOCIAL HISTORY/LIVING ENVIRONMENT: See initial eval.   Home Environment: Private residence  One/Two Story Residence: One story  Living Alone: No  Support Systems: Child(adelita),Other Family Member(s),Friend/Neighbor,Budd[employer] Community    OBJECTIVE:     PAIN: VITAL SIGNS: LINES/DRAINS:   Pre Treatment: Pain Screen  Pain Scale 1: Numeric (0 - 10)  Pain Intensity 1: 0  Post Treatment: 0   Fonseca Catheter and IV  O2 Device: Nasal cannula     ACTIVITIES OF DAILY LIVING: I Mod I S SBA CGA Min Mod Max Total NT Comments   BASIC ADLs:              Bathing/ Showering [] [] [] [] [] [] [] [] [] [x]    Toileting [] [] [x] [] [] [] [] [] [] []    Dressing [] [] [] [] [] [] [] [] [] [x]    Feeding [] [] [] [] [] [] [] [] [] [x]    Grooming [] [] [] [] [] [] [] [] [] [x]    Personal Device Care [] [] [] [] [] [] [] [] [] [x]    Functional Mobility [] [] [x] [] [] [] [] [] [] [] Cane    I=Independent, Mod I=Modified Independent, S=Supervision, SBA=Standby Assistance, CGA=Contact Guard Assistance,   Min=Minimal Assistance, Mod=Moderate Assistance, Max=Maximal Assistance, Total=Total Assistance, NT=Not Tested    MOBILITY: I Mod I S SBA CGA Min Mod Max Total  NT x2 Comments:   Supine to sit [] [] [] [] [] [] [] [] [] [] []    Sit to supine [] [] [] [] [] [] [] [] [] [] []    Sit to stand [] [] [x] [] [] [] [] [] [] [] []    Bed to chair [] [] [] [] [] [] [] [] [] [] [] Cane    I=Independent, Mod I=Modified Independent, S=Supervision, SBA=Standby Assistance, CGA=Contact Guard Assistance,   Min=Minimal Assistance, Mod=Moderate Assistance, Max=Maximal Assistance, Total=Total Assistance, NT=Not Tested    BALANCE: Good Fair+ Fair Fair- Poor NT Comments   Sitting Static [x] [] [] [] [] []    Sitting Dynamic [x] [x] [] [] [] []              Standing Static [] [x] [x] [] [] []    Standing Dynamic [] [] [x] [] [] [] X SPC     PLAN:   FREQUENCY/DURATION: OT Plan of Care: 3 times/week for duration of hospital stay or until stated goals are met, whichever comes first.    TREATMENT:   TREATMENT:   ($$ Self Care/Home Management: 23-37 mins    )  Self Care (25 Minutes): Self care including Toileting to increase independence and decrease level of assistance required.     TREATMENT GRID:  N/A    AFTER TREATMENT POSITION/PRECAUTIONS:  Chair, Needs within reach and RN notified    INTERDISCIPLINARY COLLABORATION:  RN/PCT, PT/PTA and OT/BROWN    TOTAL TREATMENT DURATION:  OT Patient Time In/Time Out  Time In: 1055  Time Out: Amarjit Posada [facility]

## 2022-02-15 NOTE — PROGRESS NOTES
ACUTE PHYSICAL THERAPY GOALS:  (Developed with and agreed upon by patient and/or caregiver.)  (1.) Juvenal Kraus  will move from supine to sit and sit to supine  with INDEPENDENCE within 7 treatment day(s). (2.) Juvenal May will transfer from bed to chair and chair to bed with MODIFIED INDEPENDENCE using the least restrictive device within 7 treatment day(s). (3.) Juvenal May will ambulate with MODIFIED INDEPENDENCE for 150 feet with the least restrictive device within 7 treatment day(s). (4.) Juvenal May will perform standing static and dynamic balance activities x 20 minutes with SUPERVISION to improve safety within 7 treatment day(s). (5.) Juvenal Kraus will perform bilateral lower extremity exercises x 20 min for HEP with INDEPENDENCE to improve strength, endurance, and functional mobility within 7 treatment day(s). PHYSICAL THERAPY: Daily Note and AM Treatment Day # 4    Juvenal Kraus is a 61 y.o. female   PRIMARY DIAGNOSIS: Acute on chronic respiratory failure with hypoxia and hypercapnia (HCC)  Atrial fibrillation with RVR (HCC) [I48.91]  Procedure(s) (LRB):  ESOPHAGOGASTRODUODENOSCOPY (EGD) (N/A)  COLONOSCOPY/ BMI 56 ROOM 302 (N/A)  ESOPHAGOGASTRODUODENAL (EGD) BIOPSY (N/A)  9 Days Post-Op    ASSESSMENT:     REHAB RECOMMENDATIONS: CURRENT LEVEL OF FUNCTION:  (Most Recently Demonstrated)   Recommendation to date pending progress:  [address] [place] [address] Therapy  Equipment:    None Bed Mobility:   Not tested  Sit to Stand:   Standby Assistance  Transfers:   Standby Assistance  Gait/Mobility:   Standby Assistance     ASSESSMENT:  Ms. Candie Grayson is sitting up in chair on arrival. She is agreeable to PT. Sit to stand with SBA. She ambulates around in room with cane and holding onto furniture. Pt declined donning socks or shoes to walk in.  She ambulated into restroom and was able to void and perform all self care without assist. Pt left sitting up in chair with needs in reach.       SUBJECTIVE:   Ms. Ivy Palencia states, \"Thank you for coming\"    SOCIAL HISTORY/ LIVING ENVIRONMENT: PLOF: Reyna with cane, lives alone but son checks on her, 0 PHOEBE, no falls   Home Environment: Private residence  One/Two Story Residence: One story  Living Alone: No  Support Systems: Child(adelita),Other Family Member(s),Friend/Neighbor,Hinduism/[employer] Community  OBJECTIVE:     PAIN: VITAL SIGNS: LINES/DRAINS:   Pre Treatment:   none  Post Treatment:  none   IV  O2 Device: Nasal cannula     MOBILITY: I Mod I S SBA CGA Min Mod Max Total  NT x2 Comments:   Bed Mobility    Rolling [] [] [] [] [] [] [] [] [] [] []    Supine to Sit [] [] [] [] [] [] [] [] [] [] []    Scooting [] [] [] [x] [] [] [] [] [] [] []    Sit to Supine [] [] [] [] [] [] [] [] [] [] []    Transfers    Sit to Stand [] [] [] [x] [] [] [] [] [] [] [] Additional time and effort   Bed to Chair [] [] [] [] [] [] [] [] [] [] []    Stand to Sit [] [] [] [x] [] [] [] [] [] [] []    I=Independent, Mod I=Modified Independent, S=Supervision, SBA=Standby Assistance, CGA=Contact Guard Assistance,   Min=Minimal Assistance, Mod=Moderate Assistance, Max=Maximal Assistance, Total=Total Assistance, NT=Not Tested    BALANCE: Good Fair+ Fair Fair- Poor NT Comments   Sitting Static [x] [] [] [] [] []    Sitting Dynamic [x] [] [] [] [] []              Standing Static [] [x] [] [] [] []    Standing Dynamic [] [] [x] [x] [] []      GAIT: I Mod I S SBA CGA Min Mod Max Total  NT x2 Comments:   Level of Assistance [] [] [] [x] [] [] [] [] [] [] []    Distance 70'    DME SPC and and holding furniture    Gait Quality Trunk sway, slow lakeisha    Weightbearing  Status N/A     I=Independent, Mod I=Modified Independent, S=Supervision, SBA=Standby Assistance, CGA=Contact Guard Assistance,   Min=Minimal Assistance, Mod=Moderate Assistance, Max=Maximal Assistance, Total=Total Assistance, NT=Not Tested    PLAN:   FREQUENCY/DURATION: PT Plan of Care: 3 times/week for duration of hospital stay or until stated goals are met, whichever comes first.  TREATMENT:     TREATMENT:   ($$ Therapeutic Activity: 23-37 mins    )  Therapeutic Activity (25 Minutes): Therapeutic activity included Scooting, Transfer Training, Ambulation on level ground, Sitting balance  and Standing balance to improve functional Mobility, Strength, ROM and Activity tolerance.      TREATMENT GRID:   Date:  2/10/22 Date:   Date:     Activity/Exercise Parameters Parameters Parameters   Ankle pumps X 15 B     LAQ X 15 B     Hip flexion X 15 B     Hip abd X 15 B                         AFTER TREATMENT POSITION/PRECAUTIONS:  Chair, Needs within reach and RN notified    INTERDISCIPLINARY COLLABORATION:  RN/PCT and PT/PTA    TOTAL TREATMENT DURATION:  PT Patient Time In/Time Out  Time In: 1055  Time Out: [address] [facility]

## 2022-02-15 NOTE — PROGRESS NOTES
Seen/examined. Pt feeling ok. Sitting in chair. Pt has been here 12 days, improving daily the last 6. Diuresing well. BP a little low this morning, will switch to PO lasix. Would like to discharge patient soon. Will get O2 eval for home, suspect she needs a significant amount. .  She will also need home bipap or trilogy, will defer this to pulm.

## 2022-02-15 NOTE — PROGRESS NOTES
Oxygen Qualifier       Room air: SpO2 with O2 and liter flow   Resting SpO2  87%  90% on 1L   Ambulating SpO2  88% on 1L / 91% on 2L       Completed by:    María Guardado, RT

## 2022-02-15 NOTE — PROGRESS NOTES
Pt discharge instructions and follow up explained to pt. IV removed and tolerated. No questions/concerns verbalized by pt.  Will discharge home by family vehicle

## 2022-02-15 NOTE — PROGRESS NOTES
Verbal bedside report given to Elizabeth Lopez, on coming RN. Patient's Situation, background, assessments and recommendations provided. Opportunity for questions provided.

## 2022-02-15 NOTE — PROGRESS NOTES
Pt continues to diurese well; on PO lasix now. BP a little low today. PTA, pt was on oxygen QHS. Pt weaned to 1L O2 and will need a walk test prior to discharge. [facility] supplies her home oxygen. [facility] plans to deliver her trilogy later today to fit the pt but will set the machine up for her when she is discharged home. Pt has been refusing to work with PT/OT recently. Referral sent to [facility] for RN/PT/OT. 1133-Walk test performed. Pt qualifies:    Oxygen Qualifier          Room air: SpO2 with O2 and liter flow   Resting SpO2  87%  90% on 1L   Ambulating SpO2   88% on 1L / 91% on 2L      Order and clinicals faxed to [address] [place] [address]. Will confirm transport and provide her with a portable oxygen tank. [address]Ray with [facility] currently fitting pt for trilogy. Discharge order is in. Pt reported that her son will transport her home. Updated [facility] of pts discharge. No other discharge needs were identified. Tx goals have been met. Care Management Interventions  PCP Verified by CM: Yes  Mode of Transport at Discharge:  Other (see comment)  Transition of Care Consult (CM Consult): Discharge [address] Wyatt: Yes  MyChart Signup: No  Discharge Durable Medical Equipment: No  Physical Therapy Consult: Yes  Occupational Therapy Consult: Yes  Speech Therapy Consult: No  Support Systems: Child(adelita),Other Family Member(s),Friend/Neighbor,L[employer]-day/[employer] Community  Confirm Follow Up Transport: Family  The Plan for Transition of Care is Related to the Following Treatment Goals : Return home and back to her baseline  The Patient and/or Patient Representative was Provided with a Choice of Provider and Agrees with the Discharge Plan?: Yes  Name of the Patient Representative Who was Provided with a Choice of Provider and Agrees with the Discharge Plan: Patient  Freedom of Choice List was Provided with Basic Dialogue that Supports the Patient's Individualized Plan of Care/Goals, Treatment Preferences and Shares the Quality Data Associated with the Providers?: Yes   Resource Information Provided?: No  Discharge Location  Patient Expects to be Discharged to[de-identified] Home with [facility])

## 2022-02-15 NOTE — DISCHARGE INSTRUCTIONS
Patient Education        Atrial Fibrillation: Care Instructions  Your Care Instructions     Atrial fibrillation is an irregular and often fast heartbeat. Treating this condition is important for several reasons. It can cause blood clots, which can travel from your heart to your brain and cause a stroke. If you have a fast heartbeat, you may feel lightheaded, dizzy, and weak. An irregular heartbeat can also increase your risk for heart failure. Atrial fibrillation is often the result of another heart condition, such as high blood pressure or coronary artery disease. Making changes to improve your heart condition will help you stay healthy and active. Follow-up care is a key part of your treatment and safety. Be sure to make and go to all appointments, and call your doctor if you are having problems. It's also a good idea to know your test results and keep a list of the medicines you take. How can you care for yourself at home? Medicines    · Take your medicines exactly as prescribed. Call your doctor if you think you are having a problem with your medicine. You will get more details on the specific medicines your doctor prescribes.     · If your doctor has given you a blood thinner to prevent a stroke, be sure you get instructions about how to take your medicine safely. Blood thinners can cause serious bleeding problems.     · Do not take any vitamins, over-the-counter drugs, or herbal products without talking to your doctor first.   Lifestyle changes    · Do not smoke. Smoking can increase your chance of a stroke and heart attack. If you need help quitting, talk to your doctor about stop-smoking programs and medicines. These can increase your chances of quitting for good.     · Eat a heart-healthy diet.     · Stay at a healthy weight. Lose weight if you need to.     · Limit alcohol to 2 drinks a day for men and 1 drink a day for women. Too much alcohol can cause health problems.     · Avoid colds and flu.  Get a pneumococcal vaccine shot. If you have had one before, ask your doctor whether you need another dose. Get a flu shot every year. If you must be around people with colds or flu, wash your hands often. Activity    · If your doctor recommends it, get more exercise. Walking is a good choice. Bit by bit, increase the amount you walk every day. Try for at least 30 minutes on most days of the week. You also may want to swim, bike, or do other activities. Your doctor may suggest that you join a cardiac rehabilitation program so that you can have help increasing your physical activity safely.     · Start light exercise if your doctor says it is okay. Even a small amount will help you get stronger, have more energy, and manage stress. Walking is an easy way to get exercise. Start out by walking a little more than you did in the hospital. Gradually increase the amount you walk.     · When you exercise, watch for signs that your heart is working too hard. You are pushing too hard if you cannot talk while you are exercising. If you become short of breath or dizzy or have chest pain, sit down and rest immediately.     · Check your pulse regularly. Place two fingers on the artery at the palm side of your wrist, in line with your thumb. If your heartbeat seems uneven or fast, talk to your doctor. When should you call for help? Call 911 anytime you think you may need emergency care. For example, call if:    · You have symptoms of a heart attack. These may include:  ? Chest pain or pressure, or a strange feeling in the chest.  ? Sweating. ? Shortness of breath. ? Nausea or vomiting. ? Pain, pressure, or a strange feeling in the back, neck, jaw, or upper belly or in one or both shoulders or arms. ? Lightheadedness or sudden weakness. ? A fast or irregular heartbeat. After you call 911, the  may tell you to chew 1 adult-strength or 2 to 4 low-dose aspirin. Wait for an ambulance.  Do not try to drive yourself.     · You have symptoms of a stroke. These may include:  ? Sudden numbness, tingling, weakness, or loss of movement in your face, arm, or leg, especially on only one side of your body. ? Sudden vision changes. ? Sudden trouble speaking. ? Sudden confusion or trouble understanding simple statements. ? Sudden problems with walking or balance. ? A sudden, severe headache that is different from past headaches.     · You passed out (lost consciousness). Call your doctor now or seek immediate medical care if:    · You have new or increased shortness of breath.     · You feel dizzy or lightheaded, or you feel like you may faint.     · Your heart rate becomes irregular.     · You can feel your heart flutter in your chest or skip heartbeats. Tell your doctor if these symptoms are new or worse. Watch closely for changes in your health, and be sure to contact your doctor if you have any problems. Where can you learn more? Go to http://www.gray.com/  Enter U020 in the search box to learn more about \"Atrial Fibrillation: Care Instructions. \"  Current as of: April 29, 2021               Content Version: 13.0  © 0242-2528 [employer]. Care instructions adapted under license by [employer] (which disclaims liability or warranty for this information). If you have questions about a medical condition or this instruction, always ask your healthcare professional. [address] any warranty or liability for your use of this information. Patient Education        Learning About Respiratory Failure  What is respiratory failure? Respiratory failure happens when a person's lungs can't get enough oxygen to the blood. This is a severe problem that may need to be treated in intensive care. Many organs such as the eyes, the brain, and the heart depend on a steady supply of oxygen they get from the blood.  The doctor will try to get enough oxygen to those organs to keep them healthy. Many things can cause lung failure. They include pneumonia and other serious infections. The doctor will look for the cause of the problem and then treat it if possible. How is it treated? To help your lungs get enough oxygen, your doctor may use a few devices. These vary in how much oxygen they give and how they help you breathe. They are:  · A nasal cannula (say \"HARVEY-zhenh-fernando\"). This is a thin tube with two prongs that fit just inside your nose. Or you may get a face mask. · A special face mask that delivers more oxygen. There are different kinds. A face mask with a bag on one end is called a non-rebreather mask. · A high-flow nasal cannula. It can warm and wet the oxygen it delivers, so getting high amounts of oxygen feels better. · A face mask that gives you oxygen through a bilevel positive airway pressure (BPAP) machine. You may also hear this called BiPAP. It uses different air pressures when you breathe in and out. · A ventilator that helps you breathe or that breathes for you. It controls how much air and oxygen flow into your lungs. This machine requires a breathing tube in your windpipe. It can be uncomfortable, so you may get medicine to help you relax or sleep. You also will get fluid through an intravenous (IV) tube. You will get regular tests to see how much oxygen is in your blood. Tests also can show how well the lungs are working. These tests help your doctor adjust the machines and the oxygen supply. The doctor will watch you closely. Current as of: July 6, 2021               Content Version: 13.0  © 2006-2021 [employer]. Care instructions adapted under license by [employer] (which disclaims liability or warranty for this information).  If you have questions about a medical condition or this instruction, always ask your healthcare professional. [address] any warranty or liability for your use of this information. Patient Education   Patient Education   Patient Education   Patient Education   Patient Education   Patient Education      Rivaroxaban (Xarelto, Xarelto Starter Pack) - (By mouth)   Why this medicine is used:   Treats and prevents blood clots. Contact a nurse or doctor right away if you have:  · Sudden or severe headache  · Back pain, numbness, tingling, weakness in your legs or feet  · Loss of bladder or bowel control  · Bloody vomit or vomit that looks like coffee grounds; bloody or black, tarry stools  · Bleeding that does not stop or bruises that do not heal     Common side effects:  · Minor bleeding or bruising  © 2017 [facility] Information is for End User's use only and may not be sold, redistributed or otherwise used for commercial purposes. Pantoprazole (Protonix) - (By mouth)   Why this medicine is used:   Treats gastroesophageal reflux disease (GERD), a damaged esophagus, and high levels of stomach acid. Contact a nurse or doctor right away if you have:  · Blistering, peeling, red skin rash  · Swelling, muscle pain, stiffness, cramps, or twitching  · Joint pain, rash on your cheeks or arms that gets worse in the sun  · Dark-colored urine, change in how much or how often you urinate  · Seizures, dizziness, uneven heartbeat  · Severe diarrhea, stomach cramps, fever, weight gain     Common side effects:  · Mild diarrhea, stomach pain  · Headache, tiredness  © 2017 [facility] Information is for End User's use only and may not be sold, redistributed or otherwise used for commercial purposes. Midodrine - (By mouth)   Why this medicine is used:   Treats a type of low blood pressure that can cause severe dizziness or fainting.   Contact a nurse or doctor right away if you have:  · Fast, slow, or pounding heartbeat, chest pain  · Difficult or painful urination, burning while urinating  · Changes in vision     Common side effects:  · Burning, crawling, itching, numbness, prickling, \"pins and needles\", or tingling feelings  · Goosebumps  © 2017 [facility] Information is for End User's use only and may not be sold, redistributed or otherwise used for commercial purposes. Metoprolol/Hydrochlorothiazide (Dutoprol, Lopressor HCT) - (By mouth)   Why this medicine is used:   Treats high blood pressure. Contact a nurse or doctor right away if you have:  · Blistering, peeling, red skin rash  · Uneven heartbeat  · Rapid weight gain; swelling in your hands, ankles, or feet  · Lightheadedness, dizziness, fainting  · Dry mouth, increased thirst, muscle cramps, nausea or vomiting     Common side effects:  · Depression  · Mild dizziness, headache, tiredness  · Mild diarrhea, constipation, nausea  © 2017 [facility] Information is for End User's use only and may not be sold, redistributed or otherwise used for commercial purposes. Diltiazem (Cardizem, Cardizem CD, Cardizem LA, Cardizem SR) - (By mouth)   Why this medicine is used:   Treats high blood pressure and angina (chest pain). Contact a nurse or doctor right away if you have:  · Fast, slow, uneven, or pounding heartbeat  · Rapid weight gain; swelling in your hands, ankles, or feet  · Dark urine or pale stools  · Nausea, vomiting, loss of appetite, stomach pain,  · Yellow skin or eyes     Common side effects:  · Dizziness  · Tiredness  © 2017 [facility] Information is for End User's use only and may not be sold, redistributed or otherwise used for commercial purposes. Digoxin (Digitek, Digox, Lanoxicaps, Lanoxin) - (By mouth)   Why this medicine is used:   Treats heart rhythm problems and heart failure.   Contact a nurse or doctor right away if you have:  · Fast, slow, or uneven heartbeat  · Trouble breathing  · Changes in vision  · Nausea, vomiting, diarrhea, loss of appetite  · Confusion, tiredness or weakness, numbness or tingling in hands, feet, or lips     Common side effects:  · Headache, dizziness  © 2017 [facility] Information is for End User's use only and may not be sold, redistributed or otherwise used for commercial purposes.

## 2022-02-15 NOTE — DISCHARGE SUMMARY
Hospitalist Discharge Summary   Admit Date:  2/3/2022 11:07 AM   DC Note date: 2/15/2022  Name:  Peri Hudson   Age:  61 y.o.   Sex:  female  :  1955   MRN:  [id number]   Room:  [facility] [place]  PCP:  Jose Juan Gan MD    Presenting Complaint: Abnormal Lab Results and Peripheral Edema    Initial Admission Diagnosis: Atrial fibrillation with RVR ([facility] [I48.91]     Problem List for this Hospitalization:  Hospital Problems as of 2/15/2022 Date Reviewed: 2022          Codes Class Noted - Resolved POA    Chronic respiratory failure with hypoxia and hypercapnia (HCC) ICD-10-CM: J96.11, J96.12  ICD-9-CM: 518.83, 799.02, 786.09  Unknown - Present Yes        Atrial fibrillation with RVR ([facility] ICD-10-CM: I48.91  ICD-9-CM: 427.31  2/3/2022 - Present Yes        Anemia (Chronic) ICD-10-CM: D64.9  ICD-9-CM: 285.9  2/3/2022 - Present Yes        Diastolic CHF, acute on chronic [facility]) ICD-10-CM: I50.33  ICD-9-CM: 428.33, 428.0  2021 - Present Yes        Restrictive lung disease (Chronic) ICD-10-CM: J98.4  ICD-9-CM: 518.89  10/22/2020 - Present Yes        Controlled type 2 diabetes mellitus without complication, without long-term current use of insulin (HCC) (Chronic) ICD-10-CM: E11.9  ICD-9-CM: 250.00  2020 - Present Yes        * (Principal) Acute on chronic respiratory failure with hypoxia and hypercapnia (HCC) ICD-10-CM: J96.21, J96.22  ICD-9-CM: 518.84, 786.09, 799.02  2020 - Present Yes        Hypertension (Chronic) ICD-10-CM: I10  ICD-9-CM: 401.9  Unknown - Present Yes        Class 3 obesity with alveolar hypoventilation, serious comorbidity, and body mass index (BMI) of 50.0 to 59.9 in adult [facility]) (Chronic) ICD-10-CM: D99.7, O09.45  ICD-9-CM: 278.03, V85.43  Unknown - Present Yes    Overview Signed 2019 11:59 AM by Jose Juan Gan MD     BMI 45.8- 12                 Did Patient have Sepsis (YES OR NO): no    Admission HPI from 2/3/2022:  \" Peri Hudson is a 61 y.o. female with medical history of Afib (on Xarelto), HTN, morbid obesity complicated by LUNA/OHS, chronic diastolic heart jaylene presents with worsening shortness of breath, fatigue, and worsening swelling in her lower extremities. Her symptoms have been getting worse over the past several days. She denies any black/red stools and has not noticed any unusual bruising or bleeding. She denies sick contacts. She denies fevers/chills, chest pain, abdominal pain, nausea/vomiting or diarrhea. She says she occasionally gets heart palpitations where her heart \"keeps going boom boom\".      ED Course: Hgb of 6.5 (last Hgb was 13 in September 2021). MCV of 79 with RDW of 19. pBNP of 1300. CXR shows vascular congestion. EKG shows Afib with RVR with HRs in the 100s. Occult blood negative. Type/screen for pRBC transfusion. Given Lasix 40 mg IV once. Hospitalist consulted for admission. [facility] Course:  admitted on 2/3 with A.fib RVR, acute hypoxic respiratory failure (room air sats 89%), severe iron deficiency anemia. EGD and colonoscopy on February 6 showing gastritis [status post biopsy, small superficial prepyloric ulcer, diverticulosis and hemorrhoids. iron def on labs, likely acute on chronic component of blood loss. Started on PO iron. Hb improved and continued to improve even after xarelto started for afib.   her HR was difficult to control but has been stable on cardizem, toprol, digoxin for the last 6-7 days. Patient oxygen requirement went from 5 to 15 L on February 7 and pulmonary consulted  Acute hypoxic respiratory failure seems to be secondary to volume overload, obesity, LUNA, OHS. Diastolic CHF noted on echo. She has improved on diuresis 40mg IV lasix BID for the last 6-7 days; due to her weight she had a lot of fluid reserves. She is doing better now but would continue PO BID lasix at home until follow up. She has baseline O2 needs I suspect.     She had midodrine started to support BP during diuresis and she has tolerated this well for days so will continue at DC    She will need bipap or trilogy at discharge as well, for OHS/LUNA. Oxygen Qualifier          Room air: SpO2 with O2 and liter flow   Resting SpO2  87%  90% on 1L   Ambulating SpO2   88% on 1L / 91% on 2L          Disposition: Home Health Care Sv  Diet: ADULT DIET Easy to Chew; Low Sodium (2 gm)  Code Status: Prior    Follow Up Orders: Follow-up Appointments   Procedures    FOLLOW UP VISIT Appointment in: One Week Please make follow up at out patient [address] for patient upon discharge     Please make follow up at out patient [address] for patient upon discharge     Standing Status:   Standing     Number of Occurrences:   1     Order Specific Question:   Appointment in     Answer: One Week    FOLLOW UP VISIT Appointment in: One Week Cardiology for CHF follow up. High risk for readmit. Check CBC, BMP, Mg at visit please     Cardiology for CHF follow up. High risk for readmit. Check CBC, BMP, Mg at visit please     Standing Status:   Standing     Number of Occurrences:   1     Order Specific Question:   Appointment in     Answer: One Week       Follow-up Information     Follow up With Specialties Details [facility] OFFICE Cardiology In 1 week follow up heart failure. please check labs CBC, BMP, Mg at visit 2 Curtis Rosales [facility] [address] [facility]  [phone]          Follow up labs/diagnostics (ultimately defer to outpatient provider):  CBC, BMP, Mg.  Consider CXR also. Check dry weight    Time spent in patient discharge and coordination 32 minutes. Plan was discussed with pt. All questions answered. Patient was stable at time of discharge. Instructions given to call a physician or return if any concerns.     Discharge Info:   Current Discharge Medication List      START taking these medications    Details   midodrine (PROAMATINE) 10 mg tablet Take 1 Tablet by mouth three (3) times daily (with meals) for 30 days. Indications: low blood pressure  Qty: 90 Tablet, Refills: 0  Start date: 2/15/2022, End date: 3/17/2022      rivaroxaban (XARELTO) 20 mg tab tablet Take 1 Tablet by mouth daily (with lunch). Qty: 30 Tablet, Refills: 1  Start date: 2/15/2022      pantoprazole (PROTONIX) 40 mg tablet Take 1 Tablet by mouth See Admin Instructions. Take 1 tablet twice daily for a month, then once daily  Indications: an ulcer of the duodenum  Qty: 60 Tablet, Refills: 1  Start date: 2/15/2022      metoprolol succinate (TOPROL-XL) 50 mg XL tablet Take 1 Tablet by mouth daily. Indications: ventricular rate control in atrial fibrillation  Qty: 30 Tablet, Refills: 1  Start date: 2/16/2022      ferrous sulfate 325 mg (65 mg iron) tablet Take 1 Tablet by mouth Daily (before breakfast). Indications: anemia from inadequate iron  Qty: 30 Tablet, Refills: 1  Start date: 2/15/2022      dilTIAZem ER (CARDIZEM CD) 300 mg capsule Take 1 Capsule by mouth daily. Indications: ventricular rate control in atrial fibrillation  Qty: 30 Capsule, Refills: 1  Start date: 2/16/2022      digoxin (LANOXIN) 0.25 mg tablet Take 1 Tablet by mouth daily. Indications: ventricular rate control in atrial fibrillation  Qty: 30 Tablet, Refills: 1  Start date: 2/16/2022         CONTINUE these medications which have CHANGED    Details   furosemide (LASIX) 40 mg tablet Take 1 Tablet by mouth Before breakfast and dinner. Qty: 90 Tablet, Refills: 2  Start date: 2/15/2022    Associated Diagnoses: Diastolic CHF, chronic (HCC)      potassium chloride (K-DUR, KLOR-CON M20) 20 mEq tablet Take 1 Tablet by mouth two (2) times a day.  With lasix  Qty: 90 Tablet, Refills: 2  Start date: 2/15/2022    Associated Diagnoses: Diastolic CHF, chronic (HCC)         CONTINUE these medications which have NOT CHANGED    Details   !! albuterol (PROVENTIL HFA, VENTOLIN HFA, PROAIR HFA) 90 mcg/actuation inhaler Take 2 Puffs by inhalation every four (4) hours as needed for Wheezing. Qty: 1 Each, Refills: 11      !! albuterol (PROVENTIL HFA, VENTOLIN HFA, PROAIR HFA) 90 mcg/actuation inhaler Take 1 Puff by inhalation every four (4) hours as needed for Wheezing. Qty: 18 g, Refills: 0       !! - Potential duplicate medications found. Please discuss with provider. STOP taking these medications       predniSONE (DELTASONE) 20 mg tablet Comments:   Reason for Stopping:               Procedures done this admission:  Procedure(s):  ESOPHAGOGASTRODUODENOSCOPY (EGD)  COLONOSCOPY/ BMI 56 ROOM 302  ESOPHAGOGASTRODUODENAL (EGD) BIOPSY    Consults this admission:  IP CONSULT TO CARDIOLOGY  IP CONSULT TO GASTROENTEROLOGY  IP CONSULT TO PULMONOLOGY    Echocardiogram/EKG results:  Results from Hospital Encounter encounter on 02/03/22    ECHO ADULT COMPLETE    Interpretation Summary    Left Ventricle: Left ventricle size is normal. Normal wall thickness. Normal wall motion. Normal left ventricular systolic function with a visually estimated EF of 60 - 65%.   Right Ventricle: Right ventricle is mildly dilated. Normal systolic function.   Tricuspid Valve: Mild to moderate transvalvular regurgitation. RVSP is mildly elevated at around 45 mmHg.   Left Atrium: Left atrium is severely dilated.        EKG Results     Procedure 720 Value Units Date/Time    EKG (Check If Upper Abdominal Pain or symptoms of SOB, Diaphoresis, or Tachycardia) [[id number]] Collected: 02/03/22 1057    Order Status: Completed Updated: 02/03/22 1250     Ventricular Rate 106 BPM      QRS Duration 68 ms      Q-T Interval 328 ms      QTC Calculation (Bezet) 435 ms      Calculated R Axis 61 degrees      Calculated T Axis 47 degrees      Diagnosis --     Atrial fibrillation with rapid ventricular response  Low voltage QRS  Nonspecific ST abnormality  Abnormal ECG  When compared with ECG of 22-SEP-2021 00:15,  Nonspecific T wave abnormality now evident in Anterior leads  Confirmed by Guy Mercado MD (), Jeannette Waters ([id number]) on 2/3/2022 12:50:05 PM      EKG 12 LEAD INITIAL [[id number]]     Order Status: Canceled           Diagnostic Imaging/Tests:   XR CHEST PORT    Result Date: 2/3/2022  Exam: XR CHEST PORT on 2/3/2022 11:33 AM Clinical History: The Female patient is 61years old  presenting for pulmonary edema. Comparison:  Chest x-ray 9/21/2021 Findings:  Frontal view of the chest was obtained. There is mild central pulmonary vascular congestion. No pleural effusions are demonstrated. The cardiomediastinal silhouette remains diffusely enlarged. There are no acute osseous abnormalities. 1. Primary megaly with mild central pulmonary vascular congestion. CPT code(s) 79439     ECHO ADULT COMPLETE    Result Date: 2/4/2022    Left Ventricle: Left ventricle size is normal. Normal wall thickness. Normal wall motion. Normal left ventricular systolic function with a visually estimated EF of 60 - 65%.   Right Ventricle: Right ventricle is mildly dilated. Normal systolic function.   Tricuspid Valve: Mild to moderate transvalvular regurgitation. RVSP is mildly elevated at around 45 mmHg.   Left Atrium: Left atrium is severely dilated. All Micro Results     Procedure Component Value Units Date/Time    COVID-19 RAPID TEST [[id number]] Collected: 02/05/22 0547    Order Status: Completed Specimen: Nasopharyngeal Updated: 02/05/22 0650     Specimen source NASAL        COVID-19 rapid test Not detected        Comment:      The specimen is NEGATIVE for SARS-CoV-2, the novel coronavirus associated with COVID-19. A negative result does not rule out COVID-19. This test has been authorized by the FDA under an Emergency Use Authorization (EUA) for use by authorized laboratories.         Fact sheet for Healthcare Providers: ConventionUpdate.co.nz  Fact sheet for Patients: ConventionUpdate.co.nz       Methodology: Isothermal Nucleic Acid Amplification Labs: Results:       BMP, Mg, Phos Recent Labs     02/15/22  0537 02/14/22  0533 02/13/22  0600    139 138   K 3.4* 3.3* 3.4*   CL 98 97* 95*   CO2 37* 39* 39*   AGAP 4* 3* 4*   BUN 18 16 15   CREA 0.80 0.70 0.60   CA 9.7 9.4 9.3   * 107* 103*   MG  --  1.9 1.7      CBC Recent Labs     02/15/22  0537 02/14/22  0533 02/13/22  0600   WBC 9.0 8.4 8.1   RBC 4.42 4.10 4.04*   HGB 10.4* 9.6* 9.4*   HCT 37.6 34.9* 34.6*    321 289   GRANS 71 68 68   LYMPH 16 16 16   EOS 3 5 6   MONOS 10 10 9   BASOS 1 1 1   IG 0 1 1   ANEU 6.4 5.7 5.5   ABL 1.4 1.4 1.3   JUDI 0.3 0.4 0.5   ABM 0.9 0.8 0.7   ABB 0.1 0.1 0.0   AIG 0.0 0.0 0.0      LFT No results for input(s): ALT, TBIL, AP, TP, ALB, GLOB, AGRAT in the last 72 hours.     No lab exists for component: SGOT, GPT   Cardiac Testing Lab Results   Component Value Date/Time    B-type Natriuretic Peptide 121.0 (H) 01/08/2020 09:22 AM      Coagulation Tests Lab Results   Component Value Date/Time    Prothrombin time 15.6 (H) 02/03/2022 11:05 AM    INR 1.2 02/03/2022 11:05 AM      A1c Lab Results   Component Value Date/Time    Hemoglobin A1c 6.9 (H) 09/23/2021 05:54 AM    Hemoglobin A1c 6.7 (H) 12/02/2020 10:55 AM    Hemoglobin A1c 7.5 (H) 09/11/2020 05:17 AM      Lipid Panel Lab Results   Component Value Date/Time    Cholesterol, total 180 12/02/2020 10:55 AM    HDL Cholesterol 63 12/02/2020 10:55 AM    LDL, calculated 102 (H) 12/02/2020 10:55 AM    LDL, calculated 95 04/01/2019 10:55 AM    VLDL, calculated 15 12/02/2020 10:55 AM    VLDL, calculated 16 04/01/2019 10:55 AM    Triglyceride 80 12/02/2020 10:55 AM      Thyroid Panel Lab Results   Component Value Date/Time    TSH 1.240 02/07/2022 10:01 PM    TSH 2.670 09/24/2021 06:01 AM        Most Recent UA Lab Results   Component Value Date/Time    WBC 3-5 09/10/2020 12:18 AM    RBC 5-10 09/10/2020 12:18 AM    Epithelial cells 0-3 09/10/2020 12:18 AM    Bacteria TRACE 09/10/2020 12:18 AM    Casts 0 09/10/2020 12:18 AM    Crystals, urine OCCASIONAL 09/10/2020 12:18 AM    Mucus 0 09/10/2020 12:18 AM          All Labs from Last 24 Hrs:  Recent Results (from the past 24 hour(s))   GLUCOSE, POC    Collection Time: 02/14/22  3:50 PM   Result Value Ref Range    Glucose (POC) 161 (H) 65 - 100 mg/dL    Performed by Pedro Burkett    GLUCOSE, POC    Collection Time: 02/14/22  9:15 PM   Result Value Ref Range    Glucose (POC) 180 (H) 65 - 100 mg/dL    Performed by Nissa    CBC WITH AUTOMATED DIFF    Collection Time: 02/15/22  5:37 AM   Result Value Ref Range    WBC 9.0 4.3 - 11.1 K/uL    RBC 4.42 4.05 - 5.2 M/uL    HGB 10.4 (L) 11.7 - 15.4 g/dL    HCT 37.6 35.8 - 46.3 %    MCV 85.1 79.6 - 97.8 FL    MCH 23.5 (L) 26.1 - 32.9 PG    MCHC 27.7 (L) 31.4 - 35.0 g/dL    RDW 26.1 (H) 11.9 - 14.6 %    PLATELET 472 740 - 494 K/uL    MPV 10.7 9.4 - 12.3 FL    ABSOLUTE NRBC 0.00 0.0 - 0.2 K/uL    DF AUTOMATED      NEUTROPHILS 71 43 - 78 %    LYMPHOCYTES 16 13 - 44 %    MONOCYTES 10 4.0 - 12.0 %    EOSINOPHILS 3 0.5 - 7.8 %    BASOPHILS 1 0.0 - 2.0 %    IMMATURE GRANULOCYTES 0 0.0 - 5.0 %    ABS. NEUTROPHILS 6.4 1.7 - 8.2 K/UL    ABS. LYMPHOCYTES 1.4 0.5 - 4.6 K/UL    ABS. MONOCYTES 0.9 0.1 - 1.3 K/UL    ABS. EOSINOPHILS 0.3 0.0 - 0.8 K/UL    ABS. BASOPHILS 0.1 0.0 - 0.2 K/UL    ABS. IMM.  GRANS. 0.0 0.0 - 0.5 K/UL   METABOLIC PANEL, BASIC    Collection Time: 02/15/22  5:37 AM   Result Value Ref Range    Sodium 139 136 - 145 mmol/L    Potassium 3.4 (L) 3.5 - 5.1 mmol/L    Chloride 98 98 - 107 mmol/L    CO2 37 (H) 21 - 32 mmol/L    Anion gap 4 (L) 7 - 16 mmol/L    Glucose 125 (H) 65 - 100 mg/dL    BUN 18 8 - 23 MG/DL    Creatinine 0.80 0.6 - 1.0 MG/DL    GFR est AA >60 >60 ml/min/1.73m2    GFR est non-AA >60 >60 ml/min/1.73m2    Calcium 9.7 8.3 - 10.4 MG/DL   GLUCOSE, POC    Collection Time: 02/15/22  7:09 AM   Result Value Ref Range    Glucose (POC) 122 (H) 65 - 100 mg/dL    Performed by Ryan    GLUCOSE, POC    Collection Time: 02/15/22 10:57 AM   Result Value Ref Range    Glucose (POC) 176 (H) 65 - 100 mg/dL    Performed by Ryan        Current Med List in Hospital:   Current Facility-Administered Medications   Medication Dose Route Frequency    furosemide (LASIX) tablet 40 mg  40 mg Oral ACB&D    potassium chloride (K-DUR, KLOR-CON M20) SR tablet 40 mEq  40 mEq Oral BID    levalbuterol (XOPENEX) nebulizer soln 0.63 mg/3 mL  0.63 mg Nebulization TID RT    midodrine (PROAMATINE) tablet 10 mg  10 mg Oral TID WITH MEALS    oxybutynin (DITROPAN) tablet 5 mg  5 mg Oral Q8H PRN    zinc oxide-cod liver oil (DESITIN) 40 % paste   Topical PRN    guaiFENesin ER (MUCINEX) tablet 1,200 mg  1,200 mg Oral Q12H    lip protectant (BLISTEX) ointment 1 Each  1 Each Topical PRN    sodium chloride (OCEAN) 0.65 % nasal squeeze bottle 2 Spray  2 Spray Both Nostrils Q2H PRN    metoprolol succinate (TOPROL-XL) XL tablet 50 mg  50 mg Oral DAILY    rivaroxaban (XARELTO) tablet 20 mg  20 mg Oral DAILY WITH LUNCH    ferrous sulfate tablet 325 mg  1 Tablet Oral BID WITH MEALS    dilTIAZem ER (CARDIZEM CD) capsule 300 mg  300 mg Oral DAILY    pantoprazole (PROTONIX) tablet 40 mg  40 mg Oral Q12H    digoxin (LANOXIN) tablet 0.25 mg  0.25 mg Oral DAILY    acetaminophen (TYLENOL) tablet 650 mg  650 mg Oral Q4H PRN    0.9% sodium chloride infusion 250 mL  250 mL IntraVENous PRN    sodium chloride (NS) flush 5-10 mL  5-10 mL IntraVENous Q8H    sodium chloride (NS) flush 5-10 mL  5-10 mL IntraVENous PRN    insulin lispro (HUMALOG) injection   SubCUTAneous AC&HS    oxyCODONE IR (ROXICODONE) tablet 5 mg  5 mg Oral Q4H PRN       Allergies   Allergen Reactions    Lortab [Hydrocodone-Acetaminophen] Nausea Only and Other (comments)     \"It makes me feel hung over\"     Immunization History   Administered Date(s) Administered    BCG Vaccine 01/01/1962    COVID-19, Pfizer Purple top, DILUTE for use, 12+ yrs, 30mcg/0.3mL dose 03/26/2021, 04/16/2021    TB Skin Test (PPD) Intradermal 02/03/2022       Recent Vital Data:  Patient Vitals for the past 24 hrs:   Temp Pulse Resp BP SpO2   02/15/22 1102     91 %   02/15/22 0838 98.1 °F (36.7 °C) 93 20 113/72 96 %   02/15/22 0456 97.9 °F (36.6 °C) 87 20 93/72 94 %   02/15/22 0415     97 %   02/15/22 0037 98.2 °F (36.8 °C) 79 20 119/76 97 %   02/14/22 2316     92 %   02/14/22 2040 97.6 °F (36.4 °C) 89 20 95/64 93 %   02/14/22 1951     94 %   02/14/22 1729  77  112/68    02/14/22 1545 98.6 °F (37 °C) 81 16 114/69 94 %   02/14/22 1330     92 %   02/14/22 1140 98.3 °F (36.8 °C) 87 19 128/68 91 %     Oxygen Therapy  O2 Sat (%): 91 % (02/15/22 1102)  Pulse via Oximetry: 94 beats per minute (02/15/22 1102)  O2 Device: Nasal cannula (02/15/22 3738)  Skin Assessment: Clean, dry, & intact (02/14/22 2040)  Skin Protection for O2 Device: N/A (02/14/22 2040)  O2 Flow Rate (L/min): 1 l/min (02/15/22 1102)  FIO2 (%): 24 % (02/15/22 1102)    Estimated body mass index is 51.06 kg/m² as calculated from the following:    Height as of this encounter: 5' 7\" (1.702 m). Weight as of this encounter: 147.9 kg (326 lb). Intake/Output Summary (Last 24 hours) at 2/15/2022 1128  Last data filed at 2/15/2022 0902  Gross per 24 hour   Intake 720 ml   Output 2700 ml   Net -1980 ml         Physical Exam:  General:    Well nourished. No overt distress  Head:  Normocephalic, atraumatic  Eyes:  Sclerae appear normal.  Pupils equally round. HENT:  Nares appear normal, no drainage. Moist mucous membranes  Neck:  No restricted ROM. Trachea midline  CV:   irregular. No JVD  Lungs:   Diminished bilat. Even, unlabored  Abdomen:   Nondistended. Extremities: No cyanosis or clubbing. Habitus related appearance of swelling without true pitting edema. Skin:     No rashes. Normal coloration  Neuro:  CN II-XII grossly intact. Psych:  Normal mood and affect.       Signed:  Liberty Enrique MD    Part of this note may have been written by using a voice dictation software. The note has been proof read but may still contain some grammatical/other typographical errors.

## 2022-02-16 LAB — MAGNESIUM SERPL-MCNC: 1.9 MG/DL (ref 1.6–2.3)

## 2022-02-17 ENCOUNTER — HOSPITAL ENCOUNTER (EMERGENCY)
Age: 67
Discharge: HOME | End: 2022-02-17
Attending: STUDENT IN AN ORGANIZED HEALTH CARE EDUCATION/TRAINING PROGRAM
Payer: COMMERCIAL

## 2022-02-17 VITALS
BODY MASS INDEX: 48.82 KG/M2 | DIASTOLIC BLOOD PRESSURE: 87 MMHG | OXYGEN SATURATION: 93 % | WEIGHT: 293 LBS | RESPIRATION RATE: 18 BRPM | HEIGHT: 65 IN | TEMPERATURE: 97.8 F | HEART RATE: 94 BPM | SYSTOLIC BLOOD PRESSURE: 127 MMHG

## 2022-02-17 DIAGNOSIS — R04.0 EPISTAXIS: Primary | ICD-10-CM

## 2022-02-17 LAB
ALBUMIN SERPL-MCNC: 2.8 G/DL (ref 3.2–4.6)
ALBUMIN/GLOB SERPL: 0.5 {RATIO} (ref 1.2–3.5)
ALP SERPL-CCNC: 103 U/L (ref 50–136)
ALT SERPL-CCNC: 37 U/L (ref 12–65)
ANION GAP SERPL CALC-SCNC: 8 MMOL/L (ref 7–16)
AST SERPL-CCNC: 48 U/L (ref 15–37)
BASOPHILS # BLD: 0.1 K/UL (ref 0–0.2)
BASOPHILS NFR BLD: 1 % (ref 0–2)
BILIRUB SERPL-MCNC: 0.5 MG/DL (ref 0.2–1.1)
BUN SERPL-MCNC: 33 MG/DL (ref 8–23)
CALCIUM SERPL-MCNC: 9.3 MG/DL (ref 8.3–10.4)
CHLORIDE SERPL-SCNC: 104 MMOL/L (ref 98–107)
CO2 SERPL-SCNC: 29 MMOL/L (ref 21–32)
CREAT SERPL-MCNC: 1.1 MG/DL (ref 0.6–1)
DIFFERENTIAL METHOD BLD: ABNORMAL
EOSINOPHIL # BLD: 0.2 K/UL (ref 0–0.8)
EOSINOPHIL NFR BLD: 1 % (ref 0.5–7.8)
ERYTHROCYTE [DISTWIDTH] IN BLOOD BY AUTOMATED COUNT: 26.9 % (ref 11.9–14.6)
GLOBULIN SER CALC-MCNC: 5.2 G/DL (ref 2.3–3.5)
GLUCOSE SERPL-MCNC: 146 MG/DL (ref 65–100)
HCT VFR BLD AUTO: 38.2 % (ref 35.8–46.3)
HGB BLD-MCNC: 10.7 G/DL (ref 11.7–15.4)
IMM GRANULOCYTES # BLD AUTO: 0.1 K/UL (ref 0–0.5)
IMM GRANULOCYTES NFR BLD AUTO: 1 % (ref 0–5)
INR PPP: 2.4
LYMPHOCYTES # BLD: 1.3 K/UL (ref 0.5–4.6)
LYMPHOCYTES NFR BLD: 12 % (ref 13–44)
MCH RBC QN AUTO: 24.2 PG (ref 26.1–32.9)
MCHC RBC AUTO-ENTMCNC: 28 G/DL (ref 31.4–35)
MCV RBC AUTO: 86.2 FL (ref 79.6–97.8)
MONOCYTES # BLD: 1 K/UL (ref 0.1–1.3)
MONOCYTES NFR BLD: 10 % (ref 4–12)
NEUTS SEG # BLD: 7.8 K/UL (ref 1.7–8.2)
NEUTS SEG NFR BLD: 75 % (ref 43–78)
NRBC # BLD: 0 K/UL (ref 0–0.2)
PLATELET # BLD AUTO: 451 K/UL (ref 150–450)
PMV BLD AUTO: 10.9 FL (ref 9.4–12.3)
POTASSIUM SERPL-SCNC: 4.2 MMOL/L (ref 3.5–5.1)
PROT SERPL-MCNC: 8 G/DL (ref 6.3–8.2)
PROTHROMBIN TIME: 26.3 SEC (ref 12.6–14.5)
RBC # BLD AUTO: 4.43 M/UL (ref 4.05–5.2)
SODIUM SERPL-SCNC: 141 MMOL/L (ref 136–145)
WBC # BLD AUTO: 10.4 K/UL (ref 4.3–11.1)

## 2022-02-17 PROCEDURE — 30901 CONTROL OF NOSEBLEED: CPT

## 2022-02-17 PROCEDURE — 85610 PROTHROMBIN TIME: CPT

## 2022-02-17 PROCEDURE — 85025 COMPLETE CBC W/AUTO DIFF WBC: CPT

## 2022-02-17 PROCEDURE — 80053 COMPREHEN METABOLIC PANEL: CPT

## 2022-02-17 PROCEDURE — 99283 EMERGENCY DEPT VISIT LOW MDM: CPT

## 2022-02-17 PROCEDURE — 74011000250 HC RX REV CODE- 250: Performed by: STUDENT IN AN ORGANIZED HEALTH CARE EDUCATION/TRAINING PROGRAM

## 2022-02-17 RX ORDER — CEPHALEXIN 500 MG/1
500 CAPSULE ORAL 4 TIMES DAILY
Qty: 28 CAPSULE | Refills: 0 | Status: SHIPPED | OUTPATIENT
Start: 2022-02-17 | End: 2022-02-24

## 2022-02-17 RX ORDER — SILVER NITRATE 38.21; 12.74 MG/1; MG/1
1 STICK TOPICAL ONCE
Status: COMPLETED | OUTPATIENT
Start: 2022-02-17 | End: 2022-02-17

## 2022-02-17 RX ADMIN — Medication 1 APPLICATOR: at 22:25

## 2022-02-17 RX ADMIN — Medication 10 ML: at 22:25

## 2022-02-18 NOTE — ED PROVIDER NOTES
73-year-old female patient presenting to the ER with reports of ongoing epistaxis. Patient states her left nare started bleeding yesterday. She reports intermittent episodes where bleeding subsides but then returned shortly thereafter. Denies trauma to the area. She does report occasional oxygen use. Denies history of recurrent nosebleeds but patient is anticoagulated for atrial fibrillation with Xarelto.   Denies any other symptoms at this time           Past Medical History:   Diagnosis Date    Chronic pain     pain R knee    Hypertension     Morbid obesity ([address]     BMI 45.8- 5/2/12    Positive PPD     had vaccination as a child - BCG       Past Surgical History:   Procedure Laterality Date    COLONOSCOPY N/A 2/6/2022    COLONOSCOPY/ BMI 56 ROOM 302 performed by Valerie Duenas MD at [facility]    HX GYN      C-sec x 1 with GA    HX KNEE ARTHROSCOPY      bilateral knees         Family History:   Problem Relation Age of Onset    Other Mother         kidney failure    Cancer Sister         cervical cancer       Social History     Socioeconomic History    Marital status:      Spouse name: Not on file    Number of children: Not on file    Years of education: Not on file    Highest education level: Not on file   Occupational History    Occupation: CNA cook in past.     Tobacco Use    Smoking status: Former Smoker     Packs/day: 0.50     Years: 25.00     Pack years: 12.50     Types: Cigarettes     Quit date: 2/7/2007     Years since quitting: 15.0    Smokeless tobacco: Never Used   Substance and Sexual Activity    Alcohol use: No    Drug use: No    Sexual activity: Not on file   Other Topics Concern    Not on file   Social History Narrative    Not on file     Social Determinants of Health     Financial Resource Strain:     Difficulty of Paying Living Expenses: Not on file   Food Insecurity:     Worried About [address] in the Last Year: Not on file    [address] [facility] [address] in the Last Year: Not on file   Transportation Needs:     Lack of Transportation (Medical): Not on file    Lack of Transportation (Non-Medical): Not on file   Physical Activity:     Days of Exercise per Week: Not on file    Minutes of Exercise per Session: Not on file   Stress:     Feeling of Stress : Not on file   Social Connections:     Frequency of Communication with Friends and Family: Not on file    Frequency of Social Gatherings with Friends and Family: Not on file    Attends Rastafari Services: Not on file    Active Member of [address] [place] [address] Historic Futures or Organizations: Not on file    Attends Club or Organization Meetings: Not on file    Marital Status: Not on file   Intimate Partner Violence:     Fear of Current or Ex-Partner: Not on file    Emotionally Abused: Not on file    Physically Abused: Not on file    Sexually Abused: Not on file   Housing Stability:     Unable to Pay for Housing in the Last Year: Not on file    Number of [place] in the Last Year: Not on file    Unstable Housing in the Last Year: Not on file         ALLERGIES: Lortab [hydrocodone-acetaminophen]    Review of Systems   HENT: Positive for nosebleeds. All other systems reviewed and are negative. Vitals:    02/17/22 2103 02/17/22 2230 02/17/22 2300   BP: 125/80 (!) 134/58 127/87   Pulse: 94     Resp: 18     Temp: 97.8 °F (36.6 °C)     SpO2: 98% 92% 93%   Weight: 147.9 kg (326 lb)     Height: 5' 5\" (1.651 m)              Physical Exam  Vitals and nursing note reviewed. Constitutional:       Appearance: Normal appearance. HENT:      Head: Normocephalic and atraumatic. Comments: Signs of bilateral epistaxis though much more significant on the left side. Posterior oropharynx shows evidence of active bleeding present. Patient actively spitting clots into an emesis bag     Nose: Nose normal.      Mouth/Throat:      Mouth: Mucous membranes are moist.   Eyes:      Extraocular Movements: Extraocular movements intact.       Pupils: Pupils are equal, round, and reactive to light. Cardiovascular:      Pulses: Normal pulses. Pulmonary:      Effort: Pulmonary effort is normal.   Abdominal:      General: Abdomen is flat. Musculoskeletal:         General: Normal range of motion. Cervical back: Normal range of motion. Skin:     General: Skin is warm and dry. Capillary Refill: Capillary refill takes less than 2 seconds. Neurological:      General: No focal deficit present. Mental Status: She is alert. Psychiatric:         Mood and Affect: Mood normal.          MDM  Number of Diagnoses or Management Options  Epistaxis  Diagnosis management comments: Labs are stable, vitals are stable. Patient topicalized with LAT bilaterally followed by nasal tampon placement in the left nare. Tolerated well. Will discharge home with ENT follow-up, short course of Keflex while packing is in place and return precautions    Voice dictation software was used during the making of this note. This software is not perfect and grammatical and other typographical errors may be present. This note has been proofread, but may still contain errors. Clayton Patton DO; 2/17/2022 @11:35 PM   ===================================================================         Epistaxis Management    Date/Time: 2/17/2022 11:35 PM  Performed by: Elli Lopez DO  Authorized by: Elli Lopez DO     Consent:     Consent obtained:  Verbal    Consent given by:  Patient    Risks discussed:  Bleeding, infection, nasal injury and pain    Alternatives discussed:  No treatment  Anesthesia (see MAR for exact dosages):      Anesthesia method:  Topical application    Topical anesthetic:  LET  Procedure details:     Treatment site:  L anterior, R anterior, L septum and R septum    Treatment method:  Anterior pack and silver nitrate    Treatment complexity:  Limited    Treatment episode: initial    Post-procedure details:     Assessment:  Bleeding stopped    Patient tolerance of procedure: Tolerated well, no immediate complications  Comments:      Patient topicalized bilaterally with let soaked cotton swab. After this was removed I was able to visualize a small area of recent bleeding in the right medial aspect of the nare that was cauterized with silver nitrate stick. Evaluation of the left nares revealed some evidence of recent bleeding that was also cauterized along the medial aspect. I then placed a saline soaked Rhino Rocket in the left nare and insufflated the balloon with approximately 2 cc of air. Patient tolerated well    No further bleeding.

## 2022-02-18 NOTE — ED NOTES
I have reviewed discharge instructions with the patient and spouse. The patient and spouse verbalized understanding. Patient left ED via Discharge Method: wheelchair to Home with spouse    Opportunity for questions and clarification provided. Patient given 1 scripts. To continue your aftercare when you leave the hospital, you may receive an automated call from our care team to check in on how you are doing. This is a free service and part of our promise to provide the best care and service to meet your aftercare needs.  If you have questions, or wish to unsubscribe from this service please call [phone]. Thank you for Choosing our [facility] Emergency Department.

## 2022-02-18 NOTE — ED TRIAGE NOTES
Pt states she has been having a nose bleed yesterday for a few hours and today since 1300    Pt states her nose will not stop bleeding

## 2022-02-18 NOTE — DISCHARGE INSTRUCTIONS
Leave packing in until follow-up with ENT provider listed. Please call this office in the morning as discussed to schedule this follow-up appointment. Take the antibiotic prescribed as directed to you follow-up with this provider.   Return for worsening symptoms, concerns or questions

## 2022-03-18 PROBLEM — J96.22 ACUTE ON CHRONIC RESPIRATORY FAILURE WITH HYPOXIA AND HYPERCAPNIA (HCC): Status: ACTIVE | Noted: 2020-06-25

## 2022-03-18 PROBLEM — I48.91 ATRIAL FIBRILLATION WITH RVR (HCC): Status: ACTIVE | Noted: 2022-02-03

## 2022-03-18 PROBLEM — F32.A MILD DEPRESSION: Status: ACTIVE | Noted: 2019-02-08

## 2022-03-18 PROBLEM — D64.9 ANEMIA: Status: ACTIVE | Noted: 2022-02-03

## 2022-03-18 PROBLEM — I50.33 DIASTOLIC CHF, ACUTE ON CHRONIC (HCC): Status: ACTIVE | Noted: 2021-11-11

## 2022-03-18 PROBLEM — J98.4 RESTRICTIVE LUNG DISEASE: Status: ACTIVE | Noted: 2020-10-22

## 2022-03-18 PROBLEM — J96.21 ACUTE ON CHRONIC RESPIRATORY FAILURE WITH HYPOXIA AND HYPERCAPNIA (HCC): Status: ACTIVE | Noted: 2020-06-25

## 2022-03-19 PROBLEM — I48.91 ATRIAL FIBRILLATION WITH RAPID VENTRICULAR RESPONSE (HCC): Status: ACTIVE | Noted: 2021-09-22

## 2022-03-19 PROBLEM — R09.02 HYPOXIA: Status: ACTIVE | Noted: 2020-09-10

## 2022-03-20 PROBLEM — E11.9 CONTROLLED TYPE 2 DIABETES MELLITUS WITHOUT COMPLICATION, WITHOUT LONG-TERM CURRENT USE OF INSULIN (HCC): Status: ACTIVE | Noted: 2020-09-09

## 2022-03-21 PROBLEM — I50.32 CHRONIC HEART FAILURE WITH PRESERVED EJECTION FRACTION (HFPEF) (HCC): Status: ACTIVE | Noted: 2022-03-21

## 2022-03-21 PROBLEM — I48.19 PERSISTENT ATRIAL FIBRILLATION (HCC): Status: ACTIVE | Noted: 2022-03-21

## 2022-03-24 PROBLEM — I50.32 CHRONIC HEART FAILURE WITH PRESERVED EJECTION FRACTION (HFPEF) (HCC): Status: ACTIVE | Noted: 2022-03-21

## 2022-03-24 PROBLEM — I48.19 PERSISTENT ATRIAL FIBRILLATION (HCC): Status: ACTIVE | Noted: 2022-03-21

## 2022-04-18 PROBLEM — I50.32 CHRONIC DIASTOLIC HEART FAILURE (HCC): Status: ACTIVE | Noted: 2021-11-11

## 2022-04-18 PROBLEM — J96.22 ACUTE ON CHRONIC RESPIRATORY FAILURE WITH HYPOXIA AND HYPERCAPNIA (HCC): Status: RESOLVED | Noted: 2020-06-25 | Resolved: 2022-04-18

## 2022-04-18 PROBLEM — J96.21 ACUTE ON CHRONIC RESPIRATORY FAILURE WITH HYPOXIA AND HYPERCAPNIA (HCC): Status: RESOLVED | Noted: 2020-06-25 | Resolved: 2022-04-18

## 2022-04-18 PROBLEM — I48.91 ATRIAL FIBRILLATION WITH RAPID VENTRICULAR RESPONSE (HCC): Status: RESOLVED | Noted: 2021-09-22 | Resolved: 2022-04-18

## 2022-04-18 PROBLEM — I48.91 ATRIAL FIBRILLATION WITH RVR (HCC): Status: RESOLVED | Noted: 2022-02-03 | Resolved: 2022-04-18

## 2022-06-08 ENCOUNTER — HOSPITAL ENCOUNTER (OUTPATIENT)
Dept: CT IMAGING | Age: 67
Discharge: HOME | End: 2022-06-11

## 2022-06-08 ENCOUNTER — TELEPHONE (OUTPATIENT)
Dept: PULMONOLOGY | Age: 67
End: 2022-06-08

## 2022-06-08 DIAGNOSIS — R91.8 PULMONARY INFILTRATE: ICD-10-CM

## 2022-06-08 NOTE — TELEPHONE ENCOUNTER
Spoke with the patient in regards to their CT scan results, explained per Rosa RANGEL that the CT scan is normal.  Patient understood the results and did not have any further questions or concerns at this time. // Eli CHERRY

## 2022-07-19 NOTE — PROGRESS NOTES
[address] [facility], [address] [facility]  [phone]        Name:  Stacy Gutierrez  YOB: 1955  MRN:  [id number]    Office visit  7/20/2022      Chief Complaint   Patient presents with    Shortness of Breath           HISTORY OF PRESENT ILLNESS:  The patient is a 74year old female who is seen for follow up of restrictive lung disease and shortness of breath. She is accompanied by her son. Has reported ILD on CXR from 2020. Her restriction is felt to be secondary to obesity and kyphosis. No evidence of ILD or pulmonary pathology. Has chronic dHF and is on Lasix. Is supposed to be on Lasix 40 mg bid, but does not take it on a regular basis because she \"doesn't want to be in the bathroom all day\". CT of chest during hospitalization was negative for PE, but did reveal an irregular infiltrate/GGO in the right lung apex. Had follow up CT of chest 6/8/22 which was normal.    Has known JEISON is supposed to be wearing CPAP and nocturnal O2 at 2 lpm.  She has TERRANCE on CPAP only in May which revealed significant desaturations with CPAP only (nearly 3 hours). She was to resume her O2 at 2 lpm with her CPAP. States she only sleeps 2-3 hours at night and then will nap for 1-2 hours during the day. Does not wear CPAP or oxygen with napping. Wants her portable oxygen tanks picked up. Doesn't believe she still needs oxygen with sleep. ASSESSMENT/PLAN:  (Medical Decision Making)  Below is the assessment and plan developed based on review of pertinent history, physical exam, labs, studies, and medications. Encounter Diagnoses   Name Primary? Restrictive lung disease  --secondary to obesity and kyphosis   Yes    JEISON (obstructive sleep apnea)  --less than optimal compliance with CPAP  --keep appt in sleep clinic next week. Nocturnal hypoxemia  --continue O2 at 2 lpm with sleep.        Chronic diastolic heart failure ([address]  --discussed with patient that her LE edema is directly related to noncompliance with Lasix, CPAP, and oxygen  --will adjust Lasix to 60 mg q am rather than 40 mg bid to see if this helps with compliance. Obesity, morbid, BMI 40.0-49.9 ([facility])  --weight down 20 pounds since last visit. --she is congratulated on her efforts--continue to limit carbs. Orders Placed This Encounter   Procedures    DME - DURABLE MEDICAL EQUIPMENT     D/C resting and exertional O2. Orders Placed This Encounter   Medications    furosemide (LASIX) 40 MG tablet     Sig: Take 1.5 tablets by mouth in the morning. Dispense:  45 tablet     Refill:  5      Follow up with me in 6 months. Collaborating physician is Dr. Pepito Pappas. MM         Alvin Cam NP, APRN - CNP  Electronically signed          ~~~~~~~~~~~~~~~~~~~~~~~~~~~~~~~~~~~~~~~~  REFERENCE INFORMATION    Past Medical History:   Diagnosis Date    Chronic pain     pain R knee    Hypertension     Morbid [address]     BMI 45.8- 5/2/12    Positive PPD     had vaccination as a child - BCG       Past Surgical History:   Procedure Laterality Date    COLONOSCOPY N/A 2/6/2022    COLONOSCOPY/ BMI 64 ROOM 302 performed by Jhoana Donnelly MD at [address] x 1 with GA    KNEE ARTHROSCOPY      bilateral knees       Family History   Problem Relation Age of Onset    Other Mother         kidney failure    Cancer Sister         cervical cancer       Social History     Socioeconomic History    Marital status:       Spouse name: Not on file    Number of children: Not on file    Years of education: Not on file    Highest education level: Not on file   Occupational History    Not on file   Tobacco Use    Smoking status: Former     Packs/day: 0.50     Years: 25.00     Pack years: 12.50     Types: Cigarettes     Quit date: 2/7/2007     Years since quitting: 15.4    Smokeless tobacco: Never   Substance and Sexual Activity    Alcohol use: No    Drug use: No    Sexual activity: Not on file   Other Topics Concern    Not on file   Social History Narrative    Not on file     Social Determinants of Health     Financial Resource Strain: Not on file   Food Insecurity: Not on file   Transportation Needs: Not on file   Physical Activity: Not on file   Stress: Not on file   Social Connections: Not on file   Intimate Partner Violence: Not on file   Housing Stability: Not on file       Patient Active Problem List   Diagnosis    Restrictive lung disease    Anemia    Mild depression ([facility]    Hypoxia    Hypertension    Controlled type 2 diabetes mellitus without complication, without long-term current use of insulin ([facility]    Class 3 obesity with alveolar hypoventilation, serious comorbidity, and body mass index (BMI) of 50.0 to 59.9 in adult [facility])    Chronic heart failure with preserved ejection fraction (HFpEF) (HCC)    Persistent atrial fibrillation (HCC)    Chronic respiratory failure (HCC)    Chronic diastolic heart failure (HCC)          Allergies   Allergen Reactions    Hydrocodone-Acetaminophen Nausea Only and Other (See Comments)     \"It makes me feel hung over\"       Current Outpatient Medications   Medication Sig    CPAP Machine MISC by Does not apply route    OXYGEN Inhale into the lungs 2 lpm qhs    furosemide (LASIX) 40 MG tablet Take 1.5 tablets by mouth in the morning.     digoxin (LANOXIN) 250 MCG tablet Take 0.25 mg by mouth daily    dilTIAZem (TIAZAC) 300 MG extended release capsule Take 300 mg by mouth daily    metoprolol succinate (TOPROL XL) 50 MG extended release tablet Take 50 mg by mouth daily    pantoprazole (PROTONIX) 40 MG tablet Take 40 mg by mouth See Admin Instructions    potassium chloride (KLOR-CON M) 20 MEQ extended release tablet Take 20 mEq by mouth 2 times daily    rivaroxaban (XARELTO) 20 MG TABS tablet Take 20 mg by mouth Daily with lunch    albuterol sulfate  (90 Base) MCG/ACT inhaler Inhale 2 puffs into the lungs every 4 hours as needed (Patient not taking: Reported on 7/20/2022)    ferrous sulfate (IRON 325) 325 (65 Fe) MG tablet Take 325 mg by mouth every morning (before breakfast) (Patient not taking: Reported on 7/20/2022)     No current facility-administered medications for this visit. Review of Systems   Constitutional:  Negative for chills, diaphoresis, fatigue and fever. Cardiovascular:  Positive for leg swelling. Negative for chest pain and palpitations. Gastrointestinal:  Negative for abdominal pain, constipation, diarrhea, nausea and vomiting. Neurological:  Negative for dizziness, tremors, seizures, syncope, weakness and headaches. PHYSICAL EXAM:    Vitals:    07/20/22 0819   BP: 134/88   Pulse: 96   Temp: 97.1 °F (36.2 °C)   TempSrc: Skin   SpO2: 98%  Comment: room air   Weight: (!) 301 lb (136.5 kg)   Height: 5' 7\" (1.702 m)      Body mass index is 47.14 kg/m². GENERAL APPEARANCE:  The patient is morbidly obese and in no respiratory distress. HEENT:  PERRL. Conjunctivae unremarkable. Nasal mucosa is without epistaxis, exudate, or polyps. Gums and dentition are unremarkable. There is no oropharyngeal narrowing. TMs are clear. NECK/LYMPHATIC:  Symmetrical with no elevation of jugular venous pulsation. Trachea midline. No thyroid enlargement. No cervical adenopathy. LUNGS:  Normal respiratory effort with symmetrical lung expansion. Breath sounds minimally decreased bilaterally, but clear. HEART:  There is a regular rate and rhythm. No murmur, rub, or gallop. There is no edema in the lower extremities. ABDOMEN:  Soft and non-tender. No hepatosplenomegaly. Bowel sounds are normal.    NEURO:  The patient is alert and oriented to person, place, and time. Memory appears intact and mood is normal.  No gross sensorimotor deficits are present. DIAGNOSTIC TESTS:   Imaging:  CT chest without contrast 06/08/2022    Narrative  CT CHEST without CONTRAST.     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 Radiation dose reduction techniques were used for this study. Our CT  scanners use one or more of the following:  Automated exposure control,  adjustment of the mA and or kV according to patient size, iterative  reconstruction. FINDINGS:  LUNGS: Atelectasis pleural-based densities and pleural effusions have all  resolved. No airspace disease. AIRWAYS: Trachea and proximal bronchi grossly patent. PLEURA: No effusion or thickening or calcifications. LYMPH NODES: There are small prevascular and AP window and pericarinal lymph  nodes. [place] [facility] HEART: Normal size. CORONARIES: No definite calcifications. AORTA: Normal caliber. UPPER ABDOMEN: Normal size adrenal glands. SKELETAL/CHEST WALL: Significant kyphosis with degenerative changes. No lytic  lesions. .    Impression  Today's exam is unremarkable. No evidence of tuberculosis. The  atelectasis and pleural effusion and pleural densities have all resolved. CT CHEST PULMONARY EMBOLISM W CONTRAST 02/07/2022    Narrative  CT OF THE CHEST WITH CONTRAST, PULMONARY EMBOLUS PROTOCOL. CLINICAL INDICATION: Shortness of breath, acute respiratory failure, severe  hypoxemia    PROCEDURE: Serial thin section axial images are obtained from the thoracic inlet  through the upper abdomen following the administration of intravenous contrast  per a dedicated, institutional pulmonary embolus protocol. Coronal MIP  reformatted images are generated. Radiation dose reduction techniques were used  for this study. Our CT scanners use one or all of the following: Automated  exposure control, adjusted of the mA and/or kV according to patient size,  iterative reconstruction    COMPARISON: Chest CT dated 9/21/2021    FINDINGS:    The aorta is unremarkable. There is adequate opacification of the central  pulmonary arterial tree.  No central intraluminal filling defect noted to  indicate acute pulmonary embolus. No mediastinal or axillary adenopathy. Trace  bilateral pleural effusions are present. There is dependent atelectasis. Irregular pleural-based densities are noted at the right lung apex may simply  represent scarring. A focal early pneumonia could also be considered. The heart  is enlarged. There is no pericardial effusion. Limited evaluation of the upper abdomen is unremarkable. No aggressive osseous is identified. Impression  1. No acute pulmonary emboli. 2. Dependent atelectasis with trace bilateral pleural effusions and  cardiomegaly. 3. Irregular pleural-based densities at the right lung apex is nonspecific. An  atypical pneumonitis is a consideration. Spirometry/Complete pulmonary function tests:     12/15/2020--          Labs:     Lab Results   Component Value Date/Time     02/17/2022 09:21 PM    K 4.2 02/17/2022 09:21 PM     02/17/2022 09:21 PM    CO2 29 02/17/2022 09:21 PM    BUN 33 02/17/2022 09:21 PM    CREATININE 1.10 02/17/2022 09:21 PM    GLUCOSE 146 02/17/2022 09:21 PM    CALCIUM 9.3 02/17/2022 09:21 PM         Lab Results   Component Value Date    WBC 10.4 02/17/2022    HGB 10.7 (L) 02/17/2022    HCT 38.2 02/17/2022    MCV 86.2 02/17/2022     (H) 02/17/2022        Lab Results   Component Value Date    BNP 1,328 (H) 02/03/2022        Lab Results   Component Value Date    TSH 1.240 02/07/2022              Sathya Cline NP, APRN - CNP  Electronically signed    Dictated using voice recognition software.   Proof read but unrecognized errors may exist.

## 2022-07-20 ENCOUNTER — OFFICE VISIT (OUTPATIENT)
Dept: PULMONOLOGY | Age: 67
End: 2022-07-20
Payer: MEDICARE

## 2022-07-20 VITALS
OXYGEN SATURATION: 98 % | SYSTOLIC BLOOD PRESSURE: 134 MMHG | BODY MASS INDEX: 45.99 KG/M2 | HEIGHT: 67 IN | HEART RATE: 96 BPM | TEMPERATURE: 97.1 F | WEIGHT: 293 LBS | DIASTOLIC BLOOD PRESSURE: 88 MMHG

## 2022-07-20 DIAGNOSIS — G47.33 OSA (OBSTRUCTIVE SLEEP APNEA): ICD-10-CM

## 2022-07-20 DIAGNOSIS — J98.4 RESTRICTIVE LUNG DISEASE: Primary | ICD-10-CM

## 2022-07-20 DIAGNOSIS — I50.32 CHRONIC DIASTOLIC HEART FAILURE (HCC): ICD-10-CM

## 2022-07-20 DIAGNOSIS — G47.34 NOCTURNAL HYPOXEMIA: ICD-10-CM

## 2022-07-20 DIAGNOSIS — E66.01 OBESITY, MORBID, BMI 40.0-49.9 (HCC): ICD-10-CM

## 2022-07-20 PROCEDURE — 99214 OFFICE O/P EST MOD 30 MIN: CPT | Performed by: NURSE PRACTITIONER

## 2022-07-20 PROCEDURE — G8400 PT W/DXA NO RESULTS DOC: HCPCS | Performed by: NURSE PRACTITIONER

## 2022-07-20 PROCEDURE — 1090F PRES/ABSN URINE INCON ASSESS: CPT | Performed by: NURSE PRACTITIONER

## 2022-07-20 PROCEDURE — 1036F TOBACCO NON-USER: CPT | Performed by: NURSE PRACTITIONER

## 2022-07-20 PROCEDURE — G8427 DOCREV CUR MEDS BY ELIG CLIN: HCPCS | Performed by: NURSE PRACTITIONER

## 2022-07-20 PROCEDURE — G8417 CALC BMI ABV UP PARAM F/U: HCPCS | Performed by: NURSE PRACTITIONER

## 2022-07-20 PROCEDURE — 1123F ACP DISCUSS/DSCN MKR DOCD: CPT | Performed by: NURSE PRACTITIONER

## 2022-07-20 PROCEDURE — 3017F COLORECTAL CA SCREEN DOC REV: CPT | Performed by: NURSE PRACTITIONER

## 2022-07-20 RX ORDER — FUROSEMIDE 40 MG/1
60 TABLET ORAL DAILY
Qty: 45 TABLET | Refills: 5 | Status: SHIPPED | OUTPATIENT
Start: 2022-07-20

## 2022-07-20 ASSESSMENT — ENCOUNTER SYMPTOMS
VOMITING: 0
ABDOMINAL PAIN: 0
CONSTIPATION: 0
DIARRHEA: 0
NAUSEA: 0

## 2022-07-26 NOTE — PROGRESS NOTES
[address] [place]. [address] [facility], [address] [place] [address]  [phone]    Patient Name:  Della Messina  YOB: 1955      Office Visit 7/27/2022    The patient is seen by synchronous (real-time) audio-video technology on [facility]. Consent:  The patient/healthcare decision maker is aware that this patient-initiated Telehealth encounter is a billable service, with coverage as determined by his insurance carrier. The patient is aware that he/she may receive a bill and has provided verbal consent to proceed: Yes     I was at the office while conducting this visit. We discussed the expected course, resolution and complications of the diagnosis(es) in detail. Medication risks, benefits, costs, interactions, and alternatives were discussed as indicated. I advised him to contact the office if his condition worsens, changes or fails to improve as anticipated. He expressed understanding with the diagnosis(es) and plan. Pursuant to the emergency declaration under the [facility] waiver authority and the [employer] Act, this Virtual  Visit was conducted, with patient's consent, to reduce the patient's risk of exposure to COVID-19 and provide continuity of care for an established patient. Services were provided through a video synchronous discussion virtually to substitute for in-person clinic visit. CHIEF COMPLAINT:    Chief Complaint   Patient presents with    New Patient       HISTORY OF PRESENT ILLNESS:      This is a 53-year-old female seen virtually because of poor sleep quality. She has a history of obstructive sleep apnea but has not been followed in our sleep center. We have no old records of her sleep apnea. She has been seen in the pulmonary office and carries a diagnosis of interstitial lung disease and chronic diastolic heart failure.   At 1 point she had a right upper lobe groundglass opacity but this had resolved on a chest CT from 6/8/2022. When she was last seen in the pulmonary office she apparently had an overnight oximetry which was without oxygen added to her CPAP and she was hypoxic. She was instructed to add 2 L of oxygen to her CPAP at night. Unfortunately she describes limited nighttime sleep. She apparently has 4 dogs and keeps 2 of them in her bedroom and her son indicates they definitely wake her up early and often. I discussed that the dogs need to be removed from the bedroom so that the patient can get an adequate night sleep. She also needs to wear the oxygen at 2 L with her CPAP. Because her prior overnight oximetry was of such little duration, she will need to have a repeat overnight oximetry once she is sleeping over 6 hours. I discussed with her that she should let us know when she can reach that goal.  At that point we will repeat her overnight oxygen test.    Unfortunately the patient is napping during the day without oxygen. It is difficult to know exactly how long she is sleeping during the day as her son is working full-time and cannot be there to witness her sleep during the day much of the time. I discussed with him that his mother needs to avoid sleeping and napping at all during the day and to set a fixed bedtime from 11 PM to 7 AM for example. I also recommended since she does have a smart phone that its alarm be set for every hour during the day to keep her from falling asleep and napping. This should improve her nighttime sleep duration significantly.         Past Medical History:   Diagnosis Date    Chronic pain     pain R knee    Hypertension     Morbid obesity ([facility] [address]     BMI 45.8- 5/2/12    Positive PPD     had vaccination as a child - BCG         [unfilled]      Past Surgical History:   Procedure Laterality Date    COLONOSCOPY N/A 2/6/2022    COLONOSCOPY/ BMI 56 ROOM 302 performed by Scott Ritter MD at [facility] ENDOSCOPY [facility]YN      C-sec x 1 with GA    KNEE ARTHROSCOPY      bilateral knees       [unfilled]    Social History     Socioeconomic History    Marital status:      Spouse name: Not on file    Number of children: Not on file    Years of education: Not on file    Highest education level: Not on file   Occupational History    Not on file   Tobacco Use    Smoking status: Former     Packs/day: 0.50     Years: 25.00     Pack years: 12.50     Types: Cigarettes     Quit date: 2/7/2007     Years since quitting: 15.4    Smokeless tobacco: Never   Substance and Sexual Activity    Alcohol use: No    Drug use: No    Sexual activity: Not on file   Other Topics Concern    Not on file   Social History Narrative    Not on file     Social Determinants of Health     Financial Resource Strain: Not on file   Food Insecurity: Not on file   Transportation Needs: Not on file   Physical Activity: Not on file   Stress: Not on file   Social Connections: Not on file   Intimate Partner Violence: Not on file   Housing Stability: Not on file         Family History   Problem Relation Age of Onset    Other Mother         kidney failure    Cancer Sister         cervical cancer         Allergies   Allergen Reactions    Hydrocodone-Acetaminophen Nausea Only and Other (See Comments)     \"It makes me feel hung over\"         Current Outpatient Medications   Medication Sig    CPAP Machine MISC by Does not apply route    OXYGEN Inhale into the lungs 2 lpm qhs    furosemide (LASIX) 40 MG tablet Take 1.5 tablets by mouth in the morning.     albuterol sulfate  (90 Base) MCG/ACT inhaler Inhale 2 puffs into the lungs every 4 hours as needed    digoxin (LANOXIN) 250 MCG tablet Take 0.25 mg by mouth daily    dilTIAZem (TIAZAC) 300 MG extended release capsule Take 300 mg by mouth daily    ferrous sulfate (IRON 325) 325 (65 Fe) MG tablet Take 325 mg by mouth every morning (before breakfast)    metoprolol succinate (TOPROL XL) 50 MG extended release tablet Take 50 mg by mouth daily    pantoprazole (PROTONIX) 40 MG tablet Take 40 mg by mouth See Admin Instructions    potassium chloride (KLOR-CON M) 20 MEQ extended release tablet Take 20 mEq by mouth 2 times daily    rivaroxaban (XARELTO) 20 MG TABS tablet Take 20 mg by mouth Daily with lunch     No current facility-administered medications for this visit. REVIEW OF SYSTEMS:   CONSTITUTIONAL:   There is no history of fever, chills, night sweats, weight loss, weight gain, persistent fatigue, or lethargy/hypersomnolence. EYES:   Denies problems with eye pain, erythema, blurred vision, or visual field loss. ENTM:   Denies history of tinnitus, epistaxis, sore throat, hoarseness, or dysphonia. LYMPH:   Denies swollen glands. CARDIAC:   No chest pain, pressure, discomfort, palpitations, orthopnea, murmurs, or edema. GI:   No dysphagia, heartburn reflux, nausea/vomiting, diarrhea, abdominal pain, or bleeding. :   Denies history of dysuria, hematuria, polyuria, or decreased urine output. MS:   No history of myalgias, arthralgias, bone pain, or muscle cramps. SKIN:   No history of rashes, jaundice, cyanosis, nodules, or ulcers. ENDO:   Negative for heat or cold intolerance. No history of DM. PSYCH:   Negative for anxiety, depression, insomnia, hallucinations. NEURO:   There is no history of AMS, persistent headache, decreased level of consciousness, seizures, or motor or sensory deficits. PHYSICAL EXAM:    There were no vitals filed for this visit. GENERAL APPEARANCE:   The patient is obese and in no respiratory distress. HEENT:   PERRL. Conjunctivae unremarkable. No cranial nerve deficits are evident. NECK/LYMPHATIC:   Symmetrical with no elevation of jugular venous pulsation. Trachea midline. No obvious thyroid enlargement is seen. LUNGS:   Normal respiratory effort with symmetrical lung expansion.    Breath sounds are without audible wheezing or congestion   SKIN:   There are no rashes, cyanosis, jaundice, or ecchymosis present. EXTREMITIES:   The extremities are unremarkable without clubbing, cyanosis, joint inflammation, degenerative, or ischemic change. MUSCULOSKELETAL:   There is no abnormal tone, muscle atrophy, or abnormal movement present. NEURO:   The patient is alert and oriented to person, place, and time. Memory appears intact and mood is normal.  No gross sensorimotor deficits are present. ASSESSMENT:  (Medical Decision Making)      ICD-10-CM    1. JEISON (obstructive sleep apnea)  G47.33 Unfortunately we have no old records about her sleep apnea. These will need to be obtained. She also apparently was hypoxic with her prior overnight oximetry on CPAP without oxygen. Oxygen at 2 L has been it does not appear to have made much of an effect on her sleep. Her constant napping during the day and her sleeping with multiple dogs in her bedroom is quite an impediment to getting an adequate night of sleep. The dogs will need to be removed from her bedroom. She will let us know when she is getting about 6 hours of sleep so that we can repeat her overnight oximetry. In the interim we will attempt to get records of where she may have had a sleep study. 2. Hypoxia  R09.02 As above. 3. Chronic heart failure with preserved ejection fraction (HFpEF) ([facility])  I50.32 The patient is prescribed 60 mg of Lasix in the morning. She apparently did lose 20 pounds with diuretic therapy. 4. Persistent atrial fibrillation ([facility])  I48.19 The patient was seen by cardiology earlier this year and it was decided to continue digoxin, metoprolol, and Cardizem along with Xarelto. PLAN:    Continue current medications as prescribed. The patient needs to have the dogs removed from her bedroom which are clearly impairing her sleep quality. The patient needs to restrict her sleep to nighttime only.   I suggested to her and her son that she fix her bedtime at 6 PM and her wake up time at 7 AM avoiding any napping outside of this interval.  I suggested that her smart phone be set hourly during the day to wake her up at least early on until she is able to sleep entirely at night. The patient is instructed to call us once she is getting 6 hours of sleep so that we can repeat her overnight oximetry to assure that oxygenation is adequate. Getting 6 hours of sleep should assure that she is getting at least 1-2 REM periods when oxygenation tends to be worst.    Follow-up will be in the sleep center in 4 to 5 months. No orders of the defined types were placed in this encounter. No orders of the defined types were placed in this encounter. Kaleigh Covarrubias MD  Electronically signed    Over 50% of today's office visit was spent in face to face time reviewing test results, prognosis, importance of compliance, education about disease process, benefits of medications, instructions for management of acute flare-ups, and follow up plans. Total face to face time spent with the patient and charting was 40 minutes. Dictated using voice recognition software.   Proof read but unrecognized errors may exist.

## 2022-07-27 ENCOUNTER — TELEMEDICINE (OUTPATIENT)
Dept: SLEEP MEDICINE | Age: 67
End: 2022-07-27
Payer: MEDICARE

## 2022-07-27 DIAGNOSIS — R09.02 HYPOXIA: ICD-10-CM

## 2022-07-27 DIAGNOSIS — I50.32 CHRONIC HEART FAILURE WITH PRESERVED EJECTION FRACTION (HFPEF) (HCC): ICD-10-CM

## 2022-07-27 DIAGNOSIS — G47.33 OSA (OBSTRUCTIVE SLEEP APNEA): Primary | ICD-10-CM

## 2022-07-27 DIAGNOSIS — I48.19 PERSISTENT ATRIAL FIBRILLATION (HCC): ICD-10-CM

## 2022-07-27 PROCEDURE — 1123F ACP DISCUSS/DSCN MKR DOCD: CPT | Performed by: INTERNAL MEDICINE

## 2022-07-27 PROCEDURE — 99214 OFFICE O/P EST MOD 30 MIN: CPT | Performed by: INTERNAL MEDICINE

## 2022-07-27 ASSESSMENT — SLEEP AND FATIGUE QUESTIONNAIRES
ESS TOTAL SCORE: 7
HOW LIKELY ARE YOU TO NOD OFF OR FALL ASLEEP WHILE SITTING AND TALKING TO SOMEONE: 0
HOW LIKELY ARE YOU TO NOD OFF OR FALL ASLEEP WHEN YOU ARE A PASSENGER IN A CAR FOR AN HOUR WITHOUT A BREAK: 0
HOW LIKELY ARE YOU TO NOD OFF OR FALL ASLEEP IN A CAR, WHILE STOPPED FOR A FEW MINUTES IN TRAFFIC: 0
HOW LIKELY ARE YOU TO NOD OFF OR FALL ASLEEP WHILE SITTING QUIETLY AFTER LUNCH WITHOUT ALCOHOL: 0
HOW LIKELY ARE YOU TO NOD OFF OR FALL ASLEEP WHILE SITTING INACTIVE IN A PUBLIC PLACE: 0
HOW LIKELY ARE YOU TO NOD OFF OR FALL ASLEEP WHILE LYING DOWN TO REST IN THE AFTERNOON WHEN CIRCUMSTANCES PERMIT: 2
HOW LIKELY ARE YOU TO NOD OFF OR FALL ASLEEP WHILE SITTING AND READING: 2
HOW LIKELY ARE YOU TO NOD OFF OR FALL ASLEEP WHILE WATCHING TV: 3

## 2022-08-16 NOTE — PROGRESS NOTES
Patient Education       Well Child Exam 11 to 14 Years   About this topic   Your child's well child exam is a visit with the doctor to check your child's health. The doctor measures your child's weight and height, and may measure your child's body mass index (BMI). The doctor plots these numbers on a growth curve. The growth curve gives a picture of your child's growth at each visit. The doctor may listen to your child's heart, lungs, and belly. Your doctor will do a full exam of your child from the head to the toes.  Your child may also need shots or blood tests during this visit.  General   Growth and Development   Your doctor will ask you how your child is developing. The doctor will focus on the skills that most children your child's age are expected to do. During this time of your child's life, here are some things you can expect.  · Physical development ? Your child may:  ? Show signs of maturing physically  ? Need reminders about drinking water when playing  ? Be a little clumsy while growing  · Hearing, seeing, and talking ? Your child may:  ? Be able to see the long-term effects of actions  ? Understand many viewpoints  ? Begin to question and challenge existing rules  ? Want to help set household rules  · Feelings and behavior ? Your child may:  ? Want to spend time alone or with friends rather than with family  ? Have an interest in dating and the opposite sex  ? Value the opinions of friends over parents' thoughts or ideas  ? Want to push the limits of what is allowed  ? Believe bad things wont happen to them  · Feeding ? Your child needs:  ? To learn to make healthy choices when eating. Serve healthy foods like lean meats, fruits, vegetables, and whole grains. Help your child choose healthy foods when out to eat.  ? To start each day with a healthy breakfast  ? To limit soda, chips, candy, and foods that are high in fats and sugar  ? Healthy snacks available like fruit, cheese and crackers, or peanut  Problem: Self Care Deficits Care Plan (Adult)  Goal: *Acute Goals and Plan of Care (Insert Text)  Description:     GOALS UPDATED: 9/14/2020:  1. Patient will complete total body bathing and dressing with Minimal Assistance and adaptive equipment as needed. 2. Patient will complete toileting with stand by assistance and adaptive equipment as needed. 3. Patient will complete bed mobility with modified independence and minimal verbal cues. 4. Patient will complete functional transfers with stand by assistance and adaptive equipment as needed. 5. Patient will complete grooming in standing with supervision and good static and good dynamic standing balance. 6. Patent will complete functional mobility for ADLs with stand by assistance and adaptive equipment as needed. 7. Patient will verbalize 2 energy conservation techniques with no cues from therapist to increase safety and independence with ADLs. Timeframe: 7 visits     Outcome: Progressing Towards Goal    OCCUPATIONAL THERAPY: Daily Note and AM 9/16/2020  INPATIENT: OT Visit Days: 3  Payor: Steph Quiroz / Plan: [email or web address] Application / Product Type: Commerical /      NAME/AGE/GENDER: Franklin Suarez is a 63 y.o. female   PRIMARY DIAGNOSIS:  Sepsis ([facility] [A41.9]  Cellulitis [L03.90]  Suspected COVID-19 virus infection [Z20.828] Sepsis ([facility] Sepsis ([facility]    ICD-10: Treatment Diagnosis:    · Generalized Muscle Weakness (M62.81)  · Difficulty in walking, Not elsewhere classified (R26.2)  · Other abnormalities of gait and mobility (R26.89)   Precautions/Allergies:    Lortab [hydrocodone-acetaminophen]      ASSESSMENT:   Per Initial Assessment:  Ms. Ulysses Seymour is a 63 y.o. female admitted for Sepsis and Suspected Covid 19 infection. Pt is NEGATIVE for Covid 19. At baseline, patient lives with son (working full time) in two story home with 0 PHOEBE. Pt is typically Mod I to independent for ADLs, IADLs, and + driving.  Pt uses SPC PRN for functional mobility for household and butter  ? To eat meals as a part of the family. Turn the TV and cell phones off while eating. Talk about your day, rather than focusing on what your child is eating.  · Sleep ? Your child:  ? Needs more sleep  ? Is likely sleeping about 8 to 10 hours in a row at night  ? Should be allowed to read each night before bed. Have your child brush and floss the teeth before going to bed as well.  ? Should limit TV and computers for the hour before bedtime  ? Keep cell phones, tablets, televisions, and other electronic devices out of bedrooms overnight. They interfere with sleep.  ? Needs a routine to make week nights easier. Encourage your child to get up at a normal time on weekends instead of sleeping late.  · Shots or vaccines ? It is important for your child to get shots on time. This protects your child from very serious illnesses like pneumonia, blood and brain infections, tetanus, flu, or cancer. Your child may need:  ? HPV or human papillomavirus vaccine  ? Tdap or tetanus, diphtheria, and pertussis vaccine  ? Meningococcal vaccine  ? Influenza vaccine  Help for Parents   · Activities.  ? Encourage your child to spend at least 1 hour each day being physically active.  ? Offer your child a variety of activities to take part in. Include music, sports, arts and crafts, and other things your child is interested in. Take care not to over schedule your child. One to 2 activities a week outside of school is often a good number for your child.  ? Make sure your child wears a helmet when using anything with wheels like skates, skateboard, bike, etc.  ? Encourage time spent with friends. Provide a safe area for this.  · Here are some things you can do to help keep your child safe and healthy.  ? Talk to your child about the dangers of smoking, drinking alcohol, and using drugs. Do not allow anyone to smoke in your home or around your child.  ? Make sure your child uses a seat belt when riding in the car. Your child should  community distances. Pt not on home O2.    9/16/2020: Pt presents sitting up in chair upon arrival. Pt alert and agreeable to therapy. Pt performed UE exercises (in grid below) to increase strength and activity tolerance to perform mobility and ADLs. Pt tolerated exercises well with brief rest breaks. Pt encouraged to use RW until she gets stronger. Pt would prefer HH instead of going to rehab. Will continue to benefit from skilled OT during stay. At this time, patient is appropriate for Co-treatment with physical therapy due to patient's decreased overall endurance/tolerance levels, as well as need for high level skilled assistance/cueing to complete functional transfers/mobility and functional tasks. Savana Chaidez is appropriate for a multidisciplinary co-treatment of PT and OT to address goals of both disciplines. This section established at most recent assessment   PROBLEM LIST (Impairments causing functional limitations):  1. Decreased Strength  2. Decreased ADL/Functional Activities  3. Decreased Transfer Abilities  4. Decreased Ambulation Ability/Technique  5. Decreased Balance  6. Increased Pain  7. Decreased Activity Tolerance  8. Decreased Pacing Skills  9. Decreased Work Simplification/Energy Conservation Techniques  10. Increased Fatigue  11. Increased Shortness of Breath  12. Edema/Girth   INTERVENTIONS PLANNED: (Benefits and precautions of occupational therapy have been discussed with the patient.)  1. Activities of daily living training  2. Adaptive equipment training  3. Balance training  4. Clothing management  5. Neuromuscular re-eduation  6. Re-evaluation  7. Therapeutic activity  8. Therapeutic exercise     TREATMENT PLAN: Frequency/Duration: Follow patient 3x/week to address above goals. Rehabilitation Potential For Stated Goals: Good     REHAB RECOMMENDATIONS (at time of discharge pending progress):    Placement:   It is my opinion, based on this patient's performance to date, ride in the back seat until 13 years of age.  ? Talk with your child about peer pressure. Help your child learn how to handle risky things friends may want to do.  ? Remind your child to use headphones responsibly. Limit how loud the volume is turned up. Never wear headphones, text, or use a cell phone while riding a bike or crossing the street.  ? Protect your child from gun injuries. If you have a gun, use a trigger lock. Keep the gun locked up and the bullets kept in a separate place.  ? Limit screen time for children to 1 to 2 hours per day. This includes TV, phones, computers, and video games.  ? Discuss social media safety  · Parents need to think about:  ? Monitoring your child's computer use, especially when on the Internet  ? How to keep open lines of communication about unwanted touch, sex, and dating  ? How to continue to talk about puberty  ? Having your child help with some family chores to encourage responsibility within the family  ? Helping children make healthy choices  · The next well child visit will most likely be in 1 year. At this visit, your doctor may:  ? Do a full check up on your child  ? Talk about school, friends, and social skills  ? Talk about sexuality and sexually-transmitted diseases  ? Talk about driving and safety  When do I need to call the doctor?   · Fever of 100.4°F (38°C) or higher  · Your child has not started puberty by age 14  · Low mood, suddenly getting poor grades, or missing school  · You are worried about your child's development  Where can I learn more?   Centers for Disease Control and Prevention  https://www.cdc.gov/ncbddd/childdevelopment/positiveparenting/adolescence.html   Centers for Disease Control and Prevention  https://www.cdc.gov/vaccines/parents/diseases/teen/index.html   KidsHealth  http://kidshealth.org/parent/growth/medical/checkup_11yrs.html#lpy209   KidsHealth  http://kidshealth.org/parent/growth/medical/checkup_12yrs.html#pqw238  that Ms. Venus Fong may benefit from [address] after discharge due to the functional deficits listed above that are likely to improve with skilled rehabilitation because patient stated she is not interested at all in going to rehab facility. Equipment:    TBD              OCCUPATIONAL PROFILE AND HISTORY:   History of Present Injury/Illness (Reason for Referral):  See H & P  Past Medical History/Comorbidities:   Ms. Venus Fong  has a past medical history of Chronic pain, Hypertension, Morbid obesity ([address] and Positive PPD. Ms. Venus Fong  has a past surgical history that includes hx gyn and hx knee arthroscopy. Social History/Living Environment:   Home Environment: Private residence  # Steps to Enter: 0  One/Two Story Residence: Two story, live on 1st floor  Lift Chair Available: No  Living Alone: No  Support Systems: Child(adelita), Family member(s)  Patient Expects to be Discharged to[de-identified] Rehabilitation facility  Current DME Used/Available at Home: Cane, straight  Tub or Shower Type: Shower  Prior Level of Function/Work/Activity:  At baseline, patient lives with son (working full time) in two story home with 0 PHOEBE. Pt is typically Mod I to independent for ADLs, IADLs, and + driving. Pt uses SPC PRN for functional mobility for household and community distances. Pt not on home O2. Personal Factors:          Sex:  female        Age:  63 y.o. Number of Personal Factors/Comorbidities that affect the Plan of Care: Extensive review of physical, cognitive, and psychosocial performance (3+):  HIGH COMPLEXITY   ASSESSMENT OF OCCUPATIONAL PERFORMANCE[de-identified]   Activities of Daily Living:   Basic ADLs (From Assessment) Complex ADLs (From Assessment)   Feeding: Independent  Oral Facial Hygiene/Grooming: Stand-by assistance  Bathing: Moderate assistance  Upper Body Dressing: Setup  Lower Body Dressing: Maximum assistance  Toileting: Moderate assistance Instrumental ADL  Meal Preparation: Total assistance  Homemaking:  Total   KidsHealth  http://kidshealth.org/parent/growth/medical/checkup_13yrs.html#zdy622   KidsHealth  http://kidshealth.org/parent/growth/medical/checkup_14yrs.html#   Last Reviewed Date   2019-10-14  Consumer Information Use and Disclaimer   This information is not specific medical advice and does not replace information you receive from your health care provider. This is only a brief summary of general information. It does NOT include all information about conditions, illnesses, injuries, tests, procedures, treatments, therapies, discharge instructions or life-style choices that may apply to you. You must talk with your health care provider for complete information about your health and treatment options. This information should not be used to decide whether or not to accept your health care providers advice, instructions or recommendations. Only your health care provider has the knowledge and training to provide advice that is right for you.  Copyright   Copyright © 2021 Up[employer]Date, Inc. and its affiliates and/or licensors. All rights reserved.    At 9 years old, children who have outgrown the booster seat may use the adult safety belt fastened correctly.   If you have an active [facility] account, please look for your well child questionnaire to come to your [facility] account before your next well child visit.   assistance   Grooming/Bathing/Dressing Activities of Daily Living     Cognitive Retraining  Safety/Judgement: Awareness of environment                       Bed/Mat Mobility  Sit to Stand: Stand-by assistance  Stand to Sit: Stand-by assistance     Most Recent Physical Functioning:   Gross Assessment:                  Posture:  Posture (WDL): Exceptions to WDL  Posture Assessment: Forward head, Rounded shoulders, Trunk flexion  Balance:  Sitting: Intact  Standing: Impaired  Standing - Static: Fair;Good  Standing - Dynamic : Fair Bed Mobility:     Wheelchair Mobility:     Transfers:  Sit to Stand: Stand-by assistance  Stand to Sit: Stand-by assistance  Interventions: Verbal cues; Safety awareness training            Patient Vitals for the past 6 hrs:   BP BP Patient Position SpO2 O2 Flow Rate (L/min) Pulse   20 0725 136/69 At rest;Head of bed elevated (Comment degrees) 90 %  76   20 0735   93 % 2.5 l/min    20 1110 119/71 At rest;Sitting (!) 85 %  80       Mental Status  Neurologic State: Alert, Appropriate for age  Orientation Level: Oriented X4  Cognition: Follows commands  Perception: Appears intact  Perseveration: No perseveration noted  Safety/Judgement: Awareness of environment                          Physical Skills Involved:  1. Range of Motion  2. Balance  3. Strength  4. Activity Tolerance  5. Gross Motor Control  6. Pain (Chronic) Cognitive Skills Affected (resulting in the inability to perform in a timely and safe manner):  1. None Psychosocial Skills Affected:  1. Habits/Routines  2. Environmental Adaptation  3. Social Interaction  4. Emotional Regulation  5. Self-Awareness  6. Awareness of Others  7. Social Roles   Number of elements that affect the Plan of Care: 5+:  HIGH COMPLEXITY   CLINICAL DECISION MAKIN [facility] [address] [address] AM-PAC 6 Clicks   Daily Activity Inpatient Short Form  How much help from another person does the patient currently need. ..  Total A Lot A Little None 1.  Putting on and taking off regular lower body clothing? [] 1   [x] 2   [] 3   [] 4   2. Bathing (including washing, rinsing, drying)? [] 1   [x] 2   [] 3   [] 4   3. Toileting, which includes using toilet, bedpan or urinal?   [] 1   [x] 2   [] 3   [] 4   4. Putting on and taking off regular upper body clothing? [] 1   [] 2   [x] 3   [] 4   5. Taking care of personal grooming such as brushing teeth? [] 1   [] 2   [x] 3   [] 4   6. Eating meals? [] 1   [] 2   [] 3   [x] 4   © 2007, Trustees of [address] [place] [address] [address] [address], under license to [employer]. All rights reserved      Score:  Initial: 12, completed, 9/11/2020 Most Recent: 16 9/14/2020 (Date: -- )    Interpretation of Tool:  Represents activities that are increasingly more difficult (i.e. Bed mobility, Transfers, Gait). Medical Necessity:     · Skilled intervention continues to be required due to decreased independence, safety, balance, strength, ROM, and activity tolerance for performance of ADLs and functional mobility. Reason for Services/Other Comments:  · Patient continues to require skilled intervention due to   · medical complications and patient unable to attend/participate in therapy as expected  · . Use of outcome tool(s) and clinical judgement create a POC that gives a: MODERATE COMPLEXITY         TREATMENT:   (In addition to Assessment/Re-Assessment sessions the following treatments were rendered)     Pre-treatment Symptoms/Complaints:   Pain: Initial:   Pain Intensity 1: 0  Post Session:  same     Today's treatment session addressed Decreased Strength, Decreased Balance, Decreased Activity Tolerance, and Increased Fatigue to progress towards achieving goal(s) listed above. During this session, Physical Therapy addressed  Bed Mobility and Ambulation to progress towards their discipline specific goal(s).   Co-treatment was necessary to improve patient's ability to increase activity demands and ability to return to normal functional activity. Therapeutic Exercise: (15 minutes):  Exercises per grid below to improve mobility, strength and activity tolerance. Required minimal visual and verbal cues to promote proper body alignment and promote proper body mechanics. Progressed resistance and repetitions as indicated. Yellow theraband Date:  9/15 Date:  9/16 Date:     Activity/Exercise Parameters Parameters Parameters   Shoulder Abd/Adduction 10 reps 20 reps    Shoulder Flexion 5 reps 7 reps AAROM    Elbow Flexion 10 reps  20 reps     Forward Punches 10 reps 20 reps    Tricep Extension  20 reps                    Braces/Orthotics/Lines/Etc:   · Pure Wick Catheter  · O2 Device: Nasal cannula  Treatment/Session Assessment:    Response to Treatment:  Tolerated well    · Interdisciplinary Collaboration:   o Certified Occupational Therapy Assistant  o Registered Nurse  · After treatment position/precautions:   o Up in chair  o Bed/Chair-wheels locked  o Bed in low position  o Call light within reach   · Compliance with Program/Exercises: Compliant most of the time, Will assess as treatment progresses. · Recommendations/Intent for next treatment session: \"Next visit will focus on advancements to more challenging activities and reduction in assistance provided\".   Total Treatment Duration:  OT Patient Time In/Time Out  Time In: 1000  Time Out: Daysi Gallagher

## 2022-10-31 NOTE — PROGRESS NOTES
[facility] CARDIOLOGY Follow Up                 Reason for Visit: Chronic HFpEF    Subjective:     Patient is a 74 y.o. female with a PMH of chronic HFpEF, longstanding persistent atrial fibrillation, and hypertension who presents for follow-up. The patient was last seen in March 2022. She had a TTE in February 2022 that was noted to demonstrate a normal EF. The patient uses a wheelchair when she is outside the home, going to doctor's appointments for instance. She uses a cane inside her house. She denies chest pain or dyspnea. She reports that she \"very seldomly\" is aware of her heartbeat. Past Medical History:   Diagnosis Date    Chronic pain     pain R knee    Hypertension     Morbid obesity ([facility]     BMI 45.8- 5/2/12    Positive PPD     had vaccination as a child - BCG      Past Surgical History:   Procedure Laterality Date    COLONOSCOPY N/A 2/6/2022    COLONOSCOPY/ BMI 56 ROOM 302 performed by Yordan Banks MD at [address] x 1 with GA    KNEE ARTHROSCOPY      bilateral knees      Family History   Problem Relation Age of Onset    Other Mother         kidney failure    Cancer Sister         cervical cancer      Social History     Tobacco Use    Smoking status: Former     Packs/day: 0.50     Years: 25.00     Pack years: 12.50     Types: Cigarettes     Quit date: 2/7/2007     Years since quitting: 15.7    Smokeless tobacco: Never   Substance Use Topics    Alcohol use: No      Allergies   Allergen Reactions    Hydrocodone-Acetaminophen Nausea Only and Other (See Comments)     \"It makes me feel hung over\"         ROS:  No obvious pertinent positives on review of systems except for what was outlined above.        Objective:       /80   Pulse 80   Ht 5' 7\" (1.702 m)   BMI 47.14 kg/m²     BP Readings from Last 3 Encounters:   07/20/22 134/88   04/18/22 128/82   03/21/22 126/88       Wt Readings from Last 3 Encounters:   07/20/22 (!) 301 lb (136.5 kg)   04/18/22 (!) 320 lb (145.2 kg)   03/21/22 223 lb (101.2 kg)       General/Constitutional:   Alert and oriented x 3, no acute distress  HEENT:   normocephalic, atraumatic, moist mucous membranes  Neck:   No JVD or carotid bruits bilaterally  Cardiovascular: Irregularly irregular, no rub/gallop appreciated  Pulmonary:   clear to auscultation bilaterally, no respiratory distress  Abdomen:   soft, non-tender, non-distended  Ext:   No sig LE edema bilaterally  Skin:  warm and dry, no obvious rashes seen  Neuro:   no obvious sensory or motor deficits  Psychiatric:   normal mood and affect    Data Review:   Lab Results   Component Value Date    CHOL 180 12/02/2020    CHOL 199 09/02/2020     Lab Results   Component Value Date    TRIG 80 12/02/2020    TRIG 92 09/02/2020     Lab Results   Component Value Date    HDL 63 12/02/2020    HDL 73 09/02/2020     Lab Results   Component Value Date    LDLCALC 102 (H) 12/02/2020    LDLCALC 110 (H) 09/02/2020     Lab Results   Component Value Date    VLDL 15 12/02/2020    VLDL 16 09/02/2020     No results found for: [facility]     Lab Results   Component Value Date/Time     02/17/2022 09:21 PM     02/15/2022 05:37 AM     02/14/2022 05:33 AM    K 4.2 02/17/2022 09:21 PM    K 3.4 02/15/2022 05:37 AM    K 3.3 02/14/2022 05:33 AM     02/17/2022 09:21 PM    CL 98 02/15/2022 05:37 AM    CL 97 02/14/2022 05:33 AM    CO2 29 02/17/2022 09:21 PM    CO2 37 02/15/2022 05:37 AM    CO2 39 02/14/2022 05:33 AM    BUN 33 02/17/2022 09:21 PM    BUN 18 02/15/2022 05:37 AM    BUN 16 02/14/2022 05:33 AM    CREATININE 1.10 02/17/2022 09:21 PM    CREATININE 0.80 02/15/2022 05:37 AM    CREATININE 0.70 02/14/2022 05:33 AM    GLUCOSE 146 02/17/2022 09:21 PM    GLUCOSE 125 02/15/2022 05:37 AM    GLUCOSE 107 02/14/2022 05:33 AM    CALCIUM 9.3 02/17/2022 09:21 PM    CALCIUM 9.7 02/15/2022 05:37 AM    CALCIUM 9.4 02/14/2022 05:33 AM         Lab Results   Component Value Date    ALT 37 02/17/2022    ALT 24 02/03/2022 ALT 24 09/21/2021    AST 48 (H) 02/17/2022    AST 18 02/03/2022    AST 13 (L) 09/21/2021        Assessment/Plan:   1. Chronic heart failure with preserved ejection fraction (HFpEF) (HCC)  - H2FPEF score = 8 diagnostic of HFpEF (heavy, atrial fibrillation, pulmonary hypertension, elder, filling pressure)  - Continue with p.o. Lasix    2. Longstanding persistent atrial fibrillation (HCC)  - CrCl 183 mL/min (patient refused having her weight taken today, so previous weight used)  - Currently rate controlled  - Continue with Toprol-XL, diltiazem, Xarelto and digoxin    3.  Hypertension, unspecified type  - Well-controlled  - Continue with diltiazem and Toprol-XL    F/U: 6 months    Dionne Baires MD

## 2022-11-02 ENCOUNTER — OFFICE VISIT (OUTPATIENT)
Dept: CARDIOLOGY CLINIC | Age: 67
End: 2022-11-02
Payer: MEDICARE

## 2022-11-02 VITALS
HEIGHT: 67 IN | DIASTOLIC BLOOD PRESSURE: 80 MMHG | SYSTOLIC BLOOD PRESSURE: 132 MMHG | BODY MASS INDEX: 47.14 KG/M2 | HEART RATE: 80 BPM

## 2022-11-02 DIAGNOSIS — I50.32 CHRONIC HEART FAILURE WITH PRESERVED EJECTION FRACTION (HFPEF) (HCC): Primary | ICD-10-CM

## 2022-11-02 DIAGNOSIS — I10 HYPERTENSION, UNSPECIFIED TYPE: ICD-10-CM

## 2022-11-02 DIAGNOSIS — I48.11 LONGSTANDING PERSISTENT ATRIAL FIBRILLATION (HCC): ICD-10-CM

## 2022-11-02 PROCEDURE — 1123F ACP DISCUSS/DSCN MKR DOCD: CPT | Performed by: INTERNAL MEDICINE

## 2022-11-02 PROCEDURE — 1090F PRES/ABSN URINE INCON ASSESS: CPT | Performed by: INTERNAL MEDICINE

## 2022-11-02 PROCEDURE — 1036F TOBACCO NON-USER: CPT | Performed by: INTERNAL MEDICINE

## 2022-11-02 PROCEDURE — 3074F SYST BP LT 130 MM HG: CPT | Performed by: INTERNAL MEDICINE

## 2022-11-02 PROCEDURE — 3078F DIAST BP <80 MM HG: CPT | Performed by: INTERNAL MEDICINE

## 2022-11-02 PROCEDURE — 3017F COLORECTAL CA SCREEN DOC REV: CPT | Performed by: INTERNAL MEDICINE

## 2022-11-02 PROCEDURE — G8427 DOCREV CUR MEDS BY ELIG CLIN: HCPCS | Performed by: INTERNAL MEDICINE

## 2022-11-02 PROCEDURE — G8484 FLU IMMUNIZE NO ADMIN: HCPCS | Performed by: INTERNAL MEDICINE

## 2022-11-02 PROCEDURE — G8417 CALC BMI ABV UP PARAM F/U: HCPCS | Performed by: INTERNAL MEDICINE

## 2022-11-02 PROCEDURE — G8400 PT W/DXA NO RESULTS DOC: HCPCS | Performed by: INTERNAL MEDICINE

## 2022-11-02 PROCEDURE — 99214 OFFICE O/P EST MOD 30 MIN: CPT | Performed by: INTERNAL MEDICINE

## 2023-01-10 NOTE — TELEPHONE ENCOUNTER
----- Message from HEATHER Barrow CNP sent at 6/8/2022 11:29 AM EDT -----  Please let patient know that CT scan is normal. [de-identified] : reconstructed breast mounds well healed, good contour and symmetry, no collections or s/sx of infection, bilateral reconstructed nipples warm, viable\par  [de-identified] : Incisions well healed, no collections or s/sx of infection\par

## 2023-02-15 ENCOUNTER — OFFICE VISIT (OUTPATIENT)
Dept: PULMONOLOGY | Age: 68
End: 2023-02-15
Payer: MEDICARE

## 2023-02-15 VITALS
TEMPERATURE: 97.4 F | DIASTOLIC BLOOD PRESSURE: 78 MMHG | HEART RATE: 70 BPM | HEIGHT: 67 IN | SYSTOLIC BLOOD PRESSURE: 136 MMHG | BODY MASS INDEX: 43.95 KG/M2 | OXYGEN SATURATION: 96 % | RESPIRATION RATE: 18 BRPM | WEIGHT: 280 LBS

## 2023-02-15 DIAGNOSIS — J98.4 RESTRICTIVE LUNG DISEASE: Primary | ICD-10-CM

## 2023-02-15 DIAGNOSIS — G47.34 NOCTURNAL HYPOXEMIA: ICD-10-CM

## 2023-02-15 DIAGNOSIS — I50.32 CHRONIC DIASTOLIC (CONGESTIVE) HEART FAILURE (HCC): ICD-10-CM

## 2023-02-15 DIAGNOSIS — G47.33 OBSTRUCTIVE SLEEP APNEA (ADULT) (PEDIATRIC): ICD-10-CM

## 2023-02-15 PROCEDURE — 3075F SYST BP GE 130 - 139MM HG: CPT | Performed by: NURSE PRACTITIONER

## 2023-02-15 PROCEDURE — G8427 DOCREV CUR MEDS BY ELIG CLIN: HCPCS | Performed by: NURSE PRACTITIONER

## 2023-02-15 PROCEDURE — G8484 FLU IMMUNIZE NO ADMIN: HCPCS | Performed by: NURSE PRACTITIONER

## 2023-02-15 PROCEDURE — 1090F PRES/ABSN URINE INCON ASSESS: CPT | Performed by: NURSE PRACTITIONER

## 2023-02-15 PROCEDURE — 99213 OFFICE O/P EST LOW 20 MIN: CPT | Performed by: NURSE PRACTITIONER

## 2023-02-15 PROCEDURE — 1036F TOBACCO NON-USER: CPT | Performed by: NURSE PRACTITIONER

## 2023-02-15 PROCEDURE — 3017F COLORECTAL CA SCREEN DOC REV: CPT | Performed by: NURSE PRACTITIONER

## 2023-02-15 PROCEDURE — G8400 PT W/DXA NO RESULTS DOC: HCPCS | Performed by: NURSE PRACTITIONER

## 2023-02-15 PROCEDURE — G8417 CALC BMI ABV UP PARAM F/U: HCPCS | Performed by: NURSE PRACTITIONER

## 2023-02-15 PROCEDURE — 1123F ACP DISCUSS/DSCN MKR DOCD: CPT | Performed by: NURSE PRACTITIONER

## 2023-02-15 PROCEDURE — 3078F DIAST BP <80 MM HG: CPT | Performed by: NURSE PRACTITIONER

## 2023-02-15 RX ORDER — METFORMIN HYDROCHLORIDE 500 MG/1
TABLET, EXTENDED RELEASE ORAL
COMMUNITY
Start: 2023-01-18

## 2023-02-15 RX ORDER — DULAGLUTIDE 0.75 MG/.5ML
INJECTION, SOLUTION SUBCUTANEOUS
COMMUNITY
Start: 2023-01-30

## 2023-02-15 RX ORDER — INSULIN GLARGINE 100 [IU]/ML
INJECTION, SOLUTION SUBCUTANEOUS
COMMUNITY
Start: 2023-02-07

## 2023-02-15 NOTE — PROGRESS NOTES
Name:  Hunter Winchester  YOB: 1955   MRN: [id number]      Office Visit: 2/15/2023        ASSESSMENT AND PLAN:  (Medical Decision Making)    Impression:     1. Restrictive lung disease  --secondary to obesity and kyphoscoliosis. This is likely improving with her ongoing weight loss. Denies any dyspnea. 2. Nocturnal hypoxemia  --remains on O2 at 2 lpm with sleep. Reports receiving benefit from it. 3. Chronic diastolic (congestive) heart failure ([address]  --followed by cardiology--on Lasix 60 mg qd.    4. Obstructive sleep apnea (adult) (pediatric)  --on CPAP--still has non-refreshing sleep--follow up is needed (due 1/2023) and we will arrange. Follow-up and Dispositions    Return in about 1 year (around 2/15/2024) for Sage Schwartz or MD, restrictive lung disease, nocturnal hypoxemia. Collaborating physician is Dr. Tarun Garcia. ADS    Ellen Mcmullen NP, APRN - CNP    _______________________________________________________    HISTORY OF PRESENT ILLNESS:    Ms. Shaheed Hernadez is a 74 y.o. female who is seen at [facility] today for  Other (Restrictive lung)     The patient is a 74year old female who is seen for follow up of restrictive lung disease and shortness of breath. She is accompanied by her son. Has reported ILD on CXR from 2020. Her restriction is felt to be secondary to obesity and kyphosis. No evidence of ILD or pulmonary pathology. Has chronic dHF and is on Lasix 60 mg daily. Has lost 21 more pounds since last visit due to dietary changes. CT of chest during hospitalization was negative for PE, but did reveal an irregular infiltrate/GGO in the right lung apex. Had follow up CT of chest 6/8/22 which was normal.     Has known JEISON and nocturnal O2. Still reports nonrefreshing sleep. Continues to take frequent naps--needs follow up in sleep clinic. REVIEW OF SYSTEMS: 10 point review of systems is negative except as reported in HPI.     PHYSICAL EXAM: Body mass index is 43.85 kg/m². Vitals:    02/15/23 0952   BP: 136/78   Pulse: 70   Resp: 18   Temp: 97.4 °F (36.3 °C)   TempSrc: Skin   SpO2: 96%   Weight: 280 lb (127 kg)   Height: 5' 7\" (1.702 m)         General:   Alert, cooperative, no distress, appears stated age. Eyes:   Conjunctivae/corneas clear. PERRL        Mouth/Throat:  Lips, mucosa, and tongue normal. Teeth and gums normal.        Lungs:     Breath sounds clear bilaterally. Heart:   Regular rate and rhythm, S1, S2 normal, no murmur, click, rub or gallop. Abdomen:    Soft, non-tender. Extremities:  Extremities normal, atraumatic, no cyanosis, 1+ edema. Skin:  Skin color normal. No rashes or lesions     Neurologic:  A&Ox3     DIAGNOSTIC TESTS:                                                                                    LABS:   Lab Results   Component Value Date/Time    WBC 10.4 02/17/2022 09:21 PM    HGB 10.7 02/17/2022 09:21 PM    HCT 38.2 02/17/2022 09:21 PM     02/17/2022 09:21 PM    TSH 1.240 02/07/2022 10:01 PM     Imaging: I performed an independent interpretation of the patient's images. CXR:   XR CHEST LIMITED ONE VIEW 02/07/2022    Narrative  Portable chest x-ray    Clinical indications: Worsening respiratory status    FINDINGS: Single AP view of the chest compared to a similar exam dated 2/3/2022  show the lungs to be expanded and clear. No pleural effusion or pneumothorax. The cardiac silhouette and mediastinum are unremarkable. Impression  No acute cardiopulmonary abnormality. CT Chest:   CT CHEST WO CONTRAST 06/08/2022    Narrative  CT CHEST without CONTRAST. 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 Radiation dose reduction techniques were used for this study.   Our CT  scanners use one or more of the following:  Automated exposure control,  adjustment of the mA and or kV according to patient size, iterative  reconstruction. FINDINGS:  LUNGS: Atelectasis pleural-based densities and pleural effusions have all  resolved. No airspace disease. AIRWAYS: Trachea and proximal bronchi grossly patent. PLEURA: No effusion or thickening or calcifications. LYMPH NODES: There are small prevascular and AP window and pericarinal lymph  nodes. Kerline Rodriguez HEART: Normal size. CORONARIES: No definite calcifications. AORTA: Normal caliber. UPPER ABDOMEN: Normal size adrenal glands. SKELETAL/CHEST WALL: Significant kyphosis with degenerative changes. No lytic  lesions. .    Impression  Today's exam is unremarkable. No evidence of tuberculosis. The  atelectasis and pleural effusion and pleural densities have all resolved. Echo:   TRANSTHORACIC ECHOCARDIOGRAM (TTE) COMPLETE (CONTRAST/BUBBLE/3D PRN) 02/04/2022    Narrative  This is a summary report. The complete report is available in the patient's medical record. If you cannot access the medical record, please contact the sending organization for a detailed fax or copy. Left Ventricle: Left ventricle size is normal. Normal wall thickness. Normal wall motion. Normal left ventricular systolic function with a visually estimated EF of 60 - 65%. Right Ventricle: Right ventricle is mildly dilated. Normal systolic function. Tricuspid Valve: Mild to moderate transvalvular regurgitation. RVSP is mildly elevated at around 45 mmHg. Left Atrium: Left atrium is severely dilated.     [facility] Reference Info:                                                                                                                  Past Medical History:   Diagnosis Date    Chronic pain     pain R knee    Hypertension     Morbid obesity ([facility] [address]     BMI 45.8- 5/2/12    Positive PPD     had vaccination as a child - BCG        Tobacco Use      Smoking status: Former        Packs/day: 0.50        Years: 25.00        Pack years: 12.5        Types: Cigarettes        Quit date: 2007        Years since quittin.0      Smokeless tobacco: Never    Allergies   Allergen Reactions    Hydrocodone-Acetaminophen Nausea Only and Other (See Comments)     \"It makes me feel hung over\"     Current Outpatient Medications   Medication Instructions    albuterol sulfate  (90 Base) MCG/ACT inhaler 2 puffs, EVERY 4 HOURS PRN    BASAGLAR KWIKPEN 100 UNIT/ML injection pen INJECT 24 UNITS UNDER THE SKIN EVERY EVENING    CPAP Machine MISC Does not apply    digoxin (LANOXIN) 0.25 mg, Oral, DAILY    dilTIAZem (TIAZAC) 300 mg, Oral, DAILY    furosemide (LASIX) 60 mg, Oral, DAILY    metFORMIN (GLUCOPHAGE-XR) 500 MG extended release tablet TAKE 2 TABLETS BY MOUTH DAILY    metoprolol succinate (TOPROL XL) 25 mg, Oral, DAILY    OXYGEN Inhalation, 2 lpm qhs    pantoprazole (PROTONIX) 40 mg, SEE ADMIN INSTRUCTIONS    potassium chloride (KLOR-CON M) 20 MEQ extended release tablet 20 mEq, Oral, 2 TIMES DAILY    rivaroxaban (XARELTO) 20 mg, Oral, DAILY WITH LUNCH    TRULICITY 0.75 MG/0.5ML SOPN INJECT 0.5 ML (0.75 MG) UNDER THE SKIN ONCE A WEEK

## 2023-03-09 ENCOUNTER — TELEPHONE (OUTPATIENT)
Dept: PULMONOLOGY | Age: 68
End: 2023-03-09

## 2023-03-09 NOTE — TELEPHONE ENCOUNTER
Received order in [employer] for patients recert for her NIV. Her download is scanned under the Media tab. Can you addend your note from 2/15/23 to state patient is compliant and gaining benefit from her NIV and needs to continue to use, instead of CPAP? I will validate her order in [employer]. Per Ray with [employer], you can call him if you have any questions.

## 2023-05-08 NOTE — PROGRESS NOTES
[facility] CARDIOLOGY Follow Up                 Reason for Visit: Chronic HFpEF    Subjective:     Patient is a 79 y.o. female with a PMH of chronic HFpEF, longstanding persistent atrial fibrillation, hypertension and diabetes who presents for follow-up. The patient was last seen in 2022. She had a TTE in 2022 that was noted to demonstrate a normal EF. The patient uses a cane at home. She reports she may have knee surgery in 2023. The patient denies angina and dyspnea. Past Medical History:   Diagnosis Date    Chronic pain     pain R knee    Hypertension     Morbid obesity ([address]     BMI 45.8- 12    Positive PPD     had vaccination as a child - BCG      Past Surgical History:   Procedure Laterality Date    COLONOSCOPY N/A 2022    COLONOSCOPY/ BMI 56 ROOM 302 performed by Radha Avila MD at [address] x 1 with GA    KNEE ARTHROSCOPY      bilateral knees      Family History   Problem Relation Age of Onset    Other Mother         kidney failure    Cancer Sister         cervical cancer      Social History     Tobacco Use    Smoking status: Former     Packs/day: 0.50     Years: 25.00     Pack years: 12.50     Types: Cigarettes     Quit date: 2007     Years since quittin.2    Smokeless tobacco: Never   Substance Use Topics    Alcohol use: No      Allergies   Allergen Reactions    Hydrocodone-Acetaminophen Nausea Only and Other (See Comments)     \"It makes me feel hung over\"         ROS:  No obvious pertinent positives on review of systems except for what was outlined above.        Objective:       /86   Pulse 75   Ht 5' 7\" (1.702 m)   BMI 43.85 kg/m²     BP Readings from Last 3 Encounters:   05/10/23 130/86   02/15/23 136/78   22 132/80       Wt Readings from Last 3 Encounters:   02/15/23 280 lb (127 kg)   22 (!) 301 lb (136.5 kg)   22 (!) 320 lb (145.2 kg)       General/Constitutional:   Alert and oriented x 3, no acute

## 2023-05-10 ENCOUNTER — OFFICE VISIT (OUTPATIENT)
Age: 68
End: 2023-05-10

## 2023-05-10 VITALS
HEART RATE: 75 BPM | BODY MASS INDEX: 43.85 KG/M2 | HEIGHT: 67 IN | DIASTOLIC BLOOD PRESSURE: 86 MMHG | SYSTOLIC BLOOD PRESSURE: 130 MMHG

## 2023-05-10 DIAGNOSIS — I10 HYPERTENSION, UNSPECIFIED TYPE: ICD-10-CM

## 2023-05-10 DIAGNOSIS — I48.11 LONGSTANDING PERSISTENT ATRIAL FIBRILLATION (HCC): Primary | ICD-10-CM

## 2023-05-10 DIAGNOSIS — I50.32 CHRONIC HEART FAILURE WITH PRESERVED EJECTION FRACTION (HFPEF) (HCC): ICD-10-CM

## 2023-08-17 ENCOUNTER — TRANSCRIBE ORDERS (OUTPATIENT)
Dept: SCHEDULING | Age: 68
End: 2023-08-17

## 2023-08-17 DIAGNOSIS — Z12.31 ENCOUNTER FOR SCREENING MAMMOGRAM FOR BREAST CANCER: Primary | ICD-10-CM

## 2023-09-27 ENCOUNTER — HOSPITAL ENCOUNTER (OUTPATIENT)
Dept: MAMMOGRAPHY | Age: 68
Discharge: HOME | End: 2023-09-30
Attending: INTERNAL MEDICINE

## 2023-09-27 DIAGNOSIS — Z12.31 ENCOUNTER FOR SCREENING MAMMOGRAM FOR BREAST CANCER: ICD-10-CM

## 2023-11-25 NOTE — PROGRESS NOTES
atraumatic, moist mucous membranes  Neck:   No JVD or carotid bruits bilaterally  Cardiovascular:   IRIR, no rub/gallop appreciated  Pulmonary:   clear to auscultation bilaterally, no respiratory distress  Abdomen:   soft, non-tender, non-distended  Ext:   No sig LE edema bilaterally  Skin:  warm and dry, no obvious rashes seen  Neuro:   no obvious sensory or motor deficits  Psychiatric:   normal mood and affect    Data Review:   Lab Results   Component Value Date    CHOL 180 12/02/2020    CHOL 199 09/02/2020     Lab Results   Component Value Date    TRIG 80 12/02/2020    TRIG 92 09/02/2020     Lab Results   Component Value Date    HDL 63 12/02/2020    HDL 73 09/02/2020     Lab Results   Component Value Date    LDLCALC 102 (H) 12/02/2020    LDLCALC 110 (H) 09/02/2020     Lab Results   Component Value Date    VLDL 15 12/02/2020    VLDL 16 09/02/2020     No results found for: \"CHOLHDLRATIO\"     Lab Results   Component Value Date/Time     02/17/2022 09:21 PM     02/15/2022 05:37 AM     02/14/2022 05:33 AM    K 4.2 02/17/2022 09:21 PM    K 3.4 02/15/2022 05:37 AM    K 3.3 02/14/2022 05:33 AM     02/17/2022 09:21 PM    CL 98 02/15/2022 05:37 AM    CL 97 02/14/2022 05:33 AM    CO2 29 02/17/2022 09:21 PM    CO2 37 02/15/2022 05:37 AM    CO2 39 02/14/2022 05:33 AM    BUN 33 02/17/2022 09:21 PM    BUN 18 02/15/2022 05:37 AM    BUN 16 02/14/2022 05:33 AM    CREATININE 1.10 02/17/2022 09:21 PM    CREATININE 0.80 02/15/2022 05:37 AM    CREATININE 0.70 02/14/2022 05:33 AM    GLUCOSE 146 02/17/2022 09:21 PM    GLUCOSE 125 02/15/2022 05:37 AM    GLUCOSE 107 02/14/2022 05:33 AM    CALCIUM 9.3 02/17/2022 09:21 PM    CALCIUM 9.7 02/15/2022 05:37 AM    CALCIUM 9.4 02/14/2022 05:33 AM         Lab Results   Component Value Date    ALT 37 02/17/2022    ALT 24 02/03/2022    ALT 24 09/21/2021    AST 48 (H) 02/17/2022    AST 18 02/03/2022    AST 13 (L) 09/21/2021        Assessment/Plan:   1.  Chronic heart failure

## 2023-11-29 ENCOUNTER — OFFICE VISIT (OUTPATIENT)
Age: 68
End: 2023-11-29
Payer: MEDICARE

## 2023-11-29 VITALS
HEART RATE: 72 BPM | SYSTOLIC BLOOD PRESSURE: 132 MMHG | BODY MASS INDEX: 43.85 KG/M2 | DIASTOLIC BLOOD PRESSURE: 70 MMHG | HEIGHT: 67 IN

## 2023-11-29 DIAGNOSIS — I50.32 CHRONIC HEART FAILURE WITH PRESERVED EJECTION FRACTION (HFPEF) (HCC): Primary | ICD-10-CM

## 2023-11-29 DIAGNOSIS — I10 HYPERTENSION, UNSPECIFIED TYPE: ICD-10-CM

## 2023-11-29 DIAGNOSIS — I48.11 LONGSTANDING PERSISTENT ATRIAL FIBRILLATION (HCC): ICD-10-CM

## 2023-11-29 PROCEDURE — 99214 OFFICE O/P EST MOD 30 MIN: CPT | Performed by: INTERNAL MEDICINE

## 2023-11-29 PROCEDURE — 1090F PRES/ABSN URINE INCON ASSESS: CPT | Performed by: INTERNAL MEDICINE

## 2023-11-29 PROCEDURE — G8400 PT W/DXA NO RESULTS DOC: HCPCS | Performed by: INTERNAL MEDICINE

## 2023-11-29 PROCEDURE — 3075F SYST BP GE 130 - 139MM HG: CPT | Performed by: INTERNAL MEDICINE

## 2023-11-29 PROCEDURE — 1036F TOBACCO NON-USER: CPT | Performed by: INTERNAL MEDICINE

## 2023-11-29 PROCEDURE — 3078F DIAST BP <80 MM HG: CPT | Performed by: INTERNAL MEDICINE

## 2023-11-29 PROCEDURE — G8484 FLU IMMUNIZE NO ADMIN: HCPCS | Performed by: INTERNAL MEDICINE

## 2023-11-29 PROCEDURE — G8427 DOCREV CUR MEDS BY ELIG CLIN: HCPCS | Performed by: INTERNAL MEDICINE

## 2023-11-29 PROCEDURE — 1123F ACP DISCUSS/DSCN MKR DOCD: CPT | Performed by: INTERNAL MEDICINE

## 2023-11-29 PROCEDURE — 3017F COLORECTAL CA SCREEN DOC REV: CPT | Performed by: INTERNAL MEDICINE

## 2023-11-29 PROCEDURE — G8417 CALC BMI ABV UP PARAM F/U: HCPCS | Performed by: INTERNAL MEDICINE

## 2024-01-30 RX ORDER — DIGOXIN 250 MCG
0.25 TABLET ORAL DAILY
Qty: 90 TABLET | Refills: 3 | Status: SHIPPED | OUTPATIENT
Start: 2024-01-30

## 2024-01-30 NOTE — TELEPHONE ENCOUNTER
digoxin (LANOXIN) 250 MCG tablet [[id number]]    Order Details  Dose: 0.25 mg Route: Oral Frequency: DAILY   Dispense Quantity: -- Refills: --          Sig: Take 1 tablet by mouth daily       Send to [facility] at [address] [place] [address]

## 2024-01-30 NOTE — TELEPHONE ENCOUNTER
Per medical record, digoxin was continued at 11/23/23 office visit with Dr. Dunbar. Next scheduled appointment with Dr. Dunbar is 5/29/24.   Requested Prescriptions     Pending Prescriptions Disp Refills    digoxin (LANOXIN) 250 MCG tablet 90 tablet 3     Sig: Take 1 tablet by mouth daily     Pended digoxin refill, as above, sent to Dr. Dunbar for approval.

## 2024-02-28 NOTE — ED TRIAGE NOTES
Patient arrives via EMS from home with generalized weakness, cough, fever. EMS states patient has had productive cough for about 1 week, has been unable to get off of the couch today. EMS noted temp of 103, respiratory rate of 42, room air sat of 87%. EMS gave 500ml NS in route, unable to obtain blood cultures. EMS .
28-Feb-2024 19:45

## 2024-04-03 ENCOUNTER — APPOINTMENT (OUTPATIENT)
Dept: GENERAL RADIOLOGY | Age: 69
DRG: 641 | End: 2024-04-03
Payer: MEDICARE

## 2024-04-03 ENCOUNTER — APPOINTMENT (OUTPATIENT)
Dept: ULTRASOUND IMAGING | Age: 69
DRG: 641 | End: 2024-04-03
Payer: MEDICARE

## 2024-04-03 ENCOUNTER — HOSPITAL ENCOUNTER (INPATIENT)
Age: 69
LOS: 6 days | Discharge: HOME | DRG: 641 | End: 2024-04-09
Attending: EMERGENCY MEDICINE
Payer: MEDICARE

## 2024-04-03 DIAGNOSIS — E87.6 HYPOKALEMIA: ICD-10-CM

## 2024-04-03 DIAGNOSIS — E87.70 HYPERVOLEMIA, UNSPECIFIED HYPERVOLEMIA TYPE: ICD-10-CM

## 2024-04-03 DIAGNOSIS — I50.9 ACUTE CONGESTIVE HEART FAILURE, UNSPECIFIED HEART FAILURE TYPE (HCC): Primary | ICD-10-CM

## 2024-04-03 DIAGNOSIS — R26.2 INABILITY TO WALK: ICD-10-CM

## 2024-04-03 DIAGNOSIS — N30.00 ACUTE CYSTITIS WITHOUT HEMATURIA: ICD-10-CM

## 2024-04-03 PROBLEM — I27.20 PULMONARY HYPERTENSION (HCC): Status: ACTIVE | Noted: 2024-04-03

## 2024-04-03 PROBLEM — N39.0 UTI (URINARY TRACT INFECTION): Status: ACTIVE | Noted: 2024-04-03

## 2024-04-03 LAB
ALBUMIN SERPL-MCNC: 2.9 G/DL (ref 3.2–4.6)
ALBUMIN/GLOB SERPL: 0.7 (ref 0.4–1.6)
ALP SERPL-CCNC: 111 U/L (ref 50–136)
ALT SERPL-CCNC: 26 U/L (ref 12–65)
ANION GAP SERPL CALC-SCNC: 7 MMOL/L (ref 2–11)
AST SERPL-CCNC: 16 U/L (ref 15–37)
BACTERIA URNS QL MICRO: ABNORMAL /HPF
BILIRUB SERPL-MCNC: 0.7 MG/DL (ref 0.2–1.1)
BILIRUB UR QL: NEGATIVE
BUN SERPL-MCNC: 12 MG/DL (ref 8–23)
CALCIUM SERPL-MCNC: 8.9 MG/DL (ref 8.3–10.4)
CASTS URNS QL MICRO: 0 /LPF
CHLORIDE SERPL-SCNC: 107 MMOL/L (ref 103–113)
CO2 SERPL-SCNC: 28 MMOL/L (ref 21–32)
CREAT SERPL-MCNC: 0.7 MG/DL (ref 0.6–1)
CRYSTALS URNS QL MICRO: 0 /LPF
DIGOXIN SERPL-MCNC: 1.3 NG/ML (ref 0.9–2.1)
EPI CELLS #/AREA URNS HPF: ABNORMAL /HPF
ERYTHROCYTE [DISTWIDTH] IN BLOOD BY AUTOMATED COUNT: 16.8 % (ref 11.9–14.6)
EST. AVERAGE GLUCOSE BLD GHB EST-MCNC: 183 MG/DL
GLOBULIN SER CALC-MCNC: 4.2 G/DL (ref 2.8–4.5)
GLUCOSE BLD STRIP.AUTO-MCNC: 157 MG/DL (ref 65–100)
GLUCOSE SERPL-MCNC: 150 MG/DL (ref 65–100)
GLUCOSE UR QL STRIP.AUTO: NEGATIVE MG/DL
HBA1C MFR BLD: 8 % (ref 4.8–5.6)
HCT VFR BLD AUTO: 32 % (ref 35.8–46.3)
HGB BLD-MCNC: 9.7 G/DL (ref 11.7–15.4)
KETONES UR-MCNC: ABNORMAL MG/DL
LEUKOCYTE ESTERASE UR QL STRIP: ABNORMAL
MAGNESIUM SERPL-MCNC: 1.9 MG/DL (ref 1.8–2.4)
MCH RBC QN AUTO: 24.9 PG (ref 26.1–32.9)
MCHC RBC AUTO-ENTMCNC: 30.3 G/DL (ref 31.4–35)
MCV RBC AUTO: 82.1 FL (ref 82–102)
MUCOUS THREADS URNS QL MICRO: 0 /LPF
NITRITE UR QL: POSITIVE
NRBC # BLD: 0 K/UL (ref 0–0.2)
NT PRO BNP: 1026 PG/ML (ref 5–125)
OTHER OBSERVATIONS: ABNORMAL
PH UR: 7 (ref 5–9)
PLATELET # BLD AUTO: 282 K/UL (ref 150–450)
PMV BLD AUTO: 10.1 FL (ref 9.4–12.3)
POTASSIUM SERPL-SCNC: 3.2 MMOL/L (ref 3.5–5.1)
PROT SERPL-MCNC: 7.1 G/DL (ref 6.3–8.2)
PROT UR QL: 100 MG/DL
RBC # BLD AUTO: 3.9 M/UL (ref 4.05–5.2)
RBC # UR STRIP: ABNORMAL
RBC #/AREA URNS HPF: ABNORMAL /HPF
SERVICE CMNT-IMP: ABNORMAL
SERVICE CMNT-IMP: ABNORMAL
SODIUM SERPL-SCNC: 142 MMOL/L (ref 136–146)
SP GR UR: 1.02 (ref 1–1.02)
UROBILINOGEN UR QL: 1 EU/DL (ref 0.2–1)
WBC # BLD AUTO: 12.8 K/UL (ref 4.3–11.1)
WBC URNS QL MICRO: ABNORMAL /HPF

## 2024-04-03 PROCEDURE — 2500000003 HC RX 250 WO HCPCS: Performed by: INTERNAL MEDICINE

## 2024-04-03 PROCEDURE — 93970 EXTREMITY STUDY: CPT

## 2024-04-03 PROCEDURE — 87086 URINE CULTURE/COLONY COUNT: CPT

## 2024-04-03 PROCEDURE — 87186 SC STD MICRODIL/AGAR DIL: CPT

## 2024-04-03 PROCEDURE — 96375 TX/PRO/DX INJ NEW DRUG ADDON: CPT

## 2024-04-03 PROCEDURE — 2580000003 HC RX 258: Performed by: EMERGENCY MEDICINE

## 2024-04-03 PROCEDURE — 6370000000 HC RX 637 (ALT 250 FOR IP): Performed by: EMERGENCY MEDICINE

## 2024-04-03 PROCEDURE — 82962 GLUCOSE BLOOD TEST: CPT

## 2024-04-03 PROCEDURE — 93005 ELECTROCARDIOGRAM TRACING: CPT | Performed by: INTERNAL MEDICINE

## 2024-04-03 PROCEDURE — 1100000003 HC PRIVATE W/ TELEMETRY

## 2024-04-03 PROCEDURE — 6370000000 HC RX 637 (ALT 250 FOR IP): Performed by: INTERNAL MEDICINE

## 2024-04-03 PROCEDURE — 83880 ASSAY OF NATRIURETIC PEPTIDE: CPT

## 2024-04-03 PROCEDURE — 6360000002 HC RX W HCPCS: Performed by: EMERGENCY MEDICINE

## 2024-04-03 PROCEDURE — 80053 COMPREHEN METABOLIC PANEL: CPT

## 2024-04-03 PROCEDURE — 81015 MICROSCOPIC EXAM OF URINE: CPT

## 2024-04-03 PROCEDURE — 36415 COLL VENOUS BLD VENIPUNCTURE: CPT

## 2024-04-03 PROCEDURE — 87088 URINE BACTERIA CULTURE: CPT

## 2024-04-03 PROCEDURE — 83735 ASSAY OF MAGNESIUM: CPT

## 2024-04-03 PROCEDURE — 85027 COMPLETE CBC AUTOMATED: CPT

## 2024-04-03 PROCEDURE — 83036 HEMOGLOBIN GLYCOSYLATED A1C: CPT

## 2024-04-03 PROCEDURE — 71045 X-RAY EXAM CHEST 1 VIEW: CPT

## 2024-04-03 PROCEDURE — 81003 URINALYSIS AUTO W/O SCOPE: CPT

## 2024-04-03 PROCEDURE — 99285 EMERGENCY DEPT VISIT HI MDM: CPT

## 2024-04-03 PROCEDURE — 96365 THER/PROPH/DIAG IV INF INIT: CPT

## 2024-04-03 PROCEDURE — 80162 ASSAY OF DIGOXIN TOTAL: CPT

## 2024-04-03 RX ORDER — CLONIDINE HYDROCHLORIDE 0.1 MG/1
0.1 TABLET ORAL EVERY 4 HOURS PRN
Status: DISCONTINUED | OUTPATIENT
Start: 2024-04-03 | End: 2024-04-09 | Stop reason: HOSPADM

## 2024-04-03 RX ORDER — HYDRALAZINE HYDROCHLORIDE 20 MG/ML
20 INJECTION INTRAMUSCULAR; INTRAVENOUS EVERY 4 HOURS PRN
Status: DISCONTINUED | OUTPATIENT
Start: 2024-04-03 | End: 2024-04-09 | Stop reason: HOSPADM

## 2024-04-03 RX ORDER — INSULIN LISPRO 100 [IU]/ML
0-4 INJECTION, SOLUTION INTRAVENOUS; SUBCUTANEOUS NIGHTLY
Status: DISCONTINUED | OUTPATIENT
Start: 2024-04-03 | End: 2024-04-09 | Stop reason: HOSPADM

## 2024-04-03 RX ORDER — OXYCODONE HYDROCHLORIDE 5 MG/1
5 TABLET ORAL EVERY 4 HOURS PRN
Status: DISCONTINUED | OUTPATIENT
Start: 2024-04-03 | End: 2024-04-09 | Stop reason: HOSPADM

## 2024-04-03 RX ORDER — DEXTROSE MONOHYDRATE 100 MG/ML
INJECTION, SOLUTION INTRAVENOUS CONTINUOUS PRN
Status: DISCONTINUED | OUTPATIENT
Start: 2024-04-03 | End: 2024-04-09 | Stop reason: HOSPADM

## 2024-04-03 RX ORDER — POTASSIUM CHLORIDE 20 MEQ/1
40 TABLET, EXTENDED RELEASE ORAL ONCE
Status: COMPLETED | OUTPATIENT
Start: 2024-04-03 | End: 2024-04-03

## 2024-04-03 RX ORDER — DIGOXIN 125 MCG
0.25 TABLET ORAL DAILY
Status: DISCONTINUED | OUTPATIENT
Start: 2024-04-04 | End: 2024-04-09 | Stop reason: HOSPADM

## 2024-04-03 RX ORDER — ENEMA 19; 7 G/133ML; G/133ML
1 ENEMA RECTAL DAILY PRN
Status: DISCONTINUED | OUTPATIENT
Start: 2024-04-03 | End: 2024-04-09 | Stop reason: HOSPADM

## 2024-04-03 RX ORDER — INSULIN GLARGINE 100 [IU]/ML
38 INJECTION, SOLUTION SUBCUTANEOUS
Status: DISCONTINUED | OUTPATIENT
Start: 2024-04-04 | End: 2024-04-09

## 2024-04-03 RX ORDER — FUROSEMIDE 10 MG/ML
60 INJECTION INTRAMUSCULAR; INTRAVENOUS ONCE
Status: COMPLETED | OUTPATIENT
Start: 2024-04-03 | End: 2024-04-03

## 2024-04-03 RX ORDER — FUROSEMIDE 10 MG/ML
60 INJECTION INTRAMUSCULAR; INTRAVENOUS DAILY
Status: DISCONTINUED | OUTPATIENT
Start: 2024-04-04 | End: 2024-04-04

## 2024-04-03 RX ORDER — ACETAMINOPHEN 325 MG/1
650 TABLET ORAL EVERY 4 HOURS PRN
Status: DISCONTINUED | OUTPATIENT
Start: 2024-04-03 | End: 2024-04-04 | Stop reason: SDUPTHER

## 2024-04-03 RX ORDER — INSULIN LISPRO 100 [IU]/ML
0-4 INJECTION, SOLUTION INTRAVENOUS; SUBCUTANEOUS
Status: DISCONTINUED | OUTPATIENT
Start: 2024-04-04 | End: 2024-04-09 | Stop reason: HOSPADM

## 2024-04-03 RX ORDER — IBUPROFEN 600 MG/1
1 TABLET ORAL PRN
Status: DISCONTINUED | OUTPATIENT
Start: 2024-04-03 | End: 2024-04-09 | Stop reason: HOSPADM

## 2024-04-03 RX ORDER — LANOLIN ALCOHOL/MO/W.PET/CERES
3 CREAM (GRAM) TOPICAL NIGHTLY PRN
Status: DISCONTINUED | OUTPATIENT
Start: 2024-04-04 | End: 2024-04-09 | Stop reason: HOSPADM

## 2024-04-03 RX ORDER — METOPROLOL SUCCINATE 25 MG/1
25 TABLET, EXTENDED RELEASE ORAL DAILY
Status: DISCONTINUED | OUTPATIENT
Start: 2024-04-04 | End: 2024-04-09 | Stop reason: HOSPADM

## 2024-04-03 RX ORDER — DILTIAZEM HYDROCHLORIDE 300 MG/1
300 CAPSULE, EXTENDED RELEASE ORAL DAILY
Status: DISCONTINUED | OUTPATIENT
Start: 2024-04-04 | End: 2024-04-03 | Stop reason: CLARIF

## 2024-04-03 RX ORDER — GUAIFENESIN/DEXTROMETHORPHAN 100-10MG/5
10 SYRUP ORAL EVERY 4 HOURS PRN
Status: DISCONTINUED | OUTPATIENT
Start: 2024-04-03 | End: 2024-04-09 | Stop reason: HOSPADM

## 2024-04-03 RX ADMIN — ACETAMINOPHEN 650 MG: 325 TABLET ORAL at 23:09

## 2024-04-03 RX ADMIN — FUROSEMIDE 60 MG: 10 INJECTION, SOLUTION INTRAMUSCULAR; INTRAVENOUS at 15:40

## 2024-04-03 RX ADMIN — CEFTRIAXONE SODIUM 2000 MG: 2 INJECTION, POWDER, FOR SOLUTION INTRAMUSCULAR; INTRAVENOUS at 18:06

## 2024-04-03 RX ADMIN — POTASSIUM CHLORIDE 40 MEQ: 1500 TABLET, EXTENDED RELEASE ORAL at 18:06

## 2024-04-03 ASSESSMENT — LIFESTYLE VARIABLES
HOW OFTEN DO YOU HAVE A DRINK CONTAINING ALCOHOL: NEVER
HOW OFTEN DO YOU HAVE A DRINK CONTAINING ALCOHOL: NEVER
HOW MANY STANDARD DRINKS CONTAINING ALCOHOL DO YOU HAVE ON A TYPICAL DAY: PATIENT DOES NOT DRINK

## 2024-04-03 ASSESSMENT — PAIN SCALES - GENERAL
PAINLEVEL_OUTOF10: 5
PAINLEVEL_OUTOF10: 0
PAINLEVEL_OUTOF10: 5

## 2024-04-03 ASSESSMENT — PAIN DESCRIPTION - LOCATION
LOCATION: HEAD
LOCATION: HEAD

## 2024-04-03 ASSESSMENT — PAIN - FUNCTIONAL ASSESSMENT: PAIN_FUNCTIONAL_ASSESSMENT: 0-10

## 2024-04-03 NOTE — ED TRIAGE NOTES
Patient arrives via [facility] from home with CO increased generalized weakness x 1 day. Normally walks with cane, unable to ambulate today, required max assist with EMS. Hx CHF, feels like \"I might have some fluid on me\"

## 2024-04-03 NOTE — ED PROVIDER NOTES
Emergency Department Provider Note       PCP: Bautista Esparza MD   Age: 69 y.o.   Sex: female     DISPOSITION Decision To Admit 04/03/2024 07:21:56 PM       ICD-10-CM    1. Acute congestive heart failure, unspecified heart failure type (HCC)  I50.9       2. Inability to walk  R26.2       3. Acute cystitis without hematuria  N30.00       4. Hypokalemia  E87.6           Medical Decision Making     Patient has diuresed large amount of fluid probably about 2 L.  She does feel much better following this.  She is also being given antibiotics for her urinary tract infection as well as potassium replacement.  She was given 60 mg of Lasix.  Tried to have her ambulate following this but she felt unsteady and lives alone was uncomfortable with discharge plan.     1 or more acute illnesses that pose a threat to life or bodily function.   Drug therapy given requiring intensive monitoring for toxicity.  Shared medical decision making was utilized in creating the patients health plan today.    I independently ordered and reviewed each unique test.  I reviewed external records: provider visit note from PCP.  I reviewed external records: provider visit note from outside specialist.     I interpreted the X-rays clear.    The patient was admitted and I have discussed patient management with the admitting provider.  The management of this patient was discussed with an external consultant.            History     Patient is coming in with generalized weakness.  She reports that she just feels like there is extra weight on her buttocks and she did not have the strength to get up out of the chair.  She did stop taking her Lasix today because she was unable to get up to go to the bathroom.  She usually can do this without difficulty.  She does report weight gain and thinks that her baseline weight is 280 pounds.  I weighed her in the bed and it was 309.  She does report increased phlegm in her throat.  But not having any chest pain or  Mood normal.         Behavior: Behavior normal.        Procedures     Procedures    Orders Placed This Encounter   Procedures    Culture, Urine    XR CHEST PORTABLE    CBC    Comprehensive Metabolic Panel    Brain Natriuretic Peptide    Urinalysis, Micro    Urine dipstick    POCT Urinalysis no Micro    Vascular duplex lower extremity venous bilateral        Medications given during this emergency department visit:  Medications   furosemide (LASIX) injection 60 mg (60 mg IntraVENous Given 4/3/24 1540)   potassium chloride (KLOR-CON M) extended release tablet 40 mEq (40 mEq Oral Given 4/3/24 1806)   cefTRIAXone (ROCEPHIN) 2,000 mg in sodium chloride 0.9 % 50 mL IVPB (mini-bag) ( IntraVENous Stopped 4/3/24 183)       New Prescriptions    No medications on file        Past Medical History:   Diagnosis Date    Chronic pain     pain R knee    Hypertension     Morbid obesity (HCC)     BMI 45.8- 12    Positive PPD     had vaccination as a child - BCG        Past Surgical History:   Procedure Laterality Date    COLONOSCOPY N/A 2022    COLONOSCOPY/ BMI 56 ROOM 302 performed by Thaddeus Sharif MD at [facility] ENDOSCOPY    GYN      C-sec x 1 with GA    KNEE ARTHROSCOPY      bilateral knees        Social History     Socioeconomic History    Marital status:    Tobacco Use    Smoking status: Former     Current packs/day: 0.00     Average packs/day: 0.5 packs/day for 25.0 years (12.5 ttl pk-yrs)     Types: Cigarettes     Start date: 1982     Quit date: 2007     Years since quittin.1     Passive exposure: Past    Smokeless tobacco: Never   Substance and Sexual Activity    Alcohol use: No    Drug use: No        Previous Medications    BASAGLAR KWIKPEN 100 UNIT/ML INJECTION PEN    INJECT 24 UNITS UNDER THE SKIN EVERY EVENING    CPAP MACHINE MISC    by Does not apply route    DIGOXIN (LANOXIN) 250 MCG TABLET    Take 1 tablet by mouth daily    DILTIAZEM (TIAZAC) 300 MG EXTENDED RELEASE CAPSULE    Take 1 capsule

## 2024-04-04 ENCOUNTER — APPOINTMENT (OUTPATIENT)
Dept: NON INVASIVE DIAGNOSTICS | Age: 69
DRG: 641 | End: 2024-04-04
Attending: INTERNAL MEDICINE
Payer: MEDICARE

## 2024-04-04 LAB
ANION GAP SERPL CALC-SCNC: 6 MMOL/L (ref 2–11)
BASOPHILS # BLD: 0 K/UL (ref 0–0.2)
BASOPHILS NFR BLD: 0 % (ref 0–2)
BUN SERPL-MCNC: 11 MG/DL (ref 8–23)
CALCIUM SERPL-MCNC: 8.8 MG/DL (ref 8.3–10.4)
CHLORIDE SERPL-SCNC: 107 MMOL/L (ref 103–113)
CO2 SERPL-SCNC: 29 MMOL/L (ref 21–32)
CREAT SERPL-MCNC: 0.7 MG/DL (ref 0.6–1)
DIFFERENTIAL METHOD BLD: ABNORMAL
ECHO AO ASC DIAM: 3.4 CM
ECHO AO ASCENDING AORTA INDEX: 1.4 CM/M2
ECHO AO ROOT DIAM: 2.8 CM
ECHO AO ROOT INDEX: 1.16 CM/M2
ECHO AV AREA PEAK VELOCITY: 1.7 CM2
ECHO AV AREA VTI: 1.6 CM2
ECHO AV AREA/BSA PEAK VELOCITY: 0.7 CM2/M2
ECHO AV AREA/BSA VTI: 0.7 CM2/M2
ECHO AV MEAN GRADIENT: 13 MMHG
ECHO AV MEAN VELOCITY: 1.7 M/S
ECHO AV PEAK GRADIENT: 27 MMHG
ECHO AV PEAK VELOCITY: 2.6 M/S
ECHO AV VELOCITY RATIO: 0.54
ECHO AV VTI: 43.1 CM
ECHO BSA: 2.56 M2
ECHO EST RA PRESSURE: 15 MMHG
ECHO IVC PROX: 2.4 CM
ECHO LA AREA 2C: 29.5 CM2
ECHO LA AREA 4C: 26.6 CM2
ECHO LA DIAMETER INDEX: 2.02 CM/M2
ECHO LA DIAMETER: 4.9 CM
ECHO LA MAJOR AXIS: 7.3 CM
ECHO LA MINOR AXIS: 8.1 CM
ECHO LA TO AORTIC ROOT RATIO: 1.75
ECHO LA VOL BP: 87 ML (ref 22–52)
ECHO LA VOL MOD A2C: 88 ML (ref 22–52)
ECHO LA VOL MOD A4C: 78 ML (ref 22–52)
ECHO LA VOL/BSA BIPLANE: 36 ML/M2 (ref 16–34)
ECHO LA VOLUME INDEX MOD A2C: 36 ML/M2 (ref 16–34)
ECHO LA VOLUME INDEX MOD A4C: 32 ML/M2 (ref 16–34)
ECHO LV E' SEPTAL VELOCITY: 11 CM/S
ECHO LV EDV A2C: 90 ML
ECHO LV EDV A4C: 128 ML
ECHO LV EDV INDEX A4C: 53 ML/M2
ECHO LV EDV NDEX A2C: 37 ML/M2
ECHO LV EJECTION FRACTION A2C: 61 %
ECHO LV EJECTION FRACTION A4C: 62 %
ECHO LV EJECTION FRACTION BIPLANE: 60 % (ref 55–100)
ECHO LV ESV A2C: 35 ML
ECHO LV ESV A4C: 49 ML
ECHO LV ESV INDEX A2C: 14 ML/M2
ECHO LV ESV INDEX A4C: 20 ML/M2
ECHO LV FRACTIONAL SHORTENING: 33 % (ref 28–44)
ECHO LV INTERNAL DIMENSION DIASTOLE INDEX: 1.65 CM/M2
ECHO LV INTERNAL DIMENSION DIASTOLIC: 4 CM (ref 3.9–5.3)
ECHO LV INTERNAL DIMENSION SYSTOLIC INDEX: 1.12 CM/M2
ECHO LV INTERNAL DIMENSION SYSTOLIC: 2.7 CM
ECHO LV IVSD: 1 CM (ref 0.6–0.9)
ECHO LV MASS 2D: 127.1 G (ref 67–162)
ECHO LV MASS INDEX 2D: 52.5 G/M2 (ref 43–95)
ECHO LV POSTERIOR WALL DIASTOLIC: 1 CM (ref 0.6–0.9)
ECHO LV RELATIVE WALL THICKNESS RATIO: 0.5
ECHO LVOT AREA: 3.1 CM2
ECHO LVOT AV VTI INDEX: 0.52
ECHO LVOT DIAM: 2 CM
ECHO LVOT MEAN GRADIENT: 4 MMHG
ECHO LVOT PEAK GRADIENT: 8 MMHG
ECHO LVOT PEAK VELOCITY: 1.4 M/S
ECHO LVOT STROKE VOLUME INDEX: 29.1 ML/M2
ECHO LVOT SV: 70.3 ML
ECHO LVOT VTI: 22.4 CM
ECHO PV ACCELERATION TIME (AT): 98 MS
ECHO PV MAX VELOCITY: 1.6 M/S
ECHO PV PEAK GRADIENT: 11 MMHG
ECHO RIGHT VENTRICULAR SYSTOLIC PRESSURE (RVSP): 42 MMHG
ECHO RV BASAL DIMENSION: 3.7 CM
ECHO RV FREE WALL PEAK S': 15 CM/S
ECHO RV INTERNAL DIMENSION: 3.5 CM
ECHO RV TAPSE: 2 CM (ref 1.7–?)
ECHO TV REGURGITANT MAX VELOCITY: 2.6 M/S
ECHO TV REGURGITANT PEAK GRADIENT: 27 MMHG
EKG DIAGNOSIS: NORMAL
EKG Q-T INTERVAL: 364 MS
EKG QRS DURATION: 86 MS
EKG QTC CALCULATION (BAZETT): 409 MS
EKG R AXIS: 33 DEGREES
EKG T AXIS: 222 DEGREES
EKG VENTRICULAR RATE: 76 BPM
EOSINOPHIL # BLD: 0.1 K/UL (ref 0–0.8)
EOSINOPHIL NFR BLD: 1 % (ref 0.5–7.8)
ERYTHROCYTE [DISTWIDTH] IN BLOOD BY AUTOMATED COUNT: 16.7 % (ref 11.9–14.6)
GLUCOSE BLD STRIP.AUTO-MCNC: 146 MG/DL (ref 65–100)
GLUCOSE BLD STRIP.AUTO-MCNC: 151 MG/DL (ref 65–100)
GLUCOSE BLD STRIP.AUTO-MCNC: 161 MG/DL (ref 65–100)
GLUCOSE BLD STRIP.AUTO-MCNC: 161 MG/DL (ref 65–100)
GLUCOSE SERPL-MCNC: 153 MG/DL (ref 65–100)
HCT VFR BLD AUTO: 31.7 % (ref 35.8–46.3)
HGB BLD-MCNC: 9.5 G/DL (ref 11.7–15.4)
IMM GRANULOCYTES # BLD AUTO: 0 K/UL (ref 0–0.5)
IMM GRANULOCYTES NFR BLD AUTO: 0 % (ref 0–5)
LYMPHOCYTES # BLD: 1.6 K/UL (ref 0.5–4.6)
LYMPHOCYTES NFR BLD: 15 % (ref 13–44)
MCH RBC QN AUTO: 25.1 PG (ref 26.1–32.9)
MCHC RBC AUTO-ENTMCNC: 30 G/DL (ref 31.4–35)
MCV RBC AUTO: 83.6 FL (ref 82–102)
MONOCYTES # BLD: 0.9 K/UL (ref 0.1–1.3)
MONOCYTES NFR BLD: 9 % (ref 4–12)
NEUTS SEG # BLD: 7.8 K/UL (ref 1.7–8.2)
NEUTS SEG NFR BLD: 74 % (ref 43–78)
NRBC # BLD: 0 K/UL (ref 0–0.2)
PLATELET # BLD AUTO: 300 K/UL (ref 150–450)
PMV BLD AUTO: 10.8 FL (ref 9.4–12.3)
POTASSIUM SERPL-SCNC: 3.1 MMOL/L (ref 3.5–5.1)
RBC # BLD AUTO: 3.79 M/UL (ref 4.05–5.2)
SERVICE CMNT-IMP: ABNORMAL
SODIUM SERPL-SCNC: 142 MMOL/L (ref 136–146)
WBC # BLD AUTO: 10.5 K/UL (ref 4.3–11.1)

## 2024-04-04 PROCEDURE — 82962 GLUCOSE BLOOD TEST: CPT

## 2024-04-04 PROCEDURE — 99223 1ST HOSP IP/OBS HIGH 75: CPT | Performed by: INTERNAL MEDICINE

## 2024-04-04 PROCEDURE — 93010 ELECTROCARDIOGRAM REPORT: CPT | Performed by: INTERNAL MEDICINE

## 2024-04-04 PROCEDURE — 1100000003 HC PRIVATE W/ TELEMETRY

## 2024-04-04 PROCEDURE — 6360000004 HC RX CONTRAST MEDICATION: Performed by: STUDENT IN AN ORGANIZED HEALTH CARE EDUCATION/TRAINING PROGRAM

## 2024-04-04 PROCEDURE — 6360000002 HC RX W HCPCS: Performed by: STUDENT IN AN ORGANIZED HEALTH CARE EDUCATION/TRAINING PROGRAM

## 2024-04-04 PROCEDURE — 80048 BASIC METABOLIC PNL TOTAL CA: CPT

## 2024-04-04 PROCEDURE — 6360000002 HC RX W HCPCS: Performed by: INTERNAL MEDICINE

## 2024-04-04 PROCEDURE — 6370000000 HC RX 637 (ALT 250 FOR IP): Performed by: STUDENT IN AN ORGANIZED HEALTH CARE EDUCATION/TRAINING PROGRAM

## 2024-04-04 PROCEDURE — 97535 SELF CARE MNGMENT TRAINING: CPT

## 2024-04-04 PROCEDURE — 36415 COLL VENOUS BLD VENIPUNCTURE: CPT

## 2024-04-04 PROCEDURE — 97530 THERAPEUTIC ACTIVITIES: CPT

## 2024-04-04 PROCEDURE — 85025 COMPLETE CBC W/AUTO DIFF WBC: CPT

## 2024-04-04 PROCEDURE — 97161 PT EVAL LOW COMPLEX 20 MIN: CPT

## 2024-04-04 PROCEDURE — 2580000003 HC RX 258: Performed by: INTERNAL MEDICINE

## 2024-04-04 PROCEDURE — 97166 OT EVAL MOD COMPLEX 45 MIN: CPT

## 2024-04-04 PROCEDURE — 2580000003 HC RX 258: Performed by: STUDENT IN AN ORGANIZED HEALTH CARE EDUCATION/TRAINING PROGRAM

## 2024-04-04 PROCEDURE — C8929 TTE W OR WO FOL WCON,DOPPLER: HCPCS

## 2024-04-04 PROCEDURE — 93306 TTE W/DOPPLER COMPLETE: CPT | Performed by: INTERNAL MEDICINE

## 2024-04-04 PROCEDURE — 6370000000 HC RX 637 (ALT 250 FOR IP): Performed by: INTERNAL MEDICINE

## 2024-04-04 RX ORDER — ACETAMINOPHEN 325 MG/1
650 TABLET ORAL EVERY 6 HOURS PRN
Status: DISCONTINUED | OUTPATIENT
Start: 2024-04-04 | End: 2024-04-09 | Stop reason: HOSPADM

## 2024-04-04 RX ORDER — ACETAMINOPHEN 650 MG/1
650 SUPPOSITORY RECTAL EVERY 6 HOURS PRN
Status: DISCONTINUED | OUTPATIENT
Start: 2024-04-04 | End: 2024-04-09 | Stop reason: HOSPADM

## 2024-04-04 RX ORDER — POTASSIUM CHLORIDE 20 MEQ/1
40 TABLET, EXTENDED RELEASE ORAL PRN
Status: DISCONTINUED | OUTPATIENT
Start: 2024-04-04 | End: 2024-04-09 | Stop reason: HOSPADM

## 2024-04-04 RX ORDER — SODIUM CHLORIDE 0.9 % (FLUSH) 0.9 %
5-40 SYRINGE (ML) INJECTION EVERY 12 HOURS SCHEDULED
Status: DISCONTINUED | OUTPATIENT
Start: 2024-04-04 | End: 2024-04-09 | Stop reason: HOSPADM

## 2024-04-04 RX ORDER — ONDANSETRON 4 MG/1
4 TABLET, ORALLY DISINTEGRATING ORAL EVERY 8 HOURS PRN
Status: DISCONTINUED | OUTPATIENT
Start: 2024-04-04 | End: 2024-04-09 | Stop reason: HOSPADM

## 2024-04-04 RX ORDER — POTASSIUM CHLORIDE 7.45 MG/ML
10 INJECTION INTRAVENOUS PRN
Status: DISCONTINUED | OUTPATIENT
Start: 2024-04-04 | End: 2024-04-09 | Stop reason: HOSPADM

## 2024-04-04 RX ORDER — ONDANSETRON 2 MG/ML
4 INJECTION INTRAMUSCULAR; INTRAVENOUS EVERY 6 HOURS PRN
Status: DISCONTINUED | OUTPATIENT
Start: 2024-04-04 | End: 2024-04-09 | Stop reason: HOSPADM

## 2024-04-04 RX ORDER — SODIUM CHLORIDE 9 MG/ML
INJECTION, SOLUTION INTRAVENOUS PRN
Status: DISCONTINUED | OUTPATIENT
Start: 2024-04-04 | End: 2024-04-09 | Stop reason: HOSPADM

## 2024-04-04 RX ORDER — SODIUM CHLORIDE 0.9 % (FLUSH) 0.9 %
5-40 SYRINGE (ML) INJECTION PRN
Status: DISCONTINUED | OUTPATIENT
Start: 2024-04-04 | End: 2024-04-09 | Stop reason: HOSPADM

## 2024-04-04 RX ORDER — FUROSEMIDE 10 MG/ML
40 INJECTION INTRAMUSCULAR; INTRAVENOUS DAILY
Status: DISCONTINUED | OUTPATIENT
Start: 2024-04-04 | End: 2024-04-05

## 2024-04-04 RX ORDER — POLYETHYLENE GLYCOL 3350 17 G/17G
17 POWDER, FOR SOLUTION ORAL DAILY PRN
Status: DISCONTINUED | OUTPATIENT
Start: 2024-04-04 | End: 2024-04-09 | Stop reason: HOSPADM

## 2024-04-04 RX ADMIN — SODIUM CHLORIDE, PRESERVATIVE FREE 10 ML: 5 INJECTION INTRAVENOUS at 20:32

## 2024-04-04 RX ADMIN — WATER 1000 MG: 1 INJECTION INTRAMUSCULAR; INTRAVENOUS; SUBCUTANEOUS at 17:09

## 2024-04-04 RX ADMIN — DILTIAZEM HYDROCHLORIDE 300 MG: 180 CAPSULE, COATED, EXTENDED RELEASE ORAL at 08:31

## 2024-04-04 RX ADMIN — INSULIN GLARGINE 38 UNITS: 100 INJECTION, SOLUTION SUBCUTANEOUS at 06:32

## 2024-04-04 RX ADMIN — SODIUM CHLORIDE, PRESERVATIVE FREE 10 ML: 5 INJECTION INTRAVENOUS at 08:32

## 2024-04-04 RX ADMIN — FUROSEMIDE 40 MG: 10 INJECTION, SOLUTION INTRAMUSCULAR; INTRAVENOUS at 08:32

## 2024-04-04 RX ADMIN — SODIUM CHLORIDE, PRESERVATIVE FREE 0.45 ML: 5 INJECTION INTRAVENOUS at 11:00

## 2024-04-04 RX ADMIN — POTASSIUM CHLORIDE 40 MEQ: 1500 TABLET, EXTENDED RELEASE ORAL at 06:26

## 2024-04-04 RX ADMIN — METOPROLOL SUCCINATE 25 MG: 25 TABLET, EXTENDED RELEASE ORAL at 08:32

## 2024-04-04 RX ADMIN — DIGOXIN 0.25 MG: 125 TABLET ORAL at 08:32

## 2024-04-04 RX ADMIN — RIVAROXABAN 20 MG: 20 TABLET, FILM COATED ORAL at 12:10

## 2024-04-04 ASSESSMENT — PAIN SCALES - GENERAL
PAINLEVEL_OUTOF10: 0
PAINLEVEL_OUTOF10: 4
PAINLEVEL_OUTOF10: 0

## 2024-04-04 NOTE — PROGRESS NOTES
Hospitalist Progress Note   Admit Date:  4/3/2024  2:49 PM   Name:  Brenda Mayer   Age:  69 y.o.  Sex:  female  :  1955   MRN:  [id number]   Room:  [facility]    Presenting/Chief Complaint: Fatigue     Reason(s) for Admission: Hypokalemia [E87.6]  Inability to walk [R26.2]  Volume overload [E87.70]  Acute cystitis without hematuria [N30.00]  Hypervolemia, unspecified hypervolemia type [E87.70]  Acute congestive heart failure, unspecified heart failure type (HCC) [I50.9]     Hospital Course:   Brenda Mayer is a 69 y.o. female with medical history of CHF and atrial fibrillation who presented to the ED on 4/3/2024 with cc of generalized weakness. Pt reports that she was sitting on the couch earlier in the day but was unable to stand when she attempted to do so. Denies any chest pain, SOB, abdominal pain, or dysuria.  Labs showed potassium 3.2, proBNP 1026, glucose 150, WBC 12.8, hemoglobin 9.7, and UA with 4+ bacteria.  EKG showed atrial fibrillation. CXR showed no acute abnormalities. Cardiology was consulted and she was admitted for further care.    Subjective & 24hr Events:   No acute overnight events.  Patient denies any chest pain or shortness of breath this morning.  Reports that her weakness feels about the same today.    Assessment & Plan:     Generalized weakness  -PT/OT  -Suspect due to deconditioning as she has no focal neurologic weakness  -Possibly due to UTI although suspect this is actually asymptomatic bacteruria     UTI vs asymptomatic bacteruria  -Continue ceftriaxone pending culture results    Chronic HFpEF (not in exacerbation)  -Cardiology following  -Lasix 40mg IV daily  -Strict I&Os  -Monitor renal function     Chronic anemia  -Hemoglobin near baseline with no evidence of active bleeding    Hypertension  -Diltiazem, Lasix, metoprolol    Diabetes mellitus  -Sliding scale insulin  -Lantus 38 units daily     JEISON  -CPAP nightly    Morbid obesity (BMI 48.04)  -Consult dietitian  as needed    Chronic atrial fibrillation  Hypercoagulable state due to atrial fibrillation  -Diltiazem, digoxin, metoprolol, Xarelto  -Cardiology following     Hypokalemia  -Resolved    PT/OT evals and PPD needed/ordered?  Yes  Diet:  ADULT DIET; Regular; 3 carb choices (45 gm/meal); Low Fat/Low Chol/High Fiber/SILVIA  VTE prophylaxis: Lovenox  Code status: Full Code  Dispo: PT/OT. Probable DC in 1-2 days pending her progress.     Non-peripheral Lines and Tubes (if present):      External Urinary Catheter (Active)        Hospital Problems:  Principal Problem:    Volume overload  Active Problems:    JEISON (obstructive sleep apnea)    Restrictive lung disease    Anemia    Hypertension    Controlled type 2 diabetes mellitus without complication, without long-term current use of insulin (HCC)    Class 3 obesity with alveolar hypoventilation, serious comorbidity, and body mass index (BMI) of 50.0 to 59.9 in adult (HCC)    Chronic heart failure with preserved ejection fraction (HFpEF) (HCC)    Longstanding persistent atrial fibrillation (HCC)    Chronic respiratory failure (HCC)    UTI (urinary tract infection)    Pulmonary hypertension (HCC)    Hypokalemia  Resolved Problems:    * No resolved hospital problems. *      Objective:   Patient Vitals for the past 24 hrs:   Temp Pulse Resp BP SpO2   04/04/24 1053 98.2 °F (36.8 °C) 80 19 126/74 91 %   04/04/24 0747 -- 74 -- -- --   04/04/24 0717 98.2 °F (36.8 °C) 75 19 115/63 91 %   04/04/24 0404 97.7 °F (36.5 °C) 74 20 (!) 122/58 94 %   04/03/24 2135 99.3 °F (37.4 °C) 80 22 (!) 141/68 93 %   04/03/24 2016 -- 81 17 119/82 93 %   04/03/24 1845 -- 76 18 120/87 94 %   04/03/24 1600 -- 70 19 (!) 106/90 94 %   04/03/24 1451 98 °F (36.7 °C) 82 20 131/81 92 %       Physical Exam:   General: in no acute distress. Obese.   Head: no scleral icterus   CV: irregular rate and rhythm   Lungs: clear breath sounds bilaterally   Abdomen: abdomen is soft, non-tender, non-distended   Extremities:

## 2024-04-04 NOTE — PROGRESS NOTES
Problem: Physical Therapy  Goal: Physical Therapy Goal  Description: Goals to be met by: 23     Patient will increase functional independence with mobility by performin. Supine to sit with Modified [place]  2. Sit to stand transfer with Modified [place]  3. Bed to chair transfer with Modified [place] using Rolling Walker  4. Gait  x 300 feet with Modified [place] using Rolling Walker.     Outcome: Ongoing, Progressing      Pt resting in bed comfortably at this time, alert and oriented times 4. No distress noted, respirations even and unlabored on room air. External cath in place and draining dark brown nettie colored urine. Pt denies pain at this time. Pt instructed to call for assistance if needed, call light in place, will continue to monitor.

## 2024-04-04 NOTE — ACP (ADVANCE CARE PLANNING)
Advance Care Planning     General Advance Care Planning (ACP) Conversation    Date of Conversation: 4/3/2024  Conducted with: Patient with Decision Making Capacity    Healthcare Decision Maker:    Primary Decision Maker: Keith Mayer - Child - [phone]  Click here to complete Healthcare Decision Makers including selection of the Healthcare Decision Maker Relationship (ie \"Primary\").  Today we documented Decision Maker(s) consistent with Legal Next of Kin hierarchy.    Content/Action Overview:  Has ACP document(s) on file - reflects the patient's care preferences  Reviewed DNR/DNI and patient elects Full Code (Attempt Resuscitation)        Length of Voluntary ACP Conversation in minutes:  <16 minutes (Non-Billable)    Marie Rajan RN

## 2024-04-04 NOTE — PLAN OF CARE
Problem: Pain  Goal: Verbalizes/displays adequate comfort level or baseline comfort level  4/4/2024 0753 by Ne Pickard, RN  Outcome: Progressing  4/3/2024 2223 by Christelle Dong, RN  Outcome: /\[facility]\"" Progressing

## 2024-04-04 NOTE — PROGRESS NOTES
Attempted to see patient this AM for occupational therapy evaluation session. Patient getting echo. Will follow and re-attempt as schedule permits/patient available. Thank you,    Minerva Oneill, OT    Rehab Caseload Tracker

## 2024-04-04 NOTE — PROGRESS NOTES
completed initial visit with patient, per consult.  Patient expressed a low affect and stated that she wants to be back at home.  Patient has one local son.  Patient engaged in life review, especially reflecting on her dogs that bring her dimitri.   provided pastoral presence, prayer and empathetic listening.  Peace be with you,  Signed by  BORIS InfanteDiv.   [phone]

## 2024-04-04 NOTE — PROGRESS NOTES
ACUTE PHYSICAL THERAPY GOALS:   (Developed with and agreed upon by patient and/or caregiver.)    LTG:  (1.)Ms. Mayer will move from supine to sit and sit to supine , scoot up and down, and roll side to side in bed with CONTACT GUARD ASSIST within 7 treatment day(s).    (2.)Ms. Mayer will transfer from bed to chair and chair to bed with CONTACT GUARD ASSIST using the least restrictive device within 7 treatment day(s).    (3.)Ms. Mayer will ambulate with CONTACT GUARD ASSIST for 100 feet with the least restrictive device within 7 treatment day(s).  (4.)Ms. Mayer will tolerate at least 23 min of dynamic standing activity to assist standing ADLs with the least restrictive device within 7 treatment days.      ________________________________________________________________________________________________      PHYSICAL THERAPY Initial Assessment, Daily Note, and AM  (Link to Caseload Tracking: PT Visit Days : 1  Acknowledge Orders  Time In/Out  PT Charge Capture  Rehab Caseload Tracker    Brenda Mayer is a 69 y.o. female   PRIMARY DIAGNOSIS: Volume overload  Hypokalemia [E87.6]  Inability to walk [R26.2]  Volume overload [E87.70]  Acute cystitis without hematuria [N30.00]  Hypervolemia, unspecified hypervolemia type [E87.70]  Acute congestive heart failure, unspecified heart failure type (HCC) [I50.9]       Reason for Referral: Generalized Muscle Weakness (M62.81)  Other lack of cordination (R27.8)  Difficulty in walking, Not elsewhere classified (R26.2)  Other abnormalities of gait and mobility (R26.89)  Inpatient: Payor: MEDICARE / Plan: MEDICARE PART A AND B / Product Type: *No Product type* /     ASSESSMENT:     REHAB RECOMMENDATIONS:   Recommendation to date pending progress:  Setting:  Short-term Rehab    Equipment:    To Be Determined     ASSESSMENT:  Ms. Mayer presents in supine, agreeable to session. At the end of session, pt is educated on benefits of short term rehab, but she is refusing  Training, Ambulation on level ground, Sitting balance , and Standing balance to improve functional Activity tolerance, Balance, Coordination, Mobility, Strength, and ROM.    TREATMENT GRID:  N/A    AFTER TREATMENT PRECAUTIONS: Bed, Call light within reach, Needs within reach, and RN notified    INTERDISCIPLINARY COLLABORATION:  RN/ PCT, PT/ PTA, and Rehab Attendant    EDUCATION: Education Given To: Patient  Education Provided: Role of Therapy;Plan of Care    TIME IN/OUT:  Time In: 0838  Time Out: 0917  Minutes: 39    Lola Griggs, PT

## 2024-04-04 NOTE — CONSULTS
[facility] Cardiology Initial Cardiac Evaluation      Date of  Admission: 4/3/2024  2:49 PM     Primary Care Physician: Bautista Esparza MD  Primary Cardiologist: Dr Dunbar  Referring Physician: Dr Mckeon  Supervising Physician: Dr Muir    CC/Reason for evaluation: volume overload    HPI:  Brenda Mayer is a 69 y.o. female with prior history of chronic diastolic heart failure, longstanding persistent atrial fibrillation on Xarelto, HTN, DM2, restrictive lung disease, JEISON, pulmonary hypertension, morbid obesity and former smoker who presented to [facility] ED via EMS on 4/3 with complaint of generalized weakness, unable to ambulate and \"might have some fluid on me\". Patient reports being in normal health this past week and then was unable to get herself up to ambulate. Denies shortness of breath. Denies orthopnea. Admits to some swelling in lower extremities but doesn't know if its worse than usual.     In ED, /81 with HR 82. Labs:  WBC 12.8, H/H 9.7/32.0, Ptl 282, Na 142, K 3.2, BUN/Cr 12/0.70, albumin 2.9, pBNP 1026, A1c 8.0, digoxin level 1.3, Mg 1.9.  UA positive. Cx pending. CXR negative for acute findings in chest. Bilateral LE duplex negative for DVT, showed bakers cyst on right. He was given rocephin, IV lasix 60 mg, KCL 40 meq in ED. Admitted to medicine team for volume overload. Cardiology consulted for volume overload.     Past Medical History:   Diagnosis Date    Chronic pain     pain R knee    Hypertension     Morbid obesity (HCC)     BMI 45.8- 5/2/12    Positive PPD     had vaccination as a child - BCG      Past Surgical History:   Procedure Laterality Date    COLONOSCOPY N/A 2/6/2022    COLONOSCOPY/ BMI 56 ROOM 302 performed by Thaddeus Sharif MD at [facility] ENDOSCOPY    GYN      C-sec x 1 with GA    KNEE ARTHROSCOPY      bilateral knees       Allergies   Allergen Reactions    Hydrocodone-Acetaminophen Nausea Only and Other (See Comments)     \"It makes me feel hung over\"      Social History  injection vial 0-4 Units  0-4 Units SubCUTAneous Nightly    cefTRIAXone (ROCEPHIN) 1,000 mg in sterile water 10 mL IV syringe  1,000 mg IntraVENous Q24H    hydrALAZINE (APRESOLINE) injection 20 mg  20 mg IntraVENous Q4H PRN    sodium phosphate (FLEET) rectal enema 1 enema  1 enema Rectal Daily PRN    oxyCODONE (ROXICODONE) immediate release tablet 5 mg  5 mg Oral Q4H PRN    melatonin tablet 3 mg  3 mg Oral Nightly PRN    guaiFENesin-dextromethorphan (ROBITUSSIN DM) 100-10 MG/5ML syrup 10 mL  10 mL Oral Q4H PRN    cloNIDine (CATAPRES) tablet 0.1 mg  0.1 mg Oral Q4H PRN    dilTIAZem (CARDIZEM CD) extended release capsule 300 mg  300 mg Oral Daily       Review of Symptoms:    General: Positive for generalized weakness. No weight changes, fever or chills  Skin: no rashes, lumps, or other skin changes  HEENT: no headache, dizziness, lightheadedness, vision changes, hearing changes, tinnitus, vertigo, sinus pressure/pain, bleeding gums, sore throat, or hoarseness  Neck: no swollen glands, goiter, pain or stiffness  Respiratory: No cough, sputum, hemoptysis, dyspnea, wheezing  Cardiovascular: + as per HPI  Gastrointestinal: Positive for GERD. No constipation, diarrhea, liver problems, or h/o GI bleed  Urinary: no frequency, urgency , hematuria, burning/pain with urination, recent flank pain, polyuria, nocturia, or difficulty urinating  Peripheral Vascular: Positive for lower extremity edema. No claudication, leg cramps, prior DVTs  Musculoskeletal: no muscle or joint pain/stiffness, joint swelling, erythema of joints, or back pain  Psychiatric: no depression or excessive stress  Neurological: no sensory or motor loss, seizures, syncope, tremors, numbness, no dementia  Hematologic: no anemia, easy bruising or bleeding  Endocrine: Positive for diabetes. Denies thyroid problems       Subjective:     Physical Exam:    Vitals:    04/03/24 2135 04/04/24 0404 04/04/24 0717 04/04/24 0747   BP: (!) 141/68 (!) 122/58 115/63

## 2024-04-04 NOTE — PLAN OF CARE
Problem: Discharge Planning  Goal: Discharge to home or other facility with appropriate resources  Outcome: HH/HSPC Progressing  Flowsheets (Taken 4/3/2024 8949)  Discharge to home or other facility with appropriate resources: Identify barriers to discharge with patient and caregiver     Problem: Pain  Goal: Verbalizes/displays adequate comfort level or baseline comfort level  Outcome: HH/HSPC Progressing     Problem: Safety - Adult  Goal: Free from fall injury  Outcome: HH/HSPC Progressing     Problem: Skin/Tissue Integrity  Goal: Absence of new skin breakdown  Description: 1.  Monitor for areas of redness and/or skin breakdown  2.  Assess vascular access sites hourly  3.  Every 4-6 hours minimum:  Change oxygen saturation probe site  4.  Every 4-6 hours:  If on nasal continuous positive airway pressure, respiratory therapy assess nares and determine need for appliance change or resting period.  Outcome: HH/HSPC Progressing

## 2024-04-04 NOTE — CARE COORDINATION
MSN, CM:  spoke with patient this afternoon about discharge planning.  Patient lives alone in own home with no steps for entrance.  Patient is independent with all ADL's and requires a cane for ambulation.  Patient states she also has a walker and w/c if needed.  Patient denies any HH or rehab currently.  Patient wears a Cpap at HS with 2L oxygen bleed in which is provided by [employer].  Patient is retired but able to drive herself to all appointments.  Patient was admitted for weakness and LE edema.  Patient was found to also have UTI.  Patient was started on IV lasix and abx.  Cardiology consulted for new ECHO pending.  PT/OT consulted for evaluation and recommendations.  Patient states she will decline any SNF but when questioned about acute rehab on 9th floor she states she would think about it.  Will attempt again tomorrow.  Case Management will continue to follow for any discharge needs that may arise.       04/04/24 1205   Service Assessment   Patient Orientation Alert and Oriented   Cognition Alert   History Provided By Patient   Primary Caregiver Self   Support Systems Children   Patient's Healthcare Decision Maker is: Named in Scanned ACP Document   PCP Verified by CM Yes   Last Visit to PCP Within last 3 months   Prior Functional Level Independent in ADLs/IADLs   Current Functional Level Other (see comment)  (PT/OT consulted)   Can patient return to prior living arrangement Yes   Ability to make needs known: Good   Family able to assist with home care needs: Yes   Would you like for me to discuss the discharge plan with any other family members/significant others, and if so, who? No   Financial Resources Medicare   Community Resources None   Social/Functional History   Lives With Alone   Type of Home House   Home Layout Two level;Able to Live on Main level with bedroom/bathroom   Home Access Level entry   Bathroom Shower/Tub Walk-in shower   Bathroom Toilet Standard   Bathroom Equipment None

## 2024-04-04 NOTE — PROGRESS NOTES
Pt resting in bed comfortably at this time, alert and oriented times 4. No distress noted, respirations even and unlabored on room air. External cath in place and working appropriately. Pt denies pain at this time. Pt instructed to call for assistance if needed, call light in place, will continue to monitor.

## 2024-04-04 NOTE — PROGRESS NOTES
extra time for all. Pt presented below her functional baseline and would benefit from skilled OT services to address deficits.      [facility] AM-PAC™ “6 Clicks” Daily Activity Inpatient Short Form:    AM-PAC Daily Activity - Inpatient   How much help is needed for putting on and taking off regular lower body clothing?: A Little  How much help is needed for bathing (which includes washing, rinsing, drying)?: A Little  How much help is needed for toileting (which includes using toilet, bedpan, or urinal)?: A Little  How much help is needed for putting on and taking off regular upper body clothing?: A Little  How much help is needed for taking care of personal grooming?: A Little  How much help for eating meals?: None  AM-PAC Inpatient Daily Activity Raw Score: 19  AM-PAC Inpatient ADL T-Scale Score : 40.22  ADL Inpatient CMS 0-100% Score: 42.8  ADL Inpatient CMS G-Code Modifier : CK           SUBJECTIVE:     Ms. Mayer states, \"I'll go to rehab if it's just for a little while.\"     Social/Functional Lives With: Alone  Type of Home: House  Home Layout: Two level, Able to Live on Main level with bedroom/bathroom  Home Access: Level entry  Bathroom Shower/Tub: Walk-in shower  Bathroom Toilet: Standard  Bathroom Equipment: None  Bathroom Accessibility: Accessible  Home Equipment: Cane, Walker, rolling, Wheelchair-manual  Receives Help From: Family  ADL Assistance: Independent  Homemaking Assistance: Independent  Homemaking Responsibilities: Yes  Ambulation Assistance: Independent  Transfer Assistance: Independent  Active : Yes  Mode of Transportation: Car  Occupation: Retired  Additional Comments: Lives alone. Independent, cane. no falls. walk in shower w/ shower chair, elevated toilet. Uses leg lift for bed mobility.    OBJECTIVE:     LINES / DRAINS / AIRWAY: External Catheter    RESTRICTIONS/PRECAUTIONS:       PAIN: VITALS / O2:   Pre Treatment: 0         Post Treatment: 0       Vitals          Oxygen             GROSS EVALUATION: INTACT IMPAIRED   (See Comments)   UE AROM [] []< B shoulders   UE PROM [] []   Strength []  Generally decreased     Posture / Balance []  Contact guard assistance w/ RW   Sensation []     Coordination []       Tone []       Edema []    Activity Tolerance []  Impaired      Hand Dominance R [] L []      COGNITION/  PERCEPTION: INTACT IMPAIRED   (See Comments)   Orientation [x]     Vision [x]     Hearing [x]     Cognition  [x]     Perception []       MOBILITY: I Mod I S SBA CGA Min Mod Max Total  NT x2 Comments:   Bed Mobility    Rolling [] [] [] [] [] [] [] [] [] [] []    Supine to Sit [] [] [] [] [x] [] [] [] [] [] []    Scooting [] [] [] [] [x] [] [] [] [] [] []    Sit to Supine [] [] [] [] [] [] [] [] [] [] []    Transfers    Sit to Stand [] [] [] [] [] [] [x] [] [] [] []    Bed to Chair [] [] [] [] [x] [x] [] [] [] [] []    Stand to Sit [] [] [] [] [] [x] [] [] [] [] []    Tub/Shower [] [] [] [] [] [] [] [] [] [] []     Toilet [] [] [] [] [] [] [] [] [] [] []      [] [] [] [] [] [] [] [] [] [] []    I=Independent, Mod I=Modified Independent, S=Supervision/Setup, SBA=Standby Assistance, CGA=Contact Guard Assistance, Min=Minimal Assistance, Mod=Moderate Assistance, Max=Maximal Assistance, Total=Total Assistance, NT=Not Tested    ACTIVITIES OF DAILY LIVING: I Mod I S SBA CGA Min Mod Max Total NT Comments   BASIC ADLs:              Upper Body Bathing  [] [] [] [] [] [] [] [] [] []     Lower Body Bathing [] [] [] [] [x] [] [] [] [] []  Perineal bathing   Toileting [] [] [] [] [] [] [] [] [] []    Upper Body Dressing [] [] [] [] [] [x] [] [] [] []    Lower Body Dressing [] [] [] [] [] [x] [] [] [] [] Doffed pants and donned pullup brief   Feeding [] [] [] [] [] [] [] [] [] []    Grooming [] [] [] [] [x] [] [] [] [] [] Standing at sink   Personal Device Care [] [] [] [] [] [] [] [] [] []    Functional Mobility [] [] [] [] [x] [x] [] [] [] [] RW   I=Independent, Mod I=Modified Independent,

## 2024-04-04 NOTE — ED NOTES
TRANSFER - OUT REPORT:    Verbal report given to Christelle KU on Brenda Mayer  being transferred to [facility] for routine progression of patient care       Report consisted of patient's Situation, Background, Assessment and   Recommendations(SBAR).     Information from the following report(s) ED Encounter Summary was reviewed with the receiving nurse.    [place] Fall Assessment:    Presents to emergency department  because of falls (Syncope, seizure, or loss of consciousness): No  Age > 70: No  Altered Mental Status, Intoxication with alcohol or substance confusion (Disorientation, impaired judgment, poor safety awaremess, or inability to follow instructions): No  Impaired Mobility: Ambulates or transfers with assistive devices or assistance; Unable to ambulate or transer.: Yes             Lines:   Peripheral IV 04/03/24 Proximal;Right Forearm (Active)        Opportunity for questions and clarification was provided.      Patient transported with:  Laure Aguilar RN  04/03/24 2029

## 2024-04-04 NOTE — PROGRESS NOTES
Pt resting in recliner comfortably at this time, alert and oriented times 4. No distress noted, respirations even and unlabored on room air. Pt denies pain at this time. Pt instructed to call for assistance if needed, call light in place, will continue to monitor. Will prepare for bedside shift report.

## 2024-04-04 NOTE — H&P
Irregular, 2+ edema   Lungs:   CTAB.  No wheezing, rhonchi, or rales.  Symmetric expansion. Anterior lateral   Abdomen:   Soft, nontender, nondistended.  Extremities: No cyanosis or clubbing.   Skin:     No rashes.  Normal coloration.   Warm and dry.    Neuro:  grossly intact.  Psych:  Normal mood and affect.      Orders Placed This Encounter   Medications    furosemide (LASIX) injection 60 mg    potassium chloride (KLOR-CON M) extended release tablet 40 mEq    cefTRIAXone (ROCEPHIN) 2,000 mg in sodium chloride 0.9 % 50 mL IVPB (mini-bag)     Order Specific Question:   Antimicrobial Indications     Answer:   Urinary Tract Infection       Prior to Admit Medications:  Current Outpatient Medications   Medication Instructions    BASAGLAR KWIKPEN 100 UNIT/ML injection pen Inject 38 Units into the skin every morning (before breakfast)    CPAP Machine MISC Does not apply    digoxin (LANOXIN) 0.25 mg, Oral, DAILY    dilTIAZem (TIAZAC) 300 mg, Oral, DAILY    furosemide (LASIX) 60 mg, Oral, DAILY    metFORMIN (GLUCOPHAGE-XR) 500 MG extended release tablet TAKE 2 TABLETS BY MOUTH DAILY    metoprolol succinate (TOPROL XL) 25 mg, Oral, DAILY    OXYGEN Inhalation, 2 lpm qhs    potassium chloride (KLOR-CON M) 20 MEQ extended release tablet 20 mEq, Oral, DAILY    rivaroxaban (XARELTO) 20 mg, Oral, DAILY WITH LUNCH       I have personally reviewed labs and tests:  Recent Results (from the past 24 hour(s))   CBC    Collection Time: 04/03/24  3:38 PM   Result Value Ref Range    WBC 12.8 (H) 4.3 - 11.1 K/uL    RBC 3.90 (L) 4.05 - 5.2 M/uL    Hemoglobin 9.7 (L) 11.7 - 15.4 g/dL    Hematocrit 32.0 (L) 35.8 - 46.3 %    MCV 82.1 82 - 102 FL    MCH 24.9 (L) 26.1 - 32.9 PG    MCHC 30.3 (L) 31.4 - 35.0 g/dL    RDW 16.8 (H) 11.9 - 14.6 %    Platelets 282 150 - 450 K/uL    MPV 10.1 9.4 - 12.3 FL    nRBC 0.00 0.0 - 0.2 K/uL   Comprehensive Metabolic Panel    Collection Time: 04/03/24  3:38 PM   Result Value Ref Range    Sodium 142 136 - 146  findings in the chest         Signed:  Kim Cameron MD    Part of this note may have been written by using a voice dictation software.  The note has been proof read but may still contain some grammatical/other typographical errors.

## 2024-04-05 LAB
ANION GAP SERPL CALC-SCNC: 5 MMOL/L (ref 2–11)
BUN SERPL-MCNC: 12 MG/DL (ref 8–23)
CALCIUM SERPL-MCNC: 8.9 MG/DL (ref 8.3–10.4)
CHLORIDE SERPL-SCNC: 104 MMOL/L (ref 103–113)
CO2 SERPL-SCNC: 31 MMOL/L (ref 21–32)
CREAT SERPL-MCNC: 0.7 MG/DL (ref 0.6–1)
GLUCOSE BLD STRIP.AUTO-MCNC: 137 MG/DL (ref 65–100)
GLUCOSE BLD STRIP.AUTO-MCNC: 158 MG/DL (ref 65–100)
GLUCOSE BLD STRIP.AUTO-MCNC: 168 MG/DL (ref 65–100)
GLUCOSE BLD STRIP.AUTO-MCNC: 190 MG/DL (ref 65–100)
GLUCOSE SERPL-MCNC: 139 MG/DL (ref 65–100)
POTASSIUM SERPL-SCNC: 3.1 MMOL/L (ref 3.5–5.1)
SERVICE CMNT-IMP: ABNORMAL
SODIUM SERPL-SCNC: 140 MMOL/L (ref 136–146)

## 2024-04-05 PROCEDURE — 6370000000 HC RX 637 (ALT 250 FOR IP): Performed by: STUDENT IN AN ORGANIZED HEALTH CARE EDUCATION/TRAINING PROGRAM

## 2024-04-05 PROCEDURE — 97530 THERAPEUTIC ACTIVITIES: CPT

## 2024-04-05 PROCEDURE — 36415 COLL VENOUS BLD VENIPUNCTURE: CPT

## 2024-04-05 PROCEDURE — 6360000002 HC RX W HCPCS: Performed by: INTERNAL MEDICINE

## 2024-04-05 PROCEDURE — 82962 GLUCOSE BLOOD TEST: CPT

## 2024-04-05 PROCEDURE — 6360000002 HC RX W HCPCS: Performed by: STUDENT IN AN ORGANIZED HEALTH CARE EDUCATION/TRAINING PROGRAM

## 2024-04-05 PROCEDURE — 80048 BASIC METABOLIC PNL TOTAL CA: CPT

## 2024-04-05 PROCEDURE — 6370000000 HC RX 637 (ALT 250 FOR IP): Performed by: INTERNAL MEDICINE

## 2024-04-05 PROCEDURE — 2580000003 HC RX 258: Performed by: INTERNAL MEDICINE

## 2024-04-05 PROCEDURE — 1100000003 HC PRIVATE W/ TELEMETRY

## 2024-04-05 PROCEDURE — 99232 SBSQ HOSP IP/OBS MODERATE 35: CPT | Performed by: INTERNAL MEDICINE

## 2024-04-05 RX ORDER — POTASSIUM CHLORIDE 20 MEQ/1
40 TABLET, EXTENDED RELEASE ORAL EVERY 4 HOURS
Status: COMPLETED | OUTPATIENT
Start: 2024-04-05 | End: 2024-04-05

## 2024-04-05 RX ORDER — FUROSEMIDE 10 MG/ML
40 INJECTION INTRAMUSCULAR; INTRAVENOUS DAILY
Status: DISCONTINUED | OUTPATIENT
Start: 2024-04-05 | End: 2024-04-07

## 2024-04-05 RX ADMIN — METOPROLOL SUCCINATE 25 MG: 25 TABLET, EXTENDED RELEASE ORAL at 08:56

## 2024-04-05 RX ADMIN — SODIUM CHLORIDE, PRESERVATIVE FREE 5 ML: 5 INJECTION INTRAVENOUS at 09:00

## 2024-04-05 RX ADMIN — RIVAROXABAN 20 MG: 20 TABLET, FILM COATED ORAL at 12:57

## 2024-04-05 RX ADMIN — INSULIN GLARGINE 38 UNITS: 100 INJECTION, SOLUTION SUBCUTANEOUS at 06:41

## 2024-04-05 RX ADMIN — POTASSIUM CHLORIDE 40 MEQ: 1500 TABLET, EXTENDED RELEASE ORAL at 08:56

## 2024-04-05 RX ADMIN — FUROSEMIDE 40 MG: 10 INJECTION, SOLUTION INTRAMUSCULAR; INTRAVENOUS at 12:57

## 2024-04-05 RX ADMIN — DIGOXIN 0.25 MG: 125 TABLET ORAL at 08:55

## 2024-04-05 RX ADMIN — POTASSIUM CHLORIDE 40 MEQ: 1500 TABLET, EXTENDED RELEASE ORAL at 16:39

## 2024-04-05 RX ADMIN — ACETAMINOPHEN 650 MG: 325 TABLET ORAL at 16:39

## 2024-04-05 RX ADMIN — POTASSIUM CHLORIDE 40 MEQ: 1500 TABLET, EXTENDED RELEASE ORAL at 12:57

## 2024-04-05 RX ADMIN — WATER 1000 MG: 1 INJECTION INTRAMUSCULAR; INTRAVENOUS; SUBCUTANEOUS at 18:07

## 2024-04-05 RX ADMIN — POTASSIUM CHLORIDE 40 MEQ: 1500 TABLET, EXTENDED RELEASE ORAL at 06:41

## 2024-04-05 RX ADMIN — DILTIAZEM HYDROCHLORIDE 300 MG: 180 CAPSULE, COATED, EXTENDED RELEASE ORAL at 08:56

## 2024-04-05 ASSESSMENT — PAIN DESCRIPTION - ORIENTATION
ORIENTATION: RIGHT
ORIENTATION: RIGHT

## 2024-04-05 ASSESSMENT — PAIN SCALES - GENERAL
PAINLEVEL_OUTOF10: 0
PAINLEVEL_OUTOF10: 0
PAINLEVEL_OUTOF10: 9
PAINLEVEL_OUTOF10: 10
PAINLEVEL_OUTOF10: 0
PAINLEVEL_OUTOF10: 0

## 2024-04-05 ASSESSMENT — PAIN - FUNCTIONAL ASSESSMENT
PAIN_FUNCTIONAL_ASSESSMENT: PREVENTS OR INTERFERES WITH MANY ACTIVE NOT PASSIVE ACTIVITIES
PAIN_FUNCTIONAL_ASSESSMENT: PREVENTS OR INTERFERES WITH MANY ACTIVE NOT PASSIVE ACTIVITIES

## 2024-04-05 ASSESSMENT — PAIN DESCRIPTION - PAIN TYPE
TYPE: ACUTE PAIN
TYPE: ACUTE PAIN

## 2024-04-05 ASSESSMENT — PAIN DESCRIPTION - LOCATION
LOCATION: FOOT
LOCATION: FOOT

## 2024-04-05 ASSESSMENT — PAIN DESCRIPTION - ONSET: ONSET: GRADUAL

## 2024-04-05 ASSESSMENT — PAIN DESCRIPTION - DESCRIPTORS: DESCRIPTORS: ACHING

## 2024-04-05 ASSESSMENT — PAIN DESCRIPTION - FREQUENCY: FREQUENCY: CONTINUOUS

## 2024-04-05 NOTE — PROGRESS NOTES
Shift report received from previous RN. No needs voiced by patient. At bedside, with patient appears to be resting quietly. Respirations even and unlabored. No signs of distress noted.

## 2024-04-05 NOTE — PROGRESS NOTES
Hospitalist Progress Note   Admit Date:  4/3/2024  2:49 PM   Name:  Brenda Mayer   Age:  69 y.o.  Sex:  female  :  1955   MRN:  [id number]   Room:  [facility]    Presenting/Chief Complaint: Fatigue     Reason(s) for Admission: Hypokalemia [E87.6]  Inability to walk [R26.2]  Volume overload [E87.70]  Acute cystitis without hematuria [N30.00]  Hypervolemia, unspecified hypervolemia type [E87.70]  Acute congestive heart failure, unspecified heart failure type (HCC) [I50.9]     Hospital Course:   Brenda Mayer is a 69 y.o. female with medical history of CHF and atrial fibrillation who presented to the ED on 4/3/2024 with cc of generalized weakness. Pt reports that she was sitting on the couch earlier in the day but was unable to stand when she attempted to do so. Denies any chest pain, SOB, abdominal pain, or dysuria.  Labs showed potassium 3.2, proBNP 1026, glucose 150, WBC 12.8, hemoglobin 9.7, and UA with 4+ bacteria.  EKG showed atrial fibrillation. CXR showed no acute abnormalities. Cardiology was consulted and she was admitted for further care.    Subjective & 24hr Events:   No acute overnight events. Pt resting in bed with no complaints. Feels better this morning.     Assessment & Plan:     Generalized weakness  -PT/OT  -Due to deconditioning, volume overload, and UTI    UTI (POA, due to gram negative bacteria)   -Continue ceftriaxone pending culture results    Chronic HFpEF (not in exacerbation)  -Cardiology following  -Lasix 40mg IV daily  -Strict I&Os  -Monitor renal function     Chronic anemia  -Hemoglobin near baseline with no evidence of active bleeding    Hypertension  -Diltiazem, Lasix, metoprolol    Diabetes mellitus  -Sliding scale insulin  -Lantus 38 units daily     JEISON  -CPAP nightly    Morbid obesity (BMI 48.04)  -Consult dietitian as needed    Chronic atrial fibrillation  Hypercoagulable state due to atrial fibrillation  -Diltiazem, digoxin, metoprolol, Xarelto  -Cardiology

## 2024-04-05 NOTE — PROGRESS NOTES
[facility] CARDIOLOGY PROGRESS NOTE           4/5/2024 9:57 AM    Admit Date: 4/3/2024         Subjective: Ins and outs were not appropriately charted.  The patient's weight has dropped over 20 pounds when compared to yesterday.  She does have what appears to be pruning in her lower extremities it looks like she has diuresed well.    ROS:  Cardiovascular:  As noted above    Objective:      Vitals:    04/04/24 2337 04/05/24 0326 04/05/24 0351 04/05/24 0710   BP: 126/61 122/74  (!) 140/62   Pulse: 82 83  84   Resp: 19 18  18   Temp: 97.9 °F (36.6 °C) 97.9 °F (36.6 °C)  98.4 °F (36.9 °C)   TempSrc: Oral Oral  Oral   SpO2: 93% 91%  96%   Weight:   126.6 kg (279 lb)    Height:             Physical Exam:  General: Well Developed, Well Nourished, No Acute Distress, Alert & Oriented x 3, Appropriate mood  Neck: supple, no JVD  Heart: S1S2 with RRR without murmurs or gallops  Lungs: Clear throughout auscultation bilaterally without adventitious sounds  Abd: soft, nontender, nondistended, with good bowel sounds  Ext: improved edema bilaterally  Skin: warm and dry      Data Review:   Recent Labs     04/03/24  1538 04/03/24  2248 04/04/24  0436 04/05/24  0443     --  142 140   K 3.2*  --  3.1* 3.1*   MG  --  1.9  --   --    BUN 12  --  11 12   WBC 12.8*  --  10.5  --    HGB 9.7*  --  9.5*  --    HCT 32.0*  --  31.7*  --      --  300  --        No results for input(s): \"TNIPOC\" in the last 72 hours.    Invalid input(s): \"TROIQ\"        Assessment/Plan:     Principal Problem:    Volume overload  Active Problems:    JEISON (obstructive sleep apnea)    Restrictive lung disease    Anemia    Hypertension    Controlled type 2 diabetes mellitus without complication, without long-term current use of insulin ([facility])    Class 3 obesity with alveolar hypoventilation, serious comorbidity, and body mass index (BMI) of 50.0 to 59.9 in adult ([facility])    Chronic heart failure with preserved ejection fraction (HFpEF)

## 2024-04-05 NOTE — PROGRESS NOTES
ACUTE PHYSICAL THERAPY GOALS:   (Developed with and agreed upon by patient and/or caregiver.)  LTG:  (1.)Ms. Mayer will move from supine to sit and sit to supine , scoot up and down, and roll side to side in bed with CONTACT GUARD ASSIST within 7 treatment day(s).    (2.)Ms. Mayer will transfer from bed to chair and chair to bed with CONTACT GUARD ASSIST using the least restrictive device within 7 treatment day(s).    (3.)Ms. Mayer will ambulate with CONTACT GUARD ASSIST for 100 feet with the least restrictive device within 7 treatment day(s).  (4.)Ms. Mayer will tolerate at least 23 min of dynamic standing activity to assist standing ADLs with the least restrictive device within 7 treatment days.    PHYSICAL THERAPY: Daily Note PM   (Link to Caseload Tracking: PT Visit Days : 2  Time In/Out PT Charge Capture  Rehab Caseload Tracker  Orders    Brenda Mayer is a 69 y.o. female   PRIMARY DIAGNOSIS: Volume overload  Hypokalemia [E87.6]  Inability to walk [R26.2]  Volume overload [E87.70]  Acute cystitis without hematuria [N30.00]  Hypervolemia, unspecified hypervolemia type [E87.70]  Acute congestive heart failure, unspecified heart failure type (HCC) [I50.9]       Inpatient: Payor: MEDICARE / Plan: MEDICARE PART A AND B / Product Type: *No Product type* /     ASSESSMENT:     REHAB RECOMMENDATIONS:   Recommendation to date pending progress:  Setting:  Inpatient Rehab Facility    Equipment:    To Be Determined     ASSESSMENT:  Ms. Mayer seen for follow up PT session. Reports chronic R knee pain but new, severe R foot pain today. Pain limited her mobility this session. Performed sit <> stand x 2 reps with RW and mod A. Requires increased time and momentum to complete. Ambulated 4 ft x 2 with RW and min A. Demonstrates an antalgic pattern with decreased stance time R LE. Required mod A for dynamic stand balance while completing ADLs. PT will continue to follow and progress mobility. C recommended  [] [] [] [] [x] [] [] [] [] []     [] [] [] [] [] [] [] [] [] [] []    I=Independent, Mod I=Modified Independent, S=Supervision, SBA=Standby Assistance, CGA=Contact Guard Assistance,   Min=Minimal Assistance, Mod=Moderate Assistance, Max=Maximal Assistance, Total=Total Assistance, NT=Not Tested    BALANCE: Good Fair+ Fair Fair- Poor NT Comments   Sitting Static [x] [] [] [] [] []    Sitting Dynamic [x] [] [] [] [] []              Standing Static [] [x] [x] [] [] []    Standing Dynamic [] [] [x] [x] [] []      GAIT: I Mod I S SBA CGA Min Mod Max Total  NT x2 Comments:   Level of Assistance [] [] [] [] [] [x] [] [] [] [] []    Distance 2 x 4  feet    DME Rolling Walker    Gait Quality Antalgic, Decreased rishabh , Decreased step clearance, Decreased step length, and Decreased stance    Weightbearing Status      Stairs NT     I=Independent, Mod I=Modified Independent, S=Supervision, SBA=Standby Assistance, CGA=Contact Guard Assistance,   Min=Minimal Assistance, Mod=Moderate Assistance, Max=Maximal Assistance, Total=Total Assistance, NT=Not Tested    PLAN:   FREQUENCY AND DURATION: 3 times/week for duration of hospital stay or until stated goals are met, whichever comes first.    TREATMENT:   TREATMENT:   Therapeutic Activity (31 Minutes): Therapeutic activity included Sit to Supine, Transfer Training, Ambulation on level ground, Sitting balance , and Standing balance to improve functional Activity tolerance, Balance, Coordination, Mobility, and Strength.    TREATMENT GRID:  N/A    AFTER TREATMENT PRECAUTIONS: Bed, Bed/Chair Locked, Call light within reach, Needs within reach, RN notified, and Side rails x2    INTERDISCIPLINARY COLLABORATION:  RN/ PCT    EDUCATION:      TIME IN/OUT:  Time In: 1550  Time Out: 1621  Minutes: 31    FERNIE SHEN, PT

## 2024-04-06 LAB
ANION GAP SERPL CALC-SCNC: 2 MMOL/L (ref 2–11)
BACTERIA SPEC CULT: ABNORMAL
BACTERIA SPEC CULT: ABNORMAL
BUN SERPL-MCNC: 12 MG/DL (ref 8–23)
CALCIUM SERPL-MCNC: 8.9 MG/DL (ref 8.3–10.4)
CHLORIDE SERPL-SCNC: 107 MMOL/L (ref 103–113)
CO2 SERPL-SCNC: 30 MMOL/L (ref 21–32)
CREAT SERPL-MCNC: 0.7 MG/DL (ref 0.6–1)
GLUCOSE BLD STRIP.AUTO-MCNC: 139 MG/DL (ref 65–100)
GLUCOSE BLD STRIP.AUTO-MCNC: 160 MG/DL (ref 65–100)
GLUCOSE BLD STRIP.AUTO-MCNC: 176 MG/DL (ref 65–100)
GLUCOSE BLD STRIP.AUTO-MCNC: 181 MG/DL (ref 65–100)
GLUCOSE SERPL-MCNC: 150 MG/DL (ref 65–100)
POTASSIUM SERPL-SCNC: 4 MMOL/L (ref 3.5–5.1)
SERVICE CMNT-IMP: ABNORMAL
SODIUM SERPL-SCNC: 139 MMOL/L (ref 136–146)

## 2024-04-06 PROCEDURE — 2580000003 HC RX 258: Performed by: INTERNAL MEDICINE

## 2024-04-06 PROCEDURE — 80048 BASIC METABOLIC PNL TOTAL CA: CPT

## 2024-04-06 PROCEDURE — 6370000000 HC RX 637 (ALT 250 FOR IP): Performed by: STUDENT IN AN ORGANIZED HEALTH CARE EDUCATION/TRAINING PROGRAM

## 2024-04-06 PROCEDURE — 82962 GLUCOSE BLOOD TEST: CPT

## 2024-04-06 PROCEDURE — 36415 COLL VENOUS BLD VENIPUNCTURE: CPT

## 2024-04-06 PROCEDURE — 6370000000 HC RX 637 (ALT 250 FOR IP): Performed by: INTERNAL MEDICINE

## 2024-04-06 PROCEDURE — 1100000003 HC PRIVATE W/ TELEMETRY

## 2024-04-06 PROCEDURE — 99232 SBSQ HOSP IP/OBS MODERATE 35: CPT | Performed by: INTERNAL MEDICINE

## 2024-04-06 PROCEDURE — 6360000002 HC RX W HCPCS: Performed by: STUDENT IN AN ORGANIZED HEALTH CARE EDUCATION/TRAINING PROGRAM

## 2024-04-06 RX ORDER — SULFAMETHOXAZOLE AND TRIMETHOPRIM 800; 160 MG/1; MG/1
1 TABLET ORAL EVERY 12 HOURS SCHEDULED
Status: DISCONTINUED | OUTPATIENT
Start: 2024-04-06 | End: 2024-04-09 | Stop reason: HOSPADM

## 2024-04-06 RX ADMIN — SULFAMETHOXAZOLE AND TRIMETHOPRIM 1 TABLET: 800; 160 TABLET ORAL at 15:49

## 2024-04-06 RX ADMIN — METOPROLOL SUCCINATE 25 MG: 25 TABLET, EXTENDED RELEASE ORAL at 08:21

## 2024-04-06 RX ADMIN — DILTIAZEM HYDROCHLORIDE 300 MG: 180 CAPSULE, COATED, EXTENDED RELEASE ORAL at 08:22

## 2024-04-06 RX ADMIN — ACETAMINOPHEN 650 MG: 325 TABLET ORAL at 15:49

## 2024-04-06 RX ADMIN — SODIUM CHLORIDE, PRESERVATIVE FREE 10 ML: 5 INJECTION INTRAVENOUS at 21:09

## 2024-04-06 RX ADMIN — RIVAROXABAN 20 MG: 20 TABLET, FILM COATED ORAL at 12:28

## 2024-04-06 RX ADMIN — INSULIN GLARGINE 38 UNITS: 100 INJECTION, SOLUTION SUBCUTANEOUS at 08:21

## 2024-04-06 RX ADMIN — SODIUM CHLORIDE, PRESERVATIVE FREE 10 ML: 5 INJECTION INTRAVENOUS at 08:23

## 2024-04-06 RX ADMIN — DIGOXIN 0.25 MG: 125 TABLET ORAL at 08:22

## 2024-04-06 RX ADMIN — FUROSEMIDE 40 MG: 10 INJECTION, SOLUTION INTRAMUSCULAR; INTRAVENOUS at 08:21

## 2024-04-06 ASSESSMENT — PAIN SCALES - GENERAL: PAINLEVEL_OUTOF10: 0

## 2024-04-06 NOTE — PROGRESS NOTES
Hospitalist Progress Note   Admit Date:  4/3/2024  2:49 PM   Name:  Brenda Mayer   Age:  69 y.o.  Sex:  female  :  1955   MRN:  [id number]   Room:  [facility]    Presenting/Chief Complaint: Fatigue     Reason(s) for Admission: Hypokalemia [E87.6]  Inability to walk [R26.2]  Volume overload [E87.70]  Acute cystitis without hematuria [N30.00]  Hypervolemia, unspecified hypervolemia type [E87.70]  Acute congestive heart failure, unspecified heart failure type (HCC) [I50.9]     Hospital Course:   Brenda Mayer is a 69 y.o. female with medical history of CHF and atrial fibrillation who presented to the ED on 4/3/2024 with cc of generalized weakness. Pt reports that she was sitting on the couch earlier in the day but was unable to stand when she attempted to do so. Denies any chest pain, SOB, abdominal pain, or dysuria.  Labs showed potassium 3.2, proBNP 1026, glucose 150, WBC 12.8, hemoglobin 9.7, and UA with 4+ bacteria.  EKG showed atrial fibrillation. CXR showed no acute abnormalities. Cardiology was consulted and she was admitted for further care.    Subjective & 24hr Events:   No acute overnight events.  Patient reports feeling better today.  Sitting in bedside chair with no complaints.    Assessment & Plan:     Generalized weakness  -PT/OT  -Due to deconditioning, volume overload, and UTI    UTI (POA, due to Enterobacter cloacae)   -Change ceftriaxone to Bactrim    Chronic HFpEF (not in exacerbation)  -Cardiology following  -Lasix 40mg IV daily  -Strict I&Os  -Monitor renal function     Chronic anemia  -Hemoglobin near baseline with no evidence of active bleeding    Hypertension  -Diltiazem, Lasix, metoprolol    Diabetes mellitus  -Sliding scale insulin  -Lantus 38 units daily     JEISON  -CPAP nightly    Morbid obesity (BMI 48.04)  -Consult dietitian as needed    Chronic atrial fibrillation  Hypercoagulable state due to atrial fibrillation  -Diltiazem, digoxin, metoprolol, Xarelto  -Cardiology

## 2024-04-06 NOTE — PROGRESS NOTES
[facility] CARDIOLOGY PROGRESS NOTE           4/6/2024 10:11 AM    Admit Date: 4/3/2024         Subjective: Diuresing well with IV lasix.    ROS:  Cardiovascular:  As noted above    Objective:      Vitals:    04/05/24 2314 04/06/24 0318 04/06/24 0340 04/06/24 0744   BP: 132/71 119/62  135/80   Pulse: 88 85  87   Resp: 20 18  18   Temp: 98.8 °F (37.1 °C) 98.4 °F (36.9 °C)  98.1 °F (36.7 °C)   TempSrc: Oral Oral  Oral   SpO2: 93% 91%  91%   Weight:   127.1 kg (280 lb 3.2 oz)    Height:             Physical Exam:  General: Well Developed, Well Nourished, No Acute Distress, Alert & Oriented x 3, Appropriate mood  Neck: supple, no JVD  Heart: S1S2 with RRR without murmurs or gallops  Lungs: Clear throughout auscultation bilaterally without adventitious sounds  Abd: soft, nontender, nondistended, with good bowel sounds  Ext: no edema bilaterally  Skin: warm and dry      Data Review:   Recent Labs     04/03/24  1538 04/03/24  2248 04/04/24  0436 04/05/24  0443 04/06/24  0408     --  142 140 139   K 3.2*  --  3.1* 3.1* 4.0   MG  --  1.9  --   --   --    BUN 12  --  11 12 12   WBC 12.8*  --  10.5  --   --    HGB 9.7*  --  9.5*  --   --    HCT 32.0*  --  31.7*  --   --      --  300  --   --        No results for input(s): \"TNIPOC\" in the last 72 hours.    Invalid input(s): \"TROIQ\"        Assessment/Plan:     Principal Problem:    Volume overload  Active Problems:    JEISON (obstructive sleep apnea)    Restrictive lung disease    Anemia    Hypertension    Controlled type 2 diabetes mellitus without complication, without long-term current use of insulin ([facility])    Class 3 obesity with alveolar hypoventilation, serious comorbidity, and body mass index (BMI) of 50.0 to 59.9 in adult ([facility])    Chronic heart failure with preserved ejection fraction (HFpEF) (HCC)    Longstanding persistent atrial fibrillation (HCC)    Chronic respiratory failure (HCC)    UTI (urinary tract infection)    Pulmonary

## 2024-04-06 NOTE — PLAN OF CARE
Problem: Discharge Planning  Goal: Discharge to home or other facility with appropriate resources  4/6/2024 0746 by Yady Pittman, RN  Outcome: Progressing  Flowsheets  Taken 4/6/2024 0741 by Yady Pittman, RN  Discharge to home or other facility with appropriate resources: Identify barriers to discharge with patient and caregiver  Taken 4/5/2024 2008 by Beena Telles RN  Discharge to home or other facility with appropriate resources:   Identify barriers to discharge with patient and caregiver   Arrange for needed discharge resources and transportation as appropriate   Identify discharge learning needs (meds, wound care, etc)   Refer to discharge planning if patient needs post-hospital services based on physician order or complex needs related to functional status, cognitive ability or social support system   Arrange for interpreters to assist at discharge as needed  4/5/2024 1821 by Minerva Rivas, RN  Outcome: Progressing     Problem: Pain  Goal: Verbalizes/displays adequate comfort level or baseline comfort level  Outcome: Progressing  Flowsheets (Taken 4/5/2024 2008 by Beena Telles, RN)  Verbalizes/displays adequate comfort level or baseline comfort level:   Encourage patient to monitor pain and request assistance   Assess pain using appropriate pain scale   Administer analgesics based on type and severity of pain and evaluate response   Implement non-pharmacological measures as appropriate and evaluate response   Consider cultural and social influences on pain and pain management   Notify Licensed Independent Practitioner if interventions unsuccessful or patient reports new pain     Problem: Safety - Adult  Goal: Free from fall injury  Outcome: Progressing     Problem: Skin/Tissue Integrity  Goal: Absence of new skin breakdown  Description: 1.  Monitor for areas of redness and/or skin breakdown  2.  Assess vascular access sites hourly  3.  Every 4-6 hours minimum:  Change oxygen saturation probe  site  4.  Every 4-6 hours:  If on nasal continuous positive airway pressure, respiratory therapy assess nares and determine need for appliance change or resting period.  Outcome: Progressing     Problem: Chronic Conditions and Co-morbidities  Goal: Patient's chronic conditions and co-morbidity symptoms are monitored and maintained or improved  Outcome: Progressing  Flowsheets  Taken 4/6/2024 0741 by Yady Pittman, RN  Care Plan - Patient's Chronic Conditions and Co-Morbidity Symptoms are Monitored and Maintained or Improved: Monitor and assess patient's chronic conditions and comorbid symptoms for stability, deterioration, or improvement  Taken 4/5/2024 2008 by Beena Telles, RN  Care Plan - Patient's Chronic Conditions and Co-Morbidity Symptoms are Monitored and Maintained or Improved:   Monitor and assess patient's chronic conditions and comorbid symptoms for stability, deterioration, or improvement   Collaborate with multidisciplinary team to address chronic and comorbid conditions and prevent exacerbation or deterioration   Update acute care plan with appropriate goals if chronic or comorbid symptoms are exacerbated and prevent overall improvement and discharge

## 2024-04-07 LAB
ANION GAP SERPL CALC-SCNC: 3 MMOL/L (ref 2–11)
BUN SERPL-MCNC: 14 MG/DL (ref 8–23)
CALCIUM SERPL-MCNC: 8.7 MG/DL (ref 8.3–10.4)
CHLORIDE SERPL-SCNC: 104 MMOL/L (ref 103–113)
CO2 SERPL-SCNC: 31 MMOL/L (ref 21–32)
CREAT SERPL-MCNC: 0.8 MG/DL (ref 0.6–1)
GLUCOSE BLD STRIP.AUTO-MCNC: 151 MG/DL (ref 65–100)
GLUCOSE BLD STRIP.AUTO-MCNC: 208 MG/DL (ref 65–100)
GLUCOSE BLD STRIP.AUTO-MCNC: 319 MG/DL (ref 65–100)
GLUCOSE SERPL-MCNC: 153 MG/DL (ref 65–100)
POTASSIUM SERPL-SCNC: 3.7 MMOL/L (ref 3.5–5.1)
SERVICE CMNT-IMP: ABNORMAL
SODIUM SERPL-SCNC: 138 MMOL/L (ref 136–146)

## 2024-04-07 PROCEDURE — 80048 BASIC METABOLIC PNL TOTAL CA: CPT

## 2024-04-07 PROCEDURE — 2580000003 HC RX 258: Performed by: INTERNAL MEDICINE

## 2024-04-07 PROCEDURE — 6370000000 HC RX 637 (ALT 250 FOR IP): Performed by: STUDENT IN AN ORGANIZED HEALTH CARE EDUCATION/TRAINING PROGRAM

## 2024-04-07 PROCEDURE — 99232 SBSQ HOSP IP/OBS MODERATE 35: CPT | Performed by: INTERNAL MEDICINE

## 2024-04-07 PROCEDURE — 36415 COLL VENOUS BLD VENIPUNCTURE: CPT

## 2024-04-07 PROCEDURE — 6370000000 HC RX 637 (ALT 250 FOR IP): Performed by: INTERNAL MEDICINE

## 2024-04-07 PROCEDURE — 82962 GLUCOSE BLOOD TEST: CPT

## 2024-04-07 PROCEDURE — 1100000003 HC PRIVATE W/ TELEMETRY

## 2024-04-07 RX ORDER — FUROSEMIDE 40 MG/1
40 TABLET ORAL DAILY
Status: DISCONTINUED | OUTPATIENT
Start: 2024-04-07 | End: 2024-04-09 | Stop reason: HOSPADM

## 2024-04-07 RX ADMIN — RIVAROXABAN 20 MG: 20 TABLET, FILM COATED ORAL at 12:10

## 2024-04-07 RX ADMIN — INSULIN GLARGINE 38 UNITS: 100 INJECTION, SOLUTION SUBCUTANEOUS at 09:41

## 2024-04-07 RX ADMIN — ACETAMINOPHEN 650 MG: 325 TABLET ORAL at 12:09

## 2024-04-07 RX ADMIN — INSULIN LISPRO 4 UNITS: 100 INJECTION, SOLUTION INTRAVENOUS; SUBCUTANEOUS at 21:18

## 2024-04-07 RX ADMIN — INSULIN GLARGINE 38 UNITS: 100 INJECTION, SOLUTION SUBCUTANEOUS at 09:40

## 2024-04-07 RX ADMIN — SODIUM CHLORIDE, PRESERVATIVE FREE 5 ML: 5 INJECTION INTRAVENOUS at 09:43

## 2024-04-07 RX ADMIN — SULFAMETHOXAZOLE AND TRIMETHOPRIM 1 TABLET: 800; 160 TABLET ORAL at 21:14

## 2024-04-07 RX ADMIN — DILTIAZEM HYDROCHLORIDE 300 MG: 180 CAPSULE, COATED, EXTENDED RELEASE ORAL at 09:41

## 2024-04-07 RX ADMIN — DIGOXIN 0.25 MG: 125 TABLET ORAL at 09:45

## 2024-04-07 RX ADMIN — FUROSEMIDE 40 MG: 40 TABLET ORAL at 09:41

## 2024-04-07 RX ADMIN — SODIUM CHLORIDE, PRESERVATIVE FREE 10 ML: 5 INJECTION INTRAVENOUS at 21:14

## 2024-04-07 RX ADMIN — SULFAMETHOXAZOLE AND TRIMETHOPRIM 1 TABLET: 800; 160 TABLET ORAL at 09:42

## 2024-04-07 RX ADMIN — METOPROLOL SUCCINATE 25 MG: 25 TABLET, EXTENDED RELEASE ORAL at 09:40

## 2024-04-07 RX ADMIN — INSULIN LISPRO 1 UNITS: 100 INJECTION, SOLUTION INTRAVENOUS; SUBCUTANEOUS at 18:32

## 2024-04-07 ASSESSMENT — PAIN DESCRIPTION - LOCATION: LOCATION: FOOT

## 2024-04-07 ASSESSMENT — PAIN SCALES - GENERAL: PAINLEVEL_OUTOF10: 4

## 2024-04-07 NOTE — NURSE NAVIGATOR
Signed         CHF teaching provided to the patient in both verbal and written format.   1. Discussed what heart failure is.  2. Discussed the importance of following up with their cardiologist.  3. Discussed the importance of medication compliance.  4. Discussed the important of weighing daily and recording the weight in the monthly calendar provided.  Instructed the pt to carry the calendar to their Cardiologist visits.   5. Cardiac Diet reviewed.  (Salt/fluid restrictions)    6. Reinforce when to call Cardiology STAT, if any of following occurs:  Shortness of Breath: with or without activity   Generalized weakness  Edema  Weight gain >=2 lbs or more a day or >=5 lbs or more per week  The patient verbalized understanding and successfully passed the CHF post test. They denied any questions.  Instructed the patient to please call me at anytime with questions or for further teaching. The Heart Failure Navigator number was provided to them.   Patient states she is familiar with these teachings.

## 2024-04-07 NOTE — PROGRESS NOTES
[facility] CARDIOLOGY PROGRESS NOTE           4/7/2024 8:39 AM    Admit Date: 4/3/2024         Subjective: Doing well, appears euvolemic.    ROS:  Cardiovascular:  As noted above    Objective:      Vitals:    04/06/24 1943 04/06/24 2320 04/07/24 0315 04/07/24 0726   BP: 120/69 118/60 130/79 133/80   Pulse: 81 93 (!) 101 85   Resp: 18 18 19 16   Temp: 97.9 °F (36.6 °C) 99.1 °F (37.3 °C) 98.1 °F (36.7 °C) 98.4 °F (36.9 °C)   TempSrc: Oral   Oral   SpO2: 91% 92% 92% 90%   Weight:       Height:             Physical Exam:  General: Well Developed, Well Nourished, No Acute Distress, Alert & Oriented x 3, Appropriate mood  Neck: supple, no JVD  Heart: S1S2 with RRR without murmurs or gallops  Lungs: Clear throughout auscultation bilaterally without adventitious sounds  Abd: soft, nontender, nondistended, with good bowel sounds  Ext: no edema bilaterally  Skin: warm and dry      Data Review:   Recent Labs     04/06/24  0408 04/07/24  0432    138   K 4.0 3.7   BUN 12 14       No results for input(s): \"TNIPOC\" in the last 72 hours.    Invalid input(s): \"TROIQ\"        Assessment/Plan:     Principal Problem:    Volume overload  Active Problems:    JEISON (obstructive sleep apnea)    Restrictive lung disease    Anemia    Hypertension    Controlled type 2 diabetes mellitus without complication, without long-term current use of insulin ([facility])    Class 3 obesity with alveolar hypoventilation, serious comorbidity, and body mass index (BMI) of 50.0 to 59.9 in adult (HCC)    Chronic heart failure with preserved ejection fraction (HFpEF) ([facility])    Longstanding persistent atrial fibrillation (HCC)    Chronic respiratory failure (HCC)    UTI (urinary tract infection)    Pulmonary hypertension ([facility])    Hypokalemia  Resolved Problems:    * No resolved hospital problems. *    A/P  1) Volume overload - resolved, please d/c on lasix 40 mg daily and BMP in one week (via rehab). Will arrange cardiology follow up in 2-4

## 2024-04-07 NOTE — PROGRESS NOTES
Hospitalist Progress Note   Admit Date:  4/3/2024  2:49 PM   Name:  Brenda Mayer   Age:  69 y.o.  Sex:  female  :  1955   MRN:  [id number]   Room:  [facility]    Presenting/Chief Complaint: Fatigue     Reason(s) for Admission: Hypokalemia [E87.6]  Inability to walk [R26.2]  Volume overload [E87.70]  Acute cystitis without hematuria [N30.00]  Hypervolemia, unspecified hypervolemia type [E87.70]  Acute congestive heart failure, unspecified heart failure type (HCC) [I50.9]     Hospital Course:   Brenda Mayer is a 69 y.o. female with medical history of CHF and atrial fibrillation who presented to the ED on 4/3/2024 with cc of generalized weakness. Pt reports that she was sitting on the couch earlier in the day but was unable to stand when she attempted to do so. Denies any chest pain, SOB, abdominal pain, or dysuria.  Labs showed potassium 3.2, proBNP 1026, glucose 150, WBC 12.8, hemoglobin 9.7, and UA with 4+ bacteria.  EKG showed atrial fibrillation. CXR showed no acute abnormalities. Cardiology was consulted and she was admitted for further care.    Subjective & 24hr Events:   No acute overnight events.  Patient resting in bed with no complaints this morning. BLE edema and weakness are improving.    Assessment & Plan:     Generalized weakness  -PT/OT  -Due to deconditioning, volume overload, and UTI    UTI (POA, due to Enterobacter cloacae)   -Continue Bactrim x 7 total days     Chronic HFpEF (not in exacerbation)  -Cardiology following  -Change lasix to 40mg PO daily   -Strict I&Os  -Monitor renal function     Chronic anemia  -Hemoglobin near baseline with no evidence of active bleeding    Hypertension  -Diltiazem, Lasix, metoprolol    Diabetes mellitus  -Sliding scale insulin  -Lantus 38 units daily     JEISON  -CPAP nightly    Morbid obesity (BMI 48.04)  -Consult dietitian as needed    Chronic atrial fibrillation  Hypercoagulable state due to atrial fibrillation  -Diltiazem, digoxin, metoprolol,

## 2024-04-08 LAB
GLUCOSE BLD STRIP.AUTO-MCNC: 168 MG/DL (ref 65–100)
GLUCOSE BLD STRIP.AUTO-MCNC: 178 MG/DL (ref 65–100)
GLUCOSE BLD STRIP.AUTO-MCNC: 192 MG/DL (ref 65–100)
GLUCOSE BLD STRIP.AUTO-MCNC: 205 MG/DL (ref 65–100)
SERVICE CMNT-IMP: ABNORMAL

## 2024-04-08 PROCEDURE — 97530 THERAPEUTIC ACTIVITIES: CPT

## 2024-04-08 PROCEDURE — 1100000003 HC PRIVATE W/ TELEMETRY

## 2024-04-08 PROCEDURE — 97116 GAIT TRAINING THERAPY: CPT

## 2024-04-08 PROCEDURE — 82962 GLUCOSE BLOOD TEST: CPT

## 2024-04-08 PROCEDURE — 6370000000 HC RX 637 (ALT 250 FOR IP): Performed by: INTERNAL MEDICINE

## 2024-04-08 PROCEDURE — 99221 1ST HOSP IP/OBS SF/LOW 40: CPT | Performed by: STUDENT IN AN ORGANIZED HEALTH CARE EDUCATION/TRAINING PROGRAM

## 2024-04-08 PROCEDURE — 6370000000 HC RX 637 (ALT 250 FOR IP): Performed by: STUDENT IN AN ORGANIZED HEALTH CARE EDUCATION/TRAINING PROGRAM

## 2024-04-08 PROCEDURE — 2580000003 HC RX 258: Performed by: INTERNAL MEDICINE

## 2024-04-08 RX ORDER — TIRZEPATIDE 2.5 MG/.5ML
0.5 INJECTION, SOLUTION SUBCUTANEOUS WEEKLY
COMMUNITY

## 2024-04-08 RX ADMIN — INSULIN GLARGINE 38 UNITS: 100 INJECTION, SOLUTION SUBCUTANEOUS at 08:25

## 2024-04-08 RX ADMIN — DILTIAZEM HYDROCHLORIDE 300 MG: 180 CAPSULE, COATED, EXTENDED RELEASE ORAL at 08:26

## 2024-04-08 RX ADMIN — ACETAMINOPHEN 650 MG: 325 TABLET ORAL at 08:34

## 2024-04-08 RX ADMIN — FUROSEMIDE 40 MG: 40 TABLET ORAL at 08:25

## 2024-04-08 RX ADMIN — DICLOFENAC SODIUM 2 G: 10 GEL TOPICAL at 20:27

## 2024-04-08 RX ADMIN — ACETAMINOPHEN 650 MG: 325 TABLET ORAL at 16:55

## 2024-04-08 RX ADMIN — DICLOFENAC SODIUM 2 G: 10 GEL TOPICAL at 09:36

## 2024-04-08 RX ADMIN — METOPROLOL SUCCINATE 25 MG: 25 TABLET, EXTENDED RELEASE ORAL at 08:26

## 2024-04-08 RX ADMIN — OXYCODONE 5 MG: 5 TABLET ORAL at 16:55

## 2024-04-08 RX ADMIN — SODIUM CHLORIDE, PRESERVATIVE FREE 10 ML: 5 INJECTION INTRAVENOUS at 20:26

## 2024-04-08 RX ADMIN — INSULIN LISPRO 2 UNITS: 100 INJECTION, SOLUTION INTRAVENOUS; SUBCUTANEOUS at 16:54

## 2024-04-08 RX ADMIN — OXYCODONE 5 MG: 5 TABLET ORAL at 12:31

## 2024-04-08 RX ADMIN — DIGOXIN 0.25 MG: 125 TABLET ORAL at 08:25

## 2024-04-08 RX ADMIN — OXYCODONE 5 MG: 5 TABLET ORAL at 08:33

## 2024-04-08 RX ADMIN — RIVAROXABAN 20 MG: 20 TABLET, FILM COATED ORAL at 12:31

## 2024-04-08 RX ADMIN — SULFAMETHOXAZOLE AND TRIMETHOPRIM 1 TABLET: 800; 160 TABLET ORAL at 08:26

## 2024-04-08 RX ADMIN — SULFAMETHOXAZOLE AND TRIMETHOPRIM 1 TABLET: 800; 160 TABLET ORAL at 20:26

## 2024-04-08 RX ADMIN — SODIUM CHLORIDE, PRESERVATIVE FREE 10 ML: 5 INJECTION INTRAVENOUS at 08:36

## 2024-04-08 ASSESSMENT — PAIN DESCRIPTION - LOCATION
LOCATION: SHOULDER
LOCATION: HEAD
LOCATION: SHOULDER
LOCATION: ARM
LOCATION: BACK
LOCATION: SHOULDER

## 2024-04-08 ASSESSMENT — PAIN SCALES - GENERAL
PAINLEVEL_OUTOF10: 3
PAINLEVEL_OUTOF10: 1
PAINLEVEL_OUTOF10: 6
PAINLEVEL_OUTOF10: 0
PAINLEVEL_OUTOF10: 0
PAINLEVEL_OUTOF10: 5
PAINLEVEL_OUTOF10: 3
PAINLEVEL_OUTOF10: 4

## 2024-04-08 ASSESSMENT — PAIN DESCRIPTION - DESCRIPTORS
DESCRIPTORS: ACHING
DESCRIPTORS: ACHING;DISCOMFORT

## 2024-04-08 ASSESSMENT — PAIN DESCRIPTION - ORIENTATION
ORIENTATION: LEFT
ORIENTATION: LEFT

## 2024-04-08 ASSESSMENT — PAIN SCALES - WONG BAKER: WONGBAKER_NUMERICALRESPONSE: HURTS LITTLE MORE

## 2024-04-08 NOTE — CONSULTS
Morbidly obese  SKIN: No visible open wounds. Good skin turgor.   MSK:  Motor:           - Inspection: No atrophy/fasciculations/tremors          - Bulk: normal          - Tone: no spasticity or rigidity           - Muscle Strength: Antigravity strength in upper and lower extremities  NEURO: No new focal motor or sensory deficits.  PSYCH: calm and cooperative, normal insight and judgement     Recent Results (from the past 72 hour(s))   POCT Glucose    Collection Time: 04/05/24  5:05 PM   Result Value Ref Range    POC Glucose 158 (H) 65 - 100 mg/dL    Performed by: Perlita    POCT Glucose    Collection Time: 04/05/24  8:44 PM   Result Value Ref Range    POC Glucose 190 (H) 65 - 100 mg/dL    Performed by: Jersey    Basic Metabolic Panel    Collection Time: 04/06/24  4:08 AM   Result Value Ref Range    Sodium 139 136 - 146 mmol/L    Potassium 4.0 3.5 - 5.1 mmol/L    Chloride 107 103 - 113 mmol/L    CO2 30 21 - 32 mmol/L    Anion Gap 2 2 - 11 mmol/L    Glucose 150 (H) 65 - 100 mg/dL    BUN 12 8 - 23 MG/DL    Creatinine 0.70 0.6 - 1.0 MG/DL    Est, Glom Filt Rate >90 >60 ml/min/1.73m2    Calcium 8.9 8.3 - 10.4 MG/DL   POCT Glucose    Collection Time: 04/06/24  8:02 AM   Result Value Ref Range    POC Glucose 139 (H) 65 - 100 mg/dL    Performed by: Jenifer    POCT Glucose    Collection Time: 04/06/24 11:25 AM   Result Value Ref Range    POC Glucose 160 (H) 65 - 100 mg/dL    Performed by: Darrell    POCT Glucose    Collection Time: 04/06/24  4:33 PM   Result Value Ref Range    POC Glucose 176 (H) 65 - 100 mg/dL    Performed by: Darrell    POCT Glucose    Collection Time: 04/06/24  8:21 PM   Result Value Ref Range    POC Glucose 181 (H) 65 - 100 mg/dL    Performed by: AbercrombieAshlynADN    Basic Metabolic Panel w/ Reflex to MG    Collection Time: 04/07/24  4:32 AM   Result Value Ref Range    Sodium 138 136 - 146 mmol/L    Potassium 3.7 3.5 - 5.1 mmol/L    Chloride 104 103 - 113 mmol/L    CO2  shows no acute process.  Limited imaging through the tibiotalar joint is normal.    Impression  IMPRESSION:  1. Degenerative osteoarthrosis of the knee as outlined above.       Xray Result (most recent):  XR CHEST PORTABLE 04/03/2024    Narrative  Chest X-ray    INDICATION: Shortness of breath    COMPARISON: CT of the chest June 8, 2022    TECHNIQUE: AP/PA view of the chest was obtained.    FINDINGS: The lungs are clear. There are no infiltrates or effusions.  The heart  size is globally enlarged. The bony thorax is intact.    Impression  No acute findings in the chest       MRI Result (most recent):  No results found for this or any previous visit from the past 3650 days.           Condition on Admission: Good/Fair        Assessment and Plan:     //Gait and Self-care deficits secondary to debility  -Sedentary lifestyle, chronically deconditioned  //Morbid obesity-contributing to overall medical and physical decline  //Decreased independence with ADLs  //Gait instability  -Denied for inpatient rehab services at this time.  Functionally, patient is close to baseline.  Furthermore, patient without significant underlying medical problems, and is currently low risk for decompensation and therefore does not require continued close physician monitoring or 24-hour rehabilitation nursing care. Current functional and medical status can be treated and monitored at a home versus lower level care facility. Patient does not require acute rehabilitation setting at this time.  -Patient currently functioning at too high of level to meet admission criteria: Contact-guard assistance for mobility and transfers, ambulating 20 feet with contact-guard assistance and rolling walker.  She requires assistance for IADLs at baseline  -If patient is still requiring physician oversight at the time of discharge, patient would benefit from a less intense therapy schedule over a prolonged duration of time in a setting such as a SNF.  -Thank you  for this consult. PM&R will sign off at this time          Code status: Full Code     Lynnette Denis D.O. PM&R  Inpatient Rehabilitation Medical Director    April 8, 2024

## 2024-04-08 NOTE — DIABETES MGMT
Patient admitted with Volume overload. Admit blood glucose 150.  HgbA1C 8.0 (eAG 183).  Blood glucose ranged 151-319 yesterday with patient receiving Lantus 38 units and Humalog 5 units. Blood glucose this morning was 168 and most recent 178. Reviewed patient current regimen: Lantus 38 units daily and Humalog correctional insulin.      Patient seen for assessment regarding diabetes management. Patient has a past medical history of JEISON, restrictive lung disease, DM, CHF, pulmonary HTN. Patient states they have been living with diabetes for 2 years and voices no family history of diabetes. Patient states they do have a working glucometer with supplies at home. Per patient they typically check blood glucose levels daily in the morning. Per patient their blood glucose levels have been running 150 at home. Patient states they are currently taking Basaglar 38 units HS, Metformin 1000 mg daily, and Mounjaro 2.5 mg every Saturday at home for management of diabetes. Patient voices that they have not experienced hypoglycemia in the past. Educated regarding hypoglycemia signs, symptoms, and treatment. Patient has not attended formal diabetes education in the past. Patient reports no difficulty with affording their diabetic supplies. Patient does voice frustration with getting medications from pharmacy as was on Trulicity however pharmacy did not have any in stock. Discussed target goals for blood glucose and A1C.  Discussed target goals for blood glucose and A1C.  Encouraged compliance with discharge regimen. Encouraged patient to continue to work on lifestyle modifications and to follow up with primary care provider (Bautista Esparza). Patient verbalized understanding and voices no further questions regarding diabetes management at this time.

## 2024-04-08 NOTE — CARE COORDINATION
MSN, CM:  patient denied from [facility] today.  [facility] to evaluate in the AM.  Case Management will continue to follow.

## 2024-04-08 NOTE — PLAN OF CARE
Problem: Pain  Goal: Verbalizes/displays adequate comfort level or baseline comfort level  4/8/2024 0913 by Damaris Sanchez, RN  Outcome: Progressing  4/8/2024 0320 by Mendel Cruz, RN  Outcome: Progressing

## 2024-04-08 NOTE — PLAN OF CARE
Problem: Discharge Planning  Goal: Discharge to home or other facility with appropriate resources  4/8/2024 0320 by Mendel Cruz RN  Outcome: Progressing  4/7/2024 1737 by Sarai Rodrigues RN  Outcome: Progressing     Problem: Pain  Goal: Verbalizes/displays adequate comfort level or baseline comfort level  4/8/2024 0320 by Mendel Cruz RN  Outcome: Progressing  4/7/2024 1737 by Sarai Rodrigues RN  Outcome: Progressing     Problem: Safety - Adult  Goal: Free from fall injury  Outcome: Progressing     Problem: Skin/Tissue Integrity  Goal: Absence of new skin breakdown  Outcome: Progressing     Problem: Chronic Conditions and Co-morbidities  Goal: Patient's chronic conditions and co-morbidity symptoms are monitored and maintained or improved  Outcome: Progressing

## 2024-04-08 NOTE — PROGRESS NOTES
Pt verbalized that she has very bad pain to her L arm and shoulder. Pt states that she has arthritis. Will make MD aware.

## 2024-04-08 NOTE — PROGRESS NOTES
ACUTE PHYSICAL THERAPY GOALS:   (Developed with and agreed upon by patient and/or caregiver.)  LTG:  (1.)Ms. Mayer will move from supine to sit and sit to supine , scoot up and down, and roll side to side in bed with CONTACT GUARD ASSIST within 7 treatment day(s).    (2.)Ms. Mayer will transfer from bed to chair and chair to bed with CONTACT GUARD ASSIST using the least restrictive device within 7 treatment day(s).    (3.)Ms. Mayer will ambulate with CONTACT GUARD ASSIST for 100 feet with the least restrictive device within 7 treatment day(s).  (4.)Ms. Mayer will tolerate at least 23 min of dynamic standing activity to assist standing ADLs with the least restrictive device within 7 treatment days.    PHYSICAL THERAPY: Daily Note AM   (Link to Caseload Tracking: PT Visit Days : 3  Time In/Out PT Charge Capture  Rehab Caseload Tracker  Orders    Brenda Mayer is a 69 y.o. female   PRIMARY DIAGNOSIS: Volume overload  Hypokalemia [E87.6]  Inability to walk [R26.2]  Volume overload [E87.70]  Acute cystitis without hematuria [N30.00]  Hypervolemia, unspecified hypervolemia type [E87.70]  Acute congestive heart failure, unspecified heart failure type (HCC) [I50.9]       Inpatient: Payor: MEDICARE / Plan: MEDICARE PART A AND B / Product Type: *No Product type* /     ASSESSMENT:     REHAB RECOMMENDATIONS:   Recommendation to date pending progress:  Setting:  Inpatient Rehab Facility    Equipment:    To Be Determined     ASSESSMENT:  Ms. Mayer seen for follow up PT session. Pt presenting with decreased strength, decreased bed mobility, decreased standing balance requiring the use of a rolling walker and decreased in gait kinematics maintaining bilateral knees in full extension. Pt ambulated 20 feet x 2 today with SB A with rolling walker with cues for heel to toe off however continuing to presenting with a guarded posture in standing. Pt will benefit from continuing skilled PT services while in the  acute care facility to assist with deficits noted above and recommending rehab before discharge back home.     SUBJECTIVE:   Ms. Mayer states, \"My left shoulder hurts today\"     Social/Functional Lives With: Alone  Type of Home: House  Home Layout: Two level, Able to Live on Main level with bedroom/bathroom  Home Access: Level entry  Bathroom Shower/Tub: Walk-in shower  Bathroom Toilet: Standard  Bathroom Equipment: None  Bathroom Accessibility: Accessible  Home Equipment: Cane, Walker, rolling, Wheelchair-manual  Receives Help From: Family  ADL Assistance: Independent  Homemaking Assistance: Independent  Homemaking Responsibilities: Yes  Ambulation Assistance: Independent  Transfer Assistance: Independent  Active : Yes  Mode of Transportation: Car  Occupation: Retired  Additional Comments: Lives alone. Independent, cane. no falls. walk in shower w/ shower chair, elevated toilet. Uses leg lift for bed mobility.  OBJECTIVE:     PAIN: VITALS / O2: PRECAUTION / LINES / DRAINS:   Pre Treatment:   Pain Assessment: 0-10  Pain Level: 5  Pain Location: Shoulder  Non-Pharmaceutical Pain Intervention(s): Repositioned  Response to Pain Intervention: Pain improved but above pain goal      Post Treatment: 3/10 at rest Vitals        Oxygen   RA External Catheter    RESTRICTIONS/PRECAUTIONS:  Restrictions/Precautions  Restrictions/Precautions: Fall Risk  Restrictions/Precautions: Fall Risk     MOBILITY: I Mod I S SBA CGA Min Mod Max Total  NT x2 Comments:   Bed Mobility    Rolling [] [] [] [] [x] [] [] [] [] [] []    Supine to Sit [] [] [] [] [] [x] [] [] [] [] []    Scooting [] [] [] [] [x] [] [] [] [] [] []    Sit to Supine [] [] [] [] [x] [] [] [] [] [] [] To LE   Transfers    Sit to Stand [] [] [] [] [] [x] [x] [] [] [] [] X 2 reps with RW; rocking several times for momentum, sit to standing from the recliner with S Ba and bedside commode with SB  A   Bed to Chair [] [] [] [] [x] [] [] [] [] [] []    Stand to Sit []  Education Given To: Patient  Education Provided:  (Bed mobility using the bed rails, sit to standing from all levels surfaces with cues for displacement of COG forward, gait with rolling walker with cues for heel to toe for with constant demonstratrion)    TIME IN/OUT:  Time In: 0915  Time Out: 1000  Minutes: 45    Daren Gann, PT

## 2024-04-08 NOTE — PROGRESS NOTES
Pt currently up in the chair. Verbalized that the pain medication along with the arthritis cream has helped her a lot today. Explained to pt that once she got her pain under control , then we can manage it better with only Tylenol and the arthritis cream. Pt also asked about leaving today, was told that  is still waiting on her approval to rehab. Pt verbalized understanding. No other needs at this time, will continue to monitor. Call light within reach.

## 2024-04-08 NOTE — PROGRESS NOTES
Hospitalist Progress Note   Admit Date:  4/3/2024  2:49 PM   Name:  Brenda Mayer   Age:  69 y.o.  Sex:  female  :  1955   MRN:  [id number]   Room:  [facility]    Presenting/Chief Complaint: Fatigue     Reason(s) for Admission: Hypokalemia [E87.6]  Inability to walk [R26.2]  Volume overload [E87.70]  Acute cystitis without hematuria [N30.00]  Hypervolemia, unspecified hypervolemia type [E87.70]  Acute congestive heart failure, unspecified heart failure type (HCC) [I50.9]     Hospital Course:   Brenda Mayer is a 69 y.o. female with medical history of CHF and atrial fibrillation who presented to the ED on 4/3/2024 with cc of generalized weakness. Pt reports that she was sitting on the couch earlier in the day but was unable to stand when she attempted to do so. Denies any chest pain, SOB, abdominal pain, or dysuria.  Labs showed potassium 3.2, proBNP 1026, glucose 150, WBC 12.8, hemoglobin 9.7, and UA with 4+ bacteria.  EKG showed atrial fibrillation. CXR showed no acute abnormalities. Cardiology was consulted and she was admitted for further care.    Subjective & 24hr Events:   No acute overnight events. Pt resting in bed this morning. Complains of chronic left shoulder pain from arthritis.     Assessment & Plan:     Generalized weakness  -PT/OT  -Due to deconditioning, volume overload, and UTI  -Improving    UTI (POA, due to Enterobacter cloacae)   -Continue Bactrim x 7 total days (last dose 24 PM)    Chronic HFpEF (not in exacerbation)  -Cardiology following  -Continue lasix 40mg PO daily     Chronic left shoulder pain  -Tylenol PRN  -Diclofenac gel PRN    Chronic anemia  -Hemoglobin near baseline with no evidence of active bleeding    Hypertension  -Diltiazem, Lasix, metoprolol    Diabetes mellitus  -Sliding scale insulin  -Lantus 38 units daily     JEISON  -CPAP nightly    Morbid obesity (BMI 48.04)  -Consult dietitian as needed    Chronic atrial fibrillation  Hypercoagulable state due to  imaging.    Signed:  Carlos Holm,

## 2024-04-09 ENCOUNTER — HOME HEALTH ADMISSION (OUTPATIENT)
Dept: HOME HEALTH SERVICES | Facility: HOME HEALTH | Age: 69
End: 2024-04-09
Payer: MEDICARE

## 2024-04-09 VITALS
OXYGEN SATURATION: 92 % | TEMPERATURE: 98.1 F | BODY MASS INDEX: 43.6 KG/M2 | HEART RATE: 82 BPM | SYSTOLIC BLOOD PRESSURE: 109 MMHG | RESPIRATION RATE: 18 BRPM | HEIGHT: 67 IN | DIASTOLIC BLOOD PRESSURE: 77 MMHG | WEIGHT: 277.8 LBS

## 2024-04-09 LAB
GLUCOSE BLD STRIP.AUTO-MCNC: 154 MG/DL (ref 65–100)
GLUCOSE BLD STRIP.AUTO-MCNC: 156 MG/DL (ref 65–100)
GLUCOSE BLD STRIP.AUTO-MCNC: 195 MG/DL (ref 65–100)
SERVICE CMNT-IMP: ABNORMAL

## 2024-04-09 PROCEDURE — 82962 GLUCOSE BLOOD TEST: CPT

## 2024-04-09 PROCEDURE — 6370000000 HC RX 637 (ALT 250 FOR IP): Performed by: INTERNAL MEDICINE

## 2024-04-09 PROCEDURE — 6370000000 HC RX 637 (ALT 250 FOR IP): Performed by: STUDENT IN AN ORGANIZED HEALTH CARE EDUCATION/TRAINING PROGRAM

## 2024-04-09 RX ORDER — METOPROLOL SUCCINATE 25 MG/1
25 TABLET, EXTENDED RELEASE ORAL DAILY
Qty: 30 TABLET | Refills: 1 | Status: SHIPPED | OUTPATIENT
Start: 2024-04-10

## 2024-04-09 RX ORDER — FUROSEMIDE 40 MG/1
40 TABLET ORAL DAILY
Qty: 30 TABLET | Refills: 1 | Status: SHIPPED | OUTPATIENT
Start: 2024-04-09

## 2024-04-09 RX ORDER — INSULIN GLARGINE 100 [IU]/ML
40 INJECTION, SOLUTION SUBCUTANEOUS
Status: DISCONTINUED | OUTPATIENT
Start: 2024-04-09 | End: 2024-04-09 | Stop reason: HOSPADM

## 2024-04-09 RX ADMIN — ACETAMINOPHEN 650 MG: 325 TABLET ORAL at 12:44

## 2024-04-09 RX ADMIN — DIGOXIN 0.25 MG: 125 TABLET ORAL at 08:41

## 2024-04-09 RX ADMIN — METOPROLOL SUCCINATE 25 MG: 25 TABLET, EXTENDED RELEASE ORAL at 08:42

## 2024-04-09 RX ADMIN — RIVAROXABAN 20 MG: 20 TABLET, FILM COATED ORAL at 12:44

## 2024-04-09 RX ADMIN — DICLOFENAC SODIUM 2 G: 10 GEL TOPICAL at 08:46

## 2024-04-09 RX ADMIN — FUROSEMIDE 40 MG: 40 TABLET ORAL at 08:42

## 2024-04-09 RX ADMIN — DILTIAZEM HYDROCHLORIDE 300 MG: 180 CAPSULE, COATED, EXTENDED RELEASE ORAL at 08:42

## 2024-04-09 RX ADMIN — INSULIN GLARGINE 40 UNITS: 100 INJECTION, SOLUTION SUBCUTANEOUS at 08:42

## 2024-04-09 RX ADMIN — SULFAMETHOXAZOLE AND TRIMETHOPRIM 1 TABLET: 800; 160 TABLET ORAL at 08:42

## 2024-04-09 ASSESSMENT — PAIN DESCRIPTION - DESCRIPTORS: DESCRIPTORS: ACHING

## 2024-04-09 ASSESSMENT — PAIN DESCRIPTION - LOCATION: LOCATION: SHOULDER

## 2024-04-09 ASSESSMENT — PAIN DESCRIPTION - ORIENTATION: ORIENTATION: LEFT

## 2024-04-09 ASSESSMENT — PAIN SCALES - GENERAL: PAINLEVEL_OUTOF10: 4

## 2024-04-09 NOTE — CARE COORDINATION
REED VELASQUEZ:  patient to be discharged home with [facility].  Patient agrees with this discharge plan and has met all milestones for this admission.  Family to transport patient home.       04/09/24 1126   Service Assessment   Patient Orientation Alert and Oriented   Cognition Alert   Services At/After Discharge   Transition of Care Consult (CM Consult) Home Health  ([facility])   Internal Home Health Yes   Services At/After Discharge PT;Nursing services;OT   Mode of Transport at Discharge Other (see comment)  (family to transport)   Confirm Follow Up Transport Self   Condition of Participation: Discharge Planning   The Plan for Transition of Care is related to the following treatment goals: Home Health   The Patient and/or Patient Representative was provided with a Choice of Provider? Patient   The Patient and/Or Patient Representative agree with the Discharge Plan? Yes   Freedom of Choice list was provided with basic dialogue that supports the patient's individualized plan of care/goals, treatment preferences, and shares the quality data associated with the providers?  Yes

## 2024-04-09 NOTE — PLAN OF CARE
Reviewed discharge instructions with patient allowed time for questions and answered. No S&S of acute distress noted at this time.

## 2024-04-09 NOTE — DISCHARGE SUMMARY
Hospitalist Discharge Summary   Admit Date:  4/3/2024  2:49 PM   DC Note date: 2024  Name:  Brenda Mayer   Age:  69 y.o.  Sex:  female  :  1955   MRN:  [id number]   Room:  [facility]  PCP:  Bautista Esparza MD    Presenting Complaint: Fatigue     Initial Admission Diagnosis: Hypokalemia [E87.6]  Inability to walk [R26.2]  Volume overload [E87.70]  Acute cystitis without hematuria [N30.00]  Hypervolemia, unspecified hypervolemia type [E87.70]  Acute congestive heart failure, unspecified heart failure type (HCC) [I50.9]     Problem List for this Hospitalization (present on admission):    Principal Problem:    Volume overload  Active Problems:    JEISON (obstructive sleep apnea)    Restrictive lung disease    Anemia    Hypertension    Controlled type 2 diabetes mellitus without complication, without long-term current use of insulin (HCC)    Class 3 obesity with alveolar hypoventilation, serious comorbidity, and body mass index (BMI) of 50.0 to 59.9 in adult (HCC)    Chronic heart failure with preserved ejection fraction (HFpEF) (HCC)    Longstanding persistent atrial fibrillation (HCC)    Chronic respiratory failure (HCC)    UTI (urinary tract infection)    Pulmonary hypertension (HCC)    Hypokalemia  Resolved Problems:    * No resolved hospital problems. *      Hospital Course:  Brenda Mayer is a 69 y.o. female with medical history of CHF and atrial fibrillation who presented to the ED on 4/3/2024 with cc of generalized weakness. Pt reports that she was sitting on the couch earlier in the day but was unable to stand when she attempted to do so. Denies any chest pain, SOB, abdominal pain, or dysuria.  Labs showed potassium 3.2, proBNP 1026, glucose 150, WBC 12.8, hemoglobin 9.7, and UA with 4+ bacteria.  EKG showed atrial fibrillation. CXR showed no acute abnormalities. Cardiology was consulted and she was admitted for further care. Patient's weakness improved with treatment of her Enterobacter  cloacae UTI and volume overload with lasix. PT/OT initially recommended rehab, but patient continued to improve and was stable to discharge home with home health on 4/9. She completed 7 days of antibiotics for UTI. Will be discharged on lasix 40mg daily. Will follow up with Cardiology in 1 week and with PCP in 2 weeks.     Disposition: Home with Home Health  Diet: ADULT DIET; Regular; 3 carb choices (45 gm/meal); Low Fat/Low Chol/High Fiber/SILVIA  Code Status: Full Code    Follow Ups:   Follow-up Information     [facility] CARDIOLOGY. Schedule an appointment as soon as possible for a   visit in 1 week(s).    Specialty: Cardiology  Contact information:  [address] [facility]  [address]  [facility] [address] [phone]           Bautista Esparza MD Follow up in 2 week(s).    Specialty: Internal Medicine  Contact information:  [address] [facility] [address] [phone]                       Time spent in patient discharge and coordination 40 minutes.    Follow up labs/diagnostics:  none    Plan was discussed with patient.  All questions answered.  Patient was stable at time of discharge.  Instructions given to call a physician or return if any concerns.    Current Discharge Medication List        CONTINUE these medications which have CHANGED    Details   furosemide (LASIX) 40 MG tablet Take 1 tablet by mouth daily  Qty: 30 tablet, Refills: 1      metoprolol succinate (TOPROL XL) 25 MG extended release tablet Take 1 tablet by mouth daily  Qty: 30 tablet, Refills: 1           CONTINUE these medications which have NOT CHANGED    Details   Tirzepatide (MOUNJARO) 2.5 MG/0.5ML SOPN SC injection Inject 0.5 mLs into the skin once a week Patient takes on Saturday.      digoxin (LANOXIN) 250 MCG tablet Take 1 tablet by mouth daily  Qty: 90 tablet, Refills: 3      BASAGLAR KWIKPEN 100 UNIT/ML injection pen Inject 38 Units into the skin daily (before lunch)      metFORMIN (GLUCOPHAGE-XR) 500 MG extended

## 2024-04-09 NOTE — PLAN OF CARE
Problem: Discharge Planning  Goal: Discharge to home or other facility with appropriate resources  Outcome: Progressing     Problem: Pain  Goal: Verbalizes/displays adequate comfort level or baseline comfort level  Outcome: Progressing     Problem: Safety - Adult  Goal: Free from fall injury  Outcome: Progressing     Problem: Skin/Tissue Integrity  Goal: Absence of new skin breakdown  Outcome: Progressing     Problem: Chronic Conditions and Co-morbidities  Goal: Patient's chronic conditions and co-morbidity symptoms are monitored and maintained or improved  Outcome: Progressing

## 2024-04-10 ENCOUNTER — TELEPHONE (OUTPATIENT)
Dept: HOME HEALTH SERVICES | Facility: HOME HEALTH | Age: 69
End: 2024-04-10

## 2024-04-10 NOTE — TELEPHONE ENCOUNTER
[facility] Pharmacist consult.  Mrs. Mayer was discharged from [facility] with CHF.  I explained that I was a pharmacist with .  I reviewed her medications.  She is having no swelling at this time.  She wanted to know why her pantoprazole was stopped.  I saw per her record that it was stopped on 4/3 with the note therapy completed.  I explained it was for bad heartburn and acid reflux.  She said she had never had that so did not know why she had been on it.  It is stopped now, so she does not need to take any she has left.  She verified understanding.  I asked her to call me with any medication questions.  She said OK to call back any time.

## 2024-04-11 ENCOUNTER — HOME CARE VISIT (OUTPATIENT)
Dept: SCHEDULING | Facility: HOME HEALTH | Age: 69
End: 2024-04-11

## 2024-04-11 PROCEDURE — G0299 HHS/HOSPICE OF RN EA 15 MIN: HCPCS

## 2024-04-11 PROCEDURE — 0221000100 HH NO PAY CLAIM PROCEDURE

## 2024-04-12 VITALS
OXYGEN SATURATION: 95 % | TEMPERATURE: 98 F | SYSTOLIC BLOOD PRESSURE: 144 MMHG | HEART RATE: 92 BPM | DIASTOLIC BLOOD PRESSURE: 72 MMHG

## 2024-04-12 ASSESSMENT — ENCOUNTER SYMPTOMS
DYSPNEA ACTIVITY LEVEL: AFTER AMBULATING 10 - 20 FT
STOOL DESCRIPTION: FORMED

## 2024-04-15 ENCOUNTER — HOME CARE VISIT (OUTPATIENT)
Dept: SCHEDULING | Facility: HOME HEALTH | Age: 69
End: 2024-04-15

## 2024-04-15 VITALS
DIASTOLIC BLOOD PRESSURE: 70 MMHG | BODY MASS INDEX: 28.66 KG/M2 | TEMPERATURE: 97.5 F | HEART RATE: 88 BPM | OXYGEN SATURATION: 98 % | WEIGHT: 183 LBS | SYSTOLIC BLOOD PRESSURE: 132 MMHG | RESPIRATION RATE: 16 BRPM

## 2024-04-15 PROCEDURE — G0299 HHS/HOSPICE OF RN EA 15 MIN: HCPCS

## 2024-04-15 PROCEDURE — G0151 HHCP-SERV OF PT,EA 15 MIN: HCPCS

## 2024-04-15 ASSESSMENT — ENCOUNTER SYMPTOMS
DYSPNEA ACTIVITY LEVEL: AFTER AMBULATING 10 - 20 FT
PAIN LOCATION - PAIN QUALITY: ACHE

## 2024-04-16 ENCOUNTER — HOME CARE VISIT (OUTPATIENT)
Dept: SCHEDULING | Facility: HOME HEALTH | Age: 69
End: 2024-04-16

## 2024-04-16 VITALS
HEART RATE: 90 BPM | OXYGEN SATURATION: 93 % | DIASTOLIC BLOOD PRESSURE: 72 MMHG | TEMPERATURE: 97.6 F | RESPIRATION RATE: 16 BRPM | SYSTOLIC BLOOD PRESSURE: 110 MMHG

## 2024-04-16 PROCEDURE — G0152 HHCP-SERV OF OT,EA 15 MIN: HCPCS

## 2024-04-16 ASSESSMENT — ENCOUNTER SYMPTOMS: PAIN LOCATION - PAIN QUALITY: ACHING

## 2024-04-17 ENCOUNTER — TELEPHONE (OUTPATIENT)
Dept: HOME HEALTH SERVICES | Facility: HOME HEALTH | Age: 69
End: 2024-04-17

## 2024-04-17 VITALS
SYSTOLIC BLOOD PRESSURE: 120 MMHG | TEMPERATURE: 97.1 F | HEART RATE: 82 BPM | DIASTOLIC BLOOD PRESSURE: 72 MMHG | RESPIRATION RATE: 16 BRPM | OXYGEN SATURATION: 97 %

## 2024-04-17 ASSESSMENT — ENCOUNTER SYMPTOMS: PAIN LOCATION - PAIN QUALITY: ACHE, SORE

## 2024-04-17 NOTE — TELEPHONE ENCOUNTER
I spoke with Mrs. Mayer and she is doing OK.  Her issue is that she took the last dose  she had of Mounjaro and cannot find it anywhere.  I verified that I had seen on the news that it is in so high request for weight loss that it is often unavailable.  She needs it for her diabetes and her next dose should be Saturday, but she cannot find any.  I told her I would check with [facility]ist [facility] and see if he carries it or knows anyone who might.  If I can find anyone who has it in stock, I will call her back.

## 2024-04-17 NOTE — TELEPHONE ENCOUNTER
I [facility]  He said \"[facility] is allocating so that no pharmacy can rossy the drug.  It's on a nationwide shortage.  We barely get enough trickle of a supply at the moment ourselves.  We've got waiting lists of patients for various strengths and we just have to do the best we can. \"  I called Mrs. Mayer to give her this information.  I told her to be sure she was on the waiting list at the [facility] so hopefully she would get some once [facility] gets some in.  I recommended she contact her MD to let him know she will probably not have it by Saturday and what should she do.  She verified understanding.

## 2024-04-18 ENCOUNTER — HOME CARE VISIT (OUTPATIENT)
Dept: SCHEDULING | Facility: HOME HEALTH | Age: 69
End: 2024-04-18

## 2024-04-18 VITALS
DIASTOLIC BLOOD PRESSURE: 64 MMHG | TEMPERATURE: 97.5 F | RESPIRATION RATE: 16 BRPM | OXYGEN SATURATION: 95 % | HEART RATE: 78 BPM | SYSTOLIC BLOOD PRESSURE: 112 MMHG

## 2024-04-18 PROCEDURE — G0299 HHS/HOSPICE OF RN EA 15 MIN: HCPCS

## 2024-04-18 ASSESSMENT — ENCOUNTER SYMPTOMS: PAIN LOCATION - PAIN QUALITY: ACHE

## 2024-04-18 NOTE — PROGRESS NOTES
Physician Progress Note      PATIENT:               CAROLANN GONSALEZ  [facility] #:                  [id number]  :                       1955  ADMIT DATE:       4/3/2024 2:49 PM  DISCH DATE:        2024 5:28 PM  RESPONDING  PROVIDER #:        Carlos Holm DO          QUERY TEXT:    Pt admitted with volume overload. If possible, please document in the progress   notes and discharge summary if you are evaluating and/or treating any of the   following:    The medical record reflects the following:  Risk Factors: 69 y.o. female with medical history of  HFpEF, JEISON on CPAP DM2,   afib on xarelto, HTN, pulm HTN, restrictive lung disease, BMI 48  Clinical Indicators: Pro-BNP: 1,026, Per notes \"Chronic HFpEF (not in   exacerbation)\" CXR: No acute findings in the chest  Echo: Left?Ventricle: Normal left ventricular systolic function with a   visually estimated EF of 60 - 65%. Left ventricle size is normal. Normal wall   thickness. Normal wall motion.  Treatment: CXR, IV Lasix, Echo, cardiology consult, Strict I&Os, Monitor renal   function    Thank you,  Teagan Oseguera RN, BSN, CDI  [email or web address]  .  Options provided:  -- Noncardiogenic acute pulmonary edema due to, Please specify.  -- Other - I will add my own diagnosis  -- Disagree - Not applicable / Not valid  -- Disagree - Clinically unable to determine / Unknown  -- Refer to Clinical Documentation Reviewer    PROVIDER RESPONSE TEXT:    Provider disagreed with this query.  No pulmonary edema    Query created by: Teagan Oseguera on 4/10/2024 9:11 AM      Electronically signed by:  Carlos Holm DO 2024 6:58 AM

## 2024-04-23 ENCOUNTER — HOME CARE VISIT (OUTPATIENT)
Dept: SCHEDULING | Facility: HOME HEALTH | Age: 69
End: 2024-04-23
Payer: MEDICARE

## 2024-04-23 VITALS
RESPIRATION RATE: 16 BRPM | DIASTOLIC BLOOD PRESSURE: 62 MMHG | SYSTOLIC BLOOD PRESSURE: 120 MMHG | HEART RATE: 76 BPM | OXYGEN SATURATION: 98 % | TEMPERATURE: 97.9 F

## 2024-04-23 VITALS
SYSTOLIC BLOOD PRESSURE: 114 MMHG | TEMPERATURE: 97.8 F | BODY MASS INDEX: 43.85 KG/M2 | HEART RATE: 75 BPM | DIASTOLIC BLOOD PRESSURE: 60 MMHG | OXYGEN SATURATION: 98 % | RESPIRATION RATE: 16 BRPM | WEIGHT: 280 LBS

## 2024-04-23 PROCEDURE — G0299 HHS/HOSPICE OF RN EA 15 MIN: HCPCS

## 2024-04-23 PROCEDURE — G0157 HHC PT ASSISTANT EA 15: HCPCS

## 2024-04-23 ASSESSMENT — ENCOUNTER SYMPTOMS
PAIN LOCATION - PAIN QUALITY: ACHE
PAIN LOCATION - PAIN QUALITY: ACHE

## 2024-04-24 ENCOUNTER — TELEPHONE (OUTPATIENT)
Dept: HOME HEALTH SERVICES | Facility: HOME HEALTH | Age: 69
End: 2024-04-24

## 2024-04-24 NOTE — TELEPHONE ENCOUNTER
Mrs. Mayer said she is fine.  She has still not been able to find any Mounjaro.  She has an appointment with her IM MD on 5/1/24 and will discuss options with him at that time.  She said she is doing well and had no medication questions at this time.  I asked her to call me if any arise.  She sounded very  today.

## 2024-04-25 ENCOUNTER — HOME CARE VISIT (OUTPATIENT)
Dept: SCHEDULING | Facility: HOME HEALTH | Age: 69
End: 2024-04-25
Payer: MEDICARE

## 2024-04-25 VITALS
WEIGHT: 280 LBS | BODY MASS INDEX: 43.85 KG/M2 | TEMPERATURE: 97.8 F | HEART RATE: 76 BPM | OXYGEN SATURATION: 96 % | DIASTOLIC BLOOD PRESSURE: 70 MMHG | SYSTOLIC BLOOD PRESSURE: 114 MMHG | RESPIRATION RATE: 16 BRPM

## 2024-04-25 PROCEDURE — G0299 HHS/HOSPICE OF RN EA 15 MIN: HCPCS

## 2024-04-25 ASSESSMENT — ENCOUNTER SYMPTOMS: PAIN LOCATION - PAIN QUALITY: ACHE

## 2024-04-26 ENCOUNTER — HOME CARE VISIT (OUTPATIENT)
Dept: SCHEDULING | Facility: HOME HEALTH | Age: 69
End: 2024-04-26
Payer: MEDICARE

## 2024-04-26 PROCEDURE — G0157 HHC PT ASSISTANT EA 15: HCPCS

## 2024-04-27 VITALS
TEMPERATURE: 97.7 F | HEART RATE: 78 BPM | DIASTOLIC BLOOD PRESSURE: 70 MMHG | SYSTOLIC BLOOD PRESSURE: 118 MMHG | OXYGEN SATURATION: 98 % | RESPIRATION RATE: 18 BRPM

## 2024-04-27 ASSESSMENT — ENCOUNTER SYMPTOMS: PAIN LOCATION - PAIN QUALITY: ACHE

## 2024-04-29 ENCOUNTER — HOME CARE VISIT (OUTPATIENT)
Dept: SCHEDULING | Facility: HOME HEALTH | Age: 69
End: 2024-04-29
Payer: MEDICARE

## 2024-04-29 PROCEDURE — G0157 HHC PT ASSISTANT EA 15: HCPCS

## 2024-05-01 NOTE — PROGRESS NOTES
No new discharge needs. HR is better controlled, but remains hypoxic. Is now on 10L HF. Will remain available. Care Management Interventions  PCP Verified by CM: Yes  Mode of Transport at Discharge:  Other (see comment)  Transition of Care Consult (CM Consult): Discharge [address]: Yes  MyChart Signup: No  Discharge Durable Medical Equipment: No  Physical Therapy Consult: Yes  Occupational Therapy Consult: Yes  Speech Therapy Consult: No  Support Systems: Child(adelita),Other Family Member(s),[employer],Friend/Neighbor  Confirm Follow Up Transport: Family  The Plan for Transition of Care is Related to the Following Treatment Goals : Return home and back to her baseline  Discharge Location  Patient Expects to be Discharged to[de-identified] Home with home health Admission Reconciliation is Completed  Discharge Reconciliation is Not Complete Admission Reconciliation is Completed  Discharge Reconciliation is Completed

## 2024-05-02 ENCOUNTER — TELEPHONE (OUTPATIENT)
Dept: HOME HEALTH SERVICES | Facility: HOME HEALTH | Age: 69
End: 2024-05-02

## 2024-05-02 ENCOUNTER — HOME CARE VISIT (OUTPATIENT)
Dept: HOME HEALTH SERVICES | Facility: HOME HEALTH | Age: 69
End: 2024-05-02
Payer: MEDICARE

## 2024-05-02 VITALS
DIASTOLIC BLOOD PRESSURE: 64 MMHG | TEMPERATURE: 98.2 F | HEART RATE: 76 BPM | RESPIRATION RATE: 18 BRPM | OXYGEN SATURATION: 94 % | SYSTOLIC BLOOD PRESSURE: 110 MMHG

## 2024-05-02 PROCEDURE — G0151 HHCP-SERV OF PT,EA 15 MIN: HCPCS

## 2024-05-02 ASSESSMENT — ENCOUNTER SYMPTOMS: PAIN LOCATION - PAIN QUALITY: ACHE

## 2024-05-02 NOTE — TELEPHONE ENCOUNTER
Mrs. Null said she was doing well today.  She did see her MD yesterday.  Since the Mounjaro is unavailable, MD ordered Jardiance 10mg. Daily.  She has not gotten yet, but will  today.  I reviewed it with her.  No other changes.  She just hopes this will work for her.  I asked her to call with any medication issues.

## 2024-05-03 PROBLEM — N39.0 UTI (URINARY TRACT INFECTION): Status: RESOLVED | Noted: 2024-04-03 | Resolved: 2024-05-03

## 2024-05-08 ENCOUNTER — OFFICE VISIT (OUTPATIENT)
Dept: PULMONOLOGY | Age: 69
End: 2024-05-08
Payer: MEDICARE

## 2024-05-08 VITALS
DIASTOLIC BLOOD PRESSURE: 68 MMHG | BODY MASS INDEX: 44.73 KG/M2 | SYSTOLIC BLOOD PRESSURE: 116 MMHG | HEART RATE: 83 BPM | OXYGEN SATURATION: 95 % | HEIGHT: 67 IN | TEMPERATURE: 97.2 F | WEIGHT: 285 LBS | RESPIRATION RATE: 18 BRPM

## 2024-05-08 DIAGNOSIS — I27.20 PULMONARY HYPERTENSION (HCC): ICD-10-CM

## 2024-05-08 DIAGNOSIS — I50.32 CHRONIC DIASTOLIC (CONGESTIVE) HEART FAILURE (HCC): ICD-10-CM

## 2024-05-08 DIAGNOSIS — G47.33 OSA (OBSTRUCTIVE SLEEP APNEA): ICD-10-CM

## 2024-05-08 DIAGNOSIS — J96.12 CHRONIC RESPIRATORY FAILURE WITH HYPERCAPNIA (HCC): ICD-10-CM

## 2024-05-08 DIAGNOSIS — G47.34 NOCTURNAL HYPOXEMIA: ICD-10-CM

## 2024-05-08 DIAGNOSIS — J98.4 RESTRICTIVE LUNG DISEASE: Primary | ICD-10-CM

## 2024-05-08 PROCEDURE — G8417 CALC BMI ABV UP PARAM F/U: HCPCS | Performed by: NURSE PRACTITIONER

## 2024-05-08 PROCEDURE — 99214 OFFICE O/P EST MOD 30 MIN: CPT | Performed by: NURSE PRACTITIONER

## 2024-05-08 PROCEDURE — 1090F PRES/ABSN URINE INCON ASSESS: CPT | Performed by: NURSE PRACTITIONER

## 2024-05-08 PROCEDURE — 3078F DIAST BP <80 MM HG: CPT | Performed by: NURSE PRACTITIONER

## 2024-05-08 PROCEDURE — 1123F ACP DISCUSS/DSCN MKR DOCD: CPT | Performed by: NURSE PRACTITIONER

## 2024-05-08 PROCEDURE — 3074F SYST BP LT 130 MM HG: CPT | Performed by: NURSE PRACTITIONER

## 2024-05-08 PROCEDURE — 3017F COLORECTAL CA SCREEN DOC REV: CPT | Performed by: NURSE PRACTITIONER

## 2024-05-08 PROCEDURE — 1111F DSCHRG MED/CURRENT MED MERGE: CPT | Performed by: NURSE PRACTITIONER

## 2024-05-08 PROCEDURE — G8427 DOCREV CUR MEDS BY ELIG CLIN: HCPCS | Performed by: NURSE PRACTITIONER

## 2024-05-08 PROCEDURE — 1036F TOBACCO NON-USER: CPT | Performed by: NURSE PRACTITIONER

## 2024-05-08 PROCEDURE — G2211 COMPLEX E/M VISIT ADD ON: HCPCS | Performed by: NURSE PRACTITIONER

## 2024-05-08 PROCEDURE — G8400 PT W/DXA NO RESULTS DOC: HCPCS | Performed by: NURSE PRACTITIONER

## 2024-05-08 RX ORDER — EMPAGLIFLOZIN 10 MG/1
10 TABLET, FILM COATED ORAL DAILY
COMMUNITY

## 2024-05-08 NOTE — PROGRESS NOTES
Name:  Brenda Mayer  YOB: 1955   MRN: [id number]      Office Visit: 5/8/2024        ASSESSMENT AND PLAN:  (Medical Decision Making)    Impression:     1. Restrictive lung disease  --no evidence of ILD on CT scan.  [place] to be secondary to obesity.    2. Nocturnal hypoxemia  --remains on O2 at 2 lpm with sleep--reports compliance and benefit.    3. JEISON (obstructive sleep apnea)  --reports nightly compliance with trilogy for at least 5 hours.     4. Chronic diastolic (congestive) heart failure (HCC)  --one hospitalization in past year for this.  Continue diuresis per cardiology.     5.  Pulmonary hypertension  --mild--would recommend repeat echo after she is euvolemic.  Continue Lasix 60 mg daily    6.  Chronic respiratory failure with hypercarbia  --Due to the severity of the patient's chronic respiratory failure, BiPAP does not supply adequate pressure or volume to effectively ventilate the patient.  Non-invasive ventilation is required for both daytime and night-time use for life sustaining measures.  Failure to adequately ventilate the patient with non-invasive ventilation could result in serious harm and/or death.  Reports compliance and benefit        Follow-up and Dispositions    Return in about 6 months (around 11/8/2024) for Sine or Rin/restrictive lung disease, JEISON.     Collaborating physician is Dr. Mele Finnegan.    LORETTA Anglin, HEATHER - [facility]    Porsche/Derrek GONZALES--[place] for oxygen, trilogy--[facility]    _________________________________________________________________________    HISTORY OF PRESENT ILLNESS:    Ms. Brenda Mayer is a 69 y.o. female who is seen at [facility] for  Restrictive Lung Disease     The patient is a 69 year old female who is seen for follow up of restrictive lung disease and shortness of breath.  She is accompanied by her son, Keith.  Has reported ILD on CXR from 2020.   Her restriction is felt to be secondary to obesity

## 2024-05-28 NOTE — PROGRESS NOTES
[facility] CARDIOLOGY Follow Up                 Reason for Visit: Chronic HFpEF    Subjective:     Patient is a 69 y.o. female with a PMH of chronic HFpEF, aortic stenosis, longstanding persistent atrial fibrillation, hypertension and diabetes who presents for follow-up.  The patient was last seen in 2023.  She was hospitalized in 2024 for generalized weakness and a UTI.  She had a TTE in 2024 that was noted to demonstrate a normal EF and aortic stenosis.  Her peak velocity was noted to be 2.6 m/s and VTI ratio 0.52.  The patient denies chest pain and dyspnea.  She does use CPAP at night.  She reports that she is going to see a urologist at OSH on  because she has been having intermittent hematuria.  She had a CT scan on May 14 that noted a large right renal pelvic stone and multiple small left renal stones.  She has received 2 IV iron infusions with her last dose last week per the patient.    Past Medical History:   Diagnosis Date    Chronic pain     pain R knee    Hypertension     Morbid obesity (HCC)     BMI 45.8- 12    Positive PPD     had vaccination as a child - BCG      Past Surgical History:   Procedure Laterality Date    COLONOSCOPY N/A 2022    COLONOSCOPY/ BMI 56 ROOM 302 performed by Thaddeus Sharif MD at [facility] ENDOSCOPY    GYN      C-sec x 1 with GA    KNEE ARTHROSCOPY      bilateral knees      Family History   Problem Relation Age of Onset    Other Mother         kidney failure    Cancer Sister         cervical cancer      Social History     Tobacco Use    Smoking status: Former     Current packs/day: 0.00     Average packs/day: 0.5 packs/day for 25.0 years (12.5 ttl pk-yrs)     Types: Cigarettes     Start date: 1982     Quit date: 2007     Years since quittin.3     Passive exposure: Past    Smokeless tobacco: Never   Substance Use Topics    Alcohol use: No      Allergies   Allergen Reactions    Hydrocodone-Acetaminophen Nausea Only and Other (See Comments)

## 2024-05-29 ENCOUNTER — OFFICE VISIT (OUTPATIENT)
Age: 69
End: 2024-05-29
Payer: MEDICARE

## 2024-05-29 VITALS
DIASTOLIC BLOOD PRESSURE: 74 MMHG | BODY MASS INDEX: 44.73 KG/M2 | WEIGHT: 285 LBS | HEART RATE: 84 BPM | SYSTOLIC BLOOD PRESSURE: 116 MMHG | HEIGHT: 67 IN

## 2024-05-29 DIAGNOSIS — I50.32 CHRONIC HEART FAILURE WITH PRESERVED EJECTION FRACTION (HFPEF) (HCC): Primary | ICD-10-CM

## 2024-05-29 DIAGNOSIS — I10 HYPERTENSION, UNSPECIFIED TYPE: ICD-10-CM

## 2024-05-29 DIAGNOSIS — D50.9 IRON DEFICIENCY ANEMIA, UNSPECIFIED IRON DEFICIENCY ANEMIA TYPE: ICD-10-CM

## 2024-05-29 DIAGNOSIS — I48.11 LONGSTANDING PERSISTENT ATRIAL FIBRILLATION (HCC): ICD-10-CM

## 2024-05-29 DIAGNOSIS — I35.0 AORTIC VALVE STENOSIS, ETIOLOGY OF CARDIAC VALVE DISEASE UNSPECIFIED: ICD-10-CM

## 2024-05-29 PROBLEM — D64.9 MILD ANEMIA: Status: ACTIVE | Noted: 2022-02-03

## 2024-05-29 PROCEDURE — 1036F TOBACCO NON-USER: CPT | Performed by: INTERNAL MEDICINE

## 2024-05-29 PROCEDURE — 3074F SYST BP LT 130 MM HG: CPT | Performed by: INTERNAL MEDICINE

## 2024-05-29 PROCEDURE — 99214 OFFICE O/P EST MOD 30 MIN: CPT | Performed by: INTERNAL MEDICINE

## 2024-05-29 PROCEDURE — 3017F COLORECTAL CA SCREEN DOC REV: CPT | Performed by: INTERNAL MEDICINE

## 2024-05-29 PROCEDURE — 3078F DIAST BP <80 MM HG: CPT | Performed by: INTERNAL MEDICINE

## 2024-05-29 PROCEDURE — 1090F PRES/ABSN URINE INCON ASSESS: CPT | Performed by: INTERNAL MEDICINE

## 2024-05-29 PROCEDURE — G8428 CUR MEDS NOT DOCUMENT: HCPCS | Performed by: INTERNAL MEDICINE

## 2024-05-29 PROCEDURE — 1123F ACP DISCUSS/DSCN MKR DOCD: CPT | Performed by: INTERNAL MEDICINE

## 2024-05-29 PROCEDURE — G8400 PT W/DXA NO RESULTS DOC: HCPCS | Performed by: INTERNAL MEDICINE

## 2024-05-29 PROCEDURE — G8417 CALC BMI ABV UP PARAM F/U: HCPCS | Performed by: INTERNAL MEDICINE

## 2024-06-07 ENCOUNTER — TELEPHONE (OUTPATIENT)
Age: 69
End: 2024-06-07

## 2024-06-07 NOTE — TELEPHONE ENCOUNTER
Tom Dunbar MD Lemons, Wanda L, RN  Caller: Unspecified (Today, 12:35 PM)  - The patient is not having ACS  - RCRI equals 2: 6.6% rate of MACE  - Though the patient has poor or unknown functional capacity (<4 METS), further testing will not impact decision making or perioperative care; therefore, no further cardiac testing is recommended prior to noncardiac procedure  - Recommend management of Xarelto perioperatively, as below, with bleeding risk determined by the     High bleeding risk  - give last dose Xarelto three days before procedure (ie, skip two doses on the two days before the procedure)  - resume Xarelto 48 to 72 hours after surgery (ie, postoperative day 2 to 3)    Low bleeding risk  - give last dose Xarelto two days before procedure (ie, skip one dose on the day before the procedure)  - resume Xarelto 24 hours after surgery (ie, postoperative day 1)          Previous Messages  Copy of this note faxed to [facility] [phone].Attn:.

## 2024-06-07 NOTE — TELEPHONE ENCOUNTER
Patient having Percutaneous nephrolithotomy/pyelolithotomy with Dr. Rothman on 06/24/24 under general. Requesting risk assessment and Xarelto hold for 3 days prior. Fax: [phone]   Last seen in office 5/29/24.

## 2024-06-07 NOTE — TELEPHONE ENCOUNTER
Patient having Percutaneous nephrolithotomy/pyelolithotomy with Dr. Rothman on 06/24/24 under general. Requesting risk assessment and Xarelto hold for 3 days prior. Fax: [phone]

## 2024-07-12 ENCOUNTER — HOSPITAL ENCOUNTER (INPATIENT)
Age: 69
LOS: 2 days | Discharge: HOME HEALTH | DRG: 872 | End: 2024-07-15
Attending: EMERGENCY MEDICINE | Admitting: FAMILY MEDICINE
Payer: MEDICARE

## 2024-07-12 ENCOUNTER — APPOINTMENT (OUTPATIENT)
Dept: GENERAL RADIOLOGY | Age: 69
DRG: 872 | End: 2024-07-12
Payer: MEDICARE

## 2024-07-12 DIAGNOSIS — N39.0 URINARY TRACT INFECTION WITH HEMATURIA, SITE UNSPECIFIED: ICD-10-CM

## 2024-07-12 DIAGNOSIS — A41.9 SEPTICEMIA (HCC): Primary | ICD-10-CM

## 2024-07-12 DIAGNOSIS — R31.9 URINARY TRACT INFECTION WITH HEMATURIA, SITE UNSPECIFIED: ICD-10-CM

## 2024-07-12 PROCEDURE — 83036 HEMOGLOBIN GLYCOSYLATED A1C: CPT

## 2024-07-12 PROCEDURE — 85025 COMPLETE CBC W/AUTO DIFF WBC: CPT

## 2024-07-12 PROCEDURE — 80053 COMPREHEN METABOLIC PANEL: CPT

## 2024-07-12 PROCEDURE — 83605 ASSAY OF LACTIC ACID: CPT

## 2024-07-12 PROCEDURE — 93005 ELECTROCARDIOGRAM TRACING: CPT | Performed by: EMERGENCY MEDICINE

## 2024-07-12 PROCEDURE — 99285 EMERGENCY DEPT VISIT HI MDM: CPT

## 2024-07-12 PROCEDURE — 84145 PROCALCITONIN (PCT): CPT

## 2024-07-12 PROCEDURE — 87040 BLOOD CULTURE FOR BACTERIA: CPT

## 2024-07-12 PROCEDURE — 71045 X-RAY EXAM CHEST 1 VIEW: CPT

## 2024-07-12 RX ORDER — SODIUM CHLORIDE, SODIUM LACTATE, POTASSIUM CHLORIDE, AND CALCIUM CHLORIDE .6; .31; .03; .02 G/100ML; G/100ML; G/100ML; G/100ML
30 INJECTION, SOLUTION INTRAVENOUS ONCE
Status: COMPLETED | OUTPATIENT
Start: 2024-07-12 | End: 2024-07-13

## 2024-07-12 ASSESSMENT — LIFESTYLE VARIABLES
HOW MANY STANDARD DRINKS CONTAINING ALCOHOL DO YOU HAVE ON A TYPICAL DAY: PATIENT DOES NOT DRINK
HOW OFTEN DO YOU HAVE A DRINK CONTAINING ALCOHOL: NEVER

## 2024-07-12 ASSESSMENT — PAIN - FUNCTIONAL ASSESSMENT: PAIN_FUNCTIONAL_ASSESSMENT: NONE - DENIES PAIN

## 2024-07-13 ENCOUNTER — APPOINTMENT (OUTPATIENT)
Dept: GENERAL RADIOLOGY | Age: 69
DRG: 872 | End: 2024-07-13
Payer: MEDICARE

## 2024-07-13 ENCOUNTER — APPOINTMENT (OUTPATIENT)
Dept: CT IMAGING | Age: 69
DRG: 872 | End: 2024-07-13
Payer: MEDICARE

## 2024-07-13 PROBLEM — E87.1 HYPONATREMIA: Status: ACTIVE | Noted: 2024-07-13

## 2024-07-13 PROBLEM — N17.9 AKI (ACUTE KIDNEY INJURY) (HCC): Status: ACTIVE | Noted: 2024-07-13

## 2024-07-13 LAB
ALBUMIN SERPL-MCNC: 2.3 G/DL (ref 3.2–4.6)
ALBUMIN/GLOB SERPL: 0.5 (ref 1–1.9)
ALP SERPL-CCNC: 125 U/L (ref 35–104)
ALT SERPL-CCNC: 17 U/L (ref 12–65)
ANION GAP SERPL CALC-SCNC: 14 MMOL/L (ref 9–18)
APPEARANCE UR: ABNORMAL
AST SERPL-CCNC: 26 U/L (ref 15–37)
BACTERIA URNS QL MICRO: ABNORMAL /HPF
BASOPHILS # BLD: 0.1 K/UL (ref 0–0.2)
BASOPHILS NFR BLD: 1 % (ref 0–2)
BILIRUB SERPL-MCNC: <0.2 MG/DL (ref 0–1.2)
BILIRUB UR QL: NEGATIVE
BUN SERPL-MCNC: 17 MG/DL (ref 8–23)
CALCIUM SERPL-MCNC: 8.9 MG/DL (ref 8.8–10.2)
CHLORIDE SERPL-SCNC: 96 MMOL/L (ref 98–107)
CO2 SERPL-SCNC: 24 MMOL/L (ref 20–28)
COLOR UR: ABNORMAL
CREAT SERPL-MCNC: 1.24 MG/DL (ref 0.6–1.1)
DIFFERENTIAL METHOD BLD: ABNORMAL
EKG ATRIAL RATE: 115 BPM
EKG DIAGNOSIS: NORMAL
EKG Q-T INTERVAL: 297 MS
EKG QRS DURATION: 82 MS
EKG QTC CALCULATION (BAZETT): 364 MS
EKG R AXIS: 32 DEGREES
EKG T AXIS: -25 DEGREES
EKG VENTRICULAR RATE: 90 BPM
EOSINOPHIL # BLD: 0 K/UL (ref 0–0.8)
EOSINOPHIL NFR BLD: 0 % (ref 0.5–7.8)
EPI CELLS #/AREA URNS HPF: ABNORMAL /HPF
ERYTHROCYTE [DISTWIDTH] IN BLOOD BY AUTOMATED COUNT: 21.9 % (ref 11.9–14.6)
EST. AVERAGE GLUCOSE BLD GHB EST-MCNC: 169 MG/DL
GLOBULIN SER CALC-MCNC: 4.8 G/DL (ref 2.3–3.5)
GLUCOSE BLD STRIP.AUTO-MCNC: 118 MG/DL (ref 65–100)
GLUCOSE BLD STRIP.AUTO-MCNC: 147 MG/DL (ref 65–100)
GLUCOSE BLD STRIP.AUTO-MCNC: 147 MG/DL (ref 65–100)
GLUCOSE SERPL-MCNC: 186 MG/DL (ref 70–99)
GLUCOSE UR STRIP.AUTO-MCNC: 500 MG/DL
HBA1C MFR BLD: 7.5 % (ref 0–5.6)
HCT VFR BLD AUTO: 39.3 % (ref 35.8–46.3)
HGB BLD-MCNC: 12.8 G/DL (ref 11.7–15.4)
HGB UR QL STRIP: ABNORMAL
IMM GRANULOCYTES # BLD AUTO: 0.2 K/UL (ref 0–0.5)
IMM GRANULOCYTES NFR BLD AUTO: 1 % (ref 0–5)
KETONES UR QL STRIP.AUTO: NEGATIVE MG/DL
LACTATE SERPL-SCNC: 1.1 MMOL/L (ref 0.5–2)
LEUKOCYTE ESTERASE UR QL STRIP.AUTO: ABNORMAL
LYMPHOCYTES # BLD: 0.8 K/UL (ref 0.5–4.6)
LYMPHOCYTES NFR BLD: 7 % (ref 13–44)
MCH RBC QN AUTO: 28.6 PG (ref 26.1–32.9)
MCHC RBC AUTO-ENTMCNC: 32.6 G/DL (ref 31.4–35)
MCV RBC AUTO: 87.7 FL (ref 82–102)
MONOCYTES # BLD: 0.8 K/UL (ref 0.1–1.3)
MONOCYTES NFR BLD: 6 % (ref 4–12)
NEUTS SEG # BLD: 10.6 K/UL (ref 1.7–8.2)
NEUTS SEG NFR BLD: 85 % (ref 43–78)
NITRITE UR QL STRIP.AUTO: NEGATIVE
NRBC # BLD: 0 K/UL (ref 0–0.2)
PH UR STRIP: 5 (ref 5–9)
PLATELET # BLD AUTO: 346 K/UL (ref 150–450)
PMV BLD AUTO: 10.1 FL (ref 9.4–12.3)
POTASSIUM SERPL-SCNC: 5.1 MMOL/L (ref 3.5–5.1)
PROCALCITONIN SERPL-MCNC: 0.33 NG/ML (ref 0–0.1)
PROT SERPL-MCNC: 7.1 G/DL (ref 6.3–8.2)
PROT UR STRIP-MCNC: 30 MG/DL
RBC # BLD AUTO: 4.48 M/UL (ref 4.05–5.2)
RBC #/AREA URNS HPF: ABNORMAL /HPF
SERVICE CMNT-IMP: ABNORMAL
SODIUM SERPL-SCNC: 134 MMOL/L (ref 136–145)
SP GR UR REFRACTOMETRY: 1.03 (ref 1–1.02)
UROBILINOGEN UR QL STRIP.AUTO: 0.2 EU/DL (ref 0.2–1)
WBC # BLD AUTO: 12.4 K/UL (ref 4.3–11.1)
WBC URNS QL MICRO: >100 /HPF
YEAST URNS QL MICRO: ABNORMAL

## 2024-07-13 PROCEDURE — 73700 CT LOWER EXTREMITY W/O DYE: CPT

## 2024-07-13 PROCEDURE — 74176 CT ABD & PELVIS W/O CONTRAST: CPT

## 2024-07-13 PROCEDURE — 73590 X-RAY EXAM OF LOWER LEG: CPT

## 2024-07-13 PROCEDURE — 82962 GLUCOSE BLOOD TEST: CPT

## 2024-07-13 PROCEDURE — 81001 URINALYSIS AUTO W/SCOPE: CPT

## 2024-07-13 PROCEDURE — 1100000000 HC RM PRIVATE

## 2024-07-13 PROCEDURE — 99221 1ST HOSP IP/OBS SF/LOW 40: CPT | Performed by: PHYSICIAN ASSISTANT

## 2024-07-13 PROCEDURE — 2580000003 HC RX 258: Performed by: EMERGENCY MEDICINE

## 2024-07-13 PROCEDURE — 6360000002 HC RX W HCPCS: Performed by: EMERGENCY MEDICINE

## 2024-07-13 PROCEDURE — 96374 THER/PROPH/DIAG INJ IV PUSH: CPT

## 2024-07-13 PROCEDURE — 87086 URINE CULTURE/COLONY COUNT: CPT

## 2024-07-13 PROCEDURE — 6370000000 HC RX 637 (ALT 250 FOR IP): Performed by: STUDENT IN AN ORGANIZED HEALTH CARE EDUCATION/TRAINING PROGRAM

## 2024-07-13 PROCEDURE — 94760 N-INVAS EAR/PLS OXIMETRY 1: CPT

## 2024-07-13 PROCEDURE — 2580000003 HC RX 258: Performed by: FAMILY MEDICINE

## 2024-07-13 PROCEDURE — 6370000000 HC RX 637 (ALT 250 FOR IP): Performed by: EMERGENCY MEDICINE

## 2024-07-13 PROCEDURE — 2700000000 HC OXYGEN THERAPY PER DAY

## 2024-07-13 PROCEDURE — 93010 ELECTROCARDIOGRAM REPORT: CPT | Performed by: INTERNAL MEDICINE

## 2024-07-13 PROCEDURE — 74018 RADEX ABDOMEN 1 VIEW: CPT

## 2024-07-13 PROCEDURE — 2580000003 HC RX 258: Performed by: STUDENT IN AN ORGANIZED HEALTH CARE EDUCATION/TRAINING PROGRAM

## 2024-07-13 PROCEDURE — 6360000002 HC RX W HCPCS: Performed by: FAMILY MEDICINE

## 2024-07-13 PROCEDURE — 73562 X-RAY EXAM OF KNEE 3: CPT

## 2024-07-13 PROCEDURE — 94660 CPAP INITIATION&MGMT: CPT

## 2024-07-13 RX ORDER — POTASSIUM CHLORIDE 20 MEQ/1
40 TABLET, EXTENDED RELEASE ORAL PRN
Status: DISCONTINUED | OUTPATIENT
Start: 2024-07-13 | End: 2024-07-15 | Stop reason: HOSPADM

## 2024-07-13 RX ORDER — INSULIN LISPRO 100 [IU]/ML
0-8 INJECTION, SOLUTION INTRAVENOUS; SUBCUTANEOUS
Status: DISCONTINUED | OUTPATIENT
Start: 2024-07-13 | End: 2024-07-15 | Stop reason: HOSPADM

## 2024-07-13 RX ORDER — ACETAMINOPHEN 325 MG/1
650 TABLET ORAL EVERY 6 HOURS PRN
Status: DISCONTINUED | OUTPATIENT
Start: 2024-07-13 | End: 2024-07-13

## 2024-07-13 RX ORDER — ACETAMINOPHEN 325 MG/1
650 TABLET ORAL
Status: COMPLETED | OUTPATIENT
Start: 2024-07-13 | End: 2024-07-13

## 2024-07-13 RX ORDER — SODIUM CHLORIDE 9 MG/ML
INJECTION, SOLUTION INTRAVENOUS PRN
Status: DISCONTINUED | OUTPATIENT
Start: 2024-07-13 | End: 2024-07-15 | Stop reason: HOSPADM

## 2024-07-13 RX ORDER — SODIUM CHLORIDE, SODIUM LACTATE, POTASSIUM CHLORIDE, CALCIUM CHLORIDE 600; 310; 30; 20 MG/100ML; MG/100ML; MG/100ML; MG/100ML
INJECTION, SOLUTION INTRAVENOUS CONTINUOUS
Status: ACTIVE | OUTPATIENT
Start: 2024-07-13 | End: 2024-07-13

## 2024-07-13 RX ORDER — INSULIN LISPRO 100 [IU]/ML
0-4 INJECTION, SOLUTION INTRAVENOUS; SUBCUTANEOUS NIGHTLY
Status: DISCONTINUED | OUTPATIENT
Start: 2024-07-13 | End: 2024-07-15 | Stop reason: HOSPADM

## 2024-07-13 RX ORDER — SODIUM CHLORIDE 0.9 % (FLUSH) 0.9 %
5-40 SYRINGE (ML) INJECTION EVERY 12 HOURS SCHEDULED
Status: DISCONTINUED | OUTPATIENT
Start: 2024-07-13 | End: 2024-07-15 | Stop reason: HOSPADM

## 2024-07-13 RX ORDER — INSULIN GLARGINE 100 [IU]/ML
10 INJECTION, SOLUTION SUBCUTANEOUS NIGHTLY
Status: DISCONTINUED | OUTPATIENT
Start: 2024-07-13 | End: 2024-07-15 | Stop reason: HOSPADM

## 2024-07-13 RX ORDER — ACETAMINOPHEN 650 MG/1
650 SUPPOSITORY RECTAL EVERY 6 HOURS PRN
Status: DISCONTINUED | OUTPATIENT
Start: 2024-07-13 | End: 2024-07-13

## 2024-07-13 RX ORDER — POLYETHYLENE GLYCOL 3350 17 G/17G
17 POWDER, FOR SOLUTION ORAL DAILY PRN
Status: DISCONTINUED | OUTPATIENT
Start: 2024-07-13 | End: 2024-07-15 | Stop reason: HOSPADM

## 2024-07-13 RX ORDER — ONDANSETRON 4 MG/1
4 TABLET, ORALLY DISINTEGRATING ORAL EVERY 8 HOURS PRN
Status: DISCONTINUED | OUTPATIENT
Start: 2024-07-13 | End: 2024-07-15 | Stop reason: HOSPADM

## 2024-07-13 RX ORDER — ENOXAPARIN SODIUM 100 MG/ML
30 INJECTION SUBCUTANEOUS 2 TIMES DAILY
Status: DISCONTINUED | OUTPATIENT
Start: 2024-07-13 | End: 2024-07-13

## 2024-07-13 RX ORDER — DEXTROSE MONOHYDRATE 100 MG/ML
INJECTION, SOLUTION INTRAVENOUS CONTINUOUS PRN
Status: DISCONTINUED | OUTPATIENT
Start: 2024-07-13 | End: 2024-07-15 | Stop reason: HOSPADM

## 2024-07-13 RX ORDER — SODIUM CHLORIDE, SODIUM LACTATE, POTASSIUM CHLORIDE, CALCIUM CHLORIDE 600; 310; 30; 20 MG/100ML; MG/100ML; MG/100ML; MG/100ML
INJECTION, SOLUTION INTRAVENOUS CONTINUOUS
Status: DISCONTINUED | OUTPATIENT
Start: 2024-07-13 | End: 2024-07-13

## 2024-07-13 RX ORDER — OXYCODONE HYDROCHLORIDE 5 MG/1
5 TABLET ORAL EVERY 4 HOURS PRN
Status: DISCONTINUED | OUTPATIENT
Start: 2024-07-13 | End: 2024-07-15 | Stop reason: HOSPADM

## 2024-07-13 RX ORDER — ACETAMINOPHEN 500 MG
1000 TABLET ORAL EVERY 6 HOURS PRN
Status: DISCONTINUED | OUTPATIENT
Start: 2024-07-13 | End: 2024-07-15 | Stop reason: HOSPADM

## 2024-07-13 RX ORDER — MAGNESIUM SULFATE IN WATER 40 MG/ML
2000 INJECTION, SOLUTION INTRAVENOUS PRN
Status: DISCONTINUED | OUTPATIENT
Start: 2024-07-13 | End: 2024-07-15 | Stop reason: HOSPADM

## 2024-07-13 RX ORDER — MORPHINE SULFATE 2 MG/ML
1 INJECTION, SOLUTION INTRAMUSCULAR; INTRAVENOUS EVERY 4 HOURS PRN
Status: DISCONTINUED | OUTPATIENT
Start: 2024-07-13 | End: 2024-07-15 | Stop reason: HOSPADM

## 2024-07-13 RX ORDER — POTASSIUM CHLORIDE 7.45 MG/ML
10 INJECTION INTRAVENOUS PRN
Status: DISCONTINUED | OUTPATIENT
Start: 2024-07-13 | End: 2024-07-15 | Stop reason: HOSPADM

## 2024-07-13 RX ORDER — IBUPROFEN 600 MG/1
1 TABLET ORAL PRN
Status: DISCONTINUED | OUTPATIENT
Start: 2024-07-13 | End: 2024-07-15 | Stop reason: HOSPADM

## 2024-07-13 RX ORDER — SODIUM CHLORIDE 0.9 % (FLUSH) 0.9 %
5-40 SYRINGE (ML) INJECTION PRN
Status: DISCONTINUED | OUTPATIENT
Start: 2024-07-13 | End: 2024-07-15 | Stop reason: HOSPADM

## 2024-07-13 RX ORDER — ONDANSETRON 2 MG/ML
4 INJECTION INTRAMUSCULAR; INTRAVENOUS EVERY 6 HOURS PRN
Status: DISCONTINUED | OUTPATIENT
Start: 2024-07-13 | End: 2024-07-15 | Stop reason: HOSPADM

## 2024-07-13 RX ORDER — ACETAMINOPHEN 650 MG/1
650 SUPPOSITORY RECTAL EVERY 6 HOURS PRN
Status: DISCONTINUED | OUTPATIENT
Start: 2024-07-13 | End: 2024-07-15 | Stop reason: HOSPADM

## 2024-07-13 RX ADMIN — SODIUM CHLORIDE, POTASSIUM CHLORIDE, SODIUM LACTATE AND CALCIUM CHLORIDE 1848 ML: 600; 310; 30; 20 INJECTION, SOLUTION INTRAVENOUS at 01:24

## 2024-07-13 RX ADMIN — ACETAMINOPHEN 650 MG: 325 TABLET, FILM COATED ORAL at 01:22

## 2024-07-13 RX ADMIN — SODIUM CHLORIDE, PRESERVATIVE FREE 10 ML: 5 INJECTION INTRAVENOUS at 20:44

## 2024-07-13 RX ADMIN — RIVAROXABAN 20 MG: 20 TABLET, FILM COATED ORAL at 17:52

## 2024-07-13 RX ADMIN — INSULIN GLARGINE 10 UNITS: 100 INJECTION, SOLUTION SUBCUTANEOUS at 20:42

## 2024-07-13 RX ADMIN — WATER 1000 MG: 1 INJECTION INTRAMUSCULAR; INTRAVENOUS; SUBCUTANEOUS at 22:47

## 2024-07-13 RX ADMIN — SODIUM CHLORIDE, POTASSIUM CHLORIDE, SODIUM LACTATE AND CALCIUM CHLORIDE: 600; 310; 30; 20 INJECTION, SOLUTION INTRAVENOUS at 14:57

## 2024-07-13 RX ADMIN — WATER 1000 MG: 1 INJECTION INTRAMUSCULAR; INTRAVENOUS; SUBCUTANEOUS at 00:10

## 2024-07-13 RX ADMIN — SODIUM CHLORIDE, POTASSIUM CHLORIDE, SODIUM LACTATE AND CALCIUM CHLORIDE: 600; 310; 30; 20 INJECTION, SOLUTION INTRAVENOUS at 09:26

## 2024-07-13 NOTE — PROGRESS NOTES
4 Eyes Skin Assessment     NAME:  Brenda Mayer  YOB: 1955  MEDICAL RECORD NUMBER:  [id number]    The patient is being assessed for  Admission    I agree that at least one RN has performed a thorough Head to Toe Skin Assessment on the patient. ALL assessment sites listed below have been assessed.      Areas assessed by both nurses:    Head, Face, Ears, Shoulders, Back, Chest, Arms, Elbows, Hands, Sacrum. Buttock, Coccyx, Ischium, Legs. Feet and Heels, and Under Medical Devices         Does the Patient have a Wound? No noted wound(s)       Gage Prevention initiated by RN: Yes  Wound Care Orders initiated by RN: No    Pressure Injury (Stage 3,4, Unstageable, DTI, NWPT, and Complex wounds) if present, place Wound referral order by RN under : No    New Ostomies, if present place, Ostomy referral order under : No     Nurse 1 eSignature: Electronically signed by Ely Powers RN on 7/13/24 at 6:42 PM EDT    **SHARE this note so that the co-signing nurse can place an eSignature**    Nurse 2 eSignature: {Esignature:[id number]}

## 2024-07-13 NOTE — ED PROVIDER NOTES
Emergency Department Provider Note       PCP: Bautista Esparza MD   Age: 69 y.o.   Sex: female     DISPOSITION Decision To Admit 07/13/2024 01:48:52 AM       ICD-10-CM    1. Septicemia (HCC)  A41.9       2. Urinary tract infection with hematuria, site unspecified  N39.0     R31.9           Medical Decision Making     Septic workup initiated due to heart rate of 94 and fever of 101.3 status post recent urinary procedures.  Review of urine culture in the past does not show any resistance to Rocephin with Enterobacter.  X-ray imaging of the right knee and right lower leg will be obtained due to tenderness and pain in this area.  Patient does not feel like she injured it badly because she simply slid to the floor.  Low suspicion for fracture  ED Course as of 07/13/24 0149   Sat Jul 13, 2024   0128 Patient has initial signs of infection in her urine.  She has a urinary stent and review of her urology records reveals she has a large renal pelvis stone which they were treating via ureteroscopy due to her being on Xarelto.  Her urologist is at [facility].  She meets criteria for sepsis but does not have severe sepsis or septic shock.  She was given 30 cc/kg bolus initially but did not show signs of septic shock.  Will seek discussion with her urologist to see if transfer is required. [KM]   0143 Discussed with the patient's urology group.  The stent does not need to be removed and they prefer to stay.  Treat infection and they will see her in follow-up for the remainder of her ureteroscopy and renal pelvis stone removal/treatment [KM]      ED Course User Index  [KM] Dalton Burrows MD     1 or more acute illnesses that pose a threat to life or bodily function.   1 acute, uncomplicated illness or injury.  Discussion with external consultants.  Shared medical decision making was utilized in creating the patients health plan today.  Considerations: The following items were considered but not ordered: CT stone protocol but  07/12/24   XR CHEST PORTABLE    Narrative    Clinical History/Indication for Exam:  Suspected Infection : Suspected Infection    RADIOGRAPH OF THE CHEST 1 VIEW    INDICATION:  Suspected Infection : Suspected Infection    COMPARISON:  4/3/2024    FINDINGS:    Lungs:  Unremarkable.  No consolidation.    Pleural space:  Unremarkable.  No pneumothorax.    Heart:  Unremarkable.  No cardiomegaly.    Mediastinum:  Unremarkable.  Normal mediastinal contour.    Bones/joints:  Unremarkable.  No acute fracture.      Impression    Normal chest x-ray.        Report signed on 07/13/2024 (01:02 Eastern Time)  Signed by: Dominguez Nesbitt M.D.  Reading Location: [facility]   Lactate, Sepsis   Result Value Ref Range    Lactic Acid, Sepsis 1.1 0.5 - 2.0 MMOL/L   CBC with Auto Differential   Result Value Ref Range    WBC 12.4 (H) 4.3 - 11.1 K/uL    RBC 4.48 4.05 - 5.2 M/uL    Hemoglobin 12.8 11.7 - 15.4 g/dL    Hematocrit 39.3 35.8 - 46.3 %    MCV 87.7 82.0 - 102.0 FL    MCH 28.6 26.1 - 32.9 PG    MCHC 32.6 31.4 - 35.0 g/dL    RDW 21.9 (H) 11.9 - 14.6 %    Platelets 346 150 - 450 K/uL    MPV 10.1 9.4 - 12.3 FL    nRBC 0.00 0.0 - 0.2 K/uL    Differential Type AUTOMATED      Neutrophils % 85 (H) 43 - 78 %    Lymphocytes % 7 (L) 13 - 44 %    Monocytes % 6 4.0 - 12.0 %    Eosinophils % 0 (L) 0.5 - 7.8 %    Basophils % 1 0.0 - 2.0 %    Immature Granulocytes % 1 0.0 - 5.0 %    Neutrophils Absolute 10.6 (H) 1.7 - 8.2 K/UL    Lymphocytes Absolute 0.8 0.5 - 4.6 K/UL    Monocytes Absolute 0.8 0.1 - 1.3 K/UL    Eosinophils Absolute 0.0 0.0 - 0.8 K/UL    Basophils Absolute 0.1 0.0 - 0.2 K/UL    Immature Granulocytes Absolute 0.2 0.0 - 0.5 K/UL   Comprehensive Metabolic Panel   Result Value Ref Range    Sodium 134 (L) 136 - 145 mmol/L    Potassium 5.1 3.5 - 5.1 mmol/L    Chloride 96 (L) 98 - 107 mmol/L    CO2 24 20 - 28 mmol/L    Anion Gap 14 9 - 18 mmol/L    Glucose 186 (H) 70 - 99 mg/dL    BUN 17 8 - 23 MG/DL    Creatinine 1.24 (H) 0.60 - 1.10 MG/DL

## 2024-07-13 NOTE — CONSULTS
Urology Consult    Requesting MD:     Patient: Brenda Mayer MRN: [id number]  SSN: [id number]    YOB: 1955  Age: 69 y.o.  Sex: female      Subjective:      Brenda Mayer is a 69 y.o. female who  complains of chronic right knee pain acutely worse at this time after a slip and fall after the knee gave out.  They presented to the [facility] on 7/12. They were found to have JARROD. They were admitted by the hospitalist. They localizes their pain to the right knee. They have pain with ambulating and ROM as she as chronic OA of the knee. They have no other orthopedic concerns at this time.   She is already seeing ortho and is actually scheduled for R TKA later this month. No acute findings on XR. No redness, excessive swelling, or other s/s of septic joint  Patient under multiple services for consult at this time and was taken for imaging.     Urology consult for JARROD with possible stent migration on KUB.    Patient is at the [facility]. Chart and images reviewed. Patient is followed by Dr. Rothman at [facility] for stone management. She has had multiple lithotripsy treatments to for her stone burden. Currently with a right stent. She is scheduled at [facility] for another CRULLS on 7/17/24.     Past Medical History:   Diagnosis Date    Chronic pain     pain R knee    Hypertension     Morbid obesity (HCC)     BMI 45.8- 5/2/12    Positive PPD     had vaccination as a child - BCG     Past Surgical History:   Procedure Laterality Date    COLONOSCOPY N/A 2/6/2022    COLONOSCOPY/ BMI 56 ROOM 302 performed by Thaddeus Sharif MD at [facility] ENDOSCOPY    GYN      C-sec x 1 with GA    KNEE ARTHROSCOPY      bilateral knees      Family History   Problem Relation Age of Onset    Other Mother         kidney failure    Cancer Sister         cervical cancer     Social History     Tobacco Use    Smoking status: Former     Current packs/day: 0.00     Average packs/day: 0.5 packs/day for 25.0 years

## 2024-07-13 NOTE — CONSULTS
[facility]  Consultation Note    Patient ID:  Name: Brenda Mayer  MRN: [id number]  AGE: 69 y.o.  : 1955    Date of Consultation:  2024  Referring Physician:  Hospitalist     Subjective: Pt complains of chronic right knee pain acutely worse at this time after a slip and fall after the knee gave out.  They presented to the [facility] on . They were found to have JARROD. They were admitted by the hospitalist. They localizes their pain to the right knee. They have pain with ambulating and ROM as she as chronic OA of the knee. They have no other orthopedic concerns at this time.   She is already seeing ortho and is actually scheduled for R TKA later this month. No acute findings on XR. No redness, excessive swelling, or other s/s of septic joint  Patient under multiple services for consult at this time and was taken for imaging     Past Medical History Includes:   Past Medical History:   Diagnosis Date    Chronic pain     pain R knee    Hypertension     Morbid obesity (HCC)     BMI 45.8- 12    Positive PPD     had vaccination as a child - BCG   ,   Past Surgical History:   Procedure Laterality Date    COLONOSCOPY N/A 2022    COLONOSCOPY/ BMI 56 ROOM 302 performed by Thaddeus Sharif MD at [facility] ENDOSCOPY    GYN      C-sec x 1 with GA    KNEE ARTHROSCOPY      bilateral knees     Family History:   Family History   Problem Relation Age of Onset    Other Mother         kidney failure    Cancer Sister         cervical cancer      Social History:   Social History     Tobacco Use    Smoking status: Former     Current packs/day: 0.00     Average packs/day: 0.5 packs/day for 25.0 years (12.5 ttl pk-yrs)     Types: Cigarettes     Start date: 1982     Quit date: 2007     Years since quittin.4     Passive exposure: Past    Smokeless tobacco: Never   Substance Use Topics    Alcohol use: No       ALLERGIES:   Allergies   Allergen Reactions     about the joint line  Lymphatic: No lympahdenopathy, No distal edema   Neuro: No gross deficits   Abdomen: Soft, Non tender, No distension      VITALS: Patient Vitals for the past 8 hrs:   BP Temp Temp src Pulse Resp SpO2   24 0727 107/81 97.7 °F (36.5 °C) Oral 58 18 90 %   24 0635 114/63 98.4 °F (36.9 °C) -- 85 18 91 %   24 0405 125/75 98.3 °F (36.8 °C) -- 70 18 95 %    , Temp (24hrs), Av.9 °F (37.2 °C), Min:97.7 °F (36.5 °C), Max:101.3 °F (38.5 °C)           Diagnosis   Patient Active Problem List   Diagnosis    Restrictive lung disease    Mild anemia    Mild depression    Hypoxia    Hypertension    Controlled type 2 diabetes mellitus without complication, without long-term current use of insulin ([facility])    Class 3 obesity with alveolar hypoventilation, serious comorbidity, and body mass index (BMI) of 50.0 to 59.9 in adult ([facility])    Chronic heart failure with preserved ejection fraction (HFpEF) ([facility])    Longstanding persistent atrial fibrillation ([facility])    Chronic respiratory failure ([facility])    Chronic diastolic heart failure ([facility])    JEISON (obstructive sleep apnea)    Volume overload    UTI (urinary tract infection)    Pulmonary hypertension ([facility])    Hypokalemia    Aortic valve stenosis    JARROD (acute kidney injury) ([facility])    Hyponatremia          Assessment      Advanced osteoarthritis, right knee    Medical Decision Making:     XR were independently interpreted by myself and chart have been reviewed.  The patient was discussed with other hospital staff. She has advanced OA of the knee and is already scheduled for TKA under the care of another ortho team. No acute findings on imaging to suggest fracture.   No acute exam findings to suggest septic joint or other more concerning dx  WBAT with cane/walker as needed to prevent falls   Consult appreciated. Will sign off for now given chronic OA and already scheduled for treatment outpatient.   Follow up outpatient as scheduled.          Time Spent in

## 2024-07-13 NOTE — H&P
Reading Location: 331        Signed:  Yohana Wick MD    Part of this note may have been written by using a voice dictation software.  The note has been proof read but may still contain some grammatical/other typographical errors.

## 2024-07-13 NOTE — ED NOTES
TRANSFER - OUT REPORT:    Verbal report given to Ade KU on Brenda Mayer  being transferred to [place] [id number] for routine progression of patient care       Report consisted of patient's Situation, Background, Assessment and   Recommendations(SBAR).     Information from the following report(s) ED SBAR was reviewed with the receiving nurse.    Lines:   Peripheral IV 07/13/24 Right Antecubital (Active)        Opportunity for questions and clarification was provided.      Patient transported with:  Registered Nurse

## 2024-07-13 NOTE — CARE COORDINATION
69 yr-old F with JARROD.  Will have help at home from her son.  No needs.     07/13/24 1016   Service Assessment   Patient Orientation Alert and Oriented   Cognition Alert   History Provided By Patient   Primary Caregiver Self   Support Systems Children   Patient's Healthcare Decision Maker is: Legal Next of Kin   PCP Verified by CM Yes   Last Visit to PCP Within last 3 months   Prior Functional Level Independent in ADLs/IADLs   Current Functional Level Independent in ADLs/IADLs   Can patient return to prior living arrangement Yes   Ability to make needs known: Good   Family able to assist with home care needs: Yes   Would you like for me to discuss the discharge plan with any other family members/significant others, and if so, who? No   Financial Resources Medicare   Community Resources Other (Comment)  (n/a)

## 2024-07-13 NOTE — ED TRIAGE NOTES
Pt brought in via ems    Pt states she had a fall earlier today.  Pt felt weak and fell.  Hasn't been eating normal.  Pt had a kidney stone removed and had a stent places.  Experiencing urinary frequency.  Pt has a wound on the groin.  Pt refused ems visualization.

## 2024-07-14 LAB
ALBUMIN SERPL-MCNC: 1.9 G/DL (ref 3.2–4.6)
ANION GAP SERPL CALC-SCNC: 14 MMOL/L (ref 9–18)
BACTERIA SPEC CULT: NORMAL
BACTERIA SPEC CULT: NORMAL
BASOPHILS # BLD: 0.1 K/UL (ref 0–0.2)
BASOPHILS NFR BLD: 1 % (ref 0–2)
BUN SERPL-MCNC: 13 MG/DL (ref 8–23)
CALCIUM SERPL-MCNC: 8.3 MG/DL (ref 8.8–10.2)
CHLORIDE SERPL-SCNC: 99 MMOL/L (ref 98–107)
CO2 SERPL-SCNC: 25 MMOL/L (ref 20–28)
CREAT SERPL-MCNC: 1.03 MG/DL (ref 0.6–1.1)
DIFFERENTIAL METHOD BLD: ABNORMAL
EOSINOPHIL # BLD: 0.1 K/UL (ref 0–0.8)
EOSINOPHIL NFR BLD: 1 % (ref 0.5–7.8)
ERYTHROCYTE [DISTWIDTH] IN BLOOD BY AUTOMATED COUNT: 21.8 % (ref 11.9–14.6)
GLUCOSE BLD STRIP.AUTO-MCNC: 116 MG/DL (ref 65–100)
GLUCOSE BLD STRIP.AUTO-MCNC: 123 MG/DL (ref 65–100)
GLUCOSE BLD STRIP.AUTO-MCNC: 148 MG/DL (ref 65–100)
GLUCOSE BLD STRIP.AUTO-MCNC: 217 MG/DL (ref 65–100)
GLUCOSE SERPL-MCNC: 134 MG/DL (ref 70–99)
HCT VFR BLD AUTO: 38.4 % (ref 35.8–46.3)
HGB BLD-MCNC: 11.9 G/DL (ref 11.7–15.4)
IMM GRANULOCYTES # BLD AUTO: 0.1 K/UL (ref 0–0.5)
IMM GRANULOCYTES NFR BLD AUTO: 1 % (ref 0–5)
LYMPHOCYTES # BLD: 1.5 K/UL (ref 0.5–4.6)
LYMPHOCYTES NFR BLD: 13 % (ref 13–44)
MCH RBC QN AUTO: 27.9 PG (ref 26.1–32.9)
MCHC RBC AUTO-ENTMCNC: 31 G/DL (ref 31.4–35)
MCV RBC AUTO: 89.9 FL (ref 82–102)
MONOCYTES # BLD: 1.1 K/UL (ref 0.1–1.3)
MONOCYTES NFR BLD: 10 % (ref 4–12)
NEUTS SEG # BLD: 8.7 K/UL (ref 1.7–8.2)
NEUTS SEG NFR BLD: 75 % (ref 43–78)
NRBC # BLD: 0 K/UL (ref 0–0.2)
PHOSPHATE SERPL-MCNC: 3.6 MG/DL (ref 2.5–4.5)
PLATELET # BLD AUTO: 276 K/UL (ref 150–450)
PMV BLD AUTO: 9.7 FL (ref 9.4–12.3)
POTASSIUM SERPL-SCNC: 4.1 MMOL/L (ref 3.5–5.1)
RBC # BLD AUTO: 4.27 M/UL (ref 4.05–5.2)
SERVICE CMNT-IMP: ABNORMAL
SERVICE CMNT-IMP: NORMAL
SODIUM SERPL-SCNC: 138 MMOL/L (ref 136–145)
WBC # BLD AUTO: 11.6 K/UL (ref 4.3–11.1)

## 2024-07-14 PROCEDURE — 2580000003 HC RX 258: Performed by: STUDENT IN AN ORGANIZED HEALTH CARE EDUCATION/TRAINING PROGRAM

## 2024-07-14 PROCEDURE — 97530 THERAPEUTIC ACTIVITIES: CPT

## 2024-07-14 PROCEDURE — 85025 COMPLETE CBC W/AUTO DIFF WBC: CPT

## 2024-07-14 PROCEDURE — 97165 OT EVAL LOW COMPLEX 30 MIN: CPT

## 2024-07-14 PROCEDURE — 36415 COLL VENOUS BLD VENIPUNCTURE: CPT

## 2024-07-14 PROCEDURE — 97161 PT EVAL LOW COMPLEX 20 MIN: CPT

## 2024-07-14 PROCEDURE — 82962 GLUCOSE BLOOD TEST: CPT

## 2024-07-14 PROCEDURE — 6370000000 HC RX 637 (ALT 250 FOR IP): Performed by: STUDENT IN AN ORGANIZED HEALTH CARE EDUCATION/TRAINING PROGRAM

## 2024-07-14 PROCEDURE — 97535 SELF CARE MNGMENT TRAINING: CPT

## 2024-07-14 PROCEDURE — 80069 RENAL FUNCTION PANEL: CPT

## 2024-07-14 PROCEDURE — 1100000000 HC RM PRIVATE

## 2024-07-14 RX ORDER — CEFDINIR 300 MG/1
300 CAPSULE ORAL EVERY 12 HOURS SCHEDULED
Status: DISCONTINUED | OUTPATIENT
Start: 2024-07-14 | End: 2024-07-15 | Stop reason: HOSPADM

## 2024-07-14 RX ORDER — DIGOXIN 125 MCG
0.25 TABLET ORAL DAILY
Status: DISCONTINUED | OUTPATIENT
Start: 2024-07-14 | End: 2024-07-15 | Stop reason: HOSPADM

## 2024-07-14 RX ORDER — METOPROLOL SUCCINATE 25 MG/1
25 TABLET, EXTENDED RELEASE ORAL DAILY
Status: DISCONTINUED | OUTPATIENT
Start: 2024-07-14 | End: 2024-07-15 | Stop reason: HOSPADM

## 2024-07-14 RX ADMIN — RIVAROXABAN 20 MG: 20 TABLET, FILM COATED ORAL at 18:21

## 2024-07-14 RX ADMIN — INSULIN GLARGINE 10 UNITS: 100 INJECTION, SOLUTION SUBCUTANEOUS at 21:03

## 2024-07-14 RX ADMIN — METOPROLOL SUCCINATE 25 MG: 25 TABLET, EXTENDED RELEASE ORAL at 09:59

## 2024-07-14 RX ADMIN — SODIUM CHLORIDE, PRESERVATIVE FREE 10 ML: 5 INJECTION INTRAVENOUS at 21:03

## 2024-07-14 RX ADMIN — CEFDINIR 300 MG: 300 CAPSULE ORAL at 21:02

## 2024-07-14 RX ADMIN — DIGOXIN 0.25 MG: 125 TABLET ORAL at 09:59

## 2024-07-14 ASSESSMENT — PAIN SCALES - GENERAL: PAINLEVEL_OUTOF10: 0

## 2024-07-14 NOTE — PROGRESS NOTES
07/13/24 2149   Oxygen Therapy/Pulse Ox   O2 Therapy Oxygen   $Oxygen $Daily Charge   O2 Device Nasal cannula   O2 Flow Rate (L/min) 2 L/min   Pulse 89   SpO2 94 %   Skin Assessment Clean, dry, & intact   Pulse Oximeter Device Mode Intermittent   Pulse Oximeter Device Location Right;Finger   $Pulse Oximeter $Spot check (single)     Pt did not bring home CPAP. Attempted Hospital CPAP with full face mask but pt stated she didn't like how it felt. Placed pt on 2L NC for tonight. Pt states she wears 2L NC at home as well.

## 2024-07-14 NOTE — PROGRESS NOTES
Hospitalist Progress Note   Admit Date:  2024 11:36 PM   Name:  Brenda Mayer   Age:  69 y.o.  Sex:  female  :  1955   MRN:  [id number]   Room:  [facility]/    Presenting/Chief Complaint: Fall and Fever     Reason(s) for Admission: Septicemia (HCC) [A41.9]  JARROD (acute kidney injury) (HCC) [N17.9]  Urinary tract infection with hematuria, site unspecified [N39.0, R31.9]     Hospital Course:   Brenda Mayer is a 69 y.o. female with medical history of HTN, chronic r knee pain and obesity presented to ER for generalized weakness. Her right knee gave out today and she slid down and stayed on the floor for 30 min before EMS arrived. On arrival EMS reported fever (unclear temp), HR 94.      In the ER: temp 101.3, HR 94, O2 96% on 2 L. Na 134. Scr 1.24. WBC 12.4.  abdominal xray shows inferior migration of double j ureteral stent.      Pt is supposed to have right knee replacement in late July. Additionally she has had ureteral stent placed recently for kidney stone  and is reportedly supposed to have this removed this Wednesday. Tells me she has been having  CT knee with old pathological fracture.   Ortho consulted by ER who recommended no immediate intervention and for patient to continue follow up with home ortho team.   Urology also consulted who report stent is in appropriate place and pt should continue to follow up with home urology team.  A CT renal stone protocol was ordered.       Subjective & 24hr Events:   JARROD resolved. CT result still pending. Contacted radiology who state they are behind on reading.   Sitting up in bed. Eating breakfast. Preparing to work with PT this morning. Denies any knee pain or trouble urinating this morning.       Assessment & Plan:     Sepsis 2/2 UTI   Kidney stone with ureteral stent placement   Temp 101.3 HR 94 WBC 12.4 source urine  UTI:  LE mod, 4+ bacteria   Cultures from april with enterobacter cloacae with sensitivity to rocephin   Ceftriaxone x 7 days  Glucose 118 (H) 65 - 100 mg/dL    Performed by: [facility]    POCT Glucose    Collection Time: 07/13/24  4:13 PM   Result Value Ref Range    POC Glucose 147 (H) 65 - 100 mg/dL    Performed by: Hamlet    POCT Glucose    Collection Time: 07/13/24  8:00 PM   Result Value Ref Range    POC Glucose 147 (H) 65 - 100 mg/dL    Performed by: Alison    CBC with Auto Differential    Collection Time: 07/14/24  3:54 AM   Result Value Ref Range    WBC 11.6 (H) 4.3 - 11.1 K/uL    RBC 4.27 4.05 - 5.2 M/uL    Hemoglobin 11.9 11.7 - 15.4 g/dL    Hematocrit 38.4 35.8 - 46.3 %    MCV 89.9 82.0 - 102.0 FL    MCH 27.9 26.1 - 32.9 PG    MCHC 31.0 (L) 31.4 - 35.0 g/dL    RDW 21.8 (H) 11.9 - 14.6 %    Platelets 276 150 - 450 K/uL    MPV 9.7 9.4 - 12.3 FL    nRBC 0.00 0.0 - 0.2 K/uL    Differential Type AUTOMATED      Neutrophils % 75 43 - 78 %    Lymphocytes % 13 13 - 44 %    Monocytes % 10 4.0 - 12.0 %    Eosinophils % 1 0.5 - 7.8 %    Basophils % 1 0.0 - 2.0 %    Immature Granulocytes % 1 0.0 - 5.0 %    Neutrophils Absolute 8.7 (H) 1.7 - 8.2 K/UL    Lymphocytes Absolute 1.5 0.5 - 4.6 K/UL    Monocytes Absolute 1.1 0.1 - 1.3 K/UL    Eosinophils Absolute 0.1 0.0 - 0.8 K/UL    Basophils Absolute 0.1 0.0 - 0.2 K/UL    Immature Granulocytes Absolute 0.1 0.0 - 0.5 K/UL   Renal Function Panel    Collection Time: 07/14/24  3:54 AM   Result Value Ref Range    Sodium 138 136 - 145 mmol/L    Potassium 4.1 3.5 - 5.1 mmol/L    Chloride 99 98 - 107 mmol/L    CO2 25 20 - 28 mmol/L    Anion Gap 14 9 - 18 mmol/L    Glucose 134 (H) 70 - 99 mg/dL    BUN 13 8 - 23 MG/DL    Creatinine 1.03 0.60 - 1.10 MG/DL    Est, Glom Filt Rate 60 (L) >60 ml/min/1.73m2    Calcium 8.3 (L) 8.8 - 10.2 MG/DL    Phosphorus 3.6 2.5 - 4.5 MG/DL    Albumin 1.9 (L) 3.2 - 4.6 g/dL   POCT Glucose    Collection Time: 07/14/24  7:15 AM   Result Value Ref Range    POC Glucose 123 (H) 65 - 100 mg/dL    Performed by: Caroline        Current Meds:  Current

## 2024-07-14 NOTE — PROGRESS NOTES
DRAINS / AIRWAY: None    RESTRICTIONS/PRECAUTIONS:       PAIN: VITALS / O2:   Pre Treatment:          Post Treatment: none       Vitals          Oxygen            GROSS EVALUATION: INTACT IMPAIRED   (See Comments)   UE AROM [x] []   UE PROM [x] []   Strength [x]       Posture / Balance [x]  RW   Sensation [x]     Coordination [x]       Tone [x]       Edema []    Activity Tolerance []  Fair      Hand Dominance R [] L []      COGNITION/  PERCEPTION: INTACT IMPAIRED   (See Comments)   Orientation [x]     Vision [x]     Hearing [x]     Cognition  [x]     Perception [x]       MOBILITY: I Mod I S SBA CGA Min Mod Max Total  NT x2 Comments:   Bed Mobility    Rolling [] [] [] [] [] [] [] [] [] [] []    Supine to Sit [] [] [] [] [] [] [] [] [] [] []    Scooting [] [] [] [] [] [] [] [] [] [] []    Sit to Supine [] [] [] [] [] [] [] [] [] [] []    Transfers    Sit to Stand [] [] [x] [] [] [] [] [] [] [] []    Bed to Chair [] [] [x] [] [] [] [] [] [] [] []    Stand to Sit [] [] [x] [] [] [] [] [] [] [] []    Tub/Shower [] [] [x] [] [] [] [] [] [] [] []     Toilet [] [] [x] [] [] [] [] [] [] [] []      [] [] [] [] [] [] [] [] [] [] []    I=Independent, Mod I=Modified Independent, S=Supervision/Setup, SBA=Standby Assistance, CGA=Contact Guard Assistance, Min=Minimal Assistance, Mod=Moderate Assistance, Max=Maximal Assistance, Total=Total Assistance, NT=Not Tested    ACTIVITIES OF DAILY LIVING: I Mod I S SBA CGA Min Mod Max Total NT Comments   BASIC ADLs:              Upper Body Bathing  [] [] [x] [] [] [] [] [] [] []     Lower Body Bathing [] [] [x] [] [] [] [] [] [] []     Toileting [] [] [x] [] [] [] [] [] [] []    Upper Body Dressing [] [] [x] [] [] [] [] [] [] []    Lower Body Dressing [] [] [x] [] [] [] [] [] [] []    Feeding [x] [] [] [] [] [] [] [] [] []    Grooming [] [] [x] [] [] [] [] [] [] []    Personal Device Care [] [] [] [] [] [] [] [] [] []    Functional Mobility [] [] [x] [] [] [] [] [] [] [] rw   I=Independent,

## 2024-07-14 NOTE — PROGRESS NOTES
ACUTE PHYSICAL THERAPY GOALS:   (Developed with and agreed upon by patient and/or caregiver.)    LTG:  (1.)Ms. Mayer will move from supine to sit and sit to supine  in bed with INDEPENDENT within 3 treatment day(s).    (2.)Ms. Mayer will transfer from bed to chair and chair to bed with MODIFIED INDEPENDENCE using the least restrictive device within 3 treatment day(s).    (3.)Ms. Mayer will ambulate with MODIFIED INDEPENDENCE for 100 feet with the least restrictive device within 3 treatment day(s).  ________________________________________________________________________________________________      PHYSICAL THERAPY Initial Assessment  (Link to Caseload Tracking: PT Visit Days : 1  Acknowledge Orders  Time In/Out  PT Charge Capture  Rehab Caseload Tracker    Brenda Mayer is a 69 y.o. female   PRIMARY DIAGNOSIS: JARROD (acute kidney injury) (HCC)  Septicemia (HCC) [A41.9]  JARROD (acute kidney injury) (HCC) [N17.9]  Urinary tract infection with hematuria, site unspecified [N39.0, R31.9]       Reason for Referral: Difficulty in walking, Not elsewhere classified (R26.2)  Inpatient: Payor: MEDICARE / Plan: MEDICARE PART A AND B / Product Type: *No Product type* /     ASSESSMENT:     REHAB RECOMMENDATIONS:   Recommendation to date pending progress:  Setting:  No further skilled physical therapy after discharge from hospital    Equipment:    None     ASSESSMENT:  Ms. Mayer admitted with above diagnoses.  Patient with R knee pain-scheduled for TKA with Rhonda on 7/31.  Patient notes she did not fall on the knee but she fell onto her arms on the sofa.  Knee immobilizer found in the room but confirmed with MD it was not necessary.  Ortho signed off on patient but noted she was WBAT with cane/walker.  Patient is ambulatory with a cane at baseline though she has a ROLLING WALKER-had L TKA previously.  She lives alone but has help from her son as needed.  Patient is currently demonstrating generalized weakness (on

## 2024-07-15 VITALS
HEART RATE: 87 BPM | HEIGHT: 67 IN | TEMPERATURE: 98.8 F | WEIGHT: 267 LBS | DIASTOLIC BLOOD PRESSURE: 78 MMHG | SYSTOLIC BLOOD PRESSURE: 142 MMHG | OXYGEN SATURATION: 91 % | RESPIRATION RATE: 16 BRPM | BODY MASS INDEX: 41.91 KG/M2

## 2024-07-15 LAB
GLUCOSE BLD STRIP.AUTO-MCNC: 120 MG/DL (ref 65–100)
GLUCOSE BLD STRIP.AUTO-MCNC: 152 MG/DL (ref 65–100)
SERVICE CMNT-IMP: ABNORMAL
SERVICE CMNT-IMP: ABNORMAL

## 2024-07-15 PROCEDURE — 82962 GLUCOSE BLOOD TEST: CPT

## 2024-07-15 PROCEDURE — 2580000003 HC RX 258: Performed by: STUDENT IN AN ORGANIZED HEALTH CARE EDUCATION/TRAINING PROGRAM

## 2024-07-15 PROCEDURE — 6370000000 HC RX 637 (ALT 250 FOR IP): Performed by: STUDENT IN AN ORGANIZED HEALTH CARE EDUCATION/TRAINING PROGRAM

## 2024-07-15 RX ORDER — CEFDINIR 300 MG/1
300 CAPSULE ORAL EVERY 12 HOURS SCHEDULED
Qty: 8 CAPSULE | Refills: 0 | Status: SHIPPED | OUTPATIENT
Start: 2024-07-15 | End: 2024-07-19

## 2024-07-15 RX ADMIN — DILTIAZEM HYDROCHLORIDE 300 MG: 180 CAPSULE, COATED, EXTENDED RELEASE ORAL at 09:57

## 2024-07-15 RX ADMIN — SODIUM CHLORIDE, PRESERVATIVE FREE 10 ML: 5 INJECTION INTRAVENOUS at 08:25

## 2024-07-15 RX ADMIN — CEFDINIR 300 MG: 300 CAPSULE ORAL at 08:15

## 2024-07-15 RX ADMIN — DIGOXIN 0.25 MG: 125 TABLET ORAL at 08:16

## 2024-07-15 RX ADMIN — METOPROLOL SUCCINATE 25 MG: 25 TABLET, EXTENDED RELEASE ORAL at 08:16

## 2024-07-15 ASSESSMENT — PAIN SCALES - GENERAL
PAINLEVEL_OUTOF10: 0
PAINLEVEL_OUTOF10: 0

## 2024-07-15 NOTE — CARE COORDINATION
Initial note:    Chart reviewed for updates. It was reported in IDR's that pt is medically stable for discharge. CM met with pt at bedside, introduced role and discussed d/c planning. Pt is aware that she is discharging today and is agreeable with discharge. IMM letter given and explained to the patient; signed copy placed on medical chart. Son will provide transport. No further CM needs identified. CM will remain accessible for consult incase additional CM needs arise prior d/c.     07/15/24 1043   Services At/After Discharge   Transition of Care Consult (CM Consult) Discharge Planning   Services At/After Discharge None   [place] Resource Information Provided? No   Mode of Transport at Discharge Other (see comment)  (Son)   Confirm Follow Up Transport Family   Condition of Participation: Discharge Planning   The Plan for Transition of Care is related to the following treatment goals: Pt is discharging home with family - No CM needs identified   The Patient and/Or Patient Representative agree with the Discharge Plan? Yes

## 2024-07-15 NOTE — DISCHARGE INSTRUCTIONS
You were admitted with UTI and improved with antibiotics. Please complete your full course of antibiotics. Please maintain your appointments with urology and orthopedic surgery.             Acute Kidney Injury: Care Instructions  Overview     Acute kidney injury (JARROD) is a sudden decrease in kidney function. This can happen over a period of hours, days or, in some cases, weeks. JARROD used to be called acute renal failure, but kidney failure doesn't always happen with JARROD. Common causes of JARROD are serious infection, blood loss, and some medicines.  When JARROD happens, the kidneys have trouble removing waste and excess fluids from the body as urine. The waste and fluids build up and become harmful.  Kidney function may return to normal if the cause of JARROD is treated quickly. Your chance of a full recovery depends on what caused the problem, how quickly the cause was treated, and what other medical problems you have. You may have a treatment called dialysis. It does the work of healthy kidneys to remove waste and fluids for a short time.  Follow-up care is a key part of your treatment and safety. Be sure to make and go to all appointments, and call your doctor if you are having problems. It's also a good idea to know your test results and keep a list of the medicines you take.  How can you care for yourself at home?  Talk to your doctor about how much fluid you should drink.  Eat a balanced diet. Talk to your doctor or a dietitian about what type of diet may be best for you. You may need to limit sodium, potassium, and phosphorus.  If you need dialysis, follow the instructions and schedule for dialysis that your doctor gives you.  Do not smoke. Smoking can make your condition worse. If you need help quitting, talk to your doctor about stop-smoking programs and medicines. These can increase your chances of quitting for good.  Limit alcohol.  Review all of your medicines with your doctor. Do not take any medicines, including  nonsteroidal anti-inflammatory drugs (NSAIDs), such as ibuprofen (Advil, Motrin) or naproxen (Aleve), unless your doctor says it is safe for you to do so.  Make sure that anyone treating you for any health problem knows that you have had JARROD.  When should you call for help?   Call 911 anytime you think you may need emergency care. For example, call if:    You passed out (lost consciousness).   Call your doctor now or seek immediate medical care if:    You have new or worse nausea and vomiting.     You have much less urine than normal, or you have no urine.     You are feeling confused or cannot think clearly.     You have new or more blood in your urine.     You have new swelling.     You are dizzy or lightheaded, or feel like you may faint.   Watch closely for changes in your health, and be sure to contact your doctor if:    You do not get better as expected.   Where can you learn more?  Go to https://www.[employer].net/patientEd and enter D885 to learn more about \"Acute Kidney Injury: Care Instructions.\"  Current as of: October 11, 2023  Content Version: 14.1  © 2802-0848 [employer].   Care instructions adapted under license by [employer]. If you have questions about a medical condition or this instruction, always ask your healthcare professional. [employer] disclaims any warranty or liability for your use of this information.

## 2024-07-15 NOTE — DISCHARGE SUMMARY
Hospitalist Discharge Summary   Admit Date:  2024 11:36 PM   DC Note date: 7/15/2024  Name:  Brenda Mayer   Age:  69 y.o.  Sex:  female  :  1955   MRN:  [id number]   Room:  [facility]  PCP:  Bautista Esparza MD    Presenting Complaint: Fall and Fever     Initial Admission Diagnosis: Septicemia ([facility]) [A41.9]  JARROD (acute kidney injury) ([facility]) [N17.9]  Urinary tract infection with hematuria, site unspecified [N39.0, R31.9]     Problem List for this Hospitalization (present on admission):    Principal Problem:    JARROD (acute kidney injury) ([facility])  Active Problems:    JEISON (obstructive sleep apnea)    Restrictive lung disease    Mild anemia    Mild depression    Hypertension    Controlled type 2 diabetes mellitus without complication, without long-term current use of insulin ([facility])    Class 3 obesity with alveolar hypoventilation, serious comorbidity, and body mass index (BMI) of 50.0 to 59.9 in adult ([facility])    Chronic heart failure with preserved ejection fraction (HFpEF) ([facility])    Longstanding persistent atrial fibrillation (HCC)    Chronic diastolic heart failure (HCC)    UTI (urinary tract infection)    Pulmonary hypertension (HCC)    Hyponatremia  Resolved Problems:    * No resolved hospital problems. *      Hospital Course:  Brenda Mayer is a 69 y.o. female with medical history of HTN, chronic r knee pain and obesity presented to ER for generalized weakness. Her right knee gave out today and she slid down and stayed on the floor for 30 min before EMS arrived. On arrival EMS reported fever (unclear temp), HR 94.      In the ER: temp 101.3, HR 94, O2 96% on 2 L. Na 134. Scr 1.24. WBC 12.4.  abdominal xray shows inferior migration of double j ureteral stent.      Pt is supposed to have right knee replacement in late July. Additionally she has had ureteral stent placed recently for kidney stone  and is reportedly supposed to have this removed this Wednesday. Tells me she has been having  CT knee with  STONE    Result Date: 7/14/2024  1. Double-J stent noted in place of the collecting system of the right kidney coursing throughout the ureter into the left aspect of the bladder with associated hydronephrosis and multiple stones as above. Inflammation surrounding the right kidney. 2. Inflammation noted at the gallbladder with wall thickening and sludge which may be secondary inflammation from the right kidney versus related to acute cholecystitis. Recommend clinical correlation and dedicated right upper quadrant ultrasound for further evaluation. 3. Multiple scattered retroperitoneal lymph nodes that aren't pathologically enlarged measuring up to 2.4 cm on the right and up to 2.3 cm on the left, benignity not confirmed. 4. Mild fatty atrophy of the pancreas. 5. Otherwise, no acute finding. ** If there are any questions about this report, I can be reached on [employer]** Electronically signed by Dunia Cruz    CT KNEE RIGHT WO CONTRAST    Result Date: 7/13/2024  Severe degenerative osteoarthritis, most severe in the medial compartment of the right knee with marked joint space narrowing, eburnation, osteophyte formation and subarticular geodes.  Old pathologic fracture through the tibial plateau geode is evident without acute fracture.  Medial compartment intra-articular loose bodies. Automatic exposure control was used as a dose lowering technique. Radiation Dose: CTDI is 27.0 mGy. DLP is 671.77 mGy-cm. Report signed on 07/13/2024 (07:12 Eastern Time) Signed by: Reno Bronson M.D. Reading Location: [id number]    XR KNEE RIGHT (3 VIEWS)    Result Date: 7/13/2024  Degenerative narrowing of the medial joint compartment with what appears to be a fracture of the medial tibial plateau.  Further evaluation with CT may be useful or MRI to see if the changes are merely degenerative or whether it represents acute injury. Report signed on 07/13/2024 (03:44 Eastern Time) Signed by: Santhosh Enriquez M.D. Reading Location:

## 2024-07-15 NOTE — PLAN OF CARE
Problem: Discharge Planning  Goal: Discharge to home or other facility with appropriate resources  Outcome: Progressing      Problem: Safety - Adult  Goal: Free from fall injury  7/14/2024 2141 by Rudy Shaffer, RN  Outcome: Progressing  7/14/2024 1005 by Ely Powers, RN  Outcome: Progressing     Problem: Skin/Tissue Integrity - Adult  Goal: Incisions, wounds, or drain sites healing without S/S of infection  Outcome: Progressing     Problem: Musculoskeletal - Adult  Goal: Return mobility to safest level of function  Outcome: Progressing     Problem: Musculoskeletal - Adult  Goal: Return ADL status to a safe level of function  Outcome: Progressing     Problem: Genitourinary - Adult  Goal: Absence of urinary retention  Outcome: Progressing     Problem: Metabolic/Fluid and Electrolytes - Adult  Goal: Glucose maintained within prescribed range  Outcome: Progressing

## 2024-07-16 NOTE — CARE COORDINATION
Patient discharged yesterday on 7-14-24 without HH services. Kiana with [facility] called and notified us that patient was current with them and had called their office requesting services be resumed.  CM was not aware she was current with [facility].  Order obtained to resume HH care and referral sent to their office. Kiana their nurse liaison with [facility] notified.

## 2024-07-25 NOTE — PROGRESS NOTES
Physician Progress Note      PATIENT:               CAROLANN GONSALEZ  CSN #:                  [id number]  :                       1955  ADMIT DATE:       2024 11:36 PM  DISCH DATE:        7/15/2024 3:48 PM  RESPONDING  PROVIDER #:        Yohana Crocker MD          QUERY TEXT:    Pt admitted with UTI.  Pt noted to have ureteral stent. If possible, please   document in the progress notes and discharge summary if you are evaluating   and/or treating any of the following:    The medical record reflects the following:  Risk Factors: Stent, Female, UTI  Clinical Indicators: UTI with stent placement 2 weeks ago for stones  Treatment: IVF, Labs, IV atb    Thank you,  Teagan RAE RN  Options provided:  -- UTI due to ureteral stent  -- UTI not due to ureteral stent  -- Other - I will add my own diagnosis  -- Disagree - Not applicable / Not valid  -- Disagree - Clinically unable to determine / Unknown  -- Refer to Clinical Documentation Reviewer    PROVIDER RESPONSE TEXT:    UTI is due to ureteral stent.    Query created by: Laura Powell on 2024 10:24 PM      Electronically signed by:  Yohana Crocker MD 2024 6:47 AM

## 2024-08-09 ENCOUNTER — APPOINTMENT (OUTPATIENT)
Dept: CT IMAGING | Age: 69
DRG: 660 | End: 2024-08-09
Payer: MEDICARE

## 2024-08-09 ENCOUNTER — ANESTHESIA EVENT (OUTPATIENT)
Dept: SURGERY | Age: 69
End: 2024-08-09
Payer: MEDICARE

## 2024-08-09 ENCOUNTER — HOSPITAL ENCOUNTER (INPATIENT)
Age: 69
LOS: 3 days | Discharge: HOME | DRG: 660 | End: 2024-08-12
Attending: UROLOGY | Admitting: UROLOGY
Payer: MEDICARE

## 2024-08-09 ENCOUNTER — HOSPITAL ENCOUNTER (EMERGENCY)
Age: 69
Discharge: HOME | DRG: 660 | End: 2024-08-09
Attending: STUDENT IN AN ORGANIZED HEALTH CARE EDUCATION/TRAINING PROGRAM
Payer: MEDICARE

## 2024-08-09 ENCOUNTER — ANESTHESIA (OUTPATIENT)
Dept: SURGERY | Age: 69
End: 2024-08-09
Payer: MEDICARE

## 2024-08-09 VITALS
BODY MASS INDEX: 41.91 KG/M2 | HEART RATE: 74 BPM | WEIGHT: 267 LBS | HEIGHT: 67 IN | OXYGEN SATURATION: 97 % | TEMPERATURE: 97.7 F | RESPIRATION RATE: 20 BRPM | DIASTOLIC BLOOD PRESSURE: 76 MMHG | SYSTOLIC BLOOD PRESSURE: 139 MMHG

## 2024-08-09 DIAGNOSIS — N20.0 KIDNEY STONE: Primary | ICD-10-CM

## 2024-08-09 DIAGNOSIS — N17.9 AKI (ACUTE KIDNEY INJURY) (HCC): ICD-10-CM

## 2024-08-09 DIAGNOSIS — N39.0 URINARY TRACT INFECTION WITH HEMATURIA, SITE UNSPECIFIED: ICD-10-CM

## 2024-08-09 DIAGNOSIS — R31.9 URINARY TRACT INFECTION WITH HEMATURIA, SITE UNSPECIFIED: ICD-10-CM

## 2024-08-09 PROBLEM — N20.1 URETERAL STONE: Status: ACTIVE | Noted: 2024-08-09

## 2024-08-09 LAB
ALBUMIN SERPL-MCNC: 2.8 G/DL (ref 3.2–4.6)
ALBUMIN/GLOB SERPL: 0.7 (ref 1–1.9)
ALP SERPL-CCNC: 100 U/L (ref 35–104)
ALT SERPL-CCNC: 16 U/L (ref 12–65)
ANION GAP SERPL CALC-SCNC: 15 MMOL/L (ref 9–18)
APPEARANCE UR: CLEAR
AST SERPL-CCNC: 28 U/L (ref 15–37)
BACTERIA URNS QL MICRO: NEGATIVE /HPF
BASOPHILS # BLD: 0.1 K/UL (ref 0–0.2)
BASOPHILS NFR BLD: 0 % (ref 0–2)
BILIRUB SERPL-MCNC: <0.2 MG/DL (ref 0–1.2)
BILIRUB UR QL: NEGATIVE
BUN SERPL-MCNC: 28 MG/DL (ref 8–23)
CALCIUM SERPL-MCNC: 8.7 MG/DL (ref 8.8–10.2)
CHLORIDE SERPL-SCNC: 101 MMOL/L (ref 98–107)
CO2 SERPL-SCNC: 22 MMOL/L (ref 20–28)
COLOR UR: ABNORMAL
CREAT SERPL-MCNC: 1.37 MG/DL (ref 0.6–1.1)
DIFFERENTIAL METHOD BLD: ABNORMAL
EKG ATRIAL RATE: 0 BPM
EKG DIAGNOSIS: NORMAL
EKG Q-T INTERVAL: 345 MS
EKG QRS DURATION: 92 MS
EKG QTC CALCULATION (BAZETT): 367 MS
EKG R AXIS: 17 DEGREES
EKG VENTRICULAR RATE: 68 BPM
EOSINOPHIL # BLD: 0 K/UL (ref 0–0.8)
EOSINOPHIL NFR BLD: 0 % (ref 0.5–7.8)
EPI CELLS #/AREA URNS HPF: ABNORMAL /HPF
ERYTHROCYTE [DISTWIDTH] IN BLOOD BY AUTOMATED COUNT: 21.4 % (ref 11.9–14.6)
GLOBULIN SER CALC-MCNC: 4 G/DL (ref 2.3–3.5)
GLUCOSE BLD STRIP.AUTO-MCNC: 154 MG/DL (ref 65–100)
GLUCOSE BLD STRIP.AUTO-MCNC: 161 MG/DL (ref 65–100)
GLUCOSE BLD STRIP.AUTO-MCNC: 167 MG/DL (ref 65–100)
GLUCOSE BLD STRIP.AUTO-MCNC: 205 MG/DL (ref 65–100)
GLUCOSE SERPL-MCNC: 225 MG/DL (ref 70–99)
GLUCOSE UR STRIP.AUTO-MCNC: >1000 MG/DL
HCT VFR BLD AUTO: 28.3 % (ref 35.8–46.3)
HGB BLD-MCNC: 8.8 G/DL (ref 11.7–15.4)
HGB UR QL STRIP: ABNORMAL
HYALINE CASTS URNS QL MICRO: ABNORMAL /LPF
IMM GRANULOCYTES # BLD AUTO: 0.2 K/UL (ref 0–0.5)
IMM GRANULOCYTES NFR BLD AUTO: 1 % (ref 0–5)
KETONES UR QL STRIP.AUTO: NEGATIVE MG/DL
LACTATE SERPL-SCNC: 1.1 MMOL/L (ref 0.5–2)
LEUKOCYTE ESTERASE UR QL STRIP.AUTO: ABNORMAL
LIPASE SERPL-CCNC: 22 U/L (ref 13–60)
LYMPHOCYTES # BLD: 1.2 K/UL (ref 0.5–4.6)
LYMPHOCYTES NFR BLD: 8 % (ref 13–44)
MAGNESIUM SERPL-MCNC: 2.1 MG/DL (ref 1.8–2.4)
MCH RBC QN AUTO: 28.8 PG (ref 26.1–32.9)
MCHC RBC AUTO-ENTMCNC: 31.1 G/DL (ref 31.4–35)
MCV RBC AUTO: 92.5 FL (ref 82–102)
MONOCYTES # BLD: 0.8 K/UL (ref 0.1–1.3)
MONOCYTES NFR BLD: 5 % (ref 4–12)
NEUTS SEG # BLD: 13.4 K/UL (ref 1.7–8.2)
NEUTS SEG NFR BLD: 86 % (ref 43–78)
NITRITE UR QL STRIP.AUTO: NEGATIVE
NRBC # BLD: 0 K/UL (ref 0–0.2)
PH UR STRIP: 6.5 (ref 5–9)
PLATELET # BLD AUTO: 287 K/UL (ref 150–450)
PMV BLD AUTO: 9.9 FL (ref 9.4–12.3)
POTASSIUM SERPL-SCNC: 4.8 MMOL/L (ref 3.5–5.1)
PROCALCITONIN SERPL-MCNC: 0.18 NG/ML (ref 0–0.1)
PROT SERPL-MCNC: 6.8 G/DL (ref 6.3–8.2)
PROT UR STRIP-MCNC: ABNORMAL MG/DL
RBC # BLD AUTO: 3.06 M/UL (ref 4.05–5.2)
RBC #/AREA URNS HPF: ABNORMAL /HPF
SERVICE CMNT-IMP: ABNORMAL
SODIUM SERPL-SCNC: 138 MMOL/L (ref 136–145)
SP GR UR REFRACTOMETRY: 1.02 (ref 1–1.02)
UROBILINOGEN UR QL STRIP.AUTO: 0.2 EU/DL (ref 0.2–1)
WBC # BLD AUTO: 15.7 K/UL (ref 4.3–11.1)
WBC URNS QL MICRO: >100 /HPF

## 2024-08-09 PROCEDURE — 6370000000 HC RX 637 (ALT 250 FOR IP): Performed by: ANESTHESIOLOGY

## 2024-08-09 PROCEDURE — 3600000014 HC SURGERY LEVEL 4 ADDTL 15MIN: Performed by: UROLOGY

## 2024-08-09 PROCEDURE — 84145 PROCALCITONIN (PCT): CPT

## 2024-08-09 PROCEDURE — 7100000001 HC PACU RECOVERY - ADDTL 15 MIN: Performed by: UROLOGY

## 2024-08-09 PROCEDURE — 99285 EMERGENCY DEPT VISIT HI MDM: CPT

## 2024-08-09 PROCEDURE — 99222 1ST HOSP IP/OBS MODERATE 55: CPT | Performed by: NURSE PRACTITIONER

## 2024-08-09 PROCEDURE — 93005 ELECTROCARDIOGRAM TRACING: CPT | Performed by: STUDENT IN AN ORGANIZED HEALTH CARE EDUCATION/TRAINING PROGRAM

## 2024-08-09 PROCEDURE — 2500000003 HC RX 250 WO HCPCS: Performed by: NURSE PRACTITIONER

## 2024-08-09 PROCEDURE — 82962 GLUCOSE BLOOD TEST: CPT

## 2024-08-09 PROCEDURE — 81001 URINALYSIS AUTO W/SCOPE: CPT

## 2024-08-09 PROCEDURE — 3600000004 HC SURGERY LEVEL 4 BASE: Performed by: UROLOGY

## 2024-08-09 PROCEDURE — 87186 SC STD MICRODIL/AGAR DIL: CPT

## 2024-08-09 PROCEDURE — BT1F1ZZ FLUOROSCOPY OF LEFT KIDNEY, URETER AND BLADDER USING LOW OSMOLAR CONTRAST: ICD-10-PCS | Performed by: UROLOGY

## 2024-08-09 PROCEDURE — 80053 COMPREHEN METABOLIC PANEL: CPT

## 2024-08-09 PROCEDURE — 6360000002 HC RX W HCPCS: Performed by: NURSE ANESTHETIST, CERTIFIED REGISTERED

## 2024-08-09 PROCEDURE — C2617 STENT, NON-COR, TEM W/O DEL: HCPCS | Performed by: UROLOGY

## 2024-08-09 PROCEDURE — 52005 CYSTO W/URTRL CATHJ: CPT | Performed by: UROLOGY

## 2024-08-09 PROCEDURE — 87088 URINE BACTERIA CULTURE: CPT

## 2024-08-09 PROCEDURE — 2500000003 HC RX 250 WO HCPCS: Performed by: NURSE ANESTHETIST, CERTIFIED REGISTERED

## 2024-08-09 PROCEDURE — 87086 URINE CULTURE/COLONY COUNT: CPT

## 2024-08-09 PROCEDURE — 3700000001 HC ADD 15 MINUTES (ANESTHESIA): Performed by: UROLOGY

## 2024-08-09 PROCEDURE — 85025 COMPLETE CBC W/AUTO DIFF WBC: CPT

## 2024-08-09 PROCEDURE — 7100000000 HC PACU RECOVERY - FIRST 15 MIN: Performed by: UROLOGY

## 2024-08-09 PROCEDURE — 1100000000 HC RM PRIVATE

## 2024-08-09 PROCEDURE — 96374 THER/PROPH/DIAG INJ IV PUSH: CPT

## 2024-08-09 PROCEDURE — 74420 UROGRAPHY RTRGR +-KUB: CPT | Performed by: UROLOGY

## 2024-08-09 PROCEDURE — 2700000000 HC OXYGEN THERAPY PER DAY

## 2024-08-09 PROCEDURE — 6370000000 HC RX 637 (ALT 250 FOR IP): Performed by: NURSE PRACTITIONER

## 2024-08-09 PROCEDURE — 6370000000 HC RX 637 (ALT 250 FOR IP): Performed by: UROLOGY

## 2024-08-09 PROCEDURE — 2580000003 HC RX 258: Performed by: UROLOGY

## 2024-08-09 PROCEDURE — 74176 CT ABD & PELVIS W/O CONTRAST: CPT

## 2024-08-09 PROCEDURE — 2580000003 HC RX 258: Performed by: NURSE PRACTITIONER

## 2024-08-09 PROCEDURE — 83605 ASSAY OF LACTIC ACID: CPT

## 2024-08-09 PROCEDURE — 6360000002 HC RX W HCPCS: Performed by: STUDENT IN AN ORGANIZED HEALTH CARE EDUCATION/TRAINING PROGRAM

## 2024-08-09 PROCEDURE — 6360000002 HC RX W HCPCS: Performed by: UROLOGY

## 2024-08-09 PROCEDURE — 96376 TX/PRO/DX INJ SAME DRUG ADON: CPT

## 2024-08-09 PROCEDURE — 6360000004 HC RX CONTRAST MEDICATION: Performed by: UROLOGY

## 2024-08-09 PROCEDURE — 2580000003 HC RX 258: Performed by: STUDENT IN AN ORGANIZED HEALTH CARE EDUCATION/TRAINING PROGRAM

## 2024-08-09 PROCEDURE — 83690 ASSAY OF LIPASE: CPT

## 2024-08-09 PROCEDURE — 2580000003 HC RX 258: Performed by: ANESTHESIOLOGY

## 2024-08-09 PROCEDURE — 52332 CYSTOSCOPY AND TREATMENT: CPT | Performed by: UROLOGY

## 2024-08-09 PROCEDURE — 2709999900 HC NON-CHARGEABLE SUPPLY: Performed by: UROLOGY

## 2024-08-09 PROCEDURE — 93010 ELECTROCARDIOGRAM REPORT: CPT | Performed by: INTERNAL MEDICINE

## 2024-08-09 PROCEDURE — C1758 CATHETER, URETERAL: HCPCS | Performed by: UROLOGY

## 2024-08-09 PROCEDURE — 0T778DZ DILATION OF LEFT URETER WITH INTRALUMINAL DEVICE, VIA NATURAL OR ARTIFICIAL OPENING ENDOSCOPIC: ICD-10-PCS | Performed by: UROLOGY

## 2024-08-09 PROCEDURE — 83735 ASSAY OF MAGNESIUM: CPT

## 2024-08-09 PROCEDURE — 96375 TX/PRO/DX INJ NEW DRUG ADDON: CPT

## 2024-08-09 PROCEDURE — 3700000000 HC ANESTHESIA ATTENDED CARE: Performed by: UROLOGY

## 2024-08-09 DEVICE — URETERAL STENT
Type: IMPLANTABLE DEVICE | Site: URETHRA | Status: FUNCTIONAL
Brand: PERCUFLEX™ PLUS

## 2024-08-09 RX ORDER — ONDANSETRON 2 MG/ML
4 INJECTION INTRAMUSCULAR; INTRAVENOUS
Status: COMPLETED | OUTPATIENT
Start: 2024-08-09 | End: 2024-08-09

## 2024-08-09 RX ORDER — IBUPROFEN 600 MG/1
1 TABLET ORAL PRN
Status: DISCONTINUED | OUTPATIENT
Start: 2024-08-09 | End: 2024-08-12 | Stop reason: HOSPADM

## 2024-08-09 RX ORDER — METOPROLOL SUCCINATE 25 MG/1
25 TABLET, EXTENDED RELEASE ORAL DAILY
Status: DISCONTINUED | OUTPATIENT
Start: 2024-08-09 | End: 2024-08-12 | Stop reason: HOSPADM

## 2024-08-09 RX ORDER — MORPHINE SULFATE 4 MG/ML
4 INJECTION, SOLUTION INTRAMUSCULAR; INTRAVENOUS ONCE
Status: COMPLETED | OUTPATIENT
Start: 2024-08-09 | End: 2024-08-09

## 2024-08-09 RX ORDER — NALOXONE HYDROCHLORIDE 0.4 MG/ML
INJECTION, SOLUTION INTRAMUSCULAR; INTRAVENOUS; SUBCUTANEOUS PRN
Status: DISCONTINUED | OUTPATIENT
Start: 2024-08-09 | End: 2024-08-09 | Stop reason: HOSPADM

## 2024-08-09 RX ORDER — SODIUM CHLORIDE 9 MG/ML
INJECTION, SOLUTION INTRAVENOUS PRN
Status: DISCONTINUED | OUTPATIENT
Start: 2024-08-09 | End: 2024-08-12 | Stop reason: HOSPADM

## 2024-08-09 RX ORDER — SODIUM CHLORIDE, SODIUM LACTATE, POTASSIUM CHLORIDE, CALCIUM CHLORIDE 600; 310; 30; 20 MG/100ML; MG/100ML; MG/100ML; MG/100ML
INJECTION, SOLUTION INTRAVENOUS CONTINUOUS
Status: DISCONTINUED | OUTPATIENT
Start: 2024-08-09 | End: 2024-08-09 | Stop reason: HOSPADM

## 2024-08-09 RX ORDER — HYDROCODONE BITARTRATE AND ACETAMINOPHEN 5; 325 MG/1; MG/1
1 TABLET ORAL EVERY 6 HOURS PRN
Status: DISCONTINUED | OUTPATIENT
Start: 2024-08-09 | End: 2024-08-12 | Stop reason: HOSPADM

## 2024-08-09 RX ORDER — SODIUM CHLORIDE 0.9 % (FLUSH) 0.9 %
5-40 SYRINGE (ML) INJECTION EVERY 12 HOURS SCHEDULED
Status: DISCONTINUED | OUTPATIENT
Start: 2024-08-09 | End: 2024-08-12 | Stop reason: HOSPADM

## 2024-08-09 RX ORDER — ONDANSETRON 2 MG/ML
4 INJECTION INTRAMUSCULAR; INTRAVENOUS
Status: DISCONTINUED | OUTPATIENT
Start: 2024-08-09 | End: 2024-08-09 | Stop reason: HOSPADM

## 2024-08-09 RX ORDER — SODIUM CHLORIDE 0.9 % (FLUSH) 0.9 %
5-40 SYRINGE (ML) INJECTION PRN
Status: DISCONTINUED | OUTPATIENT
Start: 2024-08-09 | End: 2024-08-12 | Stop reason: HOSPADM

## 2024-08-09 RX ORDER — ONDANSETRON 2 MG/ML
4 INJECTION INTRAMUSCULAR; INTRAVENOUS EVERY 6 HOURS PRN
Status: DISCONTINUED | OUTPATIENT
Start: 2024-08-09 | End: 2024-08-12 | Stop reason: HOSPADM

## 2024-08-09 RX ORDER — LIDOCAINE HYDROCHLORIDE 20 MG/ML
INJECTION, SOLUTION EPIDURAL; INFILTRATION; INTRACAUDAL; PERINEURAL PRN
Status: DISCONTINUED | OUTPATIENT
Start: 2024-08-09 | End: 2024-08-09 | Stop reason: SDUPTHER

## 2024-08-09 RX ORDER — PROPOFOL 10 MG/ML
INJECTION, EMULSION INTRAVENOUS PRN
Status: DISCONTINUED | OUTPATIENT
Start: 2024-08-09 | End: 2024-08-09 | Stop reason: SDUPTHER

## 2024-08-09 RX ORDER — INSULIN LISPRO 100 [IU]/ML
0-4 INJECTION, SOLUTION INTRAVENOUS; SUBCUTANEOUS
Status: DISCONTINUED | OUTPATIENT
Start: 2024-08-09 | End: 2024-08-12 | Stop reason: HOSPADM

## 2024-08-09 RX ORDER — HYDROMORPHONE HYDROCHLORIDE 1 MG/ML
1 INJECTION, SOLUTION INTRAMUSCULAR; INTRAVENOUS; SUBCUTANEOUS
Status: COMPLETED | OUTPATIENT
Start: 2024-08-09 | End: 2024-08-09

## 2024-08-09 RX ORDER — DIGOXIN 250 MCG
250 TABLET ORAL ONCE
Status: COMPLETED | OUTPATIENT
Start: 2024-08-09 | End: 2024-08-09

## 2024-08-09 RX ORDER — 0.9 % SODIUM CHLORIDE 0.9 %
500 INTRAVENOUS SOLUTION INTRAVENOUS
Status: DISCONTINUED | OUTPATIENT
Start: 2024-08-09 | End: 2024-08-09 | Stop reason: HOSPADM

## 2024-08-09 RX ORDER — TAMSULOSIN HYDROCHLORIDE 0.4 MG/1
0.4 CAPSULE ORAL DAILY
Status: DISCONTINUED | OUTPATIENT
Start: 2024-08-09 | End: 2024-08-09

## 2024-08-09 RX ORDER — OXYCODONE HYDROCHLORIDE 5 MG/1
5 TABLET ORAL
Status: DISCONTINUED | OUTPATIENT
Start: 2024-08-09 | End: 2024-08-09 | Stop reason: HOSPADM

## 2024-08-09 RX ORDER — DEXTROSE MONOHYDRATE 100 MG/ML
INJECTION, SOLUTION INTRAVENOUS CONTINUOUS PRN
Status: DISCONTINUED | OUTPATIENT
Start: 2024-08-09 | End: 2024-08-12 | Stop reason: HOSPADM

## 2024-08-09 RX ORDER — HYDROMORPHONE HYDROCHLORIDE 2 MG/ML
0.25 INJECTION, SOLUTION INTRAMUSCULAR; INTRAVENOUS; SUBCUTANEOUS EVERY 5 MIN PRN
Status: DISCONTINUED | OUTPATIENT
Start: 2024-08-09 | End: 2024-08-09 | Stop reason: HOSPADM

## 2024-08-09 RX ORDER — DILTIAZEM HYDROCHLORIDE 300 MG/1
300 CAPSULE, EXTENDED RELEASE ORAL DAILY
Status: DISCONTINUED | OUTPATIENT
Start: 2024-08-09 | End: 2024-08-09 | Stop reason: SDUPTHER

## 2024-08-09 RX ORDER — POTASSIUM CHLORIDE 7.45 MG/ML
10 INJECTION INTRAVENOUS PRN
Status: DISCONTINUED | OUTPATIENT
Start: 2024-08-09 | End: 2024-08-12 | Stop reason: HOSPADM

## 2024-08-09 RX ORDER — GENTAMICIN SULFATE 60 MG/50ML
120 INJECTION, SOLUTION INTRAVENOUS
Status: DISCONTINUED | OUTPATIENT
Start: 2024-08-09 | End: 2024-08-09 | Stop reason: SDUPTHER

## 2024-08-09 RX ORDER — METFORMIN HYDROCHLORIDE 500 MG/1
1500 TABLET, EXTENDED RELEASE ORAL
Status: DISCONTINUED | OUTPATIENT
Start: 2024-08-10 | End: 2024-08-09

## 2024-08-09 RX ORDER — ONDANSETRON 4 MG/1
4 TABLET, ORALLY DISINTEGRATING ORAL EVERY 8 HOURS PRN
Status: DISCONTINUED | OUTPATIENT
Start: 2024-08-09 | End: 2024-08-12 | Stop reason: HOSPADM

## 2024-08-09 RX ORDER — POLYETHYLENE GLYCOL 3350 17 G/17G
17 POWDER, FOR SOLUTION ORAL DAILY PRN
Status: DISCONTINUED | OUTPATIENT
Start: 2024-08-09 | End: 2024-08-12 | Stop reason: HOSPADM

## 2024-08-09 RX ORDER — MAGNESIUM SULFATE IN WATER 40 MG/ML
2000 INJECTION, SOLUTION INTRAVENOUS PRN
Status: DISCONTINUED | OUTPATIENT
Start: 2024-08-09 | End: 2024-08-12 | Stop reason: HOSPADM

## 2024-08-09 RX ORDER — INSULIN LISPRO 100 [IU]/ML
0-4 INJECTION, SOLUTION INTRAVENOUS; SUBCUTANEOUS NIGHTLY
Status: DISCONTINUED | OUTPATIENT
Start: 2024-08-09 | End: 2024-08-12 | Stop reason: HOSPADM

## 2024-08-09 RX ORDER — POTASSIUM CHLORIDE 20 MEQ/1
40 TABLET, EXTENDED RELEASE ORAL PRN
Status: DISCONTINUED | OUTPATIENT
Start: 2024-08-09 | End: 2024-08-12 | Stop reason: HOSPADM

## 2024-08-09 RX ORDER — HYDROMORPHONE HYDROCHLORIDE 1 MG/ML
0.5 INJECTION, SOLUTION INTRAMUSCULAR; INTRAVENOUS; SUBCUTANEOUS EVERY 4 HOURS PRN
Status: DISCONTINUED | OUTPATIENT
Start: 2024-08-09 | End: 2024-08-12 | Stop reason: HOSPADM

## 2024-08-09 RX ORDER — DIGOXIN 125 MCG
0.25 TABLET ORAL DAILY
Status: DISCONTINUED | OUTPATIENT
Start: 2024-08-09 | End: 2024-08-12 | Stop reason: HOSPADM

## 2024-08-09 RX ORDER — PHENYLEPHRINE HYDROCHLORIDE 10 MG/ML
INJECTION INTRAVENOUS PRN
Status: DISCONTINUED | OUTPATIENT
Start: 2024-08-09 | End: 2024-08-09 | Stop reason: SDUPTHER

## 2024-08-09 RX ORDER — SODIUM CHLORIDE 9 MG/ML
INJECTION, SOLUTION INTRAVENOUS CONTINUOUS
Status: DISCONTINUED | OUTPATIENT
Start: 2024-08-09 | End: 2024-08-12 | Stop reason: HOSPADM

## 2024-08-09 RX ADMIN — DILTIAZEM HYDROCHLORIDE 300 MG: 180 CAPSULE, COATED, EXTENDED RELEASE ORAL at 18:54

## 2024-08-09 RX ADMIN — LIDOCAINE HYDROCHLORIDE 100 MG: 20 INJECTION, SOLUTION EPIDURAL; INFILTRATION; INTRACAUDAL; PERINEURAL at 15:14

## 2024-08-09 RX ADMIN — DIGOXIN 250 MCG: 250 TABLET ORAL at 14:10

## 2024-08-09 RX ADMIN — WATER 1000 MG: 1 INJECTION INTRAMUSCULAR; INTRAVENOUS; SUBCUTANEOUS at 09:54

## 2024-08-09 RX ADMIN — PHENYLEPHRINE HYDROCHLORIDE 200 MCG: 10 INJECTION INTRAVENOUS at 15:25

## 2024-08-09 RX ADMIN — DIGOXIN 0.25 MG: 125 TABLET ORAL at 18:03

## 2024-08-09 RX ADMIN — SODIUM CHLORIDE, PRESERVATIVE FREE 10 ML: 5 INJECTION INTRAVENOUS at 21:58

## 2024-08-09 RX ADMIN — METOPROLOL SUCCINATE 25 MG: 25 TABLET, EXTENDED RELEASE ORAL at 18:03

## 2024-08-09 RX ADMIN — ONDANSETRON 4 MG: 2 INJECTION INTRAMUSCULAR; INTRAVENOUS at 08:45

## 2024-08-09 RX ADMIN — GENTAMICIN SULFATE 120 MG: 40 INJECTION, SOLUTION INTRAMUSCULAR; INTRAVENOUS at 15:22

## 2024-08-09 RX ADMIN — FUROSEMIDE 60 MG: 40 TABLET ORAL at 18:02

## 2024-08-09 RX ADMIN — HYOSCYAMINE SULFATE 125 MCG: 0.12 TABLET ORAL; SUBLINGUAL at 18:03

## 2024-08-09 RX ADMIN — PROPOFOL 200 MG: 10 INJECTION, EMULSION INTRAVENOUS at 15:15

## 2024-08-09 RX ADMIN — HYDROMORPHONE HYDROCHLORIDE 1 MG: 1 INJECTION, SOLUTION INTRAMUSCULAR; INTRAVENOUS; SUBCUTANEOUS at 08:44

## 2024-08-09 RX ADMIN — SODIUM CHLORIDE, POTASSIUM CHLORIDE, SODIUM LACTATE AND CALCIUM CHLORIDE: 600; 310; 30; 20 INJECTION, SOLUTION INTRAVENOUS at 14:11

## 2024-08-09 RX ADMIN — HYDROMORPHONE HYDROCHLORIDE 0.5 MG: 1 INJECTION, SOLUTION INTRAMUSCULAR; INTRAVENOUS; SUBCUTANEOUS at 18:03

## 2024-08-09 RX ADMIN — SODIUM CHLORIDE: 9 INJECTION, SOLUTION INTRAVENOUS at 18:00

## 2024-08-09 RX ADMIN — MORPHINE SULFATE 4 MG: 4 INJECTION, SOLUTION INTRAMUSCULAR; INTRAVENOUS at 12:07

## 2024-08-09 RX ADMIN — MORPHINE SULFATE 4 MG: 4 INJECTION, SOLUTION INTRAMUSCULAR; INTRAVENOUS at 10:00

## 2024-08-09 ASSESSMENT — PAIN DESCRIPTION - DESCRIPTORS
DESCRIPTORS: ACHING
DESCRIPTORS: DISCOMFORT

## 2024-08-09 ASSESSMENT — PAIN SCALES - GENERAL
PAINLEVEL_OUTOF10: 10
PAINLEVEL_OUTOF10: 0
PAINLEVEL_OUTOF10: 10
PAINLEVEL_OUTOF10: 8
PAINLEVEL_OUTOF10: 0

## 2024-08-09 ASSESSMENT — PAIN DESCRIPTION - LOCATION
LOCATION: FLANK

## 2024-08-09 ASSESSMENT — PAIN - FUNCTIONAL ASSESSMENT
PAIN_FUNCTIONAL_ASSESSMENT: NONE - DENIES PAIN
PAIN_FUNCTIONAL_ASSESSMENT: ADULT NONVERBAL PAIN SCALE (NPVS)
PAIN_FUNCTIONAL_ASSESSMENT: 0-10
PAIN_FUNCTIONAL_ASSESSMENT: 0-10
PAIN_FUNCTIONAL_ASSESSMENT: ACTIVITIES ARE NOT PREVENTED

## 2024-08-09 ASSESSMENT — PAIN DESCRIPTION - ORIENTATION
ORIENTATION: LEFT

## 2024-08-09 NOTE — PROGRESS NOTES
TRANSFER - IN REPORT:    Verbal report received from KINGS Mishra on Brenda Mayer  being received from PACU for routine progression of patient care      Report consisted of patient's Situation, Background, Assessment and   Recommendations(SBAR).     Information from the following report(s) Nurse Handoff Report was reviewed with the receiving nurse.    Opportunity for questions and clarification was provided.      Assessment completed upon patient's arrival to unit and care assumed.

## 2024-08-09 NOTE — PROGRESS NOTES
4 Eyes Skin Assessment     NAME:  Brenda Mayer  YOB: 1955  MEDICAL RECORD NUMBER:  [id number]    The patient is being assessed for  Admission    I agree that at least one RN has performed a thorough Head to Toe Skin Assessment on the patient. ALL assessment sites listed below have been assessed.      Areas assessed by both nurses:    Head, Face, Ears, Shoulders, Back, Chest, Arms, Elbows, Hands, Sacrum. Buttock, Coccyx, Ischium, Legs. Feet and Heels, and Under Medical Devices         Does the Patient have a Wound? No noted wound(s)       Gage Prevention initiated by RN: No  Wound Care Orders initiated by RN: No    Pressure Injury (Stage 3,4, Unstageable, DTI, NWPT, and Complex wounds) if present, place Wound referral order by RN under : No    New Ostomies, if present place, Ostomy referral order under : No     Nurse 1 eSignature: Electronically signed by Lili Carvajal RN on 8/9/24 at 6:26 PM EDT    **SHARE this note so that the co-signing nurse can place an eSignature**    Nurse 2 eSignature: Electronically signed by Zahida Amaro RN on 8/9/24 at 6:28 PM EDT

## 2024-08-09 NOTE — ED NOTES
TRANSFER - OUT REPORT:    Verbal report given to Rebecca KU  on Brenda Mayer  being transferred to Pre op DT for routine progression of patient care       Report consisted of patient's Situation, Background, Assessment and   Recommendations(SBAR).     Information from the following report(s) ED SBAR, MAR, Recent Results, and Neuro Assessment was reviewed with the receiving nurse.    Lines:   Peripheral IV 08/09/24 Proximal;Right;Anterior Antecubital (Active)        Opportunity for questions and clarification was provided.      Patient transported with:  O2 @ 2lpm

## 2024-08-09 NOTE — ED NOTES
Patient left via [facility] to DT Pre Op with 0.9% saline infusing. Did not receive 500 mls here in the ER due to IV being positional and patient not keeping her arm straight.

## 2024-08-09 NOTE — H&P
Urology H&P:         Patient: Brenda Mayer MRN: [id number]  SSN: [id number]    YOB: 1955  Age: 69 y.o.  Sex: female      Subjective:      Brenda Mayer is a 69 y.o. female who was transferred from the [facility] ED. Patient seen at bedside with family. Family primary historian. According to family patient is followed by [facility] for treatment of kidney stones. He reports she has had multiple procedures, but still maintains stone burden. She does have a right stent in place. Patient is somewhat lethargic and ill appearing. CT with evidence of 2 left proximal ureteral stones.    Past Medical History:   Diagnosis Date    Chronic pain     pain R knee    Hypertension     Morbid obesity (HCC)     BMI 45.8- 12    Positive PPD     had vaccination as a child - BCG     Past Surgical History:   Procedure Laterality Date    COLONOSCOPY N/A 2022    COLONOSCOPY/ BMI 56 ROOM 302 performed by Thaddeus Sharif MD at [facility] ENDOSCOPY    GYN      C-sec x 1 with GA    KNEE ARTHROSCOPY      bilateral knees      Family History   Problem Relation Age of Onset    Other Mother         kidney failure    Cancer Sister         cervical cancer     Social History     Tobacco Use    Smoking status: Former     Current packs/day: 0.00     Average packs/day: 0.5 packs/day for 25.0 years (12.5 ttl pk-yrs)     Types: Cigarettes     Start date: 1982     Quit date: 2007     Years since quittin.5     Passive exposure: Past    Smokeless tobacco: Never   Substance Use Topics    Alcohol use: No      Prior to Admission medications    Medication Sig Start Date End Date Taking? Authorizing Provider   JARDIANCE 10 MG tablet Take 1 tablet by mouth daily    Shelia Bill MD   BiPAP Machine MISC by Does not apply route Trilogy device qhs--[facility] [place]    Shelia Bill MD   acetaminophen (TYLENOL) 500 MG tablet Take 2 tablets by mouth every 6 hours as needed for Pain for pain    Shelia Bill  has already been given Rocephin.  She has had multiple prior UTIs.  I discussed placement of a left ureteral stent and the risks of that procedure as outlined below.  I have recommended she also receive a dose of 120 mg of IV gentamicin.  She will be admitted for continue antibiotics and supportive care.  Once she recovers we will discuss how she would like to move forward with her urologic follow-up.  She will likely need a PCNL on the right and ureteroscopy on the left.      We discussed in detail the risks and benefits of cystoscopy and left stent placement.  We discussed the chance of bladder perforation or ureteral damage and need for open repair or percutaneous nephrostomy tube placement.  There is a small risk of damage to the urethra, ureteral orifices, or ureter which may result in stricture.  We discussed risk of urinary infection/sepsis. We discussed the risk of general anesthesia and other operative risks including but not limited to; MI, stroke, DVT/PE, bleeding, infection, pain/LUTS, other cardiovascular, pulmonary, neurologic, and renal complications.  We explained that as with any surgical procedure there is a small chance of death.  A woodard catheter may be left in place after the procedure.  All of the patient's questions were answered and they wish to proceed.  We carefully explained the stent will have to be removed or replaced in the next 3-4 months and cannot remain in the patient or it will cause significant complications.  Patient acknowledged the need for urologic follow-up for stent removal or continued management.       Arnold Short MD  Urologic Oncology  [facility]

## 2024-08-09 NOTE — ED NOTES
Patient oxygen saturation dropped down to 85%. [facility] DO made aware. Patient put on 2L nasal canula.

## 2024-08-09 NOTE — PERIOP NOTE
TRANSFER - IN REPORT:    Verbal report received from KINGS Pearson on Brenda Mayer  being received from [facility], room 4 for routine progression of patient care      Report consisted of patient's Situation, Background, Assessment and   Recommendations(SBAR).     Information from the following report(s) Nurse Handoff Report was reviewed with the receiving nurse.    Opportunity for questions and clarification was provided.      Assessment to be completed upon patient's arrival to unit and care to be assumed.      Pt. Transport arranged by [facility]. Patient scheduled to be here at 1245.

## 2024-08-09 NOTE — ANESTHESIA PRE PROCEDURE
psychiatric history:            GI/Hepatic/Renal:   (+) morbid obesity          Endo/Other:    (+) DiabetesType II DM, using insulin.                 Abdominal:             Vascular: negative vascular ROS.         Other Findings:       Anesthesia Plan      general     ASA 3       Induction: intravenous.      Anesthetic plan and risks discussed with patient.                    Gianni Lira MD   8/9/2024

## 2024-08-09 NOTE — BRIEF OP NOTE
Brief Postoperative Note      Patient: Brenda Mayer  YOB: 1955  MRN: [id number]    Date of Procedure: 8/9/2024    Pre-Op Diagnosis Codes:     * Pyelonephritis [N12]; left proximal ureteral stones    Post-Op Diagnosis: Same       Procedure(s):  CYSTOSCOPY LEFT URETERAL STENT INSERTION AND LEFT RETROGRADE    Surgeon(s):  Michael Short MD    Assistant:  * No surgical staff found *    Anesthesia: General    Estimated Blood Loss (mL): Minimal    Complications: None    Specimens:   * No specimens in log *    Implants:  Implant Name Type Inv. Item Serial No.  Lot No. LRB No. Used Action   STENT URET 6FR L24CM HYDR+ GRAD CIRCUMFERENTIAL MRK LO PROF - [id number]  STENT URET 6FR L24CM HYDR+ GRAD CIRCUMFERENTIAL MRK LO PROF  [facility] UROLOGY-WD [id number] Left 1 Implanted         Drains:   Urinary Catheter 08/09/24 2 Way (Active)       [REMOVED] External Urinary Catheter (Removed)       [REMOVED] External Urinary Catheter (Removed)   Site Assessment Clean,dry & intact 07/15/24 0930   Placement Replaced 07/15/24 0930   Catheter Care Catheter/Wick replaced 07/15/24 0930   Perineal Care Yes 07/15/24 0930   Suction 40 mmgHg continuous 07/15/24 0930   Urine Color Yellow 07/15/24 0930   Urine Appearance Cloudy 07/15/24 0930   Urine Odor Other (Comment) 07/15/24 0930   Output (mL) 250 mL 07/15/24 0930       Findings:  Infection Present At Time Of Surgery (PATOS) (choose all levels that have infection present):  - Organ Space infection (below fascia) present as evidenced by fluid consistent with infection  Other Findings: Patient with stent already on the right side.  Presented with new left proximal ureteral stones fever and concern for pyelonephritis.  Stent placed on the left with some particulate fluid drained from the upper collecting system.  Will need to follow-up with us or her [facility] urologist likely for planned right PCNL and now will likely need left ureteroscopy with holmium laser

## 2024-08-09 NOTE — ANESTHESIA POSTPROCEDURE EVALUATION
Department of Anesthesiology  Postprocedure Note    Patient: Brenda Mayer  MRN: [id number]  YOB: 1955  Date of evaluation: 8/9/2024    Procedure Summary       Date: 08/09/24 Room / Location: [facility] MAIN OR 01 CYSTO / [facility] MAIN OR    Anesthesia Start: 1507 Anesthesia Stop: 1551    Procedure: CYSTOSCOPY LEFT URETERAL STENT INSERTION AND LEFT RETROGRADE (Left: Urethra) Diagnosis:       Pyelonephritis      (Pyelonephritis [N12])    Providers: Michael Short MD Responsible Provider: Bautista Sands MD    Anesthesia Type: General ASA Status: 3            Anesthesia Type: General    Casimiro Phase I: Casimiro Score: 8    Casimiro Phase II:      Anesthesia Post Evaluation    Patient location during evaluation: PACU  Patient participation: complete - patient participated  Level of consciousness: awake and alert  Airway patency: patent  Nausea & Vomiting: no nausea and no vomiting  Cardiovascular status: hemodynamically stable  Respiratory status: acceptable, nonlabored ventilation and spontaneous ventilation  Hydration status: euvolemic  Comments: BP (!) 133/59   Pulse (!) 101 Comment: RN notified  Temp 98.4 °F (36.9 °C) (Oral)   Resp 20   Ht 1.702 m (5' 7\")   Wt 121.1 kg (267 lb)   SpO2 94%   BMI 41.82 kg/m²     Multimodal analgesia pain management approach  Pain management: adequate and satisfactory to patient    No notable events documented.

## 2024-08-09 NOTE — ED PROVIDER NOTES
Emergency Department Provider Note       PCP: Bautista Esparza MD   Age: 69 y.o.   Sex: female     DISPOSITION       No diagnosis found.    Medical Decision Making     69-year-old female with recurrent kidney stones, currently has a right-sided ureteral stent in place presenting to this department with now left-sided pain.  Will obtain lab work including sepsis markers given patient recent diagnosis of candidal infection, UTI per review of chart  Will proceed with CT imaging to further assess  Patient is currently on Xarelto, will treat with Dilaudid     {Complexity:84105}  {Risk:45631}    I independently ordered and reviewed each unique test.  {external source:05538}   {Historian (state who, why needed, what they said):69461}  {test reviewed:41724}  {EK}  {Admitted or Consultants involved.:20117}  {MIPS URI - Strep - Sinusitis - Pregnant - Head Trauma - Overdose - Agitation:66309}  {SEP1 yes/no:54784}  {Critical Care:11647}    History     69-year-old female with history of recurrent kidney stones presenting this department with reports of severe left flank pain.  Symptoms started yesterday.  Patient has recently undergone several procedures to remove stones on the right.  She has a ureteral stent in place on the right and is followed by a urologist at [facility].  Son at bedside states they have attempted several procedures to remove stones unsuccessfully secondary to associated candidal infection.  Patient continues Diflucan at this time, no reported fever, she reports nausea without vomiting.  Pain is isolated to the left side at this time.  Patient states her symptoms are consistent with prior kidney stones.  She has been told that she has stones on both sides    The history is provided by the patient and a relative. No  was used.       ROS     Review of Systems     Physical Exam     Vitals signs and nursing note reviewed:  Vitals:    24 0821   BP: (!) 134/92   Pulse: 84  Glucose   Result Value Ref Range    POC Glucose 126 (H) 65 - 100 mg/dL    Performed by: McCallApril    POCT Glucose   Result Value Ref Range    POC Glucose 202 (H) 65 - 100 mg/dL    Performed by: AnjaliMaryPCT    POCT Glucose   Result Value Ref Range    POC Glucose 150 (H) 65 - 100 mg/dL    Performed by: Peterson    Results for orders placed or performed during the hospital encounter of 08/09/24   Culture, Urine    Specimen: Urine, clean catch    Urine   Result Value Ref Range    Special Requests NO SPECIAL REQUESTS      Culture 50,000-100,000 COLONIES/mL Enterococcus faecalis (A)      Culture <10,000 COLONIES/mL MIXED SKIN CONSUELO ISOLATED         Susceptibility    Enterococcus faecalis - BACTERIAL SUSCEPTIBILITY PANEL TRISTAN     Penicillin G 8 Sensitive ug/mL     vancomycin >=32 Resistant ug/mL     linezolid 2 Sensitive ug/mL     ampicillin <=2 Sensitive ug/mL     nitrofurantoin <=16 Sensitive ug/mL   CT ABDOMEN PELVIS RENAL STONE    Narrative    CT ABDOMEN AND PELVIS    INDICATION: Left flank pain    TECHNIQUE: Multiple 2D axial images were obtained through the abdomen and pelvis  without intravenous or oral contrast.  Radiation dose reduction techniques were  used for this study:  All CT scans performed at this facility use one or all of  the following: Automated exposure control, adjustment of the mA and/or kVp  according to patient's size, iterative reconstruction.    COMPARISON: 7/13/2024    FINDINGS:  - KIDNEYS/URETERS: Right ureteral stent is again noted, extending from an  interval calyx into the urinary bladder. There are multiple calcifications with  the renal pelvis as well as apparent coalescence of calcifications within the  lower pole calyx. There are no abnormal calcification along the ureteral course  of the stent.  There is an 8 mm lower pole left renal calculus (390 Hounsfield units). There  are 2 proximal left ureteral calculi with the larger measuring 8 mm in greatest  dimension having  2.0 mmol/L   Magnesium   Result Value Ref Range    Magnesium 2.1 1.8 - 2.4 mg/dL   Procalcitonin   Result Value Ref Range    Procalcitonin 0.18 (H) 0.00 - 0.10 ng/mL   EKG 12 Lead   Result Value Ref Range    Ventricular Rate 68 BPM    Atrial Rate 0 BPM    QRS Duration 92 ms    Q-T Interval 345 ms    QTc Calculation (Bazett) 367 ms    R Axis 17 degrees    Diagnosis       Atrial fibrillation  Low voltage, precordial leads  Borderline T abnormalities, diffuse leads  Baseline wander in lead(s) I III aVL    Confirmed by MD NANCY (), ABHISHEK ([id number]) on 8/9/2024 1:59:24 PM           CT ABDOMEN PELVIS RENAL STONE   Final Result   Bilateral nephrolithiasis. There are 2 proximal left ureteral   calcifications measuring up to 8 mm, new since prior study resulting in mild   hydronephrosis and hydroureter.      Right ureteral stent.      Borderline gallbladder distention. No gallstones or pericholecystic inflammatory   changes are evident. Follow-up with sonography/scintigraphy could be obtained as   clinically indicated.      Constipation.      ** If there are any questions about this report, I can be reached on   Perfect[employer]**      Electronically signed by Haseeb Em                   No results for input(s): \"COVID19\" in the last 72 hours.    Voice dictation software was used during the making of this note.  This software is not perfect and grammatical and other typographical errors may be present.  This note has not been completely proofread for errors.      Hung Whitman, DO  08/12/24 1059       Hung Whitman, DO  08/12/24 1102       Hung Whitman, DO  08/12/24 1103

## 2024-08-10 LAB
ANION GAP SERPL CALC-SCNC: 12 MMOL/L (ref 9–18)
BASOPHILS # BLD: 0.1 K/UL (ref 0–0.2)
BASOPHILS NFR BLD: 0 % (ref 0–2)
BUN SERPL-MCNC: 20 MG/DL (ref 8–23)
CALCIUM SERPL-MCNC: 8.4 MG/DL (ref 8.8–10.2)
CHLORIDE SERPL-SCNC: 101 MMOL/L (ref 98–107)
CO2 SERPL-SCNC: 25 MMOL/L (ref 20–28)
CREAT SERPL-MCNC: 1.15 MG/DL (ref 0.6–1.1)
DIFFERENTIAL METHOD BLD: ABNORMAL
EOSINOPHIL # BLD: 0 K/UL (ref 0–0.8)
EOSINOPHIL NFR BLD: 0 % (ref 0.5–7.8)
ERYTHROCYTE [DISTWIDTH] IN BLOOD BY AUTOMATED COUNT: 21.7 % (ref 11.9–14.6)
GLUCOSE BLD STRIP.AUTO-MCNC: 137 MG/DL (ref 65–100)
GLUCOSE BLD STRIP.AUTO-MCNC: 140 MG/DL (ref 65–100)
GLUCOSE BLD STRIP.AUTO-MCNC: 148 MG/DL (ref 65–100)
GLUCOSE BLD STRIP.AUTO-MCNC: 154 MG/DL (ref 65–100)
GLUCOSE SERPL-MCNC: 127 MG/DL (ref 70–99)
HCT VFR BLD AUTO: 27 % (ref 35.8–46.3)
HGB BLD-MCNC: 8.3 G/DL (ref 11.7–15.4)
IMM GRANULOCYTES # BLD AUTO: 0.1 K/UL (ref 0–0.5)
IMM GRANULOCYTES NFR BLD AUTO: 1 % (ref 0–5)
LYMPHOCYTES # BLD: 1.6 K/UL (ref 0.5–4.6)
LYMPHOCYTES NFR BLD: 9 % (ref 13–44)
MCH RBC QN AUTO: 29.7 PG (ref 26.1–32.9)
MCHC RBC AUTO-ENTMCNC: 30.7 G/DL (ref 31.4–35)
MCV RBC AUTO: 96.8 FL (ref 82–102)
MONOCYTES # BLD: 1 K/UL (ref 0.1–1.3)
MONOCYTES NFR BLD: 6 % (ref 4–12)
NEUTS SEG # BLD: 14.9 K/UL (ref 1.7–8.2)
NEUTS SEG NFR BLD: 84 % (ref 43–78)
NRBC # BLD: 0 K/UL (ref 0–0.2)
PLATELET # BLD AUTO: 268 K/UL (ref 150–450)
PMV BLD AUTO: 10.2 FL (ref 9.4–12.3)
POTASSIUM SERPL-SCNC: 3.7 MMOL/L (ref 3.5–5.1)
RBC # BLD AUTO: 2.79 M/UL (ref 4.05–5.2)
SERVICE CMNT-IMP: ABNORMAL
SODIUM SERPL-SCNC: 139 MMOL/L (ref 136–145)
WBC # BLD AUTO: 17.7 K/UL (ref 4.3–11.1)

## 2024-08-10 PROCEDURE — 2580000003 HC RX 258: Performed by: UROLOGY

## 2024-08-10 PROCEDURE — 2580000003 HC RX 258: Performed by: NURSE PRACTITIONER

## 2024-08-10 PROCEDURE — 6370000000 HC RX 637 (ALT 250 FOR IP): Performed by: NURSE PRACTITIONER

## 2024-08-10 PROCEDURE — 82962 GLUCOSE BLOOD TEST: CPT

## 2024-08-10 PROCEDURE — 85025 COMPLETE CBC W/AUTO DIFF WBC: CPT

## 2024-08-10 PROCEDURE — 80048 BASIC METABOLIC PNL TOTAL CA: CPT

## 2024-08-10 PROCEDURE — 76937 US GUIDE VASCULAR ACCESS: CPT

## 2024-08-10 PROCEDURE — 6370000000 HC RX 637 (ALT 250 FOR IP): Performed by: UROLOGY

## 2024-08-10 PROCEDURE — 6360000002 HC RX W HCPCS: Performed by: NURSE PRACTITIONER

## 2024-08-10 PROCEDURE — 36415 COLL VENOUS BLD VENIPUNCTURE: CPT

## 2024-08-10 PROCEDURE — 1100000000 HC RM PRIVATE

## 2024-08-10 RX ORDER — LEVOFLOXACIN 5 MG/ML
750 INJECTION, SOLUTION INTRAVENOUS EVERY 24 HOURS
Status: DISCONTINUED | OUTPATIENT
Start: 2024-08-10 | End: 2024-08-11

## 2024-08-10 RX ADMIN — SODIUM CHLORIDE: 9 INJECTION, SOLUTION INTRAVENOUS at 10:49

## 2024-08-10 RX ADMIN — DILTIAZEM HYDROCHLORIDE 300 MG: 180 CAPSULE, COATED, EXTENDED RELEASE ORAL at 10:45

## 2024-08-10 RX ADMIN — SODIUM CHLORIDE: 9 INJECTION, SOLUTION INTRAVENOUS at 18:22

## 2024-08-10 RX ADMIN — HYDROCODONE BITARTRATE AND ACETAMINOPHEN 1 TABLET: 5; 325 TABLET ORAL at 21:26

## 2024-08-10 RX ADMIN — FUROSEMIDE 60 MG: 40 TABLET ORAL at 10:18

## 2024-08-10 RX ADMIN — LEVOFLOXACIN 750 MG: 750 INJECTION, SOLUTION INTRAVENOUS at 08:42

## 2024-08-10 RX ADMIN — SODIUM CHLORIDE, PRESERVATIVE FREE 10 ML: 5 INJECTION INTRAVENOUS at 10:19

## 2024-08-10 RX ADMIN — METOPROLOL SUCCINATE 25 MG: 25 TABLET, EXTENDED RELEASE ORAL at 10:18

## 2024-08-10 RX ADMIN — SODIUM CHLORIDE, PRESERVATIVE FREE 10 ML: 5 INJECTION INTRAVENOUS at 21:20

## 2024-08-10 RX ADMIN — DIGOXIN 0.25 MG: 125 TABLET ORAL at 10:47

## 2024-08-10 ASSESSMENT — PAIN - FUNCTIONAL ASSESSMENT: PAIN_FUNCTIONAL_ASSESSMENT: PREVENTS OR INTERFERES SOME ACTIVE ACTIVITIES AND ADLS

## 2024-08-10 ASSESSMENT — PAIN DESCRIPTION - ORIENTATION: ORIENTATION: LEFT

## 2024-08-10 ASSESSMENT — PAIN DESCRIPTION - LOCATION: LOCATION: FLANK

## 2024-08-10 ASSESSMENT — PAIN SCALES - GENERAL
PAINLEVEL_OUTOF10: 4
PAINLEVEL_OUTOF10: 0

## 2024-08-10 ASSESSMENT — PAIN DESCRIPTION - DESCRIPTORS: DESCRIPTORS: ACHING;DULL

## 2024-08-10 NOTE — PROGRESS NOTES
CH responded to a consult request for spiritual support.  has made initial visit, reviewed the chart, consulted with the nurse and completed spiritual assessment.  Pt is from [place], [place]oroast[place]. Pt engaged in life review. Pt said that she is from [place]. Pt said that it is only she and her son here. Ch offered words of comfort and prayed for them. Ch listened and prayed for the pt. Ch provided spiritual care and emotional support through pastoral presence, non-anxious presence, active listening, and prayer. CH is available as needed.

## 2024-08-10 NOTE — OP NOTE
[address]  [address]                            OPERATIVE REPORT      PATIENT NAME: CAROLANN GONSALEZ           : 1955  MED REC NO: [id number]                       ROOM: 624  ACCOUNT NO: [id number]                       ADMIT DATE: 2024  PROVIDER: Michael Short MD    DATE OF SERVICE:  2024    PREOPERATIVE DIAGNOSES:  Obstructing proximal left ureteral stones with likely pyelonephritis.    POSTOPERATIVE DIAGNOSES:  Obstructing proximal left ureteral stones with likely pyelonephritis.    PROCEDURES PERFORMED:  Cystoscopy with left retrograde pyelogram and placement of left ureteral stent.    SURGEON:  Michael Short MD    ASSISTANT:  None.    ANESTHESIA:  General with LMA.    ESTIMATED BLOOD LOSS:  Minimal.    SPECIMENS REMOVED:  None.    INTRAOPERATIVE FINDINGS:  See below.     COMPLICATIONS:  None.    IMPLANTS:  A 6-Mauritian by 24 cm ureteral stent without string.    INDICATIONS:  The patient is a pleasant 69-year-old female who has been a urology patient of [facility].  She has had 3 prior ureteroscopies and has a right stent that is in place now.  The plan was for her to undergo right PCNL in the near future by Dr. Christianson.  She presented to our ED with onset of left flank pain, fevers and chills.  Her white count is 15,000.  CT scan showed 2 stones in the proximal left ureter with mild hydronephrosis.  The right stent appeared to be in good position.  She presents for the above procedure.    DESCRIPTION OF PROCEDURE:  After informed consent was obtained and risks and benefits were discussed, she was taken to the operating room 1, [facility]. After induction of general anesthesia and placement of an LMA, she was carefully positioned in the low lithotomy position.  Her genitalia were prepped and draped in the usual sterile fashion.  A time-out was performed and we confirmed the side of the  procedure.    I then inserted a 22-Martiniquais rigid cystoscope in her bladder and inspected it.  The bladder appeared normal and the right stent appeared to be in good position coming out of her right ureteral orifice.  I then located her left ureteral orifice and cannulated it with a 5-Martiniquais open-ended catheter.  I performed a left retrograde pyelogram.    Findings of left retrograde pyelogram, the distal and mid left ureter were normal appearing.  There were no filling defects.  There was then a filling defect where the 1st stone was in the proximal left ureter.  I was able to get some contrast to go past it, but I was careful to not allow any pyelovenous backflow.  There was moderate hydronephrosis above the stones, but no significant filling defects.    I then inserted a Sensor guidewire and was able to get it past both stones and into the upper collecting system.  With placement of the wire, there were some murky particulate fluid that drained from the ureter around the wire.  There was no wilmer pus.  I then inserted a 6-Martiniquais by 24 double-J stent over the wire using cystoscopic and fluoroscopic guidance and got it positioned well with a curl in the left renal pelvis as well as in the bladder.  I then emptied the bladder and removed the cystoscope.  I then placed a latex-free 16-Martiniquais Zambrano catheter to ensure maximal drainage overnight.  The patient was awakened, LMA was removed.  She was taken to the recovery room in stable condition.  There were no known complications.    DISPOSITION:  She will be admitted with continued IV antibiotics.  Of note, she was given Rocephin in the emergency department this morning around 8 or 9 p.m.  I gave her additional antibiotics, 120 mg of gentamicin IV.  We will follow up her cultures.  She will need stone management in the future either with her [facility] Urology team or us.  The plan had been a right PCNL and now she will likely need a left-sided ureteroscopy with holmium

## 2024-08-10 NOTE — PLAN OF CARE
Problem: Discharge Planning  Goal: Discharge to home or other facility with appropriate resources  Outcome: Progressing     Problem: Chronic Conditions and Co-morbidities  Goal: Patient's chronic conditions and co-morbidity symptoms are monitored and maintained or improved  Outcome: Progressing  Flowsheets  Taken 8/9/2024 1623 by Twyla Miles RN  Care Plan - Patient's Chronic Conditions and Co-Morbidity Symptoms are Monitored and Maintained or Improved: Monitor and assess patient's chronic conditions and comorbid symptoms for stability, deterioration, or improvement  Taken 8/9/2024 1550 by Twyla Miles RN  Care Plan - Patient's Chronic Conditions and Co-Morbidity Symptoms are Monitored and Maintained or Improved: Monitor and assess patient's chronic conditions and comorbid symptoms for stability, deterioration, or improvement     Problem: Pain  Goal: Verbalizes/displays adequate comfort level or baseline comfort level  Outcome: Progressing     Problem: Safety - Adult  Goal: Free from fall injury  Outcome: Progressing     Problem: Skin/Tissue Integrity  Goal: Absence of new skin breakdown  Description: 1.  Monitor for areas of redness and/or skin breakdown  2.  Assess vascular access sites hourly  3.  Every 4-6 hours minimum:  Change oxygen saturation probe site  4.  Every 4-6 hours:  If on nasal continuous positive airway pressure, respiratory therapy assess nares and determine need for appliance change or resting period.  Outcome: Progressing

## 2024-08-10 NOTE — PROGRESS NOTES
Urology Progress Note    Admit Date: 8/9/2024    Subjective:     Patient has no new complaints; flank pain resolved after stent. No fevers; wbc stable at 17.7K    Objective:     Patient Vitals for the past 8 hrs:   BP Temp Temp src Pulse Resp SpO2   08/10/24 0745 99/61 98.2 °F (36.8 °C) Oral 90 25 94 %   08/10/24 0644 (!) 104/54 98.6 °F (37 °C) -- 84 16 94 %   08/10/24 0217 (!) 94/59 99.4 °F (37.4 °C) Oral 84 18 93 %     No intake/output data recorded.  08/08 1901 - 08/10 0700  In: 370 [P.O.:120]  Out: 2950 [Urine:2950]    Physical Exam:     Awake, alert, and oriented  Lungs no JVD.  Breathing is  non-labored; no audible wheezing.    Heart regular, normal perfusion  Abd soft, nt, nd  UOP clear      Data Review   Recent Results (from the past 24 hour(s))   POCT Glucose    Collection Time: 08/09/24  1:32 PM   Result Value Ref Range    POC Glucose 205 (H) 65 - 100 mg/dL    Performed by: Vaughn    POCT Glucose    Collection Time: 08/09/24  4:08 PM   Result Value Ref Range    POC Glucose 167 (H) 65 - 100 mg/dL    Performed by: Jaz    POCT Glucose    Collection Time: 08/09/24  5:21 PM   Result Value Ref Range    POC Glucose 161 (H) 65 - 100 mg/dL    Performed by: Valente    POCT Glucose    Collection Time: 08/09/24  8:17 PM   Result Value Ref Range    POC Glucose 154 (H) 65 - 100 mg/dL    Performed by: Britni    Basic Metabolic Panel w/ Reflex to MG    Collection Time: 08/10/24  5:42 AM   Result Value Ref Range    Sodium 139 136 - 145 mmol/L    Potassium 3.7 3.5 - 5.1 mmol/L    Chloride 101 98 - 107 mmol/L    CO2 25 20 - 28 mmol/L    Anion Gap 12 9 - 18 mmol/L    Glucose 127 (H) 70 - 99 mg/dL    BUN 20 8 - 23 MG/DL    Creatinine 1.15 (H) 0.60 - 1.10 MG/DL    Est, Glom Filt Rate 52 (L) >60 ml/min/1.73m2    Calcium 8.4 (L) 8.8 - 10.2 MG/DL   CBC with Auto Differential    Collection Time: 08/10/24  5:42 AM   Result Value Ref Range    WBC 17.7 (H) 4.3 - 11.1 K/uL    RBC 2.79 (L)

## 2024-08-11 LAB
ANION GAP SERPL CALC-SCNC: 12 MMOL/L (ref 9–18)
BACTERIA SPEC CULT: ABNORMAL
BUN SERPL-MCNC: 19 MG/DL (ref 8–23)
CALCIUM SERPL-MCNC: 8.4 MG/DL (ref 8.8–10.2)
CHLORIDE SERPL-SCNC: 100 MMOL/L (ref 98–107)
CO2 SERPL-SCNC: 26 MMOL/L (ref 20–28)
CREAT SERPL-MCNC: 0.95 MG/DL (ref 0.6–1.1)
ERYTHROCYTE [DISTWIDTH] IN BLOOD BY AUTOMATED COUNT: 21.1 % (ref 11.9–14.6)
GLUCOSE BLD STRIP.AUTO-MCNC: 110 MG/DL (ref 65–100)
GLUCOSE BLD STRIP.AUTO-MCNC: 126 MG/DL (ref 65–100)
GLUCOSE BLD STRIP.AUTO-MCNC: 202 MG/DL (ref 65–100)
GLUCOSE BLD STRIP.AUTO-MCNC: 78 MG/DL (ref 65–100)
GLUCOSE SERPL-MCNC: 126 MG/DL (ref 70–99)
HCT VFR BLD AUTO: 27 % (ref 35.8–46.3)
HGB BLD-MCNC: 8.2 G/DL (ref 11.7–15.4)
MCH RBC QN AUTO: 29.7 PG (ref 26.1–32.9)
MCHC RBC AUTO-ENTMCNC: 30.4 G/DL (ref 31.4–35)
MCV RBC AUTO: 97.8 FL (ref 82–102)
NRBC # BLD: 0 K/UL (ref 0–0.2)
PLATELET # BLD AUTO: 261 K/UL (ref 150–450)
PMV BLD AUTO: 10.1 FL (ref 9.4–12.3)
POTASSIUM SERPL-SCNC: 3.4 MMOL/L (ref 3.5–5.1)
RBC # BLD AUTO: 2.76 M/UL (ref 4.05–5.2)
SERVICE CMNT-IMP: ABNORMAL
SERVICE CMNT-IMP: NORMAL
SODIUM SERPL-SCNC: 139 MMOL/L (ref 136–145)
WBC # BLD AUTO: 11.9 K/UL (ref 4.3–11.1)

## 2024-08-11 PROCEDURE — 80048 BASIC METABOLIC PNL TOTAL CA: CPT

## 2024-08-11 PROCEDURE — 82962 GLUCOSE BLOOD TEST: CPT

## 2024-08-11 PROCEDURE — 2580000003 HC RX 258: Performed by: NURSE PRACTITIONER

## 2024-08-11 PROCEDURE — 85027 COMPLETE CBC AUTOMATED: CPT

## 2024-08-11 PROCEDURE — 1100000000 HC RM PRIVATE

## 2024-08-11 PROCEDURE — 36415 COLL VENOUS BLD VENIPUNCTURE: CPT

## 2024-08-11 PROCEDURE — 2580000003 HC RX 258: Performed by: UROLOGY

## 2024-08-11 PROCEDURE — 6370000000 HC RX 637 (ALT 250 FOR IP): Performed by: UROLOGY

## 2024-08-11 PROCEDURE — 6370000000 HC RX 637 (ALT 250 FOR IP): Performed by: NURSE PRACTITIONER

## 2024-08-11 PROCEDURE — 76937 US GUIDE VASCULAR ACCESS: CPT

## 2024-08-11 PROCEDURE — 6360000002 HC RX W HCPCS: Performed by: UROLOGY

## 2024-08-11 RX ORDER — LINEZOLID 600 MG/1
600 TABLET, FILM COATED ORAL EVERY 12 HOURS SCHEDULED
Status: DISCONTINUED | OUTPATIENT
Start: 2024-08-11 | End: 2024-08-12 | Stop reason: HOSPADM

## 2024-08-11 RX ORDER — LINEZOLID 600 MG/1
600 TABLET, FILM COATED ORAL EVERY 12 HOURS SCHEDULED
Status: DISCONTINUED | OUTPATIENT
Start: 2024-08-11 | End: 2024-08-11

## 2024-08-11 RX ADMIN — LINEZOLID 600 MG: 600 TABLET, FILM COATED ORAL at 13:46

## 2024-08-11 RX ADMIN — SODIUM CHLORIDE: 9 INJECTION, SOLUTION INTRAVENOUS at 06:15

## 2024-08-11 RX ADMIN — HYDROCODONE BITARTRATE AND ACETAMINOPHEN 1 TABLET: 5; 325 TABLET ORAL at 21:00

## 2024-08-11 RX ADMIN — METOPROLOL SUCCINATE 25 MG: 25 TABLET, EXTENDED RELEASE ORAL at 08:50

## 2024-08-11 RX ADMIN — POLYETHYLENE GLYCOL 3350 17 G: 17 POWDER, FOR SOLUTION ORAL at 21:01

## 2024-08-11 RX ADMIN — HYDROCODONE BITARTRATE AND ACETAMINOPHEN 1 TABLET: 5; 325 TABLET ORAL at 10:34

## 2024-08-11 RX ADMIN — FUROSEMIDE 60 MG: 40 TABLET ORAL at 08:50

## 2024-08-11 RX ADMIN — DIGOXIN 0.25 MG: 125 TABLET ORAL at 08:50

## 2024-08-11 RX ADMIN — DILTIAZEM HYDROCHLORIDE 300 MG: 180 CAPSULE, COATED, EXTENDED RELEASE ORAL at 08:50

## 2024-08-11 RX ADMIN — AMPICILLIN SODIUM 2000 MG: 2 INJECTION, POWDER, FOR SOLUTION INTRAVENOUS at 11:14

## 2024-08-11 ASSESSMENT — PAIN SCALES - GENERAL
PAINLEVEL_OUTOF10: 0
PAINLEVEL_OUTOF10: 4
PAINLEVEL_OUTOF10: 6

## 2024-08-11 ASSESSMENT — PAIN DESCRIPTION - LOCATION
LOCATION: BACK;FLANK
LOCATION: ABDOMEN

## 2024-08-11 ASSESSMENT — PAIN DESCRIPTION - ORIENTATION
ORIENTATION: LEFT
ORIENTATION: LEFT

## 2024-08-11 ASSESSMENT — PAIN DESCRIPTION - DESCRIPTORS: DESCRIPTORS: ACHING

## 2024-08-11 NOTE — PROGRESS NOTES
Urology Progress Note    Admit Date: 8/9/2024    Subjective:     Patient has no new complaints.  Patient feels much better.  White count down to 11.9 from 17.7.  Creatinine 0.95.  Urine output clear in Zambrano.  Afebrile.  Cultures possibly consistent with Enterococcus.  Will change Levaquin to ampicillin.  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    Objective:     Patient Vitals for the past 8 hrs:   BP Temp Temp src Pulse Resp SpO2   08/11/24 0751 (!) 96/54 98.2 °F (36.8 °C) Oral 81 18 92 %   08/11/24 0342 (!) 111/55 97.9 °F (36.6 °C) Oral 62 20 90 %     No intake/output data recorded.  08/09 1901 - 08/11 0700  In: 1419.5 [P.O.:600; I.V.:713]  Out: 6350 [Urine:6350]    Physical Exam:     Awake, alert, and oriented  Lungs no JVD.  Breathing is  non-labored; no audible wheezing.    Heart regular, normal perfusion  Abd soft, nt, nd  UOP clear      Data Review   Recent Results (from the past 24 hour(s))   POCT Glucose    Collection Time: 08/10/24 11:18 AM   Result Value Ref Range    POC Glucose 137 (H) 65 - 100 mg/dL    Performed by: Thacker (Norman)    POCT Glucose    Collection Time: 08/10/24  4:04 PM   Result Value Ref Range    POC Glucose 154 (H) 65 - 100 mg/dL    Performed by: María    POCT Glucose    Collection Time: 08/10/24  7:41 PM   Result Value Ref Range    POC Glucose 148 (H) 65 - 100 mg/dL    Performed by: Britni    CBC    Collection Time: 08/11/24  4:27 AM   Result Value Ref Range    WBC 11.9 (H) 4.3 - 11.1 K/uL    RBC 2.76 (L) 4.05 - 5.2 M/uL    Hemoglobin 8.2 (L) 11.7 - 15.4 g/dL    Hematocrit 27.0 (L) 35.8 - 46.3 %    MCV 97.8 82 - 102 FL    MCH 29.7 26.1 - 32.9 PG    MCHC 30.4 (L) 31.4 - 35.0 g/dL    RDW 21.1 (H) 11.9 - 14.6 %    Platelets 261 150 - 450 K/uL    MPV 10.1 9.4 - 12.3 FL    nRBC 0.00 0.0 - 0.2 K/uL   Basic Metabolic Panel    Collection Time: 08/11/24  4:27 AM   Result Value Ref Range    Sodium 139 136 - 145 mmol/L    Potassium 3.4 (L)

## 2024-08-11 NOTE — PROGRESS NOTES
End of Shift Note  Pt has OOB to chair most of this shift tolerating well. Voiding since woodard removal with no complications.

## 2024-08-12 VITALS
RESPIRATION RATE: 16 BRPM | HEART RATE: 51 BPM | WEIGHT: 267 LBS | HEIGHT: 67 IN | OXYGEN SATURATION: 93 % | BODY MASS INDEX: 41.91 KG/M2 | SYSTOLIC BLOOD PRESSURE: 115 MMHG | DIASTOLIC BLOOD PRESSURE: 64 MMHG | TEMPERATURE: 97.9 F

## 2024-08-12 DIAGNOSIS — N20.1 URETERAL STONE: Primary | ICD-10-CM

## 2024-08-12 PROBLEM — N39.0 UTI (URINARY TRACT INFECTION): Status: RESOLVED | Noted: 2024-04-03 | Resolved: 2024-08-12

## 2024-08-12 LAB
BACTERIA SPEC CULT: ABNORMAL
BACTERIA SPEC CULT: ABNORMAL
GLUCOSE BLD STRIP.AUTO-MCNC: 150 MG/DL (ref 65–100)
GLUCOSE BLD STRIP.AUTO-MCNC: 156 MG/DL (ref 65–100)
GLUCOSE BLD STRIP.AUTO-MCNC: 177 MG/DL (ref 65–100)
SERVICE CMNT-IMP: ABNORMAL

## 2024-08-12 PROCEDURE — 6370000000 HC RX 637 (ALT 250 FOR IP): Performed by: UROLOGY

## 2024-08-12 PROCEDURE — 99231 SBSQ HOSP IP/OBS SF/LOW 25: CPT | Performed by: NURSE PRACTITIONER

## 2024-08-12 PROCEDURE — 82962 GLUCOSE BLOOD TEST: CPT

## 2024-08-12 PROCEDURE — 6370000000 HC RX 637 (ALT 250 FOR IP): Performed by: NURSE PRACTITIONER

## 2024-08-12 RX ORDER — LINEZOLID 600 MG/1
600 TABLET, FILM COATED ORAL EVERY 12 HOURS SCHEDULED
Qty: 28 TABLET | Refills: 0 | Status: SHIPPED | OUTPATIENT
Start: 2024-08-12 | End: 2024-08-26

## 2024-08-12 RX ADMIN — DIGOXIN 0.25 MG: 125 TABLET ORAL at 08:26

## 2024-08-12 RX ADMIN — LINEZOLID 600 MG: 600 TABLET, FILM COATED ORAL at 18:39

## 2024-08-12 RX ADMIN — METOPROLOL SUCCINATE 25 MG: 25 TABLET, EXTENDED RELEASE ORAL at 08:23

## 2024-08-12 RX ADMIN — LINEZOLID 600 MG: 600 TABLET, FILM COATED ORAL at 08:25

## 2024-08-12 RX ADMIN — DILTIAZEM HYDROCHLORIDE 300 MG: 180 CAPSULE, COATED, EXTENDED RELEASE ORAL at 08:24

## 2024-08-12 NOTE — CARE COORDINATION
08/12/24 1149   Service Assessment   Patient Orientation Alert and Oriented   Cognition Alert   History Provided By Patient   Primary Caregiver Self   Support Systems Children   Patient's Healthcare Decision Maker is: Legal Next of Kin   PCP Verified by CM Yes   Last Visit to PCP Within last 3 months   Prior Functional Level Independent in ADLs/IADLs   Current Functional Level Independent in ADLs/IADLs   Can patient return to prior living arrangement Yes   Ability to make needs known: Good   Family able to assist with home care needs: Yes   Would you like for me to discuss the discharge plan with any other family members/significant others, and if so, who? No   Financial Resources Medicare   Community Resources None   Social/Functional History   Lives With Alone   Type of Home House   Home Layout Two level;Able to Live on Main level with bedroom/bathroom   Home Access Level entry   Bathroom Shower/Tub Walk-in shower   Bathroom Toilet Handicap height   Bathroom Equipment Shower chair   Home Equipment Cane   Discharge Planning   Type of Residence House   Living Arrangements Alone   Services At/After Discharge   Services At/After Discharge None   [place] Resource Information Provided? No   Condition of Participation: Discharge Planning   The Plan for Transition of Care is related to the following treatment goals: Pt is discharging home with family   The Patient and/or Patient Representative was provided with a Choice of Provider? Patient   The Patient and/Or Patient Representative agree with the Discharge Plan? Yes   Freedom of Choice list was provided with basic dialogue that supports the patient's individualized plan of care/goals, treatment preferences, and shares the quality data associated with the providers?  Yes     Patient admitted with ureteral stone and had stent placed. Initial assessment completed with patient in room. Patient lives alone. She uses a cane for ambulation. She reports she is IND with all

## 2024-08-12 NOTE — PROGRESS NOTES
Admit Date: 8/9/2024      Subjective:     Brenda Mayer is POD 3 Procedure(s):  CYSTOSCOPY LEFT URETERAL STENT INSERTION AND LEFT RETROGRADE    Patient feeling much better. No fevers.    Objective:     Patient Vitals for the past 8 hrs:   BP Temp Temp src Pulse Resp SpO2   08/12/24 0739 (!) 103/55 97.9 °F (36.6 °C) Oral 75 20 94 %   08/12/24 0349 110/60 98.1 °F (36.7 °C) Oral 63 16 94 %     08/12 0701 - 08/12 1900  In: 300 [P.O.:300]  Out: -   08/10 1901 - 08/12 0700  In: -   Out: 1850 [Urine:1850]    Physical Exam:  GENERAL ASSESSMENT: alert, oriented to person, place and time, no acute distress and no anxiety, depression or agitation  Chest: normal work of breathing  CVS exam: normal rate, regular rhythm, normal S1, S2, no murmurs, rubs, clicks or gallops.  ABDOMEN: not done  Neurological exam reveals alert, oriented, normal speech, no focal findings or movement disorder noted.  FEMALE GENITOURINARY EXAM: not done  MALE GENITAL EXAM: not done    Data Review   Recent Results (from the past 24 hour(s))   POCT Glucose    Collection Time: 08/11/24 11:35 AM   Result Value Ref Range    POC Glucose 78 65 - 100 mg/dL    Performed by: Plug AppsApril    POCT Glucose    Collection Time: 08/11/24  4:34 PM   Result Value Ref Range    POC Glucose 126 (H) 65 - 100 mg/dL    Performed by: [facility] Institute[facility]April    POCT Glucose    Collection Time: 08/11/24  8:27 PM   Result Value Ref Range    POC Glucose 202 (H) 65 - 100 mg/dL    Performed by: SerjioyPCT    POCT Glucose    Collection Time: 08/12/24  7:40 AM   Result Value Ref Range    POC Glucose 150 (H) 65 - 100 mg/dL    Performed by: Peterson        Assessment:     Principal Problem:    Ureteral stone  Resolved Problems:    * No resolved hospital problems. *    Voiding spontaneously. No fevers. VSS. Urine cultures pos for enterococcus faecalis.      Pre-Op Diagnosis: Pyelonephritis [N12]    Post-Op Diagnosis:  * No post-op diagnosis entered *    Procedures: Procedure(s):  S/P

## 2024-08-12 NOTE — PROGRESS NOTES
Discharge instructions given. Education provided. Medication changes and follow-up appointments discussed with patient. Prescriptions provided. AVS reviewed. PIV removed with catheter intact, no signs or symptoms of infection, no redness, no warmth and no edema. All questions answered and patient/family have verbally voiced understanding.      Pt to leave around [phone] via  by son.

## 2024-08-12 NOTE — DISCHARGE SUMMARY
fasting glucose/consistent with pre-diabetes mellitus  > 126 mg/dl Fasting glucose consistent with overt diabetes mellitus    Performed by:                                 Date: 08/09/2024  Value: Vaughn                       Status: Final  POC Glucose                                   Date: 08/09/2024  Value: 167 (H)     Ref range: 65 - 100 mg/dL     Status: Final                Comment: 47 - 60 mg/dl Consistent with, but not fully diagnostic of hypoglycemia.  101 - 125 mg/dl Impaired fasting glucose/consistent with pre-diabetes mellitus  > 126 mg/dl Fasting glucose consistent with overt diabetes mellitus    Performed by:                                 Date: 08/09/2024  Value: [email or web address]                       Status: Final  POC Glucose                                   Date: 08/09/2024  Value: 161 (H)     Ref range: 65 - 100 mg/dL     Status: Final                Comment: 47 - 60 mg/dl Consistent with, but not fully diagnostic of hypoglycemia.  101 - 125 mg/dl Impaired fasting glucose/consistent with pre-diabetes mellitus  > 126 mg/dl Fasting glucose consistent with overt diabetes mellitus    Performed by:                                 Date: 08/09/2024  Value: KimbertflorntMARIAELENAurse                       Status: Final  Sodium                                        Date: 08/10/2024  Value: 139         Ref range: 136 - 145 mmol/L   Status: Final  Potassium                                     Date: 08/10/2024  Value: 3.7         Ref range: 3.5 - 5.1 mmol/L   Status: Final  Chloride                                      Date: 08/10/2024  Value: 101         Ref range: 98 - 107 mmol/L    Status: Final  CO2                                           Date: 08/10/2024  Value: 25          Ref range: 20 - 28 mmol/L     Status: Final  Anion Gap                                     Date: 08/10/2024  Value: 12          Ref range: 9 - 18 mmol/L      Status: Final  Glucose                                        bedtime    dilTIAZem (TIAZAC) 300 MG extended release capsule  Take 1 capsule by mouth daily    potassium chloride (KLOR-CON M) 20 MEQ extended release tablet  Take 1 tablet by mouth daily    rivaroxaban (XARELTO) 20 MG TABS tablet  Take 1 tablet by mouth Daily with lunch          Current Discharge Medication List        Time Spent on Discharge:  minutes were spent in patient examination, evaluation, counseling as well as medication reconciliation, prescriptions for required medications, discharge plan, and follow up.    Electronically signed by LE Mallory on 8/12/24 at 12:07 PM EDT

## 2024-08-13 ENCOUNTER — TELEPHONE (OUTPATIENT)
Dept: ONCOLOGY | Age: 69
End: 2024-08-13

## 2024-08-13 ENCOUNTER — PREP FOR PROCEDURE (OUTPATIENT)
Dept: ONCOLOGY | Age: 69
End: 2024-08-13

## 2024-08-13 DIAGNOSIS — N20.1 LEFT URETERAL STONE: ICD-10-CM

## 2024-08-13 NOTE — TELEPHONE ENCOUNTER
Physician provider: Dr. Short  Reason for today's call (Please detail here patients chief complaint): side effect of medication  Last office visit:n/a  Preferred pharmacy (If refill request): [facility]  Calls to office within the last 48 hours?:No    Patient notified that their information will be routed to the [facility] clinical triage team for review. Patient is advised that they will receive a phone call from the triage department. If symptom related and symptoms worsen before receiving a call back, the patient has been advised to proceed to the nearest ED.    Pt was discharged on yesterday & was prescribed Antibiotic medication Linezolid 600mg  Pt state she was informed by Pharmacist informed Pt  that this medication could interfere with her diabetes & it is rare to prescribe. Pt  is very concerned with the side effects & is afraid to take medication due to what was told to her about medication. Pt want to verify why this medication was prescribed to her & is there something else she can take instead. Pt request call back .

## 2024-08-13 NOTE — PROGRESS NOTES
Urologic Oncology  [facility]  [address] [place] [id number]  [phone]          Brenda Mayer  : 1955      INITIAL EVALUATION    Chief Complaint   Patient presents with    Follow-up       HPI: Brenda Mayer is a 69 y.o. female with pyelonephritis and bilateral stones.     Patient is here today for follow-up after her inpatient hospitalization.  I placed a left ureteral stent on 2024 because she had 2 obstructing proximal left ureteral stones and concerns for UTI.    She has a complicated urologic history.  She has been previously treated for stones at [facility] and has undergone 2 prior ureteroscopy's on the right side.  She has a stent in place and was told she would need to have a percutaneous nephrolithotomy on the right side.  That is when she then developed 2 obstructing stones on the left side and presented to [facility].  The 2 proximal left stones measure 6 to 7 mm each.  She also has 2 intrarenal stones that are slightly larger than that.    She now has stents on both sides.  She was also found to have an Enterococcus urinary tract infection that was vancomycin-resistant.  She is currently on p.o. linezolid and continues on that.  She denies any fevers or chills.    She has a repeat urine culture pending today.    She is also on Xarelto for history of A-fib.    She states she would like to transfer her urologic care to [facility].    PMH:     Past Medical History:   Diagnosis Date    Chronic pain     pain R knee    Hypertension     Morbid obesity (HCC)     BMI 45.8- 12    Positive PPD     had vaccination as a child - BCG       PSH:    Past Surgical History:   Procedure Laterality Date    COLONOSCOPY N/A 2022    COLONOSCOPY/ BMI 56 ROOM 302 performed by Thaddeus Sharif MD at [facility] ENDOSCOPY    CYSTOSCOPY Left 2024    CYSTOSCOPY LEFT URETERAL STENT INSERTION AND LEFT RETROGRADE performed by Michael Short MD at [facility] MAIN OR

## 2024-08-13 NOTE — TELEPHONE ENCOUNTER
Patient called the office today expressing concern over prescription for linezolid that was sent following her hospital discharge yesterday for VRE.  She was transferred to [facility] downtown from [facility] ED 8/9/24 for evaluation of lethargy with evidence of 2 left proximal ureteral stones on CT scan.  Urine culture completed at time of admission was significant for VRE.  Patient was discharged home on 14-day course of linezolid with plans to follow-up in clinic with repeat urinalysis on Friday, 8/16/2024 and return to the OR for cystoscopy with holmium laser lithotripsy on 8/22/2024.  Patient states that when she picked her prescription up from the pharmacy the pharmacist was concerned over use of linezolid in the setting of her diabetes and told her that linezolid can cause her blood sugar to \"go very high or very low and end up in a coma.\"  Patient is very concerned if she goes to bed at night she will not wake up in the morning as a result and inquires if there is any other antibiotics that can be used.  I expressed importance of continuing with Linezolid as prescribed given her resistant Enterococcus and encouraged her to also discuss management of her blood sugar with her PCP.  She states her blood sugar this morning was over 200 after taking her linezolid dose last night.  I again stressed the importance of discussing tighter control of her blood sugars while on linezolid with her PCP at length.

## 2024-08-16 ENCOUNTER — HOSPITAL ENCOUNTER (OUTPATIENT)
Dept: LAB | Age: 69
End: 2024-08-16
Payer: MEDICARE

## 2024-08-16 ENCOUNTER — OFFICE VISIT (OUTPATIENT)
Dept: ONCOLOGY | Age: 69
End: 2024-08-16
Payer: MEDICARE

## 2024-08-16 VITALS
BODY MASS INDEX: 42.02 KG/M2 | TEMPERATURE: 97.7 F | HEIGHT: 67 IN | SYSTOLIC BLOOD PRESSURE: 104 MMHG | HEART RATE: 62 BPM | DIASTOLIC BLOOD PRESSURE: 72 MMHG | WEIGHT: 267.7 LBS | RESPIRATION RATE: 16 BRPM | OXYGEN SATURATION: 98 %

## 2024-08-16 DIAGNOSIS — N12 PYELONEPHRITIS: Primary | ICD-10-CM

## 2024-08-16 DIAGNOSIS — N20.1 URETERAL STONE: ICD-10-CM

## 2024-08-16 LAB
APPEARANCE UR: ABNORMAL
BACTERIA URNS QL MICRO: ABNORMAL /HPF
BILIRUB UR QL: NEGATIVE
COLOR UR: ABNORMAL
EPI CELLS #/AREA URNS HPF: ABNORMAL /HPF
GLUCOSE UR STRIP.AUTO-MCNC: 1000 MG/DL
HGB UR QL STRIP: ABNORMAL
KETONES UR QL STRIP.AUTO: NEGATIVE MG/DL
LEUKOCYTE ESTERASE UR QL STRIP.AUTO: ABNORMAL
MUCOUS THREADS URNS QL MICRO: 0 /LPF
NITRITE UR QL STRIP.AUTO: NEGATIVE
PH UR STRIP: 6 (ref 5–9)
PROT UR STRIP-MCNC: >300 MG/DL
RBC #/AREA URNS HPF: >100 /HPF
SP GR UR REFRACTOMETRY: 1.02 (ref 1–1.02)
UROBILINOGEN UR QL STRIP.AUTO: 0.2 EU/DL
WBC URNS QL MICRO: ABNORMAL /HPF

## 2024-08-16 PROCEDURE — 3078F DIAST BP <80 MM HG: CPT | Performed by: UROLOGY

## 2024-08-16 PROCEDURE — 81001 URINALYSIS AUTO W/SCOPE: CPT

## 2024-08-16 PROCEDURE — 1036F TOBACCO NON-USER: CPT | Performed by: UROLOGY

## 2024-08-16 PROCEDURE — 1123F ACP DISCUSS/DSCN MKR DOCD: CPT | Performed by: UROLOGY

## 2024-08-16 PROCEDURE — 1090F PRES/ABSN URINE INCON ASSESS: CPT | Performed by: UROLOGY

## 2024-08-16 PROCEDURE — 87086 URINE CULTURE/COLONY COUNT: CPT

## 2024-08-16 PROCEDURE — 3074F SYST BP LT 130 MM HG: CPT | Performed by: UROLOGY

## 2024-08-16 PROCEDURE — G8417 CALC BMI ABV UP PARAM F/U: HCPCS | Performed by: UROLOGY

## 2024-08-16 PROCEDURE — 99214 OFFICE O/P EST MOD 30 MIN: CPT | Performed by: UROLOGY

## 2024-08-16 PROCEDURE — 3017F COLORECTAL CA SCREEN DOC REV: CPT | Performed by: UROLOGY

## 2024-08-16 PROCEDURE — 1111F DSCHRG MED/CURRENT MED MERGE: CPT | Performed by: UROLOGY

## 2024-08-16 PROCEDURE — G8428 CUR MEDS NOT DOCUMENT: HCPCS | Performed by: UROLOGY

## 2024-08-16 PROCEDURE — G8400 PT W/DXA NO RESULTS DOC: HCPCS | Performed by: UROLOGY

## 2024-08-16 ASSESSMENT — PATIENT HEALTH QUESTIONNAIRE - PHQ9
1. LITTLE INTEREST OR PLEASURE IN DOING THINGS: NOT AT ALL
SUM OF ALL RESPONSES TO PHQ QUESTIONS 1-9: 0
SUM OF ALL RESPONSES TO PHQ QUESTIONS 1-9: 0
SUM OF ALL RESPONSES TO PHQ9 QUESTIONS 1 & 2: 0
SUM OF ALL RESPONSES TO PHQ QUESTIONS 1-9: 0
2. FEELING DOWN, DEPRESSED OR HOPELESS: NOT AT ALL
SUM OF ALL RESPONSES TO PHQ QUESTIONS 1-9: 0

## 2024-08-16 ASSESSMENT — ENCOUNTER SYMPTOMS
GASTROINTESTINAL NEGATIVE: 1
RESPIRATORY NEGATIVE: 1

## 2024-08-16 NOTE — PATIENT INSTRUCTIONS
Patient Information from Today's Visit    The members of your [facility] are listed below:    Physician Provider: Michael Short, Urologic Oncologist  Advanced Practice Clinician: KRISTYN Gibson  Nurse Navigator: Kerry JOHNSON RN  Medical Assistant: Sonya CHERRY CMA  :Ne ANAYA  Supportive Care Services: Julianne LE LMSW    Diagnosis: Pyelonephritis    Follow Up Instructions:   Records reviewed  We will proceed with a left sided CRULLS on 8/22.  If you need anything prior to that procedure please do not hesitate to call our office.  You would need to be off of the Xarelto prior to this procedure STOP taking it this Saturday.      Treatment Summary has been discussed and given to patient:N/A      Current Labs:   Hospital Outpatient Visit on 08/16/2024   Component Date Value Ref Range Status    Color, UA 08/16/2024 SIENNA    Final    Appearance 08/16/2024 CLOUDY    Final    Specific Gravity, UA 08/16/2024 1.020  1.001 - 1.023   Final    pH, Urine 08/16/2024 6.0  5.0 - 9.0   Final    Protein, UA 08/16/2024 >300 (A)  NEG mg/dL Final    Glucose, Ur 08/16/2024 1000 (A)  NEG mg/dL Final    VISUALLY CONFIRMED    Ketones, Urine 08/16/2024 Negative  mg/dL Final    Bilirubin, Urine 08/16/2024 Negative  NEG   Final    Blood, Urine 08/16/2024 LARGE (A)  NEG   Final    Urobilinogen, Urine 08/16/2024 0.2  EU/dL Final    Nitrite, Urine 08/16/2024 Negative    Final    Leukocyte Esterase, Urine 08/16/2024 TRACE    Final    WBC, UA 08/16/2024 10-20  0 /hpf Final    RBC, UA 08/16/2024 >100  0 /hpf Final    Epithelial Cells, UA 08/16/2024 10-20  0 /hpf Final    BACTERIA, URINE 08/16/2024 2+ (H)  0 /hpf Final    Mucus, UA 08/16/2024 0  0 /lpf Final   ;          Please refer to After Visit Summary or MyChart for upcoming appointment information. Please call our office for rescheduling needs at least 24 hours before your scheduled appointment time.If you have any questions regarding your upcoming schedule please reach

## 2024-08-17 NOTE — PROGRESS NOTES
Physician Progress Note      PATIENT:               CAROLANN GONSALEZ  [facility] #:                  [id number]  :                       1955  ADMIT DATE:       2024 12:44 PM  DISCH DATE:        2024 7:30 PM  RESPONDING  PROVIDER #:        Geneva Rueda          QUERY TEXT:    Pt admitted with pyelonephritis. Pt noted to have leukocytosis elevated   procalcitonin, pulse, . If possible, please document in the progress notes and   discharge summary. If possible, please document in the progress notes and   discharge summary if you are evaluating and /or treating any of the following:  The medical record reflects the following:    Risk Factors: Pyelonephritis, age 69    Clinical Indicators: discharge summary 24 admission Diagnosis:    Pyelonephritis Ureteral stone. 24 underwent Cystoscopy with left   retrograde pyelogram and placement of left ureteral stent. Urine cultures pos   for enterococcus faecalis.  WBC   17.7      15.7  PROCALCITONIN    0.18  Pulse 101, 90    Treatment: IV gentamicin, ROCEPHIN, serial labs, Vitals monitoring.    Thank you    No Antonio EZ, CDS  Options provided:  -- Sepsis due to pyonephrosis , present on admission  -- pyonephrosis without Sepsis  -- Other - I will add my own diagnosis  -- Disagree - Not applicable / Not valid  -- Disagree - Clinically unable to determine / Unknown  -- Refer to Clinical Documentation Reviewer    PROVIDER RESPONSE TEXT:    This patient has Sepsis due to pyonephrosis which was present on admission.    Query created by: No Antonio on 2024 8:18 AM      Electronically signed by:  Geneva Rueda 2024 8:37 AM

## 2024-08-18 LAB
BACTERIA SPEC CULT: NORMAL
SERVICE CMNT-IMP: NORMAL

## 2024-08-19 NOTE — PERIOP NOTE
Patient verified name and .  Order for consent found in EHR and matches case posting; patient verifies procedure.   Type 1B surgery, PAT phone assessment complete.  Orders received.  Labs per surgeon: none  Labs per anesthesia protocol: POC glucose and POC K+ on DOS. Hgb 8.2 24- result sent to Ne Fregoso at surgeon's office through staff message in [facility] and sent with anesthesia summary for anesthesia to review. Chart flagged for charge nurse.    Patient answered medical/surgical history questions at their best of ability. All prior to admission medications documented in EPIC.    Patient instructed to continue taking all prescription medications up to the day of surgery but to take only the following medications the day of surgery according to anesthesia guidelines with a small sip of water: digoxin, diltiazem, Zyvox, metoprolol. Evening prior to procedure take 80% of usual dose of Basaglar insulin. Adjusted dose is 38 units. Also, patient is requested to take 2 Tylenol in the morning and then again before bed on the day before surgery. Regular or extra strength may be used.       Patient informed that all vitamins and supplements should be held 7 days prior to surgery and NSAIDS 5 days prior to surgery. Prescription meds to hold:metformin 48 hours (per anesthesia protocol when contrast is used. Patient to be advised on whether or not to hold anticoagulant by the surgeon. Do not take Jardiance or furosemide on the morning of procedure.    Patient instructed on the following:    > Arrive at Main Entrance, time of arrival to be called the day before by 1700  > NPO after midnight, unless otherwise indicated, including gum, mints, and ice chips  > Responsible adult must drive patient to the hospital, stay during surgery, and patient will need supervision 24 hours after anesthesia  > Use non moisturizing soap in shower the night before surgery and on the morning of surgery  > All piercings must be removed prior

## 2024-08-21 ENCOUNTER — ANESTHESIA EVENT (OUTPATIENT)
Dept: SURGERY | Age: 69
End: 2024-08-21
Payer: MEDICARE

## 2024-08-22 ENCOUNTER — ANESTHESIA (OUTPATIENT)
Dept: SURGERY | Age: 69
End: 2024-08-22
Payer: MEDICARE

## 2024-08-22 ENCOUNTER — APPOINTMENT (OUTPATIENT)
Dept: GENERAL RADIOLOGY | Age: 69
End: 2024-08-22
Attending: UROLOGY
Payer: MEDICARE

## 2024-08-22 ENCOUNTER — HOSPITAL ENCOUNTER (OUTPATIENT)
Age: 69
Setting detail: OUTPATIENT SURGERY
Discharge: HOME | End: 2024-08-22
Attending: UROLOGY | Admitting: UROLOGY
Payer: MEDICARE

## 2024-08-22 VITALS
RESPIRATION RATE: 25 BRPM | DIASTOLIC BLOOD PRESSURE: 66 MMHG | WEIGHT: 275.6 LBS | SYSTOLIC BLOOD PRESSURE: 131 MMHG | HEIGHT: 67 IN | HEART RATE: 81 BPM | TEMPERATURE: 98.1 F | BODY MASS INDEX: 43.26 KG/M2 | OXYGEN SATURATION: 96 %

## 2024-08-22 DIAGNOSIS — N20.1 LEFT URETERAL STONE: Primary | ICD-10-CM

## 2024-08-22 LAB
GLUCOSE BLD STRIP.AUTO-MCNC: 133 MG/DL (ref 65–100)
GLUCOSE BLD STRIP.AUTO-MCNC: 140 MG/DL (ref 65–100)
POTASSIUM BLD-SCNC: 4.2 MMOL/L (ref 3.5–5.1)
SERVICE CMNT-IMP: ABNORMAL
SERVICE CMNT-IMP: ABNORMAL

## 2024-08-22 PROCEDURE — C2617 STENT, NON-COR, TEM W/O DEL: HCPCS | Performed by: UROLOGY

## 2024-08-22 PROCEDURE — 3600000014 HC SURGERY LEVEL 4 ADDTL 15MIN: Performed by: UROLOGY

## 2024-08-22 PROCEDURE — 7100000011 HC PHASE II RECOVERY - ADDTL 15 MIN: Performed by: UROLOGY

## 2024-08-22 PROCEDURE — C1769 GUIDE WIRE: HCPCS | Performed by: UROLOGY

## 2024-08-22 PROCEDURE — 84132 ASSAY OF SERUM POTASSIUM: CPT

## 2024-08-22 PROCEDURE — 6360000002 HC RX W HCPCS: Performed by: ANESTHESIOLOGY

## 2024-08-22 PROCEDURE — 7100000000 HC PACU RECOVERY - FIRST 15 MIN: Performed by: UROLOGY

## 2024-08-22 PROCEDURE — 3700000000 HC ANESTHESIA ATTENDED CARE: Performed by: UROLOGY

## 2024-08-22 PROCEDURE — 7100000010 HC PHASE II RECOVERY - FIRST 15 MIN: Performed by: UROLOGY

## 2024-08-22 PROCEDURE — 6360000002 HC RX W HCPCS: Performed by: REGISTERED NURSE

## 2024-08-22 PROCEDURE — 88300 SURGICAL PATH GROSS: CPT

## 2024-08-22 PROCEDURE — 6360000004 HC RX CONTRAST MEDICATION: Performed by: UROLOGY

## 2024-08-22 PROCEDURE — 82355 CALCULUS ANALYSIS QUAL: CPT

## 2024-08-22 PROCEDURE — 2500000003 HC RX 250 WO HCPCS: Performed by: REGISTERED NURSE

## 2024-08-22 PROCEDURE — 7100000001 HC PACU RECOVERY - ADDTL 15 MIN: Performed by: UROLOGY

## 2024-08-22 PROCEDURE — 2709999900 HC NON-CHARGEABLE SUPPLY: Performed by: UROLOGY

## 2024-08-22 PROCEDURE — 2580000003 HC RX 258: Performed by: ANESTHESIOLOGY

## 2024-08-22 PROCEDURE — 6370000000 HC RX 637 (ALT 250 FOR IP): Performed by: ANESTHESIOLOGY

## 2024-08-22 PROCEDURE — 3700000001 HC ADD 15 MINUTES (ANESTHESIA): Performed by: UROLOGY

## 2024-08-22 PROCEDURE — 2720000010 HC SURG SUPPLY STERILE: Performed by: UROLOGY

## 2024-08-22 PROCEDURE — 3600000004 HC SURGERY LEVEL 4 BASE: Performed by: UROLOGY

## 2024-08-22 PROCEDURE — 82962 GLUCOSE BLOOD TEST: CPT

## 2024-08-22 PROCEDURE — 6360000002 HC RX W HCPCS: Performed by: PHYSICIAN ASSISTANT

## 2024-08-22 PROCEDURE — C1758 CATHETER, URETERAL: HCPCS | Performed by: UROLOGY

## 2024-08-22 DEVICE — URETERAL STENT
Type: IMPLANTABLE DEVICE | Site: URETER | Status: FUNCTIONAL
Brand: PERCUFLEX™ PLUS

## 2024-08-22 RX ORDER — SODIUM CHLORIDE 0.9 % (FLUSH) 0.9 %
5-40 SYRINGE (ML) INJECTION PRN
Status: DISCONTINUED | OUTPATIENT
Start: 2024-08-22 | End: 2024-08-22 | Stop reason: HOSPADM

## 2024-08-22 RX ORDER — SODIUM CHLORIDE 0.9 % (FLUSH) 0.9 %
5-40 SYRINGE (ML) INJECTION EVERY 12 HOURS SCHEDULED
Status: DISCONTINUED | OUTPATIENT
Start: 2024-08-22 | End: 2024-08-22 | Stop reason: HOSPADM

## 2024-08-22 RX ORDER — ONDANSETRON 2 MG/ML
4 INJECTION INTRAMUSCULAR; INTRAVENOUS
Status: DISCONTINUED | OUTPATIENT
Start: 2024-08-22 | End: 2024-08-22 | Stop reason: HOSPADM

## 2024-08-22 RX ORDER — OXYCODONE HYDROCHLORIDE 5 MG/1
10 TABLET ORAL PRN
Status: COMPLETED | OUTPATIENT
Start: 2024-08-22 | End: 2024-08-22

## 2024-08-22 RX ORDER — LEVOFLOXACIN 5 MG/ML
500 INJECTION, SOLUTION INTRAVENOUS
Status: COMPLETED | OUTPATIENT
Start: 2024-08-22 | End: 2024-08-22

## 2024-08-22 RX ORDER — METOPROLOL TARTRATE 1 MG/ML
INJECTION, SOLUTION INTRAVENOUS PRN
Status: DISCONTINUED | OUTPATIENT
Start: 2024-08-22 | End: 2024-08-22 | Stop reason: SDUPTHER

## 2024-08-22 RX ORDER — SODIUM CHLORIDE 9 MG/ML
INJECTION, SOLUTION INTRAVENOUS PRN
Status: DISCONTINUED | OUTPATIENT
Start: 2024-08-22 | End: 2024-08-22 | Stop reason: HOSPADM

## 2024-08-22 RX ORDER — LIDOCAINE HYDROCHLORIDE 20 MG/ML
INJECTION, SOLUTION EPIDURAL; INFILTRATION; INTRACAUDAL; PERINEURAL PRN
Status: DISCONTINUED | OUTPATIENT
Start: 2024-08-22 | End: 2024-08-22 | Stop reason: SDUPTHER

## 2024-08-22 RX ORDER — PROPOFOL 10 MG/ML
INJECTION, EMULSION INTRAVENOUS PRN
Status: DISCONTINUED | OUTPATIENT
Start: 2024-08-22 | End: 2024-08-22 | Stop reason: SDUPTHER

## 2024-08-22 RX ORDER — PROCHLORPERAZINE EDISYLATE 5 MG/ML
5 INJECTION INTRAMUSCULAR; INTRAVENOUS
Status: DISCONTINUED | OUTPATIENT
Start: 2024-08-22 | End: 2024-08-22 | Stop reason: HOSPADM

## 2024-08-22 RX ORDER — ROCURONIUM BROMIDE 10 MG/ML
INJECTION, SOLUTION INTRAVENOUS PRN
Status: DISCONTINUED | OUTPATIENT
Start: 2024-08-22 | End: 2024-08-22 | Stop reason: SDUPTHER

## 2024-08-22 RX ORDER — ACETAMINOPHEN 500 MG
1000 TABLET ORAL ONCE
Status: COMPLETED | OUTPATIENT
Start: 2024-08-22 | End: 2024-08-22

## 2024-08-22 RX ORDER — HYDROMORPHONE HYDROCHLORIDE 2 MG/ML
0.25 INJECTION, SOLUTION INTRAMUSCULAR; INTRAVENOUS; SUBCUTANEOUS EVERY 5 MIN PRN
Status: DISCONTINUED | OUTPATIENT
Start: 2024-08-22 | End: 2024-08-22 | Stop reason: HOSPADM

## 2024-08-22 RX ORDER — EPHEDRINE SULFATE 5 MG/ML
INJECTION INTRAVENOUS PRN
Status: DISCONTINUED | OUTPATIENT
Start: 2024-08-22 | End: 2024-08-22 | Stop reason: SDUPTHER

## 2024-08-22 RX ORDER — SUCCINYLCHOLINE CHLORIDE 20 MG/ML
INJECTION INTRAMUSCULAR; INTRAVENOUS PRN
Status: DISCONTINUED | OUTPATIENT
Start: 2024-08-22 | End: 2024-08-22 | Stop reason: SDUPTHER

## 2024-08-22 RX ORDER — HYDROMORPHONE HYDROCHLORIDE 2 MG/ML
0.5 INJECTION, SOLUTION INTRAMUSCULAR; INTRAVENOUS; SUBCUTANEOUS EVERY 5 MIN PRN
Status: DISCONTINUED | OUTPATIENT
Start: 2024-08-22 | End: 2024-08-22 | Stop reason: HOSPADM

## 2024-08-22 RX ORDER — ONDANSETRON 2 MG/ML
INJECTION INTRAMUSCULAR; INTRAVENOUS PRN
Status: DISCONTINUED | OUTPATIENT
Start: 2024-08-22 | End: 2024-08-22 | Stop reason: SDUPTHER

## 2024-08-22 RX ORDER — MIDAZOLAM HYDROCHLORIDE 2 MG/2ML
2 INJECTION, SOLUTION INTRAMUSCULAR; INTRAVENOUS
Status: DISCONTINUED | OUTPATIENT
Start: 2024-08-22 | End: 2024-08-22 | Stop reason: HOSPADM

## 2024-08-22 RX ORDER — NEOSTIGMINE METHYLSULFATE 1 MG/ML
INJECTION, SOLUTION INTRAVENOUS PRN
Status: DISCONTINUED | OUTPATIENT
Start: 2024-08-22 | End: 2024-08-22 | Stop reason: SDUPTHER

## 2024-08-22 RX ORDER — OXYCODONE HYDROCHLORIDE 5 MG/1
5 TABLET ORAL PRN
Status: COMPLETED | OUTPATIENT
Start: 2024-08-22 | End: 2024-08-22

## 2024-08-22 RX ORDER — FENTANYL CITRATE 50 UG/ML
INJECTION, SOLUTION INTRAMUSCULAR; INTRAVENOUS PRN
Status: DISCONTINUED | OUTPATIENT
Start: 2024-08-22 | End: 2024-08-22 | Stop reason: SDUPTHER

## 2024-08-22 RX ORDER — GLYCOPYRROLATE 0.2 MG/ML
INJECTION INTRAMUSCULAR; INTRAVENOUS PRN
Status: DISCONTINUED | OUTPATIENT
Start: 2024-08-22 | End: 2024-08-22 | Stop reason: SDUPTHER

## 2024-08-22 RX ORDER — SODIUM CHLORIDE, SODIUM LACTATE, POTASSIUM CHLORIDE, CALCIUM CHLORIDE 600; 310; 30; 20 MG/100ML; MG/100ML; MG/100ML; MG/100ML
INJECTION, SOLUTION INTRAVENOUS CONTINUOUS
Status: DISCONTINUED | OUTPATIENT
Start: 2024-08-22 | End: 2024-08-22 | Stop reason: HOSPADM

## 2024-08-22 RX ORDER — LIDOCAINE HYDROCHLORIDE 10 MG/ML
1 INJECTION, SOLUTION INFILTRATION; PERINEURAL
Status: DISCONTINUED | OUTPATIENT
Start: 2024-08-22 | End: 2024-08-22 | Stop reason: HOSPADM

## 2024-08-22 RX ORDER — NALOXONE HYDROCHLORIDE 0.4 MG/ML
INJECTION, SOLUTION INTRAMUSCULAR; INTRAVENOUS; SUBCUTANEOUS PRN
Status: DISCONTINUED | OUTPATIENT
Start: 2024-08-22 | End: 2024-08-22 | Stop reason: HOSPADM

## 2024-08-22 RX ADMIN — LIDOCAINE HYDROCHLORIDE 100 MG: 20 INJECTION, SOLUTION EPIDURAL; INFILTRATION; INTRACAUDAL; PERINEURAL at 07:16

## 2024-08-22 RX ADMIN — EPHEDRINE SULFATE 5 MG: 5 INJECTION INTRAVENOUS at 07:33

## 2024-08-22 RX ADMIN — Medication 160 MG: at 07:16

## 2024-08-22 RX ADMIN — LEVOFLOXACIN 500 MG: 5 INJECTION, SOLUTION INTRAVENOUS at 07:10

## 2024-08-22 RX ADMIN — ROCURONIUM BROMIDE 10 MG: 10 INJECTION, SOLUTION INTRAVENOUS at 07:30

## 2024-08-22 RX ADMIN — ACETAMINOPHEN 1000 MG: 500 TABLET, FILM COATED ORAL at 05:51

## 2024-08-22 RX ADMIN — ONDANSETRON 4 MG: 2 INJECTION INTRAMUSCULAR; INTRAVENOUS at 08:08

## 2024-08-22 RX ADMIN — PROPOFOL 180 MG: 10 INJECTION, EMULSION INTRAVENOUS at 07:16

## 2024-08-22 RX ADMIN — EPHEDRINE SULFATE 5 MG: 5 INJECTION INTRAVENOUS at 07:43

## 2024-08-22 RX ADMIN — SODIUM CHLORIDE, POTASSIUM CHLORIDE, SODIUM LACTATE AND CALCIUM CHLORIDE: 600; 310; 30; 20 INJECTION, SOLUTION INTRAVENOUS at 06:22

## 2024-08-22 RX ADMIN — ROCURONIUM BROMIDE 10 MG: 10 INJECTION, SOLUTION INTRAVENOUS at 07:16

## 2024-08-22 RX ADMIN — EPHEDRINE SULFATE 5 MG: 5 INJECTION INTRAVENOUS at 07:28

## 2024-08-22 RX ADMIN — EPHEDRINE SULFATE 5 MG: 5 INJECTION INTRAVENOUS at 07:24

## 2024-08-22 RX ADMIN — EPHEDRINE SULFATE 5 MG: 5 INJECTION INTRAVENOUS at 07:19

## 2024-08-22 RX ADMIN — GLYCOPYRROLATE 0.8 MG: 0.2 INJECTION INTRAMUSCULAR; INTRAVENOUS at 08:24

## 2024-08-22 RX ADMIN — OXYCODONE HYDROCHLORIDE 5 MG: 5 TABLET ORAL at 09:37

## 2024-08-22 RX ADMIN — FENTANYL CITRATE 100 MCG: 50 INJECTION, SOLUTION INTRAMUSCULAR; INTRAVENOUS at 07:16

## 2024-08-22 RX ADMIN — Medication 5 MG: at 08:24

## 2024-08-22 RX ADMIN — ROCURONIUM BROMIDE 5 MG: 10 INJECTION, SOLUTION INTRAVENOUS at 07:47

## 2024-08-22 RX ADMIN — METOPROLOL TARTRATE 2.5 MG: 1 INJECTION, SOLUTION INTRAVENOUS at 08:37

## 2024-08-22 ASSESSMENT — PAIN - FUNCTIONAL ASSESSMENT: PAIN_FUNCTIONAL_ASSESSMENT: NONE - DENIES PAIN

## 2024-08-22 ASSESSMENT — PAIN SCALES - GENERAL: PAINLEVEL_OUTOF10: 3

## 2024-08-22 ASSESSMENT — PAIN DESCRIPTION - LOCATION: LOCATION: PELVIS

## 2024-08-22 ASSESSMENT — PAIN DESCRIPTION - DESCRIPTORS: DESCRIPTORS: CRAMPING

## 2024-08-22 NOTE — ANESTHESIA PRE PROCEDURE
Department of Anesthesiology  Preprocedure Note       Name:  Brenda Mayer   Age:  69 y.o.  :  1955                                          MRN:  [id number]         Date:  2024      Surgeon: Surgeon(s):  Michael Short MD    Procedure: Procedure(s):  LEFT CYSTOSCOPY, RETROGRADE PYELOGRAM, URETEROSCOPY WITH HOLMIUM LASER LITHOTRIPSY AND LEFT URETERAL STENT EXCHANGE  CYSTOSCOPY URETERAL STENT INSERTION  CYSTOSCOPY RETROGRADE PYELOGRAM    Medications prior to admission:   Prior to Admission medications    Medication Sig Start Date End Date Taking? Authorizing Provider   linezolid (ZYVOX) 600 MG tablet Take 1 tablet by mouth every 12 hours for 14 days 24 Yes Geneva Rueda NP-C   JARDIANCE 10 MG tablet Take 1 tablet by mouth daily   Yes ProviderShelia MD   BiPAP Machine MISC by Does not apply route Trilogy device qhs--Med [place]   Yes ProviderShelia MD   acetaminophen (TYLENOL) 500 MG tablet Take 2 tablets by mouth every 6 hours as needed for Pain for pain   Yes Provider, MD Shelia   furosemide (LASIX) 40 MG tablet Take 1 tablet by mouth daily  Patient taking differently: Take 1.5 tablets by mouth daily 24  Yes Carlos Holm DO   metoprolol succinate (TOPROL XL) 25 MG extended release tablet Take 1 tablet by mouth daily 4/10/24  Yes Carlos Holm DO   digoxin (LANOXIN) 250 MCG tablet Take 1 tablet by mouth daily 24  Yes Tom Dunbar MD BASAGLAR KWIKPEN 100 UNIT/ML injection pen Inject 38 Units into the skin nightly subcutaneous 23  Yes ProviderShelia MD   metFORMIN (GLUCOPHAGE-XR) 500 MG extended release tablet Take 3 tablets by mouth daily (with breakfast) 23  Yes ProviderShelia MD   OXYGEN Inhale into the lungs 2 lpm at bedtime   Yes ProviderShelia MD   dilTIAZem (TIAZAC) 300 MG extended release capsule Take 1 capsule by mouth daily 22  Yes Automatic Reconciliation, Ar   potassium chloride

## 2024-08-22 NOTE — BRIEF OP NOTE
Brief Postoperative Note      Patient: Brenda Mayer  YOB: 1955  MRN: [id number]    Date of Procedure: 8/22/2024    Pre-Op Diagnosis Codes:      * Left ureteral stone [N20.1]    Post-Op Diagnosis: Same       Procedure(s):  LEFT CYSTOSCOPY, RETROGRADE PYELOGRAM, URETEROSCOPY WITH HOLMIUM LASER LITHOTRIPSY AND LEFT URETERAL STENT EXCHANGE  CYSTOSCOPY URETERAL STENT INSERTION  CYSTOSCOPY RETROGRADE PYELOGRAM    Surgeon(s):  Michael Short MD    Assistant:  * No surgical staff found *    Anesthesia: General    Estimated Blood Loss (mL): Minimal    Complications: None    Specimens:   ID Type Source Tests Collected by Time Destination   1 : left ureteral stone Stone (Calculus) Ureter STONE ANALYSIS Michael Short MD 8/22/2024 0758        Implants:  Implant Name Type Inv. Item Serial No.  Lot No. LRB No. Used Action   STENT URET 6FR L24CM HYDR+ GRAD CIRCUMFERENTIAL MRK LO PROF - [id number]  STENT URET 6FR L24CM HYDR+ GRAD CIRCUMFERENTIAL MRK LO PROF  [facility]- [id number] Left 1 Implanted         Drains:   [REMOVED] Urinary Catheter 08/09/24 2 Way (Removed)   $ Urethral catheter insertion Inserted for procedure 08/09/24 1623   Catheter Indications Perioperative use for selected surgical procedures;Urinary retention (acute or chronic), continuous bladder irrigation or bladder outlet obstruction 08/09/24 2108   Site Assessment No urethral drainage 08/11/24 1915   Urine Color Yellow 08/11/24 1915   Urine Appearance Sediment 08/11/24 1915   Urine Odor Malodorous 08/09/24 2108   Collection Container Standard 08/11/24 1915   Securement Method Securing device (Describe) 08/11/24 1915   Catheter Care  Perineal wipes 08/11/24 1053   Catheter Best Practices  Drainage tube clipped to bed;Catheter secured to thigh;Tamper seal intact;Lack of dependent loop in tubing;Drainage bag less than half full;Bag not on floor;Bag below bladder 08/11/24 1915   Status Draining 08/11/24 1915

## 2024-08-22 NOTE — OP NOTE
obtained she was taken to the operating room 2 in the [facility].  After induction of general anesthesia and placement of endotracheal tube, she was carefully positioned in low lithotomy position.  Her genitalia were prepped and draped in usual sterile fashion.  A time-out was performed.  We verified the side of the procedure as her left.  Of note, she had stents in both the right and left ureter.    She was given Levaquin as prophylactic antibiotic.  I then inserted a 22-Albanian rigid cystoscope and carried in her bladder.  Bladder appeared normal.  Stents were seen coming out of the right and left UO.  I identified the stent from the left UO, grabbed it with a grasper, pulled it to the meatus.  I then attempted to place a Sensor guidewire through it.  The wire went through the stent, but then would not go up past the mid ureter.  There appeared to be a stone, they are obstructing it.  At this point, the stent was removed and I backloaded the wire from the cystoscope.  I then inserted a 5-Albanian open-ended ureteral catheter and cannulated the UO alongside the wire.  I then performed a retrograde pyelogram.    Findings of retrograde pyelogram:  The patient's distal and mid ureter was dilated as expected after having a stent in.  In the mid ureter, there was clearly an obstructing stone.  I was able to get contrast around it.  The ureter was more dilated above that.  There were no filling defects in the upper portion of the collecting system.  There was moderate hydronephrosis.    I then used a Glidewire through the open-ended catheter and was able to negotiate around the stone in the mid ureter.  At this point, I was able to then advance the open-ended catheter past the obstructing stone.  Next, I removed the Glidewire and through the open-ended catheter passed the Sensor wire to use as a safety wire.  I then removed the cystoscope and open-ended catheter.  Next, I inserted a semi-rigid ureteroscope and

## 2024-08-22 NOTE — PROGRESS NOTES
visited patient in pre op, as requested.  Patient expressed stress, stating that this was the fourth attempt for this procedure.  Son was at bedside and supportive.   provided pastoral presence, prayer, and empathetic listening.  Peace be with you,  Signed by  BORIS InfanteDiv.   [phone]

## 2024-08-22 NOTE — ANESTHESIA POSTPROCEDURE EVALUATION
Department of Anesthesiology  Postprocedure Note    Patient: Brenda Mayer  MRN: [id number]  YOB: 1955  Date of evaluation: 8/22/2024    Procedure Summary       Date: 08/22/24 Room / Location:  MAIN OR  /  MAIN OR    Anesthesia Start: 0710 Anesthesia Stop: 0842    Procedures:       LEFT CYSTOSCOPY, RETROGRADE PYELOGRAM, URETEROSCOPY WITH HOLMIUM LASER LITHOTRIPSY AND LEFT URETERAL STENT EXCHANGE (Left)      CYSTOSCOPY URETERAL STENT INSERTION (Left)      CYSTOSCOPY RETROGRADE PYELOGRAM (Left) Diagnosis:       Left ureteral stone      (Left ureteral stone [N20.1])    Providers: Michael Short MD Responsible Provider: GERA Izaguirre MD    Anesthesia Type: general ASA Status: 3            Anesthesia Type: No value filed.    Casimiro Phase I: Casimiro Score: 8    Casimiro Phase II: Casimiro Score: 10    Anesthesia Post Evaluation    Patient location during evaluation: PACU  Patient participation: complete - patient participated  Level of consciousness: awake and alert  Airway patency: patent  Nausea & Vomiting: no nausea and no vomiting  Cardiovascular status: hemodynamically stable  Respiratory status: acceptable  Hydration status: euvolemic  Comments: Blood pressure 131/66, pulse 81, temperature 98.1 °F (36.7 °C), temperature source Temporal, resp. rate 25, height 1.702 m (5' 7\"), weight 125 kg (275 lb 9.6 oz), SpO2 96%.    No apparent anesthetic complications.  Pt stable for discharge from PACU  Multimodal analgesia pain management approach  Pain management: adequate    No notable events documented.

## 2024-08-22 NOTE — DISCHARGE INSTRUCTIONS
for a few days after the test, but call if it does not get better or if your urine is cloudy, smells bad, or has pus in it.    After general anesthesia or intravenous sedation, for 24 hours or while taking prescription Narcotics:  Limit your activities  A responsible adult needs to be with you for the next 24 hours  Do not drive and operate hazardous machinery  Do not make important personal or business decisions  Do not drink alcoholic beverages  If you have not urinated within 8 hours after discharge, and you are experiencing discomfort from urinary retention, please go to the nearest ED.  If you have sleep apnea and have a CPAP machine, please use it for all naps and sleeping.  Please use caution when taking narcotics and any of your home medications that may cause drowsiness.  *  Please give a list of your current medications to your Primary Care Provider.  *  Please update this list whenever your medications are discontinued, doses are      changed, or new medications (including over-the-counter products) are added.  *  Please carry medication information at all times in case of emergency situations.    These are general instructions for a healthy lifestyle:  No smoking/ No tobacco products/ Avoid exposure to second hand smoke  Surgeon General's Warning:  Quitting smoking now greatly reduces serious risk to your health.  Obesity, smoking, and sedentary lifestyle greatly increases your risk for illness  A healthy diet, regular physical exercise & weight monitoring are important for maintaining a healthy lifestyle    You may be retaining fluid if you have a history of heart failure or if you experience any of the following symptoms:  Weight gain of 3 pounds or more overnight or 5 pounds in a week, increased swelling in our hands or feet or shortness of breath while lying flat in bed.  Please call your doctor as soon as you notice any of these symptoms; do not wait until your next office visit.

## 2024-08-26 ENCOUNTER — TELEPHONE (OUTPATIENT)
Dept: UROLOGY | Age: 69
End: 2024-08-26

## 2024-08-26 ENCOUNTER — OFFICE VISIT (OUTPATIENT)
Dept: UROLOGY | Age: 69
End: 2024-08-26
Payer: MEDICARE

## 2024-08-26 DIAGNOSIS — N20.0 BILATERAL KIDNEY STONES: ICD-10-CM

## 2024-08-26 DIAGNOSIS — N20.1 LEFT URETERAL STONE: Primary | ICD-10-CM

## 2024-08-26 DIAGNOSIS — N20.0 KIDNEY STONE: Primary | ICD-10-CM

## 2024-08-26 LAB
BILIRUBIN, URINE, POC: NEGATIVE
BLOOD URINE, POC: NORMAL
GLUCOSE URINE, POC: >=1000
KETONES, URINE, POC: NEGATIVE
LEUKOCYTE ESTERASE, URINE, POC: NORMAL
NITRITE, URINE, POC: NEGATIVE
PH, URINE, POC: 6 (ref 4.6–8)
PROTEIN,URINE, POC: 300
SPECIFIC GRAVITY, URINE, POC: 1.01 (ref 1–1.03)
URINALYSIS CLARITY, POC: NORMAL
URINALYSIS COLOR, POC: NORMAL
UROBILINOGEN, POC: NORMAL

## 2024-08-26 PROCEDURE — 1090F PRES/ABSN URINE INCON ASSESS: CPT | Performed by: UROLOGY

## 2024-08-26 PROCEDURE — G8427 DOCREV CUR MEDS BY ELIG CLIN: HCPCS | Performed by: UROLOGY

## 2024-08-26 PROCEDURE — 1111F DSCHRG MED/CURRENT MED MERGE: CPT | Performed by: UROLOGY

## 2024-08-26 PROCEDURE — 1123F ACP DISCUSS/DSCN MKR DOCD: CPT | Performed by: UROLOGY

## 2024-08-26 PROCEDURE — G8417 CALC BMI ABV UP PARAM F/U: HCPCS | Performed by: UROLOGY

## 2024-08-26 PROCEDURE — 1036F TOBACCO NON-USER: CPT | Performed by: UROLOGY

## 2024-08-26 PROCEDURE — 3017F COLORECTAL CA SCREEN DOC REV: CPT | Performed by: UROLOGY

## 2024-08-26 PROCEDURE — 81003 URINALYSIS AUTO W/O SCOPE: CPT | Performed by: UROLOGY

## 2024-08-26 PROCEDURE — G8400 PT W/DXA NO RESULTS DOC: HCPCS | Performed by: UROLOGY

## 2024-08-26 PROCEDURE — 99205 OFFICE O/P NEW HI 60 MIN: CPT | Performed by: UROLOGY

## 2024-08-26 ASSESSMENT — ENCOUNTER SYMPTOMS
EYE DISCHARGE: 0
HEARTBURN: 0
BACK PAIN: 0
CONSTIPATION: 0
DIARRHEA: 0
VOMITING: 0
ABDOMINAL PAIN: 0
INDIGESTION: 0
WHEEZING: 0
NAUSEA: 0
EYE PAIN: 0
COUGH: 0
SHORTNESS OF BREATH: 0
SKIN LESIONS: 0
BLOOD IN STOOL: 0

## 2024-08-26 NOTE — TELEPHONE ENCOUNTER
----- Message from Dr. Jay Nielson MD sent at 8/26/2024  9:31 AM EDT -----  Regarding: Surgery Scheduling  Hospital: [facility]    Surgeon: Nisreen    Assist: NONE    Diagnosis: Right Kidney Stones    Procedure: Right PCNL    Posting time: 2 hours    Special Instruments Needed:  NONE    Anesthesia: GENERAL    Labs: CBC, BMP, U/A, PT/INR, PTT    Tests: EKG per anesthesia    Blood: NONE    Bowel Prep: NONE    Special Instructions: High infection risk.  Recommend admit on Monday for IR placement of R PCNL access tube and pre-op antibiotics, R PCNL then scheduled on Tuesday. Needs clearance to hold xarelto for surgery.     Orders/HandP:     Follow up appointment: BRANDON

## 2024-08-26 NOTE — PROGRESS NOTES
[facility]  [address] [address]  [phone]    Brenda Mayer  : 1955    Chief Complaint   Patient presents with    New Patient     Left ureteral stone     HPI     Brenda Mayer is a 69 y.o.  female with multiple medical co-morbidities listed below and bilateral large kidney stone burden with bilateral indwelling ureteral stents.  Presents today for second opinion to discuss PCNL.     She reports a history of Cystoscopy, RIGHT Ureteroscopy, Laser Lithotripsy, RIGHT Ureteral Stent Placement with Dr. Resendez in 2024 for a large 2.1 cm R kidney stone.  She opted against PCNL due to its more invasiveness and instead opted for ureteroscopy.  Unfortunately, all of the stone burden could not be cleared with 1 ureteroscopy.  Two additional second and third look ureteroscopies were attempted in 2024 at [facility] but aborted due to R pyelonephritis.  Stent remains in place on the R side.     As for her L sided stone burden, she had two L ureter stones that passed in 2024 and presented to the [facility] ER and underwent Cystoscopy, LEFT Ureteroscopy, Laser Lithotripsy, LEFT Ureteral Stent Placement by my partner, Dr. Short.  She was found to have 50+ small stones in her L kidney with high potential to drop.  She still has L ureter stent in place today and wants to discuss possible L PCNL as well.     Denies fever/chills.  Tolerating stents. Of note, she is on xarelto.     Past Medical History:   Diagnosis Date    Anemia     Atrial fibrillation (HCC)     Chronic pain     pain R knee    Diabetes mellitus (HCC)     oral medication and insulin, avg fasting 115-130, no hypo sx, last A1C 7.5 24    Heart failure (HCC)     ECHO 24 EF 60-65%    Hypertension     managed with med    Morbid obesity (HCC)     BMI 45.8- 12    Positive PPD     had vaccination as a child - BCG    Sleep apnea     uses Bi-Pap     Past Surgical History:

## 2024-09-04 ENCOUNTER — TELEPHONE (OUTPATIENT)
Dept: UROLOGY | Age: 69
End: 2024-09-04

## 2024-09-04 DIAGNOSIS — R35.0 URINE FREQUENCY: ICD-10-CM

## 2024-09-04 DIAGNOSIS — R35.0 URINE FREQUENCY: Primary | ICD-10-CM

## 2024-09-04 DIAGNOSIS — N30.00 ACUTE CYSTITIS WITHOUT HEMATURIA: Primary | ICD-10-CM

## 2024-09-04 LAB
APPEARANCE UR: ABNORMAL
BACTERIA URNS QL MICRO: ABNORMAL /HPF
BILIRUB UR QL: NEGATIVE
COLOR UR: ABNORMAL
EPI CELLS #/AREA URNS HPF: ABNORMAL /HPF
GLUCOSE UR STRIP.AUTO-MCNC: 500 MG/DL
HGB UR QL STRIP: ABNORMAL
KETONES UR QL STRIP.AUTO: NEGATIVE MG/DL
LEUKOCYTE ESTERASE UR QL STRIP.AUTO: ABNORMAL
NITRITE UR QL STRIP.AUTO: POSITIVE
OTHER OBSERVATIONS: ABNORMAL
PH UR STRIP: 5.5 (ref 5–9)
PROT UR STRIP-MCNC: 30 MG/DL
RBC #/AREA URNS HPF: ABNORMAL /HPF
SP GR UR REFRACTOMETRY: 1.01 (ref 1–1.02)
UROBILINOGEN UR QL STRIP.AUTO: 0.2 EU/DL (ref 0.2–1)
WBC URNS QL MICRO: ABNORMAL /HPF

## 2024-09-04 RX ORDER — NITROFURANTOIN 25; 75 MG/1; MG/1
100 CAPSULE ORAL 2 TIMES DAILY
Qty: 20 CAPSULE | Refills: 0 | Status: SHIPPED | OUTPATIENT
Start: 2024-09-04 | End: 2024-09-14

## 2024-09-04 NOTE — RESULT ENCOUNTER NOTE
Mrs. Crespo, your urine shows a likely UTI in the sample.  I am awaiting the culture results but will start you on an antibiotic macrobid.  We may need to adjust this based on the culture. For now, I will give you 1 week's worth of antibiotic but once we have the culture results, it will give me a better idea of something that we can continue until your surgery date.     Best Regards,  Dr. Nielson

## 2024-09-04 NOTE — TELEPHONE ENCOUNTER
Spoke with patients son, he will get urine sample to outpatient lab.  Orders placed for UA and culture.

## 2024-09-04 NOTE — TELEPHONE ENCOUNTER
----- Message from Sabianism Dacuda sent at 8/2/2024  9:32 AM EDT -----  Regarding: Appointment Request ()  Contact: [phone]  Appointment Request From: Britt Coronado    With Provider: Elisha Blackburn [-Primary Care Physician-]    Preferred Date Range: Any date 8/2/2024 or later    Preferred Times: Any    Reason: To address the following health maintenance concerns.  Mammogram    Comments:  Overdue on breast exam   Patients son called, patient having cloudy urine, up more than 10 times a night going to the bathroom.  Says she has chronic UTIs due to kidney stones.  She is concerned she has UTI now, wants to know if can be prescribed antibiotic for UTI until surgery.

## 2024-09-05 ENCOUNTER — PREP FOR PROCEDURE (OUTPATIENT)
Dept: UROLOGY | Age: 69
End: 2024-09-05

## 2024-09-05 DIAGNOSIS — N20.0 RIGHT KIDNEY STONE: Primary | ICD-10-CM

## 2024-09-05 RX ORDER — SODIUM CHLORIDE 9 MG/ML
INJECTION, SOLUTION INTRAVENOUS PRN
Status: CANCELLED | OUTPATIENT
Start: 2024-09-05

## 2024-09-05 RX ORDER — SODIUM CHLORIDE 0.9 % (FLUSH) 0.9 %
5-40 SYRINGE (ML) INJECTION EVERY 12 HOURS SCHEDULED
Status: CANCELLED | OUTPATIENT
Start: 2024-09-05

## 2024-09-05 RX ORDER — SODIUM CHLORIDE 0.9 % (FLUSH) 0.9 %
5-40 SYRINGE (ML) INJECTION PRN
Status: CANCELLED | OUTPATIENT
Start: 2024-09-05

## 2024-09-05 NOTE — TELEPHONE ENCOUNTER
Called and discussed with the patient's son.  Case request sent to scheduling for Nisreen admit 10/28/24, surgery 10/29/24.

## 2024-09-05 NOTE — TELEPHONE ENCOUNTER
Procedures: Procedure(s):   RIGHT NEPHROLITHOTOMY PERCUTANEOUS   Date: 10/29/2024   Time: 1100   Location: [facility] MAIN OR 02     Scheduled. Please complete orders.

## 2024-09-06 LAB
BACTERIA SPEC CULT: ABNORMAL
BACTERIA SPEC CULT: ABNORMAL
SERVICE CMNT-IMP: ABNORMAL

## 2024-09-07 NOTE — RESULT ENCOUNTER NOTE
Mrs. Mayer, your urine culture did show a UTI but you are on the appropriate antibiotic, macrobid.  I would complete that as prescribed.      Best Regards,  Dr. Nielson

## 2024-09-12 ENCOUNTER — TELEPHONE (OUTPATIENT)
Dept: UROLOGY | Age: 69
End: 2024-09-12

## 2024-09-12 DIAGNOSIS — N20.0 KIDNEY STONE: Primary | ICD-10-CM

## 2024-09-12 RX ORDER — OXYCODONE AND ACETAMINOPHEN 5; 325 MG/1; MG/1
1 TABLET ORAL EVERY 6 HOURS PRN
Qty: 12 TABLET | Refills: 0 | Status: SHIPPED | OUTPATIENT
Start: 2024-09-12 | End: 2024-09-13 | Stop reason: SDUPTHER

## 2024-09-13 ENCOUNTER — TELEPHONE (OUTPATIENT)
Dept: UROLOGY | Age: 69
End: 2024-09-13

## 2024-09-13 DIAGNOSIS — N20.0 KIDNEY STONES: Primary | ICD-10-CM

## 2024-09-13 DIAGNOSIS — N20.0 KIDNEY STONE: ICD-10-CM

## 2024-09-13 RX ORDER — OXYCODONE AND ACETAMINOPHEN 5; 325 MG/1; MG/1
1 TABLET ORAL EVERY 4 HOURS PRN
Qty: 20 TABLET | Refills: 0 | Status: SHIPPED | OUTPATIENT
Start: 2024-09-13 | End: 2024-09-20

## 2024-09-16 ENCOUNTER — HOSPITAL ENCOUNTER (OUTPATIENT)
Dept: CT IMAGING | Age: 69
Discharge: HOME | End: 2024-09-18
Payer: MEDICARE

## 2024-09-16 DIAGNOSIS — N20.0 KIDNEY STONES: ICD-10-CM

## 2024-09-16 PROCEDURE — 74176 CT ABD & PELVIS W/O CONTRAST: CPT

## 2024-09-19 ENCOUNTER — TELEPHONE (OUTPATIENT)
Dept: UROLOGY | Age: 69
End: 2024-09-19

## 2024-09-19 DIAGNOSIS — N30.01 ACUTE CYSTITIS WITH HEMATURIA: Primary | ICD-10-CM

## 2024-09-25 DIAGNOSIS — N30.01 ACUTE CYSTITIS WITH HEMATURIA: ICD-10-CM

## 2024-09-25 LAB
APPEARANCE UR: ABNORMAL
BACTERIA URNS QL MICRO: ABNORMAL /HPF
BILIRUB UR QL: NEGATIVE
COLOR UR: ABNORMAL
EPI CELLS #/AREA URNS HPF: ABNORMAL /HPF
GLUCOSE UR STRIP.AUTO-MCNC: NEGATIVE MG/DL
HGB UR QL STRIP: ABNORMAL
KETONES UR QL STRIP.AUTO: NEGATIVE MG/DL
LEUKOCYTE ESTERASE UR QL STRIP.AUTO: ABNORMAL
NITRITE UR QL STRIP.AUTO: POSITIVE
OTHER OBSERVATIONS: ABNORMAL
PH UR STRIP: 7 (ref 5–9)
PROT UR STRIP-MCNC: 100 MG/DL
RBC #/AREA URNS HPF: >100 /HPF
SP GR UR REFRACTOMETRY: 1.01 (ref 1–1.02)
UROBILINOGEN UR QL STRIP.AUTO: 0.2 EU/DL (ref 0.2–1)
WBC URNS QL MICRO: >100 /HPF
YEAST URNS QL MICRO: ABNORMAL

## 2024-09-26 DIAGNOSIS — N30.00 ACUTE CYSTITIS WITHOUT HEMATURIA: Primary | ICD-10-CM

## 2024-09-26 RX ORDER — SULFAMETHOXAZOLE/TRIMETHOPRIM 800-160 MG
1 TABLET ORAL 2 TIMES DAILY
Qty: 14 TABLET | Refills: 0 | Status: SHIPPED | OUTPATIENT
Start: 2024-09-26 | End: 2024-10-03

## 2024-09-26 NOTE — RESULT ENCOUNTER NOTE
Mrs. Mayer, your urine test shows some bacteria in the urine.  Culture is pending. I am going to go ahead and send an antibiotic, bactrim to your pharmacy to start taking.  I will contact you with the culture results when available and adjust antibiotic as needed    Best Regards,  Dr. Nielson

## 2024-09-27 LAB
BACTERIA SPEC CULT: ABNORMAL
BACTERIA SPEC CULT: ABNORMAL
SERVICE CMNT-IMP: ABNORMAL

## 2024-09-28 NOTE — RESULT ENCOUNTER NOTE
Mrs. Mayer, your urine culture did show a UTI.  You are on the correct antibiotic, bactrim.  I recommend that you complete that as prescribed.      Best Regards,  Dr. Nielson

## 2024-10-21 DIAGNOSIS — N20.0 KIDNEY STONE: Primary | ICD-10-CM

## 2024-10-23 ENCOUNTER — TELEPHONE (OUTPATIENT)
Dept: SURGERY | Age: 69
End: 2024-10-23

## 2024-10-23 ENCOUNTER — HOSPITAL ENCOUNTER (OUTPATIENT)
Dept: SURGERY | Age: 69
Discharge: HOME | End: 2024-10-26
Payer: MEDICARE

## 2024-10-23 VITALS
HEIGHT: 67 IN | TEMPERATURE: 98 F | BODY MASS INDEX: 40.65 KG/M2 | SYSTOLIC BLOOD PRESSURE: 156 MMHG | WEIGHT: 259 LBS | OXYGEN SATURATION: 98 % | DIASTOLIC BLOOD PRESSURE: 64 MMHG | RESPIRATION RATE: 18 BRPM | HEART RATE: 74 BPM

## 2024-10-23 DIAGNOSIS — N20.0 RIGHT KIDNEY STONE: ICD-10-CM

## 2024-10-23 LAB
ANION GAP SERPL CALC-SCNC: 14 MMOL/L (ref 9–18)
APPEARANCE UR: ABNORMAL
BACTERIA URNS QL MICRO: ABNORMAL /HPF
BILIRUB UR QL: NEGATIVE
BUN SERPL-MCNC: 15 MG/DL (ref 8–23)
CALCIUM SERPL-MCNC: 9.6 MG/DL (ref 8.8–10.2)
CHLORIDE SERPL-SCNC: 105 MMOL/L (ref 98–107)
CO2 SERPL-SCNC: 24 MMOL/L (ref 20–28)
COLOR UR: ABNORMAL
CREAT SERPL-MCNC: 1.04 MG/DL (ref 0.6–1.1)
EPI CELLS #/AREA URNS HPF: ABNORMAL /HPF
ERYTHROCYTE [DISTWIDTH] IN BLOOD BY AUTOMATED COUNT: 20.1 % (ref 11.9–14.6)
GLUCOSE SERPL-MCNC: 106 MG/DL (ref 70–99)
GLUCOSE UR STRIP.AUTO-MCNC: NEGATIVE MG/DL
HCT VFR BLD AUTO: 32.1 % (ref 35.8–46.3)
HGB BLD-MCNC: 9.6 G/DL (ref 11.7–15.4)
HGB UR QL STRIP: ABNORMAL
KETONES UR QL STRIP.AUTO: NEGATIVE MG/DL
LEUKOCYTE ESTERASE UR QL STRIP.AUTO: ABNORMAL
MCH RBC QN AUTO: 27.6 PG (ref 26.1–32.9)
MCHC RBC AUTO-ENTMCNC: 29.9 G/DL (ref 31.4–35)
MCV RBC AUTO: 92.2 FL (ref 82–102)
NITRITE UR QL STRIP.AUTO: POSITIVE
NRBC # BLD: 0 K/UL (ref 0–0.2)
PH UR STRIP: 7 (ref 5–9)
PLATELET # BLD AUTO: 358 K/UL (ref 150–450)
PMV BLD AUTO: 9.4 FL (ref 9.4–12.3)
POTASSIUM SERPL-SCNC: 4.7 MMOL/L (ref 3.5–5.1)
PROT UR STRIP-MCNC: 100 MG/DL
RBC # BLD AUTO: 3.48 M/UL (ref 4.05–5.2)
RBC #/AREA URNS HPF: ABNORMAL /HPF
SODIUM SERPL-SCNC: 143 MMOL/L (ref 136–145)
SP GR UR REFRACTOMETRY: 1.02 (ref 1–1.02)
UROBILINOGEN UR QL STRIP.AUTO: 0.2 EU/DL (ref 0.2–1)
WBC # BLD AUTO: 10.5 K/UL (ref 4.3–11.1)
WBC URNS QL MICRO: >100 /HPF

## 2024-10-23 PROCEDURE — 87086 URINE CULTURE/COLONY COUNT: CPT

## 2024-10-23 PROCEDURE — 81001 URINALYSIS AUTO W/SCOPE: CPT

## 2024-10-23 PROCEDURE — 85027 COMPLETE CBC AUTOMATED: CPT

## 2024-10-23 PROCEDURE — 87088 URINE BACTERIA CULTURE: CPT

## 2024-10-23 PROCEDURE — 80048 BASIC METABOLIC PNL TOTAL CA: CPT

## 2024-10-23 PROCEDURE — 87186 SC STD MICRODIL/AGAR DIL: CPT

## 2024-10-23 NOTE — PROGRESS NOTES
Latest Reference Range & Units 10/23/24 14:54   WBC, UA 0 /hpf >100   RBC, UA 0 /hpf 5-10   Epithelial Cells, UA 0 /hpf 5-10   Bacteria, UA 0 /hpf 4+ (H)   (H): Data is abnormally high  Telephone message sent to Shahla Quan

## 2024-10-23 NOTE — TELEPHONE ENCOUNTER
Pt and son have multiple surgical / procedure questions. Pt takes  Xarelto. Will need hold order and pt will need instructions.

## 2024-10-23 NOTE — TELEPHONE ENCOUNTER
Latest Reference Range & Units 10/23/24 14:54   WBC, UA 0 /hpf >100   RBC, UA 0 /hpf 5-10   Epithelial Cells, UA 0 /hpf 5-10   Bacteria, UA 0 /hpf 4+ (H)   (H): Data is abnormally high

## 2024-10-23 NOTE — PROGRESS NOTES
PLEASE CONTINUE TAKING ALL PRESCRIPTION MEDICATIONS UP TO THE DAY OF SURGERY UNLESS OTHERWISE DIRECTED BELOW. You may take Tylenol, allergy,  and/or indigestion medications.     TAKE ONLY THESE MEDICATIONS ON THE DAY OF SURGERY    Digoxin, Diltiazem, Metoprolol      30 units Baslagar insulin night before surgery      DISCONTINUE all vitamins and supplements 7 days prior to surgery. DISCONTINUE Non-Steroidal Anti-Inflammatory (NSAIDS) such as Advil and Aleve 5 days prior to surgery.     Home Medications to Hold- please continue all other medications except these.    No Metformin Morning of Surgery    Xarelto as Directed      Comments      On the day before surgery please take 2 Tylenol in the morning and then again before bed. You may use either regular or extra strength.           Please do not bring home medications with you on the day of surgery unless otherwise directed by your nurse.  If you are instructed to bring home medications, please give them to your nurse as they will be administered by the nursing staff.    If you have any questions, please call [facility] [phone].    A copy of this note was provided to the patient for reference.

## 2024-10-23 NOTE — PROGRESS NOTES
Patient verified name and     Order for consent not found in EHR and matches case posting; patient verified.     Type 2 surgery, walk in assessment complete.    Labs per surgeon: none;   Labs per anesthesia protocol: none;   EK24   Pt on Xarelto. Pt will need hold order and instructions Message sent to Shahla at Dr Nielson's office.     Patient provided with and instructed on educational handouts including Guide to Surgery, Preventing Surgical Site Infections, Pain Management, and [facility] Brochure.    Patient answered medical/surgical history questions at their best of ability. All prior to admission medications documented in EPIC. Original medication prescription bottle not visualized during patient appointment.     Patient instructed to hold all vitamins 7 days prior to surgery and NSAIDS 5 days prior to surgery, patient verbalized understanding.     Patient teach back successful and patient demonstrates knowledge of instructions.

## 2024-10-25 LAB
BACTERIA SPEC CULT: ABNORMAL
BACTERIA SPEC CULT: ABNORMAL
SERVICE CMNT-IMP: ABNORMAL

## 2024-10-28 ENCOUNTER — HOSPITAL ENCOUNTER (OUTPATIENT)
Dept: INTERVENTIONAL RADIOLOGY/VASCULAR | Age: 69
DRG: 659 | End: 2024-10-28
Attending: UROLOGY
Payer: MEDICARE

## 2024-10-28 ENCOUNTER — HOSPITAL ENCOUNTER (INPATIENT)
Age: 69
LOS: 8 days | Discharge: HOME | DRG: 659 | End: 2024-11-05
Attending: UROLOGY | Admitting: UROLOGY
Payer: MEDICARE

## 2024-10-28 ENCOUNTER — HOSPITAL ENCOUNTER (OUTPATIENT)
Dept: INTERVENTIONAL RADIOLOGY/VASCULAR | Age: 69
Discharge: HOME | End: 2024-10-31
Attending: UROLOGY
Payer: MEDICARE

## 2024-10-28 DIAGNOSIS — N20.0 KIDNEY STONE: ICD-10-CM

## 2024-10-28 DIAGNOSIS — N20.0 KIDNEY STONE: Primary | ICD-10-CM

## 2024-10-28 LAB
ABO + RH BLD: NORMAL
APTT PPP: 20.5 SEC (ref 23.3–37.4)
BLOOD GROUP ANTIBODIES SERPL: NORMAL
GLUCOSE BLD STRIP.AUTO-MCNC: 132 MG/DL (ref 65–100)
GLUCOSE BLD STRIP.AUTO-MCNC: 186 MG/DL (ref 65–100)
INR PPP: 1.1
PROTHROMBIN TIME: 14.9 SEC (ref 11.3–14.9)
SERVICE CMNT-IMP: ABNORMAL
SERVICE CMNT-IMP: ABNORMAL
SPECIMEN EXP DATE BLD: NORMAL

## 2024-10-28 PROCEDURE — 6360000004 HC RX CONTRAST MEDICATION: Performed by: RADIOLOGY

## 2024-10-28 PROCEDURE — 50432 PLMT NEPHROSTOMY CATHETER: CPT | Performed by: RADIOLOGY

## 2024-10-28 PROCEDURE — 0T9430Z DRAINAGE OF LEFT KIDNEY PELVIS WITH DRAINAGE DEVICE, PERCUTANEOUS APPROACH: ICD-10-PCS | Performed by: RADIOLOGY

## 2024-10-28 PROCEDURE — 6360000002 HC RX W HCPCS: Performed by: RADIOLOGY

## 2024-10-28 PROCEDURE — 2580000003 HC RX 258: Performed by: RADIOLOGY

## 2024-10-28 PROCEDURE — 6370000000 HC RX 637 (ALT 250 FOR IP): Performed by: RADIOLOGY

## 2024-10-28 PROCEDURE — 87086 URINE CULTURE/COLONY COUNT: CPT

## 2024-10-28 PROCEDURE — 99153 MOD SED SAME PHYS/QHP EA: CPT

## 2024-10-28 PROCEDURE — 6370000000 HC RX 637 (ALT 250 FOR IP): Performed by: NURSE PRACTITIONER

## 2024-10-28 PROCEDURE — 36415 COLL VENOUS BLD VENIPUNCTURE: CPT

## 2024-10-28 PROCEDURE — 86850 RBC ANTIBODY SCREEN: CPT

## 2024-10-28 PROCEDURE — 85730 THROMBOPLASTIN TIME PARTIAL: CPT

## 2024-10-28 PROCEDURE — 2700000000 HC OXYGEN THERAPY PER DAY

## 2024-10-28 PROCEDURE — 87102 FUNGUS ISOLATION CULTURE: CPT

## 2024-10-28 PROCEDURE — 2580000003 HC RX 258: Performed by: NURSE PRACTITIONER

## 2024-10-28 PROCEDURE — 86901 BLOOD TYPING SEROLOGIC RH(D): CPT

## 2024-10-28 PROCEDURE — 85610 PROTHROMBIN TIME: CPT

## 2024-10-28 PROCEDURE — 94760 N-INVAS EAR/PLS OXIMETRY 1: CPT

## 2024-10-28 PROCEDURE — 87206 SMEAR FLUORESCENT/ACID STAI: CPT

## 2024-10-28 PROCEDURE — 1100000000 HC RM PRIVATE

## 2024-10-28 PROCEDURE — 86900 BLOOD TYPING SEROLOGIC ABO: CPT

## 2024-10-28 PROCEDURE — 99152 MOD SED SAME PHYS/QHP 5/>YRS: CPT | Performed by: RADIOLOGY

## 2024-10-28 PROCEDURE — 82962 GLUCOSE BLOOD TEST: CPT

## 2024-10-28 PROCEDURE — 50432 PLMT NEPHROSTOMY CATHETER: CPT

## 2024-10-28 PROCEDURE — 6360000002 HC RX W HCPCS: Performed by: NURSE PRACTITIONER

## 2024-10-28 RX ORDER — OXYCODONE HYDROCHLORIDE 5 MG/1
10 TABLET ORAL EVERY 4 HOURS PRN
Status: DISCONTINUED | OUTPATIENT
Start: 2024-10-28 | End: 2024-10-29

## 2024-10-28 RX ORDER — NALOXONE HYDROCHLORIDE 0.4 MG/ML
INJECTION, SOLUTION INTRAMUSCULAR; INTRAVENOUS; SUBCUTANEOUS PRN
Status: CANCELLED | OUTPATIENT
Start: 2024-10-28

## 2024-10-28 RX ORDER — IOPAMIDOL 612 MG/ML
INJECTION, SOLUTION INTRAVASCULAR PRN
Status: COMPLETED | OUTPATIENT
Start: 2024-10-28 | End: 2024-10-28

## 2024-10-28 RX ORDER — FENTANYL CITRATE 50 UG/ML
INJECTION, SOLUTION INTRAMUSCULAR; INTRAVENOUS PRN
Status: COMPLETED | OUTPATIENT
Start: 2024-10-28 | End: 2024-10-28

## 2024-10-28 RX ORDER — SODIUM CHLORIDE 9 MG/ML
INJECTION, SOLUTION INTRAVENOUS PRN
Status: DISCONTINUED | OUTPATIENT
Start: 2024-10-28 | End: 2024-10-28 | Stop reason: HOSPADM

## 2024-10-28 RX ORDER — DIPHENHYDRAMINE HYDROCHLORIDE 50 MG/ML
INJECTION INTRAMUSCULAR; INTRAVENOUS PRN
Status: COMPLETED | OUTPATIENT
Start: 2024-10-28 | End: 2024-10-28

## 2024-10-28 RX ORDER — ONDANSETRON 2 MG/ML
4 INJECTION INTRAMUSCULAR; INTRAVENOUS
Status: CANCELLED | OUTPATIENT
Start: 2024-10-28 | End: 2024-10-29

## 2024-10-28 RX ORDER — DIPHENHYDRAMINE HYDROCHLORIDE 50 MG/ML
12.5 INJECTION INTRAMUSCULAR; INTRAVENOUS
Status: CANCELLED | OUTPATIENT
Start: 2024-10-28 | End: 2024-10-29

## 2024-10-28 RX ORDER — POLYETHYLENE GLYCOL 3350 17 G/17G
17 POWDER, FOR SOLUTION ORAL DAILY PRN
Status: DISCONTINUED | OUTPATIENT
Start: 2024-10-28 | End: 2024-11-05 | Stop reason: HOSPADM

## 2024-10-28 RX ORDER — SODIUM CHLORIDE 0.9 % (FLUSH) 0.9 %
5-40 SYRINGE (ML) INJECTION EVERY 12 HOURS SCHEDULED
Status: DISCONTINUED | OUTPATIENT
Start: 2024-10-28 | End: 2024-11-05 | Stop reason: HOSPADM

## 2024-10-28 RX ORDER — DIGOXIN 250 MCG
0.25 TABLET ORAL DAILY
Status: DISCONTINUED | OUTPATIENT
Start: 2024-10-29 | End: 2024-11-05 | Stop reason: HOSPADM

## 2024-10-28 RX ORDER — INSULIN GLARGINE 100 [IU]/ML
38 INJECTION, SOLUTION SUBCUTANEOUS NIGHTLY
Status: DISCONTINUED | OUTPATIENT
Start: 2024-10-28 | End: 2024-11-05 | Stop reason: HOSPADM

## 2024-10-28 RX ORDER — SODIUM CHLORIDE 0.9 % (FLUSH) 0.9 %
5-40 SYRINGE (ML) INJECTION EVERY 12 HOURS SCHEDULED
Status: DISCONTINUED | OUTPATIENT
Start: 2024-10-28 | End: 2024-10-28 | Stop reason: HOSPADM

## 2024-10-28 RX ORDER — ACETAMINOPHEN 325 MG/1
650 TABLET ORAL EVERY 6 HOURS PRN
Status: DISCONTINUED | OUTPATIENT
Start: 2024-10-28 | End: 2024-11-05 | Stop reason: HOSPADM

## 2024-10-28 RX ORDER — ONDANSETRON 2 MG/ML
INJECTION INTRAMUSCULAR; INTRAVENOUS PRN
Status: COMPLETED | OUTPATIENT
Start: 2024-10-28 | End: 2024-10-28

## 2024-10-28 RX ORDER — ONDANSETRON 4 MG/1
4 TABLET, ORALLY DISINTEGRATING ORAL EVERY 8 HOURS PRN
Status: DISCONTINUED | OUTPATIENT
Start: 2024-10-28 | End: 2024-11-05 | Stop reason: HOSPADM

## 2024-10-28 RX ORDER — HYDROMORPHONE HYDROCHLORIDE 2 MG/ML
0.5 INJECTION, SOLUTION INTRAMUSCULAR; INTRAVENOUS; SUBCUTANEOUS EVERY 10 MIN PRN
Status: CANCELLED | OUTPATIENT
Start: 2024-10-28

## 2024-10-28 RX ORDER — GENTAMICIN SULFATE 80 MG/100ML
80 INJECTION, SOLUTION INTRAVENOUS EVERY 24 HOURS
Status: DISCONTINUED | OUTPATIENT
Start: 2024-10-28 | End: 2024-10-29

## 2024-10-28 RX ORDER — DEXTROSE MONOHYDRATE 100 MG/ML
INJECTION, SOLUTION INTRAVENOUS CONTINUOUS PRN
Status: DISCONTINUED | OUTPATIENT
Start: 2024-10-28 | End: 2024-11-05 | Stop reason: HOSPADM

## 2024-10-28 RX ORDER — HYDROMORPHONE HYDROCHLORIDE 2 MG/ML
0.25 INJECTION, SOLUTION INTRAMUSCULAR; INTRAVENOUS; SUBCUTANEOUS EVERY 5 MIN PRN
Status: CANCELLED | OUTPATIENT
Start: 2024-10-28

## 2024-10-28 RX ORDER — DILTIAZEM HYDROCHLORIDE 300 MG/1
300 CAPSULE, EXTENDED RELEASE ORAL DAILY
Status: DISCONTINUED | OUTPATIENT
Start: 2024-10-28 | End: 2024-10-28 | Stop reason: SDUPTHER

## 2024-10-28 RX ORDER — GENTAMICIN SULFATE 80 MG/100ML
80 INJECTION, SOLUTION INTRAVENOUS
Status: DISCONTINUED | OUTPATIENT
Start: 2024-10-28 | End: 2024-10-28 | Stop reason: HOSPADM

## 2024-10-28 RX ORDER — OXYCODONE HYDROCHLORIDE 5 MG/1
5 TABLET ORAL EVERY 4 HOURS PRN
Status: DISCONTINUED | OUTPATIENT
Start: 2024-10-28 | End: 2024-10-29

## 2024-10-28 RX ORDER — ONDANSETRON 2 MG/ML
4 INJECTION INTRAMUSCULAR; INTRAVENOUS EVERY 6 HOURS PRN
Status: DISCONTINUED | OUTPATIENT
Start: 2024-10-28 | End: 2024-11-05 | Stop reason: HOSPADM

## 2024-10-28 RX ORDER — SODIUM CHLORIDE 9 MG/ML
INJECTION, SOLUTION INTRAVENOUS PRN
Status: DISCONTINUED | OUTPATIENT
Start: 2024-10-28 | End: 2024-11-05 | Stop reason: HOSPADM

## 2024-10-28 RX ORDER — METOPROLOL SUCCINATE 25 MG/1
25 TABLET, EXTENDED RELEASE ORAL DAILY
Status: DISCONTINUED | OUTPATIENT
Start: 2024-10-29 | End: 2024-11-05 | Stop reason: HOSPADM

## 2024-10-28 RX ORDER — POTASSIUM CHLORIDE 1500 MG/1
20 TABLET, EXTENDED RELEASE ORAL DAILY
Status: DISCONTINUED | OUTPATIENT
Start: 2024-10-29 | End: 2024-11-02

## 2024-10-28 RX ORDER — POTASSIUM CHLORIDE 1500 MG/1
40 TABLET, EXTENDED RELEASE ORAL PRN
Status: ACTIVE | OUTPATIENT
Start: 2024-10-28 | End: 2024-10-30

## 2024-10-28 RX ORDER — POTASSIUM CHLORIDE 7.45 MG/ML
10 INJECTION INTRAVENOUS PRN
Status: ACTIVE | OUTPATIENT
Start: 2024-10-28 | End: 2024-10-30

## 2024-10-28 RX ORDER — MAGNESIUM SULFATE IN WATER 40 MG/ML
2000 INJECTION, SOLUTION INTRAVENOUS PRN
Status: DISCONTINUED | OUTPATIENT
Start: 2024-10-28 | End: 2024-11-05 | Stop reason: HOSPADM

## 2024-10-28 RX ORDER — OXYCODONE AND ACETAMINOPHEN 5; 325 MG/1; MG/1
1 TABLET ORAL EVERY 4 HOURS PRN
Status: DISCONTINUED | OUTPATIENT
Start: 2024-10-28 | End: 2024-11-05 | Stop reason: HOSPADM

## 2024-10-28 RX ORDER — OXYCODONE HYDROCHLORIDE 5 MG/1
10 TABLET ORAL PRN
Status: CANCELLED | OUTPATIENT
Start: 2024-10-28 | End: 2024-10-28

## 2024-10-28 RX ORDER — OXYCODONE HYDROCHLORIDE 5 MG/1
5 TABLET ORAL PRN
Status: CANCELLED | OUTPATIENT
Start: 2024-10-28 | End: 2024-10-28

## 2024-10-28 RX ORDER — INSULIN LISPRO 100 [IU]/ML
0-4 INJECTION, SOLUTION INTRAVENOUS; SUBCUTANEOUS
Status: DISCONTINUED | OUTPATIENT
Start: 2024-10-28 | End: 2024-11-05 | Stop reason: HOSPADM

## 2024-10-28 RX ORDER — SODIUM CHLORIDE 0.9 % (FLUSH) 0.9 %
5-40 SYRINGE (ML) INJECTION PRN
Status: DISCONTINUED | OUTPATIENT
Start: 2024-10-28 | End: 2024-10-28 | Stop reason: HOSPADM

## 2024-10-28 RX ORDER — MIDAZOLAM HYDROCHLORIDE 2 MG/2ML
INJECTION, SOLUTION INTRAMUSCULAR; INTRAVENOUS PRN
Status: COMPLETED | OUTPATIENT
Start: 2024-10-28 | End: 2024-10-28

## 2024-10-28 RX ORDER — OXYCODONE HYDROCHLORIDE 5 MG/1
TABLET ORAL PRN
Status: COMPLETED | OUTPATIENT
Start: 2024-10-28 | End: 2024-10-28

## 2024-10-28 RX ORDER — IBUPROFEN 600 MG/1
1 TABLET ORAL PRN
Status: DISCONTINUED | OUTPATIENT
Start: 2024-10-28 | End: 2024-11-05 | Stop reason: HOSPADM

## 2024-10-28 RX ORDER — SODIUM CHLORIDE 0.9 % (FLUSH) 0.9 %
5-40 SYRINGE (ML) INJECTION PRN
Status: DISCONTINUED | OUTPATIENT
Start: 2024-10-28 | End: 2024-11-05 | Stop reason: HOSPADM

## 2024-10-28 RX ORDER — SODIUM CHLORIDE, SODIUM LACTATE, POTASSIUM CHLORIDE, CALCIUM CHLORIDE 600; 310; 30; 20 MG/100ML; MG/100ML; MG/100ML; MG/100ML
INJECTION, SOLUTION INTRAVENOUS CONTINUOUS
Status: CANCELLED | OUTPATIENT
Start: 2024-10-28

## 2024-10-28 RX ADMIN — OXYCODONE HYDROCHLORIDE AND ACETAMINOPHEN 1 TABLET: 5; 325 TABLET ORAL at 13:15

## 2024-10-28 RX ADMIN — SODIUM CHLORIDE, PRESERVATIVE FREE 10 ML: 5 INJECTION INTRAVENOUS at 21:37

## 2024-10-28 RX ADMIN — FENTANYL CITRATE 50 MCG: 50 INJECTION, SOLUTION INTRAMUSCULAR; INTRAVENOUS at 08:44

## 2024-10-28 RX ADMIN — MIDAZOLAM HYDROCHLORIDE 1 MG: 1 INJECTION, SOLUTION INTRAMUSCULAR; INTRAVENOUS at 08:48

## 2024-10-28 RX ADMIN — ONDANSETRON 4 MG: 2 INJECTION INTRAMUSCULAR; INTRAVENOUS at 11:28

## 2024-10-28 RX ADMIN — MIDAZOLAM HYDROCHLORIDE 1 MG: 1 INJECTION, SOLUTION INTRAMUSCULAR; INTRAVENOUS at 09:01

## 2024-10-28 RX ADMIN — MIDAZOLAM HYDROCHLORIDE 1 MG: 1 INJECTION, SOLUTION INTRAMUSCULAR; INTRAVENOUS at 08:54

## 2024-10-28 RX ADMIN — MIDAZOLAM HYDROCHLORIDE 1 MG: 1 INJECTION, SOLUTION INTRAMUSCULAR; INTRAVENOUS at 08:44

## 2024-10-28 RX ADMIN — OXYCODONE HYDROCHLORIDE AND ACETAMINOPHEN 1 TABLET: 5; 325 TABLET ORAL at 21:36

## 2024-10-28 RX ADMIN — WATER 2000 MG: 1 INJECTION INTRAMUSCULAR; INTRAVENOUS; SUBCUTANEOUS at 08:45

## 2024-10-28 RX ADMIN — CEFTRIAXONE SODIUM 2000 MG: 2 INJECTION, POWDER, FOR SOLUTION INTRAMUSCULAR; INTRAVENOUS at 15:09

## 2024-10-28 RX ADMIN — FENTANYL CITRATE 50 MCG: 50 INJECTION, SOLUTION INTRAMUSCULAR; INTRAVENOUS at 08:54

## 2024-10-28 RX ADMIN — FENTANYL CITRATE 50 MCG: 50 INJECTION, SOLUTION INTRAMUSCULAR; INTRAVENOUS at 09:22

## 2024-10-28 RX ADMIN — OXYCODONE 5 MG: 5 TABLET ORAL at 10:49

## 2024-10-28 RX ADMIN — DIPHENHYDRAMINE HYDROCHLORIDE 12.5 MG: 50 INJECTION, SOLUTION INTRAMUSCULAR; INTRAVENOUS at 08:44

## 2024-10-28 RX ADMIN — GENTAMICIN SULFATE 80 MG: 80 INJECTION, SOLUTION INTRAVENOUS at 21:35

## 2024-10-28 RX ADMIN — FENTANYL CITRATE 50 MCG: 50 INJECTION, SOLUTION INTRAMUSCULAR; INTRAVENOUS at 09:01

## 2024-10-28 RX ADMIN — ACETAMINOPHEN 650 MG: 325 TABLET ORAL at 22:41

## 2024-10-28 RX ADMIN — IOPAMIDOL 100 ML: 612 INJECTION, SOLUTION INTRAVENOUS at 08:49

## 2024-10-28 RX ADMIN — FENTANYL CITRATE 50 MCG: 50 INJECTION, SOLUTION INTRAMUSCULAR; INTRAVENOUS at 08:48

## 2024-10-28 RX ADMIN — MIDAZOLAM HYDROCHLORIDE 1 MG: 1 INJECTION, SOLUTION INTRAMUSCULAR; INTRAVENOUS at 09:22

## 2024-10-28 ASSESSMENT — PAIN - FUNCTIONAL ASSESSMENT
PAIN_FUNCTIONAL_ASSESSMENT: PREVENTS OR INTERFERES SOME ACTIVE ACTIVITIES AND ADLS
PAIN_FUNCTIONAL_ASSESSMENT: NONE - DENIES PAIN

## 2024-10-28 ASSESSMENT — PAIN DESCRIPTION - DESCRIPTORS
DESCRIPTORS: DISCOMFORT
DESCRIPTORS: ACHING

## 2024-10-28 ASSESSMENT — PAIN DESCRIPTION - LOCATION
LOCATION: FLANK
LOCATION: FLANK

## 2024-10-28 ASSESSMENT — PAIN SCALES - GENERAL
PAINLEVEL_OUTOF10: 3
PAINLEVEL_OUTOF10: 5
PAINLEVEL_OUTOF10: 3
PAINLEVEL_OUTOF10: 1
PAINLEVEL_OUTOF10: 3
PAINLEVEL_OUTOF10: 5
PAINLEVEL_OUTOF10: 0
PAINLEVEL_OUTOF10: 3

## 2024-10-28 ASSESSMENT — PAIN DESCRIPTION - ONSET: ONSET: ON-GOING

## 2024-10-28 ASSESSMENT — PAIN DESCRIPTION - PAIN TYPE: TYPE: SURGICAL PAIN

## 2024-10-28 ASSESSMENT — PAIN DESCRIPTION - ORIENTATION
ORIENTATION: LEFT
ORIENTATION: LEFT

## 2024-10-28 NOTE — OR NURSING
No difficulty during recovery. Pain improved following medication administration. Dressing clean, dry and intact. Neph tube draining. Scant blood noted to urine

## 2024-10-28 NOTE — PRE SEDATION
KWIKPEN 100 UNIT/ML injection pen Inject 38 Units into the skin nightly subcutaneous 2/7/23   ProviderShelia MD   metFORMIN (GLUCOPHAGE-XR) 500 MG extended release tablet Take 3 tablets by mouth daily (with breakfast) 1/18/23   ProviderShelia MD   OXYGEN Inhale into the lungs 2 lpm at bedtime    ProviderShelia MD   dilTIAZem (TIAZAC) 300 MG extended release capsule Take 1 capsule by mouth daily 2/16/22   Automatic Reconciliation, Ar   potassium chloride (KLOR-CON M) 20 MEQ extended release tablet Take 1 tablet by mouth daily 2/15/22   Automatic Reconciliation, Ar   rivaroxaban (XARELTO) 20 MG TABS tablet Take 1 tablet by mouth Daily with lunch 2/15/22   Automatic Reconciliation, Ar        Pre-Sedation Documentation and Exam:   I have personally completed a history, physical exam & review of systems for this patient (see notes).  Vital signs have been reviewed (see flow sheet for vitals).    Mallampati Airway Assessment:  normal, dentition not prohibitive, Mallampati Class III - (soft palate & base of uvula are visible)    Prior History of Anesthesia Complications:   none    ASA Classification:  Class 3 - A patient with severe systemic disease that limits activity but is not incapacitating    Sedation/ Anesthesia Plan:   intravenous sedation    Medications Planned:   midazolam (Versed) intravenously and fentanyl intravenously    Patient is an appropriate candidate for plan of sedation: yes    Electronically signed by KRISTYN Pizarro on 10/28/2024 at 8:04 AM

## 2024-10-28 NOTE — PERIOP NOTE
6th floor staff informed of patient's procedure on 10/29 with Dr. Nielson. Pre-op arrival of 0900 for procedure time of 1111.

## 2024-10-28 NOTE — BRIEF OP NOTE
[facility]  Department of Interventional Radiology  [phone]        Brief Procedure Note    Patient: Brenda Mayer MRN: [id number]  SSN: [id number]    YOB: 1955  Age: 69 y.o.  Sex: female      Date of Procedure: 10/28/2024     Pre-Procedure Diagnosis:   Symptomatic , Bilateral kidney stones.  Patient requests that the left kidney be treated first.      Post-Procedure Diagnosis:  SAME    Procedure(s):  Percutaneous Left Nephrostomy Tube Placement    Brief Description of Procedure:  Unsuccessful stent retrieval.      Performed By:  DEEPTI HOWELL MD     Assistants:  None    Anesthesia:  Moderate Sedation    Estimated Blood Loss:  Less than 10ml    Specimens:  Microbiology    Implants:  Nephrostomy Drain    Findings:  No hydronephrosis.  Indwelling stent extends from an upper pole calyx to the bladder.  Small stones in the anterior lower pole calyx.      Complications:  None    Recommendations:  Gravity bag drainage.      Follow Up:  Will follow.     Signed By: DEEPTI HOWELL MD     October 28, 2024

## 2024-10-28 NOTE — H&P
tomorrow.        Plan:     Planned Procedure: IR guided left nephrostomy catheter placement under moderate sedation    Risks, benefits, and alternatives reviewed with patient and she agrees to proceed with the procedure.      Signed By: KRISTYN Pizarro     October 28, 2024

## 2024-10-28 NOTE — OR NURSING
TRANSFER - OUT REPORT:     Verbal report given to Slava Muir RN on Brenda Mayer  being transferred to IR Recovery for routine progression of patient care.      Report consisted of patient’s Situation, Background, Assessment and Recommendations(SBAR).       Information from the following report(s) SBAR, Procedure Summary, and MAR was reviewed with the receiving nurse.     Opportunity for questions and clarification was provided.        Conscious Sedation:    250 Mcg of Fentanyl administered   5 Mg of Versed administered   12.5 Mg of Benadryl administered      Pt tolerated procedure Well.      Peripheral Intravenous Line:   Peripheral IV 10/28/24 Proximal;Right;Anterior Forearm (Active)     Left posterior flank has a  sterile, 4 x 4 gauze, and band-aid type dressing that is clean, dry, intact, and non-tender.    VITALS:  /62   Pulse 68   Temp 98.2 °F (36.8 °C) (Oral)   Resp 16   Ht 1.702 m (5' 7.01\")   Wt 117.5 kg (259 lb)   SpO2 93%   BMI 40.56 kg/m² .

## 2024-10-28 NOTE — PROGRESS NOTES
Pt arrived on unit around 1200. Pt was lethargic but able to wake up and answer questions congruently. Pt was placed on 2L nasal canula d/t sats in the 80s. Pt came up to mid 90s after 2L placed. Pt was given percocet upon arrival to unit for moderate flank pain. Pt responded well to medication. Pt uses Bipap at night. Pt does not have personal bipap. Respiratory consulted.     See below for output for left neph tube:       10/28/24 1833   Nephrostomy Tube 1 Left Flank 10 fr   Placement Date/Time: 10/28/24 0910   Present on Admission/Arrival: No  Description (optional): 10 Fr All-Purpose Nephrostomy Drain  Inserted by: Dr. BORIS Barriga  Tube Number: 1  Location: Left Flank  Tube Size (fr): 10 fr  Urine Returned: Yes   Output (mL) 400 mL     All known needs met at this time. Bed is currently low, call light was left in reach, and pt was encouraged to ask for assistance. Pt was left resting in bed with no complaints.

## 2024-10-28 NOTE — PROGRESS NOTES
4 Eyes Skin Assessment     NAME:  Brenda Mayer  YOB: 1955  MEDICAL RECORD NUMBER:  [id number]    The patient is being assessed for  Admission    I agree that at least one RN has performed a thorough Head to Toe Skin Assessment on the patient. ALL assessment sites listed below have been assessed.      Areas assessed by both nurses:    Head, Face, Ears, Shoulders, Back, Chest, Arms, Elbows, Hands, Sacrum. Buttock, Coccyx, Ischium, and Legs. Feet and Heels        Does the Patient have a Wound? No noted wound(s)       Gage Prevention initiated by RN: No  Wound Care Orders initiated by RN: No    Pressure Injury (Stage 3,4, Unstageable, DTI, NWPT, and Complex wounds) if present, place Wound referral order by RN under : No    New Ostomies, if present place, Ostomy referral order under : No     Nurse 1 eSignature: Electronically signed by Christelle Jon RN on 10/28/24 at 1:12 PM EDT    **SHARE this note so that the co-signing nurse can place an eSignature**    Nurse 2 eSignature: Electronically signed by YASMINE CLAY RN on 10/28/24 at 5:47 PM EDT

## 2024-10-28 NOTE — PROGRESS NOTES
TRANSFER - IN REPORT:    Verbal report received from Perry Mayer  being received from IR for routine progression of patient care      Report consisted of patient's Situation, Background, Assessment and   Recommendations(SBAR).     Information from the following report(s) Nurse Handoff Report was reviewed with the receiving nurse.    Opportunity for questions and clarification was provided.      Assessment will be completed upon patient's arrival to unit and care assumed.

## 2024-10-29 VITALS
TEMPERATURE: 98.1 F | HEIGHT: 67 IN | RESPIRATION RATE: 18 BRPM | DIASTOLIC BLOOD PRESSURE: 91 MMHG | HEART RATE: 83 BPM | WEIGHT: 259 LBS | OXYGEN SATURATION: 90 % | BODY MASS INDEX: 40.65 KG/M2 | SYSTOLIC BLOOD PRESSURE: 105 MMHG

## 2024-10-29 LAB
ANION GAP SERPL CALC-SCNC: 12 MMOL/L (ref 7–16)
BASOPHILS # BLD: 0.1 K/UL (ref 0–0.2)
BASOPHILS NFR BLD: 1 % (ref 0–2)
BUN SERPL-MCNC: 19 MG/DL (ref 8–23)
CALCIUM SERPL-MCNC: 8.7 MG/DL (ref 8.8–10.2)
CHLORIDE SERPL-SCNC: 99 MMOL/L (ref 98–107)
CO2 SERPL-SCNC: 25 MMOL/L (ref 20–29)
CREAT SERPL-MCNC: 1.14 MG/DL (ref 0.6–1.1)
DIFFERENTIAL METHOD BLD: ABNORMAL
EOSINOPHIL # BLD: 0.1 K/UL (ref 0–0.8)
EOSINOPHIL NFR BLD: 1 % (ref 0.5–7.8)
ERYTHROCYTE [DISTWIDTH] IN BLOOD BY AUTOMATED COUNT: 20.3 % (ref 11.9–14.6)
GLUCOSE BLD STRIP.AUTO-MCNC: 116 MG/DL (ref 65–100)
GLUCOSE BLD STRIP.AUTO-MCNC: 133 MG/DL (ref 65–100)
GLUCOSE BLD STRIP.AUTO-MCNC: 148 MG/DL (ref 65–100)
GLUCOSE BLD STRIP.AUTO-MCNC: 150 MG/DL (ref 65–100)
GLUCOSE SERPL-MCNC: 124 MG/DL (ref 70–99)
HCT VFR BLD AUTO: 34.3 % (ref 35.8–46.3)
HGB BLD-MCNC: 9.9 G/DL (ref 11.7–15.4)
IMM GRANULOCYTES # BLD AUTO: 0.1 K/UL (ref 0–0.5)
IMM GRANULOCYTES NFR BLD AUTO: 1 % (ref 0–5)
LACTATE SERPL-SCNC: 0.6 MMOL/L (ref 0.5–2)
LYMPHOCYTES # BLD: 0.8 K/UL (ref 0.5–4.6)
LYMPHOCYTES NFR BLD: 5 % (ref 13–44)
MCH RBC QN AUTO: 27.5 PG (ref 26.1–32.9)
MCHC RBC AUTO-ENTMCNC: 28.9 G/DL (ref 31.4–35)
MCV RBC AUTO: 95.3 FL (ref 82–102)
MONOCYTES # BLD: 0.7 K/UL (ref 0.1–1.3)
MONOCYTES NFR BLD: 5 % (ref 4–12)
NEUTS SEG # BLD: 12.8 K/UL (ref 1.7–8.2)
NEUTS SEG NFR BLD: 88 % (ref 43–78)
NRBC # BLD: 0 K/UL (ref 0–0.2)
PLATELET # BLD AUTO: 229 K/UL (ref 150–450)
PMV BLD AUTO: 10.5 FL (ref 9.4–12.3)
POTASSIUM SERPL-SCNC: 4.2 MMOL/L (ref 3.5–5.1)
RBC # BLD AUTO: 3.6 M/UL (ref 4.05–5.2)
SERVICE CMNT-IMP: ABNORMAL
SODIUM SERPL-SCNC: 136 MMOL/L (ref 136–145)
WBC # BLD AUTO: 14.5 K/UL (ref 4.3–11.1)

## 2024-10-29 PROCEDURE — 6360000002 HC RX W HCPCS: Performed by: NURSE PRACTITIONER

## 2024-10-29 PROCEDURE — 2580000003 HC RX 258: Performed by: ANESTHESIOLOGY

## 2024-10-29 PROCEDURE — 99232 SBSQ HOSP IP/OBS MODERATE 35: CPT | Performed by: UROLOGY

## 2024-10-29 PROCEDURE — 6360000002 HC RX W HCPCS: Performed by: UROLOGY

## 2024-10-29 PROCEDURE — 82962 GLUCOSE BLOOD TEST: CPT

## 2024-10-29 PROCEDURE — 6370000000 HC RX 637 (ALT 250 FOR IP): Performed by: NURSE PRACTITIONER

## 2024-10-29 PROCEDURE — 2580000003 HC RX 258: Performed by: NURSE PRACTITIONER

## 2024-10-29 PROCEDURE — 83605 ASSAY OF LACTIC ACID: CPT

## 2024-10-29 PROCEDURE — 2580000003 HC RX 258: Performed by: UROLOGY

## 2024-10-29 PROCEDURE — 1100000000 HC RM PRIVATE

## 2024-10-29 PROCEDURE — 36415 COLL VENOUS BLD VENIPUNCTURE: CPT

## 2024-10-29 PROCEDURE — 80048 BASIC METABOLIC PNL TOTAL CA: CPT

## 2024-10-29 PROCEDURE — 87040 BLOOD CULTURE FOR BACTERIA: CPT

## 2024-10-29 PROCEDURE — 85025 COMPLETE CBC W/AUTO DIFF WBC: CPT

## 2024-10-29 RX ORDER — SODIUM CHLORIDE, SODIUM LACTATE, POTASSIUM CHLORIDE, CALCIUM CHLORIDE 600; 310; 30; 20 MG/100ML; MG/100ML; MG/100ML; MG/100ML
INJECTION, SOLUTION INTRAVENOUS CONTINUOUS
Status: DISCONTINUED | OUTPATIENT
Start: 2024-10-29 | End: 2024-11-04 | Stop reason: HOSPADM

## 2024-10-29 RX ORDER — FAMOTIDINE 20 MG/1
20 TABLET, FILM COATED ORAL ONCE
Status: DISCONTINUED | OUTPATIENT
Start: 2024-10-29 | End: 2024-11-04 | Stop reason: HOSPADM

## 2024-10-29 RX ORDER — MIDAZOLAM HYDROCHLORIDE 2 MG/2ML
2 INJECTION, SOLUTION INTRAMUSCULAR; INTRAVENOUS
Status: ACTIVE | OUTPATIENT
Start: 2024-10-29 | End: 2024-10-30

## 2024-10-29 RX ORDER — SODIUM CHLORIDE 0.9 % (FLUSH) 0.9 %
5-40 SYRINGE (ML) INJECTION PRN
Status: DISCONTINUED | OUTPATIENT
Start: 2024-10-29 | End: 2024-10-31 | Stop reason: SDUPTHER

## 2024-10-29 RX ORDER — SODIUM CHLORIDE 0.9 % (FLUSH) 0.9 %
5-40 SYRINGE (ML) INJECTION EVERY 12 HOURS SCHEDULED
Status: DISCONTINUED | OUTPATIENT
Start: 2024-10-29 | End: 2024-10-31 | Stop reason: SDUPTHER

## 2024-10-29 RX ORDER — LIDOCAINE HYDROCHLORIDE 10 MG/ML
1 INJECTION, SOLUTION INFILTRATION; PERINEURAL
Status: ACTIVE | OUTPATIENT
Start: 2024-10-29 | End: 2024-10-30

## 2024-10-29 RX ORDER — ACETAMINOPHEN 500 MG
1000 TABLET ORAL ONCE
Status: DISCONTINUED | OUTPATIENT
Start: 2024-10-29 | End: 2024-11-04 | Stop reason: HOSPADM

## 2024-10-29 RX ORDER — SODIUM CHLORIDE 9 MG/ML
INJECTION, SOLUTION INTRAVENOUS PRN
Status: DISCONTINUED | OUTPATIENT
Start: 2024-10-29 | End: 2024-10-31

## 2024-10-29 RX ORDER — SODIUM CHLORIDE 9 MG/ML
INJECTION, SOLUTION INTRAVENOUS CONTINUOUS
Status: DISCONTINUED | OUTPATIENT
Start: 2024-10-29 | End: 2024-10-31

## 2024-10-29 RX ORDER — FENTANYL CITRATE 50 UG/ML
100 INJECTION, SOLUTION INTRAMUSCULAR; INTRAVENOUS
Status: ACTIVE | OUTPATIENT
Start: 2024-10-29 | End: 2024-10-30

## 2024-10-29 RX ADMIN — SODIUM CHLORIDE: 9 INJECTION, SOLUTION INTRAVENOUS at 09:17

## 2024-10-29 RX ADMIN — INSULIN GLARGINE 38 UNITS: 100 INJECTION, SOLUTION SUBCUTANEOUS at 20:46

## 2024-10-29 RX ADMIN — CEFTRIAXONE SODIUM 2000 MG: 2 INJECTION, POWDER, FOR SOLUTION INTRAMUSCULAR; INTRAVENOUS at 14:36

## 2024-10-29 RX ADMIN — OXYCODONE HYDROCHLORIDE AND ACETAMINOPHEN 1 TABLET: 5; 325 TABLET ORAL at 13:24

## 2024-10-29 RX ADMIN — SODIUM CHLORIDE, PRESERVATIVE FREE 10 ML: 5 INJECTION INTRAVENOUS at 20:48

## 2024-10-29 RX ADMIN — ACETAMINOPHEN 650 MG: 325 TABLET ORAL at 09:17

## 2024-10-29 RX ADMIN — GENTAMICIN SULFATE 420 MG: 40 INJECTION, SOLUTION INTRAMUSCULAR; INTRAVENOUS at 17:02

## 2024-10-29 RX ADMIN — SODIUM CHLORIDE, PRESERVATIVE FREE 10 ML: 5 INJECTION INTRAVENOUS at 20:47

## 2024-10-29 RX ADMIN — SODIUM CHLORIDE, PRESERVATIVE FREE 10 ML: 5 INJECTION INTRAVENOUS at 08:45

## 2024-10-29 RX ADMIN — OXYCODONE HYDROCHLORIDE AND ACETAMINOPHEN 1 TABLET: 5; 325 TABLET ORAL at 05:42

## 2024-10-29 RX ADMIN — SODIUM CHLORIDE: 9 INJECTION, SOLUTION INTRAVENOUS at 22:50

## 2024-10-29 ASSESSMENT — PAIN SCALES - GENERAL
PAINLEVEL_OUTOF10: 6
PAINLEVEL_OUTOF10: 3
PAINLEVEL_OUTOF10: 6

## 2024-10-29 ASSESSMENT — PAIN DESCRIPTION - ORIENTATION
ORIENTATION: LEFT
ORIENTATION: LEFT

## 2024-10-29 ASSESSMENT — PAIN DESCRIPTION - LOCATION
LOCATION: BACK
LOCATION: FLANK

## 2024-10-29 ASSESSMENT — PAIN DESCRIPTION - DESCRIPTORS
DESCRIPTORS: DISCOMFORT
DESCRIPTORS: ACHING

## 2024-10-29 ASSESSMENT — PAIN - FUNCTIONAL ASSESSMENT: PAIN_FUNCTIONAL_ASSESSMENT: PREVENTS OR INTERFERES SOME ACTIVE ACTIVITIES AND ADLS

## 2024-10-29 NOTE — PROGRESS NOTES
10/28/24 2923   NIV Type   NIV Started/Stopped   (pt refused our CPAP, noncompliant with hers at home. on 2L HS)

## 2024-10-29 NOTE — PROGRESS NOTES
Pt is in bed without complaints. Hourly rounds completed and all needs met. Pain managed per MAR. Nephrolithotomy cancelled today due to fever last night. L neph tube has drained 450mLs this shift. Bed is low, locked, and call light is in reach. Pt encouraged to call for assistance.

## 2024-10-29 NOTE — PROGRESS NOTES
Pharmacy Gentamicin Progress Note    [facility]   Pharmacy Pharmacokinetic Monitoring Service - Gentamicin     Brenda Mayer is a 69 y.o. female starting on gentamicin therapy for Urinary Tract Infection.   Pharmacy consulted for monitoring and adjustment by Geneva Rueda NP-C      Target Concentration:  Using Urban-Bruno Nomogram      Pertinent Laboratory Values:   Ht Readings from Last 1 Encounters:   10/28/24 1.702 m (5' 7.01\")      Wt Readings from Last 1 Encounters:   10/28/24 117 kg (257 lb 15 oz)     Temp Readings from Last 1 Encounters:   10/29/24 99.2 °F (37.3 °C) (Oral)     Recent Labs     10/29/24  0549 10/29/24  0828   BUN 19  --    CREATININE 1.14*  --    WBC 14.5*  --    LACTA  --  0.6     Estimated Creatinine Clearance: 63 mL/min (A) (based on SCr of 1.14 mg/dL (H)).    No results found for: \"GENTRANDOM\", \"GENTTROUGH\", \"GENTPEAK\"      Plan:  Start Gentamicin at 420 mg (5 mg/kg based on adjusted body tamera) every 24 hours  Estimated levels on therapy:  Random levels per Urban-Bruno Nomogram  Renal labs as indicated   Gentamicin concentrations will be ordered as clinically appropriate   Pharmacy will continue to monitor patient and adjust therapy as indicated    Thank you for the consult,  Wicho Pink RPH

## 2024-10-29 NOTE — PROGRESS NOTES
Admit Date: 10/28/2024      Subjective:     Brenda Mayer is 1 POD  Procedure(s):  LEFT NEPHROLITHOTOMY PERCUTANEOUS    Patient is resting in bed. Fever 102 overnight.     Objective:     Patient Vitals for the past 8 hrs:   Resp   10/29/24 0542 16     No intake/output data recorded.  10/27 1901 - 10/29 0700  In: 51.3   Out: 850 [Urine:850]    Physical Exam:  GENERAL ASSESSMENT: alert, oriented to person, place and time, no acute distress and no anxiety, depression or agitation  Chest: normal work of breathing on 3L NC.  CVS exam: normal rate, regular rhythm, normal S1, S2, no murmurs, rubs, clicks or gallops.  ABDOMEN: not done  Neurological exam reveals alert, oriented, normal speech, no focal findings or movement disorder noted.  FEMALE GENITOURINARY EXAM: not done  MALE GENITAL EXAM: not done    Data Review   Recent Results (from the past 24 hour(s))   APTT    Collection Time: 10/28/24  1:18 PM   Result Value Ref Range    APTT 20.5 (L) 23.3 - 37.4 SEC   Protime-INR    Collection Time: 10/28/24  1:18 PM   Result Value Ref Range    Protime 14.9 11.3 - 14.9 sec    INR 1.1     TYPE AND SCREEN    Collection Time: 10/28/24  3:47 PM   Result Value Ref Range    Crossmatch expiration date 10/31/2024,2359     ABO/Rh A POSITIVE     Antibody Screen NEG    POCT Glucose    Collection Time: 10/28/24  8:10 PM   Result Value Ref Range    POC Glucose 186 (H) 65 - 100 mg/dL    Performed by: Vanessa    Basic Metabolic Panel w/ Reflex to MG    Collection Time: 10/29/24  5:49 AM   Result Value Ref Range    Sodium 136 136 - 145 mmol/L    Potassium 4.2 3.5 - 5.1 mmol/L    Chloride 99 98 - 107 mmol/L    CO2 25 20 - 29 mmol/L    Anion Gap 12 7 - 16 mmol/L    Glucose 124 (H) 70 - 99 mg/dL    BUN 19 8 - 23 MG/DL    Creatinine 1.14 (H) 0.60 - 1.10 MG/DL    Est, Glom Filt Rate 53 (L) >60 ml/min/1.73m2    Calcium 8.7 (L) 8.8 - 10.2 MG/DL   CBC with Auto Differential    Collection Time: 10/29/24  5:49 AM   Result Value Ref Range

## 2024-10-29 NOTE — CARE COORDINATION
10/29/24 1542   Service Assessment   Patient Orientation Alert and Oriented   Cognition Alert   History Provided By Patient   Primary Caregiver Self   Support Systems Children;Family Members   Patient's Healthcare Decision Maker is: Legal Next of Kin   PCP Verified by CM Yes   Last Visit to PCP Within last year   Prior Functional Level Independent in ADLs/IADLs   Current Functional Level Independent in ADLs/IADLs   Can patient return to prior living arrangement Yes   Ability to make needs known: Good   Family able to assist with home care needs: Yes   Would you like for me to discuss the discharge plan with any other family members/significant others, and if so, who? Yes   Financial Resources Medicare   Social/Functional History   Lives With Alone   Receives Help From Family   ADL Assistance Independent   Homemaking Assistance Independent   Ambulation Assistance Independent   Transfer Assistance Independent   Active  Yes   Mode of Transportation Car   Services At/After Discharge   Confirm Follow Up Transport Family     CM met with pt at bedside, demographics and PCP verified, pt lives alone with her adult son close by. Indpt at home, DME-cane and RW uses as needed, pt was O2 nightly and it is provided by [employer]. Pt drives, pt has h/h currently for PT, does not know name of agency, may have been ordered by her PCP. CM will try to identify to send [facility] orders if needed, pt will have transportation home, CM will continue to follow for discharge needs.

## 2024-10-30 LAB
ANION GAP SERPL CALC-SCNC: 11 MMOL/L (ref 7–16)
BACTERIA SPEC CULT: NORMAL
BASOPHILS # BLD: 0.1 K/UL (ref 0–0.2)
BASOPHILS NFR BLD: 1 % (ref 0–2)
BUN SERPL-MCNC: 15 MG/DL (ref 8–23)
CALCIUM SERPL-MCNC: 8.1 MG/DL (ref 8.8–10.2)
CHLORIDE SERPL-SCNC: 103 MMOL/L (ref 98–107)
CO2 SERPL-SCNC: 23 MMOL/L (ref 20–29)
CREAT SERPL-MCNC: 0.98 MG/DL (ref 0.6–1.1)
CREAT SERPL-MCNC: 1.01 MG/DL (ref 0.6–1.1)
DIFFERENTIAL METHOD BLD: ABNORMAL
EOSINOPHIL # BLD: 0.1 K/UL (ref 0–0.8)
EOSINOPHIL NFR BLD: 1 % (ref 0.5–7.8)
ERYTHROCYTE [DISTWIDTH] IN BLOOD BY AUTOMATED COUNT: 19.9 % (ref 11.9–14.6)
GENTAMICIN SERPL-MCNC: 3.6 UG/ML
GLUCOSE BLD STRIP.AUTO-MCNC: 130 MG/DL (ref 65–100)
GLUCOSE BLD STRIP.AUTO-MCNC: 130 MG/DL (ref 65–100)
GLUCOSE BLD STRIP.AUTO-MCNC: 153 MG/DL (ref 65–100)
GLUCOSE BLD STRIP.AUTO-MCNC: 174 MG/DL (ref 65–100)
GLUCOSE SERPL-MCNC: 133 MG/DL (ref 70–99)
HCT VFR BLD AUTO: 29.4 % (ref 35.8–46.3)
HGB BLD-MCNC: 8.6 G/DL (ref 11.7–15.4)
IMM GRANULOCYTES # BLD AUTO: 0.1 K/UL (ref 0–0.5)
IMM GRANULOCYTES NFR BLD AUTO: 1 % (ref 0–5)
LYMPHOCYTES # BLD: 1 K/UL (ref 0.5–4.6)
LYMPHOCYTES NFR BLD: 10 % (ref 13–44)
MCH RBC QN AUTO: 27.7 PG (ref 26.1–32.9)
MCHC RBC AUTO-ENTMCNC: 29.3 G/DL (ref 31.4–35)
MCV RBC AUTO: 94.8 FL (ref 82–102)
MONOCYTES # BLD: 0.7 K/UL (ref 0.1–1.3)
MONOCYTES NFR BLD: 7 % (ref 4–12)
NEUTS SEG # BLD: 8.5 K/UL (ref 1.7–8.2)
NEUTS SEG NFR BLD: 82 % (ref 43–78)
NRBC # BLD: 0 K/UL (ref 0–0.2)
PLATELET # BLD AUTO: 169 K/UL (ref 150–450)
PMV BLD AUTO: 10.8 FL (ref 9.4–12.3)
POTASSIUM SERPL-SCNC: 4 MMOL/L (ref 3.5–5.1)
RBC # BLD AUTO: 3.1 M/UL (ref 4.05–5.2)
SERVICE CMNT-IMP: ABNORMAL
SERVICE CMNT-IMP: NORMAL
SODIUM SERPL-SCNC: 138 MMOL/L (ref 136–145)
WBC # BLD AUTO: 10.3 K/UL (ref 4.3–11.1)

## 2024-10-30 PROCEDURE — 85025 COMPLETE CBC W/AUTO DIFF WBC: CPT

## 2024-10-30 PROCEDURE — 2580000003 HC RX 258: Performed by: ANESTHESIOLOGY

## 2024-10-30 PROCEDURE — 6370000000 HC RX 637 (ALT 250 FOR IP): Performed by: NURSE PRACTITIONER

## 2024-10-30 PROCEDURE — 2700000000 HC OXYGEN THERAPY PER DAY

## 2024-10-30 PROCEDURE — 80048 BASIC METABOLIC PNL TOTAL CA: CPT

## 2024-10-30 PROCEDURE — 6370000000 HC RX 637 (ALT 250 FOR IP): Performed by: UROLOGY

## 2024-10-30 PROCEDURE — 94760 N-INVAS EAR/PLS OXIMETRY 1: CPT

## 2024-10-30 PROCEDURE — 76937 US GUIDE VASCULAR ACCESS: CPT

## 2024-10-30 PROCEDURE — 99024 POSTOP FOLLOW-UP VISIT: CPT | Performed by: PHYSICIAN ASSISTANT

## 2024-10-30 PROCEDURE — 36415 COLL VENOUS BLD VENIPUNCTURE: CPT

## 2024-10-30 PROCEDURE — 80170 ASSAY OF GENTAMICIN: CPT

## 2024-10-30 PROCEDURE — 6360000002 HC RX W HCPCS: Performed by: NURSE PRACTITIONER

## 2024-10-30 PROCEDURE — 2580000003 HC RX 258: Performed by: NURSE PRACTITIONER

## 2024-10-30 PROCEDURE — 82962 GLUCOSE BLOOD TEST: CPT

## 2024-10-30 PROCEDURE — 1100000000 HC RM PRIVATE

## 2024-10-30 RX ADMIN — DIGOXIN 0.25 MG: 250 TABLET ORAL at 08:56

## 2024-10-30 RX ADMIN — SODIUM CHLORIDE, PRESERVATIVE FREE 10 ML: 5 INJECTION INTRAVENOUS at 22:02

## 2024-10-30 RX ADMIN — CEFTRIAXONE SODIUM 2000 MG: 2 INJECTION, POWDER, FOR SOLUTION INTRAMUSCULAR; INTRAVENOUS at 14:35

## 2024-10-30 RX ADMIN — SODIUM CHLORIDE, PRESERVATIVE FREE 10 ML: 5 INJECTION INTRAVENOUS at 22:00

## 2024-10-30 RX ADMIN — SODIUM CHLORIDE: 9 INJECTION, SOLUTION INTRAVENOUS at 22:09

## 2024-10-30 RX ADMIN — SODIUM CHLORIDE: 9 INJECTION, SOLUTION INTRAVENOUS at 09:53

## 2024-10-30 RX ADMIN — SODIUM CHLORIDE, PRESERVATIVE FREE 10 ML: 5 INJECTION INTRAVENOUS at 22:01

## 2024-10-30 RX ADMIN — INSULIN GLARGINE 38 UNITS: 100 INJECTION, SOLUTION SUBCUTANEOUS at 22:00

## 2024-10-30 RX ADMIN — OXYCODONE HYDROCHLORIDE AND ACETAMINOPHEN 1 TABLET: 5; 325 TABLET ORAL at 02:19

## 2024-10-30 RX ADMIN — DILTIAZEM HYDROCHLORIDE 300 MG: 180 CAPSULE, COATED, EXTENDED RELEASE ORAL at 08:57

## 2024-10-30 RX ADMIN — OXYCODONE HYDROCHLORIDE AND ACETAMINOPHEN 1 TABLET: 5; 325 TABLET ORAL at 17:20

## 2024-10-30 RX ADMIN — METOPROLOL SUCCINATE 25 MG: 25 TABLET, EXTENDED RELEASE ORAL at 08:57

## 2024-10-30 ASSESSMENT — PAIN DESCRIPTION - DESCRIPTORS: DESCRIPTORS: ACHING;SHARP

## 2024-10-30 ASSESSMENT — PAIN SCALES - GENERAL
PAINLEVEL_OUTOF10: 4
PAINLEVEL_OUTOF10: 6
PAINLEVEL_OUTOF10: 0

## 2024-10-30 ASSESSMENT — PAIN DESCRIPTION - ORIENTATION
ORIENTATION: LEFT
ORIENTATION: LEFT

## 2024-10-30 ASSESSMENT — PAIN DESCRIPTION - LOCATION
LOCATION: FLANK
LOCATION: BACK

## 2024-10-30 NOTE — PROGRESS NOTES
Admit Date: 10/28/2024    Subjective:     Patient resting comfortably.    Objective:     Patient Vitals for the past 8 hrs:   BP Temp Temp src Pulse Resp SpO2   10/30/24 0856 122/61 -- -- (!) 101 -- --   10/30/24 0735 122/61 100 °F (37.8 °C) Oral (!) 101 17 90 %   10/30/24 0345 118/75 98.4 °F (36.9 °C) Oral 94 18 90 %     No intake/output data recorded.  10/28 1901 - 10/30 0700  In: 793.9 [I.V.:617.7]  Out: 2150 [Urine:2150]    Physical Exam:  GENERAL ASSESSMENT: resting comfortably, no acute distress and no anxiety, depression or agitation  Chest: Easy work of breathing  CVS exam: RRR  ABDOMEN: L nephrostomy tube draining clear yellow urine  Neurological exam reveals alert, oriented, normal speech, no focal findings or movement disorder noted.  FEMALE GENITOURINARY EXAM: Purewick with clear yellow urine in canister  MALE GENITAL EXAM: not done        Data Review   Recent Results (from the past 24 hour(s))   POCT Glucose    Collection Time: 10/29/24  4:13 PM   Result Value Ref Range    POC Glucose 116 (H) 65 - 100 mg/dL    Performed by: [place]    POCT Glucose    Collection Time: 10/29/24  7:23 PM   Result Value Ref Range    POC Glucose 148 (H) 65 - 100 mg/dL    Performed by: Vanessa    Gentamicin Level, Random    Collection Time: 10/30/24  4:13 AM   Result Value Ref Range    Gentamicin Rm 3.6 ug/ml   Creatinine and GFR    Collection Time: 10/30/24  4:13 AM   Result Value Ref Range    Creatinine 0.98 0.60 - 1.10 MG/DL    Est, Glom Filt Rate 64 >60 ml/min/1.73m2   CBC with Auto Differential    Collection Time: 10/30/24  4:13 AM   Result Value Ref Range    WBC 10.3 4.3 - 11.1 K/uL    RBC 3.10 (L) 4.05 - 5.2 M/uL    Hemoglobin 8.6 (L) 11.7 - 15.4 g/dL    Hematocrit 29.4 (L) 35.8 - 46.3 %    MCV 94.8 82 - 102 FL    MCH 27.7 26.1 - 32.9 PG    MCHC 29.3 (L) 31.4 - 35.0 g/dL    RDW 19.9 (H) 11.9 - 14.6 %    Platelets 169 150 - 450 K/uL    MPV 10.8 9.4 - 12.3 FL    nRBC 0.00 0.0 - 0.2 K/uL     Differential Type AUTOMATED      Neutrophils % 82 (H) 43 - 78 %    Lymphocytes % 10 (L) 13 - 44 %    Monocytes % 7 4.0 - 12.0 %    Eosinophils % 1 0.5 - 7.8 %    Basophils % 1 0.0 - 2.0 %    Immature Granulocytes % 1 0.0 - 5.0 %    Neutrophils Absolute 8.5 (H) 1.7 - 8.2 K/UL    Lymphocytes Absolute 1.0 0.5 - 4.6 K/UL    Monocytes Absolute 0.7 0.1 - 1.3 K/UL    Eosinophils Absolute 0.1 0.0 - 0.8 K/UL    Basophils Absolute 0.1 0.0 - 0.2 K/UL    Immature Granulocytes Absolute 0.1 0.0 - 0.5 K/UL   Basic Metabolic Panel    Collection Time: 10/30/24  4:13 AM   Result Value Ref Range    Sodium 138 136 - 145 mmol/L    Potassium 4.0 3.5 - 5.1 mmol/L    Chloride 103 98 - 107 mmol/L    CO2 23 20 - 29 mmol/L    Anion Gap 11 7 - 16 mmol/L    Glucose 133 (H) 70 - 99 mg/dL    BUN 15 8 - 23 MG/DL    Creatinine 1.01 0.60 - 1.10 MG/DL    Est, Glom Filt Rate 61 >60 ml/min/1.73m2    Calcium 8.1 (L) 8.8 - 10.2 MG/DL   POCT Glucose    Collection Time: 10/30/24  7:28 AM   Result Value Ref Range    POC Glucose 130 (H) 65 - 100 mg/dL    Performed by: Foreign        No results found.      Assessment:     Principal Problem:    Kidney stone  Resolved Problems:    * No resolved hospital problems. *    Patient with hx of recurrent urosepsis admitted for L PCNL. Had neph tube placement 10/28 and started on IV antibiotics in anticipation of PCNL. Unfortunately still became febrile and hypotensive with concern for urosepsis. PCNL postponed.  Plan:   Keep nephrostomy tube to drainage.  ID consulted for recommendations regarding duration of antibiotics and timing of PCNL. Appreciate help.      KRISTYN Sampson [facility]    Phone: [phone]    I have reviewed the above note.  I agree with the HPI, exam, assessment and plan as outlined by the nurse practitioner.    Jay Nielson M.D.    [facility]  [address]

## 2024-10-30 NOTE — PROGRESS NOTES
IP Consult to Vascular Access Team  Consult performed by: Vineet Robles RN  Consult ordered by: Jay Nielson MD       US Guided PIV access-    Ultrasound was used to find the vein which was compressible and without any ultrasound features of an artery or nerve bundle. Skin was cleaned and disinfected prior to IV puncture.  Under real-time ultrasound guidance peripheral access was obtained in the right forearm using 20 G 1.75\" Peripheral IV catheter after 1 attempt(s). Blood return was present and IV flushed without difficulty with no clinical signs of infiltration. IV dressing applied and no immediate complications noted. Patient tolerated the procedure well.

## 2024-10-30 NOTE — PROGRESS NOTES
Pharmacy Gentamicin Progress Note    [facility]   Pharmacy Pharmacokinetic Monitoring Service - Gentamicin     Brenda Mayer is a 69 y.o. female starting on gentamicin therapy for Urinary Tract Infection.   Pharmacy consulted for monitoring and adjustment by Geneva Rueda NP-C      Target Concentration:  Using Urban-Bruno Nomogram      Pertinent Laboratory Values:   Ht Readings from Last 1 Encounters:   10/28/24 1.702 m (5' 7.01\")      Wt Readings from Last 1 Encounters:   10/28/24 117 kg (257 lb 15 oz)     Temp Readings from Last 1 Encounters:   10/30/24 99.1 °F (37.3 °C)     Recent Labs     10/29/24  0549 10/29/24  0828 10/30/24  0413   BUN 19  --  15   CREATININE 1.14*  --  1.01  0.98   WBC 14.5*  --  10.3   LACTA  --  0.6  --      Estimated Creatinine Clearance: 74 mL/min (based on SCr of 0.98 mg/dL).    Lab Results   Component Value Date/Time    GENTRANDOM 3.6 10/30/2024 04:13 AM         Plan:  Continue Gentamicin at 420 mg (5 mg/kg based on adjusted body tamera) every 24 hours  Random levels per Urban-Bruno Nomogram  Renal labs as indicated   Gentamicin concentrations will be ordered as clinically appropriate   Pharmacy will continue to monitor patient and adjust therapy as indicated    Thank you for the consult,  Gianni Gallegos [facility]

## 2024-10-30 NOTE — CONSULTS
Infectious Disease Consult    Today's Date: 10/30/2024   Admit Date: 10/28/2024    Patient YOB: 1955    Impression:   E. Coli UTI  SIRS/sepsis due to above  Bilateral nephrolithiasis  S/p left percutaneous nephrostomy    Plan:   Her WBC is improved and her SIRS/sepsis picture is under control at this time.  I think it is safe to proceed with cystoscopy and lithotripsy at this time.  The urine culture on 10/28 showed mixed kandi, but the culture on 10/23 showed E. Coli, which is the most likely source of sepsis.  This organism is sensitive to ceftriaxone, which should be adequate treatment.  Would stop gentamicin to avoid nephrotoxicity or other complications as gram-negative double coverage is no longer indicated with clear resolution of sepsis.  Would plan to treat with IV ceftriaxone derian-operatively until she is clearly treated.  When she is suitable for discharge, okay to transition to oral keflex at 1gm PO TID for 7 days.  ID will not continue to follow the patient.  Please call us back with any new questions or concerns.    Anti-infectives:   IV ceftriaxone  IV gentamicin    Subjective:   Date of Consultation:  October 30, 2024  Referring Physician: Jay Nielson MD for: assistance in management of sepsis    Patient is a 69 y.o. female with bilateral nephrolithiasis who presented to [facility] on 10/28 for treatment of her renal stones.  She had elected to have each side treated separately in consultation with urology.  On 10/28 she presented for planned percutaneous left nephrostomy tube, along with lithotripsy/stent.  Following the procedure, she had fever and leukocytosis.  She was initiated on treatment with IV ceftriaxone and gentamicin.  Her fever and leukocytosis are improved.  ID is consulted for antibiotic recommendations prior to upcoming lithotripsy procedure, which was postponed due to sepsis picture.  She is feeling better.  She reports that she did have discolored urine prior to the

## 2024-10-30 NOTE — PROGRESS NOTES
Pt is in bed without complaints. Hourly rounds completed and all needs met. L neph tube draining, dressing changed due to peeling off. Pt bumped up to 3L NC due to low O2 saturation. Pt requests c-pap tonight. Pain managed per MAR. Bed is low, locked, and call light is in reach. Pt encouraged to call for assistance.

## 2024-10-30 NOTE — PROGRESS NOTES
Hourly rounds performed this shift. Bed lowered and locked. Call light within reach. PRN pain medication given per MAR.

## 2024-10-31 ENCOUNTER — PREP FOR PROCEDURE (OUTPATIENT)
Dept: UROLOGY | Age: 69
End: 2024-10-31

## 2024-10-31 ENCOUNTER — TELEPHONE (OUTPATIENT)
Dept: UROLOGY | Age: 69
End: 2024-10-31

## 2024-10-31 LAB
ANION GAP SERPL CALC-SCNC: 13 MMOL/L (ref 7–16)
BASOPHILS # BLD: 0 K/UL (ref 0–0.2)
BASOPHILS NFR BLD: 0 % (ref 0–2)
BUN SERPL-MCNC: 13 MG/DL (ref 8–23)
CALCIUM SERPL-MCNC: 8.2 MG/DL (ref 8.8–10.2)
CHLORIDE SERPL-SCNC: 106 MMOL/L (ref 98–107)
CO2 SERPL-SCNC: 19 MMOL/L (ref 20–29)
CREAT SERPL-MCNC: 0.93 MG/DL (ref 0.6–1.1)
DIFFERENTIAL METHOD BLD: ABNORMAL
EOSINOPHIL # BLD: 0.2 K/UL (ref 0–0.8)
EOSINOPHIL NFR BLD: 2 % (ref 0.5–7.8)
ERYTHROCYTE [DISTWIDTH] IN BLOOD BY AUTOMATED COUNT: 19.7 % (ref 11.9–14.6)
FUNGAL CULT/SMEAR: NORMAL
GLUCOSE BLD STRIP.AUTO-MCNC: 124 MG/DL (ref 65–100)
GLUCOSE BLD STRIP.AUTO-MCNC: 126 MG/DL (ref 65–100)
GLUCOSE BLD STRIP.AUTO-MCNC: 127 MG/DL (ref 65–100)
GLUCOSE BLD STRIP.AUTO-MCNC: 145 MG/DL (ref 65–100)
GLUCOSE SERPL-MCNC: 117 MG/DL (ref 70–99)
HCT VFR BLD AUTO: 28.9 % (ref 35.8–46.3)
HGB BLD-MCNC: 8.5 G/DL (ref 11.7–15.4)
IMM GRANULOCYTES # BLD AUTO: 0.1 K/UL (ref 0–0.5)
IMM GRANULOCYTES NFR BLD AUTO: 1 % (ref 0–5)
LYMPHOCYTES # BLD: 1 K/UL (ref 0.5–4.6)
LYMPHOCYTES NFR BLD: 11 % (ref 13–44)
MCH RBC QN AUTO: 27.3 PG (ref 26.1–32.9)
MCHC RBC AUTO-ENTMCNC: 29.4 G/DL (ref 31.4–35)
MCV RBC AUTO: 92.9 FL (ref 82–102)
MONOCYTES # BLD: 0.6 K/UL (ref 0.1–1.3)
MONOCYTES NFR BLD: 7 % (ref 4–12)
NEUTS SEG # BLD: 7.6 K/UL (ref 1.7–8.2)
NEUTS SEG NFR BLD: 80 % (ref 43–78)
NRBC # BLD: 0 K/UL (ref 0–0.2)
PLATELET # BLD AUTO: 162 K/UL (ref 150–450)
PMV BLD AUTO: 9.9 FL (ref 9.4–12.3)
POTASSIUM SERPL-SCNC: 4.6 MMOL/L (ref 3.5–5.1)
RBC # BLD AUTO: 3.11 M/UL (ref 4.05–5.2)
SERVICE CMNT-IMP: ABNORMAL
SODIUM SERPL-SCNC: 138 MMOL/L (ref 136–145)
SPECIMEN PROCESSING: NORMAL
SPECIMEN SOURCE: NORMAL
WBC # BLD AUTO: 9.5 K/UL (ref 4.3–11.1)

## 2024-10-31 PROCEDURE — 6370000000 HC RX 637 (ALT 250 FOR IP): Performed by: NURSE PRACTITIONER

## 2024-10-31 PROCEDURE — 80048 BASIC METABOLIC PNL TOTAL CA: CPT

## 2024-10-31 PROCEDURE — 1100000000 HC RM PRIVATE

## 2024-10-31 PROCEDURE — 2580000003 HC RX 258: Performed by: UROLOGY

## 2024-10-31 PROCEDURE — 85025 COMPLETE CBC W/AUTO DIFF WBC: CPT

## 2024-10-31 PROCEDURE — 6360000002 HC RX W HCPCS: Performed by: UROLOGY

## 2024-10-31 PROCEDURE — 36415 COLL VENOUS BLD VENIPUNCTURE: CPT

## 2024-10-31 PROCEDURE — 82962 GLUCOSE BLOOD TEST: CPT

## 2024-10-31 PROCEDURE — 2580000003 HC RX 258: Performed by: NURSE PRACTITIONER

## 2024-10-31 PROCEDURE — 99024 POSTOP FOLLOW-UP VISIT: CPT | Performed by: PHYSICIAN ASSISTANT

## 2024-10-31 PROCEDURE — 6370000000 HC RX 637 (ALT 250 FOR IP): Performed by: UROLOGY

## 2024-10-31 RX ORDER — SODIUM CHLORIDE 9 MG/ML
INJECTION, SOLUTION INTRAVENOUS PRN
Status: CANCELLED | OUTPATIENT
Start: 2024-10-31

## 2024-10-31 RX ORDER — SODIUM CHLORIDE 0.9 % (FLUSH) 0.9 %
5-40 SYRINGE (ML) INJECTION PRN
Status: CANCELLED | OUTPATIENT
Start: 2024-10-31

## 2024-10-31 RX ORDER — SODIUM CHLORIDE 0.9 % (FLUSH) 0.9 %
5-40 SYRINGE (ML) INJECTION EVERY 12 HOURS SCHEDULED
Status: CANCELLED | OUTPATIENT
Start: 2024-10-31

## 2024-10-31 RX ADMIN — INSULIN GLARGINE 38 UNITS: 100 INJECTION, SOLUTION SUBCUTANEOUS at 21:00

## 2024-10-31 RX ADMIN — DILTIAZEM HYDROCHLORIDE 300 MG: 180 CAPSULE, COATED, EXTENDED RELEASE ORAL at 08:23

## 2024-10-31 RX ADMIN — METOPROLOL SUCCINATE 25 MG: 25 TABLET, EXTENDED RELEASE ORAL at 08:23

## 2024-10-31 RX ADMIN — OXYCODONE HYDROCHLORIDE AND ACETAMINOPHEN 1 TABLET: 5; 325 TABLET ORAL at 14:12

## 2024-10-31 RX ADMIN — CEFTRIAXONE SODIUM 2000 MG: 2 INJECTION, POWDER, FOR SOLUTION INTRAMUSCULAR; INTRAVENOUS at 14:31

## 2024-10-31 RX ADMIN — DIGOXIN 0.25 MG: 250 TABLET ORAL at 08:24

## 2024-10-31 RX ADMIN — SODIUM CHLORIDE: 9 INJECTION, SOLUTION INTRAVENOUS at 06:44

## 2024-10-31 RX ADMIN — SODIUM CHLORIDE, PRESERVATIVE FREE 10 ML: 5 INJECTION INTRAVENOUS at 21:01

## 2024-10-31 ASSESSMENT — PAIN SCALES - GENERAL
PAINLEVEL_OUTOF10: 6
PAINLEVEL_OUTOF10: 4

## 2024-10-31 ASSESSMENT — PAIN DESCRIPTION - LOCATION: LOCATION: FLANK

## 2024-10-31 ASSESSMENT — PAIN DESCRIPTION - DESCRIPTORS: DESCRIPTORS: ACHING

## 2024-10-31 ASSESSMENT — PAIN DESCRIPTION - ORIENTATION: ORIENTATION: LEFT

## 2024-10-31 NOTE — PROGRESS NOTES
Admit Date: 10/28/2024    Subjective:     Patient has no new complaints.     Objective:     Patient Vitals for the past 8 hrs:   BP Temp Temp src Pulse Resp SpO2   10/31/24 1412 -- -- -- -- 16 --   10/31/24 0823 120/82 -- -- (!) 110 -- --   10/31/24 0750 120/82 98.1 °F (36.7 °C) Oral (!) 110 17 92 %     10/31 0701 - 10/31 1900  In: -   Out: 350 [Urine:350]  10/29 1901 - 10/31 0700  In: -   Out: 2475 [Urine:2475]    Physical Exam:  GENERAL ASSESSMENT: alert, oriented to person, place and time, no acute distress and no anxiety, depression or agitation  Chest: Easy work of breathing  CVS exam: RRR  ABDOMEN: L neph tube draining clear yellow urine  Neurological exam reveals alert, oriented, normal speech, no focal findings or movement disorder noted.  FEMALE GENITOURINARY EXAM: not done  MALE GENITAL EXAM: not done        Data Review   Recent Results (from the past 24 hour(s))   POCT Glucose    Collection Time: 10/30/24  4:10 PM   Result Value Ref Range    POC Glucose 130 (H) 65 - 100 mg/dL    Performed by: Foreign    POCT Glucose    Collection Time: 10/30/24  7:54 PM   Result Value Ref Range    POC Glucose 174 (H) 65 - 100 mg/dL    Performed by: Mohan    Basic Metabolic Panel    Collection Time: 10/31/24  3:57 AM   Result Value Ref Range    Sodium 138 136 - 145 mmol/L    Potassium 4.6 3.5 - 5.1 mmol/L    Chloride 106 98 - 107 mmol/L    CO2 19 (L) 20 - 29 mmol/L    Anion Gap 13 7 - 16 mmol/L    Glucose 117 (H) 70 - 99 mg/dL    BUN 13 8 - 23 MG/DL    Creatinine 0.93 0.60 - 1.10 MG/DL    Est, Glom Filt Rate 68 >60 ml/min/1.73m2    Calcium 8.2 (L) 8.8 - 10.2 MG/DL   CBC with Auto Differential    Collection Time: 10/31/24  4:49 AM   Result Value Ref Range    WBC 9.5 4.3 - 11.1 K/uL    RBC 3.11 (L) 4.05 - 5.2 M/uL    Hemoglobin 8.5 (L) 11.7 - 15.4 g/dL    Hematocrit 28.9 (L) 35.8 - 46.3 %    MCV 92.9 82 - 102 FL    MCH 27.3 26.1 - 32.9 PG    MCHC 29.4 (L) 31.4 - 35.0 g/dL    RDW 19.7 (H) 11.9 - 14.6 %

## 2024-10-31 NOTE — PROGRESS NOTES
Physician Progress Note      PATIENT:               CAROLANN GONSALEZ  [facility] #:                  [id number]  :                       1955  ADMIT DATE:       10/28/2024 12:51 PM  DISCH DATE:  RESPONDING  PROVIDER #:        Jay Nielson MD          QUERY TEXT:    Pt admitted with fever after left nephrostomy tube placement. Pt noted to have   temp 102, HR , spo2 83% 2L NC-90% on 3L WBC 14.5. If possible, please   document in the progress notes and discharge summary if you are evaluating and   /or treating any of the following:  The medical record reflects the following:  Risk Factors: age 69, left nephrostomy tube placement  Clinical Indicators: temp 102, HR , spo2 83% 2L NC-90% on 3L. WBC 14.5.   Per notes \" concern for sepsis\"  Treatment: IV NS infusion, IV rocephin, IV gentamicin, ID consult, labs,   imaging  Options provided:  -- Sepsis due to recent procedure  -- Sepsis due to nephrostomy tube  -- Sepsis due to recent procedure and nephrostomy tube  -- Sepsis not related to procedure or nephrostomy tube and is due to, please   specify cause of sepsis  -- Other - I will add my own diagnosis  -- Disagree - Not applicable / Not valid  -- Disagree - Clinically unable to determine / Unknown  -- Refer to Clinical Documentation Reviewer    PROVIDER RESPONSE TEXT:    Sepsis due to nephrostomy tube    Query created by: Michelle Carr on 10/30/2024 3:09 PM      Electronically signed by:  Jay Nielson MD 10/30/2024 8:47 PM

## 2024-10-31 NOTE — PROGRESS NOTES
Pt is in bed without complaints. Hourly rounds completed and all needs met. Pain managed per MAR. 500 mLs drained from L neph tube this shift. Bed is low, locked, and call light is in reach. Pt encouraged to call for assistance.

## 2024-10-31 NOTE — PROGRESS NOTES
Hourly rounds performed this shift. Bed lowered and locked. Call light within reach. Bed alarm on. Pt on 3L NC.

## 2024-10-31 NOTE — TELEPHONE ENCOUNTER
Procedures: Procedure(s):   NEPHROLITHOTOMY PERCUTANEOUS/LEFT/ ALREADY HAS TUBE PLACED   Date: 11/4/2024   Time: 1125   Location: [facility] MAIN OR 11

## 2024-10-31 NOTE — TELEPHONE ENCOUNTER
----- Message from Dr. Jay Nielson MD sent at 10/31/2024  8:33 AM EDT -----  Regarding: Surgery Scheduling  Left PCNL - Please schedule for Monday . First choice would be AM 10-12.  Second choice would be to follow fazal Monday afternoon    Already has tube in place and already admitted to hospital.    Thanks!  Nisreen

## 2024-11-01 LAB
ANION GAP SERPL CALC-SCNC: 8 MMOL/L (ref 7–16)
BASOPHILS # BLD: 0 K/UL (ref 0–0.2)
BASOPHILS NFR BLD: 1 % (ref 0–2)
BUN SERPL-MCNC: 10 MG/DL (ref 8–23)
CALCIUM SERPL-MCNC: 8.5 MG/DL (ref 8.8–10.2)
CHLORIDE SERPL-SCNC: 107 MMOL/L (ref 98–107)
CO2 SERPL-SCNC: 26 MMOL/L (ref 20–29)
CREAT SERPL-MCNC: 0.7 MG/DL (ref 0.6–1.1)
DIFFERENTIAL METHOD BLD: ABNORMAL
EOSINOPHIL # BLD: 0.2 K/UL (ref 0–0.8)
EOSINOPHIL NFR BLD: 3 % (ref 0.5–7.8)
ERYTHROCYTE [DISTWIDTH] IN BLOOD BY AUTOMATED COUNT: 19.4 % (ref 11.9–14.6)
FUNGUS SMEAR: NORMAL
GLUCOSE BLD STRIP.AUTO-MCNC: 100 MG/DL (ref 65–100)
GLUCOSE BLD STRIP.AUTO-MCNC: 117 MG/DL (ref 65–100)
GLUCOSE BLD STRIP.AUTO-MCNC: 121 MG/DL (ref 65–100)
GLUCOSE BLD STRIP.AUTO-MCNC: 137 MG/DL (ref 65–100)
GLUCOSE SERPL-MCNC: 100 MG/DL (ref 70–99)
GRAM STN SPEC: NORMAL
HCT VFR BLD AUTO: 28.5 % (ref 35.8–46.3)
HGB BLD-MCNC: 8.4 G/DL (ref 11.7–15.4)
IMM GRANULOCYTES # BLD AUTO: 0 K/UL (ref 0–0.5)
IMM GRANULOCYTES NFR BLD AUTO: 0 % (ref 0–5)
LYMPHOCYTES # BLD: 1 K/UL (ref 0.5–4.6)
LYMPHOCYTES NFR BLD: 12 % (ref 13–44)
MCH RBC QN AUTO: 27.3 PG (ref 26.1–32.9)
MCHC RBC AUTO-ENTMCNC: 29.5 G/DL (ref 31.4–35)
MCV RBC AUTO: 92.5 FL (ref 82–102)
MONOCYTES # BLD: 0.5 K/UL (ref 0.1–1.3)
MONOCYTES NFR BLD: 6 % (ref 4–12)
NEUTS SEG # BLD: 6.8 K/UL (ref 1.7–8.2)
NEUTS SEG NFR BLD: 79 % (ref 43–78)
NRBC # BLD: 0 K/UL (ref 0–0.2)
PLATELET # BLD AUTO: 183 K/UL (ref 150–450)
PMV BLD AUTO: 9.9 FL (ref 9.4–12.3)
POTASSIUM SERPL-SCNC: 3.8 MMOL/L (ref 3.5–5.1)
RBC # BLD AUTO: 3.08 M/UL (ref 4.05–5.2)
SERVICE CMNT-IMP: ABNORMAL
SERVICE CMNT-IMP: NORMAL
SERVICE CMNT-IMP: NORMAL
SODIUM SERPL-SCNC: 141 MMOL/L (ref 136–145)
SPECIMEN SOURCE: NORMAL
WBC # BLD AUTO: 8.6 K/UL (ref 4.3–11.1)

## 2024-11-01 PROCEDURE — 76937 US GUIDE VASCULAR ACCESS: CPT

## 2024-11-01 PROCEDURE — 6370000000 HC RX 637 (ALT 250 FOR IP): Performed by: UROLOGY

## 2024-11-01 PROCEDURE — 6360000002 HC RX W HCPCS: Performed by: UROLOGY

## 2024-11-01 PROCEDURE — 36415 COLL VENOUS BLD VENIPUNCTURE: CPT

## 2024-11-01 PROCEDURE — 82962 GLUCOSE BLOOD TEST: CPT

## 2024-11-01 PROCEDURE — 6370000000 HC RX 637 (ALT 250 FOR IP): Performed by: NURSE PRACTITIONER

## 2024-11-01 PROCEDURE — 2580000003 HC RX 258: Performed by: UROLOGY

## 2024-11-01 PROCEDURE — 2580000003 HC RX 258: Performed by: NURSE PRACTITIONER

## 2024-11-01 PROCEDURE — 94660 CPAP INITIATION&MGMT: CPT

## 2024-11-01 PROCEDURE — 80048 BASIC METABOLIC PNL TOTAL CA: CPT

## 2024-11-01 PROCEDURE — 85025 COMPLETE CBC W/AUTO DIFF WBC: CPT

## 2024-11-01 PROCEDURE — 99231 SBSQ HOSP IP/OBS SF/LOW 25: CPT | Performed by: NURSE PRACTITIONER

## 2024-11-01 PROCEDURE — 1100000000 HC RM PRIVATE

## 2024-11-01 RX ORDER — FLUCONAZOLE 100 MG/1
200 TABLET ORAL DAILY
Status: DISCONTINUED | OUTPATIENT
Start: 2024-11-01 | End: 2024-11-05 | Stop reason: HOSPADM

## 2024-11-01 RX ADMIN — SODIUM CHLORIDE, PRESERVATIVE FREE 10 ML: 5 INJECTION INTRAVENOUS at 21:49

## 2024-11-01 RX ADMIN — DILTIAZEM HYDROCHLORIDE 300 MG: 180 CAPSULE, COATED, EXTENDED RELEASE ORAL at 09:31

## 2024-11-01 RX ADMIN — SODIUM CHLORIDE, PRESERVATIVE FREE 10 ML: 5 INJECTION INTRAVENOUS at 09:32

## 2024-11-01 RX ADMIN — POTASSIUM CHLORIDE 20 MEQ: 1500 TABLET, EXTENDED RELEASE ORAL at 14:46

## 2024-11-01 RX ADMIN — FUROSEMIDE 60 MG: 40 TABLET ORAL at 14:45

## 2024-11-01 RX ADMIN — METOPROLOL SUCCINATE 25 MG: 25 TABLET, EXTENDED RELEASE ORAL at 09:32

## 2024-11-01 RX ADMIN — CEFTRIAXONE SODIUM 2000 MG: 2 INJECTION, POWDER, FOR SOLUTION INTRAMUSCULAR; INTRAVENOUS at 16:15

## 2024-11-01 RX ADMIN — DIGOXIN 0.25 MG: 250 TABLET ORAL at 09:36

## 2024-11-01 RX ADMIN — INSULIN GLARGINE 38 UNITS: 100 INJECTION, SOLUTION SUBCUTANEOUS at 21:46

## 2024-11-01 RX ADMIN — FLUCONAZOLE 200 MG: 100 TABLET ORAL at 09:31

## 2024-11-01 NOTE — CARE COORDINATION
Dispo update:  Discussed at IDT rounds this morning.  Oral Keflex at discharge.  No other discharge needs voiced or identified.  CM available if needed.

## 2024-11-01 NOTE — PROGRESS NOTES
Admit Date: 10/28/2024      Subjective:     Brenda Mayer is POD  Procedure(s):  LEFT NEPHROLITHOTOMY PERCUTANEOUS    No new complaints.    Objective:     Patient Vitals for the past 8 hrs:   BP Temp Temp src Pulse Resp SpO2   11/01/24 0715 137/67 97.8 °F (36.6 °C) Oral 79 16 92 %     11/01 0701 - 11/01 1900  In: 240 [P.O.:240]  Out: -   10/30 1901 - 11/01 0700  In: -   Out: 2200 [Urine:2200]    Physical Exam:  GENERAL ASSESSMENT: alert, oriented to person, place and time, no acute distress and no anxiety, depression or agitation  Chest: normal work of breathing  CVS exam: normal rate, regular rhythm, normal S1, S2, no murmurs, rubs, clicks or gallops.  ABDOMEN: not done  Neurological exam reveals alert, oriented, normal speech, no focal findings or movement disorder noted.  FEMALE GENITOURINARY EXAM: not done  MALE GENITAL EXAM: not done    Data Review   Recent Results (from the past 24 hour(s))   POCT Glucose    Collection Time: 10/31/24  4:17 PM   Result Value Ref Range    POC Glucose 124 (H) 65 - 100 mg/dL    Performed by: Foreign    POCT Glucose    Collection Time: 10/31/24  7:43 PM   Result Value Ref Range    POC Glucose 145 (H) 65 - 100 mg/dL    Performed by: Mohan    CBC with Auto Differential    Collection Time: 11/01/24  5:09 AM   Result Value Ref Range    WBC 8.6 4.3 - 11.1 K/uL    RBC 3.08 (L) 4.05 - 5.2 M/uL    Hemoglobin 8.4 (L) 11.7 - 15.4 g/dL    Hematocrit 28.5 (L) 35.8 - 46.3 %    MCV 92.5 82 - 102 FL    MCH 27.3 26.1 - 32.9 PG    MCHC 29.5 (L) 31.4 - 35.0 g/dL    RDW 19.4 (H) 11.9 - 14.6 %    Platelets 183 150 - 450 K/uL    MPV 9.9 9.4 - 12.3 FL    nRBC 0.00 0.0 - 0.2 K/uL    Differential Type AUTOMATED      Neutrophils % 79 (H) 43 - 78 %    Lymphocytes % 12 (L) 13 - 44 %    Monocytes % 6 4.0 - 12.0 %    Eosinophils % 3 0.5 - 7.8 %    Basophils % 1 0.0 - 2.0 %    Immature Granulocytes % 0 0.0 - 5.0 %    Neutrophils Absolute 6.8 1.7 - 8.2 K/UL    Lymphocytes Absolute 1.0 0.5

## 2024-11-02 LAB
ANION GAP SERPL CALC-SCNC: 9 MMOL/L (ref 7–16)
BASOPHILS # BLD: 0.1 K/UL (ref 0–0.2)
BASOPHILS NFR BLD: 1 % (ref 0–2)
BUN SERPL-MCNC: 11 MG/DL (ref 8–23)
CALCIUM SERPL-MCNC: 8.9 MG/DL (ref 8.8–10.2)
CHLORIDE SERPL-SCNC: 104 MMOL/L (ref 98–107)
CO2 SERPL-SCNC: 29 MMOL/L (ref 20–29)
CREAT SERPL-MCNC: 0.84 MG/DL (ref 0.6–1.1)
DIFFERENTIAL METHOD BLD: ABNORMAL
EOSINOPHIL # BLD: 0.2 K/UL (ref 0–0.8)
EOSINOPHIL NFR BLD: 4 % (ref 0.5–7.8)
ERYTHROCYTE [DISTWIDTH] IN BLOOD BY AUTOMATED COUNT: 19.2 % (ref 11.9–14.6)
GLUCOSE BLD STRIP.AUTO-MCNC: 110 MG/DL (ref 65–100)
GLUCOSE BLD STRIP.AUTO-MCNC: 112 MG/DL (ref 65–100)
GLUCOSE BLD STRIP.AUTO-MCNC: 163 MG/DL (ref 65–100)
GLUCOSE BLD STRIP.AUTO-MCNC: 170 MG/DL (ref 65–100)
GLUCOSE SERPL-MCNC: 109 MG/DL (ref 70–99)
HCT VFR BLD AUTO: 29.4 % (ref 35.8–46.3)
HGB BLD-MCNC: 8.8 G/DL (ref 11.7–15.4)
IMM GRANULOCYTES # BLD AUTO: 0 K/UL (ref 0–0.5)
IMM GRANULOCYTES NFR BLD AUTO: 0 % (ref 0–5)
LYMPHOCYTES # BLD: 1.1 K/UL (ref 0.5–4.6)
LYMPHOCYTES NFR BLD: 16 % (ref 13–44)
MCH RBC QN AUTO: 27.4 PG (ref 26.1–32.9)
MCHC RBC AUTO-ENTMCNC: 29.9 G/DL (ref 31.4–35)
MCV RBC AUTO: 91.6 FL (ref 82–102)
MONOCYTES # BLD: 0.5 K/UL (ref 0.1–1.3)
MONOCYTES NFR BLD: 7 % (ref 4–12)
NEUTS SEG # BLD: 5 K/UL (ref 1.7–8.2)
NEUTS SEG NFR BLD: 72 % (ref 43–78)
NRBC # BLD: 0 K/UL (ref 0–0.2)
PLATELET # BLD AUTO: 227 K/UL (ref 150–450)
PMV BLD AUTO: 10.3 FL (ref 9.4–12.3)
POTASSIUM SERPL-SCNC: 3.6 MMOL/L (ref 3.5–5.1)
RBC # BLD AUTO: 3.21 M/UL (ref 4.05–5.2)
SERVICE CMNT-IMP: ABNORMAL
SODIUM SERPL-SCNC: 141 MMOL/L (ref 136–145)
WBC # BLD AUTO: 6.9 K/UL (ref 4.3–11.1)

## 2024-11-02 PROCEDURE — 2580000003 HC RX 258: Performed by: NURSE PRACTITIONER

## 2024-11-02 PROCEDURE — 36415 COLL VENOUS BLD VENIPUNCTURE: CPT

## 2024-11-02 PROCEDURE — 1100000000 HC RM PRIVATE

## 2024-11-02 PROCEDURE — 6370000000 HC RX 637 (ALT 250 FOR IP): Performed by: NURSE PRACTITIONER

## 2024-11-02 PROCEDURE — 6360000002 HC RX W HCPCS: Performed by: UROLOGY

## 2024-11-02 PROCEDURE — 80048 BASIC METABOLIC PNL TOTAL CA: CPT

## 2024-11-02 PROCEDURE — 82962 GLUCOSE BLOOD TEST: CPT

## 2024-11-02 PROCEDURE — 2580000003 HC RX 258: Performed by: UROLOGY

## 2024-11-02 PROCEDURE — 85025 COMPLETE CBC W/AUTO DIFF WBC: CPT

## 2024-11-02 PROCEDURE — 99231 SBSQ HOSP IP/OBS SF/LOW 25: CPT | Performed by: NURSE PRACTITIONER

## 2024-11-02 PROCEDURE — 94660 CPAP INITIATION&MGMT: CPT

## 2024-11-02 PROCEDURE — 2700000000 HC OXYGEN THERAPY PER DAY

## 2024-11-02 PROCEDURE — 6370000000 HC RX 637 (ALT 250 FOR IP): Performed by: UROLOGY

## 2024-11-02 RX ORDER — POTASSIUM CHLORIDE 1500 MG/1
40 TABLET, EXTENDED RELEASE ORAL DAILY
Status: DISCONTINUED | OUTPATIENT
Start: 2024-11-03 | End: 2024-11-05 | Stop reason: HOSPADM

## 2024-11-02 RX ORDER — POTASSIUM CHLORIDE 1500 MG/1
20 TABLET, EXTENDED RELEASE ORAL ONCE
Status: COMPLETED | OUTPATIENT
Start: 2024-11-02 | End: 2024-11-02

## 2024-11-02 RX ADMIN — OXYCODONE HYDROCHLORIDE AND ACETAMINOPHEN 1 TABLET: 5; 325 TABLET ORAL at 14:11

## 2024-11-02 RX ADMIN — POTASSIUM CHLORIDE 20 MEQ: 1500 TABLET, EXTENDED RELEASE ORAL at 09:39

## 2024-11-02 RX ADMIN — SODIUM CHLORIDE, PRESERVATIVE FREE 10 ML: 5 INJECTION INTRAVENOUS at 09:40

## 2024-11-02 RX ADMIN — SODIUM CHLORIDE, PRESERVATIVE FREE 10 ML: 5 INJECTION INTRAVENOUS at 20:22

## 2024-11-02 RX ADMIN — CEFTRIAXONE SODIUM 2000 MG: 2 INJECTION, POWDER, FOR SOLUTION INTRAMUSCULAR; INTRAVENOUS at 14:18

## 2024-11-02 RX ADMIN — DIGOXIN 0.25 MG: 250 TABLET ORAL at 09:45

## 2024-11-02 RX ADMIN — INSULIN GLARGINE 38 UNITS: 100 INJECTION, SOLUTION SUBCUTANEOUS at 20:22

## 2024-11-02 RX ADMIN — POTASSIUM CHLORIDE 20 MEQ: 1500 TABLET, EXTENDED RELEASE ORAL at 11:01

## 2024-11-02 RX ADMIN — DILTIAZEM HYDROCHLORIDE 300 MG: 180 CAPSULE, COATED, EXTENDED RELEASE ORAL at 09:38

## 2024-11-02 RX ADMIN — FLUCONAZOLE 200 MG: 100 TABLET ORAL at 09:39

## 2024-11-02 RX ADMIN — FUROSEMIDE 60 MG: 40 TABLET ORAL at 09:38

## 2024-11-02 RX ADMIN — METOPROLOL SUCCINATE 25 MG: 25 TABLET, EXTENDED RELEASE ORAL at 09:38

## 2024-11-02 ASSESSMENT — PAIN DESCRIPTION - DESCRIPTORS: DESCRIPTORS: DISCOMFORT

## 2024-11-02 ASSESSMENT — PAIN SCALES - GENERAL: PAINLEVEL_OUTOF10: 5

## 2024-11-02 ASSESSMENT — PAIN DESCRIPTION - ORIENTATION: ORIENTATION: LEFT

## 2024-11-02 ASSESSMENT — PAIN DESCRIPTION - LOCATION: LOCATION: FLANK

## 2024-11-02 ASSESSMENT — PAIN - FUNCTIONAL ASSESSMENT: PAIN_FUNCTIONAL_ASSESSMENT: PREVENTS OR INTERFERES SOME ACTIVE ACTIVITIES AND ADLS

## 2024-11-02 NOTE — PROGRESS NOTES
Admit Date: 10/28/2024      Subjective:     Brenda Mayer is POD  Procedure(s):  LEFT NEPHROLITHOTOMY PERCUTANEOUS    No new complaints. Reports feeling better.    Objective:     Patient Vitals for the past 8 hrs:   BP Temp Temp src Pulse Resp SpO2   11/02/24 0715 122/79 97.6 °F (36.4 °C) Oral 70 18 97 %   11/02/24 0408 -- -- -- 68 18 --   11/02/24 0407 122/76 98.2 °F (36.8 °C) Oral (!) 41 18 99 %     11/02 0701 - 11/02 1900  In: 120 [P.O.:120]  Out: -   10/31 1901 - 11/02 0700  In: 720 [P.O.:720]  Out: 3525 [Urine:3525]    Physical Exam:  GENERAL ASSESSMENT: alert, oriented to person, place and time, no acute distress and no anxiety, depression or agitation  Chest: normal work of breathing  CVS exam: normal rate, regular rhythm, normal S1, S2, no murmurs, rubs, clicks or gallops.  ABDOMEN: not done  Neurological exam reveals alert, oriented, normal speech, no focal findings or movement disorder noted.  FEMALE GENITOURINARY EXAM: not done  MALE GENITAL EXAM: not done    Data Review   Recent Results (from the past 24 hour(s))   POCT Glucose    Collection Time: 11/01/24 11:07 AM   Result Value Ref Range    POC Glucose 117 (H) 65 - 100 mg/dL    Performed by: [employer]    POCT Glucose    Collection Time: 11/01/24  4:04 PM   Result Value Ref Range    POC Glucose 121 (H) 65 - 100 mg/dL    Performed by: Home ChefCT    POCT Glucose    Collection Time: 11/01/24  7:30 PM   Result Value Ref Range    POC Glucose 137 (H) 65 - 100 mg/dL    Performed by: [employer]    CBC with Auto Differential    Collection Time: 11/02/24  6:30 AM   Result Value Ref Range    WBC 6.9 4.3 - 11.1 K/uL    RBC 3.21 (L) 4.05 - 5.2 M/uL    Hemoglobin 8.8 (L) 11.7 - 15.4 g/dL    Hematocrit 29.4 (L) 35.8 - 46.3 %    MCV 91.6 82 - 102 FL    MCH 27.4 26.1 - 32.9 PG    MCHC 29.9 (L) 31.4 - 35.0 g/dL    RDW 19.2 (H) 11.9 - 14.6 %    Platelets 227 150 - 450 K/uL    MPV 10.3 9.4 - 12.3 FL    nRBC 0.00 0.0 - 0.2 K/uL    Differential  Type AUTOMATED      Neutrophils % 72 43 - 78 %    Lymphocytes % 16 13 - 44 %    Monocytes % 7 4.0 - 12.0 %    Eosinophils % 4 0.5 - 7.8 %    Basophils % 1 0.0 - 2.0 %    Immature Granulocytes % 0 0.0 - 5.0 %    Neutrophils Absolute 5.0 1.7 - 8.2 K/UL    Lymphocytes Absolute 1.1 0.5 - 4.6 K/UL    Monocytes Absolute 0.5 0.1 - 1.3 K/UL    Eosinophils Absolute 0.2 0.0 - 0.8 K/UL    Basophils Absolute 0.1 0.0 - 0.2 K/UL    Immature Granulocytes Absolute 0.0 0.0 - 0.5 K/UL   Basic Metabolic Panel    Collection Time: 11/02/24  6:30 AM   Result Value Ref Range    Sodium 141 136 - 145 mmol/L    Potassium 3.6 3.5 - 5.1 mmol/L    Chloride 104 98 - 107 mmol/L    CO2 29 20 - 29 mmol/L    Anion Gap 9 7 - 16 mmol/L    Glucose 109 (H) 70 - 99 mg/dL    BUN 11 8 - 23 MG/DL    Creatinine 0.84 0.60 - 1.10 MG/DL    Est, Glom Filt Rate 76 >60 ml/min/1.73m2    Calcium 8.9 8.8 - 10.2 MG/DL   POCT Glucose    Collection Time: 11/02/24  7:16 AM   Result Value Ref Range    POC Glucose 110 (H) 65 - 100 mg/dL    Performed by: EddaaPCT        Assessment:     Principal Problem:    Kidney stone  Resolved Problems:    * No resolved hospital problems. *    Voiding spontaneously. PCN draining to gravity. No fevers. WBC 9.8. Cr. 1.07. VSS.    Pre-Op Diagnosis: Kidney stone [N20.0]    Post-Op Diagnosis:  * No post-op diagnosis entered *    Procedures: Procedure(s):  LEFT NEPHROLITHOTOMY PERCUTANEOUS      Plan:   Continue rocephin.  Will make NPO after MN on Sunday.  Scheduled PCNL with Dr. Nielson on Monday.      Signed By: LE Mallory     November 2, 2024      [facility]

## 2024-11-03 ENCOUNTER — ANESTHESIA EVENT (OUTPATIENT)
Dept: SURGERY | Age: 69
DRG: 659 | End: 2024-11-03
Payer: MEDICARE

## 2024-11-03 LAB
ANION GAP SERPL CALC-SCNC: 9 MMOL/L (ref 7–16)
BACTERIA SPEC CULT: NORMAL
BACTERIA SPEC CULT: NORMAL
BASOPHILS # BLD: 0.1 K/UL (ref 0–0.2)
BASOPHILS NFR BLD: 1 % (ref 0–2)
BUN SERPL-MCNC: 14 MG/DL (ref 8–23)
CALCIUM SERPL-MCNC: 9 MG/DL (ref 8.8–10.2)
CHLORIDE SERPL-SCNC: 103 MMOL/L (ref 98–107)
CO2 SERPL-SCNC: 29 MMOL/L (ref 20–29)
CREAT SERPL-MCNC: 0.87 MG/DL (ref 0.6–1.1)
DIFFERENTIAL METHOD BLD: ABNORMAL
EOSINOPHIL # BLD: 0.3 K/UL (ref 0–0.8)
EOSINOPHIL NFR BLD: 4 % (ref 0.5–7.8)
ERYTHROCYTE [DISTWIDTH] IN BLOOD BY AUTOMATED COUNT: 19.1 % (ref 11.9–14.6)
GLUCOSE BLD STRIP.AUTO-MCNC: 155 MG/DL (ref 65–100)
GLUCOSE BLD STRIP.AUTO-MCNC: 198 MG/DL (ref 65–100)
GLUCOSE BLD STRIP.AUTO-MCNC: 93 MG/DL (ref 65–100)
GLUCOSE BLD STRIP.AUTO-MCNC: 95 MG/DL (ref 65–100)
GLUCOSE SERPL-MCNC: 85 MG/DL (ref 70–99)
HCT VFR BLD AUTO: 29.1 % (ref 35.8–46.3)
HGB BLD-MCNC: 8.7 G/DL (ref 11.7–15.4)
IMM GRANULOCYTES # BLD AUTO: 0.1 K/UL (ref 0–0.5)
IMM GRANULOCYTES NFR BLD AUTO: 1 % (ref 0–5)
LYMPHOCYTES # BLD: 1.5 K/UL (ref 0.5–4.6)
LYMPHOCYTES NFR BLD: 21 % (ref 13–44)
MCH RBC QN AUTO: 27.4 PG (ref 26.1–32.9)
MCHC RBC AUTO-ENTMCNC: 29.9 G/DL (ref 31.4–35)
MCV RBC AUTO: 91.8 FL (ref 82–102)
MONOCYTES # BLD: 0.5 K/UL (ref 0.1–1.3)
MONOCYTES NFR BLD: 7 % (ref 4–12)
NEUTS SEG # BLD: 4.8 K/UL (ref 1.7–8.2)
NEUTS SEG NFR BLD: 66 % (ref 43–78)
NRBC # BLD: 0 K/UL (ref 0–0.2)
PLATELET # BLD AUTO: 251 K/UL (ref 150–450)
PMV BLD AUTO: 10.2 FL (ref 9.4–12.3)
POTASSIUM SERPL-SCNC: 3.9 MMOL/L (ref 3.5–5.1)
RBC # BLD AUTO: 3.17 M/UL (ref 4.05–5.2)
SERVICE CMNT-IMP: ABNORMAL
SERVICE CMNT-IMP: ABNORMAL
SERVICE CMNT-IMP: NORMAL
SODIUM SERPL-SCNC: 142 MMOL/L (ref 136–145)
WBC # BLD AUTO: 7.2 K/UL (ref 4.3–11.1)

## 2024-11-03 PROCEDURE — 2580000003 HC RX 258: Performed by: UROLOGY

## 2024-11-03 PROCEDURE — 99231 SBSQ HOSP IP/OBS SF/LOW 25: CPT | Performed by: NURSE PRACTITIONER

## 2024-11-03 PROCEDURE — 36415 COLL VENOUS BLD VENIPUNCTURE: CPT

## 2024-11-03 PROCEDURE — 6370000000 HC RX 637 (ALT 250 FOR IP): Performed by: NURSE PRACTITIONER

## 2024-11-03 PROCEDURE — 6360000002 HC RX W HCPCS: Performed by: UROLOGY

## 2024-11-03 PROCEDURE — 94660 CPAP INITIATION&MGMT: CPT

## 2024-11-03 PROCEDURE — 82962 GLUCOSE BLOOD TEST: CPT

## 2024-11-03 PROCEDURE — 80048 BASIC METABOLIC PNL TOTAL CA: CPT

## 2024-11-03 PROCEDURE — 85025 COMPLETE CBC W/AUTO DIFF WBC: CPT

## 2024-11-03 PROCEDURE — 1100000000 HC RM PRIVATE

## 2024-11-03 PROCEDURE — 6370000000 HC RX 637 (ALT 250 FOR IP): Performed by: UROLOGY

## 2024-11-03 PROCEDURE — 2580000003 HC RX 258: Performed by: NURSE PRACTITIONER

## 2024-11-03 RX ADMIN — FLUCONAZOLE 200 MG: 100 TABLET ORAL at 08:45

## 2024-11-03 RX ADMIN — OXYCODONE HYDROCHLORIDE AND ACETAMINOPHEN 1 TABLET: 5; 325 TABLET ORAL at 14:33

## 2024-11-03 RX ADMIN — FUROSEMIDE 60 MG: 40 TABLET ORAL at 08:44

## 2024-11-03 RX ADMIN — INSULIN LISPRO 1 UNITS: 100 INJECTION, SOLUTION INTRAVENOUS; SUBCUTANEOUS at 20:49

## 2024-11-03 RX ADMIN — DIGOXIN 0.25 MG: 250 TABLET ORAL at 08:45

## 2024-11-03 RX ADMIN — POTASSIUM CHLORIDE 40 MEQ: 1500 TABLET, EXTENDED RELEASE ORAL at 08:44

## 2024-11-03 RX ADMIN — SODIUM CHLORIDE, PRESERVATIVE FREE 10 ML: 5 INJECTION INTRAVENOUS at 08:46

## 2024-11-03 RX ADMIN — INSULIN GLARGINE 38 UNITS: 100 INJECTION, SOLUTION SUBCUTANEOUS at 20:49

## 2024-11-03 RX ADMIN — CEFTRIAXONE SODIUM 2000 MG: 2 INJECTION, POWDER, FOR SOLUTION INTRAMUSCULAR; INTRAVENOUS at 14:23

## 2024-11-03 RX ADMIN — METOPROLOL SUCCINATE 25 MG: 25 TABLET, EXTENDED RELEASE ORAL at 08:44

## 2024-11-03 RX ADMIN — DILTIAZEM HYDROCHLORIDE 300 MG: 180 CAPSULE, COATED, EXTENDED RELEASE ORAL at 08:45

## 2024-11-03 ASSESSMENT — PAIN SCALES - GENERAL: PAINLEVEL_OUTOF10: 1

## 2024-11-03 ASSESSMENT — PAIN - FUNCTIONAL ASSESSMENT: PAIN_FUNCTIONAL_ASSESSMENT: ACTIVITIES ARE NOT PREVENTED

## 2024-11-03 ASSESSMENT — PAIN DESCRIPTION - DESCRIPTORS: DESCRIPTORS: DISCOMFORT

## 2024-11-03 NOTE — PROGRESS NOTES
Admit Date: 10/28/2024      Subjective:     Brenda Mayer is POD  Procedure(s):  LEFT NEPHROLITHOTOMY PERCUTANEOUS    No new complaints. Reports \"feeling pretty good.\"    Objective:     Patient Vitals for the past 8 hrs:   BP Temp Temp src Pulse Resp SpO2   11/03/24 0726 (!) 122/59 97.5 °F (36.4 °C) Oral 50 16 96 %   11/03/24 0309 116/71 97.8 °F (36.6 °C) Oral 53 16 99 %   11/03/24 0209 -- -- -- 70 18 98 %     No intake/output data recorded.  11/01 1901 - 11/03 0700  In: 600 [P.O.:600]  Out: 4530 [Urine:4530]    Physical Exam:  GENERAL ASSESSMENT: alert, oriented to person, place and time, no acute distress and no anxiety, depression or agitation  Chest: normal work of breathing  CVS exam: normal rate, regular rhythm, normal S1, S2, no murmurs, rubs, clicks or gallops.  ABDOMEN: not done  Neurological exam reveals alert, oriented, normal speech, no focal findings or movement disorder noted.  FEMALE GENITOURINARY EXAM: not done  MALE GENITAL EXAM: not done      Data Review   Recent Results (from the past 24 hour(s))   POCT Glucose    Collection Time: 11/02/24 11:12 AM   Result Value Ref Range    POC Glucose 112 (H) 65 - 100 mg/dL    Performed by: WallacePreeyaPCT    POCT Glucose    Collection Time: 11/02/24  4:13 PM   Result Value Ref Range    POC Glucose 163 (H) 65 - 100 mg/dL    Performed by: WallacePreeyaPCT    POCT Glucose    Collection Time: 11/02/24  7:05 PM   Result Value Ref Range    POC Glucose 170 (H) 65 - 100 mg/dL    Performed by: SUSIE    CBC with Auto Differential    Collection Time: 11/03/24  5:28 AM   Result Value Ref Range    WBC 7.2 4.3 - 11.1 K/uL    RBC 3.17 (L) 4.05 - 5.2 M/uL    Hemoglobin 8.7 (L) 11.7 - 15.4 g/dL    Hematocrit 29.1 (L) 35.8 - 46.3 %    MCV 91.8 82 - 102 FL    MCH 27.4 26.1 - 32.9 PG    MCHC 29.9 (L) 31.4 - 35.0 g/dL    RDW 19.1 (H) 11.9 - 14.6 %    Platelets 251 150 - 450 K/uL    MPV 10.2 9.4 - 12.3 FL    nRBC 0.00 0.0 - 0.2 K/uL    Differential Type  AUTOMATED      Neutrophils % 66 43 - 78 %    Lymphocytes % 21 13 - 44 %    Monocytes % 7 4.0 - 12.0 %    Eosinophils % 4 0.5 - 7.8 %    Basophils % 1 0.0 - 2.0 %    Immature Granulocytes % 1 0.0 - 5.0 %    Neutrophils Absolute 4.8 1.7 - 8.2 K/UL    Lymphocytes Absolute 1.5 0.5 - 4.6 K/UL    Monocytes Absolute 0.5 0.1 - 1.3 K/UL    Eosinophils Absolute 0.3 0.0 - 0.8 K/UL    Basophils Absolute 0.1 0.0 - 0.2 K/UL    Immature Granulocytes Absolute 0.1 0.0 - 0.5 K/UL   Basic Metabolic Panel    Collection Time: 11/03/24  5:28 AM   Result Value Ref Range    Sodium 142 136 - 145 mmol/L    Potassium 3.9 3.5 - 5.1 mmol/L    Chloride 103 98 - 107 mmol/L    CO2 29 20 - 29 mmol/L    Anion Gap 9 7 - 16 mmol/L    Glucose 85 70 - 99 mg/dL    BUN 14 8 - 23 MG/DL    Creatinine 0.87 0.60 - 1.10 MG/DL    Est, Glom Filt Rate 73 >60 ml/min/1.73m2    Calcium 9.0 8.8 - 10.2 MG/DL   POCT Glucose    Collection Time: 11/03/24  7:03 AM   Result Value Ref Range    POC Glucose 95 65 - 100 mg/dL    Performed by: Viviane        Assessment:     Principal Problem:    Kidney stone  Resolved Problems:    * No resolved hospital problems. *    Voiding spontaneously. PCN draining to gravity. VSS. WBC 7.2. HGB 8.7.    Pre-Op Diagnosis: Kidney stone [N20.0]    Post-Op Diagnosis:  * No post-op diagnosis entered *    Procedures: Procedure(s):  S/P LEFT NEPHROLITHOTOMY PERCUTANEOUS      Plan:   NPO after MN.  Schedule PCNL tomorrow am with Dr. Nielson.      Signed By: Geneva Rueda NP-C     November 3, 2024      [facility]

## 2024-11-03 NOTE — PROGRESS NOTES
Pt had an out put of 280mL out of her left nephrostomy tube at 2206. Hourly rounds performed this shift. Bed lowered and locked. Call light within reach. All needs met at this time.    Patient tolerated procedure well.

## 2024-11-03 NOTE — ANESTHESIA PRE PROCEDURE
Department of Anesthesiology  Preprocedure Note       Name:  Brenda Mayer   Age:  69 y.o.  :  1955                                          MRN:  [id number]         Date:  11/3/2024      Surgeon: Surgeon(s):  Jay Nielson MD    Procedure: Procedure(s):  NEPHROLITHOTOMY PERCUTANEOUS/LEFT/ ALREADY HAS TUBE PLACED    Medications prior to admission:   Prior to Admission medications    Medication Sig Start Date End Date Taking? Authorizing Provider   BiPAP Machine MISC by Does not apply route Trilogy device qhs--Med [place]   Yes ProviderShelia MD   acetaminophen (TYLENOL) 500 MG tablet Take 2 tablets by mouth every 6 hours as needed for Pain for pain   Yes ProviderShelia MD   furosemide (LASIX) 40 MG tablet Take 1 tablet by mouth daily  Patient taking differently: Take 1.5 tablets by mouth daily 24  Yes Carlos Holm DO   metoprolol succinate (TOPROL XL) 25 MG extended release tablet Take 1 tablet by mouth daily 4/10/24  Yes Carlos Holm DO   digoxin (LANOXIN) 250 MCG tablet Take 1 tablet by mouth daily 24  Yes Tom Dunbar MD   BASPIETRO KWIKPEN 100 UNIT/ML injection pen Inject 38 Units into the skin nightly subcutaneous 23  Yes ProviderShelia MD   metFORMIN (GLUCOPHAGE-XR) 500 MG extended release tablet Take 3 tablets by mouth daily (with breakfast) 23  Yes ProviderShelia MD   OXYGEN Inhale into the lungs 2 lpm at bedtime   Yes ProviderShelia MD   dilTIAZem (TIAZAC) 300 MG extended release capsule Take 1 capsule by mouth daily 22  Yes Automatic Reconciliation, Ar   potassium chloride (KLOR-CON M) 20 MEQ extended release tablet Take 1 tablet by mouth daily 2/15/22  Yes Automatic Reconciliation, Ar   rivaroxaban (XARELTO) 20 MG TABS tablet Take 1 tablet by mouth Daily with lunch 2/15/22   Automatic Reconciliation, Ar       Current medications:    Current Facility-Administered Medications   Medication Dose Route Frequency

## 2024-11-04 ENCOUNTER — APPOINTMENT (OUTPATIENT)
Dept: GENERAL RADIOLOGY | Age: 69
DRG: 659 | End: 2024-11-04
Attending: UROLOGY
Payer: MEDICARE

## 2024-11-04 ENCOUNTER — ANESTHESIA (OUTPATIENT)
Dept: SURGERY | Age: 69
DRG: 659 | End: 2024-11-04
Payer: MEDICARE

## 2024-11-04 LAB
ABO + RH BLD: NORMAL
ANION GAP SERPL CALC-SCNC: 8 MMOL/L (ref 7–16)
BASOPHILS # BLD: 0.1 K/UL (ref 0–0.2)
BASOPHILS NFR BLD: 1 % (ref 0–2)
BLOOD GROUP ANTIBODIES SERPL: NORMAL
BUN SERPL-MCNC: 13 MG/DL (ref 8–23)
CALCIUM SERPL-MCNC: 8.9 MG/DL (ref 8.8–10.2)
CHLORIDE SERPL-SCNC: 103 MMOL/L (ref 98–107)
CO2 SERPL-SCNC: 30 MMOL/L (ref 20–29)
CREAT SERPL-MCNC: 0.86 MG/DL (ref 0.6–1.1)
DIFFERENTIAL METHOD BLD: ABNORMAL
EOSINOPHIL # BLD: 0.3 K/UL (ref 0–0.8)
EOSINOPHIL NFR BLD: 4 % (ref 0.5–7.8)
ERYTHROCYTE [DISTWIDTH] IN BLOOD BY AUTOMATED COUNT: 19.5 % (ref 11.9–14.6)
GLUCOSE BLD STRIP.AUTO-MCNC: 165 MG/DL (ref 65–100)
GLUCOSE BLD STRIP.AUTO-MCNC: 77 MG/DL (ref 65–100)
GLUCOSE BLD STRIP.AUTO-MCNC: 82 MG/DL (ref 65–100)
GLUCOSE BLD STRIP.AUTO-MCNC: 84 MG/DL (ref 65–100)
GLUCOSE BLD STRIP.AUTO-MCNC: 85 MG/DL (ref 65–100)
GLUCOSE SERPL-MCNC: 83 MG/DL (ref 70–99)
HCT VFR BLD AUTO: 31.3 % (ref 35.8–46.3)
HGB BLD-MCNC: 9.4 G/DL (ref 11.7–15.4)
IMM GRANULOCYTES # BLD AUTO: 0.1 K/UL (ref 0–0.5)
IMM GRANULOCYTES NFR BLD AUTO: 1 % (ref 0–5)
LYMPHOCYTES # BLD: 1.6 K/UL (ref 0.5–4.6)
LYMPHOCYTES NFR BLD: 21 % (ref 13–44)
MCH RBC QN AUTO: 27.4 PG (ref 26.1–32.9)
MCHC RBC AUTO-ENTMCNC: 30 G/DL (ref 31.4–35)
MCV RBC AUTO: 91.3 FL (ref 82–102)
MONOCYTES # BLD: 0.5 K/UL (ref 0.1–1.3)
MONOCYTES NFR BLD: 7 % (ref 4–12)
NEUTS SEG # BLD: 5.2 K/UL (ref 1.7–8.2)
NEUTS SEG NFR BLD: 68 % (ref 43–78)
NRBC # BLD: 0 K/UL (ref 0–0.2)
PLATELET # BLD AUTO: 254 K/UL (ref 150–450)
PMV BLD AUTO: 10.8 FL (ref 9.4–12.3)
POTASSIUM SERPL-SCNC: ABNORMAL MMOL/L (ref 3.5–5.1)
RBC # BLD AUTO: 3.43 M/UL (ref 4.05–5.2)
SERVICE CMNT-IMP: ABNORMAL
SERVICE CMNT-IMP: NORMAL
SODIUM SERPL-SCNC: 141 MMOL/L (ref 136–145)
SPECIMEN EXP DATE BLD: NORMAL
WBC # BLD AUTO: 7.7 K/UL (ref 4.3–11.1)

## 2024-11-04 PROCEDURE — 2720000010 HC SURG SUPPLY STERILE: Performed by: UROLOGY

## 2024-11-04 PROCEDURE — 2580000003 HC RX 258: Performed by: ANESTHESIOLOGY

## 2024-11-04 PROCEDURE — 86901 BLOOD TYPING SEROLOGIC RH(D): CPT

## 2024-11-04 PROCEDURE — 3700000001 HC ADD 15 MINUTES (ANESTHESIA): Performed by: UROLOGY

## 2024-11-04 PROCEDURE — 6360000002 HC RX W HCPCS: Performed by: ANESTHESIOLOGY

## 2024-11-04 PROCEDURE — 2700000000 HC OXYGEN THERAPY PER DAY

## 2024-11-04 PROCEDURE — 36415 COLL VENOUS BLD VENIPUNCTURE: CPT

## 2024-11-04 PROCEDURE — 52310 CYSTOSCOPY AND TREATMENT: CPT | Performed by: UROLOGY

## 2024-11-04 PROCEDURE — C1758 CATHETER, URETERAL: HCPCS | Performed by: UROLOGY

## 2024-11-04 PROCEDURE — 88300 SURGICAL PATH GROSS: CPT

## 2024-11-04 PROCEDURE — 99233 SBSQ HOSP IP/OBS HIGH 50: CPT | Performed by: UROLOGY

## 2024-11-04 PROCEDURE — 2580000003 HC RX 258: Performed by: UROLOGY

## 2024-11-04 PROCEDURE — 71045 X-RAY EXAM CHEST 1 VIEW: CPT

## 2024-11-04 PROCEDURE — 94760 N-INVAS EAR/PLS OXIMETRY 1: CPT

## 2024-11-04 PROCEDURE — 0TC13ZZ EXTIRPATION OF MATTER FROM LEFT KIDNEY, PERCUTANEOUS APPROACH: ICD-10-PCS | Performed by: UROLOGY

## 2024-11-04 PROCEDURE — 6370000000 HC RX 637 (ALT 250 FOR IP): Performed by: NURSE PRACTITIONER

## 2024-11-04 PROCEDURE — 7100000001 HC PACU RECOVERY - ADDTL 15 MIN: Performed by: UROLOGY

## 2024-11-04 PROCEDURE — BT1F1ZZ FLUOROSCOPY OF LEFT KIDNEY, URETER AND BLADDER USING LOW OSMOLAR CONTRAST: ICD-10-PCS | Performed by: UROLOGY

## 2024-11-04 PROCEDURE — C1769 GUIDE WIRE: HCPCS | Performed by: UROLOGY

## 2024-11-04 PROCEDURE — 6370000000 HC RX 637 (ALT 250 FOR IP): Performed by: UROLOGY

## 2024-11-04 PROCEDURE — 86850 RBC ANTIBODY SCREEN: CPT

## 2024-11-04 PROCEDURE — 3600000014 HC SURGERY LEVEL 4 ADDTL 15MIN: Performed by: UROLOGY

## 2024-11-04 PROCEDURE — 0TC44ZZ EXTIRPATION OF MATTER FROM LEFT KIDNEY PELVIS, PERCUTANEOUS ENDOSCOPIC APPROACH: ICD-10-PCS | Performed by: UROLOGY

## 2024-11-04 PROCEDURE — 50080 PERQ NL/PL LITHOTRP SMPL<2CM: CPT | Performed by: UROLOGY

## 2024-11-04 PROCEDURE — 2709999900 HC NON-CHARGEABLE SUPPLY: Performed by: UROLOGY

## 2024-11-04 PROCEDURE — 3700000000 HC ANESTHESIA ATTENDED CARE: Performed by: UROLOGY

## 2024-11-04 PROCEDURE — 2500000003 HC RX 250 WO HCPCS: Performed by: REGISTERED NURSE

## 2024-11-04 PROCEDURE — 6360000002 HC RX W HCPCS: Performed by: UROLOGY

## 2024-11-04 PROCEDURE — 82962 GLUCOSE BLOOD TEST: CPT

## 2024-11-04 PROCEDURE — 0TP98DZ REMOVAL OF INTRALUMINAL DEVICE FROM URETER, VIA NATURAL OR ARTIFICIAL OPENING ENDOSCOPIC: ICD-10-PCS | Performed by: UROLOGY

## 2024-11-04 PROCEDURE — C1894 INTRO/SHEATH, NON-LASER: HCPCS | Performed by: UROLOGY

## 2024-11-04 PROCEDURE — 3600000004 HC SURGERY LEVEL 4 BASE: Performed by: UROLOGY

## 2024-11-04 PROCEDURE — 85025 COMPLETE CBC W/AUTO DIFF WBC: CPT

## 2024-11-04 PROCEDURE — 82355 CALCULUS ANALYSIS QUAL: CPT

## 2024-11-04 PROCEDURE — 7100000000 HC PACU RECOVERY - FIRST 15 MIN: Performed by: UROLOGY

## 2024-11-04 PROCEDURE — 6360000004 HC RX CONTRAST MEDICATION: Performed by: UROLOGY

## 2024-11-04 PROCEDURE — C1747 HC ENDOSCOPE, SINGLE, URINARY TRACT: HCPCS | Performed by: UROLOGY

## 2024-11-04 PROCEDURE — 2580000003 HC RX 258: Performed by: NURSE PRACTITIONER

## 2024-11-04 PROCEDURE — 6360000002 HC RX W HCPCS: Performed by: REGISTERED NURSE

## 2024-11-04 PROCEDURE — 1100000000 HC RM PRIVATE

## 2024-11-04 PROCEDURE — 80048 BASIC METABOLIC PNL TOTAL CA: CPT

## 2024-11-04 PROCEDURE — 86900 BLOOD TYPING SEROLOGIC ABO: CPT

## 2024-11-04 RX ORDER — SODIUM CHLORIDE 0.9 % (FLUSH) 0.9 %
5-40 SYRINGE (ML) INJECTION EVERY 12 HOURS SCHEDULED
Status: DISCONTINUED | OUTPATIENT
Start: 2024-11-04 | End: 2024-11-04 | Stop reason: HOSPADM

## 2024-11-04 RX ORDER — LIDOCAINE HYDROCHLORIDE 20 MG/ML
INJECTION, SOLUTION EPIDURAL; INFILTRATION; INTRACAUDAL; PERINEURAL
Status: DISCONTINUED | OUTPATIENT
Start: 2024-11-04 | End: 2024-11-04 | Stop reason: SDUPTHER

## 2024-11-04 RX ORDER — OXYCODONE HYDROCHLORIDE 5 MG/1
5 TABLET ORAL
Status: DISCONTINUED | OUTPATIENT
Start: 2024-11-04 | End: 2024-11-04 | Stop reason: HOSPADM

## 2024-11-04 RX ORDER — SODIUM CHLORIDE 9 MG/ML
INJECTION, SOLUTION INTRAVENOUS PRN
Status: DISCONTINUED | OUTPATIENT
Start: 2024-11-04 | End: 2024-11-04 | Stop reason: HOSPADM

## 2024-11-04 RX ORDER — IOPAMIDOL 612 MG/ML
INJECTION, SOLUTION INTRAVASCULAR PRN
Status: DISCONTINUED | OUTPATIENT
Start: 2024-11-04 | End: 2024-11-04 | Stop reason: HOSPADM

## 2024-11-04 RX ORDER — SCOLOPAMINE TRANSDERMAL SYSTEM 1 MG/1
1 PATCH, EXTENDED RELEASE TRANSDERMAL
Status: DISCONTINUED | OUTPATIENT
Start: 2024-11-04 | End: 2024-11-04 | Stop reason: HOSPADM

## 2024-11-04 RX ORDER — ONDANSETRON 2 MG/ML
INJECTION INTRAMUSCULAR; INTRAVENOUS
Status: DISCONTINUED | OUTPATIENT
Start: 2024-11-04 | End: 2024-11-04 | Stop reason: SDUPTHER

## 2024-11-04 RX ORDER — SODIUM CHLORIDE, SODIUM LACTATE, POTASSIUM CHLORIDE, CALCIUM CHLORIDE 600; 310; 30; 20 MG/100ML; MG/100ML; MG/100ML; MG/100ML
INJECTION, SOLUTION INTRAVENOUS CONTINUOUS
Status: DISCONTINUED | OUTPATIENT
Start: 2024-11-04 | End: 2024-11-04 | Stop reason: HOSPADM

## 2024-11-04 RX ORDER — HYDROMORPHONE HYDROCHLORIDE 2 MG/ML
0.5 INJECTION, SOLUTION INTRAMUSCULAR; INTRAVENOUS; SUBCUTANEOUS EVERY 10 MIN PRN
Status: COMPLETED | OUTPATIENT
Start: 2024-11-04 | End: 2024-11-04

## 2024-11-04 RX ORDER — SODIUM CHLORIDE 0.9 % (FLUSH) 0.9 %
5-40 SYRINGE (ML) INJECTION PRN
Status: DISCONTINUED | OUTPATIENT
Start: 2024-11-04 | End: 2024-11-04 | Stop reason: HOSPADM

## 2024-11-04 RX ORDER — ONDANSETRON 2 MG/ML
4 INJECTION INTRAMUSCULAR; INTRAVENOUS
Status: DISCONTINUED | OUTPATIENT
Start: 2024-11-04 | End: 2024-11-04 | Stop reason: HOSPADM

## 2024-11-04 RX ORDER — NALOXONE HYDROCHLORIDE 0.4 MG/ML
INJECTION, SOLUTION INTRAMUSCULAR; INTRAVENOUS; SUBCUTANEOUS PRN
Status: DISCONTINUED | OUTPATIENT
Start: 2024-11-04 | End: 2024-11-04 | Stop reason: HOSPADM

## 2024-11-04 RX ORDER — METOCLOPRAMIDE HYDROCHLORIDE 5 MG/ML
10 INJECTION INTRAMUSCULAR; INTRAVENOUS
Status: DISCONTINUED | OUTPATIENT
Start: 2024-11-04 | End: 2024-11-04 | Stop reason: HOSPADM

## 2024-11-04 RX ORDER — ROCURONIUM BROMIDE 10 MG/ML
INJECTION, SOLUTION INTRAVENOUS
Status: DISCONTINUED | OUTPATIENT
Start: 2024-11-04 | End: 2024-11-04 | Stop reason: SDUPTHER

## 2024-11-04 RX ORDER — FENTANYL CITRATE 50 UG/ML
25 INJECTION, SOLUTION INTRAMUSCULAR; INTRAVENOUS EVERY 5 MIN PRN
Status: DISCONTINUED | OUTPATIENT
Start: 2024-11-04 | End: 2024-11-04 | Stop reason: HOSPADM

## 2024-11-04 RX ORDER — GLYCOPYRROLATE 0.2 MG/ML
INJECTION INTRAMUSCULAR; INTRAVENOUS
Status: DISCONTINUED | OUTPATIENT
Start: 2024-11-04 | End: 2024-11-04 | Stop reason: SDUPTHER

## 2024-11-04 RX ORDER — PROPOFOL 10 MG/ML
INJECTION, EMULSION INTRAVENOUS
Status: DISCONTINUED | OUTPATIENT
Start: 2024-11-04 | End: 2024-11-04 | Stop reason: SDUPTHER

## 2024-11-04 RX ORDER — MIDAZOLAM HYDROCHLORIDE 2 MG/2ML
2 INJECTION, SOLUTION INTRAMUSCULAR; INTRAVENOUS
Status: DISCONTINUED | OUTPATIENT
Start: 2024-11-04 | End: 2024-11-04 | Stop reason: HOSPADM

## 2024-11-04 RX ORDER — DIPHENHYDRAMINE HYDROCHLORIDE 50 MG/ML
12.5 INJECTION INTRAMUSCULAR; INTRAVENOUS
Status: DISCONTINUED | OUTPATIENT
Start: 2024-11-04 | End: 2024-11-04 | Stop reason: HOSPADM

## 2024-11-04 RX ORDER — FENTANYL CITRATE 50 UG/ML
INJECTION, SOLUTION INTRAMUSCULAR; INTRAVENOUS
Status: DISCONTINUED | OUTPATIENT
Start: 2024-11-04 | End: 2024-11-04 | Stop reason: SDUPTHER

## 2024-11-04 RX ORDER — DEXAMETHASONE SODIUM PHOSPHATE 4 MG/ML
INJECTION, SOLUTION INTRA-ARTICULAR; INTRALESIONAL; INTRAMUSCULAR; INTRAVENOUS; SOFT TISSUE
Status: DISCONTINUED | OUTPATIENT
Start: 2024-11-04 | End: 2024-11-04 | Stop reason: SDUPTHER

## 2024-11-04 RX ORDER — FENTANYL CITRATE 50 UG/ML
100 INJECTION, SOLUTION INTRAMUSCULAR; INTRAVENOUS
Status: DISCONTINUED | OUTPATIENT
Start: 2024-11-04 | End: 2024-11-04 | Stop reason: HOSPADM

## 2024-11-04 RX ADMIN — HYDROMORPHONE HYDROCHLORIDE 0.5 MG: 2 INJECTION INTRAMUSCULAR; INTRAVENOUS; SUBCUTANEOUS at 14:22

## 2024-11-04 RX ADMIN — FLUCONAZOLE 200 MG: 100 TABLET ORAL at 15:41

## 2024-11-04 RX ADMIN — INSULIN GLARGINE 38 UNITS: 100 INJECTION, SOLUTION SUBCUTANEOUS at 20:12

## 2024-11-04 RX ADMIN — METOPROLOL SUCCINATE 25 MG: 25 TABLET, EXTENDED RELEASE ORAL at 10:28

## 2024-11-04 RX ADMIN — HYDROMORPHONE HYDROCHLORIDE 0.5 MG: 2 INJECTION INTRAMUSCULAR; INTRAVENOUS; SUBCUTANEOUS at 13:52

## 2024-11-04 RX ADMIN — DIGOXIN 0.25 MG: 250 TABLET ORAL at 10:27

## 2024-11-04 RX ADMIN — PROPOFOL 150 MG: 10 INJECTION, EMULSION INTRAVENOUS at 11:52

## 2024-11-04 RX ADMIN — LIDOCAINE HYDROCHLORIDE 100 MG: 20 INJECTION, SOLUTION EPIDURAL; INFILTRATION; INTRACAUDAL; PERINEURAL at 11:52

## 2024-11-04 RX ADMIN — DILTIAZEM HYDROCHLORIDE 300 MG: 180 CAPSULE, COATED, EXTENDED RELEASE ORAL at 10:28

## 2024-11-04 RX ADMIN — OXYCODONE HYDROCHLORIDE AND ACETAMINOPHEN 1 TABLET: 5; 325 TABLET ORAL at 16:28

## 2024-11-04 RX ADMIN — GLYCOPYRROLATE 0.2 MG: 0.2 INJECTION INTRAMUSCULAR; INTRAVENOUS at 13:21

## 2024-11-04 RX ADMIN — SODIUM CHLORIDE, PRESERVATIVE FREE 10 ML: 5 INJECTION INTRAVENOUS at 15:50

## 2024-11-04 RX ADMIN — OXYCODONE HYDROCHLORIDE AND ACETAMINOPHEN 1 TABLET: 5; 325 TABLET ORAL at 20:11

## 2024-11-04 RX ADMIN — CEFTRIAXONE SODIUM 2000 MG: 2 INJECTION, POWDER, FOR SOLUTION INTRAMUSCULAR; INTRAVENOUS at 15:48

## 2024-11-04 RX ADMIN — FENTANYL CITRATE 100 MCG: 50 INJECTION, SOLUTION INTRAMUSCULAR; INTRAVENOUS at 11:52

## 2024-11-04 RX ADMIN — DEXAMETHASONE SODIUM PHOSPHATE 4 MG: 4 INJECTION INTRA-ARTICULAR; INTRALESIONAL; INTRAMUSCULAR; INTRAVENOUS; SOFT TISSUE at 12:15

## 2024-11-04 RX ADMIN — SUGAMMADEX 200 MG: 100 INJECTION, SOLUTION INTRAVENOUS at 13:16

## 2024-11-04 RX ADMIN — FUROSEMIDE 60 MG: 40 TABLET ORAL at 15:42

## 2024-11-04 RX ADMIN — HYDROMORPHONE HYDROCHLORIDE 0.5 MG: 2 INJECTION INTRAMUSCULAR; INTRAVENOUS; SUBCUTANEOUS at 14:56

## 2024-11-04 RX ADMIN — SODIUM CHLORIDE, SODIUM LACTATE, POTASSIUM CHLORIDE, AND CALCIUM CHLORIDE: 600; 310; 30; 20 INJECTION, SOLUTION INTRAVENOUS at 11:41

## 2024-11-04 RX ADMIN — ONDANSETRON 4 MG: 2 INJECTION INTRAMUSCULAR; INTRAVENOUS at 12:15

## 2024-11-04 RX ADMIN — ROCURONIUM BROMIDE 50 MG: 10 INJECTION, SOLUTION INTRAVENOUS at 11:53

## 2024-11-04 RX ADMIN — SODIUM CHLORIDE, PRESERVATIVE FREE 10 ML: 5 INJECTION INTRAVENOUS at 20:12

## 2024-11-04 RX ADMIN — HYDROMORPHONE HYDROCHLORIDE 0.5 MG: 2 INJECTION INTRAMUSCULAR; INTRAVENOUS; SUBCUTANEOUS at 13:42

## 2024-11-04 ASSESSMENT — PAIN SCALES - GENERAL
PAINLEVEL_OUTOF10: 7
PAINLEVEL_OUTOF10: 6
PAINLEVEL_OUTOF10: 6
PAINLEVEL_OUTOF10: 10
PAINLEVEL_OUTOF10: 6
PAINLEVEL_OUTOF10: 0
PAINLEVEL_OUTOF10: 8

## 2024-11-04 ASSESSMENT — PAIN DESCRIPTION - DESCRIPTORS
DESCRIPTORS: ACHING;SHARP
DESCRIPTORS: ACHING
DESCRIPTORS: DISCOMFORT

## 2024-11-04 ASSESSMENT — PAIN - FUNCTIONAL ASSESSMENT
PAIN_FUNCTIONAL_ASSESSMENT: 0-10
PAIN_FUNCTIONAL_ASSESSMENT: PREVENTS OR INTERFERES WITH MANY ACTIVE NOT PASSIVE ACTIVITIES
PAIN_FUNCTIONAL_ASSESSMENT: NONE - DENIES PAIN
PAIN_FUNCTIONAL_ASSESSMENT: 0-10

## 2024-11-04 ASSESSMENT — PAIN DESCRIPTION - LOCATION
LOCATION: FLANK
LOCATION: INCISION;BACK
LOCATION: BACK;INCISION
LOCATION: BACK;FLANK
LOCATION: BACK;INCISION

## 2024-11-04 ASSESSMENT — PAIN DESCRIPTION - ORIENTATION
ORIENTATION: LEFT

## 2024-11-04 ASSESSMENT — COPD QUESTIONNAIRES: CAT_SEVERITY: MILD

## 2024-11-04 NOTE — ANESTHESIA PRE PROCEDURE
Department of Anesthesiology  Preprocedure Note       Name:  Brenda Mayer   Age:  69 y.o.  :  1955                                          MRN:  [id number]         Date:  2024      Surgeon: Surgeon(s):  Jay Nielson MD    Procedure: Procedure(s):  NEPHROLITHOTOMY PERCUTANEOUS/LEFT/ ALREADY HAS TUBE PLACED    Medications prior to admission:   Prior to Admission medications    Medication Sig Start Date End Date Taking? Authorizing Provider   BiPAP Machine MISC by Does not apply route Trilogy device qhs--Med [place]   Yes ProviderShelia MD   acetaminophen (TYLENOL) 500 MG tablet Take 2 tablets by mouth every 6 hours as needed for Pain for pain   Yes ProviderShelia MD   furosemide (LASIX) 40 MG tablet Take 1 tablet by mouth daily  Patient taking differently: Take 1.5 tablets by mouth daily 24  Yes Carlos Holm DO   metoprolol succinate (TOPROL XL) 25 MG extended release tablet Take 1 tablet by mouth daily 4/10/24  Yes Carlos Holm DO   digoxin (LANOXIN) 250 MCG tablet Take 1 tablet by mouth daily 24  Yes Tom Dunbar MD   BASPIETRO KWIKPEN 100 UNIT/ML injection pen Inject 38 Units into the skin nightly subcutaneous 23  Yes ProviderShelia MD   metFORMIN (GLUCOPHAGE-XR) 500 MG extended release tablet Take 3 tablets by mouth daily (with breakfast) 23  Yes ProviderShelia MD   OXYGEN Inhale into the lungs 2 lpm at bedtime   Yes ProviderShelia MD   dilTIAZem (TIAZAC) 300 MG extended release capsule Take 1 capsule by mouth daily 22  Yes Automatic Reconciliation, Ar   potassium chloride (KLOR-CON M) 20 MEQ extended release tablet Take 1 tablet by mouth daily 2/15/22  Yes Automatic Reconciliation, Ar   rivaroxaban (XARELTO) 20 MG TABS tablet Take 1 tablet by mouth Daily with lunch 2/15/22   Automatic Reconciliation, Ar       Current medications:    Current Facility-Administered Medications   Medication Dose Route Frequency

## 2024-11-04 NOTE — OP NOTE
abnormalities were noted and the left ureter stent was in place.  I used this as a map for my dilation with a NephroMax balloon.  I removed the dual-lumen, and then over the Amplatz Super Stiff wire, I placed my NephroMax balloon dilator just inside the left upper pole posterior calyx.  Once in position, I then inflated the balloon to 12 atmospheres of pressure, held this for 2 minutes, passed the access sheath over the balloon into the posterior upper pole calyx, deflated the balloon and backloaded off the wire.  Next, I inserted the rigid nephroscope through the access sheath into the left posterior pole calyx.  I then performed complete pyeloscopy of the patient's kidney.  There were few stone fragments that were able to be suctioned out with the LithoClast device using the rigid nephroscope.  I then switched to my flexible nephroscope to allow me to obtain visualization of the more difficult calices.  These revealed a few other stone fragments that were basketed out with the [employer] basket and sent for analysis.  Next, I then inspected each and every calyx in the kidney.  No other significant fragments remained.  I then switched to my flexible ureteroscope and performed a left antegrade ureteroscopy that showed no ureteral stones or fragments from the ureter by the kidney all the way down to the bladder.  The ureteroscope was then removed.  I then used my stent grasper through the rigid nephroscope to grasp the patient's stent and removed it completely intact.  I decided not to leave a new stent as the patient's ureter was clear of stone and she had been stented for quite some time, so her ureter was wide open.  At this point, after clearing the patient's kidney and ureter of stone on the left side, and no visible stone remained on intraoperative x-ray or direct visualization, I decided to end the case.  I, therefore, removed the flexible nephroscope and alongside the access sheath, I placed a 5-[place] open-ended  ureteral catheter down the wire to the level of the bladder.  I tied 2 tandem knots.  I then   sutured the ureteral catheter in place with a 2-0 nylon in a drain stitch fashion.  I then over the remaining wire through the access sheath, placed a 22 [place] catheter into the posterior upper pole calyx.  I inflated the balloon with 2 mL of sterile water.  I then cut the access sheath away from the catheter and sutured it in place with a 2-0 nylon in a drain stitch fashion.  The catheter was draining clear pink at the end of the case.  The patient was awoken from anesthesia, transferred to PACU in stable condition.  She tolerated the procedure well.  There were no complications.  All counts were correct at the end of the procedure.  She will stay in the hospital overnight on her IV Rocephin.  We will get a repeat CT in the morning to confirm no further stones remain and she will likely be discharged home tomorrow without a stent if all goes well and her tubes are removed successfully.    DRAINS:  Left nephrostomy tube and Zambrano catheter 16-[place] 2 way.        MD JEAN-PIERRE SINGLETON/LORI  D:  11/04/2024 17:16:49  T:  11/04/2024 17:40:58  JOB #:  [id number]

## 2024-11-04 NOTE — PROGRESS NOTES
Urology Progress Note    Admit Date: 10/28/2024    Subjective:     Patient has no new complaints. Afebrile.  On IV rocephin for pre-op treatment of UTI.  L NT in place.  Going to OR today for L PCNL.    Objective:     Patient Vitals for the past 8 hrs:   BP Temp Temp src Pulse Resp SpO2   11/04/24 0251 134/79 97.5 °F (36.4 °C) Oral 58 18 97 %     11/03 1901 - 11/04 0700  In: -   Out: 750 [Urine:750]  11/02 0701 - 11/03 1900  In: 780 [P.O.:780]  Out: 3980 [Urine:3980]    Physical Exam:   /79   Pulse 58   Temp 97.5 °F (36.4 °C) (Oral)   Resp 18   Ht 1.702 m (5' 7.01\")   Wt 117 kg (257 lb 15 oz)   SpO2 97%   BMI 40.39 kg/m²      GENERAL: No acute distress, Awake, Alert, Oriented X 3, Gait normal  CARDIAC: regular rate and rhythm  CHEST AND LUNG: Easy work of breathing,  ABDOMEN: soft, non tender, non-distended,  Back: L Nephrostomy tube in place draining clear yellow urine.         Data Review   Recent Results (from the past 24 hour(s))   POCT Glucose    Collection Time: 11/03/24  7:03 AM   Result Value Ref Range    POC Glucose 95 65 - 100 mg/dL    Performed by: Viviane    POCT Glucose    Collection Time: 11/03/24 11:16 AM   Result Value Ref Range    POC Glucose 93 65 - 100 mg/dL    Performed by: [employer]    POCT Glucose    Collection Time: 11/03/24  4:13 PM   Result Value Ref Range    POC Glucose 155 (H) 65 - 100 mg/dL    Performed by: [employer]    POCT Glucose    Collection Time: 11/03/24  7:36 PM   Result Value Ref Range    POC Glucose 198 (H) 65 - 100 mg/dL    Performed by: Pardeep            Assessment:     Principal Problem:    Kidney stone  Resolved Problems:    * No resolved hospital problems. *    69 year old female with left kidney stones and left ureter stent in place who presents for left pCNL.  Has left nephrostomy tube in and developed urosepsis after left nephrostomy tube placement.  Now has been treated with IV antibiotics and sepsis resolved.  Cleared by ID

## 2024-11-04 NOTE — ANESTHESIA POSTPROCEDURE EVALUATION
Department of Anesthesiology  Postprocedure Note    Patient: Brenda Mayer  MRN: [id number]  YOB: 1955  Date of evaluation: 11/4/2024    Procedure Summary       Date: 11/04/24 Room / Location: [facility] MAIN OR  / [facility] MAIN OR    Anesthesia Start: 1141 Anesthesia Stop: 1333    Procedure: NEPHROLITHOTOMY PERCUTANEOUS/LEFT/ ALREADY HAS TUBE PLACED (Left: Kidney) Diagnosis:       Kidney stone      (Kidney stone [N20.0])    Providers: Jay Nielson MD Responsible Provider: Ehsan Lomeli MD    Anesthesia Type: general ASA Status: 3            Anesthesia Type: No value filed.    Casimiro Phase I: Casimiro Score: 8    Casimiro Phase II:      Anesthesia Post Evaluation    Patient location during evaluation: PACU  Patient participation: complete - patient participated  Level of consciousness: awake and awake and alert  Airway patency: patent  Nausea & Vomiting: no nausea  Cardiovascular status: hemodynamically stable  Respiratory status: acceptable  Hydration status: euvolemic  Multimodal analgesia pain management approach  Pain management: adequate    No notable events documented.

## 2024-11-04 NOTE — PROGRESS NOTES
TRANSFER - IN REPORT:    Verbal report received from PACU on Brenda Mayer  being received from [facility] for ordered procedure      Report consisted of patient's Situation, Background, Assessment and   Recommendations(SBAR).     Information from the following report(s) Nurse Handoff Report was reviewed with the receiving nurse.    Opportunity for questions and clarification was provided.      Assessment completed upon patient's arrival to unit and care assumed.

## 2024-11-04 NOTE — PROGRESS NOTES
Rounds performed throughout shift. Pt denies needs at this time. Bed in low position, locked and call light/personal items within reach.

## 2024-11-04 NOTE — BRIEF OP NOTE
Brief Postoperative Note      Patient: Brenda Mayer  YOB: 1955  MRN: [id number]    Date of Procedure: 11/4/2024    Pre-Op Diagnosis Codes:      * Kidney stone [N20.0]    Post-Op Diagnosis: Same       Procedure:   Left Percutaneous Nephrolithotomy for stone burden < 2 cm, percutaneous dilation of nephrostomy tube tract, antegrade pyelogram, antegrade ureteroscopy, antegrade left ureter stent removal.     Surgeon(s):  Jay Nielson MD    Assistant:  * No surgical staff found *    Anesthesia: General    Estimated Blood Loss (mL): less than 50     Complications: None    Specimens:   Left kidney stone for analysis.     Implants:  * No implants in log *      Drains:   Nephrostomy Tube 1 Left Flank (Active)       Urinary Catheter 11/04/24 2 Way (Active)       [REMOVED] Nephrostomy Tube 1 Left Flank 10 fr (Removed)   Site Assessment No urethral drainage 11/02/24 1935   Dressing Status Clean, dry & intact 11/04/24 0748   Dressing Type Dry dressing 11/04/24 0748   Collection Container Standard 11/04/24 0748   Securement Method Other (Comment) 11/04/24 0748   Status Patent;Draining 11/04/24 0748   Urine Color Yellow 11/04/24 0748   Urine Appearance Clear 11/04/24 0748   Urine Odor Malodorous 11/02/24 0715   Output (mL) 100 mL 11/04/24 0748       [REMOVED] Urinary Catheter 08/09/24 2 Way (Removed)       [REMOVED] External Urinary Catheter (Removed)       [REMOVED] External Urinary Catheter (Removed)       [REMOVED] External Urinary Catheter (Removed)   Site Assessment Clean,dry & intact 11/03/24 2302   Placement Replaced 11/04/24 0918   Securement Method Other (Comment) 11/03/24 2302   Catheter Care Catheter/Wick replaced 11/04/24 0918   Perineal Care Yes 11/04/24 0918   Suction 40 mmgHg continuous 11/03/24 2302   Urine Color Yellow 11/03/24 2302   Urine Appearance Cloudy 11/03/24 2302   Urine Odor Malodorous 10/30/24 1436   Output (mL) 300 mL 11/03/24 2302       Findings:  Infection Present At Time  Of Surgery (PATOS) (choose all levels that have infection present):  No infection present  Other Findings: Stone fragments in left renal pelvis removed.  Left ureter stent removed.  Visibly stone free at end of procedure.     Electronically signed by Jay Nielson MD on 11/4/2024 at 1:13 PM

## 2024-11-04 NOTE — CARE COORDINATION
Pt is not medically ready for discharge, no needs anticipated at discharge, CM will continue to follow.

## 2024-11-04 NOTE — ANESTHESIA PROCEDURE NOTES
Airway  Date/Time: 11/4/2024 11:54 PM  Urgency: elective    Airway not difficult    General Information and Staff    Patient location during procedure: OR  Resident/CRNA: Jerrica Kurtz APRN - CRNA  Performed: resident/CRNA   Performed by: Jerrica Kurtz APRN - CRNA  Authorized by: Ehsan Lomeli MD      Indications and Patient Condition  Indications for airway management: anesthesia  Spontaneous Ventilation: absent  Sedation level: deep  Preoxygenated: yes  Patient position: sniffing  MILS not maintained throughout  Mask difficulty assessment: vent by bag mask    Final Airway Details  Final airway type: endotracheal airway      Successful airway: ETT  Cuffed: yes   Successful intubation technique: video laryngoscopy  Facilitating devices/methods: intubating stylet  Endotracheal tube insertion site: oral  Blade size: #3  ETT size (mm): 7.0  Cormack-Lehane Classification: grade I - full view of glottis  Placement verified by: chest auscultation and capnometry   Measured from: lips  ETT to lips (cm): 23  Number of attempts at approach: 1  Ventilation between attempts: bag mask  Number of other approaches attempted: 0    no

## 2024-11-04 NOTE — PERIOP NOTE
TRANSFER - OUT REPORT:    Verbal report given to Leticia KU on Brenda Mayer  being transferred to [facility] for routine post-op       Report consisted of patient's Situation, Background, Assessment and   Recommendations(SBAR).     Information from the following report(s) Adult Overview, Surgery Report, and MAR was reviewed with the receiving nurse.           Lines:   Peripheral IV 11/04/24 Right;Anterior;Dorsal Forearm (Active)   Site Assessment Clean, dry & intact 11/04/24 1332   Line Status Flushed;Infusing 11/04/24 1332   Line Care Connections checked and tightened 11/04/24 1332   Phlebitis Assessment No symptoms 11/04/24 1332   Infiltration Assessment 0 11/04/24 1332   Alcohol Cap Used No 11/04/24 1332   Dressing Status Clean, dry & intact 11/04/24 1332   Dressing Type Transparent 11/04/24 1332        Opportunity for questions and clarification was provided.      Patient transported with:  Tech

## 2024-11-05 ENCOUNTER — APPOINTMENT (OUTPATIENT)
Dept: CT IMAGING | Age: 69
DRG: 659 | End: 2024-11-05
Attending: UROLOGY
Payer: MEDICARE

## 2024-11-05 VITALS
DIASTOLIC BLOOD PRESSURE: 65 MMHG | WEIGHT: 257 LBS | OXYGEN SATURATION: 97 % | RESPIRATION RATE: 17 BRPM | HEIGHT: 67 IN | HEART RATE: 55 BPM | SYSTOLIC BLOOD PRESSURE: 116 MMHG | TEMPERATURE: 97.7 F | BODY MASS INDEX: 40.34 KG/M2

## 2024-11-05 LAB
ANION GAP SERPL CALC-SCNC: 12 MMOL/L (ref 7–16)
BASOPHILS # BLD: 0.1 K/UL (ref 0–0.2)
BASOPHILS NFR BLD: 1 % (ref 0–2)
BUN SERPL-MCNC: 12 MG/DL (ref 8–23)
CALCIUM SERPL-MCNC: 9.1 MG/DL (ref 8.8–10.2)
CHLORIDE SERPL-SCNC: 99 MMOL/L (ref 98–107)
CO2 SERPL-SCNC: 27 MMOL/L (ref 20–29)
CREAT SERPL-MCNC: 0.98 MG/DL (ref 0.6–1.1)
DIFFERENTIAL METHOD BLD: ABNORMAL
EOSINOPHIL # BLD: 0.2 K/UL (ref 0–0.8)
EOSINOPHIL NFR BLD: 2 % (ref 0.5–7.8)
ERYTHROCYTE [DISTWIDTH] IN BLOOD BY AUTOMATED COUNT: 19.6 % (ref 11.9–14.6)
GLUCOSE BLD STRIP.AUTO-MCNC: 142 MG/DL (ref 65–100)
GLUCOSE BLD STRIP.AUTO-MCNC: 153 MG/DL (ref 65–100)
GLUCOSE BLD STRIP.AUTO-MCNC: 154 MG/DL (ref 65–100)
GLUCOSE SERPL-MCNC: 117 MG/DL (ref 70–99)
HCT VFR BLD AUTO: 36.1 % (ref 35.8–46.3)
HGB BLD-MCNC: 10.7 G/DL (ref 11.7–15.4)
IMM GRANULOCYTES # BLD AUTO: 0.1 K/UL (ref 0–0.5)
IMM GRANULOCYTES NFR BLD AUTO: 1 % (ref 0–5)
LYMPHOCYTES # BLD: 1.3 K/UL (ref 0.5–4.6)
LYMPHOCYTES NFR BLD: 11 % (ref 13–44)
MCH RBC QN AUTO: 27.3 PG (ref 26.1–32.9)
MCHC RBC AUTO-ENTMCNC: 29.6 G/DL (ref 31.4–35)
MCV RBC AUTO: 92.1 FL (ref 82–102)
MONOCYTES # BLD: 0.6 K/UL (ref 0.1–1.3)
MONOCYTES NFR BLD: 6 % (ref 4–12)
NEUTS SEG # BLD: 9.2 K/UL (ref 1.7–8.2)
NEUTS SEG NFR BLD: 80 % (ref 43–78)
NRBC # BLD: 0 K/UL (ref 0–0.2)
PLATELET # BLD AUTO: 310 K/UL (ref 150–450)
PMV BLD AUTO: 9.8 FL (ref 9.4–12.3)
POTASSIUM SERPL-SCNC: 4.1 MMOL/L (ref 3.5–5.1)
RBC # BLD AUTO: 3.92 M/UL (ref 4.05–5.2)
SERVICE CMNT-IMP: ABNORMAL
SODIUM SERPL-SCNC: 138 MMOL/L (ref 136–145)
WBC # BLD AUTO: 11.6 K/UL (ref 4.3–11.1)

## 2024-11-05 PROCEDURE — 80048 BASIC METABOLIC PNL TOTAL CA: CPT

## 2024-11-05 PROCEDURE — 82962 GLUCOSE BLOOD TEST: CPT

## 2024-11-05 PROCEDURE — 6370000000 HC RX 637 (ALT 250 FOR IP): Performed by: NURSE PRACTITIONER

## 2024-11-05 PROCEDURE — 99231 SBSQ HOSP IP/OBS SF/LOW 25: CPT | Performed by: NURSE PRACTITIONER

## 2024-11-05 PROCEDURE — 85025 COMPLETE CBC W/AUTO DIFF WBC: CPT

## 2024-11-05 PROCEDURE — 6370000000 HC RX 637 (ALT 250 FOR IP): Performed by: UROLOGY

## 2024-11-05 PROCEDURE — 74176 CT ABD & PELVIS W/O CONTRAST: CPT

## 2024-11-05 PROCEDURE — 36415 COLL VENOUS BLD VENIPUNCTURE: CPT

## 2024-11-05 RX ORDER — CEPHALEXIN 500 MG/1
500 CAPSULE ORAL 4 TIMES DAILY
Qty: 5 CAPSULE | Refills: 0 | Status: SHIPPED | OUTPATIENT
Start: 2024-11-05

## 2024-11-05 RX ORDER — OXYCODONE AND ACETAMINOPHEN 5; 325 MG/1; MG/1
1 TABLET ORAL EVERY 4 HOURS PRN
Qty: 12 TABLET | Refills: 0 | Status: SHIPPED | OUTPATIENT
Start: 2024-11-05 | End: 2024-11-08

## 2024-11-05 RX ADMIN — DILTIAZEM HYDROCHLORIDE 300 MG: 180 CAPSULE, COATED, EXTENDED RELEASE ORAL at 08:18

## 2024-11-05 RX ADMIN — FLUCONAZOLE 200 MG: 100 TABLET ORAL at 08:18

## 2024-11-05 RX ADMIN — FUROSEMIDE 60 MG: 40 TABLET ORAL at 08:18

## 2024-11-05 RX ADMIN — OXYCODONE HYDROCHLORIDE AND ACETAMINOPHEN 1 TABLET: 5; 325 TABLET ORAL at 10:06

## 2024-11-05 RX ADMIN — ACETAMINOPHEN 650 MG: 325 TABLET ORAL at 16:05

## 2024-11-05 RX ADMIN — POTASSIUM CHLORIDE 40 MEQ: 1500 TABLET, EXTENDED RELEASE ORAL at 08:18

## 2024-11-05 RX ADMIN — OXYCODONE HYDROCHLORIDE AND ACETAMINOPHEN 1 TABLET: 5; 325 TABLET ORAL at 06:06

## 2024-11-05 RX ADMIN — DIGOXIN 0.25 MG: 250 TABLET ORAL at 08:18

## 2024-11-05 ASSESSMENT — PAIN SCALES - GENERAL
PAINLEVEL_OUTOF10: 6
PAINLEVEL_OUTOF10: 4
PAINLEVEL_OUTOF10: 6
PAINLEVEL_OUTOF10: 3
PAINLEVEL_OUTOF10: 0

## 2024-11-05 ASSESSMENT — PAIN DESCRIPTION - ORIENTATION
ORIENTATION: LEFT

## 2024-11-05 ASSESSMENT — PAIN DESCRIPTION - LOCATION
LOCATION: FLANK

## 2024-11-05 ASSESSMENT — PAIN DESCRIPTION - DESCRIPTORS
DESCRIPTORS: SHARP
DESCRIPTORS: SHARP
DESCRIPTORS: DISCOMFORT
DESCRIPTORS: ACHING

## 2024-11-05 NOTE — PLAN OF CARE
Problem: Chronic Conditions and Co-morbidities  Goal: Patient's chronic conditions and co-morbidity symptoms are monitored and maintained or improved  Outcome: Progressing     Problem: Discharge Planning  Goal: Discharge to home or other facility with appropriate resources  11/5/2024 0504 by Deloris Santana RN  Outcome: Progressing  11/4/2024 1551 by Leticia Telles RN  Outcome: Progressing     Problem: ABCDS Injury Assessment  Goal: Absence of physical injury  Outcome: Progressing     Problem: Pain  Goal: Verbalizes/displays adequate comfort level or baseline comfort level  11/5/2024 0504 by Deloris Santana RN  Outcome: Progressing  11/4/2024 1551 by Leticia Telles RN  Outcome: Progressing     Problem: Safety - Adult  Goal: Free from fall injury  11/5/2024 0504 by Deloris Santana RN  Outcome: Progressing  11/4/2024 1551 by Leticia Telles RN  Outcome: Progressing

## 2024-11-05 NOTE — DISCHARGE SUMMARY
Discharge Summary    Date: 11/5/2024  Patient Name: Brenda Mayer    YOB: 1955     Age: 69 y.o.    Admit Date: 10/28/2024  Discharge Date: 11/5/2024  Discharge Condition: Stable    Admission Diagnosis  Kidney stone [N20.0]      Discharge Diagnosis  Principal Problem:    Kidney stone  Resolved Problems:    * No resolved hospital problems. *      Hospital Stay  Narrative of Hospital Course:   The patient is a 69 y.o. female who presents for IR guided left nephrostomy catheter placement under moderate sedation.     Patient has history of bilateral large kidney stone burden with bilateral indwelling ureteral stents.  She is to undergo left PCNL tomorrow.  Pain is worse on the left side.     Patient is n.p.o. today and has held her Xarelto over 48 hours.  11/4/24 underwent  Left percutaneous nephrolithotomy for stone burden less than 2 cm, percutaneous dilation of nephrostomy tube tract, antegrade left pyelogram, antegrade left ureteroscopy, antegrade left ureteral stent removal with Dr. Nielson.    Her hospital stay was prolong to treat urosepsis.\    11/5/24 discharged home in stable condition.    Consultants:  IP CONSULT TO RESPIRATORY CARE  IP CONSULT TO PHARMACY  IP CONSULT TO INFECTIOUS DISEASES  IP CONSULT TO VASCULAR ACCESS TEAM  IP CONSULT TO VASCULAR ACCESS TEAM    Surgeries/procedures Performed:   Left percutaneous nephrolithotomy for stone burden less than 2 cm, percutaneous dilation of nephrostomy tube tract, antegrade left pyelogram, antegrade left ureteroscopy, antegrade left ureteral stent removal.     Treatments:    Analgesia and IV Hydration        Discharge Plan/Disposition:  Home    Hospital/Incidental Findings Requiring Follow Up:    Patient Instructions:    Diet: Regular Diet    Activity:Activity as Tolerated  For number of days (if applicable):      Other Instructions:    Provider Follow-Up:   No follow-ups on file.     Significant Diagnostic Studies:    Recent

## 2024-11-05 NOTE — PROGRESS NOTES
Hourly rounds performed this shift. Bed lowered and locked. Call light within reach. Chair alarm on. Pt on 2L NC. PRN pain medication given per MAR. Dressing clean, dry, and intact.

## 2024-11-05 NOTE — PROGRESS NOTES
Admit Date: 10/28/2024      Subjective:     Brenda Mayer is POD 1 Procedure(s):  NEPHROLITHOTOMY PERCUTANEOUS/LEFT/ ALREADY HAS TUBE PLACED    No new complaints.    Objective:     Patient Vitals for the past 8 hrs:   BP Temp Temp src Pulse Resp SpO2   11/05/24 0735 116/65 97.7 °F (36.5 °C) Oral 55 17 97 %   11/05/24 0636 -- -- -- -- 16 --   11/05/24 0606 -- -- -- -- 17 --   11/05/24 0403 (!) 147/68 97.3 °F (36.3 °C) Oral 68 12 98 %     No intake/output data recorded.  11/03 1901 - 11/05 0700  In: 500 [I.V.:500]  Out: 2525 [Urine:2500]    Physical Exam:  GENERAL ASSESSMENT: alert, oriented to person, place and time, no acute distress and no anxiety, depression or agitation  Chest: normal work of breathing  CVS exam: normal rate, regular rhythm, normal S1, S2, no murmurs, rubs, clicks or gallops.  ABDOMEN: not done  Neurological exam reveals alert, oriented, normal speech, no focal findings or movement disorder noted.  FEMALE GENITOURINARY EXAM: not done  MALE GENITAL EXAM: not done    Data Review   Recent Results (from the past 24 hour(s))   POCT Glucose    Collection Time: 11/04/24 10:04 AM   Result Value Ref Range    POC Glucose 77 65 - 100 mg/dL    Performed by: More    POCT Glucose    Collection Time: 11/04/24  1:45 PM   Result Value Ref Range    POC Glucose 84 65 - 100 mg/dL    Performed by: Mehrdad    POCT Glucose    Collection Time: 11/04/24  4:34 PM   Result Value Ref Range    POC Glucose 85 65 - 100 mg/dL    Performed by: Treasure    POCT Glucose    Collection Time: 11/04/24  7:08 PM   Result Value Ref Range    POC Glucose 165 (H) 65 - 100 mg/dL    Performed by: SerjioyPCT    Basic Metabolic Panel    Collection Time: 11/05/24  5:33 AM   Result Value Ref Range    Sodium 138 136 - 145 mmol/L    Potassium 4.1 3.5 - 5.1 mmol/L    Chloride 99 98 - 107 mmol/L    CO2 27 20 - 29 mmol/L    Anion Gap 12 7 - 16 mmol/L    Glucose 117 (H) 70 - 99 mg/dL    BUN 12 8 - 23 MG/DL     [id number]  Phone: [phone]  Fax: [phone]

## 2024-11-05 NOTE — PROGRESS NOTES
Pt resting in bed, denies needs at this time.  PRN pain med given 1x.  PCNL completed this shift see Urology MD note.  Zambrano patent and draining.  Left neph tube draining appropriately.  Hourly rounds completed.  Bed locked and lowered into lowest position.  Call light and personal items within reach.  Report given to oncoming nurse.

## 2024-11-13 ENCOUNTER — OFFICE VISIT (OUTPATIENT)
Dept: PULMONOLOGY | Age: 69
End: 2024-11-13
Payer: MEDICARE

## 2024-11-13 VITALS
BODY MASS INDEX: 40.09 KG/M2 | TEMPERATURE: 97.4 F | SYSTOLIC BLOOD PRESSURE: 122 MMHG | HEIGHT: 67 IN | RESPIRATION RATE: 18 BRPM | DIASTOLIC BLOOD PRESSURE: 68 MMHG | HEART RATE: 72 BPM | WEIGHT: 255.4 LBS | OXYGEN SATURATION: 96 %

## 2024-11-13 DIAGNOSIS — I50.32 CHRONIC DIASTOLIC HEART FAILURE (HCC): ICD-10-CM

## 2024-11-13 DIAGNOSIS — G47.33 OSA (OBSTRUCTIVE SLEEP APNEA): ICD-10-CM

## 2024-11-13 DIAGNOSIS — G47.34 NOCTURNAL HYPOXEMIA: ICD-10-CM

## 2024-11-13 DIAGNOSIS — E66.01 OBESITY, MORBID (MORE THAN 100 LBS OVER IDEAL WEIGHT OR BMI > 40): ICD-10-CM

## 2024-11-13 DIAGNOSIS — J98.4 RESTRICTIVE LUNG DISEASE: Primary | ICD-10-CM

## 2024-11-13 DIAGNOSIS — R60.0 LOWER EXTREMITY EDEMA: ICD-10-CM

## 2024-11-13 DIAGNOSIS — I27.20 PULMONARY HYPERTENSION (HCC): ICD-10-CM

## 2024-11-13 DIAGNOSIS — J96.12 CHRONIC RESPIRATORY FAILURE WITH HYPERCAPNIA: ICD-10-CM

## 2024-11-13 PROCEDURE — 3074F SYST BP LT 130 MM HG: CPT | Performed by: NURSE PRACTITIONER

## 2024-11-13 PROCEDURE — 1036F TOBACCO NON-USER: CPT | Performed by: NURSE PRACTITIONER

## 2024-11-13 PROCEDURE — G8400 PT W/DXA NO RESULTS DOC: HCPCS | Performed by: NURSE PRACTITIONER

## 2024-11-13 PROCEDURE — G8427 DOCREV CUR MEDS BY ELIG CLIN: HCPCS | Performed by: NURSE PRACTITIONER

## 2024-11-13 PROCEDURE — G8417 CALC BMI ABV UP PARAM F/U: HCPCS | Performed by: NURSE PRACTITIONER

## 2024-11-13 PROCEDURE — G8484 FLU IMMUNIZE NO ADMIN: HCPCS | Performed by: NURSE PRACTITIONER

## 2024-11-13 PROCEDURE — 1126F AMNT PAIN NOTED NONE PRSNT: CPT | Performed by: NURSE PRACTITIONER

## 2024-11-13 PROCEDURE — 1090F PRES/ABSN URINE INCON ASSESS: CPT | Performed by: NURSE PRACTITIONER

## 2024-11-13 PROCEDURE — 3017F COLORECTAL CA SCREEN DOC REV: CPT | Performed by: NURSE PRACTITIONER

## 2024-11-13 PROCEDURE — 3078F DIAST BP <80 MM HG: CPT | Performed by: NURSE PRACTITIONER

## 2024-11-13 PROCEDURE — G2211 COMPLEX E/M VISIT ADD ON: HCPCS | Performed by: NURSE PRACTITIONER

## 2024-11-13 PROCEDURE — 1111F DSCHRG MED/CURRENT MED MERGE: CPT | Performed by: NURSE PRACTITIONER

## 2024-11-13 PROCEDURE — 1160F RVW MEDS BY RX/DR IN RCRD: CPT | Performed by: NURSE PRACTITIONER

## 2024-11-13 PROCEDURE — 99214 OFFICE O/P EST MOD 30 MIN: CPT | Performed by: NURSE PRACTITIONER

## 2024-11-13 PROCEDURE — 1159F MED LIST DOCD IN RCRD: CPT | Performed by: NURSE PRACTITIONER

## 2024-11-13 PROCEDURE — 1123F ACP DISCUSS/DSCN MKR DOCD: CPT | Performed by: NURSE PRACTITIONER

## 2024-11-13 RX ORDER — SEMAGLUTIDE 0.68 MG/ML
INJECTION, SOLUTION SUBCUTANEOUS
COMMUNITY

## 2024-11-13 NOTE — PROGRESS NOTES
obesity and kyphosis.  No evidence of ILD or pulmonary pathology on CT scan.  Recently hospitalized 4/3/24 thru 4/9/24 with volume overload and acute cystitis.  BNP was 1026 on admission.  Was diuresed.  Echo revealed RVSP of 42 mm Hg.  Has chronic dHF and is on Lasix 60 mg daily.       Has known JEISON and nocturnal O2.  Reports nightly compliance and benefit.  Admits to wearing trilogy device for about 5 hours a night.  Her sleep is very fragmented due to multiple animals sleeping in the bedroom.  She does not wear her trilogy or O2 when she takes a daily nap--this frustrates her son.      Has chronic LE edema despite Lasix.  Denies excessive fluid intake.    Has been started on Ozempic and her weight is down 30 pounds since last visit.           REVIEW OF SYSTEMS: 10 point review of systems is negative except as reported in HPI.    PHYSICAL EXAM: Body mass index is 40 kg/m².  Vitals:    11/13/24 0946   BP: 122/68   Pulse: 72   Resp: 18   Temp: 97.4 °F (36.3 °C)   TempSrc: Temporal   SpO2: 96%   Weight: 115.8 kg (255 lb 6.4 oz)   Height: 1.702 m (5' 7\")         General:   Alert, cooperative, no distress, appears stated age.        Eyes:   Conjunctivae/corneas clear. PERRL        Mouth/Throat:  Lips, mucosa, and tongue normal. Teeth and gums normal.        Lungs:     Breath sounds clear.     Heart:   Regular rate and rhythm, S1, S2 normal, no murmur, click, rub or gallop.     Abdomen:    Soft, non-tender.     Extremities:  Extremities normal, atraumatic, no cyanosis; 2-3+ LEedema.     Skin:  Skin color normal. No rashes or lesions     Neurologic:  A&Ox3     DIAGNOSTIC TESTS:                                                                                    LABS:   Lab Results   Component Value Date/Time    WBC 11.6 11/05/2024 06:14 AM    HGB 10.7 11/05/2024 06:14 AM    HCT 36.1 11/05/2024 06:14 AM     11/05/2024 06:14 AM    TSH 1.240 02/07/2022 10:01 PM     Imaging: I performed an independent interpretation of

## 2024-11-18 LAB
FUNGAL CULT/SMEAR: NORMAL
FUNGUS (MYCOLOGY) CULTURE: POSITIVE
FUNGUS SMEAR: ABNORMAL
REFLEX TO ID: ABNORMAL
SPECIMEN PROCESSING: NORMAL
SPECIMEN SOURCE: ABNORMAL
SPECIMEN SOURCE: NORMAL

## 2024-11-18 NOTE — PROGRESS NOTES
[facility] CARDIOLOGY Follow Up                 Reason for Visit: Chronic HFpEF    Subjective:     Patient is a 69 y.o. female with a PMH of chronic HFpEF, aortic stenosis, longstanding persistent atrial fibrillation, hypertension, chronic lung disease (restrictive lung disease and JEISON) and diabetes who presents for follow-up.  The patient was last seen in May 2024.  She was hospitalized in November 2024 for for urological procedures.  She had a TTE in April 2024 that was noted to demonstrate a normal EF and aortic stenosis. Her peak velocity was noted to be 2.6 m/s and VTI ratio 0.52. The patient denies angina and dyspnea.    Past Medical History:   Diagnosis Date    Anemia     Atrial fibrillation (HCC)     Chronic pain     pain R knee    Diabetes mellitus (HCC)     oral medication and insulin, avg fasting 115-130, no hypo sx, last A1C 7.5 7/12/24    Heart failure (HCC)     ECHO 4/4/24 EF 60-65%    Hypertension     managed with med    Morbid obesity     BMI 45.8- 5/2/12    Positive PPD     had vaccination as a child - BCG    Sleep apnea     uses Bi-Pap      Past Surgical History:   Procedure Laterality Date    COLONOSCOPY N/A 2/6/2022    COLONOSCOPY/ BMI 56 ROOM 302 performed by Thaddeus Sharif MD at [facility] ENDOSCOPY    CYSTOSCOPY Left 08/09/2024    CYSTOSCOPY LEFT URETERAL STENT INSERTION AND LEFT RETROGRADE performed by Michael Short MD at [facility] MAIN OR    CYSTOSCOPY Left 8/22/2024    CYSTOSCOPY URETERAL STENT INSERTION performed by Michael Short MD at [facility] MAIN OR    CYSTOSCOPY Left 8/22/2024    CYSTOSCOPY RETROGRADE PYELOGRAM performed by Michael Short MD at [facility] MAIN OR    GYN      C-sec x 1 with GA    IR NEPHROSTOMY PERCUTANEOUS LEFT  10/28/2024    IR NEPHROSTOMY PERCUTANEOUS LEFT 10/28/2024 [facility] RADIOLOGY SPECIALS    KIDNEY STONE REMOVAL Left 11/4/2024    NEPHROLITHOTOMY PERCUTANEOUS/LEFT/ ALREADY HAS TUBE PLACED performed by Jay Nielson MD at [facility] OR    TOTAL KNEE ARTHROPLASTY Left

## 2024-11-20 ENCOUNTER — OFFICE VISIT (OUTPATIENT)
Age: 69
End: 2024-11-20
Payer: MEDICARE

## 2024-11-20 VITALS
HEART RATE: 80 BPM | HEIGHT: 67 IN | DIASTOLIC BLOOD PRESSURE: 78 MMHG | BODY MASS INDEX: 40 KG/M2 | SYSTOLIC BLOOD PRESSURE: 124 MMHG

## 2024-11-20 DIAGNOSIS — I35.0 AORTIC VALVE STENOSIS, ETIOLOGY OF CARDIAC VALVE DISEASE UNSPECIFIED: ICD-10-CM

## 2024-11-20 DIAGNOSIS — I10 HYPERTENSION, UNSPECIFIED TYPE: ICD-10-CM

## 2024-11-20 DIAGNOSIS — I48.11 LONGSTANDING PERSISTENT ATRIAL FIBRILLATION (HCC): ICD-10-CM

## 2024-11-20 DIAGNOSIS — I50.32 CHRONIC HEART FAILURE WITH PRESERVED EJECTION FRACTION (HFPEF) (HCC): Primary | ICD-10-CM

## 2024-11-20 LAB
FUNGUS IDENTIFICATION: NORMAL
SPECIMEN SOURCE: NORMAL

## 2024-11-20 PROCEDURE — 1126F AMNT PAIN NOTED NONE PRSNT: CPT | Performed by: INTERNAL MEDICINE

## 2024-11-20 PROCEDURE — 1090F PRES/ABSN URINE INCON ASSESS: CPT | Performed by: INTERNAL MEDICINE

## 2024-11-20 PROCEDURE — 1123F ACP DISCUSS/DSCN MKR DOCD: CPT | Performed by: INTERNAL MEDICINE

## 2024-11-20 PROCEDURE — 1111F DSCHRG MED/CURRENT MED MERGE: CPT | Performed by: INTERNAL MEDICINE

## 2024-11-20 PROCEDURE — G8417 CALC BMI ABV UP PARAM F/U: HCPCS | Performed by: INTERNAL MEDICINE

## 2024-11-20 PROCEDURE — 3078F DIAST BP <80 MM HG: CPT | Performed by: INTERNAL MEDICINE

## 2024-11-20 PROCEDURE — G8484 FLU IMMUNIZE NO ADMIN: HCPCS | Performed by: INTERNAL MEDICINE

## 2024-11-20 PROCEDURE — 1159F MED LIST DOCD IN RCRD: CPT | Performed by: INTERNAL MEDICINE

## 2024-11-20 PROCEDURE — 3017F COLORECTAL CA SCREEN DOC REV: CPT | Performed by: INTERNAL MEDICINE

## 2024-11-20 PROCEDURE — 1036F TOBACCO NON-USER: CPT | Performed by: INTERNAL MEDICINE

## 2024-11-20 PROCEDURE — G8400 PT W/DXA NO RESULTS DOC: HCPCS | Performed by: INTERNAL MEDICINE

## 2024-11-20 PROCEDURE — 3074F SYST BP LT 130 MM HG: CPT | Performed by: INTERNAL MEDICINE

## 2024-11-20 PROCEDURE — 1160F RVW MEDS BY RX/DR IN RCRD: CPT | Performed by: INTERNAL MEDICINE

## 2024-11-20 PROCEDURE — 99214 OFFICE O/P EST MOD 30 MIN: CPT | Performed by: INTERNAL MEDICINE

## 2024-11-20 PROCEDURE — G8427 DOCREV CUR MEDS BY ELIG CLIN: HCPCS | Performed by: INTERNAL MEDICINE

## 2024-12-18 ENCOUNTER — TELEPHONE (OUTPATIENT)
Dept: UROLOGY | Age: 69
End: 2024-12-18

## 2024-12-18 ENCOUNTER — OFFICE VISIT (OUTPATIENT)
Dept: UROLOGY | Age: 69
End: 2024-12-18
Payer: MEDICARE

## 2024-12-18 DIAGNOSIS — N20.0 KIDNEY STONE: Primary | ICD-10-CM

## 2024-12-18 DIAGNOSIS — N30.01 ACUTE CYSTITIS WITH HEMATURIA: ICD-10-CM

## 2024-12-18 LAB
BILIRUBIN, URINE, POC: NEGATIVE
BLOOD URINE, POC: ABNORMAL
GLUCOSE URINE, POC: NEGATIVE MG/DL
KETONES, URINE, POC: NEGATIVE MG/DL
LEUKOCYTE ESTERASE, URINE, POC: ABNORMAL
NITRITE, URINE, POC: POSITIVE
PH, URINE, POC: 5.5 (ref 4.6–8)
PROTEIN,URINE, POC: 100 MG/DL
SPECIFIC GRAVITY, URINE, POC: 1.02 (ref 1–1.03)
URINALYSIS CLARITY, POC: ABNORMAL
URINALYSIS COLOR, POC: ABNORMAL
UROBILINOGEN, POC: ABNORMAL MG/DL

## 2024-12-18 PROCEDURE — G8400 PT W/DXA NO RESULTS DOC: HCPCS | Performed by: UROLOGY

## 2024-12-18 PROCEDURE — 1159F MED LIST DOCD IN RCRD: CPT | Performed by: UROLOGY

## 2024-12-18 PROCEDURE — G8484 FLU IMMUNIZE NO ADMIN: HCPCS | Performed by: UROLOGY

## 2024-12-18 PROCEDURE — 1036F TOBACCO NON-USER: CPT | Performed by: UROLOGY

## 2024-12-18 PROCEDURE — G8427 DOCREV CUR MEDS BY ELIG CLIN: HCPCS | Performed by: UROLOGY

## 2024-12-18 PROCEDURE — 1123F ACP DISCUSS/DSCN MKR DOCD: CPT | Performed by: UROLOGY

## 2024-12-18 PROCEDURE — 74018 RADEX ABDOMEN 1 VIEW: CPT | Performed by: UROLOGY

## 2024-12-18 PROCEDURE — 1090F PRES/ABSN URINE INCON ASSESS: CPT | Performed by: UROLOGY

## 2024-12-18 PROCEDURE — 99215 OFFICE O/P EST HI 40 MIN: CPT | Performed by: UROLOGY

## 2024-12-18 PROCEDURE — G8417 CALC BMI ABV UP PARAM F/U: HCPCS | Performed by: UROLOGY

## 2024-12-18 PROCEDURE — 81003 URINALYSIS AUTO W/O SCOPE: CPT | Performed by: UROLOGY

## 2024-12-18 PROCEDURE — 3017F COLORECTAL CA SCREEN DOC REV: CPT | Performed by: UROLOGY

## 2024-12-18 NOTE — TELEPHONE ENCOUNTER
----- Message from Dr. Jay Nielson MD sent at 12/18/2024  9:00 AM EST -----  Regarding: Surgery Scheduling  Hospital: [facility]    Surgeon: Nisreen    Assist: NONE    Diagnosis: Right Kidney Stones    Procedure: Right PCNL    Posting time: 2 hours    Special Instruments Needed:  NONE    Anesthesia: GENERAL    Labs: CBC, BMP, U/A    Tests: EKG per anesthesia    Blood: NONE    Bowel Prep: NONE    Special Instructions: needs clearance to hold blood thinner for surgery.      Orders/HandP:     Follow up appointment: cysto / stent pull 10-14 days after surgery.

## 2024-12-31 ENCOUNTER — PREP FOR PROCEDURE (OUTPATIENT)
Dept: UROLOGY | Age: 69
End: 2024-12-31

## 2024-12-31 DIAGNOSIS — N20.0 RIGHT KIDNEY STONE: Primary | ICD-10-CM

## 2024-12-31 RX ORDER — SODIUM CHLORIDE 0.9 % (FLUSH) 0.9 %
5-40 SYRINGE (ML) INJECTION EVERY 12 HOURS SCHEDULED
OUTPATIENT
Start: 2024-12-31

## 2024-12-31 RX ORDER — SODIUM CHLORIDE 9 MG/ML
INJECTION, SOLUTION INTRAVENOUS PRN
OUTPATIENT
Start: 2024-12-31

## 2024-12-31 RX ORDER — SODIUM CHLORIDE 0.9 % (FLUSH) 0.9 %
5-40 SYRINGE (ML) INJECTION PRN
OUTPATIENT
Start: 2024-12-31

## 2024-12-31 NOTE — TELEPHONE ENCOUNTER
Procedures: Procedure(s):   RIGHT NEPHROLITHOTOMY PERCUTANEOUS / ALREADY HAS TUBE PLACED   Date: 2/6/2025   Time: 1417   Location: [facility] MAIN OR 08     Scheduled.  Please complete orders.  Post op appt scheduled.

## 2025-01-02 DIAGNOSIS — N20.0 KIDNEY STONE: Primary | ICD-10-CM

## 2025-01-20 ENCOUNTER — TELEPHONE (OUTPATIENT)
Dept: UROLOGY | Age: 70
End: 2025-01-20

## 2025-01-20 NOTE — TELEPHONE ENCOUNTER
Cardiac Pre-operative Assessment      Physician or Practice Requesting Medication Clearance or Risk Assessment: Dr. Nielson    : Shahla Avelina    Contact Phone Number: [phone]    Fax Number: [phone]    Date of Surgery/Procedure: 2/6/25    Type of Surgery or Procedure: nephrolithotomy      Medications to hold Xarelto    # Days pre-procedure to hold 5 days    Restart medication ______    Risk assessment:High      Please advise if the patient is cleared to hold blood thinners for surgery.  Thank you.

## 2025-01-21 NOTE — TELEPHONE ENCOUNTER
Copy of this triage note giving clearance FAX'd to Shahla Urbina with Dr. Nielson at [phone].

## 2025-01-21 NOTE — TELEPHONE ENCOUNTER
Tom Dunbar MD Keener, Lynn F RN  Caller: Unspecified (Yesterday,  2:20 PM)  - Recommend management of Xarelto perioperatively, as below, with bleeding risk determined by the     High bleeding risk  - give last dose Xarelto three days before procedure (ie, skip two doses on the two days before the procedure)  - resume Xarelto 48 to 72 hours after surgery (ie, postoperative day 2 to 3)    Low bleeding risk  - give last dose Xarelto two days before procedure (ie, skip one dose on the day before the procedure)  - resume Xarelto 24 hours after surgery (ie, postoperative day 1)

## 2025-01-30 ENCOUNTER — TELEPHONE (OUTPATIENT)
Dept: SURGERY | Age: 70
End: 2025-01-30

## 2025-01-30 ENCOUNTER — HOSPITAL ENCOUNTER (OUTPATIENT)
Dept: SURGERY | Age: 70
Discharge: HOME | End: 2025-01-30
Payer: MEDICARE

## 2025-01-30 ENCOUNTER — TELEPHONE (OUTPATIENT)
Dept: UROLOGY | Age: 70
End: 2025-01-30

## 2025-01-30 VITALS
DIASTOLIC BLOOD PRESSURE: 54 MMHG | WEIGHT: 272.8 LBS | TEMPERATURE: 97.3 F | OXYGEN SATURATION: 94 % | HEIGHT: 67 IN | RESPIRATION RATE: 16 BRPM | HEART RATE: 68 BPM | BODY MASS INDEX: 42.82 KG/M2 | SYSTOLIC BLOOD PRESSURE: 127 MMHG

## 2025-01-30 DIAGNOSIS — N20.0 RIGHT KIDNEY STONE: ICD-10-CM

## 2025-01-30 LAB
ANION GAP SERPL CALC-SCNC: 14 MMOL/L (ref 7–16)
APPEARANCE UR: ABNORMAL
BACTERIA URNS QL MICRO: ABNORMAL /HPF
BILIRUB UR QL: NEGATIVE
BUN SERPL-MCNC: 25 MG/DL (ref 8–23)
CALCIUM SERPL-MCNC: 9.2 MG/DL (ref 8.8–10.2)
CHLORIDE SERPL-SCNC: 101 MMOL/L (ref 98–107)
CO2 SERPL-SCNC: 23 MMOL/L (ref 20–29)
COLOR UR: ABNORMAL
CREAT SERPL-MCNC: 1 MG/DL (ref 0.6–1.1)
CRYSTALS URNS QL MICRO: ABNORMAL /LPF
EPI CELLS #/AREA URNS HPF: ABNORMAL /HPF
ERYTHROCYTE [DISTWIDTH] IN BLOOD BY AUTOMATED COUNT: 15.7 % (ref 11.9–14.6)
GLUCOSE SERPL-MCNC: 168 MG/DL (ref 70–99)
GLUCOSE UR STRIP.AUTO-MCNC: NEGATIVE MG/DL
HCT VFR BLD AUTO: 34.3 % (ref 35.8–46.3)
HGB BLD-MCNC: 10.7 G/DL (ref 11.7–15.4)
HGB UR QL STRIP: ABNORMAL
KETONES UR QL STRIP.AUTO: NEGATIVE MG/DL
LEUKOCYTE ESTERASE UR QL STRIP.AUTO: ABNORMAL
MCH RBC QN AUTO: 27.3 PG (ref 26.1–32.9)
MCHC RBC AUTO-ENTMCNC: 31.2 G/DL (ref 31.4–35)
MCV RBC AUTO: 87.5 FL (ref 82–102)
NITRITE UR QL STRIP.AUTO: POSITIVE
NRBC # BLD: 0 K/UL (ref 0–0.2)
PH UR STRIP: 5 (ref 5–9)
PLATELET # BLD AUTO: 287 K/UL (ref 150–450)
PMV BLD AUTO: 10.6 FL (ref 9.4–12.3)
POTASSIUM SERPL-SCNC: 4.1 MMOL/L (ref 3.5–5.1)
PROT UR STRIP-MCNC: 30 MG/DL
RBC # BLD AUTO: 3.92 M/UL (ref 4.05–5.2)
RBC #/AREA URNS HPF: >100 /HPF
SODIUM SERPL-SCNC: 138 MMOL/L (ref 136–145)
SP GR UR REFRACTOMETRY: 1.02 (ref 1–1.02)
UROBILINOGEN UR QL STRIP.AUTO: 0.2 EU/DL (ref 0.2–1)
WBC # BLD AUTO: 12.3 K/UL (ref 4.3–11.1)
WBC URNS QL MICRO: >100 /HPF

## 2025-01-30 PROCEDURE — 87186 SC STD MICRODIL/AGAR DIL: CPT

## 2025-01-30 PROCEDURE — 81001 URINALYSIS AUTO W/SCOPE: CPT

## 2025-01-30 PROCEDURE — 87088 URINE BACTERIA CULTURE: CPT

## 2025-01-30 PROCEDURE — 80048 BASIC METABOLIC PNL TOTAL CA: CPT

## 2025-01-30 PROCEDURE — 87086 URINE CULTURE/COLONY COUNT: CPT

## 2025-01-30 PROCEDURE — 85027 COMPLETE CBC AUTOMATED: CPT

## 2025-01-30 RX ORDER — RIBOSE 100 %
POWDER (GRAM) MISCELLANEOUS DAILY
COMMUNITY

## 2025-01-30 NOTE — TELEPHONE ENCOUNTER
Pt instructed to continue Xarelto until she receives further instructions from Dr Nielson's office. Please contact pt with instructions on holding Xarelto prior to surgery.

## 2025-01-30 NOTE — PERIOP NOTE
PLEASE CONTINUE TAKING ALL PRESCRIPTION MEDICATIONS UP TO THE DAY OF SURGERY UNLESS OTHERWISE DIRECTED BELOW. You may take Tylenol, allergy,  and/or indigestion medications.     TAKE ONLY THESE MEDICATIONS ON THE DAY OF SURGERY      Metoprolol (Lopressor)     Diltiazem (Tiazac)     Digoxin (Lanoxin)     DISCONTINUE all vitamins and supplements 7 days prior to surgery. DISCONTINUE Non-Steroidal Anti-Inflammatory (NSAIDS) such as Advil and Aleve 5 days prior to surgery.     Home Medications to Hold- please continue all other medications except these.      Carnitine- hold 7 days prior to surgery.    CO Q10- hold 7 days prior to surgery.  D-Ribose- hold 7 days prior to surgery.     Comments   Xarelto- continue until you receive further instructions from your surgeons office.   On the day before surgery (2.5.25) please take 2 Tylenol in the morning and then again before bed. You may use either regular or extra strength.    Please drink 32 ounces of non-caffeinated clear liquids 4 hours prior to your arrival to avoid dehydration.      Bring CPAP machine the day of surgery.    Clear liquid diet the day prior to surgery on 2.5.25    Basaglar Insulin- 30 Units of insulin the night before surgery.       Please do not bring home medications with you on the day of surgery unless otherwise directed by your nurse.  If you are instructed to bring home medications, please give them to your nurse as they will be administered by the nursing staff.    If you have any questions, please call [facility] [phone].    A copy of this note was provided to the patient for reference.      CLEAR LIQUID DIET    Things included in a clear liquid diet:     Water  Black Coffee (may have sugar but no milk or cream)  Tea  Any type soft drink  Apple, Cranberry, or Grape juice  Chicken bouillon or broth  Beef bouillon or broth  Popsicles  Jell-O (any flavor), NO solid fruit mixed in  Hard candy that is clear, such as

## 2025-01-30 NOTE — TELEPHONE ENCOUNTER
Latest Reference Range & Units 01/30/25 13:30   Sodium 136 - 145 mmol/L 138   Potassium 3.5 - 5.1 mmol/L 4.1   Chloride 98 - 107 mmol/L 101   CARBON DIOXIDE 20 - 29 mmol/L 23   BUN,BUNPL 8 - 23 MG/DL 25 (H)   Creatinine 0.60 - 1.10 MG/DL 1.00   Anion Gap 7 - 16 mmol/L 14   Est, Glom Filt Rate >60 ml/min/1.73m2 62   Glucose 70 - 99 mg/dL 168 (H)   Calcium 8.8 - 10.2 MG/DL 9.2   pH, Urine 5.0 - 9.0   5.0   WBC 4.3 - 11.1 K/uL 12.3 (H)   RBC 4.05 - 5.2 M/uL 3.92 (L)   Hemoglobin Quant 11.7 - 15.4 g/dL 10.7 (L)   Hematocrit 35.8 - 46.3 % 34.3 (L)   MCV 82.0 - 102.0 FL 87.5   MCH 26.1 - 32.9 PG 27.3   MCHC 31.4 - 35.0 g/dL 31.2 (L)   MPV 9.4 - 12.3 FL 10.6   RDW 11.9 - 14.6 % 15.7 (H)   Platelet Count 150 - 450 K/uL 287   Nucleated Red Blood Cells 0.0 - 0.2 K/uL 0.00   CULTURE, URINE  Rpt (IP)   Color, UA -   YELLOW/STRAW   Glucose, Ur NEG mg/dL Negative   Bilirubin, Urine NEG   Negative   Ketones, Urine NEG mg/dL Negative   Specific Gravity, UA 1.001 - 1.023   1.019   Blood, Urine NEG   LARGE !   Protein, UA NEG mg/dL 30 !   Urobilinogen 0.2 - 1.0 EU/dL 0.2   Nitrite, Urine NEG   Positive !   Leukocyte Esterase, Urine NEG   MODERATE !   Appearance -   TURBID   WBC, UA 0 /hpf >100   RBC, UA 0 /hpf >100   Epithelial Cells, UA 0 /hpf 0-3   Bacteria, UA 0 /hpf 4+ (H)   Crystals 0 /LPF URIC ACID CRYSTALS   (H): Data is abnormally high  (L): Data is abnormally low  !: Data is abnormal  (IP): In Process  Rpt: View report in Results Review for more information

## 2025-01-30 NOTE — PERIOP NOTE
Labs reviewed. UA abnormal, WBC:12.3. Urine culture with pending results. Labs automatically routed to ordering provider via Epic documentation.

## 2025-01-30 NOTE — PERIOP NOTE
Patient verified name and     Order for consent was found in EHR and does match case posting; patient verified.     Type 1B surgery, Phone assessment complete.    Labs per surgeon: CBC, BMP, UA, UA CX; results pending.  Labs per anesthesia protocol: POC glucose; results Glucose:168  EKG: ECHO:25/Last card note:24  Xarelto recommendation: 25      Telephone Encounter sent to Shahla JOHNSON With instructions to contact pt with Xarelto instructions.    Patient provided with and instructed on educational handouts including Guide to Surgery, Preventing Surgical Site Infections, Pain Management, and [facility] Brochure.    Patient answered medical/surgical history questions at their best of ability. All prior to admission medications documented in EPIC. Original medication prescription bottle was not visualized during patient appointment.     Patient instructed to hold all vitamins 7 days prior to surgery and NSAIDS 5 days prior to surgery, patient verbalized understanding.     Patient teach back successful and patient demonstrates knowledge of instructions.

## 2025-01-30 NOTE — TELEPHONE ENCOUNTER
Cardiac Pre-operative Assessment      Physician or Practice Requesting Medication Clearance or Risk Assessment:     : Alana     Contact Phone Number: [phone]    Fax Number: [phone]    Date of Surgery/Procedure: 2/6    Type of Surgery or Procedure: PCNL      Medications to hold Xarelto    # Days pre-procedure to hold 5 days     Restart medication ____    Risk assessment:    Please send the response back in this phone encounter

## 2025-01-31 DIAGNOSIS — N30.00 ACUTE CYSTITIS WITHOUT HEMATURIA: Primary | ICD-10-CM

## 2025-01-31 RX ORDER — CEFPODOXIME PROXETIL 200 MG/1
200 TABLET, FILM COATED ORAL 2 TIMES DAILY
Qty: 20 TABLET | Refills: 0 | Status: SHIPPED | OUTPATIENT
Start: 2025-01-31 | End: 2025-02-10

## 2025-01-31 NOTE — TELEPHONE ENCOUNTER
Tom Dunbar MD Bennett, Sherry, RN  Caller: Unspecified (Yesterday,  3:23 PM)  Recommend management of Xarelto perioperatively, as below, with bleeding risk determined by the     High bleeding risk  - give last dose Xarelto three days before procedure (ie, skip two doses on the two days before the procedure)  - resume Xarelto 48 to 72 hours after surgery (ie, postoperative day 2 to 3)    Low bleeding risk  - give last dose Xarelto two days before procedure (ie, skip one dose on the day before the procedure)  - resume Xarelto 24 hours after surgery (ie, postoperative day 1)

## 2025-02-01 LAB
BACTERIA SPEC CULT: ABNORMAL
BACTERIA SPEC CULT: ABNORMAL
SERVICE CMNT-IMP: ABNORMAL

## 2025-02-06 ENCOUNTER — APPOINTMENT (OUTPATIENT)
Dept: INTERVENTIONAL RADIOLOGY/VASCULAR | Age: 70
DRG: 659 | End: 2025-02-06
Attending: UROLOGY
Payer: MEDICARE

## 2025-02-14 ENCOUNTER — TELEPHONE (OUTPATIENT)
Age: 70
End: 2025-02-14

## 2025-02-14 RX ORDER — DIGOXIN 250 MCG
0.25 TABLET ORAL DAILY
Qty: 30 TABLET | Refills: 0 | Status: SHIPPED | OUTPATIENT
Start: 2025-02-14 | End: 2025-02-17 | Stop reason: SDUPTHER

## 2025-02-14 NOTE — TELEPHONE ENCOUNTER
Digoxin added lov 11/20/24.Pt.completely out.Dig.level has not been checked recently.Will send in 30 day supply since she is out and send note to  to see if he would like Dig.level checked or if ok to give more refills.    Requested Prescriptions     Signed Prescriptions Disp Refills    digoxin (LANOXIN) 250 MCG tablet 30 tablet 0     Sig: Take 1 tablet by mouth daily     Authorizing Provider: ALIYAH WADDELL     Ordering User: ZULEYMA LECHUGA

## 2025-02-14 NOTE — TELEPHONE ENCOUNTER
MEDICATION REFILL REQUEST      Name of Medication:  Digoxin  Dose:  250 mg  Frequency:  QD  Quantity:  90  Days' supply:  90 with 3 refills      Pharmacy Name/Location:  [id number]

## 2025-02-17 RX ORDER — DIGOXIN 250 MCG
0.25 TABLET ORAL DAILY
Qty: 90 TABLET | Refills: 3 | Status: SHIPPED | OUTPATIENT
Start: 2025-02-17

## 2025-02-17 NOTE — TELEPHONE ENCOUNTER
Tom Dunbar MD  You16 hours ago (4:33 PM)       Thanks.  There is no indication to check digoxin serum concentration on maintenance therapy.  This is not a new medication for the patient and there is no suspicion for digoxin toxicity.  Furthermore, her kidney function is stable.

## 2025-02-17 NOTE — TELEPHONE ENCOUNTER
Pt.son notified of MD response and v/u.Med escribed as below:  Requested Prescriptions     Signed Prescriptions Disp Refills    digoxin (LANOXIN) 250 MCG tablet 90 tablet 3     Sig: Take 1 tablet by mouth daily     Authorizing Provider: ALIYAH WADDELL     Ordering User: ZULEYMA LECHUGA

## 2025-02-20 ENCOUNTER — HOSPITAL ENCOUNTER (EMERGENCY)
Age: 70
Discharge: HOME | End: 2025-02-20
Payer: MEDICARE

## 2025-02-20 VITALS
HEIGHT: 67 IN | OXYGEN SATURATION: 95 % | WEIGHT: 266 LBS | RESPIRATION RATE: 18 BRPM | HEART RATE: 87 BPM | SYSTOLIC BLOOD PRESSURE: 135 MMHG | TEMPERATURE: 99.7 F | BODY MASS INDEX: 41.75 KG/M2 | DIASTOLIC BLOOD PRESSURE: 88 MMHG

## 2025-02-20 DIAGNOSIS — N30.01 ACUTE CYSTITIS WITH HEMATURIA: Primary | ICD-10-CM

## 2025-02-20 LAB
ALBUMIN SERPL-MCNC: 2.7 G/DL (ref 3.2–4.6)
ALBUMIN/GLOB SERPL: 0.6 (ref 1–1.9)
ALP SERPL-CCNC: 103 U/L (ref 35–104)
ALT SERPL-CCNC: 12 U/L (ref 8–45)
ANION GAP SERPL CALC-SCNC: 10 MMOL/L (ref 7–16)
APPEARANCE UR: ABNORMAL
AST SERPL-CCNC: 14 U/L (ref 15–37)
BACTERIA URNS QL MICRO: ABNORMAL /HPF
BASOPHILS # BLD: 0.05 K/UL (ref 0–0.2)
BASOPHILS NFR BLD: 0.4 % (ref 0–2)
BILIRUB SERPL-MCNC: 0.5 MG/DL (ref 0–1.2)
BILIRUB UR QL: NEGATIVE
BUN SERPL-MCNC: 19 MG/DL (ref 8–23)
CALCIUM SERPL-MCNC: 8.8 MG/DL (ref 8.8–10.2)
CHLORIDE SERPL-SCNC: 102 MMOL/L (ref 98–107)
CO2 SERPL-SCNC: 23 MMOL/L (ref 20–29)
COLOR UR: ABNORMAL
CREAT SERPL-MCNC: 1.12 MG/DL (ref 0.6–1.1)
DIFFERENTIAL METHOD BLD: ABNORMAL
EOSINOPHIL # BLD: 0.1 K/UL (ref 0–0.8)
EOSINOPHIL NFR BLD: 0.7 % (ref 0.5–7.8)
EPI CELLS #/AREA URNS HPF: ABNORMAL /HPF
ERYTHROCYTE [DISTWIDTH] IN BLOOD BY AUTOMATED COUNT: 15.7 % (ref 11.9–14.6)
GLOBULIN SER CALC-MCNC: 4.4 G/DL (ref 2.3–3.5)
GLUCOSE SERPL-MCNC: 144 MG/DL (ref 70–99)
GLUCOSE UR STRIP.AUTO-MCNC: NEGATIVE MG/DL
HCT VFR BLD AUTO: 31.7 % (ref 35.8–46.3)
HGB BLD-MCNC: 10 G/DL (ref 11.7–15.4)
HGB UR QL STRIP: ABNORMAL
IMM GRANULOCYTES # BLD AUTO: 0.1 K/UL (ref 0–0.5)
IMM GRANULOCYTES NFR BLD AUTO: 0.7 % (ref 0–5)
KETONES UR QL STRIP.AUTO: NEGATIVE MG/DL
LACTATE SERPL-SCNC: 0.7 MMOL/L (ref 0.5–2)
LEUKOCYTE ESTERASE UR QL STRIP.AUTO: ABNORMAL
LYMPHOCYTES # BLD: 1.59 K/UL (ref 0.5–4.6)
LYMPHOCYTES NFR BLD: 11.7 % (ref 13–44)
MCH RBC QN AUTO: 27.2 PG (ref 26.1–32.9)
MCHC RBC AUTO-ENTMCNC: 31.5 G/DL (ref 31.4–35)
MCV RBC AUTO: 86.4 FL (ref 82–102)
MONOCYTES # BLD: 1 K/UL (ref 0.1–1.3)
MONOCYTES NFR BLD: 7.4 % (ref 4–12)
NEUTS SEG # BLD: 10.71 K/UL (ref 1.7–8.2)
NEUTS SEG NFR BLD: 79.1 % (ref 43–78)
NITRITE UR QL STRIP.AUTO: POSITIVE
NRBC # BLD: 0 K/UL (ref 0–0.2)
PH UR STRIP: 5 (ref 5–9)
PLATELET # BLD AUTO: 224 K/UL (ref 150–450)
PMV BLD AUTO: 9.8 FL (ref 9.4–12.3)
POTASSIUM SERPL-SCNC: 4 MMOL/L (ref 3.5–5.1)
PROCALCITONIN SERPL-MCNC: 0.59 NG/ML (ref 0–0.1)
PROT SERPL-MCNC: 7.1 G/DL (ref 6.3–8.2)
PROT UR STRIP-MCNC: 100 MG/DL
RBC # BLD AUTO: 3.67 M/UL (ref 4.05–5.2)
RBC #/AREA URNS HPF: ABNORMAL /HPF
SODIUM SERPL-SCNC: 135 MMOL/L (ref 136–145)
SP GR UR REFRACTOMETRY: 1.01 (ref 1–1.02)
UROBILINOGEN UR QL STRIP.AUTO: 0.2 EU/DL (ref 0.2–1)
WBC # BLD AUTO: 13.6 K/UL (ref 4.3–11.1)
WBC URNS QL MICRO: >100 /HPF
YEAST URNS QL MICRO: ABNORMAL

## 2025-02-20 PROCEDURE — 2500000003 HC RX 250 WO HCPCS

## 2025-02-20 PROCEDURE — 85025 COMPLETE CBC W/AUTO DIFF WBC: CPT

## 2025-02-20 PROCEDURE — 80053 COMPREHEN METABOLIC PANEL: CPT

## 2025-02-20 PROCEDURE — 84145 PROCALCITONIN (PCT): CPT

## 2025-02-20 PROCEDURE — 99284 EMERGENCY DEPT VISIT MOD MDM: CPT

## 2025-02-20 PROCEDURE — 96374 THER/PROPH/DIAG INJ IV PUSH: CPT

## 2025-02-20 PROCEDURE — 87086 URINE CULTURE/COLONY COUNT: CPT

## 2025-02-20 PROCEDURE — 6360000002 HC RX W HCPCS

## 2025-02-20 PROCEDURE — 83605 ASSAY OF LACTIC ACID: CPT

## 2025-02-20 PROCEDURE — 81001 URINALYSIS AUTO W/SCOPE: CPT

## 2025-02-20 RX ORDER — CEFDINIR 300 MG/1
300 CAPSULE ORAL 2 TIMES DAILY
Qty: 14 CAPSULE | Refills: 0 | Status: SHIPPED | OUTPATIENT
Start: 2025-02-20 | End: 2025-02-27

## 2025-02-20 RX ADMIN — WATER 1000 MG: 1 INJECTION INTRAMUSCULAR; INTRAVENOUS; SUBCUTANEOUS at 22:37

## 2025-02-20 ASSESSMENT — LIFESTYLE VARIABLES
HOW OFTEN DO YOU HAVE A DRINK CONTAINING ALCOHOL: NEVER
HOW MANY STANDARD DRINKS CONTAINING ALCOHOL DO YOU HAVE ON A TYPICAL DAY: PATIENT DOES NOT DRINK

## 2025-02-20 ASSESSMENT — PAIN - FUNCTIONAL ASSESSMENT: PAIN_FUNCTIONAL_ASSESSMENT: NONE - DENIES PAIN

## 2025-02-21 NOTE — ED PROVIDER NOTES
Emergency Department Provider Note       PCP: Bautista Esparza MD   Age: 69 y.o.   Sex: female     DISPOSITION Decision To Discharge 02/20/2025 11:29:11 PM    ICD-10-CM    1. Acute cystitis with hematuria  N30.01           Medical Decision Making     Patient presents with UTI symptoms.  She is well-appearing.  Vital signs significant for temperature of 99.7F, mild tachycardia on admission.  Exam benign.  Belly soft and nontender.  No CVA tenderness.  CBC and CMP overall unremarkable.  Mild leukocytosis of 13.6.  Lactate 0.7.  Procalcitonin 0.6.  Urine positive for nitrites and +2 bacteria.  Urine culture sent and pending.  Patient given dose of Rocephin in the ER.  She continues to deny any pain.  Patient's son is very frustrated with the situation, states that the reason that she got UTI is because her procedure got pushed to March.    I reached out and discussed case with urology, spoke with Marylin Bakari.  She stated that she will update Dr. Nielson tomorrow morning.  They may consider putting patient on a daily low-dose antibiotic if she has to wait longer for the procedure.  Urology okay with starting on Omnicef for now, they will follow culture results.    I spoke with patient and her son regarding this.  They are okay with the plan to go home on antibiotics and that urology will follow-up with them.  Discussed that urology will follow the culture results and update antibiotic recommendations as needed.       1 or more acute illnesses that pose a threat to life or bodily function.   Prescription drug management performed.  Patient was discharged risks and benefits of hospitalization were considered.  Shared medical decision making was utilized in creating the patients health plan today.  I independently ordered and reviewed each unique test.                       History     Patient is a 69-year-old female with past medical history significant for A-fib on AC, type 2 diabetes, right groin abscess wound

## 2025-02-21 NOTE — ED TRIAGE NOTES
Pt c/o hematuria, and a fever.   Pt was scheduled to have kidney stones removed but surgery was delayed.

## 2025-02-21 NOTE — DISCHARGE INSTRUCTIONS
You are diagnosed with a UTI today.  You were given a dose of antibiotics in the ER.  Please continue to take antibiotics outpatient.  Your urologist should be reaching out to you with the plan moving forward.  They will follow your urine culture results and change your antibiotics as needed.  If you experience any symptoms such as fever, chills, vomiting, severe abdominal pain, back pain please return to the ER immediately for reevaluation.

## 2025-02-21 NOTE — ED NOTES
Patient mobility status  wheelchair bound.     I have reviewed discharge instructions with the patient.  The patient verbalized understanding.    Patient left ED via Discharge Method: wheelchair to Home with Extended Family:.    Opportunity for questions and clarification provided.     Patient given 1 script.

## 2025-02-22 LAB
BACTERIA SPEC CULT: NORMAL
SERVICE CMNT-IMP: NORMAL

## 2025-03-11 RX ORDER — DILTIAZEM HYDROCHLORIDE 300 MG/1
300 CAPSULE, COATED, EXTENDED RELEASE ORAL DAILY
COMMUNITY
Start: 2025-02-07

## 2025-03-11 RX ORDER — ROSUVASTATIN CALCIUM 5 MG/1
5 TABLET, COATED ORAL
COMMUNITY
Start: 2025-01-15 | End: 2026-01-15

## 2025-03-11 NOTE — PERIOP NOTE
Patient verified name and .  Order for consent  was found in EHR and does match case posting; patient verifies procedure.   Type 1B surgery,  assessment complete.  Orders not received.  Labs per surgeon: UA in preop  Labs per anesthesia protocol: none    Patient answered medical/surgical history questions at their best of ability. All prior to admission medications documented in EPIC.    Patient instructed to continue taking all prescription medications up to the day of surgery but to take ONLY the following medications the day of surgery according to anesthesia guidelines with a small sip of water:   Digoxin (Lanoxin)  Diltizem (Cardizem)  Metoprolol (Toprol)  Metformin (Glucophage)     Also, patient is requested to take 2 Tylenol in the morning and then again before bed on the day before surgery. Regular or extra strength may be used.       Patient informed that all vitamins and supplements should be held 7 days prior to surgery and NSAIDS 5 days prior to surgery.     Prescription meds to hold:You are taking a blood thinner/ anticoagulant Xarelto (Rivaroxaban). The surgeon will advise you on whether you will continue this medication or hold this medication prior to surgery.  If you have not been advised, you should contact your surgeon. Pt verbalizes understanding of instructions and states she was instructed by surgeon to hold for 5 days prior to surgery date.    Patient instructed on the following:    > Arrive at [address] [place] [address] (enter at front entrance by statSofia). Check in on the left at the Admissions office.  Entrance, time of arrival to be called the day before by 1700  > No food after midnight, patient may drink clear liquids only up until 2 hours prior to arrival. No gum, candy, mints.   > Responsible adult must drive patient to the hospital, stay during surgery, and patient will need supervision 24 hours after anesthesia  > Use non moisturizing soap in shower the night

## 2025-03-17 ENCOUNTER — TELEPHONE (OUTPATIENT)
Dept: PULMONOLOGY | Age: 70
End: 2025-03-17

## 2025-03-17 RX ORDER — OXYCODONE HYDROCHLORIDE 5 MG/1
10 TABLET ORAL PRN
Refills: 0 | Status: CANCELLED | OUTPATIENT
Start: 2025-03-17 | End: 2025-03-17

## 2025-03-17 RX ORDER — PROCHLORPERAZINE EDISYLATE 5 MG/ML
5 INJECTION INTRAMUSCULAR; INTRAVENOUS
Status: CANCELLED | OUTPATIENT
Start: 2025-03-17 | End: 2025-03-18

## 2025-03-17 RX ORDER — NALOXONE HYDROCHLORIDE 0.4 MG/ML
INJECTION, SOLUTION INTRAMUSCULAR; INTRAVENOUS; SUBCUTANEOUS PRN
Status: CANCELLED | OUTPATIENT
Start: 2025-03-17

## 2025-03-17 RX ORDER — ONDANSETRON 2 MG/ML
4 INJECTION INTRAMUSCULAR; INTRAVENOUS
Status: CANCELLED | OUTPATIENT
Start: 2025-03-17 | End: 2025-03-18

## 2025-03-17 RX ORDER — DIPHENHYDRAMINE HYDROCHLORIDE 50 MG/ML
12.5 INJECTION, SOLUTION INTRAMUSCULAR; INTRAVENOUS
Status: CANCELLED | OUTPATIENT
Start: 2025-03-17 | End: 2025-03-18

## 2025-03-17 RX ORDER — OXYCODONE HYDROCHLORIDE 5 MG/1
5 TABLET ORAL PRN
Refills: 0 | Status: CANCELLED | OUTPATIENT
Start: 2025-03-17 | End: 2025-03-17

## 2025-03-17 NOTE — TELEPHONE ENCOUNTER
Called patient to reschedule 5/21 appointment with Rin as she will be out on personal leave. Pt did not have VM so could not leave message. Sending letter and Akanoohart message for pt to call to schedule           Return in about 6 months (around 5/13/2025) for Rin, restrictive lung disease, Arrive 15 minutes prior to appt time.

## 2025-03-18 ENCOUNTER — APPOINTMENT (OUTPATIENT)
Dept: INTERVENTIONAL RADIOLOGY/VASCULAR | Age: 70
DRG: 659 | End: 2025-03-18
Attending: UROLOGY
Payer: MEDICARE

## 2025-03-18 ENCOUNTER — HOSPITAL ENCOUNTER (INPATIENT)
Age: 70
LOS: 7 days | Discharge: HOME | DRG: 659 | End: 2025-03-25
Attending: UROLOGY | Admitting: UROLOGY
Payer: MEDICARE

## 2025-03-18 DIAGNOSIS — N20.0 KIDNEY STONE: ICD-10-CM

## 2025-03-18 PROBLEM — N12 PYELONEPHRITIS: Status: ACTIVE | Noted: 2025-03-18

## 2025-03-18 LAB
EST. AVERAGE GLUCOSE BLD GHB EST-MCNC: 174 MG/DL
GLUCOSE BLD STRIP.AUTO-MCNC: 102 MG/DL (ref 65–100)
GLUCOSE BLD STRIP.AUTO-MCNC: 110 MG/DL (ref 65–100)
GLUCOSE BLD STRIP.AUTO-MCNC: 113 MG/DL (ref 65–100)
HBA1C MFR BLD: 7.7 % (ref 0–5.6)
POTASSIUM BLD-SCNC: 4.3 MMOL/L (ref 3.5–5.1)
SERVICE CMNT-IMP: ABNORMAL

## 2025-03-18 PROCEDURE — 2709999900 IR GUIDED NEPHROSTOMY CATH PLACEMENT RIGHT

## 2025-03-18 PROCEDURE — 87206 SMEAR FLUORESCENT/ACID STAI: CPT

## 2025-03-18 PROCEDURE — 87086 URINE CULTURE/COLONY COUNT: CPT

## 2025-03-18 PROCEDURE — 36415 COLL VENOUS BLD VENIPUNCTURE: CPT

## 2025-03-18 PROCEDURE — 2500000003 HC RX 250 WO HCPCS: Performed by: NURSE PRACTITIONER

## 2025-03-18 PROCEDURE — 6360000002 HC RX W HCPCS: Performed by: RADIOLOGY

## 2025-03-18 PROCEDURE — 1100000000 HC RM PRIVATE

## 2025-03-18 PROCEDURE — 99152 MOD SED SAME PHYS/QHP 5/>YRS: CPT | Performed by: RADIOLOGY

## 2025-03-18 PROCEDURE — 2580000003 HC RX 258: Performed by: UROLOGY

## 2025-03-18 PROCEDURE — 83036 HEMOGLOBIN GLYCOSYLATED A1C: CPT

## 2025-03-18 PROCEDURE — 0T9630Z DRAINAGE OF RIGHT URETER WITH DRAINAGE DEVICE, PERCUTANEOUS APPROACH: ICD-10-PCS | Performed by: RADIOLOGY

## 2025-03-18 PROCEDURE — 2500000003 HC RX 250 WO HCPCS: Performed by: UROLOGY

## 2025-03-18 PROCEDURE — C1894 INTRO/SHEATH, NON-LASER: HCPCS

## 2025-03-18 PROCEDURE — 6370000000 HC RX 637 (ALT 250 FOR IP): Performed by: UROLOGY

## 2025-03-18 PROCEDURE — 87088 URINE BACTERIA CULTURE: CPT

## 2025-03-18 PROCEDURE — 50433 PLMT NEPHROURETERAL CATHETER: CPT | Performed by: RADIOLOGY

## 2025-03-18 PROCEDURE — 6360000002 HC RX W HCPCS: Performed by: UROLOGY

## 2025-03-18 PROCEDURE — 87106 FUNGI IDENTIFICATION YEAST: CPT

## 2025-03-18 PROCEDURE — 2580000003 HC RX 258: Performed by: ANESTHESIOLOGY

## 2025-03-18 PROCEDURE — 2580000003 HC RX 258: Performed by: RADIOLOGY

## 2025-03-18 PROCEDURE — 87102 FUNGUS ISOLATION CULTURE: CPT

## 2025-03-18 PROCEDURE — 87186 SC STD MICRODIL/AGAR DIL: CPT

## 2025-03-18 PROCEDURE — 84132 ASSAY OF SERUM POTASSIUM: CPT

## 2025-03-18 PROCEDURE — 6360000004 HC RX CONTRAST MEDICATION: Performed by: RADIOLOGY

## 2025-03-18 PROCEDURE — 82962 GLUCOSE BLOOD TEST: CPT

## 2025-03-18 PROCEDURE — 0TP93DZ REMOVAL OF INTRALUMINAL DEVICE FROM URETER, PERCUTANEOUS APPROACH: ICD-10-PCS | Performed by: RADIOLOGY

## 2025-03-18 RX ORDER — ROSUVASTATIN CALCIUM 10 MG/1
5 TABLET, COATED ORAL
Status: DISCONTINUED | OUTPATIENT
Start: 2025-03-18 | End: 2025-03-25 | Stop reason: HOSPADM

## 2025-03-18 RX ORDER — MIDAZOLAM HYDROCHLORIDE 2 MG/2ML
2 INJECTION, SOLUTION INTRAMUSCULAR; INTRAVENOUS
Status: DISCONTINUED | OUTPATIENT
Start: 2025-03-18 | End: 2025-03-18

## 2025-03-18 RX ORDER — ACETAMINOPHEN 500 MG
1000 TABLET ORAL ONCE
Status: DISCONTINUED | OUTPATIENT
Start: 2025-03-18 | End: 2025-03-18

## 2025-03-18 RX ORDER — GENTAMICIN SULFATE 80 MG/100ML
80 INJECTION, SOLUTION INTRAVENOUS
Status: DISPENSED | OUTPATIENT
Start: 2025-03-18 | End: 2025-03-18

## 2025-03-18 RX ORDER — MAGNESIUM SULFATE IN WATER 40 MG/ML
2000 INJECTION, SOLUTION INTRAVENOUS PRN
Status: DISCONTINUED | OUTPATIENT
Start: 2025-03-18 | End: 2025-03-18 | Stop reason: SDUPTHER

## 2025-03-18 RX ORDER — SODIUM CHLORIDE 0.9 % (FLUSH) 0.9 %
5-40 SYRINGE (ML) INJECTION PRN
Status: DISCONTINUED | OUTPATIENT
Start: 2025-03-18 | End: 2025-03-18

## 2025-03-18 RX ORDER — SODIUM CHLORIDE 0.9 % (FLUSH) 0.9 %
5-40 SYRINGE (ML) INJECTION EVERY 12 HOURS SCHEDULED
Status: DISCONTINUED | OUTPATIENT
Start: 2025-03-18 | End: 2025-03-21

## 2025-03-18 RX ORDER — LIDOCAINE HYDROCHLORIDE 10 MG/ML
INJECTION, SOLUTION EPIDURAL; INFILTRATION; INTRACAUDAL; PERINEURAL PRN
Status: COMPLETED | OUTPATIENT
Start: 2025-03-18 | End: 2025-03-18

## 2025-03-18 RX ORDER — LIDOCAINE HYDROCHLORIDE 10 MG/ML
1 INJECTION, SOLUTION INFILTRATION; PERINEURAL
Status: DISCONTINUED | OUTPATIENT
Start: 2025-03-18 | End: 2025-03-18

## 2025-03-18 RX ORDER — FUROSEMIDE 40 MG/1
40 TABLET ORAL DAILY
Status: DISCONTINUED | OUTPATIENT
Start: 2025-03-18 | End: 2025-03-25 | Stop reason: HOSPADM

## 2025-03-18 RX ORDER — OXYCODONE HYDROCHLORIDE 5 MG/1
10 TABLET ORAL EVERY 4 HOURS PRN
Refills: 0 | Status: DISCONTINUED | OUTPATIENT
Start: 2025-03-18 | End: 2025-03-25 | Stop reason: HOSPADM

## 2025-03-18 RX ORDER — METOPROLOL SUCCINATE 25 MG/1
25 TABLET, EXTENDED RELEASE ORAL DAILY
Status: DISCONTINUED | OUTPATIENT
Start: 2025-03-19 | End: 2025-03-25 | Stop reason: HOSPADM

## 2025-03-18 RX ORDER — SODIUM CHLORIDE, SODIUM LACTATE, POTASSIUM CHLORIDE, CALCIUM CHLORIDE 600; 310; 30; 20 MG/100ML; MG/100ML; MG/100ML; MG/100ML
INJECTION, SOLUTION INTRAVENOUS CONTINUOUS
Status: DISCONTINUED | OUTPATIENT
Start: 2025-03-18 | End: 2025-03-18

## 2025-03-18 RX ORDER — SENNA AND DOCUSATE SODIUM 50; 8.6 MG/1; MG/1
1 TABLET, FILM COATED ORAL 2 TIMES DAILY
Status: DISCONTINUED | OUTPATIENT
Start: 2025-03-18 | End: 2025-03-25 | Stop reason: HOSPADM

## 2025-03-18 RX ORDER — MIDAZOLAM HYDROCHLORIDE 2 MG/2ML
INJECTION, SOLUTION INTRAMUSCULAR; INTRAVENOUS PRN
Status: COMPLETED | OUTPATIENT
Start: 2025-03-18 | End: 2025-03-18

## 2025-03-18 RX ORDER — SODIUM CHLORIDE 9 MG/ML
INJECTION, SOLUTION INTRAVENOUS PRN
Status: DISCONTINUED | OUTPATIENT
Start: 2025-03-18 | End: 2025-03-18

## 2025-03-18 RX ORDER — POTASSIUM CHLORIDE 1500 MG/1
40 TABLET, EXTENDED RELEASE ORAL PRN
Status: DISCONTINUED | OUTPATIENT
Start: 2025-03-18 | End: 2025-03-25 | Stop reason: HOSPADM

## 2025-03-18 RX ORDER — ONDANSETRON 2 MG/ML
4 INJECTION INTRAMUSCULAR; INTRAVENOUS EVERY 6 HOURS PRN
Status: DISCONTINUED | OUTPATIENT
Start: 2025-03-18 | End: 2025-03-18 | Stop reason: SDUPTHER

## 2025-03-18 RX ORDER — ONDANSETRON 2 MG/ML
4 INJECTION INTRAMUSCULAR; INTRAVENOUS EVERY 6 HOURS PRN
Status: DISCONTINUED | OUTPATIENT
Start: 2025-03-18 | End: 2025-03-25 | Stop reason: HOSPADM

## 2025-03-18 RX ORDER — SODIUM CHLORIDE 9 MG/ML
INJECTION, SOLUTION INTRAVENOUS PRN
Status: DISCONTINUED | OUTPATIENT
Start: 2025-03-18 | End: 2025-03-25 | Stop reason: HOSPADM

## 2025-03-18 RX ORDER — IBUPROFEN 600 MG/1
1 TABLET ORAL PRN
Status: DISCONTINUED | OUTPATIENT
Start: 2025-03-18 | End: 2025-03-18 | Stop reason: SDUPTHER

## 2025-03-18 RX ORDER — OXYCODONE HYDROCHLORIDE 5 MG/1
5 TABLET ORAL EVERY 4 HOURS PRN
Refills: 0 | Status: DISCONTINUED | OUTPATIENT
Start: 2025-03-18 | End: 2025-03-25 | Stop reason: HOSPADM

## 2025-03-18 RX ORDER — FENTANYL CITRATE 50 UG/ML
INJECTION, SOLUTION INTRAMUSCULAR; INTRAVENOUS PRN
Status: COMPLETED | OUTPATIENT
Start: 2025-03-18 | End: 2025-03-18

## 2025-03-18 RX ORDER — SODIUM CHLORIDE 0.9 % (FLUSH) 0.9 %
5-40 SYRINGE (ML) INJECTION EVERY 12 HOURS SCHEDULED
Status: DISCONTINUED | OUTPATIENT
Start: 2025-03-18 | End: 2025-03-18

## 2025-03-18 RX ORDER — SODIUM CHLORIDE 0.9 % (FLUSH) 0.9 %
5-40 SYRINGE (ML) INJECTION PRN
Status: DISCONTINUED | OUTPATIENT
Start: 2025-03-18 | End: 2025-03-25 | Stop reason: HOSPADM

## 2025-03-18 RX ORDER — DEXTROSE MONOHYDRATE 100 MG/ML
INJECTION, SOLUTION INTRAVENOUS CONTINUOUS PRN
Status: DISCONTINUED | OUTPATIENT
Start: 2025-03-18 | End: 2025-03-25 | Stop reason: HOSPADM

## 2025-03-18 RX ORDER — ONDANSETRON 4 MG/1
4 TABLET, ORALLY DISINTEGRATING ORAL EVERY 8 HOURS PRN
Status: DISCONTINUED | OUTPATIENT
Start: 2025-03-18 | End: 2025-03-25 | Stop reason: HOSPADM

## 2025-03-18 RX ORDER — SODIUM CHLORIDE 9 MG/ML
INJECTION, SOLUTION INTRAVENOUS CONTINUOUS
Status: DISCONTINUED | OUTPATIENT
Start: 2025-03-18 | End: 2025-03-25 | Stop reason: HOSPADM

## 2025-03-18 RX ORDER — ACETAMINOPHEN 500 MG
1000 TABLET ORAL EVERY 6 HOURS PRN
Status: DISCONTINUED | OUTPATIENT
Start: 2025-03-18 | End: 2025-03-25 | Stop reason: HOSPADM

## 2025-03-18 RX ORDER — DEXTROSE MONOHYDRATE 100 MG/ML
INJECTION, SOLUTION INTRAVENOUS CONTINUOUS PRN
Status: DISCONTINUED | OUTPATIENT
Start: 2025-03-18 | End: 2025-03-18 | Stop reason: SDUPTHER

## 2025-03-18 RX ORDER — INSULIN LISPRO 100 [IU]/ML
0-8 INJECTION, SOLUTION INTRAVENOUS; SUBCUTANEOUS
Status: DISCONTINUED | OUTPATIENT
Start: 2025-03-18 | End: 2025-03-25 | Stop reason: HOSPADM

## 2025-03-18 RX ORDER — SODIUM CHLORIDE 0.9 % (FLUSH) 0.9 %
5-40 SYRINGE (ML) INJECTION EVERY 12 HOURS SCHEDULED
Status: DISCONTINUED | OUTPATIENT
Start: 2025-03-18 | End: 2025-03-25 | Stop reason: HOSPADM

## 2025-03-18 RX ORDER — MAGNESIUM SULFATE IN WATER 40 MG/ML
2000 INJECTION, SOLUTION INTRAVENOUS PRN
Status: DISCONTINUED | OUTPATIENT
Start: 2025-03-18 | End: 2025-03-25 | Stop reason: HOSPADM

## 2025-03-18 RX ORDER — POTASSIUM CHLORIDE 1500 MG/1
40 TABLET, EXTENDED RELEASE ORAL PRN
Status: DISCONTINUED | OUTPATIENT
Start: 2025-03-18 | End: 2025-03-18 | Stop reason: SDUPTHER

## 2025-03-18 RX ORDER — POTASSIUM CHLORIDE 7.45 MG/ML
10 INJECTION INTRAVENOUS PRN
Status: DISCONTINUED | OUTPATIENT
Start: 2025-03-18 | End: 2025-03-25 | Stop reason: HOSPADM

## 2025-03-18 RX ORDER — SODIUM CHLORIDE 9 MG/ML
INJECTION, SOLUTION INTRAVENOUS CONTINUOUS
Status: DISCONTINUED | OUTPATIENT
Start: 2025-03-18 | End: 2025-03-18

## 2025-03-18 RX ORDER — ENOXAPARIN SODIUM 100 MG/ML
30 INJECTION SUBCUTANEOUS 2 TIMES DAILY
Status: DISCONTINUED | OUTPATIENT
Start: 2025-03-18 | End: 2025-03-18

## 2025-03-18 RX ORDER — CIPROFLOXACIN 2 MG/ML
400 INJECTION, SOLUTION INTRAVENOUS
Status: DISCONTINUED | OUTPATIENT
Start: 2025-03-18 | End: 2025-03-18

## 2025-03-18 RX ORDER — ONDANSETRON 4 MG/1
4 TABLET, ORALLY DISINTEGRATING ORAL EVERY 8 HOURS PRN
Status: DISCONTINUED | OUTPATIENT
Start: 2025-03-18 | End: 2025-03-18 | Stop reason: SDUPTHER

## 2025-03-18 RX ORDER — POTASSIUM CHLORIDE 7.45 MG/ML
10 INJECTION INTRAVENOUS PRN
Status: DISCONTINUED | OUTPATIENT
Start: 2025-03-18 | End: 2025-03-18 | Stop reason: SDUPTHER

## 2025-03-18 RX ORDER — POTASSIUM CHLORIDE 1500 MG/1
40 TABLET, EXTENDED RELEASE ORAL DAILY
Status: DISCONTINUED | OUTPATIENT
Start: 2025-03-18 | End: 2025-03-25 | Stop reason: HOSPADM

## 2025-03-18 RX ORDER — IBUPROFEN 600 MG/1
1 TABLET ORAL PRN
Status: DISCONTINUED | OUTPATIENT
Start: 2025-03-18 | End: 2025-03-25 | Stop reason: HOSPADM

## 2025-03-18 RX ORDER — POLYETHYLENE GLYCOL 3350 17 G/17G
17 POWDER, FOR SOLUTION ORAL DAILY PRN
Status: DISCONTINUED | OUTPATIENT
Start: 2025-03-18 | End: 2025-03-25 | Stop reason: HOSPADM

## 2025-03-18 RX ORDER — DIGOXIN 125 MCG
0.25 TABLET ORAL DAILY
Status: DISCONTINUED | OUTPATIENT
Start: 2025-03-19 | End: 2025-03-25 | Stop reason: HOSPADM

## 2025-03-18 RX ADMIN — MIDAZOLAM HYDROCHLORIDE 1 MG: 1 INJECTION, SOLUTION INTRAMUSCULAR; INTRAVENOUS at 09:40

## 2025-03-18 RX ADMIN — DOCUSATE SODIUM 50 MG AND SENNOSIDES 8.6 MG 1 TABLET: 8.6; 5 TABLET, FILM COATED ORAL at 20:37

## 2025-03-18 RX ADMIN — SODIUM CHLORIDE, PRESERVATIVE FREE 10 ML: 5 INJECTION INTRAVENOUS at 20:41

## 2025-03-18 RX ADMIN — CEFAZOLIN SODIUM 2000 MG: 100 INJECTION, POWDER, LYOPHILIZED, FOR SOLUTION INTRAVENOUS at 09:44

## 2025-03-18 RX ADMIN — SODIUM CHLORIDE, SODIUM LACTATE, POTASSIUM CHLORIDE, AND CALCIUM CHLORIDE: .6; .31; .03; .02 INJECTION, SOLUTION INTRAVENOUS at 07:47

## 2025-03-18 RX ADMIN — CEFTRIAXONE SODIUM 2000 MG: 2 INJECTION, POWDER, FOR SOLUTION INTRAMUSCULAR; INTRAVENOUS at 17:26

## 2025-03-18 RX ADMIN — FENTANYL CITRATE 50 MCG: 50 INJECTION, SOLUTION INTRAMUSCULAR; INTRAVENOUS at 10:25

## 2025-03-18 RX ADMIN — ROSUVASTATIN CALCIUM 5 MG: 10 TABLET, FILM COATED ORAL at 20:37

## 2025-03-18 RX ADMIN — LIDOCAINE HYDROCHLORIDE 5 ML: 10 INJECTION, SOLUTION EPIDURAL; INFILTRATION; INTRACAUDAL; PERINEURAL at 09:50

## 2025-03-18 RX ADMIN — FENTANYL CITRATE 25 MCG: 50 INJECTION, SOLUTION INTRAMUSCULAR; INTRAVENOUS at 10:40

## 2025-03-18 RX ADMIN — MIDAZOLAM HYDROCHLORIDE 0.5 MG: 1 INJECTION, SOLUTION INTRAMUSCULAR; INTRAVENOUS at 10:45

## 2025-03-18 RX ADMIN — FENTANYL CITRATE 50 MCG: 50 INJECTION, SOLUTION INTRAMUSCULAR; INTRAVENOUS at 09:40

## 2025-03-18 RX ADMIN — FENTANYL CITRATE 25 MCG: 50 INJECTION, SOLUTION INTRAMUSCULAR; INTRAVENOUS at 10:45

## 2025-03-18 RX ADMIN — MIDAZOLAM HYDROCHLORIDE 1 MG: 1 INJECTION, SOLUTION INTRAMUSCULAR; INTRAVENOUS at 10:25

## 2025-03-18 RX ADMIN — IOHEXOL 50 ML: 300 INJECTION, SOLUTION INTRAVENOUS at 10:59

## 2025-03-18 RX ADMIN — MIDAZOLAM HYDROCHLORIDE 0.5 MG: 1 INJECTION, SOLUTION INTRAMUSCULAR; INTRAVENOUS at 10:40

## 2025-03-18 RX ADMIN — MIDAZOLAM HYDROCHLORIDE 1 MG: 1 INJECTION, SOLUTION INTRAMUSCULAR; INTRAVENOUS at 09:51

## 2025-03-18 RX ADMIN — FENTANYL CITRATE 50 MCG: 50 INJECTION, SOLUTION INTRAMUSCULAR; INTRAVENOUS at 09:51

## 2025-03-18 RX ADMIN — SODIUM CHLORIDE: 9 INJECTION, SOLUTION INTRAVENOUS at 18:04

## 2025-03-18 ASSESSMENT — PAIN SCALES - GENERAL
PAINLEVEL_OUTOF10: 0
PAINLEVEL_OUTOF10: 0
PAINLEVEL_OUTOF10: 4
PAINLEVEL_OUTOF10: 0
PAINLEVEL_OUTOF10: 4
PAINLEVEL_OUTOF10: 2

## 2025-03-18 ASSESSMENT — PAIN - FUNCTIONAL ASSESSMENT: PAIN_FUNCTIONAL_ASSESSMENT: NONE - DENIES PAIN

## 2025-03-18 NOTE — OR NURSING
TRANSFER - OUT REPORT:           Verbal report given to KINGS Cristobal(name) on Brenda Mayer  being transferred to IR Recovery 5(unit) for  routine post-op            Report consisted of patient’s Situation, Background, Assessment and      Recommendations(SBAR).          Information from the following report(s) SBAR, Procedure Summary, and MAR was reviewed with the receiving nurse.       Opportunity for questions and clarification was provided.          Conscious Sedation:    200 Mcg of Fentanyl administered   4 Mg of Versed administered     Pt tolerated procedure well.      Peripheral Intravenous Line:   Peripheral IV 03/18/25 Posterior;Right Wrist (Active)   Site Assessment Clean, dry & intact 03/18/25 0741   Line Status Brisk blood return;Flushed;Infusing 03/18/25 0741   Phlebitis Assessment No symptoms 03/18/25 0741   Infiltration Assessment 0 03/18/25 0741   Alcohol Cap Used No 03/18/25 0741   Dressing Status New dressing applied;Clean, dry & intact 03/18/25 0741   Dressing Type Transparent 03/18/25 0741   Dressing Intervention New 03/18/25 0741       VITALS:  BP (!) 144/65   Pulse 63   Temp 97.7 °F (36.5 °C) (Infrared)   Resp 16   Ht 1.702 m (5' 7\")   Wt 115.2 kg (254 lb)   SpO2 96%   BMI 39.78 kg/m²

## 2025-03-18 NOTE — PROGRESS NOTES
Urology Update Note:     Patient went to IR for access catheter placement for PCNL.  Her R kidney unfortunately was very hydronephrotic and filled with pus.  Therefore patient's PCNL will be canceled.  She will be admitted for antibiotics and return next week for PCNL    Jay Nielson M.D.    [facility]  [address] [address]  Phone: [phone]  Fax: [phone]

## 2025-03-18 NOTE — H&P
[facility] H&P Note                                           03/18/25     Patient: Brenda Mayer  MRN: [id number]    Admission Date:  3/18/2025, 0  Admission Diagnosis: Kidney stone [N20.0]  Reason for Consult: Right Kidney Stone    ASSESSMENT: 69 y.o. female with symptomatic R large kidney stone burden with R NT and R ureteral stent place.  Presents for treatment of R kidney stones today.     PLAN:  I reviewed the risks/benefits/alternatives for stone treatment today in detail with the patient. Treatment options discussed include medical expulsive therapy (MET) vs. ESWL vs. Ureteroscopy/laser lithotropsy vs. PCNL.      After our discussion, the patient would like to proceed with RIGHT percutaneous nephrolithotomy, possible antegrade stent placement.  Risks of PCNL that we discussed were bleeding, infection, injury to the bladder/ureter/kidney/bowel and surrounding structures, pneumothorax, need for chest tube, need for nephrostomy tube, incomplete fragmentation of stones, steinstrasse, inability to remove all stone fragments in 1 operative setting requiring additional operative intervention in the form of second look PCNL vs. ESWL vs ureteroscopy/laser lithotripsy and/or stent placement, nephro-cutaneous fistula, ureteral stricture, need for ureteral stent, stent migration, stent pain, urinary retention, risks of general anesthesia, recurrent stones.  The patient expressed understanding of the above.      -Consented  -Antibiotic on call to OR  -NPO for procedure.         __________________________________________________________________________________    HPI:     Brenda Mayer is a 69 y.o. female with symptomatic R large kidney stone burden with R NT and R ureteral stent place.  Presents for treatment of R kidney stones today.     No changes in medical history since last seen by me.  NPO for procedure. No concern for infection.  Not on blood thinning

## 2025-03-18 NOTE — BRIEF OP NOTE
[facility]  Department of Interventional Radiology  [phone]        Brief Procedure Note    Patient: Brenda Mayer MRN: [id number]  SSN: [id number]    YOB: 1955  Age: 69 y.o.  Sex: female      Date of Procedure: 3/18/2025     Pre-Procedure Diagnosis:   Left Kidney Stones    Post-Procedure Diagnosis:  SAME and Obstructed ureteral stent, hydronephrosis, pyonephrosis.     Procedure(s):  Percutaneous Nephrostomy Access, Stent extraction, Nephro-Ureteral drain placement    Brief Description of Procedure:  as above    Performed By:  DEEPTI HOWELL MD     Assistants:  None    Anesthesia:  Moderate Sedation    Estimated Blood Loss:  Less than 10ml    Specimens:  Microbiology    Implants:  10 Fr, 24 cm, Nephro-Ureteral Drain    Findings:  Pyo-Hydro-nephrosis.  Obstructed stent cemented into the distal ureter.  Gravel and pus in the renal pelvis.      Complications:  None    Recommendations:  1 hour bedrest.  Antibiotics.  Reschedule PCNL until urosepsis is cleared.      Follow Up:  10-12 weeks, if necessary.      Signed By: DEEPTI HOWELL MD     March 18, 2025

## 2025-03-18 NOTE — PRE SEDATION
Sedation Pre-Procedure Note    Patient Name: Brenda Mayer   YOB: 1955  Room/Bed: [facility]  Medical Record Number: [id number]  Date: 3/18/2025   Time: 9:13 AM       Indication:  Kidney stones    Consent: I have discussed with the patient and/or the patient representative the indication, alternatives, and the possible risks and/or complications of the planned procedure and the anesthesia methods. The patient and/or patient representative appear to understand and agree to proceed.    Vital Signs:   Vitals:    25 0759   BP: (!) 123/56   Pulse: 60   Resp: 18   Temp: 97.7 °F (36.5 °C)   SpO2: 93%       Past Medical History:   has a past medical history of Anemia, Atrial fibrillation (HCC), Chronic heart failure with preserved ejection fraction (HFpEF) (HCC), Chronic pain, Current use of long term anticoagulation, Former cigarette smoker, H/O echocardiogram, History of kidney stones, Hypertension, Kidney stone, Morbid obesity, Positive PPD, Sleep apnea, and Type 2 diabetes mellitus (HCC).    Past Surgical History:   has a past surgical history that includes Colonoscopy (N/A, 2022); Cystoscopy (Left, 2024); Total knee arthroplasty (Left); Ureter surgery (Left, 2024); Cystoscopy (Left, 2024); Cystoscopy (Left, 2024); IR GUIDED NEPHROSTOMY CATH PLACEMENT LEFT (10/28/2024); Kidney stone removal (Left, 2024); and  section.    Medications:   Scheduled Meds:    ampicillin IV  1,000 mg IntraVENous On Call to OR    gentamicin  80 mg IntraVENous On Call to OR    acetaminophen  1,000 mg Oral Once    sodium chloride flush  5-40 mL IntraVENous 2 times per day    sodium chloride flush  5-40 mL IntraVENous 2 times per day    ceFAZolin (ANCEF) IV  2,000 mg IntraVENous On Call to OR     Continuous Infusions:    lactated ringers 125 mL/hr at 25 0747    sodium chloride      sodium chloride       PRN Meds: lidocaine 1 % injection, sodium chloride flush, sodium

## 2025-03-18 NOTE — PERIOP NOTE
TRANSFER - OUT REPORT:    Verbal report given to Emiliana KU on Brenda Mayer  being transferred to [facility] for routine progression of patient care       Report consisted of patient's Situation, Background, Assessment and   Recommendations(SBAR).     Information from the following report(s) Nurse Handoff Report was reviewed with the receiving nurse.           Lines:   Peripheral IV 03/18/25 Posterior;Right Wrist (Active)   Site Assessment Clean, dry & intact 03/18/25 0741   Line Status Brisk blood return;Flushed;Infusing 03/18/25 0741   Phlebitis Assessment No symptoms 03/18/25 0741   Infiltration Assessment 0 03/18/25 0741   Alcohol Cap Used No 03/18/25 0741   Dressing Status New dressing applied;Clean, dry & intact 03/18/25 0741   Dressing Type Transparent 03/18/25 0741   Dressing Intervention New 03/18/25 0741        Opportunity for questions and clarification was provided.      Patient transported with:  Inhouse Transport

## 2025-03-19 LAB
ANION GAP SERPL CALC-SCNC: 13 MMOL/L (ref 7–16)
BASOPHILS # BLD: 0.04 K/UL (ref 0–0.2)
BASOPHILS NFR BLD: 0.3 % (ref 0–2)
BUN SERPL-MCNC: 13 MG/DL (ref 8–23)
CALCIUM SERPL-MCNC: 8.5 MG/DL (ref 8.8–10.2)
CHLORIDE SERPL-SCNC: 104 MMOL/L (ref 98–107)
CO2 SERPL-SCNC: 25 MMOL/L (ref 20–29)
CREAT SERPL-MCNC: 1 MG/DL (ref 0.6–1.1)
DIFFERENTIAL METHOD BLD: ABNORMAL
EOSINOPHIL # BLD: 0.07 K/UL (ref 0–0.8)
EOSINOPHIL NFR BLD: 0.6 % (ref 0.5–7.8)
ERYTHROCYTE [DISTWIDTH] IN BLOOD BY AUTOMATED COUNT: 16 % (ref 11.9–14.6)
GLUCOSE BLD STRIP.AUTO-MCNC: 109 MG/DL (ref 65–100)
GLUCOSE BLD STRIP.AUTO-MCNC: 115 MG/DL (ref 65–100)
GLUCOSE BLD STRIP.AUTO-MCNC: 122 MG/DL (ref 65–100)
GLUCOSE BLD STRIP.AUTO-MCNC: 158 MG/DL (ref 65–100)
GLUCOSE BLD STRIP.AUTO-MCNC: 214 MG/DL (ref 65–100)
GLUCOSE SERPL-MCNC: 104 MG/DL (ref 70–99)
HCT VFR BLD AUTO: 32.8 % (ref 35.8–46.3)
HGB BLD-MCNC: 9.9 G/DL (ref 11.7–15.4)
IMM GRANULOCYTES # BLD AUTO: 0.06 K/UL (ref 0–0.5)
IMM GRANULOCYTES NFR BLD AUTO: 0.5 % (ref 0–5)
LYMPHOCYTES # BLD: 1.3 K/UL (ref 0.5–4.6)
LYMPHOCYTES NFR BLD: 10.5 % (ref 13–44)
MCH RBC QN AUTO: 26.9 PG (ref 26.1–32.9)
MCHC RBC AUTO-ENTMCNC: 30.2 G/DL (ref 31.4–35)
MCV RBC AUTO: 89.1 FL (ref 82–102)
MONOCYTES # BLD: 0.82 K/UL (ref 0.1–1.3)
MONOCYTES NFR BLD: 6.6 % (ref 4–12)
NEUTS SEG # BLD: 10.12 K/UL (ref 1.7–8.2)
NEUTS SEG NFR BLD: 81.5 % (ref 43–78)
NRBC # BLD: 0 K/UL (ref 0–0.2)
PLATELET # BLD AUTO: 266 K/UL (ref 150–450)
PMV BLD AUTO: 10.3 FL (ref 9.4–12.3)
POTASSIUM SERPL-SCNC: 4 MMOL/L (ref 3.5–5.1)
RBC # BLD AUTO: 3.68 M/UL (ref 4.05–5.2)
SERVICE CMNT-IMP: ABNORMAL
SODIUM SERPL-SCNC: 142 MMOL/L (ref 136–145)
WBC # BLD AUTO: 12.4 K/UL (ref 4.3–11.1)

## 2025-03-19 PROCEDURE — 82962 GLUCOSE BLOOD TEST: CPT

## 2025-03-19 PROCEDURE — 6370000000 HC RX 637 (ALT 250 FOR IP): Performed by: NURSE PRACTITIONER

## 2025-03-19 PROCEDURE — 36415 COLL VENOUS BLD VENIPUNCTURE: CPT

## 2025-03-19 PROCEDURE — 2580000003 HC RX 258: Performed by: RADIOLOGY

## 2025-03-19 PROCEDURE — 80048 BASIC METABOLIC PNL TOTAL CA: CPT

## 2025-03-19 PROCEDURE — 85025 COMPLETE CBC W/AUTO DIFF WBC: CPT

## 2025-03-19 PROCEDURE — 99232 SBSQ HOSP IP/OBS MODERATE 35: CPT | Performed by: UROLOGY

## 2025-03-19 PROCEDURE — 2580000003 HC RX 258: Performed by: UROLOGY

## 2025-03-19 PROCEDURE — 6360000002 HC RX W HCPCS: Performed by: UROLOGY

## 2025-03-19 PROCEDURE — 6370000000 HC RX 637 (ALT 250 FOR IP): Performed by: UROLOGY

## 2025-03-19 PROCEDURE — 2500000003 HC RX 250 WO HCPCS: Performed by: UROLOGY

## 2025-03-19 PROCEDURE — 2500000003 HC RX 250 WO HCPCS: Performed by: NURSE PRACTITIONER

## 2025-03-19 PROCEDURE — 1100000000 HC RM PRIVATE

## 2025-03-19 RX ADMIN — SODIUM CHLORIDE: 9 INJECTION, SOLUTION INTRAVENOUS at 11:13

## 2025-03-19 RX ADMIN — SODIUM CHLORIDE, PRESERVATIVE FREE 10 ML: 5 INJECTION INTRAVENOUS at 20:24

## 2025-03-19 RX ADMIN — CEFTRIAXONE SODIUM 2000 MG: 2 INJECTION, POWDER, FOR SOLUTION INTRAMUSCULAR; INTRAVENOUS at 09:46

## 2025-03-19 RX ADMIN — POTASSIUM CHLORIDE 40 MEQ: 20 TABLET, EXTENDED RELEASE ORAL at 09:35

## 2025-03-19 RX ADMIN — FUROSEMIDE 40 MG: 40 TABLET ORAL at 09:34

## 2025-03-19 RX ADMIN — METOPROLOL SUCCINATE 25 MG: 25 TABLET, EXTENDED RELEASE ORAL at 09:35

## 2025-03-19 RX ADMIN — DIGOXIN 0.25 MG: 125 TABLET ORAL at 09:35

## 2025-03-19 RX ADMIN — INSULIN LISPRO 2 UNITS: 100 INJECTION, SOLUTION INTRAVENOUS; SUBCUTANEOUS at 20:23

## 2025-03-19 RX ADMIN — SODIUM CHLORIDE: 9 INJECTION, SOLUTION INTRAVENOUS at 18:36

## 2025-03-19 RX ADMIN — ROSUVASTATIN CALCIUM 5 MG: 10 TABLET, FILM COATED ORAL at 20:23

## 2025-03-19 RX ADMIN — DILTIAZEM HYDROCHLORIDE 300 MG: 180 CAPSULE, EXTENDED RELEASE ORAL at 09:34

## 2025-03-19 RX ADMIN — ACETAMINOPHEN 1000 MG: 500 TABLET, FILM COATED ORAL at 09:41

## 2025-03-19 ASSESSMENT — PAIN SCALES - GENERAL
PAINLEVEL_OUTOF10: 0
PAINLEVEL_OUTOF10: 0
PAINLEVEL_OUTOF10: 2

## 2025-03-19 ASSESSMENT — PAIN DESCRIPTION - DESCRIPTORS: DESCRIPTORS: ACHING

## 2025-03-19 ASSESSMENT — PAIN DESCRIPTION - ORIENTATION: ORIENTATION: POSTERIOR

## 2025-03-19 ASSESSMENT — PAIN DESCRIPTION - LOCATION: LOCATION: BACK

## 2025-03-19 NOTE — PROGRESS NOTES
Urology Progress Note    Admit Date: 3/18/2025    Subjective:     Patient has no new complaints. Afebrile overnight.  R NT draining.  Pain controlled.     Objective:     Patient Vitals for the past 8 hrs:   BP Temp Temp src Pulse Resp SpO2   03/19/25 0748 118/77 97.9 °F (36.6 °C) Oral 78 18 93 %     03/19 0701 - 03/19 1900  In: 655.3 [I.V.:605.3]  Out: 325 [Urine:325]  03/17 1901 - 03/19 0700  In: 1400.4 [I.V.:1349.4]  Out: 1675 [Urine:1675]    Physical Exam:     /77   Pulse 78   Temp 97.9 °F (36.6 °C) (Oral)   Resp 18   Ht 1.702 m (5' 7\")   Wt 117.6 kg (259 lb 4.2 oz)   SpO2 93%   BMI 40.61 kg/m²      GENERAL: No acute distress, Awake, Alert, Oriented X 3, Gait normal  CARDIAC: regular rate and rhythm  CHEST AND LUNG: Easy work of breathing, clear to auscultation bilaterally, no cyanosis  ABDOMEN: soft, non tender, non-distended, positive bowel sounds, no organomegaly, no palpable masses, no guarding, no rebound tenderness  : R NT with yanci clear urine          Data Review   Recent Results (from the past 24 hours)   Hemoglobin A1c    Collection Time: 03/18/25  2:38 PM   Result Value Ref Range    Hemoglobin A1C 7.7 (H) 0 - 5.6 %    Estimated Avg Glucose 174 mg/dL   POCT Glucose    Collection Time: 03/18/25  5:36 PM   Result Value Ref Range    POC Glucose 113 (H) 65 - 100 mg/dL    Performed by: AquilesaPCT    POCT Glucose    Collection Time: 03/18/25  8:24 PM   Result Value Ref Range    POC Glucose 102 (H) 65 - 100 mg/dL    Performed by: Danitza    POCT Glucose    Collection Time: 03/19/25  3:42 AM   Result Value Ref Range    POC Glucose 115 (H) 65 - 100 mg/dL    Performed by: SaranyaNewtriciousFouzia    Basic Metabolic Panel w/ Reflex to MG    Collection Time: 03/19/25  4:42 AM   Result Value Ref Range    Sodium 142 136 - 145 mmol/L    Potassium 4.0 3.5 - 5.1 mmol/L    Chloride 104 98 - 107 mmol/L    CO2 25 20 - 29 mmol/L    Anion Gap 13 7 - 16 mmol/L    Glucose 104 (H) 70 - 99 mg/dL

## 2025-03-19 NOTE — CARE COORDINATION
03/18/25 1527   Service Assessment   Support Systems Family Members   PCP Verified by CM Yes   Last Visit to PCP Within last year   Prior Functional Level Independent in ADLs/IADLs   Current Functional Level Independent in ADLs/IADLs   Can patient return to prior living arrangement Yes   Family able to assist with home care needs: Yes   Would you like for me to discuss the discharge plan with any other family members/significant others, and if so, who? Yes   Financial Resources Medicare   Social/Functional History   Lives With Alone   Receives Help From Family   Prior Level of Assist for ADLs Independent   Prior Level of Assist for Homemaking Independent   Ambulation Assistance Independent   Prior Level of Assist for Transfers Independent   Active  No   Patient's  Info son transports   Mode of Transportation Car   Discharge Planning   Type of Residence House   Living Arrangements Alone   Current Services Prior To Admission None   Potential Assistance Needed N/A   Type of Home Care Services None   Patient expects to be discharged to: House   Services At/After Discharge   Confirm Follow Up Transport Family     CM met with pt at bedside, demographics and PCP verified, she lives alone with son close by, son drives pt to appts and shopping, pt does not drive, indpt prior to admit, she receives h/h RN 3x/week for inner thigh wound she reports, provider is [facility] the provider but CM could not verify, no needs anticipated at discharge, pt will have transportation home.

## 2025-03-19 NOTE — PROGRESS NOTES
Pt is in bed without complaints. Hourly rounds completed and all needs met. Tylenol given this morning x1. Pt has had 1700mLs of output from neph tube. Bed is low, locked, and call light is in reach. Pt encouraged to call for assistance.

## 2025-03-19 NOTE — PROGRESS NOTES
Pt  bed in lowest position, wheels locked, and call light within reach. Hourly rounds completed. VSS. IV is patent and dressing is clean, dry, and intact. Ureter stent draining yanci output.

## 2025-03-19 NOTE — PLAN OF CARE
Problem: Safety - Adult  Goal: Free from fall injury  3/19/2025 0009 by Yuliya Olson RN  Outcome: Progressing  3/18/2025 1528 by Clinton Lund RN  Outcome: Progressing     Problem: Pain  Goal: Verbalizes/displays adequate comfort level or baseline comfort level  3/19/2025 0009 by Yuliya Olson RN  Outcome: Progressing  Flowsheets (Taken 3/18/2025 1948)  Verbalizes/displays adequate comfort level or baseline comfort level:   Encourage patient to monitor pain and request assistance   Assess pain using appropriate pain scale   Administer analgesics based on type and severity of pain and evaluate response  3/18/2025 1528 by Clinton Lund RN  Outcome: Progressing     Problem: Chronic Conditions and Co-morbidities  Goal: Patient's chronic conditions and co-morbidity symptoms are monitored and maintained or improved  3/19/2025 0009 by Yuliya Olson RN  Outcome: Progressing  Flowsheets (Taken 3/18/2025 1529 by Clinton Lund RN)  Care Plan - Patient's Chronic Conditions and Co-Morbidity Symptoms are Monitored and Maintained or Improved: Monitor and assess patient's chronic conditions and comorbid symptoms for stability, deterioration, or improvement  3/18/2025 1528 by Clinton Lund RN  Outcome: Progressing     Problem: Discharge Planning  Goal: Discharge to home or other facility with appropriate resources  3/19/2025 0009 by Yuliya Olson RN  Outcome: Progressing  Flowsheets (Taken 3/18/2025 1529 by Clinton Lund, RN)  Discharge to home or other facility with appropriate resources: Identify barriers to discharge with patient and caregiver  3/18/2025 1528 by Clinton Lund RN  Outcome: Progressing

## 2025-03-20 ENCOUNTER — TELEPHONE (OUTPATIENT)
Dept: UROLOGY | Age: 70
End: 2025-03-20

## 2025-03-20 ENCOUNTER — PREP FOR PROCEDURE (OUTPATIENT)
Dept: UROLOGY | Age: 70
End: 2025-03-20

## 2025-03-20 LAB
ANION GAP SERPL CALC-SCNC: 11 MMOL/L (ref 7–16)
BUN SERPL-MCNC: 9 MG/DL (ref 8–23)
CALCIUM SERPL-MCNC: 8.2 MG/DL (ref 8.8–10.2)
CHLORIDE SERPL-SCNC: 103 MMOL/L (ref 98–107)
CO2 SERPL-SCNC: 23 MMOL/L (ref 20–29)
CREAT SERPL-MCNC: 1.03 MG/DL (ref 0.6–1.1)
ERYTHROCYTE [DISTWIDTH] IN BLOOD BY AUTOMATED COUNT: 16.2 % (ref 11.9–14.6)
GLUCOSE BLD STRIP.AUTO-MCNC: 135 MG/DL (ref 65–100)
GLUCOSE BLD STRIP.AUTO-MCNC: 141 MG/DL (ref 65–100)
GLUCOSE BLD STRIP.AUTO-MCNC: 145 MG/DL (ref 65–100)
GLUCOSE BLD STRIP.AUTO-MCNC: 173 MG/DL (ref 65–100)
GLUCOSE SERPL-MCNC: 154 MG/DL (ref 70–99)
HCT VFR BLD AUTO: 30.7 % (ref 35.8–46.3)
HGB BLD-MCNC: 9.3 G/DL (ref 11.7–15.4)
MCH RBC QN AUTO: 26.7 PG (ref 26.1–32.9)
MCHC RBC AUTO-ENTMCNC: 30.3 G/DL (ref 31.4–35)
MCV RBC AUTO: 88.2 FL (ref 82–102)
NRBC # BLD: 0 K/UL (ref 0–0.2)
PLATELET # BLD AUTO: 226 K/UL (ref 150–450)
PMV BLD AUTO: 9.7 FL (ref 9.4–12.3)
POTASSIUM SERPL-SCNC: 3.5 MMOL/L (ref 3.5–5.1)
RBC # BLD AUTO: 3.48 M/UL (ref 4.05–5.2)
SERVICE CMNT-IMP: ABNORMAL
SODIUM SERPL-SCNC: 137 MMOL/L (ref 136–145)
WBC # BLD AUTO: 12.9 K/UL (ref 4.3–11.1)

## 2025-03-20 PROCEDURE — 2500000003 HC RX 250 WO HCPCS: Performed by: NURSE PRACTITIONER

## 2025-03-20 PROCEDURE — 85027 COMPLETE CBC AUTOMATED: CPT

## 2025-03-20 PROCEDURE — 6370000000 HC RX 637 (ALT 250 FOR IP): Performed by: UROLOGY

## 2025-03-20 PROCEDURE — 1100000000 HC RM PRIVATE

## 2025-03-20 PROCEDURE — 2580000003 HC RX 258: Performed by: RADIOLOGY

## 2025-03-20 PROCEDURE — 80048 BASIC METABOLIC PNL TOTAL CA: CPT

## 2025-03-20 PROCEDURE — 6370000000 HC RX 637 (ALT 250 FOR IP): Performed by: NURSE PRACTITIONER

## 2025-03-20 PROCEDURE — 99233 SBSQ HOSP IP/OBS HIGH 50: CPT | Performed by: UROLOGY

## 2025-03-20 PROCEDURE — 6360000002 HC RX W HCPCS: Performed by: UROLOGY

## 2025-03-20 PROCEDURE — 2580000003 HC RX 258: Performed by: UROLOGY

## 2025-03-20 PROCEDURE — 36415 COLL VENOUS BLD VENIPUNCTURE: CPT

## 2025-03-20 PROCEDURE — 82962 GLUCOSE BLOOD TEST: CPT

## 2025-03-20 PROCEDURE — 2500000003 HC RX 250 WO HCPCS: Performed by: UROLOGY

## 2025-03-20 RX ORDER — SODIUM CHLORIDE 9 MG/ML
INJECTION, SOLUTION INTRAVENOUS PRN
Status: CANCELLED | OUTPATIENT
Start: 2025-03-20

## 2025-03-20 RX ORDER — SODIUM CHLORIDE 0.9 % (FLUSH) 0.9 %
5-40 SYRINGE (ML) INJECTION PRN
Status: CANCELLED | OUTPATIENT
Start: 2025-03-20

## 2025-03-20 RX ORDER — SODIUM CHLORIDE 0.9 % (FLUSH) 0.9 %
5-40 SYRINGE (ML) INJECTION EVERY 12 HOURS SCHEDULED
Status: CANCELLED | OUTPATIENT
Start: 2025-03-20

## 2025-03-20 RX ADMIN — METOPROLOL SUCCINATE 25 MG: 25 TABLET, EXTENDED RELEASE ORAL at 08:41

## 2025-03-20 RX ADMIN — DIGOXIN 0.25 MG: 125 TABLET ORAL at 08:42

## 2025-03-20 RX ADMIN — FUROSEMIDE 40 MG: 40 TABLET ORAL at 08:41

## 2025-03-20 RX ADMIN — SODIUM CHLORIDE, PRESERVATIVE FREE 10 ML: 5 INJECTION INTRAVENOUS at 20:19

## 2025-03-20 RX ADMIN — SODIUM CHLORIDE: 9 INJECTION, SOLUTION INTRAVENOUS at 10:51

## 2025-03-20 RX ADMIN — SODIUM CHLORIDE: 9 INJECTION, SOLUTION INTRAVENOUS at 02:18

## 2025-03-20 RX ADMIN — ACETAMINOPHEN 1000 MG: 500 TABLET, FILM COATED ORAL at 04:12

## 2025-03-20 RX ADMIN — SODIUM CHLORIDE: 9 INJECTION, SOLUTION INTRAVENOUS at 18:46

## 2025-03-20 RX ADMIN — CEFTRIAXONE SODIUM 2000 MG: 2 INJECTION, POWDER, FOR SOLUTION INTRAMUSCULAR; INTRAVENOUS at 08:47

## 2025-03-20 RX ADMIN — ACETAMINOPHEN 1000 MG: 500 TABLET, FILM COATED ORAL at 22:00

## 2025-03-20 RX ADMIN — ROSUVASTATIN CALCIUM 5 MG: 10 TABLET, FILM COATED ORAL at 20:19

## 2025-03-20 RX ADMIN — DILTIAZEM HYDROCHLORIDE 300 MG: 180 CAPSULE, EXTENDED RELEASE ORAL at 08:40

## 2025-03-20 RX ADMIN — POTASSIUM CHLORIDE 40 MEQ: 20 TABLET, EXTENDED RELEASE ORAL at 08:42

## 2025-03-20 ASSESSMENT — PAIN SCALES - GENERAL
PAINLEVEL_OUTOF10: 0
PAINLEVEL_OUTOF10: 0
PAINLEVEL_OUTOF10: 3

## 2025-03-20 ASSESSMENT — PAIN DESCRIPTION - DESCRIPTORS: DESCRIPTORS: ACHING

## 2025-03-20 ASSESSMENT — PAIN DESCRIPTION - LOCATION: LOCATION: HEAD

## 2025-03-20 NOTE — PLAN OF CARE
Problem: Safety - Adult  Goal: Free from fall injury  Outcome: Progressing     Problem: Pain  Goal: Verbalizes/displays adequate comfort level or baseline comfort level  Outcome: Progressing  Flowsheets (Taken 3/19/2025 1924)  Verbalizes/displays adequate comfort level or baseline comfort level:   Encourage patient to monitor pain and request assistance   Assess pain using appropriate pain scale   Administer analgesics based on type and severity of pain and evaluate response     Problem: Chronic Conditions and Co-morbidities  Goal: Patient's chronic conditions and co-morbidity symptoms are monitored and maintained or improved  Outcome: Progressing     Problem: Discharge Planning  Goal: Discharge to home or other facility with appropriate resources  Outcome: Progressing

## 2025-03-20 NOTE — PROGRESS NOTES
Pt  bed in lowest position, wheels locked, and call light within reach. Hourly rounds completed. VSS. IV is patent and dressing is clean, dry, and intact. Pt spiked fever, tylenol given.

## 2025-03-20 NOTE — PROGRESS NOTES
Pt is in bed without complaints. Hourly rounds completed and all needs met. Neph tube draining, 1600mL of output. Bed is low, locked, and call light is in reach. Pt encouraged to call for assistance.

## 2025-03-20 NOTE — TELEPHONE ENCOUNTER
Called scheduling and asked them to hold open room 7 in the main.  I sent a request to scheduling for 3/24 @ 11:30 am per Nisreen's request.

## 2025-03-20 NOTE — TELEPHONE ENCOUNTER
----- Message from Dr. Jay Nielson MD sent at 3/20/2025  8:09 AM EDT -----  Regarding: R PCNL Reschedule  Kerry,    The above patient needs her R PCNL rescheduled in the next 1-2 weeks to be done while she is on antibiotics.  She has tube from IR now but went septic so I had to post-pone R PCNL.    She does not need to go back to IR so we can just book her for regular OR with me.     Can you see if OR will let me do her case at 11:30 on Monday or 11:30 on Wednesday next week?     If note, I can do it at 11:30 on Monday or Wednesday to following week.     Other option would be to put it on a surgery day if there's room but I doubt that since it's already so packed the next couple of weeks.     Thanks!Nisreen

## 2025-03-20 NOTE — TELEPHONE ENCOUNTER
RIGHT NEPHROLITHOTOMY PERCUTANEOUS   Date: 3/24/2025   Time: 1130   Location: [facility] MAIN OR 07

## 2025-03-21 LAB
GLUCOSE BLD STRIP.AUTO-MCNC: 126 MG/DL (ref 65–100)
GLUCOSE BLD STRIP.AUTO-MCNC: 141 MG/DL (ref 65–100)
GLUCOSE BLD STRIP.AUTO-MCNC: 150 MG/DL (ref 65–100)
GLUCOSE BLD STRIP.AUTO-MCNC: 172 MG/DL (ref 65–100)
SERVICE CMNT-IMP: ABNORMAL

## 2025-03-21 PROCEDURE — 2500000003 HC RX 250 WO HCPCS: Performed by: NURSE PRACTITIONER

## 2025-03-21 PROCEDURE — 6360000002 HC RX W HCPCS: Performed by: UROLOGY

## 2025-03-21 PROCEDURE — 99231 SBSQ HOSP IP/OBS SF/LOW 25: CPT | Performed by: NURSE PRACTITIONER

## 2025-03-21 PROCEDURE — 6370000000 HC RX 637 (ALT 250 FOR IP): Performed by: NURSE PRACTITIONER

## 2025-03-21 PROCEDURE — 6370000000 HC RX 637 (ALT 250 FOR IP): Performed by: UROLOGY

## 2025-03-21 PROCEDURE — 2580000003 HC RX 258: Performed by: UROLOGY

## 2025-03-21 PROCEDURE — 1100000000 HC RM PRIVATE

## 2025-03-21 PROCEDURE — 82962 GLUCOSE BLOOD TEST: CPT

## 2025-03-21 RX ORDER — LINEZOLID 600 MG/1
600 TABLET, FILM COATED ORAL EVERY 12 HOURS SCHEDULED
Status: DISCONTINUED | OUTPATIENT
Start: 2025-03-21 | End: 2025-03-25 | Stop reason: HOSPADM

## 2025-03-21 RX ADMIN — SODIUM CHLORIDE, PRESERVATIVE FREE 10 ML: 5 INJECTION INTRAVENOUS at 08:22

## 2025-03-21 RX ADMIN — ROSUVASTATIN CALCIUM 5 MG: 10 TABLET, FILM COATED ORAL at 20:16

## 2025-03-21 RX ADMIN — DILTIAZEM HYDROCHLORIDE 300 MG: 180 CAPSULE, EXTENDED RELEASE ORAL at 08:18

## 2025-03-21 RX ADMIN — POTASSIUM CHLORIDE 40 MEQ: 20 TABLET, EXTENDED RELEASE ORAL at 08:18

## 2025-03-21 RX ADMIN — DIGOXIN 0.25 MG: 125 TABLET ORAL at 08:19

## 2025-03-21 RX ADMIN — LINEZOLID 600 MG: 600 TABLET, FILM COATED ORAL at 20:16

## 2025-03-21 RX ADMIN — SODIUM CHLORIDE, PRESERVATIVE FREE 10 ML: 5 INJECTION INTRAVENOUS at 20:16

## 2025-03-21 RX ADMIN — FUROSEMIDE 40 MG: 40 TABLET ORAL at 08:19

## 2025-03-21 RX ADMIN — CEFTRIAXONE SODIUM 2000 MG: 2 INJECTION, POWDER, FOR SOLUTION INTRAMUSCULAR; INTRAVENOUS at 08:30

## 2025-03-21 RX ADMIN — METOPROLOL SUCCINATE 25 MG: 25 TABLET, EXTENDED RELEASE ORAL at 08:22

## 2025-03-21 ASSESSMENT — PAIN SCALES - GENERAL: PAINLEVEL_OUTOF10: 0

## 2025-03-21 NOTE — PLAN OF CARE
Problem: Safety - Adult  Goal: Free from fall injury  Outcome: Progressing     Problem: Pain  Goal: Verbalizes/displays adequate comfort level or baseline comfort level  Outcome: Progressing  Flowsheets (Taken 3/20/2025 1949)  Verbalizes/displays adequate comfort level or baseline comfort level:   Encourage patient to monitor pain and request assistance   Assess pain using appropriate pain scale     Problem: Chronic Conditions and Co-morbidities  Goal: Patient's chronic conditions and co-morbidity symptoms are monitored and maintained or improved  Outcome: Progressing     Problem: Discharge Planning  Goal: Discharge to home or other facility with appropriate resources  Outcome: Progressing

## 2025-03-21 NOTE — PROGRESS NOTES
Pt  bed in lowest position, wheels locked, and call light within reach. Hourly rounds completed. VSS. IV is patent and dressing is clean, dry, and intact. Pain treated per MAR.

## 2025-03-21 NOTE — PROGRESS NOTES
Physician Progress Note      PATIENT:               CAROLANN GONSALEZ  [facility] #:                  [id number]  :                       1955  ADMIT DATE:       3/18/2025 6:49 AM  DISCH DATE:  RESPONDING  PROVIDER #:        Jay Nielson MD          QUERY TEXT:    Pt admitted with R kidney stone burden with pus in kidney. Pt noted to have   Temp-102.7, WBC-12.4. If possible, please document in the progress notes and   discharge summary if you are evaluating and /or treating any of the following:    The medical record reflects the following:  Risk Factors: R kidney stone, Hydronephrosis, Pus in kidney  Clinical Indicators: Urology PN on 3/20-70 year old female with large R kidney   stone burden s/p R nephro-ureteral stent placement by IR 3/18/25.  R PCNL   canceled upon NT placement due to pus in kidney.  Now febrile to 102.9 with   concern for uro-sepsis secondary to large R kidney stone burden.  Pyelonephritis  Temp-102.7(3/20)  WBC-12.4(3/19), 12.9(3/20)  Treatment: R NT to drainage, IV Rocephin, Trend labs, OOA/Ambulate, IVF, IV   ampicillin (OMNIPEN) 1,000 mg, IV gentamicin (GARAMYCIN) IVPB 80 mg, IV   cefazolin (ANCEF) 2000 mg,    Thank You,  Kelsea Crews S, CDS  Options provided:  -- Sepsis due to Pyelonephritis, present on admission  -- Pyelonephritis without Sepsis  -- Other - I will add my own diagnosis  -- Disagree - Not applicable / Not valid  -- Disagree - Clinically unable to determine / Unknown  -- Refer to Clinical Documentation Reviewer    PROVIDER RESPONSE TEXT:    This patient has sepsis due to Pyelonephritis which was present on admission.    Query created by: Kelsea Crews on 3/21/2025 4:37 AM      Electronically signed by:  Jay Nielson MD 3/21/2025 11:58 AM

## 2025-03-21 NOTE — PROGRESS NOTES
Admit Date: 3/18/2025      Subjective:     Brenda Mayer is resting in bed. Reports feels about the same. No more fevers since yesterday.    Objective:     Patient Vitals for the past 8 hrs:   BP Temp Temp src Pulse Resp SpO2   03/21/25 0739 129/68 98.4 °F (36.9 °C) Oral 95 18 92 %     03/21 0701 - 03/21 1900  In: -   Out: 840 [Urine:840]  03/19 1901 - 03/21 0700  In: 4017.6 [I.V.:3967.6]  Out: 4655 [Urine:4655]    Physical Exam:  GENERAL ASSESSMENT: alert, oriented to person, place and time, no acute distress and no anxiety, depression or agitation  Chest: normal work of breathing  CVS exam: normal rate, regular rhythm, normal S1, S2, no murmurs, rubs, clicks or gallops.  ABDOMEN: not done  Neurological exam reveals alert, oriented, normal speech, no focal findings or movement disorder noted.  FEMALE GENITOURINARY EXAM: not done  MALE GENITAL EXAM: not done    Data Review   Recent Results (from the past 24 hours)   POCT Glucose    Collection Time: 03/20/25  4:04 PM   Result Value Ref Range    POC Glucose 135 (H) 65 - 100 mg/dL    Performed by: [employer]    POCT Glucose    Collection Time: 03/20/25  7:45 PM   Result Value Ref Range    POC Glucose 173 (H) 65 - 100 mg/dL    Performed by: AnjaliPurpleTealCT    POCT Glucose    Collection Time: 03/21/25  7:38 AM   Result Value Ref Range    POC Glucose 141 (H) 65 - 100 mg/dL    Performed by: Topsy LabsnayanP&R LabpakCT    POCT Glucose    Collection Time: 03/21/25 11:27 AM   Result Value Ref Range    POC Glucose 126 (H) 65 - 100 mg/dL    Performed by: [employer]        Assessment:     Principal Problem:    Kidney stone  Active Problems:    Pyelonephritis    Right kidney stone  Resolved Problems:    * No resolved hospital problems. *    Voiding spontaneously. RT PCN draining to gravity. Cr. 1.03. WBC 12.9. urine culture growing e.coli and VRE facealis. VSS.    Pre-Op Diagnosis: Kidney stone [N20.0]    Post-Op Diagnosis:  * No post-op diagnosis entered *    Procedures:

## 2025-03-21 NOTE — PLAN OF CARE
Problem: Safety - Adult  Goal: Free from fall injury  3/21/2025 1025 by Rossy Long RN  Outcome: Progressing  3/21/2025 0019 by Yuliya Olson RN  Outcome: Progressing     Problem: Pain  Goal: Verbalizes/displays adequate comfort level or baseline comfort level  3/21/2025 1025 by Rossy Long RN  Outcome: Progressing  3/21/2025 0019 by Yuliya Olson RN  Outcome: Progressing  Flowsheets (Taken 3/20/2025 1949)  Verbalizes/displays adequate comfort level or baseline comfort level:   Encourage patient to monitor pain and request assistance   Assess pain using appropriate pain scale     Problem: Chronic Conditions and Co-morbidities  Goal: Patient's chronic conditions and co-morbidity symptoms are monitored and maintained or improved  3/21/2025 1025 by Rossy Long RN  Outcome: Progressing  3/21/2025 0019 by Yuliya Olson RN  Outcome: Progressing     Problem: Discharge Planning  Goal: Discharge to home or other facility with appropriate resources  3/21/2025 1025 by Rossy Long RN  Outcome: Progressing  3/21/2025 0019 by Yuliya Olson RN  Outcome: Progressing

## 2025-03-22 LAB
ANION GAP SERPL CALC-SCNC: 11 MMOL/L (ref 7–16)
BACTERIA SPEC CULT: ABNORMAL
BACTERIA SPEC CULT: ABNORMAL
BASOPHILS # BLD: 0.06 K/UL (ref 0–0.2)
BASOPHILS NFR BLD: 0.6 % (ref 0–2)
BUN SERPL-MCNC: 10 MG/DL (ref 8–23)
CALCIUM SERPL-MCNC: 9 MG/DL (ref 8.8–10.2)
CHLORIDE SERPL-SCNC: 103 MMOL/L (ref 98–107)
CO2 SERPL-SCNC: 26 MMOL/L (ref 20–29)
CREAT SERPL-MCNC: 0.79 MG/DL (ref 0.6–1.1)
DIFFERENTIAL METHOD BLD: ABNORMAL
EOSINOPHIL # BLD: 0.33 K/UL (ref 0–0.8)
EOSINOPHIL NFR BLD: 3.5 % (ref 0.5–7.8)
ERYTHROCYTE [DISTWIDTH] IN BLOOD BY AUTOMATED COUNT: 15.9 % (ref 11.9–14.6)
GLUCOSE BLD STRIP.AUTO-MCNC: 128 MG/DL (ref 65–100)
GLUCOSE BLD STRIP.AUTO-MCNC: 133 MG/DL (ref 65–100)
GLUCOSE BLD STRIP.AUTO-MCNC: 140 MG/DL (ref 65–100)
GLUCOSE BLD STRIP.AUTO-MCNC: 148 MG/DL (ref 65–100)
GLUCOSE SERPL-MCNC: 140 MG/DL (ref 70–99)
HCT VFR BLD AUTO: 30.2 % (ref 35.8–46.3)
HGB BLD-MCNC: 9.6 G/DL (ref 11.7–15.4)
IMM GRANULOCYTES # BLD AUTO: 0.04 K/UL (ref 0–0.5)
IMM GRANULOCYTES NFR BLD AUTO: 0.4 % (ref 0–5)
LYMPHOCYTES # BLD: 1.34 K/UL (ref 0.5–4.6)
LYMPHOCYTES NFR BLD: 14.1 % (ref 13–44)
MCH RBC QN AUTO: 27.1 PG (ref 26.1–32.9)
MCHC RBC AUTO-ENTMCNC: 31.8 G/DL (ref 31.4–35)
MCV RBC AUTO: 85.3 FL (ref 82–102)
MONOCYTES # BLD: 0.72 K/UL (ref 0.1–1.3)
MONOCYTES NFR BLD: 7.6 % (ref 4–12)
NEUTS SEG # BLD: 6.99 K/UL (ref 1.7–8.2)
NEUTS SEG NFR BLD: 73.8 % (ref 43–78)
NRBC # BLD: 0 K/UL (ref 0–0.2)
PLATELET # BLD AUTO: 252 K/UL (ref 150–450)
PMV BLD AUTO: 10.1 FL (ref 9.4–12.3)
POTASSIUM SERPL-SCNC: 3.6 MMOL/L (ref 3.5–5.1)
RBC # BLD AUTO: 3.54 M/UL (ref 4.05–5.2)
SERVICE CMNT-IMP: ABNORMAL
SODIUM SERPL-SCNC: 140 MMOL/L (ref 136–145)
WBC # BLD AUTO: 9.5 K/UL (ref 4.3–11.1)

## 2025-03-22 PROCEDURE — 85025 COMPLETE CBC W/AUTO DIFF WBC: CPT

## 2025-03-22 PROCEDURE — 2500000003 HC RX 250 WO HCPCS: Performed by: NURSE PRACTITIONER

## 2025-03-22 PROCEDURE — 82962 GLUCOSE BLOOD TEST: CPT

## 2025-03-22 PROCEDURE — 1100000000 HC RM PRIVATE

## 2025-03-22 PROCEDURE — 99231 SBSQ HOSP IP/OBS SF/LOW 25: CPT | Performed by: NURSE PRACTITIONER

## 2025-03-22 PROCEDURE — 6370000000 HC RX 637 (ALT 250 FOR IP): Performed by: NURSE PRACTITIONER

## 2025-03-22 PROCEDURE — 6360000002 HC RX W HCPCS: Performed by: UROLOGY

## 2025-03-22 PROCEDURE — 2580000003 HC RX 258: Performed by: UROLOGY

## 2025-03-22 PROCEDURE — 36415 COLL VENOUS BLD VENIPUNCTURE: CPT

## 2025-03-22 PROCEDURE — 6370000000 HC RX 637 (ALT 250 FOR IP): Performed by: UROLOGY

## 2025-03-22 PROCEDURE — 80048 BASIC METABOLIC PNL TOTAL CA: CPT

## 2025-03-22 RX ADMIN — LINEZOLID 600 MG: 600 TABLET, FILM COATED ORAL at 08:17

## 2025-03-22 RX ADMIN — DOCUSATE SODIUM 50 MG AND SENNOSIDES 8.6 MG 1 TABLET: 8.6; 5 TABLET, FILM COATED ORAL at 19:56

## 2025-03-22 RX ADMIN — FUROSEMIDE 40 MG: 40 TABLET ORAL at 08:17

## 2025-03-22 RX ADMIN — DIGOXIN 0.25 MG: 125 TABLET ORAL at 08:17

## 2025-03-22 RX ADMIN — CEFTRIAXONE SODIUM 2000 MG: 2 INJECTION, POWDER, FOR SOLUTION INTRAMUSCULAR; INTRAVENOUS at 08:22

## 2025-03-22 RX ADMIN — DILTIAZEM HYDROCHLORIDE 300 MG: 180 CAPSULE, EXTENDED RELEASE ORAL at 08:17

## 2025-03-22 RX ADMIN — METOPROLOL SUCCINATE 25 MG: 25 TABLET, EXTENDED RELEASE ORAL at 08:17

## 2025-03-22 RX ADMIN — LINEZOLID 600 MG: 600 TABLET, FILM COATED ORAL at 19:57

## 2025-03-22 RX ADMIN — POTASSIUM CHLORIDE 40 MEQ: 20 TABLET, EXTENDED RELEASE ORAL at 08:17

## 2025-03-22 RX ADMIN — SODIUM CHLORIDE, PRESERVATIVE FREE 10 ML: 5 INJECTION INTRAVENOUS at 08:20

## 2025-03-22 RX ADMIN — ACETAMINOPHEN 1000 MG: 500 TABLET, FILM COATED ORAL at 02:30

## 2025-03-22 RX ADMIN — ROSUVASTATIN CALCIUM 5 MG: 10 TABLET, FILM COATED ORAL at 19:57

## 2025-03-22 RX ADMIN — SODIUM CHLORIDE, PRESERVATIVE FREE 10 ML: 5 INJECTION INTRAVENOUS at 19:57

## 2025-03-22 ASSESSMENT — PAIN SCALES - GENERAL
PAINLEVEL_OUTOF10: 3
PAINLEVEL_OUTOF10: 0
PAINLEVEL_OUTOF10: 0

## 2025-03-22 ASSESSMENT — PAIN DESCRIPTION - LOCATION: LOCATION: BACK

## 2025-03-22 ASSESSMENT — PAIN DESCRIPTION - ORIENTATION: ORIENTATION: RIGHT;LOWER

## 2025-03-22 ASSESSMENT — PAIN - FUNCTIONAL ASSESSMENT: PAIN_FUNCTIONAL_ASSESSMENT: ACTIVITIES ARE NOT PREVENTED

## 2025-03-22 ASSESSMENT — PAIN DESCRIPTION - DESCRIPTORS: DESCRIPTORS: SORE

## 2025-03-22 NOTE — PROGRESS NOTES
Admit Date: 3/18/2025      Subjective:     Brenda Mayer is resting in bed. Feeling some better.  Objective:     Patient Vitals for the past 8 hrs:   BP Temp Temp src Pulse Resp SpO2   03/22/25 0705 135/71 97.7 °F (36.5 °C) Oral 68 18 95 %     03/22 0701 - 03/22 1900  In: 120 [P.O.:120]  Out: 800 [Urine:800]  03/20 1901 - 03/22 0700  In: -   Out: 3095 [Urine:3095]    Physical Exam:  GENERAL ASSESSMENT: alert, oriented to person, place and time, no acute distress and no anxiety, depression or agitation  Chest: normal work of breathing  CVS exam: normal rate, regular rhythm, normal S1, S2, no murmurs, rubs, clicks or gallops.  ABDOMEN: not done  Neurological exam reveals alert, oriented, normal speech, no focal findings or movement disorder noted.  FEMALE GENITOURINARY EXAM: not done  MALE GENITAL EXAM: not done    Data Review   Recent Results (from the past 24 hours)   POCT Glucose    Collection Time: 03/21/25  4:12 PM   Result Value Ref Range    POC Glucose 150 (H) 65 - 100 mg/dL    Performed by: TawannamPCT    POCT Glucose    Collection Time: 03/21/25  7:42 PM   Result Value Ref Range    POC Glucose 172 (H) 65 - 100 mg/dL    Performed by: SerjioyPCT    POCT Glucose    Collection Time: 03/22/25  7:17 AM   Result Value Ref Range    POC Glucose 128 (H) 65 - 100 mg/dL    Performed by: DenyvChatteraPCT    CBC with Auto Differential    Collection Time: 03/22/25  9:15 AM   Result Value Ref Range    WBC 9.5 4.3 - 11.1 K/uL    RBC 3.54 (L) 4.05 - 5.2 M/uL    Hemoglobin 9.6 (L) 11.7 - 15.4 g/dL    Hematocrit 30.2 (L) 35.8 - 46.3 %    MCV 85.3 82 - 102 FL    MCH 27.1 26.1 - 32.9 PG    MCHC 31.8 31.4 - 35.0 g/dL    RDW 15.9 (H) 11.9 - 14.6 %    Platelets 252 150 - 450 K/uL    MPV 10.1 9.4 - 12.3 FL    nRBC 0.00 0.0 - 0.2 K/uL    Differential Type AUTOMATED      Neutrophils % 73.8 43.0 - 78.0 %    Lymphocytes % 14.1 13.0 - 44.0 %    Monocytes % 7.6 4.0 - 12.0 %    Eosinophils % 3.5 0.5 - 7.8 %    Basophils %

## 2025-03-22 NOTE — PLAN OF CARE
Problem: Safety - Adult  Goal: Free from fall injury  3/21/2025 2040 by Earlene Cha RN  Outcome: Progressing  Flowsheets (Taken 3/21/2025 1908)  Free From Fall Injury: Instruct family/caregiver on patient safety  3/21/2025 1025 by Rossy Long RN  Outcome: Progressing     Problem: Pain  Goal: Verbalizes/displays adequate comfort level or baseline comfort level  3/21/2025 2040 by Earlene Cha RN  Outcome: Progressing  Flowsheets (Taken 3/21/2025 1941)  Verbalizes/displays adequate comfort level or baseline comfort level: Encourage patient to monitor pain and request assistance  3/21/2025 1025 by Rossy Long RN  Outcome: Progressing     Problem: Chronic Conditions and Co-morbidities  Goal: Patient's chronic conditions and co-morbidity symptoms are monitored and maintained or improved  3/21/2025 2040 by Earlene Cha RN  Outcome: Progressing  Flowsheets (Taken 3/21/2025 1908)  Care Plan - Patient's Chronic Conditions and Co-Morbidity Symptoms are Monitored and Maintained or Improved: Monitor and assess patient's chronic conditions and comorbid symptoms for stability, deterioration, or improvement  3/21/2025 1025 by Rossy Long RN  Outcome: Progressing     Problem: Discharge Planning  Goal: Discharge to home or other facility with appropriate resources  3/21/2025 2040 by Earlene Cha RN  Outcome: Progressing  Flowsheets (Taken 3/21/2025 1908)  Discharge to home or other facility with appropriate resources:   Identify barriers to discharge with patient and caregiver   Arrange for needed discharge resources and transportation as appropriate   Identify discharge learning needs (meds, wound care, etc)   Refer to discharge planning if patient needs post-hospital services based on physician order or complex needs related to functional status, cognitive ability or social support system  3/21/2025 1025 by Rossy Long RN  Outcome: Progressing     Problem: Skin/Tissue Integrity  Goal: Skin

## 2025-03-22 NOTE — PLAN OF CARE
Problem: Safety - Adult  Goal: Free from fall injury  3/22/2025 0752 by Rossy Long RN  Outcome: Progressing  3/21/2025 2040 by Earlene Cha RN  Outcome: Progressing  Flowsheets (Taken 3/21/2025 1908)  Free From Fall Injury: Instruct family/caregiver on patient safety     Problem: Pain  Goal: Verbalizes/displays adequate comfort level or baseline comfort level  3/22/2025 0752 by Rossy Long RN  Outcome: Progressing  3/21/2025 2040 by Earlene Cha RN  Outcome: Progressing  Flowsheets (Taken 3/21/2025 1941)  Verbalizes/displays adequate comfort level or baseline comfort level: Encourage patient to monitor pain and request assistance     Problem: Chronic Conditions and Co-morbidities  Goal: Patient's chronic conditions and co-morbidity symptoms are monitored and maintained or improved  3/22/2025 0752 by Rossy Long RN  Outcome: Progressing  3/21/2025 2040 by Earlene Cha RN  Outcome: Progressing  Flowsheets (Taken 3/21/2025 1908)  Care Plan - Patient's Chronic Conditions and Co-Morbidity Symptoms are Monitored and Maintained or Improved: Monitor and assess patient's chronic conditions and comorbid symptoms for stability, deterioration, or improvement     Problem: Discharge Planning  Goal: Discharge to home or other facility with appropriate resources  3/22/2025 0752 by Rossy Long RN  Outcome: Progressing  3/21/2025 2040 by Earlene Cha RN  Outcome: Progressing  Flowsheets (Taken 3/21/2025 1908)  Discharge to home or other facility with appropriate resources:   Identify barriers to discharge with patient and caregiver   Arrange for needed discharge resources and transportation as appropriate   Identify discharge learning needs (meds, wound care, etc)   Refer to discharge planning if patient needs post-hospital services based on physician order or complex needs related to functional status, cognitive ability or social support system      [facility]

## 2025-03-22 NOTE — PLAN OF CARE
Problem: Safety - Adult  Goal: Free from fall injury  3/22/2025 1929 by Earlene Cha RN  Outcome: Progressing  Flowsheets (Taken 3/22/2025 1906)  Free From Fall Injury: Instruct family/caregiver on patient safety  3/22/2025 0752 by Rossy Long RN  Outcome: Progressing     Problem: Pain  Goal: Verbalizes/displays adequate comfort level or baseline comfort level  3/22/2025 1929 by Earlene Cha RN  Outcome: Progressing  3/22/2025 0752 by Rossy Long RN  Outcome: Progressing     Problem: Chronic Conditions and Co-morbidities  Goal: Patient's chronic conditions and co-morbidity symptoms are monitored and maintained or improved  3/22/2025 1929 by Earlene Cha RN  Outcome: Progressing  Flowsheets (Taken 3/22/2025 1906)  Care Plan - Patient's Chronic Conditions and Co-Morbidity Symptoms are Monitored and Maintained or Improved: Monitor and assess patient's chronic conditions and comorbid symptoms for stability, deterioration, or improvement  3/22/2025 0752 by Rossy Long RN  Outcome: Progressing     Problem: Discharge Planning  Goal: Discharge to home or other facility with appropriate resources  3/22/2025 1929 by Earlene Cha RN  Outcome: Progressing  Flowsheets (Taken 3/22/2025 1906)  Discharge to home or other facility with appropriate resources:   Identify barriers to discharge with patient and caregiver   Arrange for needed discharge resources and transportation as appropriate   Identify discharge learning needs (meds, wound care, etc)   Refer to discharge planning if patient needs post-hospital services based on physician order or complex needs related to functional status, cognitive ability or social support system  3/22/2025 0752 by Rossy Long RN  Outcome: Progressing     Problem: Skin/Tissue Integrity  Goal: Skin integrity remains intact  Description: 1.  Monitor for areas of redness and/or skin breakdown  2.  Assess vascular access sites hourly  3.  Every 4-6 hours minimum:

## 2025-03-23 ENCOUNTER — ANESTHESIA EVENT (OUTPATIENT)
Dept: SURGERY | Age: 70
DRG: 659 | End: 2025-03-23
Payer: MEDICARE

## 2025-03-23 LAB
GLUCOSE BLD STRIP.AUTO-MCNC: 131 MG/DL (ref 65–100)
GLUCOSE BLD STRIP.AUTO-MCNC: 131 MG/DL (ref 65–100)
GLUCOSE BLD STRIP.AUTO-MCNC: 133 MG/DL (ref 65–100)
GLUCOSE BLD STRIP.AUTO-MCNC: 134 MG/DL (ref 65–100)
SERVICE CMNT-IMP: ABNORMAL

## 2025-03-23 PROCEDURE — 6370000000 HC RX 637 (ALT 250 FOR IP): Performed by: NURSE PRACTITIONER

## 2025-03-23 PROCEDURE — 1100000000 HC RM PRIVATE

## 2025-03-23 PROCEDURE — 99231 SBSQ HOSP IP/OBS SF/LOW 25: CPT | Performed by: NURSE PRACTITIONER

## 2025-03-23 PROCEDURE — 6360000002 HC RX W HCPCS: Performed by: UROLOGY

## 2025-03-23 PROCEDURE — 6370000000 HC RX 637 (ALT 250 FOR IP): Performed by: UROLOGY

## 2025-03-23 PROCEDURE — 82962 GLUCOSE BLOOD TEST: CPT

## 2025-03-23 PROCEDURE — 2500000003 HC RX 250 WO HCPCS: Performed by: NURSE PRACTITIONER

## 2025-03-23 PROCEDURE — 2580000003 HC RX 258: Performed by: UROLOGY

## 2025-03-23 RX ADMIN — DOCUSATE SODIUM 50 MG AND SENNOSIDES 8.6 MG 1 TABLET: 8.6; 5 TABLET, FILM COATED ORAL at 07:49

## 2025-03-23 RX ADMIN — FUROSEMIDE 40 MG: 40 TABLET ORAL at 07:49

## 2025-03-23 RX ADMIN — LINEZOLID 600 MG: 600 TABLET, FILM COATED ORAL at 07:49

## 2025-03-23 RX ADMIN — ROSUVASTATIN CALCIUM 5 MG: 10 TABLET, FILM COATED ORAL at 20:26

## 2025-03-23 RX ADMIN — METOPROLOL SUCCINATE 25 MG: 25 TABLET, EXTENDED RELEASE ORAL at 07:48

## 2025-03-23 RX ADMIN — POTASSIUM CHLORIDE 40 MEQ: 20 TABLET, EXTENDED RELEASE ORAL at 07:49

## 2025-03-23 RX ADMIN — SODIUM CHLORIDE, PRESERVATIVE FREE 10 ML: 5 INJECTION INTRAVENOUS at 07:49

## 2025-03-23 RX ADMIN — Medication 3 MG: at 20:26

## 2025-03-23 RX ADMIN — SODIUM CHLORIDE, PRESERVATIVE FREE 10 ML: 5 INJECTION INTRAVENOUS at 20:26

## 2025-03-23 RX ADMIN — DIGOXIN 0.25 MG: 125 TABLET ORAL at 07:49

## 2025-03-23 RX ADMIN — DILTIAZEM HYDROCHLORIDE 300 MG: 180 CAPSULE, EXTENDED RELEASE ORAL at 07:49

## 2025-03-23 RX ADMIN — LINEZOLID 600 MG: 600 TABLET, FILM COATED ORAL at 20:26

## 2025-03-23 RX ADMIN — CEFTRIAXONE SODIUM 2000 MG: 2 INJECTION, POWDER, FOR SOLUTION INTRAMUSCULAR; INTRAVENOUS at 07:51

## 2025-03-23 ASSESSMENT — PAIN SCALES - GENERAL: PAINLEVEL_OUTOF10: 0

## 2025-03-23 NOTE — ANESTHESIA PRE PROCEDURE
Department of Anesthesiology  Preprocedure Note       Name:  Brenda Mayer   Age:  70 y.o.  :  1955                                          MRN:  [id number]         Date:  3/23/2025      Surgeon: Surgeon(s):  Jay Nielson MD    Procedure: Procedure(s):  RIGHT NEPHROLITHOTOMY PERCUTANEOUS    Medications prior to admission:   Prior to Admission medications    Medication Sig Start Date End Date Taking? Authorizing Provider   dilTIAZem (CARDIZEM CD) 300 MG extended release capsule Take 1 capsule by mouth daily 25  Yes ProviderShelia MD   rosuvastatin (CRESTOR) 5 MG tablet Take 1 tablet by mouth nightly 1/15/25 1/15/26 Yes Provider, MD Shelia   digoxin (LANOXIN) 250 MCG tablet Take 1 tablet by mouth daily 25  Yes Tom Dunbar MD   Coenzyme Q10 (CO Q-10 PO) Take by mouth daily   Yes Provider, MD Shelia   D-Ribose (RIBOSE, D,) POWD Take by mouth daily   Yes Provider, MD Shelia   Carnitine-B5-B6 500-15-5 MG TABS Take 1 tablet by mouth daily   Yes Provider, MD Shelia   Misc. Devices (CPAP MACHINE) MISC by Does not apply route   Yes Provider, MD Shelia   acetaminophen (TYLENOL) 500 MG tablet Take 2 tablets by mouth every 6 hours as needed for Pain for pain   Yes Provider, MD Shelia   furosemide (LASIX) 40 MG tablet Take 1 tablet by mouth daily  Patient taking differently: Take 1.5 tablets by mouth daily 24  Yes Carlos Holm DO   metoprolol succinate (TOPROL XL) 25 MG extended release tablet Take 1 tablet by mouth daily 4/10/24  Yes Carlos Holm DO   BASAGLAR KWIKPEN 100 UNIT/ML injection pen Inject 38 Units into the skin nightly subcutaneous 23  Yes ProviderShelia MD   metFORMIN (GLUCOPHAGE-XR) 500 MG extended release tablet Take 3 tablets by mouth daily (with breakfast) 23  Yes ProviderShelia MD   OXYGEN by CPAP route 2 lpm at bedtime   Yes ProviderShelia MD   potassium chloride (KLOR-CON M) 20 MEQ extended

## 2025-03-23 NOTE — PLAN OF CARE
Problem: Safety - Adult  Goal: Free from fall injury  3/23/2025 0741 by Rossy Long RN  Outcome: Progressing  3/22/2025 1929 by Earlene Cha RN  Outcome: Progressing  Flowsheets (Taken 3/22/2025 1906)  Free From Fall Injury: Instruct family/caregiver on patient safety     Problem: Pain  Goal: Verbalizes/displays adequate comfort level or baseline comfort level  3/23/2025 0741 by Rossy Long RN  Outcome: Progressing  3/22/2025 1929 by Earlene Cha RN  Outcome: Progressing  Flowsheets (Taken 3/22/2025 1923)  Verbalizes/displays adequate comfort level or baseline comfort level: Encourage patient to monitor pain and request assistance     Problem: Chronic Conditions and Co-morbidities  Goal: Patient's chronic conditions and co-morbidity symptoms are monitored and maintained or improved  3/23/2025 0741 by Rossy Long RN  Outcome: Progressing  3/22/2025 1929 by Earlene Cha RN  Outcome: Progressing  Flowsheets (Taken 3/22/2025 1906)  Care Plan - Patient's Chronic Conditions and Co-Morbidity Symptoms are Monitored and Maintained or Improved: Monitor and assess patient's chronic conditions and comorbid symptoms for stability, deterioration, or improvement     Problem: Discharge Planning  Goal: Discharge to home or other facility with appropriate resources  3/23/2025 0741 by Rossy Long RN  Outcome: Progressing  3/22/2025 1929 by Earlene Cha RN  Outcome: Progressing  Flowsheets (Taken 3/22/2025 1906)  Discharge to home or other facility with appropriate resources:   Identify barriers to discharge with patient and caregiver   Arrange for needed discharge resources and transportation as appropriate   Identify discharge learning needs (meds, wound care, etc)   Refer to discharge planning if patient needs post-hospital services based on physician order or complex needs related to functional status, cognitive ability or social support system

## 2025-03-23 NOTE — PLAN OF CARE
Problem: Safety - Adult  Goal: Free from fall injury  3/23/2025 1937 by Earlene Cha RN  Outcome: Progressing  Flowsheets (Taken 3/23/2025 1908)  Free From Fall Injury: Instruct family/caregiver on patient safety  3/23/2025 0741 by Rossy Long RN  Outcome: Progressing     Problem: Pain  Goal: Verbalizes/displays adequate comfort level or baseline comfort level  3/23/2025 1937 by Earlene Cha RN  Outcome: Progressing  3/23/2025 0741 by Rossy Long RN  Outcome: Progressing     Problem: Chronic Conditions and Co-morbidities  Goal: Patient's chronic conditions and co-morbidity symptoms are monitored and maintained or improved  3/23/2025 1937 by Earlene Cha RN  Outcome: Progressing  Flowsheets (Taken 3/23/2025 1908)  Care Plan - Patient's Chronic Conditions and Co-Morbidity Symptoms are Monitored and Maintained or Improved: Monitor and assess patient's chronic conditions and comorbid symptoms for stability, deterioration, or improvement  3/23/2025 0741 by Rossy Long RN  Outcome: Progressing     Problem: Discharge Planning  Goal: Discharge to home or other facility with appropriate resources  3/23/2025 1937 by Earlene Cha RN  Outcome: Progressing  Flowsheets (Taken 3/23/2025 1908)  Discharge to home or other facility with appropriate resources:   Identify barriers to discharge with patient and caregiver   Arrange for needed discharge resources and transportation as appropriate   Identify discharge learning needs (meds, wound care, etc)   Refer to discharge planning if patient needs post-hospital services based on physician order or complex needs related to functional status, cognitive ability or social support system  3/23/2025 0741 by Rossy Long RN  Outcome: Progressing     Problem: Skin/Tissue Integrity  Goal: Skin integrity remains intact  Description: 1.  Monitor for areas of redness and/or skin breakdown  2.  Assess vascular access sites hourly  3.  Every 4-6 hours minimum:

## 2025-03-23 NOTE — PROGRESS NOTES
Admit Date: 3/18/2025      Subjective:     Brenda Mayer is resting in bed. No new complaints.  Objective:     Patient Vitals for the past 8 hrs:   BP Temp Temp src Pulse Resp SpO2   03/23/25 0730 122/67 98.2 °F (36.8 °C) Oral 66 16 99 %   03/23/25 0308 130/74 98.2 °F (36.8 °C) Oral 68 18 93 %     03/23 0701 - 03/23 1900  In: -   Out: 50 [Urine:50]  03/21 1901 - 03/23 0700  In: 240 [P.O.:240]  Out: 3075 [Urine:3075]    Physical Exam:  GENERAL ASSESSMENT: alert, oriented to person, place and time, no acute distress and no anxiety, depression or agitation  Chest: normal work of breathing  CVS exam: normal rate, regular rhythm, normal S1, S2, no murmurs, rubs, clicks or gallops.  ABDOMEN: not done  Neurological exam reveals alert, oriented, normal speech, no focal findings or movement disorder noted.  FEMALE GENITOURINARY EXAM: not done  MALE GENITAL EXAM: not done    Data Review   Recent Results (from the past 24 hours)   POCT Glucose    Collection Time: 03/22/25 11:06 AM   Result Value Ref Range    POC Glucose 148 (H) 65 - 100 mg/dL    Performed by: [employer] GmbHPreeyaPCT    POCT Glucose    Collection Time: 03/22/25  4:01 PM   Result Value Ref Range    POC Glucose 140 (H) 65 - 100 mg/dL    Performed by: [employer]    POCT Glucose    Collection Time: 03/22/25  7:50 PM   Result Value Ref Range    POC Glucose 133 (H) 65 - 100 mg/dL    Performed by: AlexPCJONNY    POCT Glucose    Collection Time: 03/23/25  7:49 AM   Result Value Ref Range    POC Glucose 131 (H) 65 - 100 mg/dL    Performed by: Lianne        Assessment:     Principal Problem:    Kidney stone  Active Problems:    Pyelonephritis    Right kidney stone  Resolved Problems:    * No resolved hospital problems. *    Voiding spontaneously. PCN draining to gravity. Cr. 0.79. HGB 9.6. WBC 9.5. VSS.     Pre-Op Diagnosis: Kidney stone [N20.0]    Post-Op Diagnosis:  * No post-op diagnosis entered *    Procedures: Procedure(s):  S/P RIGHT

## 2025-03-24 ENCOUNTER — ANESTHESIA (OUTPATIENT)
Dept: SURGERY | Age: 70
DRG: 659 | End: 2025-03-24
Payer: MEDICARE

## 2025-03-24 ENCOUNTER — APPOINTMENT (OUTPATIENT)
Dept: GENERAL RADIOLOGY | Age: 70
DRG: 659 | End: 2025-03-24
Attending: UROLOGY
Payer: MEDICARE

## 2025-03-24 LAB
ABO + RH BLD: NORMAL
BLOOD GROUP ANTIBODIES SERPL: NORMAL
GLUCOSE BLD STRIP.AUTO-MCNC: 110 MG/DL (ref 65–100)
GLUCOSE BLD STRIP.AUTO-MCNC: 110 MG/DL (ref 65–100)
GLUCOSE BLD STRIP.AUTO-MCNC: 135 MG/DL (ref 65–100)
GLUCOSE BLD STRIP.AUTO-MCNC: 139 MG/DL (ref 65–100)
GLUCOSE BLD STRIP.AUTO-MCNC: 163 MG/DL (ref 65–100)
GLUCOSE BLD STRIP.AUTO-MCNC: 286 MG/DL (ref 65–100)
SERVICE CMNT-IMP: ABNORMAL
SPECIMEN EXP DATE BLD: NORMAL

## 2025-03-24 PROCEDURE — C1747 HC ENDOSCOPE, SINGLE, URINARY TRACT: HCPCS | Performed by: UROLOGY

## 2025-03-24 PROCEDURE — 1100000000 HC RM PRIVATE

## 2025-03-24 PROCEDURE — C1894 INTRO/SHEATH, NON-LASER: HCPCS | Performed by: UROLOGY

## 2025-03-24 PROCEDURE — 2500000003 HC RX 250 WO HCPCS: Performed by: NURSE PRACTITIONER

## 2025-03-24 PROCEDURE — 7100000000 HC PACU RECOVERY - FIRST 15 MIN: Performed by: UROLOGY

## 2025-03-24 PROCEDURE — 82962 GLUCOSE BLOOD TEST: CPT

## 2025-03-24 PROCEDURE — 0TC03ZZ EXTIRPATION OF MATTER FROM RIGHT KIDNEY, PERCUTANEOUS APPROACH: ICD-10-PCS | Performed by: UROLOGY

## 2025-03-24 PROCEDURE — 86850 RBC ANTIBODY SCREEN: CPT

## 2025-03-24 PROCEDURE — C1758 CATHETER, URETERAL: HCPCS | Performed by: UROLOGY

## 2025-03-24 PROCEDURE — 86901 BLOOD TYPING SEROLOGIC RH(D): CPT

## 2025-03-24 PROCEDURE — 7100000001 HC PACU RECOVERY - ADDTL 15 MIN: Performed by: UROLOGY

## 2025-03-24 PROCEDURE — C1769 GUIDE WIRE: HCPCS | Performed by: UROLOGY

## 2025-03-24 PROCEDURE — 2720000010 HC SURG SUPPLY STERILE: Performed by: UROLOGY

## 2025-03-24 PROCEDURE — 2500000003 HC RX 250 WO HCPCS: Performed by: NURSE ANESTHETIST, CERTIFIED REGISTERED

## 2025-03-24 PROCEDURE — 2580000003 HC RX 258: Performed by: UROLOGY

## 2025-03-24 PROCEDURE — 6370000000 HC RX 637 (ALT 250 FOR IP): Performed by: NURSE PRACTITIONER

## 2025-03-24 PROCEDURE — 50081 PERQ NL/PL LITHOTRP CPLX>2CM: CPT | Performed by: UROLOGY

## 2025-03-24 PROCEDURE — 6360000002 HC RX W HCPCS: Performed by: UROLOGY

## 2025-03-24 PROCEDURE — 0T768DZ DILATION OF RIGHT URETER WITH INTRALUMINAL DEVICE, VIA NATURAL OR ARTIFICIAL OPENING ENDOSCOPIC: ICD-10-PCS | Performed by: UROLOGY

## 2025-03-24 PROCEDURE — 2580000003 HC RX 258: Performed by: ANESTHESIOLOGY

## 2025-03-24 PROCEDURE — 5A0935A ASSISTANCE WITH RESPIRATORY VENTILATION, LESS THAN 24 CONSECUTIVE HOURS, HIGH NASAL FLOW/VELOCITY: ICD-10-PCS | Performed by: UROLOGY

## 2025-03-24 PROCEDURE — 0TC33ZZ EXTIRPATION OF MATTER FROM RIGHT KIDNEY PELVIS, PERCUTANEOUS APPROACH: ICD-10-PCS | Performed by: UROLOGY

## 2025-03-24 PROCEDURE — BT1D1ZZ FLUOROSCOPY OF RIGHT KIDNEY, URETER AND BLADDER USING LOW OSMOLAR CONTRAST: ICD-10-PCS | Performed by: UROLOGY

## 2025-03-24 PROCEDURE — 6370000000 HC RX 637 (ALT 250 FOR IP): Performed by: UROLOGY

## 2025-03-24 PROCEDURE — C2617 STENT, NON-COR, TEM W/O DEL: HCPCS | Performed by: UROLOGY

## 2025-03-24 PROCEDURE — 88300 SURGICAL PATH GROSS: CPT

## 2025-03-24 PROCEDURE — 3600000014 HC SURGERY LEVEL 4 ADDTL 15MIN: Performed by: UROLOGY

## 2025-03-24 PROCEDURE — 3700000000 HC ANESTHESIA ATTENDED CARE: Performed by: UROLOGY

## 2025-03-24 PROCEDURE — 2709999900 HC NON-CHARGEABLE SUPPLY: Performed by: UROLOGY

## 2025-03-24 PROCEDURE — 6370000000 HC RX 637 (ALT 250 FOR IP): Performed by: ANESTHESIOLOGY

## 2025-03-24 PROCEDURE — 6360000002 HC RX W HCPCS: Performed by: NURSE ANESTHETIST, CERTIFIED REGISTERED

## 2025-03-24 PROCEDURE — 99231 SBSQ HOSP IP/OBS SF/LOW 25: CPT | Performed by: NURSE PRACTITIONER

## 2025-03-24 PROCEDURE — 3700000001 HC ADD 15 MINUTES (ANESTHESIA): Performed by: UROLOGY

## 2025-03-24 PROCEDURE — 86900 BLOOD TYPING SEROLOGIC ABO: CPT

## 2025-03-24 PROCEDURE — 3600000004 HC SURGERY LEVEL 4 BASE: Performed by: UROLOGY

## 2025-03-24 DEVICE — URETERAL STENT
Type: IMPLANTABLE DEVICE | Status: FUNCTIONAL
Brand: PERCUFLEX™ PLUS

## 2025-03-24 RX ORDER — PROPOFOL 10 MG/ML
INJECTION, EMULSION INTRAVENOUS
Status: DISCONTINUED | OUTPATIENT
Start: 2025-03-24 | End: 2025-03-24 | Stop reason: SDUPTHER

## 2025-03-24 RX ORDER — ONDANSETRON 2 MG/ML
INJECTION INTRAMUSCULAR; INTRAVENOUS
Status: DISCONTINUED | OUTPATIENT
Start: 2025-03-24 | End: 2025-03-24 | Stop reason: SDUPTHER

## 2025-03-24 RX ORDER — SUCCINYLCHOLINE CHLORIDE 20 MG/ML
INJECTION INTRAMUSCULAR; INTRAVENOUS
Status: DISCONTINUED | OUTPATIENT
Start: 2025-03-24 | End: 2025-03-24 | Stop reason: SDUPTHER

## 2025-03-24 RX ORDER — SODIUM CHLORIDE, SODIUM LACTATE, POTASSIUM CHLORIDE, CALCIUM CHLORIDE 600; 310; 30; 20 MG/100ML; MG/100ML; MG/100ML; MG/100ML
INJECTION, SOLUTION INTRAVENOUS CONTINUOUS
Status: DISCONTINUED | OUTPATIENT
Start: 2025-03-24 | End: 2025-03-24 | Stop reason: HOSPADM

## 2025-03-24 RX ORDER — SODIUM CHLORIDE 9 MG/ML
INJECTION, SOLUTION INTRAVENOUS PRN
Status: DISCONTINUED | OUTPATIENT
Start: 2025-03-24 | End: 2025-03-24 | Stop reason: HOSPADM

## 2025-03-24 RX ORDER — GLYCOPYRROLATE 0.2 MG/ML
INJECTION INTRAMUSCULAR; INTRAVENOUS
Status: DISCONTINUED | OUTPATIENT
Start: 2025-03-24 | End: 2025-03-24 | Stop reason: SDUPTHER

## 2025-03-24 RX ORDER — SODIUM CHLORIDE 0.9 % (FLUSH) 0.9 %
5-40 SYRINGE (ML) INJECTION PRN
Status: DISCONTINUED | OUTPATIENT
Start: 2025-03-24 | End: 2025-03-24 | Stop reason: HOSPADM

## 2025-03-24 RX ORDER — DIPHENHYDRAMINE HYDROCHLORIDE 50 MG/ML
12.5 INJECTION, SOLUTION INTRAMUSCULAR; INTRAVENOUS
Status: DISCONTINUED | OUTPATIENT
Start: 2025-03-24 | End: 2025-03-24 | Stop reason: HOSPADM

## 2025-03-24 RX ORDER — SODIUM CHLORIDE 0.9 % (FLUSH) 0.9 %
5-40 SYRINGE (ML) INJECTION EVERY 12 HOURS SCHEDULED
Status: DISCONTINUED | OUTPATIENT
Start: 2025-03-24 | End: 2025-03-24 | Stop reason: HOSPADM

## 2025-03-24 RX ORDER — FENTANYL CITRATE 50 UG/ML
INJECTION, SOLUTION INTRAMUSCULAR; INTRAVENOUS
Status: DISCONTINUED | OUTPATIENT
Start: 2025-03-24 | End: 2025-03-24 | Stop reason: SDUPTHER

## 2025-03-24 RX ORDER — ROCURONIUM BROMIDE 10 MG/ML
INJECTION, SOLUTION INTRAVENOUS
Status: DISCONTINUED | OUTPATIENT
Start: 2025-03-24 | End: 2025-03-24 | Stop reason: SDUPTHER

## 2025-03-24 RX ORDER — DEXAMETHASONE SODIUM PHOSPHATE 4 MG/ML
INJECTION, SOLUTION INTRA-ARTICULAR; INTRALESIONAL; INTRAMUSCULAR; INTRAVENOUS; SOFT TISSUE
Status: DISCONTINUED | OUTPATIENT
Start: 2025-03-24 | End: 2025-03-24 | Stop reason: SDUPTHER

## 2025-03-24 RX ORDER — MIDAZOLAM HYDROCHLORIDE 2 MG/2ML
2 INJECTION, SOLUTION INTRAMUSCULAR; INTRAVENOUS
Status: DISCONTINUED | OUTPATIENT
Start: 2025-03-24 | End: 2025-03-24 | Stop reason: HOSPADM

## 2025-03-24 RX ORDER — LIDOCAINE HYDROCHLORIDE 20 MG/ML
INJECTION, SOLUTION EPIDURAL; INFILTRATION; INTRACAUDAL; PERINEURAL
Status: DISCONTINUED | OUTPATIENT
Start: 2025-03-24 | End: 2025-03-24 | Stop reason: SDUPTHER

## 2025-03-24 RX ORDER — ACETAMINOPHEN 500 MG
1000 TABLET ORAL ONCE
Status: COMPLETED | OUTPATIENT
Start: 2025-03-24 | End: 2025-03-24

## 2025-03-24 RX ORDER — OXYCODONE HYDROCHLORIDE 5 MG/1
5 TABLET ORAL
Status: DISCONTINUED | OUTPATIENT
Start: 2025-03-24 | End: 2025-03-24 | Stop reason: HOSPADM

## 2025-03-24 RX ORDER — NALOXONE HYDROCHLORIDE 0.4 MG/ML
INJECTION, SOLUTION INTRAMUSCULAR; INTRAVENOUS; SUBCUTANEOUS PRN
Status: DISCONTINUED | OUTPATIENT
Start: 2025-03-24 | End: 2025-03-24 | Stop reason: HOSPADM

## 2025-03-24 RX ORDER — FENTANYL CITRATE 50 UG/ML
100 INJECTION, SOLUTION INTRAMUSCULAR; INTRAVENOUS
Status: DISCONTINUED | OUTPATIENT
Start: 2025-03-24 | End: 2025-03-24 | Stop reason: HOSPADM

## 2025-03-24 RX ORDER — EPHEDRINE SULFATE 5 MG/ML
INJECTION INTRAVENOUS
Status: DISCONTINUED | OUTPATIENT
Start: 2025-03-24 | End: 2025-03-24 | Stop reason: SDUPTHER

## 2025-03-24 RX ORDER — ONDANSETRON 2 MG/ML
4 INJECTION INTRAMUSCULAR; INTRAVENOUS
Status: DISCONTINUED | OUTPATIENT
Start: 2025-03-24 | End: 2025-03-24 | Stop reason: HOSPADM

## 2025-03-24 RX ADMIN — Medication 160 MG: at 12:25

## 2025-03-24 RX ADMIN — EPHEDRINE SULFATE 5 MG: 5 INJECTION INTRAVENOUS at 12:26

## 2025-03-24 RX ADMIN — ROCURONIUM BROMIDE 5 MG: 10 INJECTION, SOLUTION INTRAVENOUS at 12:24

## 2025-03-24 RX ADMIN — SODIUM CHLORIDE, SODIUM LACTATE, POTASSIUM CHLORIDE, AND CALCIUM CHLORIDE: .6; .31; .03; .02 INJECTION, SOLUTION INTRAVENOUS at 11:51

## 2025-03-24 RX ADMIN — DEXAMETHASONE SODIUM PHOSPHATE 4 MG: 4 INJECTION, SOLUTION INTRAMUSCULAR; INTRAVENOUS at 12:53

## 2025-03-24 RX ADMIN — SODIUM CHLORIDE, PRESERVATIVE FREE 10 ML: 5 INJECTION INTRAVENOUS at 08:48

## 2025-03-24 RX ADMIN — ACETAMINOPHEN 1000 MG: 500 TABLET, FILM COATED ORAL at 11:50

## 2025-03-24 RX ADMIN — GLYCOPYRROLATE 0.2 MG: 0.2 INJECTION INTRAMUSCULAR; INTRAVENOUS at 12:37

## 2025-03-24 RX ADMIN — INSULIN LISPRO 4 UNITS: 100 INJECTION, SOLUTION INTRAVENOUS; SUBCUTANEOUS at 20:55

## 2025-03-24 RX ADMIN — METOPROLOL SUCCINATE 25 MG: 25 TABLET, EXTENDED RELEASE ORAL at 08:46

## 2025-03-24 RX ADMIN — SODIUM CHLORIDE, PRESERVATIVE FREE 10 ML: 5 INJECTION INTRAVENOUS at 20:27

## 2025-03-24 RX ADMIN — ONDANSETRON 4 MG: 2 INJECTION INTRAMUSCULAR; INTRAVENOUS at 12:53

## 2025-03-24 RX ADMIN — ROSUVASTATIN CALCIUM 5 MG: 10 TABLET, FILM COATED ORAL at 20:26

## 2025-03-24 RX ADMIN — FENTANYL CITRATE 50 MCG: 50 INJECTION, SOLUTION INTRAMUSCULAR; INTRAVENOUS at 12:24

## 2025-03-24 RX ADMIN — DIGOXIN 0.25 MG: 125 TABLET ORAL at 08:45

## 2025-03-24 RX ADMIN — CEFTRIAXONE SODIUM 2000 MG: 2 INJECTION, POWDER, FOR SOLUTION INTRAMUSCULAR; INTRAVENOUS at 08:51

## 2025-03-24 RX ADMIN — DILTIAZEM HYDROCHLORIDE 300 MG: 180 CAPSULE, EXTENDED RELEASE ORAL at 08:44

## 2025-03-24 RX ADMIN — PROPOFOL 180 MG: 10 INJECTION, EMULSION INTRAVENOUS at 12:24

## 2025-03-24 RX ADMIN — LINEZOLID 600 MG: 600 TABLET, FILM COATED ORAL at 20:26

## 2025-03-24 RX ADMIN — LINEZOLID 600 MG: 600 TABLET, FILM COATED ORAL at 08:45

## 2025-03-24 RX ADMIN — LIDOCAINE HYDROCHLORIDE 100 MG: 20 INJECTION, SOLUTION EPIDURAL; INFILTRATION; INTRACAUDAL; PERINEURAL at 12:24

## 2025-03-24 RX ADMIN — Medication 3 MG: at 22:47

## 2025-03-24 ASSESSMENT — PAIN - FUNCTIONAL ASSESSMENT: PAIN_FUNCTIONAL_ASSESSMENT: 0-10

## 2025-03-24 NOTE — PROGRESS NOTES
Admit Date: 3/18/2025      Subjective:     Brenda Mayer is POD  Procedure(s):  RIGHT NEPHROLITHOTOMY PERCUTANEOUS / ALREADY HAS TUBE PLACED    No new complaints. NPO for procedure.    Objective:     Patient Vitals for the past 8 hrs:   BP Temp Temp src Pulse Resp SpO2 Weight   03/24/25 0844 116/63 -- -- 77 -- -- --   03/24/25 0841 116/63 -- -- 77 -- -- --   03/24/25 0702 (!) 109/56 97.9 °F (36.6 °C) Oral 80 17 91 % --   03/24/25 0420 114/60 97.3 °F (36.3 °C) Oral 93 16 94 % 113.8 kg (250 lb 14.1 oz)     03/24 0701 - 03/24 1900  In: -   Out: 300 [Urine:300]  03/22 1901 - 03/24 0700  In: -   Out: 2275 [Urine:2275]    Physical Exam:  GENERAL ASSESSMENT: alert, oriented to person, place and time, no acute distress and no anxiety, depression or agitation  Chest: normal work of breathing  CVS exam: normal rate, regular rhythm, normal S1, S2, no murmurs, rubs, clicks or gallops.  ABDOMEN: not done  Neurological exam reveals alert, oriented, normal speech, no focal findings or movement disorder noted.  FEMALE GENITOURINARY EXAM: not done  MALE GENITAL EXAM: not done    Data Review   Recent Results (from the past 24 hours)   POCT Glucose    Collection Time: 03/23/25 11:19 AM   Result Value Ref Range    POC Glucose 131 (H) 65 - 100 mg/dL    Performed by: TajThwaprT    POCT Glucose    Collection Time: 03/23/25  5:28 PM   Result Value Ref Range    POC Glucose 133 (H) 65 - 100 mg/dL    Performed by: NyaPCT    POCT Glucose    Collection Time: 03/23/25  8:14 PM   Result Value Ref Range    POC Glucose 134 (H) 65 - 100 mg/dL    Performed by: AlexandrePCJONNY    POCT Glucose    Collection Time: 03/24/25  7:23 AM   Result Value Ref Range    POC Glucose 135 (H) 65 - 100 mg/dL    Performed by: Sondra        Assessment:     Principal Problem:    Kidney stone  Active Problems:    Pyelonephritis    Right kidney stone  Resolved Problems:    * No resolved hospital problems. *    Voiding spontaneously.

## 2025-03-24 NOTE — PROGRESS NOTES
Physician Progress Note      PATIENT:               BRENDA MAYER  [facility] #:                  [id number]  :                       1955  ADMIT DATE:       3/18/2025 6:49 AM  DISCH DATE:  RESPONDING  PROVIDER #:        Geneva Rueda          QUERY TEXT:    Pt admitted with pyonephrosis.  Pt noted to have R ureteral stent. If   possible, please document in the progress notes and discharge summary if you   are evaluating and/or treating any of the following:    The medical record reflects the following:  Risk Factors:  R kidney stone, Hydronephrosis, Pus in kidney, encrusted stent  Clinical Indicators: H&P on 3/18-Brenda Mayer is a 69 y.o. female   with symptomatic R large kidney stone burden with R NT and R ureteral stent   place.  Presents for treatment of R kidney stones today.  Urology PN on 3/81-99-kapi-old female with large R kidney stone burden s/p R   nephro-ureteral stent placement by IR 3/18/25.  R PCNL canceled upon NT   placement due to pus in kidney.  Now febrile to 102.9 with concern for   uro-sepsis secondary to large R kidney stone burden. Pyelonephritis  OP note on 3/18-Massive right hydronephrosis with thick, purulent,   urine/pyonephrosis. This stent is crusted and completely occluded, a Glide   wire could not be passed through its lumen. The encrusted stent snapped in   two, however both portions were removed successfully.  Temp-102.7(3/20)  WBC-12.4(3/19), 12.9(3/20)  Treatment: Removal of stent, R NT to drainage, IV Rocephin, Trend labs,   OOA/Ambulate, IVF, IV ampicillin (OMNIPEN) 1,000 mg, IV gentamicin (GARAMYCIN)   IVPB 80 mg, IV cefazolin (ANCEF) 2000 mg,    Thank you,  NADEEM Mcclure, CDS  Options provided:  -- Pyonephrosis due to ureteral stent  -- Pyonephrosis not due to ureteral stent  -- Other - I will add my own diagnosis  -- Disagree - Not applicable / Not valid  -- Disagree - Clinically unable to determine / Unknown  -- Refer to Clinical Documentation

## 2025-03-24 NOTE — PERIOP NOTE
TRANSFER - OUT REPORT:    Verbal report given to KINGS Katz on Brenda Mayer  being transferred to Room 609 for routine progression of patient care       Report consisted of patient’s Situation, Background, Assessment and   Recommendations(SBAR).     Information from the following report(s) Nurse Handoff Report, Index, Adult Overview, Surgery Report, MAR, Cardiac Rhythm SR-SB with PACs, and Neuro Assessment was reviewed with the receiving nurse.    Lines:   Peripheral IV 03/23/25 Left;Posterior Forearm (Active)   Site Assessment Clean, dry & intact 03/24/25 1421   Line Status Capped 03/24/25 1109   Line Care Connections checked and tightened 03/24/25 1421   Phlebitis Assessment No symptoms 03/24/25 1421   Infiltration Assessment 0 03/24/25 1421   Alcohol Cap Used No 03/24/25 1421   Dressing Status Clean, dry & intact 03/24/25 1421   Dressing Type Transparent 03/24/25 1421       Peripheral IV 03/24/25 Right Hand (Active)   Site Assessment Clean, dry & intact 03/24/25 1421   Line Care Connections checked and tightened 03/24/25 1421   Phlebitis Assessment No symptoms 03/24/25 1421   Infiltration Assessment 0 03/24/25 1421   Alcohol Cap Used No 03/24/25 1421   Dressing Status Clean, dry & intact 03/24/25 1421   Dressing Type Transparent 03/24/25 1421        Opportunity for questions and clarification was provided.      Patient transported with:   O2 @ 2 liters  Tech    VTE prophylaxis orders have been written for Brenda Mayer.    Patient said she had no family here.  Patient also felt a scratch to her face. I mentioned maybe she could get some neosporin from her nurse on 6th floor for comfort there.

## 2025-03-24 NOTE — PROGRESS NOTES
TRANSFER - IN REPORT:    Verbal report received from KINGS Dumont on Brenda Mayer  being received from PACU for routine progression of patient care      Report consisted of patient's Situation, Background, Assessment and   Recommendations(SBAR).     Information from the following report(s) Nurse Handoff Report was reviewed with the receiving nurse.    Opportunity for questions and clarification was provided.      Assessment to be completed upon patient's arrival to unit and then care will be assumed.

## 2025-03-24 NOTE — PROGRESS NOTES
TRANSFER - OUT REPORT:    Verbal report given to KINGS Rankin on Brenda Mayer  being transferred to Pre-Op for ordered procedure       Report consisted of patient's Situation, Background, Assessment and   Recommendations(SBAR).     Information from the following report(s) Nurse Handoff Report was reviewed with the receiving nurse.           Lines:   Peripheral IV 03/23/25 Left;Posterior Forearm (Active)   Site Assessment Clean, dry & intact 03/24/25 0741   Line Status Capped;Flushed 03/24/25 0741   Line Care Connections checked and tightened 03/24/25 0741   Phlebitis Assessment No symptoms 03/24/25 0741   Infiltration Assessment 0 03/24/25 0741   Alcohol Cap Used Yes 03/24/25 0741   Dressing Status Clean, dry & intact 03/24/25 0741   Dressing Type Transparent 03/24/25 0741        Opportunity for questions and clarification was provided.      Patient transported with:  Tech

## 2025-03-24 NOTE — H&P
Update History & Physical    The patient's History and Physical of March 24, 2025 was reviewed with the patient and I examined the patient. There was no change. The surgical site was confirmed by the patient and me.       Plan: The risks, benefits, expected outcome, and alternative to the recommended procedure have been discussed with the patient. Patient understands and wants to proceed with the procedure.     Electronically signed by Jay Nielson MD on 3/24/2025 at 11:56 AM    Expand All Collapse All    Admit Date: 3/18/2025        Subjective:      Brenda Mayer is POD  Procedure(s):  RIGHT NEPHROLITHOTOMY PERCUTANEOUS / ALREADY HAS TUBE PLACED     No new complaints. NPO for procedure.     Objective:      Patient Vitals for the past 8 hrs:    BP Temp Temp src Pulse Resp SpO2 Weight   03/24/25 0844 116/63 -- -- 77 -- -- --   03/24/25 0841 116/63 -- -- 77 -- -- --   03/24/25 0702 (!) 109/56 97.9 °F (36.6 °C) Oral 80 17 91 % --   03/24/25 0420 114/60 97.3 °F (36.3 °C) Oral 93 16 94 % 113.8 kg (250 lb 14.1 oz)      03/24 0701 - 03/24 1900  In: -   Out: 300 [Urine:300]  03/22 1901 - 03/24 0700  In: -   Out: 2275 [Urine:2275]     Physical Exam:  GENERAL ASSESSMENT: alert, oriented to person, place and time, no acute distress and no anxiety, depression or agitation  Chest: normal work of breathing  CVS exam: normal rate, regular rhythm, normal S1, S2, no murmurs, rubs, clicks or gallops.  ABDOMEN: not done  Neurological exam reveals alert, oriented, normal speech, no focal findings or movement disorder noted.  FEMALE GENITOURINARY EXAM: not done  MALE GENITAL EXAM: not done     Data Review   Recent Results         Recent Results (from the past 24 hours)   POCT Glucose     Collection Time: 03/23/25 11:19 AM   Result Value Ref Range     POC Glucose 131 (H) 65 - 100 mg/dL     Performed by: Lianne     POCT Glucose     Collection Time: 03/23/25  5:28 PM   Result Value Ref Range     POC Glucose 133 (H) 65

## 2025-03-24 NOTE — PROGRESS NOTES
Pt is in bed without complaints. Hourly rounds completed and all needs met. Zambrano draining and patent. Right neph tub draining as well. Bed is low, locked, and call light is in reach. Pt encouraged to call for assistance.

## 2025-03-24 NOTE — BRIEF OP NOTE
Brief Postoperative Note      Patient: Brenda Mayer  YOB: 1955  MRN: [id number]    Date of Procedure: 3/24/2025    Pre-Op Diagnosis Codes:      * Kidney stone [N20.0]    Post-Op Diagnosis: Same       Procedure:   Right Percutaneous Nephrolithotomy for stone burden > 2 cm, Percutaneous dilation of nephrostomy tube tract, right antegrade pyelogram, antegrade ureteroscopy, stent placement, intra-operative interpretation of fluoroscopy < 1 hour    Surgeon(s):  Jay Nielson MD    Assistant:  * No surgical staff found *    Anesthesia: General    Estimated Blood Loss (mL): less than 100     Complications: None    Specimens:   ID Type Source Tests Collected by Time Destination   1 : right kidney stone Stone (Calculus) Kidney STONE ANALYSIS Jay Nielson MD 3/24/2025 1344        Implants:  Implant Name Type Inv. Item Serial No.  Lot No. LRB No. Used Action   STENT URET 6FR L26CM HYDR+ PGTL TAPR TIP GRAD BLDR MRK LO - [id number]  STENT URET 6FR L26CM HYDR+ PGTL TAPR TIP GRAD BLDR MRK LO  [employer] [facility] UROLOGY-WD [id number] Right 1 Implanted         Drains:   Urinary Catheter 03/24/25 2 Way (Active)   Catheter Indications Perioperative use for selected surgical procedures 03/24/25 1451   Site Assessment No urethral drainage 03/24/25 1451   Urine Color Bloody 03/24/25 1451   Collection Container Standard 03/24/25 1451   Securement Method Securing device (Describe) 03/24/25 1451   Catheter Best Practices  Drainage tube clipped to bed;Catheter secured to thigh;Tamper seal intact;Bag below bladder;Bag not on floor;Lack of dependent loop in tubing;Drainage bag less than half full 03/24/25 1451   Status Draining;Patent 03/24/25 1451       [REMOVED] Ureteral Drain/Stent 03/18/25 Right Ureter (Removed)   Site Assessment Other (Comment) 03/21/25 1908   Urine Color Yellow 03/24/25 0942   Urine Appearance Clear 03/24/25 0942   Urine Odor Malodorous 03/19/25 1507   Dressing Status Clean, dry &

## 2025-03-24 NOTE — ANESTHESIA POSTPROCEDURE EVALUATION
Department of Anesthesiology  Postprocedure Note    Patient: Brenad Mayer  MRN: [id number]  YOB: 1955  Date of evaluation: 3/24/2025    Procedure Summary       Date: 03/24/25 Room / Location: [facility] MAIN OR  / [facility] MAIN OR    Anesthesia Start: 1218 Anesthesia Stop: 1423    Procedure: RIGHT NEPHROLITHOTOMY PERCUTANEOUS (Right: Kidney) Diagnosis:       Kidney stone      (Kidney stone [N20.0])    Providers: Jay Nielson MD Responsible Provider: Girma Salazar MD    Anesthesia Type: General ASA Status: 3            Anesthesia Type: General    Casimiro Phase I: Casimiro Score: 8    Casimiro Phase II:      Anesthesia Post Evaluation    Patient location during evaluation: PACU  Patient participation: complete - patient participated  Level of consciousness: awake and alert  Airway patency: patent  Nausea & Vomiting: no nausea and no vomiting  Cardiovascular status: hemodynamically stable  Respiratory status: acceptable, nonlabored ventilation and spontaneous ventilation  Hydration status: euvolemic  Comments: /62   Pulse 67   Temp 98.4 °F (36.9 °C) (Temporal)   Resp 15   Ht 1.702 m (5' 7\")   Wt 113.8 kg (250 lb 14.1 oz)   SpO2 97%   BMI 39.29 kg/m²     Multimodal analgesia pain management approach  Pain management: adequate and satisfactory to patient    No notable events documented.

## 2025-03-25 ENCOUNTER — APPOINTMENT (OUTPATIENT)
Dept: CT IMAGING | Age: 70
DRG: 659 | End: 2025-03-25
Attending: UROLOGY
Payer: MEDICARE

## 2025-03-25 VITALS
HEART RATE: 70 BPM | OXYGEN SATURATION: 94 % | SYSTOLIC BLOOD PRESSURE: 140 MMHG | HEIGHT: 67 IN | DIASTOLIC BLOOD PRESSURE: 71 MMHG | BODY MASS INDEX: 39.38 KG/M2 | WEIGHT: 250.88 LBS | TEMPERATURE: 97.5 F | RESPIRATION RATE: 16 BRPM

## 2025-03-25 PROBLEM — N20.0 RIGHT KIDNEY STONE: Status: RESOLVED | Noted: 2025-03-18 | Resolved: 2025-03-25

## 2025-03-25 PROBLEM — N12 PYELONEPHRITIS: Status: RESOLVED | Noted: 2025-03-18 | Resolved: 2025-03-25

## 2025-03-25 PROBLEM — N20.0 KIDNEY STONE: Status: RESOLVED | Noted: 2024-08-26 | Resolved: 2025-03-25

## 2025-03-25 LAB
ANION GAP SERPL CALC-SCNC: 13 MMOL/L (ref 7–16)
BUN SERPL-MCNC: 18 MG/DL (ref 8–23)
CALCIUM SERPL-MCNC: 9.1 MG/DL (ref 8.8–10.2)
CHLORIDE SERPL-SCNC: 104 MMOL/L (ref 98–107)
CO2 SERPL-SCNC: 24 MMOL/L (ref 20–29)
CREAT SERPL-MCNC: 0.93 MG/DL (ref 0.6–1.1)
ERYTHROCYTE [DISTWIDTH] IN BLOOD BY AUTOMATED COUNT: 16.1 % (ref 11.9–14.6)
GLUCOSE BLD STRIP.AUTO-MCNC: 160 MG/DL (ref 65–100)
GLUCOSE BLD STRIP.AUTO-MCNC: 164 MG/DL (ref 65–100)
GLUCOSE BLD STRIP.AUTO-MCNC: 169 MG/DL (ref 65–100)
GLUCOSE SERPL-MCNC: 189 MG/DL (ref 70–99)
HCT VFR BLD AUTO: 34.4 % (ref 35.8–46.3)
HGB BLD-MCNC: 10.4 G/DL (ref 11.7–15.4)
MCH RBC QN AUTO: 26.9 PG (ref 26.1–32.9)
MCHC RBC AUTO-ENTMCNC: 30.2 G/DL (ref 31.4–35)
MCV RBC AUTO: 88.9 FL (ref 82–102)
NRBC # BLD: 0 K/UL (ref 0–0.2)
PLATELET # BLD AUTO: 362 K/UL (ref 150–450)
PMV BLD AUTO: 10 FL (ref 9.4–12.3)
POTASSIUM SERPL-SCNC: 4.5 MMOL/L (ref 3.5–5.1)
RBC # BLD AUTO: 3.87 M/UL (ref 4.05–5.2)
SERVICE CMNT-IMP: ABNORMAL
SODIUM SERPL-SCNC: 140 MMOL/L (ref 136–145)
WBC # BLD AUTO: 15.3 K/UL (ref 4.3–11.1)

## 2025-03-25 PROCEDURE — 85027 COMPLETE CBC AUTOMATED: CPT

## 2025-03-25 PROCEDURE — 6370000000 HC RX 637 (ALT 250 FOR IP): Performed by: NURSE PRACTITIONER

## 2025-03-25 PROCEDURE — 6360000002 HC RX W HCPCS: Performed by: UROLOGY

## 2025-03-25 PROCEDURE — 6370000000 HC RX 637 (ALT 250 FOR IP): Performed by: UROLOGY

## 2025-03-25 PROCEDURE — 36415 COLL VENOUS BLD VENIPUNCTURE: CPT

## 2025-03-25 PROCEDURE — 2580000003 HC RX 258: Performed by: UROLOGY

## 2025-03-25 PROCEDURE — 80048 BASIC METABOLIC PNL TOTAL CA: CPT

## 2025-03-25 PROCEDURE — 74176 CT ABD & PELVIS W/O CONTRAST: CPT

## 2025-03-25 PROCEDURE — 2500000003 HC RX 250 WO HCPCS: Performed by: NURSE PRACTITIONER

## 2025-03-25 PROCEDURE — 82962 GLUCOSE BLOOD TEST: CPT

## 2025-03-25 PROCEDURE — 99231 SBSQ HOSP IP/OBS SF/LOW 25: CPT | Performed by: NURSE PRACTITIONER

## 2025-03-25 RX ORDER — CEPHALEXIN 500 MG/1
500 CAPSULE ORAL 3 TIMES DAILY
Qty: 15 CAPSULE | Refills: 0 | Status: SHIPPED | OUTPATIENT
Start: 2025-03-25 | End: 2025-03-30

## 2025-03-25 RX ORDER — LINEZOLID 600 MG/1
600 TABLET, FILM COATED ORAL EVERY 12 HOURS SCHEDULED
Qty: 6 TABLET | Refills: 0 | Status: SHIPPED | OUTPATIENT
Start: 2025-03-25 | End: 2025-03-28

## 2025-03-25 RX ADMIN — DILTIAZEM HYDROCHLORIDE 300 MG: 180 CAPSULE, EXTENDED RELEASE ORAL at 08:35

## 2025-03-25 RX ADMIN — DIGOXIN 0.25 MG: 125 TABLET ORAL at 08:36

## 2025-03-25 RX ADMIN — DOCUSATE SODIUM 50 MG AND SENNOSIDES 8.6 MG 1 TABLET: 8.6; 5 TABLET, FILM COATED ORAL at 08:35

## 2025-03-25 RX ADMIN — SODIUM CHLORIDE, PRESERVATIVE FREE 10 ML: 5 INJECTION INTRAVENOUS at 08:37

## 2025-03-25 RX ADMIN — LINEZOLID 600 MG: 600 TABLET, FILM COATED ORAL at 08:35

## 2025-03-25 RX ADMIN — METOPROLOL SUCCINATE 25 MG: 25 TABLET, EXTENDED RELEASE ORAL at 08:35

## 2025-03-25 RX ADMIN — CEFTRIAXONE SODIUM 2000 MG: 2 INJECTION, POWDER, FOR SOLUTION INTRAMUSCULAR; INTRAVENOUS at 08:41

## 2025-03-25 RX ADMIN — FUROSEMIDE 40 MG: 40 TABLET ORAL at 08:35

## 2025-03-25 RX ADMIN — POTASSIUM CHLORIDE 40 MEQ: 20 TABLET, EXTENDED RELEASE ORAL at 08:35

## 2025-03-25 NOTE — OP NOTE
[address]  [address]                            OPERATIVE REPORT      PATIENT NAME: CAROLANN GONSALEZ      : 1955  MED REC NO: [id number]                       ROOM: 609  ACCOUNT NO: [id number]                       ADMIT DATE: 2025  PROVIDER: Jay Nielson MD    DATE OF SERVICE:  2025    PREOPERATIVE DIAGNOSES:  Right kidney stones.    POSTOPERATIVE DIAGNOSES:  Right kidney stones.    PROCEDURES PERFORMED:  Right percutaneous nephrolithotomy for stone burden greater than 2 cm, percutaneous dilation of nephrostomy tube tract, right antegrade pyelogram, antegrade ureteroscopy, antegrade ureteral stent placement, and intraoperative interpretation of fluoroscopy, less than 1 hour.    SURGEON:  Jay Nielson MD    ASSISTANT:  Surgical Assistant:  None.    ANESTHESIA:  General.    ESTIMATED BLOOD LOSS:  Less than 100.    SPECIMENS REMOVED:  Right kidney stones for analysis.    INTRAOPERATIVE FINDINGS:  Findings:  No infection.    Other Findings:  Multiple right kidney stones filling the right renal pelvis, right upper, mid, and lower poles, removed.  A few fragments in the right ureter were pushed down into the bladder for easy passage through the Zambrano catheter and urethra.  Visibly stone free in the right kidney and ureter at the end of the case.     COMPLICATIONS:  None.    IMPLANTS:  6 x 26 right ureter stent without strings.    INDICATIONS:  The patient is a 70-year-old female with a history of bilateral kidney stones and recurrent urinary tract infections, who opted to undergo a right-sided percutaneous nephrolithotomy today after she has already had a left-sided percutaneous nephrolithotomy to clear her left-sided kidney stone burden.  After risk-benefit discussion was had, she opted to proceed with the procedure today.    DESCRIPTION OF PROCEDURE:  After informed consent was obtained and the correct

## 2025-03-25 NOTE — PROGRESS NOTES
Hourly rounds performed this shift. Bed lowered and locked. Call light within reach. Neph tube with bloody output.

## 2025-03-25 NOTE — DISCHARGE SUMMARY
Discharge Summary    Date: 3/25/2025  Patient Name: Brenda Mayer    YOB: 1955     Age: 70 y.o.    Admit Date: 3/18/2025  Discharge Date: 3/25/2025  Discharge Condition: Stable    Admission Diagnosis  Kidney stone [N20.0];Pyelonephritis [N12];Right kidney stone [N20.0]      Discharge Diagnosis  Principal Problem (Resolved):    Kidney stone  Active Problems:    * No active hospital problems. *  Resolved Problems:    Pyelonephritis    Right kidney stone      Hospital Stay  Narrative of Hospital Course:  I reviewed the risks/benefits/alternatives for stone treatment today in detail with the patient. Treatment options discussed include medical expulsive therapy (MET) vs. ESWL vs. Ureteroscopy/laser lithotropsy vs. PCNL.       After our discussion, the patient would like to proceed with RIGHT percutaneous nephrolithotomy, possible antegrade stent placement.  Risks of PCNL that we discussed were bleeding, infection, injury to the bladder/ureter/kidney/bowel and surrounding structures, pneumothorax, need for chest tube, need for nephrostomy tube, incomplete fragmentation of stones, steinstrasse, inability to remove all stone fragments in 1 operative setting requiring additional operative intervention in the form of second look PCNL vs. ESWL vs ureteroscopy/laser lithotripsy and/or stent placement, nephro-cutaneous fistula, ureteral stricture, need for ureteral stent, stent migration, stent pain, urinary retention, risks of general anesthesia, recurrent stones.  The patient expressed understanding of the above.       -Consented  -Antibiotic on call to OR  -NPO for procedure.     Patient went to IR for the procedure and when PCN was placed infection was found in the kidney. The PCNL was canceled and she was treated with IV antibiotics. And narrowed per culture. After infection treated she was able to proceed with previously scheduled PCNL>    3/25/25 underwent  Right percutaneous nephrolithotomy for

## 2025-03-25 NOTE — PLAN OF CARE
Problem: Safety - Adult  Goal: Free from fall injury  Outcome: Progressing     Problem: Pain  Goal: Verbalizes/displays adequate comfort level or baseline comfort level  Outcome: Progressing     Problem: Chronic Conditions and Co-morbidities  Goal: Patient's chronic conditions and co-morbidity symptoms are monitored and maintained or improved  Outcome: Progressing     Problem: Discharge Planning  Goal: Discharge to home or other facility with appropriate resources  Outcome: Progressing     Problem: Skin/Tissue Integrity  Goal: Skin integrity remains intact  Description: 1.  Monitor for areas of redness and/or skin breakdown  2.  Assess vascular access sites hourly  3.  Every 4-6 hours minimum:  Change oxygen saturation probe site  4.  Every 4-6 hours:  If on nasal continuous positive airway pressure, respiratory therapy assess nares and determine need for appliance change or resting period  Outcome: Progressing

## 2025-03-25 NOTE — PROGRESS NOTES
Admit Date: 3/18/2025      Subjective:     Brenda Mayer is POD 1 Procedure(s):  RIGHT NEPHROLITHOTOMY PERCUTANEOUS    No new complaints.     Objective:     Patient Vitals for the past 8 hrs:   BP Temp Temp src Pulse Resp SpO2   03/25/25 0818 136/75 98.1 °F (36.7 °C) Oral 56 -- 92 %   03/25/25 0355 116/66 97.3 °F (36.3 °C) Oral 74 16 91 %     03/25 0701 - 03/25 1900  In: 550.5 [P.O.:300]  Out: -   03/23 1901 - 03/25 0700  In: -   Out: 1975 [Urine:1975]    Physical Exam:  GENERAL ASSESSMENT: alert, oriented to person, place and time, no acute distress and no anxiety, depression or agitation  Chest: normal work of breathing  CVS exam: normal rate, regular rhythm, normal S1, S2, no murmurs, rubs, clicks or gallops.  ABDOMEN: not done  Neurological exam reveals alert, oriented, normal speech, no focal findings or movement disorder noted.  FEMALE GENITOURINARY EXAM: not done  MALE GENITAL EXAM: not done      Data Review   Recent Results (from the past 24 hours)   POCT Glucose    Collection Time: 03/24/25 11:16 AM   Result Value Ref Range    POC Glucose 110 (H) 65 - 100 mg/dL    Performed by: Sondra    POCT Glucose    Collection Time: 03/24/25 11:52 AM   Result Value Ref Range    POC Glucose 110 (H) 65 - 100 mg/dL    Performed by: Elva    TYPE AND SCREEN    Collection Time: 03/24/25 11:54 AM   Result Value Ref Range    Crossmatch expiration date 03/27/2025,2359     ABO/Rh A POSITIVE     Antibody Screen NEG    POCT Glucose    Collection Time: 03/24/25  2:50 PM   Result Value Ref Range    POC Glucose 139 (H) 65 - 100 mg/dL    Performed by: Frederick    POCT Glucose    Collection Time: 03/24/25  4:47 PM   Result Value Ref Range    POC Glucose 163 (H) 65 - 100 mg/dL    Performed by: Sondra    POCT Glucose    Collection Time: 03/24/25  8:36 PM   Result Value Ref Range    POC Glucose 286 (H) 65 - 100 mg/dL    Performed by: Danitza    POCT Glucose    Collection Time:

## 2025-03-26 NOTE — PROGRESS NOTES
Pt states upon admission she brought a black wheelchair from home. RN called house supervisor to search IR suite but was not found. Charge nurse communicated with pt and family member that we would follow up and phone number for 6th floor was provided. Discharge paperwork complete.

## 2025-03-27 ENCOUNTER — TELEPHONE (OUTPATIENT)
Dept: UROLOGY | Age: 70
End: 2025-03-27

## 2025-03-27 NOTE — TELEPHONE ENCOUNTER
Patient grabiel Parker called the office to see if her 3/31/25 Cysto appointment could be rescheduled to a Wednesday. The next Wednesday we have open is 4/23/25. Patient grabiel decided to keep 3/31/25 appointment since its sooner.

## 2025-03-28 LAB
FUNGUS IDENTIFICATION: NORMAL
SPECIMEN SOURCE: NORMAL

## 2025-03-30 ENCOUNTER — RESULTS FOLLOW-UP (OUTPATIENT)
Dept: UROLOGY | Age: 70
End: 2025-03-30

## 2025-03-30 RX ORDER — FLUCONAZOLE 150 MG/1
150 TABLET ORAL DAILY
Qty: 3 TABLET | Refills: 0 | Status: SHIPPED | OUTPATIENT
Start: 2025-03-30 | End: 2025-04-02

## 2025-03-30 NOTE — RESULT ENCOUNTER NOTE
Mrs. Mayer, your urine culture did grow yeast in it.  I am sending an antifungal to your pharmacy to help to clear it.     Best Regards,  Dr. Nielson

## 2025-03-31 ENCOUNTER — PROCEDURE VISIT (OUTPATIENT)
Dept: UROLOGY | Age: 70
End: 2025-03-31
Payer: MEDICARE

## 2025-03-31 DIAGNOSIS — N20.0 KIDNEY STONE: Primary | ICD-10-CM

## 2025-03-31 LAB
BILIRUBIN, URINE, POC: NORMAL
BLOOD URINE, POC: NORMAL
GLUCOSE URINE, POC: NEGATIVE MG/DL
KETONES, URINE, POC: 15 MG/DL
LEUKOCYTE ESTERASE, URINE, POC: NORMAL
NITRITE, URINE, POC: NEGATIVE
PH, URINE, POC: 5.5 (ref 4.6–8)
PROTEIN,URINE, POC: 300 MG/DL
SPECIFIC GRAVITY, URINE, POC: 1.01 (ref 1–1.03)
URINALYSIS CLARITY, POC: NORMAL
URINALYSIS COLOR, POC: NORMAL
UROBILINOGEN, POC: NORMAL MG/DL

## 2025-03-31 PROCEDURE — 81003 URINALYSIS AUTO W/O SCOPE: CPT | Performed by: UROLOGY

## 2025-03-31 PROCEDURE — 52310 CYSTOSCOPY AND TREATMENT: CPT | Performed by: UROLOGY

## 2025-03-31 NOTE — PROGRESS NOTES
[facility]  [address]   [address]  [place] [address]  [phone]    Brenda Mayer  : 1955         HPI   70 y.o., female who presents for cystoscopy and R Ureter stent removal.     She is 1 week s/P R PCNL.  Doing well.  Has stent discomfort and hematuria from stent. No fever / chills.     CT after procedure showed she was stone free.       Past Medical History:   Diagnosis Date    Anemia     h/o blood transfusion ~,  iron transfusions ~    Atrial fibrillation ([facility])     Followed by [facility].    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    Sleep apnea     uses CPAP with 2L oxygen    Type 2 diabetes mellitus ([facility])     oral, insulin, and Ozempic - fbs avg 140- A1C 7.5  (2024)- denies hypo episodes     Past Surgical History:   Procedure Laterality Date     SECTION      COLONOSCOPY N/A 2022    COLONOSCOPY/ BMI 56 ROOM 302 performed by Thaddeus Sharif MD at [facility] ENDOSCOPY    CYSTOSCOPY Left 2024    CYSTOSCOPY LEFT URETERAL STENT INSERTION AND LEFT RETROGRADE performed by Michael Short MD at [facility] MAIN OR    CYSTOSCOPY Left 2024    CYSTOSCOPY URETERAL STENT INSERTION performed by Michael Short MD at [facility] MAIN OR    CYSTOSCOPY Left 2024    CYSTOSCOPY RETROGRADE PYELOGRAM performed by Michael Short MD at [facility] MAIN OR    IR GUIDED NEPHROSTOMY CATH PLACEMENT LEFT  10/28/2024    IR NEPHROSTOMY PERCUTANEOUS LEFT 10/28/2024 [facility]

## 2025-08-20 ENCOUNTER — OFFICE VISIT (OUTPATIENT)
Age: 70
End: 2025-08-20
Payer: MEDICARE

## 2025-08-20 VITALS
HEART RATE: 74 BPM | RESPIRATION RATE: 20 BRPM | OXYGEN SATURATION: 95 % | WEIGHT: 268 LBS | SYSTOLIC BLOOD PRESSURE: 115 MMHG | HEIGHT: 67 IN | BODY MASS INDEX: 42.06 KG/M2 | DIASTOLIC BLOOD PRESSURE: 60 MMHG | TEMPERATURE: 98 F

## 2025-08-20 VITALS
HEIGHT: 67 IN | HEART RATE: 72 BPM | DIASTOLIC BLOOD PRESSURE: 78 MMHG | WEIGHT: 268 LBS | BODY MASS INDEX: 42.06 KG/M2 | SYSTOLIC BLOOD PRESSURE: 124 MMHG

## 2025-08-20 DIAGNOSIS — I27.20 PULMONARY HYPERTENSION (HCC): ICD-10-CM

## 2025-08-20 DIAGNOSIS — J98.4 RESTRICTIVE LUNG DISEASE SECONDARY TO OBESITY: ICD-10-CM

## 2025-08-20 DIAGNOSIS — G47.33 OSA (OBSTRUCTIVE SLEEP APNEA): Primary | ICD-10-CM

## 2025-08-20 DIAGNOSIS — I50.32 CHRONIC HEART FAILURE WITH PRESERVED EJECTION FRACTION (HFPEF) (HCC): ICD-10-CM

## 2025-08-20 DIAGNOSIS — E66.813 CLASS 3 OBESITY WITH ALVEOLAR HYPOVENTILATION, SERIOUS COMORBIDITY, AND BODY MASS INDEX (BMI) OF 50.0 TO 59.9 IN ADULT (HCC): ICD-10-CM

## 2025-08-20 DIAGNOSIS — E66.9 RESTRICTIVE LUNG DISEASE SECONDARY TO OBESITY: ICD-10-CM

## 2025-08-20 DIAGNOSIS — I48.11 LONGSTANDING PERSISTENT ATRIAL FIBRILLATION (HCC): Primary | ICD-10-CM

## 2025-08-20 DIAGNOSIS — I10 HYPERTENSION, UNSPECIFIED TYPE: ICD-10-CM

## 2025-08-20 DIAGNOSIS — E66.2 CLASS 3 OBESITY WITH ALVEOLAR HYPOVENTILATION, SERIOUS COMORBIDITY, AND BODY MASS INDEX (BMI) OF 50.0 TO 59.9 IN ADULT (HCC): ICD-10-CM

## 2025-08-20 PROBLEM — R09.02 HYPOXIA: Status: RESOLVED | Noted: 2020-09-10 | Resolved: 2025-08-20

## 2025-08-20 PROCEDURE — 1090F PRES/ABSN URINE INCON ASSESS: CPT | Performed by: INTERNAL MEDICINE

## 2025-08-20 PROCEDURE — 3078F DIAST BP <80 MM HG: CPT | Performed by: INTERNAL MEDICINE

## 2025-08-20 PROCEDURE — 99214 OFFICE O/P EST MOD 30 MIN: CPT | Performed by: INTERNAL MEDICINE

## 2025-08-20 PROCEDURE — G8417 CALC BMI ABV UP PARAM F/U: HCPCS | Performed by: INTERNAL MEDICINE

## 2025-08-20 PROCEDURE — 3017F COLORECTAL CA SCREEN DOC REV: CPT | Performed by: INTERNAL MEDICINE

## 2025-08-20 PROCEDURE — G8400 PT W/DXA NO RESULTS DOC: HCPCS | Performed by: INTERNAL MEDICINE

## 2025-08-20 PROCEDURE — 1123F ACP DISCUSS/DSCN MKR DOCD: CPT | Performed by: INTERNAL MEDICINE

## 2025-08-20 PROCEDURE — 1159F MED LIST DOCD IN RCRD: CPT | Performed by: INTERNAL MEDICINE

## 2025-08-20 PROCEDURE — 1126F AMNT PAIN NOTED NONE PRSNT: CPT | Performed by: INTERNAL MEDICINE

## 2025-08-20 PROCEDURE — G8427 DOCREV CUR MEDS BY ELIG CLIN: HCPCS | Performed by: INTERNAL MEDICINE

## 2025-08-20 PROCEDURE — 3074F SYST BP LT 130 MM HG: CPT | Performed by: INTERNAL MEDICINE

## 2025-08-20 PROCEDURE — 1160F RVW MEDS BY RX/DR IN RCRD: CPT | Performed by: INTERNAL MEDICINE

## 2025-08-20 PROCEDURE — 93000 ELECTROCARDIOGRAM COMPLETE: CPT | Performed by: INTERNAL MEDICINE

## 2025-08-20 PROCEDURE — G2211 COMPLEX E/M VISIT ADD ON: HCPCS | Performed by: INTERNAL MEDICINE

## 2025-08-20 PROCEDURE — 1036F TOBACCO NON-USER: CPT | Performed by: INTERNAL MEDICINE

## 2025-08-20 PROCEDURE — G8428 CUR MEDS NOT DOCUMENT: HCPCS | Performed by: INTERNAL MEDICINE

## (undated) PROCEDURE — 0TC13ZZ EXTIRPATION OF MATTER FROM LEFT KIDNEY, PERCUTANEOUS APPROACH: ICD-10-PCS

## (undated) DEVICE — GARMENT,MEDLINE,DVT,INT,CALF,MED, GEN2: Brand: MEDLINE

## (undated) DEVICE — 3M™ IOBAN™ 2 ANTIMICROBIAL INCISE DRAPE 6650EZ: Brand: IOBAN™ 2

## (undated) DEVICE — SOLUTION IRRIG 1000ML H2O PIC PLAS SHATTERPROOF CONTAINER

## (undated) DEVICE — CONTAINER PREFIL FRMLN 40ML --

## (undated) DEVICE — CATHETER URET 5FR L70CM OPN END SGL LUMN INJ HUB FLEXIMA

## (undated) DEVICE — INTENDED FOR TISSUE SEPARATION, AND OTHER PROCEDURES THAT REQUIRE A SHARP SURGICAL BLADE TO PUNCTURE OR CUT.: Brand: BARD-PARKER ® STAINLESS STEEL BLADES

## (undated) DEVICE — ENDOSCOPIC VALVE WITH ADAPTER.: Brand: SURSEAL® II

## (undated) DEVICE — PAD,ABDOMINAL,5"X9",ST,LF,25/BX: Brand: MEDLINE INDUSTRIES, INC.

## (undated) DEVICE — GLOVE SURG SZ 7 CRM LTX FREE POLYISOPRENE POLYMER BEAD ANTI

## (undated) DEVICE — GUIDEWIRE URO L145CM DIA0.035IN TIP L7CM S STL PTFE BENT

## (undated) DEVICE — DRAPE PT ISOLATN 130 IN X 96 IN

## (undated) DEVICE — SOLUTION IRRIG 3000ML H2O STRL BAG

## (undated) DEVICE — Y-TYPE TUR/BLADDER IRRIGATION SET, REGULATING CLAMP

## (undated) DEVICE — SOLUTION IRRG H2O 3000 ML USP STRL TITAN XL CONTAINER

## (undated) DEVICE — GLOVE SURG SZ 75 L12IN FNGR THK79MIL GRN LTX FREE

## (undated) DEVICE — BAG,DRAINAGE,4L,A/R TOWER,LL,SLIDE TAP: Brand: MEDLINE

## (undated) DEVICE — NITINOL WIRE WITH HYDROPHILIC TIP: Brand: SENSOR

## (undated) DEVICE — CATHETER URETH 22FR BLLN 30CC SIL HYDRGEL 3 W F SPEC SHT

## (undated) DEVICE — FORCEPS BX L240CM JAW DIA2.8MM L CAP W/ NDL MIC MESH TOOTH

## (undated) DEVICE — PACK SURGICAL PROCEDURE KIT CYSTOSCOPY TOTE

## (undated) DEVICE — NDL PRT INJ NSAF BLNT 18GX1.5 --

## (undated) DEVICE — KIT PRB L350MM DIA3.9MM BLU W/ STONE CTCH DISP SWISS

## (undated) DEVICE — BAG DRAIN UROLOGY GENESIS NS

## (undated) DEVICE — FLEXIVA  PULSE  AND  FLEXIVA  PULSE  TRACTIP  LASER  FIBERS  ARE  HIGH  POWER  SINGLE-USE FIBER: Brand: FLEXIVA PULSE

## (undated) DEVICE — GUIDEWIRE UROLOGICAL STR 3 CM 0.038 INX150 CM DUAL-FLEX

## (undated) DEVICE — BANDAGE,GAUZE,BULKEE II,4.5"X4.1YD,STRL: Brand: MEDLINE

## (undated) DEVICE — CATHETER URETH 22FR BLLN 5CC STD LTX 2 W TWO OPP DRNGE EYE

## (undated) DEVICE — SINGLE-USE DIGITAL FLEXIBLE URETEROSCOPE: Brand: LITHOVUE

## (undated) DEVICE — SYRINGE CATH TIP 50ML

## (undated) DEVICE — INFLATION DEVICE: Brand: ENCORE™ 26

## (undated) DEVICE — DUAL LUMEN URETERAL CATHETER

## (undated) DEVICE — ASCOPE 4 CYSTO - STANDARD DEFLECTION: Brand: ASCOPE™ 4 CYSTO

## (undated) DEVICE — SUTURE ETHILON SZ 2-0 L18IN NONABSORBABLE BLK L26MM PS 3/8 585H

## (undated) DEVICE — NITINOL STONE RETRIEVAL DEVICE: Brand: DAKOTA

## (undated) DEVICE — SHEET,DRAPE,53X77,STERILE: Brand: MEDLINE

## (undated) DEVICE — MINOR SPLIT GENERAL: Brand: MEDLINE INDUSTRIES, INC.

## (undated) DEVICE — JELLY,LUBE,STERILE,FLIP TOP,TUBE,4-OZ: Brand: MEDLINE

## (undated) DEVICE — JELLY LUBRICATING 10GM PREFIL SYR LUBE

## (undated) DEVICE — SYR 3ML LL TIP 1/10ML GRAD --

## (undated) DEVICE — DEVICE SECUREMENT AD W/ TRICOT ANCHR PD FOR F LTX SIL CATH

## (undated) DEVICE — Device: Brand: INJETAK ADJUSTABLE TIP NEEDLE 70CM

## (undated) DEVICE — SYR 5ML 1/5 GRAD LL NSAF LF --

## (undated) DEVICE — SOLUTION IRRIG 1000ML STRL H2O USP PLAS POUR BTL

## (undated) DEVICE — DILATOR/SHEATH SET: Brand: 8/10 DILATOR/SHEATH SET

## (undated) DEVICE — Device

## (undated) DEVICE — CANNULA NSL ORAL AD FOR CAPNOFLEX CO2 O2 AIRLFE

## (undated) DEVICE — SOL IRR SOD CHL 0.9% TITAN XL CNTNR 3000ML

## (undated) DEVICE — KENDALL RADIOLUCENT FOAM MONITORING ELECTRODE RECTANGULAR SHAPE: Brand: KENDALL

## (undated) DEVICE — DRAPE C ARM W/ POLY STRP W42XL72IN FOR MOB XR

## (undated) DEVICE — SOLUTION IRRIG 3000ML 0.9% SOD CHL USP UROMATIC PLAS CONT

## (undated) DEVICE — CONTAINER,SPECIMEN,O.R.STRL,4.5OZ: Brand: MEDLINE

## (undated) DEVICE — 1LYRTR 16FR10ML 100%SILI SNAP: Brand: MEDLINE INDUSTRIES, INC.

## (undated) DEVICE — BLOCK BITE AD 60FR W/ VELC STRP ADDRESSES MOST PT AND

## (undated) DEVICE — C-ARM: Brand: UNBRANDED

## (undated) DEVICE — CONNECTOR TBNG OD5-7MM O2 END DISP

## (undated) DEVICE — GARMENT,MEDLINE,DVT,INT,CALF,LG, GEN2: Brand: MEDLINE

## (undated) DEVICE — CATHETER URETH 16FR BLLN 5CC L16IN SIL F INDWL 2 W SHT LEN